# Patient Record
Sex: FEMALE | Race: WHITE | NOT HISPANIC OR LATINO | Employment: OTHER | URBAN - METROPOLITAN AREA
[De-identification: names, ages, dates, MRNs, and addresses within clinical notes are randomized per-mention and may not be internally consistent; named-entity substitution may affect disease eponyms.]

---

## 2017-02-08 RX ORDER — METFORMIN HYDROCHLORIDE 500 MG/1
TABLET ORAL
Qty: 60 TABLET | Refills: 5 | Status: SHIPPED | OUTPATIENT
Start: 2017-02-08 | End: 2017-09-05 | Stop reason: SDUPTHER

## 2017-02-08 RX ORDER — NABUMETONE 500 MG/1
TABLET, FILM COATED ORAL
Qty: 30 TABLET | Refills: 1 | Status: SHIPPED | OUTPATIENT
Start: 2017-02-08 | End: 2017-04-07 | Stop reason: SDUPTHER

## 2017-03-07 RX ORDER — SULFASALAZINE 500 MG/1
TABLET ORAL
Qty: 60 TABLET | Refills: 3 | Status: SHIPPED | OUTPATIENT
Start: 2017-03-07 | End: 2017-07-17 | Stop reason: SDUPTHER

## 2017-03-07 RX ORDER — ATORVASTATIN CALCIUM 40 MG/1
TABLET, FILM COATED ORAL
Qty: 30 TABLET | Refills: 3 | Status: SHIPPED | OUTPATIENT
Start: 2017-03-07 | End: 2017-06-14 | Stop reason: ALTCHOICE

## 2017-04-10 RX ORDER — NABUMETONE 500 MG/1
TABLET, FILM COATED ORAL
Qty: 30 TABLET | Refills: 0 | Status: SHIPPED | OUTPATIENT
Start: 2017-04-10 | End: 2017-06-07 | Stop reason: SDUPTHER

## 2017-05-30 ENCOUNTER — OFFICE VISIT (OUTPATIENT)
Dept: INTERNAL MEDICINE | Facility: CLINIC | Age: 77
End: 2017-05-30
Payer: MEDICARE

## 2017-05-30 VITALS
RESPIRATION RATE: 18 BRPM | BODY MASS INDEX: 31.57 KG/M2 | TEMPERATURE: 99 F | HEIGHT: 58 IN | DIASTOLIC BLOOD PRESSURE: 70 MMHG | OXYGEN SATURATION: 98 % | WEIGHT: 150.38 LBS | SYSTOLIC BLOOD PRESSURE: 112 MMHG | HEART RATE: 80 BPM

## 2017-05-30 DIAGNOSIS — T14.8XXA BITE BY ANIMAL: Primary | ICD-10-CM

## 2017-05-30 PROCEDURE — 1159F MED LIST DOCD IN RCRD: CPT | Performed by: INTERNAL MEDICINE

## 2017-05-30 PROCEDURE — 99213 OFFICE O/P EST LOW 20 MIN: CPT | Mod: S$PBB | Performed by: INTERNAL MEDICINE

## 2017-05-30 PROCEDURE — 1126F AMNT PAIN NOTED NONE PRSNT: CPT | Performed by: INTERNAL MEDICINE

## 2017-05-30 PROCEDURE — 99213 OFFICE O/P EST LOW 20 MIN: CPT | Mod: PBBFAC | Performed by: INTERNAL MEDICINE

## 2017-05-30 PROCEDURE — 99999 PR PBB SHADOW E&M-EST. PATIENT-LVL III: CPT | Mod: PBBFAC,,, | Performed by: INTERNAL MEDICINE

## 2017-05-30 RX ORDER — AMOXICILLIN AND CLAVULANATE POTASSIUM 875; 125 MG/1; MG/1
1 TABLET, FILM COATED ORAL 2 TIMES DAILY
Qty: 14 TABLET | Refills: 0 | Status: SHIPPED | OUTPATIENT
Start: 2017-05-30 | End: 2017-05-30 | Stop reason: SDUPTHER

## 2017-05-30 RX ORDER — AMOXICILLIN AND CLAVULANATE POTASSIUM 875; 125 MG/1; MG/1
1 TABLET, FILM COATED ORAL 2 TIMES DAILY
Qty: 14 TABLET | Refills: 0 | Status: SHIPPED | OUTPATIENT
Start: 2017-05-30 | End: 2017-06-14

## 2017-05-30 NOTE — PROGRESS NOTES
Subjective:       Patient ID: Gretel Ashton is a 76 y.o. female.    Chief Complaint: Animal Bite (mouse bite; right index finger.)      HPI:  Patient presents to clinic with bit on right index finger from mouse. She was trying to remove a trap and she didn't know the trap and two mice on it and the 2nd mouse bite her finger when she reached for the back of the trap. No fevers. No redness to finger yet. Mild pain on tip of finger.     Past Medical History:   Diagnosis Date    Arthritis     Depression     Diabetes mellitus type II     Hyperlipidemia     Hypertension     Pacemaker        Family History   Problem Relation Age of Onset    Cancer Mother     Stroke Father     Cancer Sister     Stroke Maternal Grandmother        Social History     Social History    Marital status:      Spouse name: N/A    Number of children: N/A    Years of education: N/A     Occupational History    Not on file.     Social History Main Topics    Smoking status: Former Smoker     Packs/day: 2.00     Years: 43.00     Types: Cigarettes     Quit date: 1/1/2000    Smokeless tobacco: Never Used    Alcohol use No    Drug use: No    Sexual activity: Not on file     Other Topics Concern    Not on file     Social History Narrative    No narrative on file       Review of Systems   Constitutional: Negative for activity change, fatigue, fever and unexpected weight change.   HENT: Negative for congestion, ear pain, hearing loss, rhinorrhea, sore throat and tinnitus.    Eyes: Negative for pain, redness and visual disturbance.   Respiratory: Negative for cough, shortness of breath and wheezing.    Cardiovascular: Negative for chest pain, palpitations and leg swelling.   Gastrointestinal: Negative for abdominal pain, blood in stool, constipation, diarrhea, nausea and vomiting.   Genitourinary: Negative for dysuria, frequency, pelvic pain and urgency.   Musculoskeletal: Negative for back pain, joint swelling and neck pain.    Skin: Negative for color change, rash and wound.   Neurological: Negative for dizziness, tremors, weakness, light-headedness and headaches.         Objective:      Physical Exam   Constitutional: She is oriented to person, place, and time. She appears well-developed and well-nourished. No distress.   HENT:   Head: Normocephalic and atraumatic.   Right Ear: External ear normal.   Left Ear: External ear normal.   Eyes: Conjunctivae and EOM are normal. Pupils are equal, round, and reactive to light.   Neck: Neck supple. No tracheal deviation present.   Cardiovascular: Normal rate and regular rhythm.    Pulmonary/Chest: Effort normal. No respiratory distress.   Abdominal: Soft. Bowel sounds are normal. She exhibits no distension.   Neurological: She is alert and oriented to person, place, and time. No cranial nerve deficit.   Skin: Skin is warm and dry.   Small bite laura tip of right index finger   Psychiatric: She has a normal mood and affect. Her behavior is normal.   Vitals reviewed.      Assessment:       1. Bite by animal        Plan:       Gretel was seen today for animal bite.    Diagnoses and all orders for this visit:    Bite by animal  -     Discontinue: amoxicillin-clavulanate 875-125mg (AUGMENTIN) 875-125 mg per tablet; Take 1 tablet by mouth 2 (two) times daily.  -     amoxicillin-clavulanate 875-125mg (AUGMENTIN) 875-125 mg per tablet; Take 1 tablet by mouth 2 (two) times daily.    looks good. Will print antibx in case gets red, hot, swollen, or fevers in next 24-48 hours since injury  Just occurred a few hours ago. But may not need antibx.    Call if worsening

## 2017-05-30 NOTE — PATIENT INSTRUCTIONS
Animal Bite (General)  An animal bite can cause a wound deep enough to break the skin. In such cases, the wound is cleaned and then closed. Sometimes, the wound is not closed completely. This is so that fluid can drain if the wound becomes infected. In addition to wound care, a tetanus shot may be given, if needed.    Home care  · Care for the wound as directed. If a dressing was applied to the wound, be sure to change it as directed.  · Wash your hands well with soap and warm water before and after caring for the wound. This helps lower the risk of infection.  · If the wound bleeds, place a clean, soft cloth on the wound. Then firmly apply pressure until the bleeding stops. This may take up to 5 minutes. Do not release the pressure and look at the wound during this time.  · Most skin wounds heal within 10 days. But an infection can occur even with proper treatment. So be sure to watch the wound for signs of infection (see below). Check the wound as often as directed by your health care provider.  · Antibiotics may be prescribed. These help prevent or treat infection. If youre given antibiotics, take them as directed. Also be sure to complete the medications.  Rabies Prevention  Rabies is a virus that can be carried in certain animals. These can include domestic animals such as dogs and cats. Wild animals such as skunks, raccoons, foxes, and bats can also carry rabies. Pets fully vaccinated against rabies (2 shots) are at very low risk of infection. But because human rabies is almost always fatal, any biting pet should be confined for 10 days as an extra precaution. In general, if there is a risk for rabies, the following steps may need to be taken:  · If someones pet dog or cat has bitten you, it should be kept in a secure area for the next 10 days to watch for signs of illness. (If the pet owner wont allow this, contact your local animal control center.) If the dog or cat becomes ill or dies during that time,  contact your local animal control center at once so the animal may be tested for rabies. If the pet stays healthy for the next 10 days, there is no danger of rabies in the animal or you.  · If a stray pet bit you, contact your local animal control center. They can give information on capture, quarantine, and animal rabies testing.  · If you cant locate the animal that bit you in the next 2 days, and if rabies exists in your region, you may need to receive the rabies vaccine series. Call your health care provider right away. Or, return to the emergency department promptly.  · All animal bites should be reported to the local animal control center. If you were not given a form to fill out, you can report this yourself.  Follow-up care  Follow up with your health care provider, or as directed.  When to seek medical advice  Call your health care provider right away if any of these occur:  · Signs of infection:  ¨ Spreading redness or warmth from the wound  ¨ Increased pain or swelling  ¨ Fever of 100.4ºF (38ºC) or higher, or as directed by your health care provider  ¨ Colored fluid or pus draining from the wound  · Signs of rabies infection:  ¨ Headache  ¨ Confusion  ¨ Strange behavior  ¨ Seizure  · Decreased ability to move any body part near the bite area  · Bleeding that cannot be stopped after 5 minutes of firm pressure  Date Last Reviewed: 3/23/2015  © 4009-1378 [employer]. [address] [place] [address], [place] [address]. All rights reserved. This information is not intended as a substitute for professional medical care. Always follow your healthcare professional's instructions.

## 2017-06-07 ENCOUNTER — CLINICAL SUPPORT (OUTPATIENT)
Dept: INTERNAL MEDICINE | Facility: CLINIC | Age: 77
End: 2017-06-07
Payer: MEDICARE

## 2017-06-07 DIAGNOSIS — E78.5 HYPERLIPIDEMIA, UNSPECIFIED HYPERLIPIDEMIA TYPE: ICD-10-CM

## 2017-06-07 DIAGNOSIS — E11.9 CONTROLLED TYPE 2 DIABETES MELLITUS WITHOUT COMPLICATION, WITHOUT LONG-TERM CURRENT USE OF INSULIN: ICD-10-CM

## 2017-06-07 LAB
ALBUMIN SERPL BCP-MCNC: 3.4 G/DL
ALP SERPL-CCNC: 78 U/L
ALT SERPL W/O P-5'-P-CCNC: 14 U/L
ANION GAP SERPL CALC-SCNC: 8 MMOL/L
AST SERPL-CCNC: 21 U/L
BASOPHILS # BLD AUTO: 0.02 K/UL
BASOPHILS NFR BLD: 0.4 %
BILIRUB SERPL-MCNC: 0.3 MG/DL
BUN SERPL-MCNC: 19 MG/DL
CALCIUM SERPL-MCNC: 9.4 MG/DL
CHLORIDE SERPL-SCNC: 103 MMOL/L
CHOLEST/HDLC SERPL: 4.3 {RATIO}
CO2 SERPL-SCNC: 31 MMOL/L
CREAT SERPL-MCNC: 0.7 MG/DL
CREAT UR-MCNC: 103.2 MG/DL
DIFFERENTIAL METHOD: ABNORMAL
EOSINOPHIL # BLD AUTO: 0.1 K/UL
EOSINOPHIL NFR BLD: 2.2 %
ERYTHROCYTE [DISTWIDTH] IN BLOOD BY AUTOMATED COUNT: 16.9 %
EST. GFR  (AFRICAN AMERICAN): >60 ML/MIN/1.73 M^2
EST. GFR  (NON AFRICAN AMERICAN): >60 ML/MIN/1.73 M^2
GLUCOSE SERPL-MCNC: 92 MG/DL
HCT VFR BLD AUTO: 36.2 %
HDL/CHOLESTEROL RATIO: 23.5 %
HDLC SERPL-MCNC: 132 MG/DL
HDLC SERPL-MCNC: 31 MG/DL
HGB BLD-MCNC: 11.2 G/DL
LDLC SERPL CALC-MCNC: 86.4 MG/DL
LYMPHOCYTES # BLD AUTO: 1.5 K/UL
LYMPHOCYTES NFR BLD: 30 %
MCH RBC QN AUTO: 25.7 PG
MCHC RBC AUTO-ENTMCNC: 30.9 %
MCV RBC AUTO: 83 FL
MICROALBUMIN UR DL<=1MG/L-MCNC: 7 UG/ML
MICROALBUMIN/CREATININE RATIO: 6.8 UG/MG
MONOCYTES # BLD AUTO: 0.6 K/UL
MONOCYTES NFR BLD: 11.6 %
NEUTROPHILS # BLD AUTO: 2.9 K/UL
NEUTROPHILS NFR BLD: 55.8 %
NONHDLC SERPL-MCNC: 101 MG/DL
PLATELET # BLD AUTO: 239 K/UL
PMV BLD AUTO: 12.1 FL
POTASSIUM SERPL-SCNC: 4.2 MMOL/L
PROT SERPL-MCNC: 7.4 G/DL
RBC # BLD AUTO: 4.36 M/UL
SODIUM SERPL-SCNC: 142 MMOL/L
TRIGL SERPL-MCNC: 73 MG/DL
TSH SERPL DL<=0.005 MIU/L-ACNC: 2.66 UIU/ML
WBC # BLD AUTO: 5.1 K/UL

## 2017-06-07 PROCEDURE — 36415 COLL VENOUS BLD VENIPUNCTURE: CPT | Mod: PBBFAC

## 2017-06-07 PROCEDURE — 80053 COMPREHEN METABOLIC PANEL: CPT

## 2017-06-07 PROCEDURE — 85025 COMPLETE CBC W/AUTO DIFF WBC: CPT

## 2017-06-07 PROCEDURE — 82570 ASSAY OF URINE CREATININE: CPT

## 2017-06-07 PROCEDURE — 84443 ASSAY THYROID STIM HORMONE: CPT

## 2017-06-07 PROCEDURE — 83036 HEMOGLOBIN GLYCOSYLATED A1C: CPT

## 2017-06-07 PROCEDURE — 80061 LIPID PANEL: CPT

## 2017-06-07 RX ORDER — NABUMETONE 500 MG/1
TABLET, FILM COATED ORAL
Qty: 30 TABLET | Refills: 1 | Status: SHIPPED | OUTPATIENT
Start: 2017-06-07 | End: 2017-07-17 | Stop reason: SDUPTHER

## 2017-06-08 LAB
ESTIMATED AVG GLUCOSE: 123 MG/DL
HBA1C MFR BLD HPLC: 5.9 %

## 2017-06-14 ENCOUNTER — OFFICE VISIT (OUTPATIENT)
Dept: INTERNAL MEDICINE | Facility: CLINIC | Age: 77
End: 2017-06-14
Payer: MEDICARE

## 2017-06-14 VITALS
BODY MASS INDEX: 31.79 KG/M2 | HEART RATE: 83 BPM | WEIGHT: 151.44 LBS | DIASTOLIC BLOOD PRESSURE: 56 MMHG | HEIGHT: 58 IN | SYSTOLIC BLOOD PRESSURE: 90 MMHG | RESPIRATION RATE: 16 BRPM | OXYGEN SATURATION: 91 %

## 2017-06-14 DIAGNOSIS — Z29.9 PREVENTIVE MEASURE: ICD-10-CM

## 2017-06-14 DIAGNOSIS — M19.90 ARTHRITIS: ICD-10-CM

## 2017-06-14 DIAGNOSIS — E11.9 CONTROLLED TYPE 2 DIABETES MELLITUS WITHOUT COMPLICATION, WITHOUT LONG-TERM CURRENT USE OF INSULIN: ICD-10-CM

## 2017-06-14 DIAGNOSIS — J44.9 CHRONIC OBSTRUCTIVE PULMONARY DISEASE, UNSPECIFIED COPD TYPE: ICD-10-CM

## 2017-06-14 DIAGNOSIS — E78.5 HYPERLIPIDEMIA, UNSPECIFIED HYPERLIPIDEMIA TYPE: ICD-10-CM

## 2017-06-14 DIAGNOSIS — K52.9 COLITIS: ICD-10-CM

## 2017-06-14 DIAGNOSIS — I10 ESSENTIAL HYPERTENSION: Primary | ICD-10-CM

## 2017-06-14 PROCEDURE — 99214 OFFICE O/P EST MOD 30 MIN: CPT | Mod: S$PBB | Performed by: INTERNAL MEDICINE

## 2017-06-14 PROCEDURE — 99213 OFFICE O/P EST LOW 20 MIN: CPT | Mod: PBBFAC | Performed by: INTERNAL MEDICINE

## 2017-06-14 PROCEDURE — 99999 PR PBB SHADOW E&M-EST. PATIENT-LVL III: CPT | Mod: PBBFAC,,, | Performed by: INTERNAL MEDICINE

## 2017-06-14 PROCEDURE — 90732 PPSV23 VACC 2 YRS+ SUBQ/IM: CPT | Mod: PBBFAC

## 2017-06-14 PROCEDURE — 1159F MED LIST DOCD IN RCRD: CPT | Performed by: INTERNAL MEDICINE

## 2017-06-14 RX ORDER — DOCUSATE SODIUM 100 MG/1
100 CAPSULE, LIQUID FILLED ORAL DAILY
COMMUNITY
End: 2018-05-16

## 2017-06-14 NOTE — PROGRESS NOTES
Subjective:       Patient ID: Gretel Ashton is a 76 y.o. female.    Chief Complaint: Hypertension (follow up with lab review); Hyperlipidemia; Diabetes; Depression; Arthritis; and Dizziness    Gretel Ashton is a 76 y.o. female who presents for Type II DM, Hypertension, and Hyperlipidemia follow up. Labs were reviewed with patient today.        Hyperlipidemia   This is a chronic problem. The problem is uncontrolled. Recent lipid tests were reviewed and are high. Pertinent negatives include no chest pain or myalgias. Current antihyperlipidemic treatment includes statins. The current treatment provides moderate improvement of lipids.   Diabetes   She presents for her follow-up diabetic visit. She has type 2 diabetes mellitus. Hypoglycemia symptoms include dizziness. Pertinent negatives for hypoglycemia include no confusion, headaches, nervousness/anxiousness or pallor. Associated symptoms include fatigue. Pertinent negatives for diabetes include no chest pain, no polydipsia, no polyphagia and no weakness. There are no hypoglycemic complications. Symptoms are worsening. There are no diabetic complications. Risk factors for coronary artery disease include diabetes mellitus, dyslipidemia, hypertension, obesity, post-menopausal and sedentary lifestyle. Current diabetic treatment includes oral agent (monotherapy). Her weight is stable. She is following a generally healthy diet. Her breakfast blood glucose range is generally 130-140 mg/dl. An ACE inhibitor/angiotensin II receptor blocker is not being taken.   Arthritis   Presents for follow-up visit. Affected location: MCps both hands  Associated symptoms include fatigue. Pertinent negatives include no dysuria, fever or rash.   Dizziness: no hearing loss, no fever, no headaches, no nausea, no vomiting, no weakness, no palpitations and no chest pain.  Medication Refill   Associated symptoms include arthralgias, coughing and fatigue. Pertinent negatives include no chest  pain, chills, congestion, fever, headaches, myalgias, nausea, numbness, rash, sore throat, vomiting or weakness.     Review of Systems   Constitutional: Positive for fatigue. Negative for chills and fever.   HENT: Negative for congestion, hearing loss, sinus pressure and sore throat.    Eyes: Negative for photophobia.   Respiratory: Positive for cough. Negative for choking, chest tightness and wheezing.         On home o2 ; COPD ;sleeps with O2 at night    Cardiovascular: Negative for chest pain and palpitations.   Gastrointestinal: Negative for blood in stool, nausea and vomiting.   Endocrine: Negative for polydipsia and polyphagia.   Genitourinary: Negative for dysuria and hematuria.   Musculoskeletal: Positive for arthralgias and arthritis. Negative for myalgias.   Skin: Negative for pallor and rash.   Neurological: Positive for dizziness. Negative for weakness, numbness and headaches.   Hematological: Does not bruise/bleed easily.   Psychiatric/Behavioral: Negative for confusion, dysphoric mood, hallucinations, sleep disturbance and suicidal ideas. The patient is not nervous/anxious.        Objective:      Physical Exam   Constitutional: She is oriented to person, place, and time. She appears well-developed and well-nourished.   HENT:   Head: Normocephalic and atraumatic.   Right Ear: External ear normal. A middle ear effusion is present.   Left Ear: External ear normal. A middle ear effusion is present.   Nose: Nose normal.   Mouth/Throat: Oropharynx is clear and moist. Mucous membranes are pale.   Eyes: Conjunctivae and EOM are normal. Pupils are equal, round, and reactive to light.   Neck: Normal range of motion. Neck supple. No JVD present. No tracheal deviation present. No thyromegaly present.   Cardiovascular: Normal rate, regular rhythm, normal heart sounds and intact distal pulses.    Pulses:       Dorsalis pedis pulses are 1+ on the right side, and 1+ on the left side.        Posterior tibial pulses are  1+ on the right side, and 1+ on the left side.   Pulmonary/Chest: Effort normal. No respiratory distress. She has no wheezes. She has no rales. She exhibits no tenderness.   Abdominal: Soft. Bowel sounds are normal. She exhibits no distension and no mass. There is no tenderness. There is no rebound and no guarding.   Musculoskeletal: Normal range of motion. She exhibits no edema.   Feet:   Right Foot:   Protective Sensation: 5 sites tested. 5 sites sensed.   Skin Integrity: Negative for ulcer, erythema or dry skin.   Left Foot:   Protective Sensation: 5 sites tested. 5 sites sensed.   Skin Integrity: Negative for ulcer, erythema or dry skin.   Lymphadenopathy:     She has no cervical adenopathy.   Neurological: She is alert and oriented to person, place, and time. She has normal reflexes. No cranial nerve deficit. She exhibits normal muscle tone. Coordination normal.   Skin: Skin is warm and dry.   Psychiatric: She has a normal mood and affect. Her behavior is normal. Judgment and thought content normal.   Nursing note and vitals reviewed.      Assessment:       1. Essential hypertension    2. Controlled type 2 diabetes mellitus without complication, without long-term current use of insulin    3. Hyperlipidemia, unspecified hyperlipidemia type    4. Colitis    5. Chronic obstructive pulmonary disease, unspecified COPD type    6. Arthritis    7. Preventive measure        Plan:   Gretel was seen today for hypertension, hyperlipidemia, diabetes, depression, arthritis and dizziness.    Diagnoses and all orders for this visit:    Essential hypertension  -     CBC auto differential; Future  -     Comprehensive metabolic panel; Future  Resolved.  Controlled type 2 diabetes mellitus without complication, without long-term current use of insulin  -     Hemoglobin A1c; Future  -     Microalbumin/creatinine urine ratio; Future  Patient has controlled Diabetes .  We discussed about diet ;low in calories. Avoid sweats,  sodas.  Also increasing activity;walking 2-3 miles a day.   gave patient  instructions about adherence to plan.  Goal of  A1c  less than 7 % stressed.  Also goal of LDL less than 70 highlighted to patient.  Hyperlipidemia, unspecified hyperlipidemia type  -     Lipid panel; Future  -     TSH; Future  Well controlled.  Continue same medication and dose.  Colitis  Stable;  Continue with sulfasalazine  Chronic obstructive pulmonary disease, unspecified COPD type  Stable.  Continue advair  Arthritis  -     CBC auto differential; Future  Hand arthritis ; helped by relafen     Preventive measure  -     Pneumococcal Polysaccharide Vaccine (23 Valent) (SQ/IM)

## 2017-07-17 RX ORDER — SULFASALAZINE 500 MG/1
TABLET ORAL
Qty: 60 TABLET | Refills: 3 | Status: SHIPPED | OUTPATIENT
Start: 2017-07-17 | End: 2017-12-04 | Stop reason: SDUPTHER

## 2017-07-17 RX ORDER — NABUMETONE 500 MG/1
TABLET, FILM COATED ORAL
Qty: 30 TABLET | Refills: 3 | Status: SHIPPED | OUTPATIENT
Start: 2017-07-17 | End: 2017-12-04 | Stop reason: SDUPTHER

## 2017-07-20 ENCOUNTER — OFFICE VISIT (OUTPATIENT)
Dept: INTERNAL MEDICINE | Facility: CLINIC | Age: 77
End: 2017-07-20
Payer: MEDICARE

## 2017-07-20 VITALS
HEIGHT: 58 IN | WEIGHT: 151.44 LBS | DIASTOLIC BLOOD PRESSURE: 72 MMHG | BODY MASS INDEX: 31.79 KG/M2 | HEART RATE: 81 BPM | RESPIRATION RATE: 14 BRPM | SYSTOLIC BLOOD PRESSURE: 130 MMHG | OXYGEN SATURATION: 92 %

## 2017-07-20 DIAGNOSIS — Z02.6 ENCOUNTER FOR INSURANCE EXAMINATION: Primary | ICD-10-CM

## 2017-07-20 DIAGNOSIS — Z12.31 ENCOUNTER FOR SCREENING MAMMOGRAM FOR MALIGNANT NEOPLASM OF BREAST: ICD-10-CM

## 2017-07-20 PROCEDURE — 99999 PR PBB SHADOW E&M-EST. PATIENT-LVL III: CPT | Mod: PBBFAC,,, | Performed by: INTERNAL MEDICINE

## 2017-07-20 PROCEDURE — 99213 OFFICE O/P EST LOW 20 MIN: CPT | Mod: PBBFAC | Performed by: INTERNAL MEDICINE

## 2017-07-20 PROCEDURE — 99213 OFFICE O/P EST LOW 20 MIN: CPT | Mod: S$PBB | Performed by: INTERNAL MEDICINE

## 2017-07-20 NOTE — PROGRESS NOTES
Subjective:       Patient ID: Gretel Ashton is a 76 y.o. female.    Chief Complaint: Insurance Documents to be completed    Gretel Ashton is a 76 y.o. female  Here for need paperwork filled for her auto insurance brought by her son.  She has been driving since age of 15 yrs.  She reports no issues with driving .  No issues with legs arms; son vouches she is a careful    I see no restrictions.      Review of Systems   Constitutional: Positive for fatigue. Negative for chills and fever.   HENT: Negative for congestion, hearing loss, sinus pressure and sore throat.    Eyes: Negative for photophobia.   Respiratory: Positive for cough. Negative for choking, chest tightness and wheezing.         On home o2 ; COPD ;sleeps with O2 at night    Cardiovascular: Negative for chest pain and palpitations.   Gastrointestinal: Negative for blood in stool, nausea and vomiting.   Endocrine: Negative for polydipsia and polyphagia.   Genitourinary: Negative for dysuria and hematuria.   Musculoskeletal: Positive for arthralgias. Negative for myalgias.   Skin: Negative for pallor and rash.   Neurological: Positive for dizziness. Negative for weakness, numbness and headaches.   Hematological: Does not bruise/bleed easily.   Psychiatric/Behavioral: Negative for confusion, dysphoric mood, hallucinations, sleep disturbance and suicidal ideas. The patient is not nervous/anxious.        Objective:      Physical Exam   Constitutional: She is oriented to person, place, and time. She appears well-developed and well-nourished.   HENT:   Head: Normocephalic and atraumatic.   Nose: Nose normal.   Mouth/Throat: Oropharynx is clear and moist. Mucous membranes are pale.   Eyes: Conjunctivae and EOM are normal. Pupils are equal, round, and reactive to light.   Neck: Normal range of motion. Neck supple. No JVD present. No tracheal deviation present. No thyromegaly present.   Cardiovascular: Normal rate, regular rhythm, normal heart sounds and  intact distal pulses.    Pulmonary/Chest: Effort normal. No respiratory distress. She has no wheezes. She has no rales. She exhibits no tenderness.   Abdominal: Soft. Bowel sounds are normal. She exhibits no distension and no mass. There is no tenderness. There is no rebound and no guarding.   Musculoskeletal: Normal range of motion. She exhibits no edema.   Lymphadenopathy:     She has no cervical adenopathy.   Neurological: She is alert and oriented to person, place, and time. She has normal reflexes. No cranial nerve deficit. She exhibits normal muscle tone. Coordination normal.   Skin: Skin is warm and dry.   Psychiatric: She has a normal mood and affect. Her behavior is normal. Judgment and thought content normal.   Nursing note and vitals reviewed.      Assessment:       1. Encounter for insurance examination    2. Encounter for screening mammogram for malignant neoplasm of breast        Plan:   Gretel was seen today for insurance documents to be completed.    Diagnoses and all orders for this visit:    Encounter for insurance examination  Paperwork completed    Encounter for screening mammogram for malignant neoplasm of breast  -     Mammo Digital Screening Bilat with CAD; Future

## 2017-07-21 ENCOUNTER — HOSPITAL ENCOUNTER (OUTPATIENT)
Dept: RADIOLOGY | Facility: HOSPITAL | Age: 77
Discharge: HOME | End: 2017-07-21
Attending: INTERNAL MEDICINE
Payer: MEDICARE

## 2017-07-21 VITALS — BODY MASS INDEX: 34.15 KG/M2 | WEIGHT: 200 LBS | HEIGHT: 64 IN

## 2017-07-21 DIAGNOSIS — Z12.31 ENCOUNTER FOR SCREENING MAMMOGRAM FOR MALIGNANT NEOPLASM OF BREAST: ICD-10-CM

## 2017-07-21 PROCEDURE — 77067 SCR MAMMO BI INCL CAD: CPT | Mod: 26,,, | Performed by: RADIOLOGY

## 2017-07-21 PROCEDURE — 77067 SCR MAMMO BI INCL CAD: CPT | Mod: TC

## 2017-07-21 PROCEDURE — 77063 BREAST TOMOSYNTHESIS BI: CPT | Mod: 26,,, | Performed by: RADIOLOGY

## 2017-09-05 RX ORDER — METFORMIN HYDROCHLORIDE 500 MG/1
TABLET ORAL
Qty: 60 TABLET | Refills: 5 | Status: SHIPPED | OUTPATIENT
Start: 2017-09-05 | End: 2018-05-01 | Stop reason: SDUPTHER

## 2017-09-05 NOTE — TELEPHONE ENCOUNTER
Requested Prescriptions     Pending Prescriptions Disp Refills    metformin (GLUCOPHAGE) 500 MG tablet 60 tablet 5     Sig: TAKE ONE TABLET BY MOUTH TWICE DAILY AFTER MEALS   LOV: 7/20/17

## 2017-09-06 RX ORDER — METFORMIN HYDROCHLORIDE 500 MG/1
TABLET ORAL
Qty: 60 TABLET | Refills: 5 | Status: SHIPPED | OUTPATIENT
Start: 2017-09-06 | End: 2017-10-02 | Stop reason: SDUPTHER

## 2017-10-02 ENCOUNTER — OFFICE VISIT (OUTPATIENT)
Dept: INTERNAL MEDICINE | Facility: CLINIC | Age: 77
End: 2017-10-02
Payer: MEDICARE

## 2017-10-02 VITALS
DIASTOLIC BLOOD PRESSURE: 68 MMHG | TEMPERATURE: 98 F | WEIGHT: 155.63 LBS | HEART RATE: 83 BPM | HEIGHT: 64 IN | SYSTOLIC BLOOD PRESSURE: 122 MMHG | BODY MASS INDEX: 26.57 KG/M2 | RESPIRATION RATE: 18 BRPM

## 2017-10-02 DIAGNOSIS — J06.9 VIRAL URI WITH COUGH: Primary | ICD-10-CM

## 2017-10-02 PROCEDURE — 99999 PR PBB SHADOW E&M-EST. PATIENT-LVL III: CPT | Mod: PBBFAC,,, | Performed by: INTERNAL MEDICINE

## 2017-10-02 PROCEDURE — 99999 PR STA SHADOW: CPT | Mod: PBBFAC,,, | Performed by: INTERNAL MEDICINE

## 2017-10-02 PROCEDURE — 99999 PR STA SHADOW: CPT | Mod: PBBFAC,,,

## 2017-10-02 PROCEDURE — 96372 THER/PROPH/DIAG INJ SC/IM: CPT | Mod: PBBFAC

## 2017-10-02 PROCEDURE — 99213 OFFICE O/P EST LOW 20 MIN: CPT | Mod: S$PBB | Performed by: INTERNAL MEDICINE

## 2017-10-02 PROCEDURE — 99213 OFFICE O/P EST LOW 20 MIN: CPT | Mod: PBBFAC | Performed by: INTERNAL MEDICINE

## 2017-10-02 RX ORDER — PROMETHAZINE HYDROCHLORIDE AND CODEINE PHOSPHATE 6.25; 1 MG/5ML; MG/5ML
5 SOLUTION ORAL EVERY 6 HOURS PRN
Qty: 120 ML | Refills: 0 | Status: SHIPPED | OUTPATIENT
Start: 2017-10-02 | End: 2017-10-12

## 2017-10-02 RX ORDER — METHYLPREDNISOLONE ACETATE 80 MG/ML
80 INJECTION, SUSPENSION INTRA-ARTICULAR; INTRALESIONAL; INTRAMUSCULAR; SOFT TISSUE
Status: COMPLETED | OUTPATIENT
Start: 2017-10-02 | End: 2017-10-02

## 2017-10-02 RX ORDER — ALBUTEROL SULFATE 90 UG/1
2 AEROSOL, METERED RESPIRATORY (INHALATION) EVERY 6 HOURS PRN
Qty: 1 INHALER | Refills: 5 | Status: SHIPPED | OUTPATIENT
Start: 2017-10-02 | End: 2018-05-16

## 2017-10-02 RX ADMIN — METHYLPREDNISOLONE ACETATE 80 MG: 80 INJECTION, SUSPENSION INTRA-ARTICULAR; INTRALESIONAL; INTRAMUSCULAR; SOFT TISSUE at 01:10

## 2017-10-02 NOTE — PROGRESS NOTES
Subjective:       Patient ID: Gretel Ashton is a 76 y.o. female.    Chief Complaint: Shortness of Breath; Cough; and Nasal Congestion      HPI:  Patient is known to me from acute visit and presnets with cough and congestion. + sore throat and PND. Sx started 3 days ago. Cough is productive. Reports mild SOB. No wheezing. She carries a diagnosis of COPD. She was prescribed Advair in the past but not using currently. No fevers. She has not taken anything OTC yet.     Past Medical History:   Diagnosis Date    Arthritis     Depression     Diabetes mellitus type II     Hyperlipidemia     Hypertension     Pacemaker        Family History   Problem Relation Age of Onset    Cancer Mother     Breast cancer Mother     Stroke Father     Cancer Sister     Breast cancer Sister     Stroke Maternal Grandmother        Social History     Social History    Marital status:      Spouse name: N/A    Number of children: N/A    Years of education: N/A     Occupational History    Not on file.     Social History Main Topics    Smoking status: Former Smoker     Packs/day: 2.00     Years: 43.00     Types: Cigarettes     Quit date: 1/1/2000    Smokeless tobacco: Never Used    Alcohol use No    Drug use: No    Sexual activity: Not on file     Other Topics Concern    Not on file     Social History Narrative    No narrative on file       Review of Systems   Constitutional: Negative for activity change, fatigue, fever and unexpected weight change.   HENT: Positive for congestion, postnasal drip and sore throat. Negative for ear pain, hearing loss and rhinorrhea.    Eyes: Negative for redness and visual disturbance.   Respiratory: Positive for cough and shortness of breath. Negative for wheezing.    Cardiovascular: Negative for chest pain, palpitations and leg swelling.   Gastrointestinal: Negative for abdominal pain, constipation, diarrhea, nausea and vomiting.   Genitourinary: Negative for dysuria, frequency and  urgency.   Musculoskeletal: Negative for back pain, joint swelling and neck pain.   Skin: Negative for color change, rash and wound.   Neurological: Negative for dizziness, tremors, weakness, light-headedness and headaches.         Objective:      Physical Exam   Constitutional: She is oriented to person, place, and time. She appears well-developed and well-nourished. No distress.   HENT:   Head: Normocephalic and atraumatic.   Right Ear: Tympanic membrane and external ear normal.   Left Ear: Tympanic membrane and external ear normal.   Mouth/Throat: Posterior oropharyngeal erythema present. No oropharyngeal exudate or posterior oropharyngeal edema.   Eyes: Conjunctivae and EOM are normal. Pupils are equal, round, and reactive to light. Right eye exhibits no discharge. Left eye exhibits no discharge.   Neck: Neck supple. No tracheal deviation present.   Cardiovascular: Normal rate and regular rhythm.    Murmur heard.  Pulmonary/Chest: Effort normal and breath sounds normal. No respiratory distress. She has no wheezes. She has no rales.   Abdominal: Soft. Bowel sounds are normal. She exhibits no distension. There is no tenderness.   Neurological: She is alert and oriented to person, place, and time. No cranial nerve deficit.   Skin: Skin is warm and dry.   Psychiatric: She has a normal mood and affect. Her behavior is normal.   Vitals reviewed.      Assessment:       1. Viral URI with cough        Plan:       Gretel was seen today for shortness of breath, cough and nasal congestion.    Diagnoses and all orders for this visit:    Viral URI with cough  -     methylPREDNISolone acetate injection 80 mg; Inject 1 mL (80 mg total) into the muscle one time.  -     albuterol 90 mcg/actuation inhaler; Inhale 2 puffs into the lungs every 6 (six) hours as needed for Wheezing.  -     promethazine-codeine 6.25-10 mg/5 ml (PHENERGAN WITH CODEINE) 6.25-10 mg/5 mL syrup; Take 5 mLs by mouth every 6 (six) hours as needed for  Cough.    no wheezing, lungs are clear  Treat as viral URI  Start claritin OTC daily  Call if not getting better or fever > 101 F    RTC PRN and as scheduled with PCP

## 2017-10-02 NOTE — PATIENT INSTRUCTIONS
Viral Upper Respiratory Illness (Adult)  You have a viral upper respiratory illness (URI), which is another term for the common cold. This illness is contagious during the first few days. It is spread through the air by coughing and sneezing. It may also be spread by direct contact (touching the sick person and then touching your own eyes, nose, or mouth). Frequent handwashing will decrease risk of spread. Most viral illnesses go away within 7 to 10 days with rest and simple home remedies. Sometimes the illness may last for several weeks. Antibiotics will not kill a virus, and they are generally not prescribed for this condition.    Home care  · If symptoms are severe, rest at home for the first 2 to 3 days. When you resume activity, don't let yourself get too tired.  · Avoid being exposed to cigarette smoke (yours or others).  · You may use acetaminophen or ibuprofen to control pain and fever, unless another medicine was prescribed. (Note: If you have chronic liver or kidney disease, have ever had a stomach ulcer or gastrointestinal bleeding, or are taking blood-thinning medicines, talk with your healthcare provider before using these medicines.) Aspirin should never be given to anyone under 18 years of age who is ill with a viral infection or fever. It may cause severe liver or brain damage.  · Your appetite may be poor, so a light diet is fine. Avoid dehydration by drinking 6 to 8 glasses of fluids per day (water, soft drinks, juices, tea, or soup). Extra fluids will help loosen secretions in the nose and lungs.  · Over-the-counter cold medicines will not shorten the length of time youre sick, but they may be helpful for the following symptoms: cough, sore throat, and nasal and sinus congestion. (Note: Do not use decongestants if you have high blood pressure.)  Follow-up care  Follow up with your healthcare provider, or as advised.  When to seek medical advice  Call your healthcare provider right away if any  of these occur:  · Cough with lots of colored sputum (mucus)  · Severe headache; face, neck, or ear pain  · Difficulty swallowing due to throat pain  · Fever of 100.4°F (38°C)  Call 911, or get immediate medical care  Call emergency services right away if any of these occur:  · Chest pain, shortness of breath, wheezing, or difficulty breathing  · Coughing up blood  · Inability to swallow due to throat pain  Date Last Reviewed: 9/13/2015  © 7370-3888 [employer]. [address] [place] [address]. All rights reserved. This information is not intended as a substitute for professional medical care. Always follow your healthcare professional's instructions.

## 2017-12-04 RX ORDER — SULFASALAZINE 500 MG/1
TABLET ORAL
Qty: 60 TABLET | Refills: 3 | Status: SHIPPED | OUTPATIENT
Start: 2017-12-04 | End: 2018-04-06 | Stop reason: SDUPTHER

## 2017-12-04 RX ORDER — NABUMETONE 500 MG/1
TABLET, FILM COATED ORAL
Qty: 30 TABLET | Refills: 3 | Status: SHIPPED | OUTPATIENT
Start: 2017-12-04 | End: 2018-04-06 | Stop reason: SDUPTHER

## 2017-12-07 ENCOUNTER — CLINICAL SUPPORT (OUTPATIENT)
Dept: INTERNAL MEDICINE | Facility: CLINIC | Age: 77
End: 2017-12-07
Payer: MEDICARE

## 2017-12-07 DIAGNOSIS — E11.9 CONTROLLED TYPE 2 DIABETES MELLITUS WITHOUT COMPLICATION, WITHOUT LONG-TERM CURRENT USE OF INSULIN: ICD-10-CM

## 2017-12-07 DIAGNOSIS — E78.5 HYPERLIPIDEMIA, UNSPECIFIED HYPERLIPIDEMIA TYPE: ICD-10-CM

## 2017-12-07 DIAGNOSIS — I10 ESSENTIAL HYPERTENSION: ICD-10-CM

## 2017-12-07 DIAGNOSIS — M19.90 ARTHRITIS: ICD-10-CM

## 2017-12-07 LAB
ALBUMIN SERPL BCP-MCNC: 3.3 G/DL
ALP SERPL-CCNC: 78 U/L
ALT SERPL W/O P-5'-P-CCNC: 15 U/L
ANION GAP SERPL CALC-SCNC: 9 MMOL/L
AST SERPL-CCNC: 22 U/L
BASOPHILS # BLD AUTO: 0.02 K/UL
BASOPHILS NFR BLD: 0.5 %
BILIRUB SERPL-MCNC: 0.3 MG/DL
BUN SERPL-MCNC: 15 MG/DL
CALCIUM SERPL-MCNC: 9.2 MG/DL
CHLORIDE SERPL-SCNC: 104 MMOL/L
CHOLEST SERPL-MCNC: 130 MG/DL
CHOLEST/HDLC SERPL: 3.2 {RATIO}
CO2 SERPL-SCNC: 32 MMOL/L
CREAT SERPL-MCNC: 0.7 MG/DL
CREAT UR-MCNC: 78.9 MG/DL
DIFFERENTIAL METHOD: ABNORMAL
EOSINOPHIL # BLD AUTO: 0.1 K/UL
EOSINOPHIL NFR BLD: 3.3 %
ERYTHROCYTE [DISTWIDTH] IN BLOOD BY AUTOMATED COUNT: 16.8 %
EST. GFR  (AFRICAN AMERICAN): >60 ML/MIN/1.73 M^2
EST. GFR  (NON AFRICAN AMERICAN): >60 ML/MIN/1.73 M^2
ESTIMATED AVG GLUCOSE: 105 MG/DL
GLUCOSE SERPL-MCNC: 99 MG/DL
HBA1C MFR BLD HPLC: 5.3 %
HCT VFR BLD AUTO: 36 %
HDLC SERPL-MCNC: 41 MG/DL
HDLC SERPL: 31.5 %
HGB BLD-MCNC: 11.2 G/DL
LDLC SERPL CALC-MCNC: 77.8 MG/DL
LYMPHOCYTES # BLD AUTO: 1.5 K/UL
LYMPHOCYTES NFR BLD: 35.8 %
MCH RBC QN AUTO: 26.4 PG
MCHC RBC AUTO-ENTMCNC: 31.1 G/DL
MCV RBC AUTO: 85 FL
MICROALBUMIN UR DL<=1MG/L-MCNC: 15 UG/ML
MICROALBUMIN/CREATININE RATIO: 19 UG/MG
MONOCYTES # BLD AUTO: 0.6 K/UL
MONOCYTES NFR BLD: 14.3 %
NEUTROPHILS # BLD AUTO: 1.9 K/UL
NEUTROPHILS NFR BLD: 46.1 %
NONHDLC SERPL-MCNC: 89 MG/DL
PLATELET # BLD AUTO: 207 K/UL
PMV BLD AUTO: 11.9 FL
POTASSIUM SERPL-SCNC: 4.1 MMOL/L
PROT SERPL-MCNC: 7 G/DL
RBC # BLD AUTO: 4.25 M/UL
SODIUM SERPL-SCNC: 145 MMOL/L
TRIGL SERPL-MCNC: 56 MG/DL
TSH SERPL DL<=0.005 MIU/L-ACNC: 2.18 UIU/ML
WBC # BLD AUTO: 4.19 K/UL

## 2017-12-07 PROCEDURE — 99999 PR STA SHADOW: CPT | Mod: PBBFAC,,,

## 2017-12-07 PROCEDURE — 80061 LIPID PANEL: CPT

## 2017-12-07 PROCEDURE — 80053 COMPREHEN METABOLIC PANEL: CPT

## 2017-12-07 PROCEDURE — 36415 COLL VENOUS BLD VENIPUNCTURE: CPT | Mod: PBBFAC

## 2017-12-07 PROCEDURE — 83036 HEMOGLOBIN GLYCOSYLATED A1C: CPT

## 2017-12-07 PROCEDURE — 99999 PR PBB SHADOW E&M-EST. PATIENT-LVL I: CPT | Mod: PBBFAC,,,

## 2017-12-07 PROCEDURE — 82570 ASSAY OF URINE CREATININE: CPT

## 2017-12-07 PROCEDURE — 84443 ASSAY THYROID STIM HORMONE: CPT

## 2017-12-07 PROCEDURE — 99211 OFF/OP EST MAY X REQ PHY/QHP: CPT | Mod: PBBFAC

## 2017-12-07 PROCEDURE — 85025 COMPLETE CBC W/AUTO DIFF WBC: CPT

## 2017-12-13 ENCOUNTER — OFFICE VISIT (OUTPATIENT)
Dept: INTERNAL MEDICINE | Facility: CLINIC | Age: 77
End: 2017-12-13
Payer: MEDICARE

## 2017-12-13 VITALS
WEIGHT: 154.31 LBS | DIASTOLIC BLOOD PRESSURE: 72 MMHG | SYSTOLIC BLOOD PRESSURE: 114 MMHG | HEART RATE: 84 BPM | OXYGEN SATURATION: 90 % | HEIGHT: 64 IN | RESPIRATION RATE: 14 BRPM | BODY MASS INDEX: 26.34 KG/M2

## 2017-12-13 DIAGNOSIS — E11.9 CONTROLLED TYPE 2 DIABETES MELLITUS WITHOUT COMPLICATION, WITHOUT LONG-TERM CURRENT USE OF INSULIN: ICD-10-CM

## 2017-12-13 DIAGNOSIS — J44.9 CHRONIC OBSTRUCTIVE PULMONARY DISEASE, UNSPECIFIED COPD TYPE: ICD-10-CM

## 2017-12-13 DIAGNOSIS — K52.9 COLITIS: ICD-10-CM

## 2017-12-13 DIAGNOSIS — M19.90 ARTHRITIS: ICD-10-CM

## 2017-12-13 DIAGNOSIS — E78.5 HYPERLIPIDEMIA, UNSPECIFIED HYPERLIPIDEMIA TYPE: Primary | ICD-10-CM

## 2017-12-13 PROCEDURE — G0008 ADMIN INFLUENZA VIRUS VAC: HCPCS | Mod: PBBFAC

## 2017-12-13 PROCEDURE — 99999 PR STA SHADOW: CPT | Mod: PBBFAC,,, | Performed by: INTERNAL MEDICINE

## 2017-12-13 PROCEDURE — 99214 OFFICE O/P EST MOD 30 MIN: CPT | Mod: S$PBB | Performed by: INTERNAL MEDICINE

## 2017-12-13 PROCEDURE — 99213 OFFICE O/P EST LOW 20 MIN: CPT | Mod: PBBFAC | Performed by: INTERNAL MEDICINE

## 2017-12-13 PROCEDURE — 99999 FLU VACCINE - HIGH DOSE (65+) PRESERVATIVE FREE IM: CPT | Mod: PBBFAC,,,

## 2017-12-13 PROCEDURE — 99999 PR PBB SHADOW E&M-EST. PATIENT-LVL III: CPT | Mod: PBBFAC,,, | Performed by: INTERNAL MEDICINE

## 2017-12-13 NOTE — PROGRESS NOTES
Subjective:       Patient ID: rGetel Ashton is a 77 y.o. female.    Chief Complaint: Hyperlipidemia (follow up with lab review); Hypertension; Depression; Diabetes; and Arthritis    Gretel Ashton is a 77 y.o. female who presents for Type II DM, Hypertension, and Hyperlipidemia follow up. Labs were reviewed with patient today.        Hyperlipidemia   This is a chronic problem. The problem is uncontrolled. Recent lipid tests were reviewed and are high. Pertinent negatives include no chest pain or myalgias. Current antihyperlipidemic treatment includes statins. The current treatment provides moderate improvement of lipids.   Hypertension   This is a chronic problem. The current episode started more than 1 year ago. The problem is controlled. Pertinent negatives include no chest pain, headaches or palpitations. Risk factors for coronary artery disease include sedentary lifestyle, obesity, diabetes mellitus and dyslipidemia. The current treatment provides mild improvement.   Diabetes   She presents for her follow-up diabetic visit. She has type 2 diabetes mellitus. Pertinent negatives for hypoglycemia include no confusion, dizziness, headaches, nervousness/anxiousness or pallor. Pertinent negatives for diabetes include no chest pain, no polydipsia, no polyphagia and no weakness. There are no hypoglycemic complications. Symptoms are worsening. There are no diabetic complications. Risk factors for coronary artery disease include diabetes mellitus, dyslipidemia, hypertension, obesity, post-menopausal and sedentary lifestyle. Current diabetic treatment includes oral agent (monotherapy). Her weight is stable. She is following a generally healthy diet. Her breakfast blood glucose range is generally 130-140 mg/dl. An ACE inhibitor/angiotensin II receptor blocker is not being taken.   Arthritis   Presents for follow-up visit. Affected location: MCps both hands  Pertinent negatives include no dysuria, fever or rash.    Medication Refill   Associated symptoms include arthralgias and coughing. Pertinent negatives include no chest pain, chills, congestion, fever, headaches, myalgias, nausea, numbness, rash, sore throat, vomiting or weakness.     Review of Systems   Constitutional: Negative for chills and fever.   HENT: Negative for congestion, hearing loss, sinus pressure and sore throat.    Eyes: Negative for photophobia.   Respiratory: Positive for cough. Negative for choking, chest tightness and wheezing.         On home o2 ; COPD ;sleeps with O2 at night    Cardiovascular: Negative for chest pain and palpitations.   Gastrointestinal: Negative for blood in stool, nausea and vomiting.   Endocrine: Negative for polydipsia and polyphagia.   Genitourinary: Negative for dysuria and hematuria.   Musculoskeletal: Positive for arthralgias and arthritis. Negative for myalgias.   Skin: Negative for pallor and rash.   Neurological: Negative for dizziness, weakness, numbness and headaches.   Hematological: Does not bruise/bleed easily.   Psychiatric/Behavioral: Negative for confusion, dysphoric mood, hallucinations, sleep disturbance and suicidal ideas. The patient is not nervous/anxious.        Objective:      Physical Exam   Constitutional: She is oriented to person, place, and time. She appears well-developed and well-nourished.   HENT:   Head: Normocephalic and atraumatic.   Nose: Nose normal.   Mouth/Throat: Oropharynx is clear and moist. Mucous membranes are pale.   Eyes: Conjunctivae and EOM are normal. Pupils are equal, round, and reactive to light.   Neck: Normal range of motion. Neck supple. No JVD present. No tracheal deviation present. No thyromegaly present.   Cardiovascular: Normal rate, regular rhythm, normal heart sounds and intact distal pulses.    Pulses:       Dorsalis pedis pulses are 1+ on the right side, and 1+ on the left side.        Posterior tibial pulses are 1+ on the right side, and 1+ on the left side.    Pulmonary/Chest: Effort normal. No respiratory distress. She has no wheezes. She has no rales. She exhibits no tenderness.   Abdominal: Soft. Bowel sounds are normal. She exhibits no distension and no mass. There is no tenderness. There is no rebound and no guarding.   Musculoskeletal: Normal range of motion. She exhibits no edema.   Feet:   Right Foot:   Protective Sensation: 5 sites tested. 5 sites sensed.   Skin Integrity: Negative for ulcer, erythema or dry skin.   Left Foot:   Protective Sensation: 5 sites tested. 5 sites sensed.   Skin Integrity: Negative for ulcer, erythema or dry skin.   Lymphadenopathy:     She has no cervical adenopathy.   Neurological: She is alert and oriented to person, place, and time. She has normal reflexes. No cranial nerve deficit. She exhibits normal muscle tone. Coordination normal.   Skin: Skin is warm and dry.   Psychiatric: She has a normal mood and affect. Her behavior is normal. Judgment and thought content normal.   Nursing note and vitals reviewed.      Assessment:       1. Hyperlipidemia, unspecified hyperlipidemia type    2. Controlled type 2 diabetes mellitus without complication, without long-term current use of insulin    3. Arthritis    4. Colitis    5. Chronic obstructive pulmonary disease, unspecified COPD type        Plan:   Gretel was seen today for hyperlipidemia, hypertension, depression, diabetes and arthritis.    Diagnoses and all orders for this visit:    Hyperlipidemia, unspecified hyperlipidemia type  -     CBC auto differential; Future  -     Comprehensive metabolic panel; Future  -     Lipid panel; Future  Limit the cholesterol in your diet to less than 300 mg per day.   Fats should contribute no more than 20 to 35% of your daily calories.   Less than 7 to 10% of your calories should come from saturated fat.   Avoid saturated fat products e.g., Butter, some oils, meat, and poultry fat contain a lot of saturated fat.   Check food labels for fat and  cholesterol content. Choose the foods with less fat per serving.   Limit the amount of butter and margarine you eat.   Use salad dressings and margarine made with polyunsaturated and monounsaturated fats.   Use egg whites or egg substitutes rather than whole eggs.   Replace whole-milk dairy products with nonfat or low-fat milk, cheese, spreads, and yogurt.   Eat skinless chicken, turkey, fish, and meatless entrees more often than red meat.   Choose lean cuts of meat and trim off all visible fat. Keep portion sizes moderate.   Avoid fatty desserts such as ice cream, cream-filled cakes, and cheesecakes. Choose fresh fruits, nonfat frozen yogurt, Popsicles, etc.   Reduce the amount of fried foods, vending machine food, and fast food you eat.   Eat fruits and vegetables (especially fresh fruits and leafy vegetables), beans, and whole grains daily. The fiber in these foods helps lower cholesterol.   Look for low-fat or nonfat varieties of the foods you like to eat, or look for substitutes.   You may need to exercise 60 minutes a day to prevent weight gain and 90 minutes a day to lose weight.  Controlled type 2 diabetes mellitus without complication, without long-term current use of insulin  -     Hemoglobin A1c; Future  -     Microalbumin/creatinine urine ratio; Future  -     TSH; Future  Diet controlled.  Patient has controlled Diabetes .  We discussed about diet ;low in calories. Avoid sweats, sodas.  Also increasing activity;walking 2-3 miles a day.  gave patient  instructions about adherence to plan.  Goal of  A1c  less than 7 % stressed.  Also goal of LDL less than 70 highlighted to patient.  Arthritis  Prn relafen     Colitis  Stable; continue meds.    Chronic obstructive pulmonary disease, unspecified COPD type  Using advair and home o2 at night

## 2018-02-05 ENCOUNTER — TELEPHONE (OUTPATIENT)
Dept: INTERNAL MEDICINE | Facility: CLINIC | Age: 78
End: 2018-02-05

## 2018-02-05 NOTE — TELEPHONE ENCOUNTER
----- Message from Joce Brooks sent at 2018  4:03 PM CST -----  Contact: MISHA / SON   Gretel Ashton  MRN: [id number]  : 1940  PCP: Jailene Astudillo  Home Phone      [phone]  Work Phone      Not on file.  Mobile          [phone]  Home Phone      Not on file.      MESSAGE: WANTS PT TO BE SEEN TOMORROW. STARTED WITH COLD / COUGH SYMPTOMS AND IS GETTING WORSE. PLEASE CALL     PHONE: [phone]

## 2018-02-06 ENCOUNTER — OFFICE VISIT (OUTPATIENT)
Dept: FAMILY MEDICINE | Facility: CLINIC | Age: 78
End: 2018-02-06
Payer: MEDICARE

## 2018-02-06 VITALS
SYSTOLIC BLOOD PRESSURE: 120 MMHG | DIASTOLIC BLOOD PRESSURE: 76 MMHG | RESPIRATION RATE: 18 BRPM | HEIGHT: 64 IN | TEMPERATURE: 99 F | BODY MASS INDEX: 26.84 KG/M2 | HEART RATE: 100 BPM | WEIGHT: 157.19 LBS

## 2018-02-06 DIAGNOSIS — M19.90 ARTHRITIS: ICD-10-CM

## 2018-02-06 DIAGNOSIS — J01.90 ACUTE SINUSITIS, RECURRENCE NOT SPECIFIED, UNSPECIFIED LOCATION: Primary | ICD-10-CM

## 2018-02-06 PROCEDURE — 99213 OFFICE O/P EST LOW 20 MIN: CPT | Mod: S$PBB | Performed by: FAMILY MEDICINE

## 2018-02-06 PROCEDURE — 96372 THER/PROPH/DIAG INJ SC/IM: CPT | Mod: PBBFAC

## 2018-02-06 PROCEDURE — 99999 PR PBB SHADOW E&M-EST. PATIENT-LVL III: CPT | Mod: PBBFAC,,, | Performed by: FAMILY MEDICINE

## 2018-02-06 PROCEDURE — 99213 OFFICE O/P EST LOW 20 MIN: CPT | Mod: PBBFAC | Performed by: FAMILY MEDICINE

## 2018-02-06 PROCEDURE — 99999 PR STA SHADOW: CPT | Mod: PBBFAC,,,

## 2018-02-06 PROCEDURE — 99999 PR STA SHADOW: CPT | Mod: PBBFAC,,, | Performed by: FAMILY MEDICINE

## 2018-02-06 RX ORDER — METHYLPREDNISOLONE ACETATE 40 MG/ML
20 INJECTION, SUSPENSION INTRA-ARTICULAR; INTRALESIONAL; INTRAMUSCULAR; SOFT TISSUE
Status: COMPLETED | OUTPATIENT
Start: 2018-02-06 | End: 2018-02-06

## 2018-02-06 RX ORDER — CETIRIZINE HYDROCHLORIDE 10 MG/1
10 TABLET ORAL DAILY
Qty: 30 TABLET | Refills: 5 | Status: ON HOLD | OUTPATIENT
Start: 2018-02-06 | End: 2018-05-08

## 2018-02-06 RX ORDER — AZITHROMYCIN 250 MG/1
TABLET, FILM COATED ORAL
Qty: 6 TABLET | Refills: 0 | Status: ON HOLD | OUTPATIENT
Start: 2018-02-06 | End: 2018-05-08

## 2018-02-06 RX ADMIN — METHYLPREDNISOLONE ACETATE 20 MG: 40 INJECTION, SUSPENSION INTRA-ARTICULAR; INTRALESIONAL; INTRAMUSCULAR; SOFT TISSUE at 03:02

## 2018-02-06 NOTE — PROGRESS NOTES
Chief complaint: Sinus congestion    History of present illness: Pt is 77 y.o. female complaints of sinus congestion, facial pressure, sore throat, ear ache.  Patient states glands are swollen and neck.  Patient has a cough.  This started 5 days ago.  Patient denies chest pain, shortness of breath, fever.  Patient has itchy watery eyes, itchy scratchy throat.  The patient complains of decreased hearing.  Cough is nonproductive    Review of systems:  Constitutional-no weight loss, weight gain  HEENT-allergy symptoms such as itchy watery eyes, post nasal drip, itchy palate, come and go.  Respiratory-no wheezing, see history of present illness  Neurological-no weakness or numbness    Past medical history, family history, social history-same as note dated today    Medications-all reviewed and verified in nurses notes.    Physical exam: Vital signs-reviewed and verified in nurses notes  Gen.-alert, oriented, no apparent distress.  Coughing.  Head-positive facial tenderness over the frontal and maxillary sinuses  Eyes: Pupils equal round reactive to light and accommodation, extraocular muscles intact, conjunctiva clear  Ears: Tympanic membranes are clear and mobile, no fluid present.  Nose: Injected mucous membranes, erythematous, mucopurulent discharge  Throat:  Injected red streaky mucosa,  tonsils normal  Neck: Shotty, tender anterior lymphadenopathy  Heart: Regular rate and rhythm, no murmurs, rubs or gallops  Lungs:Lungs were clear to auscultation and percussion, and with normal diaphragmatic excursion. No wheezes or rales were noted.     Assessment/Plan:   Acute sinusitis, recurrence not specified, unspecified location  -     azithromycin (Z-JARED) 250 MG tablet; 2 po day 1, then 1 po q day  Dispense: 6 tablet; Refill: 0  -     cetirizine (ZYRTEC) 10 MG tablet; Take 1 tablet (10 mg total) by mouth once daily.  Dispense: 30 tablet; Refill: 5  -     dextromethorphan-guaifenesin  mg (MUCINEX DM)  mg per 12 hr  tablet; Take 1 tablet by mouth 2 (two) times daily as needed.  Dispense: 20 tablet; Refill: 2  -     methylPREDNISolone acetate injection 20 mg; Inject 0.5 mLs (20 mg total) into the muscle one time.    Arthritis  -     methylPREDNISolone acetate injection 20 mg; Inject 0.5 mLs (20 mg total) into the muscle one time.      Sinusitis, acute  - azithromycin (ZITHROMAX) 500 MG tablet; Take 1 tablet (500 mg total) by mouth once daily.  - cetirizine (ZYRTEC) 10 MG tablet; Take 1 tablet (10 mg total) by mouth once daily.  - diphenhydrAMINE (BENADRYL) 25 mg capsule; Take 1 each (25 mg total) by mouth nightly as needed for Itching.  - methylPREDNISolone acetate injection 60 mg; Inject 1.5 mLs (60 mg total) into the muscle one time.    Simply saline nasal lavage q.2 to 3 hours p.r.n.  Avoid dust, allergens, other sinus irritants.  Handwashing technique discussed to prevent spreading germs.

## 2018-04-09 RX ORDER — SULFASALAZINE 500 MG/1
500 TABLET ORAL 2 TIMES DAILY
Qty: 60 TABLET | Refills: 3 | Status: SHIPPED | OUTPATIENT
Start: 2018-04-09 | End: 2018-10-02 | Stop reason: SDUPTHER

## 2018-04-09 RX ORDER — NABUMETONE 500 MG/1
500 TABLET, FILM COATED ORAL DAILY
Qty: 30 TABLET | Refills: 3 | Status: SHIPPED | OUTPATIENT
Start: 2018-04-09 | End: 2018-10-02 | Stop reason: SDUPTHER

## 2018-05-01 RX ORDER — METFORMIN HYDROCHLORIDE 500 MG/1
TABLET ORAL
Qty: 60 TABLET | Refills: 5 | Status: ON HOLD | OUTPATIENT
Start: 2018-05-01 | End: 2018-05-09

## 2018-05-01 NOTE — TELEPHONE ENCOUNTER
----- Message from Althea Guaman MA sent at 2018 11:24 AM CDT -----  Contact: [facility]  Gretel Ashton  MRN: [id number]  : 1940  PCP: Jailene Astudillo  Home Phone      [phone]  Work Phone      Not on file.  Mobile          [phone]  Home Phone      Not on file.      MESSAGE:   Pt requesting refill or new Rx.   Is this a refill or new RX:  refill  RX name and strength: Metformin  Last office visit: 17  Pharmacy name and location:  [facility]  Comments:      Phone:  [phone]

## 2018-05-08 ENCOUNTER — HOSPITAL ENCOUNTER (OUTPATIENT)
Facility: HOSPITAL | Age: 78
Discharge: HOME | End: 2018-05-09
Attending: SURGERY | Admitting: INTERNAL MEDICINE
Payer: MEDICARE

## 2018-05-08 DIAGNOSIS — J18.9 PNEUMONIA OF RIGHT LOWER LOBE DUE TO INFECTIOUS ORGANISM: ICD-10-CM

## 2018-05-08 DIAGNOSIS — J01.90 ACUTE SINUSITIS, RECURRENCE NOT SPECIFIED, UNSPECIFIED LOCATION: ICD-10-CM

## 2018-05-08 DIAGNOSIS — J20.9 COPD WITH ACUTE BRONCHITIS: Primary | ICD-10-CM

## 2018-05-08 DIAGNOSIS — J44.0 COPD WITH ACUTE BRONCHITIS: Primary | ICD-10-CM

## 2018-05-08 DIAGNOSIS — R06.02 SOB (SHORTNESS OF BREATH): ICD-10-CM

## 2018-05-08 PROBLEM — R79.89 POSITIVE D DIMER: Status: ACTIVE | Noted: 2018-05-08

## 2018-05-08 LAB
ALBUMIN SERPL BCP-MCNC: 3.2 G/DL
ALP SERPL-CCNC: 83 U/L
ALT SERPL W/O P-5'-P-CCNC: 13 U/L
ANION GAP SERPL CALC-SCNC: 6 MMOL/L
APTT BLDCRRT: 24.5 SEC
AST SERPL-CCNC: 19 U/L
BASOPHILS # BLD AUTO: 0.01 K/UL
BASOPHILS NFR BLD: 0.2 %
BILIRUB SERPL-MCNC: 0.3 MG/DL
BILIRUB UR QL STRIP: NEGATIVE
BNP SERPL-MCNC: 69 PG/ML
BUN SERPL-MCNC: 19 MG/DL
CALCIUM SERPL-MCNC: 9.1 MG/DL
CHLORIDE SERPL-SCNC: 105 MMOL/L
CK MB SERPL-MCNC: 2.3 NG/ML
CK MB SERPL-RTO: 2.4 %
CK SERPL-CCNC: 96 U/L
CK SERPL-CCNC: 96 U/L
CLARITY UR: CLEAR
CO2 SERPL-SCNC: 32 MMOL/L
COLOR UR: YELLOW
CREAT SERPL-MCNC: 0.6 MG/DL
D DIMER PPP IA.FEU-MCNC: 1.42 MG/L FEU
DIFFERENTIAL METHOD: ABNORMAL
EOSINOPHIL # BLD AUTO: 0.2 K/UL
EOSINOPHIL NFR BLD: 3.4 %
ERYTHROCYTE [DISTWIDTH] IN BLOOD BY AUTOMATED COUNT: 15.8 %
EST. GFR  (AFRICAN AMERICAN): >60 ML/MIN/1.73 M^2
EST. GFR  (NON AFRICAN AMERICAN): >60 ML/MIN/1.73 M^2
ESTIMATED AVG GLUCOSE: 117 MG/DL
FLUAV AG SPEC QL IA: NEGATIVE
FLUBV AG SPEC QL IA: NEGATIVE
GLUCOSE SERPL-MCNC: 102 MG/DL
GLUCOSE UR QL STRIP: NEGATIVE
HBA1C MFR BLD HPLC: 5.7 %
HCT VFR BLD AUTO: 34.4 %
HGB BLD-MCNC: 10.7 G/DL
HGB UR QL STRIP: NEGATIVE
INR PPP: 1
KETONES UR QL STRIP: NEGATIVE
LACTATE SERPL-SCNC: 0.7 MMOL/L
LEUKOCYTE ESTERASE UR QL STRIP: NEGATIVE
LYMPHOCYTES # BLD AUTO: 1.4 K/UL
LYMPHOCYTES NFR BLD: 30.5 %
MCH RBC QN AUTO: 26.4 PG
MCHC RBC AUTO-ENTMCNC: 31.1 G/DL
MCV RBC AUTO: 85 FL
MONOCYTES # BLD AUTO: 0.6 K/UL
MONOCYTES NFR BLD: 12.7 %
NEUTROPHILS # BLD AUTO: 2.4 K/UL
NEUTROPHILS NFR BLD: 53.2 %
NITRITE UR QL STRIP: NEGATIVE
PH UR STRIP: 7 [PH] (ref 5–8)
PLATELET # BLD AUTO: 199 K/UL
PMV BLD AUTO: 10.9 FL
POCT GLUCOSE: 141 MG/DL (ref 70–110)
POCT GLUCOSE: 153 MG/DL (ref 70–110)
POTASSIUM SERPL-SCNC: 4.2 MMOL/L
PROT SERPL-MCNC: 6.8 G/DL
PROT UR QL STRIP: NEGATIVE
PROTHROMBIN TIME: 10 SEC
RBC # BLD AUTO: 4.05 M/UL
SODIUM SERPL-SCNC: 143 MMOL/L
SP GR UR STRIP: 1.01 (ref 1–1.03)
SPECIMEN SOURCE: NORMAL
TROPONIN I SERPL DL<=0.01 NG/ML-MCNC: <0.006 NG/ML
URN SPEC COLLECT METH UR: NORMAL
UROBILINOGEN UR STRIP-ACNC: NEGATIVE EU/DL
WBC # BLD AUTO: 4.42 K/UL

## 2018-05-08 PROCEDURE — 85610 PROTHROMBIN TIME: CPT

## 2018-05-08 PROCEDURE — 96365 THER/PROPH/DIAG IV INF INIT: CPT

## 2018-05-08 PROCEDURE — 87040 BLOOD CULTURE FOR BACTERIA: CPT

## 2018-05-08 PROCEDURE — 25500020 PHARM REV CODE 255: Performed by: INTERNAL MEDICINE

## 2018-05-08 PROCEDURE — 27000221 HC OXYGEN, UP TO 24 HOURS

## 2018-05-08 PROCEDURE — 25000003 PHARM REV CODE 250: Performed by: SURGERY

## 2018-05-08 PROCEDURE — G0378 HOSPITAL OBSERVATION PER HR: HCPCS

## 2018-05-08 PROCEDURE — 96375 TX/PRO/DX INJ NEW DRUG ADDON: CPT

## 2018-05-08 PROCEDURE — 83605 ASSAY OF LACTIC ACID: CPT

## 2018-05-08 PROCEDURE — 63600175 PHARM REV CODE 636 W HCPCS: Performed by: SURGERY

## 2018-05-08 PROCEDURE — 80053 COMPREHEN METABOLIC PANEL: CPT

## 2018-05-08 PROCEDURE — 82550 ASSAY OF CK (CPK): CPT

## 2018-05-08 PROCEDURE — 94640 AIRWAY INHALATION TREATMENT: CPT

## 2018-05-08 PROCEDURE — 85730 THROMBOPLASTIN TIME PARTIAL: CPT

## 2018-05-08 PROCEDURE — 85025 COMPLETE CBC W/AUTO DIFF WBC: CPT

## 2018-05-08 PROCEDURE — 83036 HEMOGLOBIN GLYCOSYLATED A1C: CPT

## 2018-05-08 PROCEDURE — 27000492 HC SLEEVE, SCD T/L

## 2018-05-08 PROCEDURE — 99220 PR INITIAL OBSERVATION CARE,LEVL III: CPT | Mod: ,,, | Performed by: INTERNAL MEDICINE

## 2018-05-08 PROCEDURE — 94761 N-INVAS EAR/PLS OXIMETRY MLT: CPT

## 2018-05-08 PROCEDURE — 93010 ELECTROCARDIOGRAM REPORT: CPT | Mod: ,,, | Performed by: INTERNAL MEDICINE

## 2018-05-08 PROCEDURE — 84484 ASSAY OF TROPONIN QUANT: CPT

## 2018-05-08 PROCEDURE — 36415 COLL VENOUS BLD VENIPUNCTURE: CPT

## 2018-05-08 PROCEDURE — 25000242 PHARM REV CODE 250 ALT 637 W/ HCPCS: Performed by: SURGERY

## 2018-05-08 PROCEDURE — 85379 FIBRIN DEGRADATION QUANT: CPT

## 2018-05-08 PROCEDURE — 82553 CREATINE MB FRACTION: CPT

## 2018-05-08 PROCEDURE — 81003 URINALYSIS AUTO W/O SCOPE: CPT

## 2018-05-08 PROCEDURE — 83880 ASSAY OF NATRIURETIC PEPTIDE: CPT

## 2018-05-08 PROCEDURE — 82962 GLUCOSE BLOOD TEST: CPT

## 2018-05-08 PROCEDURE — 96376 TX/PRO/DX INJ SAME DRUG ADON: CPT

## 2018-05-08 PROCEDURE — 96366 THER/PROPH/DIAG IV INF ADDON: CPT

## 2018-05-08 PROCEDURE — 87400 INFLUENZA A/B EACH AG IA: CPT | Mod: 59

## 2018-05-08 PROCEDURE — 93005 ELECTROCARDIOGRAM TRACING: CPT

## 2018-05-08 PROCEDURE — 99285 EMERGENCY DEPT VISIT HI MDM: CPT | Mod: 25

## 2018-05-08 RX ORDER — PANTOPRAZOLE SODIUM 40 MG/1
40 TABLET, DELAYED RELEASE ORAL DAILY
Status: DISCONTINUED | OUTPATIENT
Start: 2018-05-08 | End: 2018-05-09 | Stop reason: HOSPADM

## 2018-05-08 RX ORDER — METHYLPREDNISOLONE SOD SUCC 125 MG
80 VIAL (EA) INJECTION EVERY 8 HOURS
Status: DISCONTINUED | OUTPATIENT
Start: 2018-05-08 | End: 2018-05-09

## 2018-05-08 RX ORDER — ACETAMINOPHEN 325 MG/1
650 TABLET ORAL EVERY 8 HOURS PRN
Status: DISCONTINUED | OUTPATIENT
Start: 2018-05-08 | End: 2018-05-09 | Stop reason: HOSPADM

## 2018-05-08 RX ORDER — METFORMIN HYDROCHLORIDE 500 MG/1
500 TABLET ORAL 2 TIMES DAILY WITH MEALS
Status: DISCONTINUED | OUTPATIENT
Start: 2018-05-08 | End: 2018-05-08

## 2018-05-08 RX ORDER — IBUPROFEN 200 MG
16 TABLET ORAL
Status: DISCONTINUED | OUTPATIENT
Start: 2018-05-08 | End: 2018-05-09 | Stop reason: HOSPADM

## 2018-05-08 RX ORDER — IBUPROFEN 200 MG
24 TABLET ORAL
Status: DISCONTINUED | OUTPATIENT
Start: 2018-05-08 | End: 2018-05-09 | Stop reason: HOSPADM

## 2018-05-08 RX ORDER — GLUCAGON 1 MG
1 KIT INJECTION
Status: DISCONTINUED | OUTPATIENT
Start: 2018-05-08 | End: 2018-05-09 | Stop reason: HOSPADM

## 2018-05-08 RX ORDER — INSULIN ASPART 100 [IU]/ML
0-5 INJECTION, SOLUTION INTRAVENOUS; SUBCUTANEOUS
Status: DISCONTINUED | OUTPATIENT
Start: 2018-05-08 | End: 2018-05-09 | Stop reason: HOSPADM

## 2018-05-08 RX ORDER — METHYLPREDNISOLONE SOD SUCC 125 MG
125 VIAL (EA) INJECTION
Status: DISCONTINUED | OUTPATIENT
Start: 2018-05-08 | End: 2018-05-08

## 2018-05-08 RX ORDER — ONDANSETRON 2 MG/ML
4 INJECTION INTRAMUSCULAR; INTRAVENOUS EVERY 8 HOURS PRN
Status: DISCONTINUED | OUTPATIENT
Start: 2018-05-08 | End: 2018-05-09 | Stop reason: HOSPADM

## 2018-05-08 RX ORDER — LEVALBUTEROL 1.25 MG/.5ML
1.25 SOLUTION, CONCENTRATE RESPIRATORY (INHALATION) EVERY 6 HOURS PRN
Status: DISCONTINUED | OUTPATIENT
Start: 2018-05-08 | End: 2018-05-09 | Stop reason: HOSPADM

## 2018-05-08 RX ORDER — IPRATROPIUM BROMIDE AND ALBUTEROL SULFATE 2.5; .5 MG/3ML; MG/3ML
3 SOLUTION RESPIRATORY (INHALATION)
Status: COMPLETED | OUTPATIENT
Start: 2018-05-08 | End: 2018-05-08

## 2018-05-08 RX ORDER — ASPIRIN 81 MG/1
81 TABLET ORAL DAILY
Status: DISCONTINUED | OUTPATIENT
Start: 2018-05-08 | End: 2018-05-09 | Stop reason: HOSPADM

## 2018-05-08 RX ORDER — CEFTRIAXONE 1 G/1
1 INJECTION, POWDER, FOR SOLUTION INTRAMUSCULAR; INTRAVENOUS
Status: DISCONTINUED | OUTPATIENT
Start: 2018-05-08 | End: 2018-05-08

## 2018-05-08 RX ORDER — SULFASALAZINE 500 MG/1
500 TABLET ORAL 2 TIMES DAILY
Status: DISCONTINUED | OUTPATIENT
Start: 2018-05-08 | End: 2018-05-08

## 2018-05-08 RX ADMIN — ASPIRIN 81 MG: 81 TABLET, COATED ORAL at 01:05

## 2018-05-08 RX ADMIN — METHYLPREDNISOLONE SODIUM SUCCINATE 80 MG: 125 INJECTION, POWDER, FOR SOLUTION INTRAMUSCULAR; INTRAVENOUS at 10:05

## 2018-05-08 RX ADMIN — AZITHROMYCIN MONOHYDRATE 500 MG: 500 INJECTION, POWDER, LYOPHILIZED, FOR SOLUTION INTRAVENOUS at 11:05

## 2018-05-08 RX ADMIN — PANTOPRAZOLE SODIUM 40 MG: 40 TABLET, DELAYED RELEASE ORAL at 01:05

## 2018-05-08 RX ADMIN — IOHEXOL 75 ML: 350 INJECTION, SOLUTION INTRAVENOUS at 04:05

## 2018-05-08 RX ADMIN — METHYLPREDNISOLONE SODIUM SUCCINATE 80 MG: 125 INJECTION, POWDER, FOR SOLUTION INTRAMUSCULAR; INTRAVENOUS at 11:05

## 2018-05-08 RX ADMIN — CEFTRIAXONE 1 G: 1 INJECTION, POWDER, FOR SOLUTION INTRAMUSCULAR; INTRAVENOUS at 11:05

## 2018-05-08 RX ADMIN — IPRATROPIUM BROMIDE AND ALBUTEROL SULFATE 3 ML: .5; 3 SOLUTION RESPIRATORY (INHALATION) at 09:05

## 2018-05-08 NOTE — ED NOTES
Pt admitted to room 308. Pt transferred via stretcher by nurse on O2 & tele in no distress. VSS. Bedside report given to AIXA Plascnecia.

## 2018-05-08 NOTE — ED PROVIDER NOTES
Encounter Date: 2018       History     Chief Complaint   Patient presents with    Shortness of Breath     The history is provided by the patient.   Shortness of Breath   This is a chronic problem. The problem occurs intermittently.The current episode started more than 1 week ago. The problem has been gradually worsening. Pertinent negatives include no fever, no headaches, no sore throat, no ear pain, no cough, no wheezing, no chest pain, no vomiting, no abdominal pain, no rash and no leg swelling. Treatments tried: Tried wearing her home oxygen at night and taking her breathing treatments. The treatment provided no relief. Associated medical issues do not include heart failure.     Review of patient's allergies indicates:  No Known Allergies  Past Medical History:   Diagnosis Date    Arthritis     Depression     Diabetes mellitus type II     Hyperlipidemia     Hypertension     Pacemaker      Past Surgical History:   Procedure Laterality Date     SECTION      INNER EAR SURGERY       Family History   Problem Relation Age of Onset    Cancer Mother     Breast cancer Mother     Stroke Father     Cancer Sister     Breast cancer Sister     Stroke Maternal Grandmother      Social History   Substance Use Topics    Smoking status: Former Smoker     Packs/day: 2.00     Years: 43.00     Types: Cigarettes     Quit date: 2000    Smokeless tobacco: Never Used    Alcohol use No     Review of Systems   Constitutional: Negative for chills, fatigue and fever.   HENT: Negative for congestion, dental problem, ear pain, sore throat and trouble swallowing.    Eyes: Negative for pain, discharge, redness and visual disturbance.   Respiratory: Positive for shortness of breath. Negative for cough, chest tightness and wheezing.    Cardiovascular: Negative for chest pain, palpitations and leg swelling.   Gastrointestinal: Negative for abdominal pain, constipation, diarrhea, nausea and vomiting.   Genitourinary:  Negative for difficulty urinating, dysuria, flank pain, frequency, hematuria and urgency.   Musculoskeletal: Positive for arthralgias (chronic arthritis). Negative for back pain and myalgias.   Skin: Negative for color change, pallor and rash.   Neurological: Negative for seizures, weakness and headaches.   Psychiatric/Behavioral: Negative.        Physical Exam     Vitals:    05/08/18 0912 05/08/18 0925 05/08/18 0948 05/08/18 1002   BP: 139/87  (!) 142/77 120/72   Pulse: 85 70 89 90   Resp: (!) 22 (!) 22 19 20   Temp: 96.7 °F (35.9 °C)      TempSrc: Oral      SpO2: (!) 91% (!) 93% (!) 91% 98%   Weight: 71.2 kg (157 lb)          Physical Exam    Nursing note and vitals reviewed.  Constitutional: She appears well-developed. No distress.   HENT:   Head: Normocephalic and atraumatic.   Right Ear: External ear normal.   Left Ear: External ear normal.   Nose: Nose normal.   Mouth/Throat: Oropharynx is clear and moist.   Eyes: Conjunctivae, EOM and lids are normal. Pupils are equal, round, and reactive to light.   Neck: Normal range of motion. Neck supple.   Cardiovascular: Normal rate, regular rhythm, S1 normal, S2 normal and intact distal pulses.   Pulmonary/Chest: Effort normal and breath sounds normal. No respiratory distress. She has no decreased breath sounds. She has no wheezes. She has no rhonchi.   Abdominal: Soft. Bowel sounds are normal. She exhibits no distension. There is no tenderness.   Musculoskeletal: Normal range of motion.   Neurological: She is alert and oriented to person, place, and time. She has normal strength. GCS eye subscore is 4. GCS verbal subscore is 5. GCS motor subscore is 6.   Skin: Skin is warm and dry. Capillary refill takes less than 2 seconds. No rash noted.   Psychiatric: She has a normal mood and affect. Her speech is normal and behavior is normal.         ED Course   Procedures  Labs Reviewed   COMPREHENSIVE METABOLIC PANEL - Abnormal; Notable for the following:        Result Value     CO2 32 (*)     Albumin 3.2 (*)     Anion Gap 6 (*)     All other components within normal limits   CBC W/ AUTO DIFFERENTIAL - Abnormal; Notable for the following:     Hemoglobin 10.7 (*)     Hematocrit 34.4 (*)     MCH 26.4 (*)     MCHC 31.1 (*)     RDW 15.8 (*)     All other components within normal limits   D DIMER, QUANTITATIVE - Abnormal; Notable for the following:     D-Dimer 1.42 (*)     All other components within normal limits   CULTURE, BLOOD   TROPONIN I   CK   CK-MB   B-TYPE NATRIURETIC PEPTIDE   PROTIME-INR   APTT   INFLUENZA A AND B ANTIGEN   LACTIC ACID, PLASMA   URINALYSIS     EKG Readings: (Independently Interpreted)   EKG read with MD at the time it was performed. EKG without concerning findings.                Medications   cefTRIAXone injection 1 g (1 g Intravenous Given 5/8/18 1116)   azithromycin 500 mg in 0.9 % sodium chloride 250 mL IVPB (ready to mix system) (not administered)   methylPREDNISolone sodium succinate injection 80 mg (80 mg Intravenous Given 5/8/18 1117)   albuterol-ipratropium 2.5mg-0.5mg/3mL nebulizer solution 3 mL (3 mLs Nebulization Given 5/8/18 0946)                         Clinical Impression:   The primary encounter diagnosis was COPD with acute bronchitis. Diagnoses of SOB (shortness of breath) and Pneumonia of right lower lobe due to infectious organism were also pertinent to this visit.            Patient has shortness of breath with a 90% room air oxygenation today  Patient has been coughing and wheezing at home, former smoker  Patient has an abnormal chest x-ray, normal lactic acid the emergency room  Patient feels weak and short of breath, observation with IV antibiotics  We'll also do IV steroids and breathing treatments: COPD/pneumonia               Bruce Malone MD  05/08/18 7834

## 2018-05-08 NOTE — SUBJECTIVE & OBJECTIVE
Past Medical History:   Diagnosis Date    Arthritis     Depression     Diabetes mellitus type II     Hyperlipidemia     Hypertension     Pacemaker        Past Surgical History:   Procedure Laterality Date     SECTION      INNER EAR SURGERY         Review of patient's allergies indicates:  No Known Allergies    No current facility-administered medications on file prior to encounter.      Current Outpatient Prescriptions on File Prior to Encounter   Medication Sig    ADVAIR DISKUS 250-50 mcg/dose diskus inhaler INHALE 1 PUFF TWICE A DAYAS DIRECTED    albuterol 90 mcg/actuation inhaler Inhale 2 puffs into the lungs every 6 (six) hours as needed for Wheezing.    aspirin (ECOTRIN) 81 MG EC tablet Take 81 mg by mouth once daily.      docusate sodium (COLACE) 100 MG capsule Take 100 mg by mouth once daily at 6am.    metFORMIN (GLUCOPHAGE) 500 MG tablet TAKE ONE TABLET BY MOUTH TWICE DAILY AFTER MEALS    nabumetone (RELAFEN) 500 MG tablet Take 1 tablet (500 mg total) by mouth once daily.    rosuvastatin (CRESTOR) 20 MG tablet Take 20 mg by mouth every evening.    sulfaSALAzine (AZULFIDINE) 500 mg Tab Take 1 tablet (500 mg total) by mouth 2 (two) times daily.    ZETIA 10 mg tablet Take 10 mg by mouth once daily.    [DISCONTINUED] azithromycin (Z-JARED) 250 MG tablet 2 po day 1, then 1 po q day    [DISCONTINUED] cetirizine (ZYRTEC) 10 MG tablet Take 1 tablet (10 mg total) by mouth once daily.    [DISCONTINUED] fexofenadine (ALLEGRA) 180 MG tablet Take 1 tablet (180 mg total) by mouth once daily.     Family History     Problem Relation (Age of Onset)    Breast cancer Mother, Sister    Cancer Mother, Sister    Stroke Father, Maternal Grandmother        Social History Main Topics    Smoking status: Former Smoker     Packs/day: 2.00     Years: 43.00     Types: Cigarettes     Quit date: 2000    Smokeless tobacco: Never Used    Alcohol use No    Drug use: No    Sexual activity: Not on file      Review of Systems   Constitutional: Positive for fatigue. Negative for appetite change, chills and fever.   HENT: Negative for congestion, ear discharge, ear pain, rhinorrhea, sinus pressure and sore throat.    Eyes: Positive for visual disturbance.   Respiratory: Positive for cough, shortness of breath and wheezing. Negative for choking and chest tightness.         With coughing and increased activity   Cardiovascular: Negative for chest pain and palpitations.   Gastrointestinal: Negative for constipation, diarrhea, nausea and vomiting.   Musculoskeletal: Positive for arthralgias. Negative for joint swelling and myalgias.   Skin: Negative for rash and wound.   Neurological: Negative for dizziness, syncope, weakness, light-headedness and numbness.   Psychiatric/Behavioral: Positive for dysphoric mood and sleep disturbance. Negative for hallucinations and self-injury. The patient is nervous/anxious.      Objective:     Vital Signs (Most Recent):  Temp: 97 °F (36.1 °C) (05/08/18 1244)  Pulse: 89 (05/08/18 1400)  Resp: 17 (05/08/18 1244)  BP: (!) 147/86 (05/08/18 1244)  SpO2: 96 % (05/08/18 1244) Vital Signs (24h Range):  Temp:  [96.7 °F (35.9 °C)-97 °F (36.1 °C)] 97 °F (36.1 °C)  Pulse:  [66-90] 89  Resp:  [15-24] 17  SpO2:  [91 %-98 %] 96 %  BP: (120-176)/() 147/86     Weight: 73.2 kg (161 lb 6 oz)  Body mass index is 30.49 kg/m².    Physical Exam   Constitutional: She is oriented to person, place, and time. She appears well-developed and well-nourished.   HENT:   Head: Normocephalic and atraumatic.   Right Ear: External ear normal.   Left Ear: External ear normal.   Nose: Nose normal.   Mouth/Throat: Oropharynx is clear and moist.   Cardiovascular: Normal rate, regular rhythm and normal heart sounds.    Pulmonary/Chest: Effort normal. She has wheezes. She has rales.   Abdominal: Soft. Bowel sounds are normal.   Musculoskeletal:   Hands with RA changes   Neurological: She is alert and oriented to person,  place, and time.   Skin: Skin is warm and dry.   Psychiatric: She has a normal mood and affect.   Nursing note and vitals reviewed.          Significant Labs:   CBC:   Recent Labs  Lab 05/08/18  0939   WBC 4.42   HGB 10.7*   HCT 34.4*        CMP:   Recent Labs  Lab 05/08/18  0939      K 4.2      CO2 32*      BUN 19   CREATININE 0.6   CALCIUM 9.1   PROT 6.8   ALBUMIN 3.2*   BILITOT 0.3   ALKPHOS 83   AST 19   ALT 13   ANIONGAP 6*   EGFRNONAA >60     Lactic Acid:   Recent Labs  Lab 05/08/18  0939   LACTATE 0.7     Troponin:   Recent Labs  Lab 05/08/18  0939   TROPONINI <0.006     TSH:   Recent Labs  Lab 12/07/17  0813   TSH 2.182     BNP    Recent Labs  Lab 05/08/18  0939   BNP 69     Lab Results   Component Value Date    DDIMER 1.42 (H) 05/08/2018       Significant Imaging: CXR: I have reviewed all pertinent results/findings within the past 24 hours and my personal findings are:  Chronic lung markings are seen bilaterally.  Patchy opacity in the right lung base likely related to a combination of atelectasis of the component of infiltrate and aspiration not excluded.  The heart is enlarged.  Calcified atheromatous disease affects the aorta.  Left-sided pacemaker device is in place.  Age-appropriate degenerative changes affect the skeleton.

## 2018-05-08 NOTE — PLAN OF CARE
Problem: Patient Care Overview  Goal: Plan of Care Review  Outcome: Ongoing (interventions implemented as appropriate)  Pt admitted with COPD with acute bronchitis and RLL pna. On IV zithromax, rochephin, solumedrol and prn respiratory treatments. Sinus rhythm on telemetry. Occasional non productive cough. Breath sounds diminished with coarse crackles to RLL and faint crackles to LLL. Oxygen sats upper 90's on 2L via nasal cannula. Pulmonology consulted. Monitoring blood sugars AC & HS. Call bell within reach. Bed in low, locked position. Reviewed plan of care with pt; states agreement.

## 2018-05-08 NOTE — H&P
[facility] Medicine  History & Physical    Patient Name: Gretel Ashton  MRN: [id number]  Admission Date: 2018  Attending Physician: Jailene Astudillo MD   Primary Care Provider: Jailene Astudillo MD         Patient information was obtained from patient, past medical records and ER records.     Subjective:     Principal Problem:COPD with acute bronchitis    Chief Complaint:   Chief Complaint   Patient presents with    Shortness of Breath     for 2 weeks         HPI: Gretel Ashton is a 77 y.o. female  Past smoker with H/o COPD presented with SOB for 2 weeks ;getting worse  Placed in observation for COPD exacerbation and RLL pneumonia    Shortness of Breath   This is a chronic problem. The problem occurs intermittently.The current episode started more than 1 week ago. The problem has been gradually worsening. Pertinent negatives include no fever, no headaches, no sore throat, no ear pain, no cough, no wheezing, no chest pain, no vomiting, no abdominal pain, no rash and no leg swelling. Treatments tried: Tried wearing her home oxygen at night and taking her breathing treatments. The treatment provided no relief. Associated medical issues do not include heart failure.     Past Medical History:   Diagnosis Date    Arthritis     Depression     Diabetes mellitus type II     Hyperlipidemia     Hypertension     Pacemaker        Past Surgical History:   Procedure Laterality Date     SECTION      INNER EAR SURGERY         Review of patient's allergies indicates:  No Known Allergies    No current facility-administered medications on file prior to encounter.      Current Outpatient Prescriptions on File Prior to Encounter   Medication Sig    ADVAIR DISKUS 250-50 mcg/dose diskus inhaler INHALE 1 PUFF TWICE A DAYAS DIRECTED    albuterol 90 mcg/actuation inhaler Inhale 2 puffs into the lungs every 6 (six) hours as needed for Wheezing.    aspirin (ECOTRIN) 81 MG EC tablet Take 81  mg by mouth once daily.      docusate sodium (COLACE) 100 MG capsule Take 100 mg by mouth once daily at 6am.    metFORMIN (GLUCOPHAGE) 500 MG tablet TAKE ONE TABLET BY MOUTH TWICE DAILY AFTER MEALS    nabumetone (RELAFEN) 500 MG tablet Take 1 tablet (500 mg total) by mouth once daily.    rosuvastatin (CRESTOR) 20 MG tablet Take 20 mg by mouth every evening.    sulfaSALAzine (AZULFIDINE) 500 mg Tab Take 1 tablet (500 mg total) by mouth 2 (two) times daily.    ZETIA 10 mg tablet Take 10 mg by mouth once daily.    [DISCONTINUED] azithromycin (Z-JARED) 250 MG tablet 2 po day 1, then 1 po q day    [DISCONTINUED] cetirizine (ZYRTEC) 10 MG tablet Take 1 tablet (10 mg total) by mouth once daily.    [DISCONTINUED] fexofenadine (ALLEGRA) 180 MG tablet Take 1 tablet (180 mg total) by mouth once daily.     Family History     Problem Relation (Age of Onset)    Breast cancer Mother, Sister    Cancer Mother, Sister    Stroke Father, Maternal Grandmother        Social History Main Topics    Smoking status: Former Smoker     Packs/day: 2.00     Years: 43.00     Types: Cigarettes     Quit date: 1/1/2000    Smokeless tobacco: Never Used    Alcohol use No    Drug use: No    Sexual activity: Not on file     Review of Systems   Constitutional: Positive for fatigue. Negative for appetite change, chills and fever.   HENT: Negative for congestion, ear discharge, ear pain, rhinorrhea, sinus pressure and sore throat.    Eyes: Positive for visual disturbance.   Respiratory: Positive for cough, shortness of breath and wheezing. Negative for choking and chest tightness.         With coughing and increased activity   Cardiovascular: Negative for chest pain and palpitations.   Gastrointestinal: Negative for constipation, diarrhea, nausea and vomiting.   Musculoskeletal: Positive for arthralgias. Negative for joint swelling and myalgias.   Skin: Negative for rash and wound.   Neurological: Negative for dizziness, syncope, weakness,  light-headedness and numbness.   Psychiatric/Behavioral: Positive for dysphoric mood and sleep disturbance. Negative for hallucinations and self-injury. The patient is nervous/anxious.      Objective:     Vital Signs (Most Recent):  Temp: 97 °F (36.1 °C) (05/08/18 1244)  Pulse: 89 (05/08/18 1400)  Resp: 17 (05/08/18 1244)  BP: (!) 147/86 (05/08/18 1244)  SpO2: 96 % (05/08/18 1244) Vital Signs (24h Range):  Temp:  [96.7 °F (35.9 °C)-97 °F (36.1 °C)] 97 °F (36.1 °C)  Pulse:  [66-90] 89  Resp:  [15-24] 17  SpO2:  [91 %-98 %] 96 %  BP: (120-176)/() 147/86     Weight: 73.2 kg (161 lb 6 oz)  Body mass index is 30.49 kg/m².    Physical Exam   Constitutional: She is oriented to person, place, and time. She appears well-developed and well-nourished.   HENT:   Head: Normocephalic and atraumatic.   Right Ear: External ear normal.   Left Ear: External ear normal.   Nose: Nose normal.   Mouth/Throat: Oropharynx is clear and moist.   Cardiovascular: Normal rate, regular rhythm and normal heart sounds.    Pulmonary/Chest: Effort normal. She has wheezes. She has rales.   Abdominal: Soft. Bowel sounds are normal.   Musculoskeletal:   Hands with RA changes   Neurological: She is alert and oriented to person, place, and time.   Skin: Skin is warm and dry.   Psychiatric: She has a normal mood and affect.   Nursing note and vitals reviewed.          Significant Labs:   CBC:   Recent Labs  Lab 05/08/18  0939   WBC 4.42   HGB 10.7*   HCT 34.4*        CMP:   Recent Labs  Lab 05/08/18  0939      K 4.2      CO2 32*      BUN 19   CREATININE 0.6   CALCIUM 9.1   PROT 6.8   ALBUMIN 3.2*   BILITOT 0.3   ALKPHOS 83   AST 19   ALT 13   ANIONGAP 6*   EGFRNONAA >60     Lactic Acid:   Recent Labs  Lab 05/08/18  0939   LACTATE 0.7     Troponin:   Recent Labs  Lab 05/08/18  0939   TROPONINI <0.006     TSH:   Recent Labs  Lab 12/07/17  0813   TSH 2.182     BNP    Recent Labs  Lab 05/08/18  0939   BNP 69     Lab Results    Component Value Date    DDIMER 1.42 (H) 05/08/2018       Significant Imaging: CXR: I have reviewed all pertinent results/findings within the past 24 hours and my personal findings are:  Chronic lung markings are seen bilaterally.  Patchy opacity in the right lung base likely related to a combination of atelectasis of the component of infiltrate and aspiration not excluded.  The heart is enlarged.  Calcified atheromatous disease affects the aorta.  Left-sided pacemaker device is in place.  Age-appropriate degenerative changes affect the skeleton.    Assessment/Plan:     * COPD with acute bronchitis    O2   Nebs  IV sterods  IV zithromax and IV rocephin           Positive D dimer    CTA chest ordered  DC metformin           Pneumonia of right lower lobe due to infectious organism    Blood cultures   IV antibiotics            Type 2 diabetes mellitus, controlled    She is doing CTA   DC metformin   Resume in 48 hrs  accu checks and coverage          Depression      I offered her medications.  She declined          Hyperlipidemia    Resume statin            VTE Risk Mitigation         Ordered     IP VTE HIGH RISK PATIENT  Once      05/08/18 1239     Place sequential compression device  Until discontinued      05/08/18 1239             Jailene Astudillo MD  Department of Hospital Medicine   [facility]

## 2018-05-08 NOTE — ED TRIAGE NOTES
77 y.o. female presents to ER   Chief Complaint   Patient presents with    Shortness of Breath   pt c/o SOB for 1 week. No acute distress noted.

## 2018-05-08 NOTE — HPI
Gretel Ashton is a 77 y.o. female  Past smoker with H/o COPD presented with SOB for 2 weeks ;getting worse  Placed in observation for COPD exacerbation and RLL pneumonia    Shortness of Breath   This is a chronic problem. The problem occurs intermittently.The current episode started more than 1 week ago. The problem has been gradually worsening. Pertinent negatives include no fever, no headaches, no sore throat, no ear pain, no cough, no wheezing, no chest pain, no vomiting, no abdominal pain, no rash and no leg swelling. Treatments tried: Tried wearing her home oxygen at night and taking her breathing treatments. The treatment provided no relief. Associated medical issues do not include heart failure.

## 2018-05-08 NOTE — ED NOTES
Unable to complete med rec due to patient not knowing the medications she takes & not having it with her. I informed the patient to get a family member to bring her medications to the hospital.

## 2018-05-09 VITALS
RESPIRATION RATE: 18 BRPM | HEART RATE: 92 BPM | SYSTOLIC BLOOD PRESSURE: 147 MMHG | TEMPERATURE: 97 F | DIASTOLIC BLOOD PRESSURE: 74 MMHG | WEIGHT: 161.38 LBS | BODY MASS INDEX: 30.47 KG/M2 | OXYGEN SATURATION: 93 % | HEIGHT: 61 IN

## 2018-05-09 LAB
ALBUMIN SERPL BCP-MCNC: 3.2 G/DL
ALP SERPL-CCNC: 88 U/L
ALT SERPL W/O P-5'-P-CCNC: 13 U/L
ANION GAP SERPL CALC-SCNC: 7 MMOL/L
AST SERPL-CCNC: 17 U/L
BASOPHILS # BLD AUTO: 0 K/UL
BASOPHILS NFR BLD: 0 %
BILIRUB SERPL-MCNC: 0.3 MG/DL
BUN SERPL-MCNC: 20 MG/DL
CALCIUM SERPL-MCNC: 9.3 MG/DL
CHLORIDE SERPL-SCNC: 104 MMOL/L
CO2 SERPL-SCNC: 30 MMOL/L
CREAT SERPL-MCNC: 0.7 MG/DL
DIFFERENTIAL METHOD: ABNORMAL
EOSINOPHIL # BLD AUTO: 0 K/UL
EOSINOPHIL NFR BLD: 0 %
ERYTHROCYTE [DISTWIDTH] IN BLOOD BY AUTOMATED COUNT: 15.8 %
EST. GFR  (AFRICAN AMERICAN): >60 ML/MIN/1.73 M^2
EST. GFR  (NON AFRICAN AMERICAN): >60 ML/MIN/1.73 M^2
GLUCOSE SERPL-MCNC: 176 MG/DL
HCT VFR BLD AUTO: 34.5 %
HGB BLD-MCNC: 10.8 G/DL
LYMPHOCYTES # BLD AUTO: 0.7 K/UL
LYMPHOCYTES NFR BLD: 17.4 %
MCH RBC QN AUTO: 26.3 PG
MCHC RBC AUTO-ENTMCNC: 31.3 G/DL
MCV RBC AUTO: 84 FL
MONOCYTES # BLD AUTO: 0.1 K/UL
MONOCYTES NFR BLD: 3.4 %
NEUTROPHILS # BLD AUTO: 3.1 K/UL
NEUTROPHILS NFR BLD: 79.2 %
PLATELET # BLD AUTO: 233 K/UL
PMV BLD AUTO: 11.2 FL
POCT GLUCOSE: 155 MG/DL (ref 70–110)
POCT GLUCOSE: 160 MG/DL (ref 70–110)
POTASSIUM SERPL-SCNC: 4.2 MMOL/L
PROT SERPL-MCNC: 7 G/DL
RBC # BLD AUTO: 4.11 M/UL
SODIUM SERPL-SCNC: 141 MMOL/L
WBC # BLD AUTO: 3.85 K/UL

## 2018-05-09 PROCEDURE — 94760 N-INVAS EAR/PLS OXIMETRY 1: CPT

## 2018-05-09 PROCEDURE — 82962 GLUCOSE BLOOD TEST: CPT

## 2018-05-09 PROCEDURE — 96366 THER/PROPH/DIAG IV INF ADDON: CPT

## 2018-05-09 PROCEDURE — 25000003 PHARM REV CODE 250: Performed by: SURGERY

## 2018-05-09 PROCEDURE — 85025 COMPLETE CBC W/AUTO DIFF WBC: CPT

## 2018-05-09 PROCEDURE — 80053 COMPREHEN METABOLIC PANEL: CPT

## 2018-05-09 PROCEDURE — 36415 COLL VENOUS BLD VENIPUNCTURE: CPT

## 2018-05-09 PROCEDURE — G0378 HOSPITAL OBSERVATION PER HR: HCPCS

## 2018-05-09 PROCEDURE — 27000221 HC OXYGEN, UP TO 24 HOURS

## 2018-05-09 PROCEDURE — 99217 PR OBSERVATION CARE DISCHARGE: CPT | Mod: ,,, | Performed by: NURSE PRACTITIONER

## 2018-05-09 PROCEDURE — 63600175 PHARM REV CODE 636 W HCPCS: Performed by: SURGERY

## 2018-05-09 PROCEDURE — 96376 TX/PRO/DX INJ SAME DRUG ADON: CPT

## 2018-05-09 RX ORDER — METFORMIN HYDROCHLORIDE 500 MG/1
TABLET ORAL
Qty: 60 TABLET | Refills: 0 | Status: SHIPPED | OUTPATIENT
Start: 2018-05-09 | End: 2018-12-07 | Stop reason: SDUPTHER

## 2018-05-09 RX ORDER — AZITHROMYCIN 250 MG/1
TABLET, FILM COATED ORAL
Qty: 2 TABLET | Refills: 0 | Status: SHIPPED | OUTPATIENT
Start: 2018-05-09 | End: 2018-05-09 | Stop reason: HOSPADM

## 2018-05-09 RX ORDER — FLUTICASONE PROPIONATE AND SALMETEROL 50; 250 UG/1; UG/1
POWDER RESPIRATORY (INHALATION)
Qty: 60 EACH | Refills: 5 | Status: SHIPPED | OUTPATIENT
Start: 2018-05-09 | End: 2019-07-08 | Stop reason: SDUPTHER

## 2018-05-09 RX ORDER — AZITHROMYCIN 500 MG/1
500 TABLET, FILM COATED ORAL DAILY
Qty: 1 TABLET | Refills: 0 | Status: SHIPPED | OUTPATIENT
Start: 2018-05-09 | End: 2018-05-10

## 2018-05-09 RX ADMIN — PANTOPRAZOLE SODIUM 40 MG: 40 TABLET, DELAYED RELEASE ORAL at 09:05

## 2018-05-09 RX ADMIN — METHYLPREDNISOLONE SODIUM SUCCINATE 80 MG: 125 INJECTION, POWDER, FOR SOLUTION INTRAMUSCULAR; INTRAVENOUS at 06:05

## 2018-05-09 RX ADMIN — ASPIRIN 81 MG: 81 TABLET, COATED ORAL at 09:05

## 2018-05-09 RX ADMIN — AZITHROMYCIN MONOHYDRATE 500 MG: 500 INJECTION, POWDER, LYOPHILIZED, FOR SOLUTION INTRAVENOUS at 12:05

## 2018-05-09 NOTE — PROGRESS NOTES
Discharge Medication Reconciliation - Pharmacy Consult Note     The discharge medication regimen was reviewed by Tete Lim, Pharm.D. The following medications have been adjusted/changed:     Patient is to INITIATE the following medications   · none    Patient is to DISCONTINUE the following medications   · none    Patient is to HOLD the following medications   · None     The following medications DOSE or FREQUENCY was CHANGED  · See below (azithromycin & metformin)    The following issues/concerns were addressed prior to discharge:   Discharge medication counseling   · I spoke with the patient again regarding her discharge medications, how to take and possible side effects.  She noted that she cannot properly use her MDI but does have a nebulizer machine to use in place if needed. We reviewed proper steroid inhaler use.     Bedside delivery of medications   · yes     Medication affordability   · yes     Tete Lim Pharm.D.  [id number]         Medication List        CHANGE how you take these medications      azithromycin 500 MG tablet  Commonly known as:  ZITHROMAX  take 1  tablet x 1 dose tomorrow 5/10/18  What changed:  · medication strength  · how much to take  · how to take this  · when to take this  · additional instructions     metFORMIN 500 MG tablet  Commonly known as:  GLUCOPHAGE  TAKE ONE TABLET BY MOUTH TWICE DAILY AFTER MEALS** Resume tomorrow 5/10/18  What changed:  additional instructions            CONTINUE taking these medications      ADVAIR DISKUS 250-50 mcg/dose diskus inhaler  Generic drug:  fluticasone-salmeterol 250-50 mcg/dose  INHALE 1 PUFF TWICE A DAYAS DIRECTED     albuterol 90 mcg/actuation inhaler  Inhale 2 puffs into the lungs every 6 (six) hours as needed for Wheezing.     aspirin 81 MG EC tablet  Commonly known as:  ECOTRIN  Notes to patient:  Given today 5/9/18 at 9:30am     docusate sodium 100 MG capsule  Commonly known as:  COLACE     nabumetone 500 MG tablet  Commonly known as:   RELAFEN  Take 1 tablet (500 mg total) by mouth once daily.     rosuvastatin 20 MG tablet  Commonly known as:  CRESTOR     sulfaSALAzine 500 mg Tab  Commonly known as:  AZULFIDINE  Take 1 tablet (500 mg total) by mouth 2 (two) times daily.     ZETIA 10 mg tablet  Generic drug:  ezetimibe               Where to Get Your Medications        These medications were sent to [facility] [address] [address]      Hours:  Mon-Fri, 8a-5:30p Phone:  [phone]   · azithromycin 500 MG tablet       These medications were sent to [facility], [place] - [address]  [address] [place] [address]      Phone:  [phone]   · metFORMIN 500 MG tablet

## 2018-05-09 NOTE — PLAN OF CARE
05/08/18 1130   Medicare Message   Important Message from Medicare regarding Discharge Appeal Rights Given to patient/caregiver;Explained to patient/caregiver;Signed/date by patient/caregiver   Date IMM was signed 05/08/18   Time IMM was signed 1135

## 2018-05-09 NOTE — PROGRESS NOTES
Staff Handoff  Report received from Thais RN and patient assessed per flowsheet. 2L N/C in use. Non-prod cough. Crackles to bases. SR-ST on telemetry. Instructed to call for needs/asst.     Resident Handoff

## 2018-05-09 NOTE — SUBJECTIVE & OBJECTIVE
Past Medical History:   Diagnosis Date    Arthritis     Depression     Diabetes mellitus type II     Hyperlipidemia     Hypertension     Pacemaker        Past Surgical History:   Procedure Laterality Date     SECTION      INNER EAR SURGERY         Review of patient's allergies indicates:  No Known Allergies    No current facility-administered medications on file prior to encounter.      Current Outpatient Prescriptions on File Prior to Encounter   Medication Sig    ADVAIR DISKUS 250-50 mcg/dose diskus inhaler INHALE 1 PUFF TWICE A DAYAS DIRECTED    albuterol 90 mcg/actuation inhaler Inhale 2 puffs into the lungs every 6 (six) hours as needed for Wheezing.    aspirin (ECOTRIN) 81 MG EC tablet Take 81 mg by mouth once daily.      docusate sodium (COLACE) 100 MG capsule Take 100 mg by mouth once daily at 6am.    metFORMIN (GLUCOPHAGE) 500 MG tablet TAKE ONE TABLET BY MOUTH TWICE DAILY AFTER MEALS    nabumetone (RELAFEN) 500 MG tablet Take 1 tablet (500 mg total) by mouth once daily.    rosuvastatin (CRESTOR) 20 MG tablet Take 20 mg by mouth every evening.    sulfaSALAzine (AZULFIDINE) 500 mg Tab Take 1 tablet (500 mg total) by mouth 2 (two) times daily.    ZETIA 10 mg tablet Take 10 mg by mouth once daily.     Family History     Problem Relation (Age of Onset)    Breast cancer Mother, Sister    Cancer Mother, Sister    Stroke Father, Maternal Grandmother        Social History Main Topics    Smoking status: Former Smoker     Packs/day: 2.00     Years: 43.00     Types: Cigarettes     Quit date: 2000    Smokeless tobacco: Never Used    Alcohol use No    Drug use: No    Sexual activity: Not on file     Review of Systems   Constitutional: Positive for fatigue. Negative for appetite change, chills and fever.   HENT: Negative for congestion, ear discharge, ear pain, rhinorrhea, sinus pressure and sore throat.    Eyes: Positive for visual disturbance.   Respiratory: Positive for cough.  Negative for choking, chest tightness, shortness of breath and wheezing.         With coughing and increased activity   Cardiovascular: Negative for chest pain and palpitations.   Gastrointestinal: Negative for constipation, diarrhea, nausea and vomiting.   Musculoskeletal: Positive for arthralgias. Negative for joint swelling and myalgias.   Skin: Negative for rash and wound.   Neurological: Negative for dizziness, syncope, weakness, light-headedness and numbness.   Psychiatric/Behavioral: Positive for dysphoric mood and sleep disturbance. Negative for hallucinations and self-injury. The patient is nervous/anxious.      Objective:     Vital Signs (Most Recent):  Temp: 97.4 °F (36.3 °C) (05/09/18 0357)  Pulse: 98 (05/09/18 0600)  Resp: 18 (05/09/18 0357)  BP: (!) 154/85 (05/09/18 0357)  SpO2: 96 % (05/09/18 0727) Vital Signs (24h Range):  Temp:  [96.1 °F (35.6 °C)-97.4 °F (36.3 °C)] 97.4 °F (36.3 °C)  Pulse:  [] 98  Resp:  [15-24] 18  SpO2:  [91 %-98 %] 96 %  BP: (120-180)/() 154/85     Weight: 73.2 kg (161 lb 6 oz)  Body mass index is 30.49 kg/m².    Physical Exam   Constitutional: She is oriented to person, place, and time. She appears well-developed and well-nourished.   HENT:   Head: Normocephalic and atraumatic.   Right Ear: External ear normal.   Left Ear: External ear normal.   Nose: Nose normal.   Mouth/Throat: Oropharynx is clear and moist.   Cardiovascular: Normal rate, regular rhythm and normal heart sounds.    Pulmonary/Chest: Effort normal. She has no wheezes. She has no rales.   Markedly reduced tidal volume but no wheezing    Abdominal: Soft. Bowel sounds are normal.   Musculoskeletal:   Hands with RA changes   Neurological: She is alert and oriented to person, place, and time.   Skin: Skin is warm and dry.   Psychiatric: She has a normal mood and affect.   Nursing note and vitals reviewed.          Significant Labs:   CBC:     Recent Labs  Lab 05/08/18  0939 05/09/18  0425   WBC 4.42  3.85*   HGB 10.7* 10.8*   HCT 34.4* 34.5*    233     CMP:     Recent Labs  Lab 05/08/18  0939 05/09/18  0425    141   K 4.2 4.2    104   CO2 32* 30*    176*   BUN 19 20   CREATININE 0.6 0.7   CALCIUM 9.1 9.3   PROT 6.8 7.0   ALBUMIN 3.2* 3.2*   BILITOT 0.3 0.3   ALKPHOS 83 88   AST 19 17   ALT 13 13   ANIONGAP 6* 7*   EGFRNONAA >60 >60     Lactic Acid:     Recent Labs  Lab 05/08/18  0939   LACTATE 0.7     Troponin:     Recent Labs  Lab 05/08/18  0939   TROPONINI <0.006     TSH:     Recent Labs  Lab 12/07/17  0813   TSH 2.182     BNP    Recent Labs  Lab 05/08/18  0939   BNP 69     Lab Results   Component Value Date    DDIMER 1.42 (H) 05/08/2018       Significant Imaging: CXR: I have reviewed all pertinent results/findings within the past 24 hours and my personal findings are:  Chronic lung markings are seen bilaterally.  Patchy opacity in the right lung base likely related to a combination of atelectasis of the component of infiltrate and aspiration not excluded.  The heart is enlarged.  Calcified atheromatous disease affects the aorta.  Left-sided pacemaker device is in place.  Age-appropriate degenerative changes affect the skeleton.   3/8/18 CTA chest No aortic dissection or pulmonary embolus is seen.    Severe centrilobular emphysematous disease.  There are 2 noncalcified nodules seen within the lingula, the largest measuring 5.5 mm.  For multiple solid nodules all <6 mm, Fleischner Society 2017 guidelines recommend no routine follow up for a low risk patient, or follow up with non-contrast chest CT at 12 months after discovery in a high risk patient.    Mosaic perfusion pattern of the lungs can be seen the setting of small airways or small vessels disease.    Cardiomegaly.  Calcified coronary artery disease is seen.  Calcified atheromatous disease affects the aorta.

## 2018-05-09 NOTE — HOSPITAL COURSE
5/9/18 NAD overnight. Slept well. O2 sat 96% on 2LNC.    /80, 154/85; BP well controlled as op; / 62 at recent office visit; no on Anti-hypertensive meds..   CT demonstrating emphysema ; not reporting RLL pneumonia as per CXR.   Pt sitting up in chair. Looks good. Speaking full sentences. Reports she is feeling a lot better than yesterday. Currently not wearing nasal cannula.   Uses home oxygen

## 2018-05-09 NOTE — PROGRESS NOTES
I spoke with the patient and her friend, with consent, about her current medications, what they are for and common side effects.  We discussed her current antibiotics and getting assitance to prevent falls. She inquired about her steroid medications and seemed to understand her treatment plan.

## 2018-05-09 NOTE — CONSULTS
[facility]  Pulmonology  Consult Note    Patient Name: Gretel Ashton  MRN: [id number]  Admission Date: 2018  Hospital Length of Stay: 0 days  Code Status: Full Code  Attending Physician: Jailene Astudillo MD  Primary Care Provider: Jailene Astudillo MD   Principal Problem: COPD with acute bronchitis    Consults  Subjective:     HPI:  Gretel Ashton is a 77 y.o. year old female that's presents with a chief complaint of SOB and Wheezing for 10 to 12  Days.Patient has a 40pack year history of smoking 2 to 3 packs for 20 years qiuit about 20 years ago. Worsening sob without a significant history of wheezing BNP is normal .admitted for exacebation of COPD with increased marking in RLL on CXRConsulted to evaluate Respiratory status.    Past Medical History:   Diagnosis Date    Arthritis     Depression     Diabetes mellitus type II     Hyperlipidemia     Hypertension     Pacemaker         Past Surgical History:   Procedure Laterality Date     SECTION      INNER EAR SURGERY         Prior to Admission medications    Medication Sig Start Date End Date Taking? Authorizing Provider   ADVAIR DISKUS 250-50 mcg/dose diskus inhaler INHALE 1 PUFF TWICE A DAYAS DIRECTED 16  Yes Jailene Astudillo MD   albuterol 90 mcg/actuation inhaler Inhale 2 puffs into the lungs every 6 (six) hours as needed for Wheezing. 10/2/17  Yes Linda Lira MD   aspirin (ECOTRIN) 81 MG EC tablet Take 81 mg by mouth once daily.     Yes Historical Provider, MD   docusate sodium (COLACE) 100 MG capsule Take 100 mg by mouth once daily at 6am.   Yes Historical Provider, MD   metFORMIN (GLUCOPHAGE) 500 MG tablet TAKE ONE TABLET BY MOUTH TWICE DAILY AFTER MEALS 18  Yes Jailene Astudillo MD   nabumetone (RELAFEN) 500 MG tablet Take 1 tablet (500 mg total) by mouth once daily. 18  Yes Jailene Astudillo MD   rosuvastatin (CRESTOR) 20 MG tablet Take 20 mg by mouth every evening. 16  Yes Historical Provider,  MD   sulfaSALAzine (AZULFIDINE) 500 mg Tab Take 1 tablet (500 mg total) by mouth 2 (two) times daily. 4/9/18  Yes Jailene Astudillo MD   ZETIA 10 mg tablet Take 10 mg by mouth once daily. 12/6/16  Yes Historical Provider, MD       Social History     Social History    Marital status:      Spouse name: N/A    Number of children: N/A    Years of education: N/A     Occupational History    Not on file.     Social History Main Topics    Smoking status: Former Smoker     Packs/day: 2.00     Years: 43.00     Types: Cigarettes     Quit date: 1/1/2000    Smokeless tobacco: Never Used    Alcohol use No    Drug use: No    Sexual activity: Not on file     Other Topics Concern    Not on file     Social History Narrative    No narrative on file       Family History   Problem Relation Age of Onset    Cancer Mother     Breast cancer Mother     Stroke Father     Cancer Sister     Breast cancer Sister     Stroke Maternal Grandmother        Review of patient's allergies indicates:  No Known Allergies Allergies have been reviewed.     ROS: Review of Systems   Constitutional: Negative for chills, fever and weight loss.   HENT: Negative for sore throat.    Eyes: Negative for double vision and photophobia.   Respiratory: Positive for cough, sputum production (clear) and shortness of breath (progressive over 10 to 12 days ).    Cardiovascular: Positive for leg swelling (mild) and PND (occasional). Negative for palpitations.   Gastrointestinal: Negative for abdominal pain and diarrhea.   Genitourinary: Negative for dysuria and frequency.   Musculoskeletal: Positive for myalgias (generalized). Negative for back pain and neck pain.   Skin: Negative.    Neurological: Negative for dizziness, weakness and headaches.   Endo/Heme/Allergies: Does not bruise/bleed easily.   Psychiatric/Behavioral: Negative for memory loss. The patient has insomnia (intermittant ).        PE:   Vitals:    05/09/18 0600 05/09/18 0727 05/09/18  0800 05/09/18 0937   BP:    (!) 147/77   BP Location:    Right arm   Patient Position:    Sitting   Pulse: 98  78 99   Resp:       Temp:    96.6 °F (35.9 °C)   TempSrc:       SpO2:  96%  (!) 92%   Weight:       Height:        Physical Exam    Alert and orientated X 3   HEENT: Head: Normocephalic no trauma                Ears : Normal Pinna No Drainage no Battles sign                Eyes: Vision Unchanged, No conjunctivitis,No drainage                Neck: Supple, No JVD,No Abnormal Carotid Pulsations                Throat: No Erythema, No pus,No Swelling,Mallampati score= 2    Chest: course BS bilaterally but no wheezing or rhonchi  Cardiac: RRR S1+ S2 with a -S3: +M = 2/6, No R/H/G  Abdomen: Bowel Sounds are Normal.Soft Abdomen. No organomegaly of Liver,Spleen,or Kidneys   CNS: Non focal and intact. Cranial nerves 2, 346,8,9,10 and 12 are normal.Norrmal gait.Normal posture.  Extremities: No Clubbing,No Cyanosis with oxygen,Positive mild edema of lower extremities Bilateral 1/4 +  Skin: No Rash, No Ulcerative sores,and No cellulitis of the IV site.    Lab Results   Component Value Date    WBC 3.85 (L) 05/09/2018    HGB 10.8 (L) 05/09/2018    HCT 34.5 (L) 05/09/2018     05/09/2018    CHOL 130 12/07/2017    TRIG 56 12/07/2017    HDL 41 12/07/2017    ALT 13 05/09/2018    AST 17 05/09/2018     05/09/2018    K 4.2 05/09/2018     05/09/2018    CREATININE 0.7 05/09/2018    BUN 20 05/09/2018    CO2 30 (H) 05/09/2018    TSH 2.182 12/07/2017    INR 1.0 05/08/2018    HGBA1C 5.7 (H) 05/08/2018               Assessment/Plan:     * COPD with acute bronchitis    Improving with o2 pt has o2 at home ? If she is using by history needs it at night         Pneumonia of right lower lobe due to infectious organism    Increased marking on cxr in rll will repeat xray as outpt or in AM         Type 2 diabetes mellitus, controlled    Stable         will see patient as an out patient      Thank you for your consult. I will  sign off. Please contact us if you have any additional questions.     Hood Solano MD  Pulmonology  [facility]

## 2018-05-09 NOTE — DISCHARGE SUMMARY
[facility] Hospital Medicine  Discharge Summary      Patient Name: Gretel Ashton  MRN: [id number]  Admission Date: 5/8/2018  Hospital Length of Stay: 0 days  Discharge Date and Time:  05/09/2018 11:16 AM  Attending Physician: Jailene Hester MD   Discharging Provider: Barbara Amin NP  Primary Care Provider: Jailene Hester MD      HPI:   Gretel Ashton is a 77 y.o. female  Past smoker with H/o COPD presented with SOB for 2 weeks ;getting worse  Placed in observation for COPD exacerbation and RLL pneumonia    Shortness of Breath   This is a chronic problem. The problem occurs intermittently.The current episode started more than 1 week ago. The problem has been gradually worsening. Pertinent negatives include no fever, no headaches, no sore throat, no ear pain, no cough, no wheezing, no chest pain, no vomiting, no abdominal pain, no rash and no leg swelling. Treatments tried: Tried wearing her home oxygen at night and taking her breathing treatments. The treatment provided no relief. Associated medical issues do not include heart failure.     * No surgery found *      Hospital Course:   5/9/18 NAD overnight. Slept well. O2 sat 96% on 2LNC.    /80, 154/85; BP well controlled as op; / 62 at recent office visit; no on Anti-hypertensive meds..   CT demonstrating emphysema ; not reporting RLL pneumonia as per CXR.   Pt sitting up in chair. Looks good. Speaking full sentences. Reports she is feeling a lot better than yesterday. Currently not wearing nasal cannula.   Uses home oxygen     Consults:   Consults         Status Ordering Provider     Inpatient consult to Pulmonology  Once     Provider:  Hood Solano MD    Acknowledged JAILENE HESTER          No new Assessment & Plan notes have been filed under this hospital service since the last note was generated.  Service: Hospital Medicine    Final Active Diagnoses:    Diagnosis Date Noted POA    PRINCIPAL PROBLEM:  COPD with  acute bronchitis [J44.0, J20.9] 05/08/2018 Yes    Pneumonia of right lower lobe due to infectious organism [J18.1] 05/08/2018 Yes    Positive D dimer [R79.89] 05/08/2018 Yes    Type 2 diabetes mellitus, controlled [E11.9] 12/04/2013 Yes    Hyperlipidemia [E78.5]  Yes    Depression [F32.9]  Yes      Problems Resolved During this Admission:    Diagnosis Date Noted Date Resolved POA       Discharged Condition: good    Disposition: Home or Self Care    Follow Up:  Follow-up Information     Jailene Astudillo MD. Schedule an appointment as soon as possible for a visit in 1 week.    Specialty:  Internal Medicine  Why:  outpatient services  Contact information:  [address] [id number] [phone]                 Patient Instructions:   No discharge procedures on file.    Significant Diagnostic Studies:   CBC:      Recent Labs  Lab 05/08/18  0939 05/09/18  0425   WBC 4.42 3.85*   HGB 10.7* 10.8*   HCT 34.4* 34.5*    233      CMP:      Recent Labs  Lab 05/08/18  0939 05/09/18  0425    141   K 4.2 4.2    104   CO2 32* 30*    176*   BUN 19 20   CREATININE 0.6 0.7   CALCIUM 9.1 9.3   PROT 6.8 7.0   ALBUMIN 3.2* 3.2*   BILITOT 0.3 0.3   ALKPHOS 83 88   AST 19 17   ALT 13 13   ANIONGAP 6* 7*   EGFRNONAA >60 >60      Lactic Acid:      Recent Labs  Lab 05/08/18  0939   LACTATE 0.7      Troponin:      Recent Labs  Lab 05/08/18  0939   TROPONINI <0.006      TSH:      Recent Labs  Lab 12/07/17  0813   TSH 2.182      BNP     Recent Labs  Lab 05/08/18  0939   BNP 69            Lab Results   Component Value Date     DDIMER 1.42 (H) 05/08/2018         Significant Imaging: CXR: I have reviewed all pertinent results/findings within the past 24 hours and my personal findings are:  Chronic lung markings are seen bilaterally.  Patchy opacity in the right lung base likely related to a combination of atelectasis of the component of infiltrate and aspiration not excluded.  The heart is enlarged.  Calcified  atheromatous disease affects the aorta.  Left-sided pacemaker device is in place.  Age-appropriate degenerative changes affect the skeleton.   3/8/18 CTA chest No aortic dissection or pulmonary embolus is seen.    Severe centrilobular emphysematous disease.  There are 2 noncalcified nodules seen within the lingula, the largest measuring 5.5 mm.  For multiple solid nodules all <6 mm, Fleischner Society 2017 guidelines recommend no routine follow up for a low risk patient, or follow up with non-contrast chest CT at 12 months after discovery in a high risk patient.    Mosaic perfusion pattern of the lungs can be seen the setting of small airways or small vessels disease.    Cardiomegaly.  Calcified coronary artery disease is seen.  Calcified atheromatous disease affects the aorta.       Pending Diagnostic Studies:     None         Medications:  Reconciled Home Medications:      Medication List      CHANGE how you take these medications    azithromycin 500 MG tablet  Commonly known as:  ZITHROMAX  Take 1 tablet (500 mg total) by mouth once daily. Take 500mg 1 tablet x 1 dose tomorrow 5/101/8  What changed:  · medication strength  · how much to take  · how to take this  · when to take this  · additional instructions     metFORMIN 500 MG tablet  Commonly known as:  GLUCOPHAGE  TAKE ONE TABLET BY MOUTH TWICE DAILY AFTER MEALS** Resume tomorrow 5/10/18  What changed:  additional instructions        CONTINUE taking these medications    ADVAIR DISKUS 250-50 mcg/dose diskus inhaler  Generic drug:  fluticasone-salmeterol 250-50 mcg/dose  INHALE 1 PUFF TWICE A DAYAS DIRECTED     albuterol 90 mcg/actuation inhaler  Inhale 2 puffs into the lungs every 6 (six) hours as needed for Wheezing.     aspirin 81 MG EC tablet  Commonly known as:  ECOTRIN  Take 81 mg by mouth once daily.     docusate sodium 100 MG capsule  Commonly known as:  COLACE  Take 100 mg by mouth once daily at 6am.     nabumetone 500 MG tablet  Commonly known as:   RELAFEN  Take 1 tablet (500 mg total) by mouth once daily.     rosuvastatin 20 MG tablet  Commonly known as:  CRESTOR  Take 20 mg by mouth every evening.     sulfaSALAzine 500 mg Tab  Commonly known as:  AZULFIDINE  Take 1 tablet (500 mg total) by mouth 2 (two) times daily.     ZETIA 10 mg tablet  Generic drug:  ezetimibe  Take 10 mg by mouth once daily.            Indwelling Lines/Drains at time of discharge:   Lines/Drains/Airways          No matching active lines, drains, or airways          Time spent on the discharge of patient: 20 minutes  Patient was seen and examined on the date of discharge and determined to be suitable for discharge.         Barbara Amin, NP  Department of Hospital Medicine  [facility]

## 2018-05-09 NOTE — PLAN OF CARE
05/09/18 1105   Discharge Assessment   Assessment Type Discharge Planning Reassessment     Patient will discharge home today. She has no concerns for discharge.

## 2018-05-09 NOTE — PLAN OF CARE
05/09/18 1047   Discharge Reassessment   Assessment Type Discharge Planning Reassessment     Pt requested homemaker services. Sw contacted [place] [facility] homemaker (Alyssa) and was advised to have the pt to call the office. [phone]. Sw provided pt with contact information. The pt has no other SW needs noted at this time. SW will continue to follow and offer support as needed.    Jorden Robles LMSW

## 2018-05-09 NOTE — PLAN OF CARE
05/09/18 0959   MAGUIRE Message   Medicare Outpatient and Observation Notification regarding financial responsibility Given to patient/caregiver;Explained to patient/caregiver;Signed/date by patient/caregiver   Date MAGUIRE was signed 05/02/18   Time MAGUIRE was signed 0959

## 2018-05-09 NOTE — PROGRESS NOTES
[facility] Hospital Medicine  Progress Note    Patient Name: Gretel Ashton  MRN: [id number]  Patient Class: OP- Observation   Admission Date: 2018  Length of Stay: 0 days  Attending Physician: Jailene Astudillo MD  Primary Care Provider: Jailene Astudillo MD        Subjective:     Principal Problem:COPD with acute bronchitis    HPI:  Gretel Ashton is a 77 y.o. female  Past smoker with H/o COPD presented with SOB for 2 weeks ;getting worse  Placed in observation for COPD exacerbation and RLL pneumonia    Shortness of Breath   This is a chronic problem. The problem occurs intermittently.The current episode started more than 1 week ago. The problem has been gradually worsening. Pertinent negatives include no fever, no headaches, no sore throat, no ear pain, no cough, no wheezing, no chest pain, no vomiting, no abdominal pain, no rash and no leg swelling. Treatments tried: Tried wearing her home oxygen at night and taking her breathing treatments. The treatment provided no relief. Associated medical issues do not include heart failure.     Hospital Course:  18 NAD overnight. Slept well. O2 sat 96% on 2LNC.    /80, 154/85; BP well controlled as op; / 62 at recent office visit; no on Anti-hypertensive meds..   CT demonstrating emphysema ; not reporting RLL pneumonia as per CXR.   Pt sitting up in chair. Looks good. Speaking full sentences. Reports she is feeling a lot better than yesterday. Currently not wearing nasal cannula.   Uses home oxygen    Past Medical History:   Diagnosis Date    Arthritis     Depression     Diabetes mellitus type II     Hyperlipidemia     Hypertension     Pacemaker        Past Surgical History:   Procedure Laterality Date     SECTION      INNER EAR SURGERY         Review of patient's allergies indicates:  No Known Allergies    No current facility-administered medications on file prior to encounter.      Current Outpatient Prescriptions on  File Prior to Encounter   Medication Sig    ADVAIR DISKUS 250-50 mcg/dose diskus inhaler INHALE 1 PUFF TWICE A DAYAS DIRECTED    albuterol 90 mcg/actuation inhaler Inhale 2 puffs into the lungs every 6 (six) hours as needed for Wheezing.    aspirin (ECOTRIN) 81 MG EC tablet Take 81 mg by mouth once daily.      docusate sodium (COLACE) 100 MG capsule Take 100 mg by mouth once daily at 6am.    metFORMIN (GLUCOPHAGE) 500 MG tablet TAKE ONE TABLET BY MOUTH TWICE DAILY AFTER MEALS    nabumetone (RELAFEN) 500 MG tablet Take 1 tablet (500 mg total) by mouth once daily.    rosuvastatin (CRESTOR) 20 MG tablet Take 20 mg by mouth every evening.    sulfaSALAzine (AZULFIDINE) 500 mg Tab Take 1 tablet (500 mg total) by mouth 2 (two) times daily.    ZETIA 10 mg tablet Take 10 mg by mouth once daily.     Family History     Problem Relation (Age of Onset)    Breast cancer Mother, Sister    Cancer Mother, Sister    Stroke Father, Maternal Grandmother        Social History Main Topics    Smoking status: Former Smoker     Packs/day: 2.00     Years: 43.00     Types: Cigarettes     Quit date: 1/1/2000    Smokeless tobacco: Never Used    Alcohol use No    Drug use: No    Sexual activity: Not on file     Review of Systems   Constitutional: Positive for fatigue. Negative for appetite change, chills and fever.   HENT: Negative for congestion, ear discharge, ear pain, rhinorrhea, sinus pressure and sore throat.    Eyes: Positive for visual disturbance.   Respiratory: Positive for cough. Negative for choking, chest tightness, shortness of breath and wheezing.         With coughing and increased activity   Cardiovascular: Negative for chest pain and palpitations.   Gastrointestinal: Negative for constipation, diarrhea, nausea and vomiting.   Musculoskeletal: Positive for arthralgias. Negative for joint swelling and myalgias.   Skin: Negative for rash and wound.   Neurological: Negative for dizziness, syncope, weakness,  light-headedness and numbness.   Psychiatric/Behavioral: Positive for dysphoric mood and sleep disturbance. Negative for hallucinations and self-injury. The patient is nervous/anxious.      Objective:     Vital Signs (Most Recent):  Temp: 97.4 °F (36.3 °C) (05/09/18 0357)  Pulse: 98 (05/09/18 0600)  Resp: 18 (05/09/18 0357)  BP: (!) 154/85 (05/09/18 0357)  SpO2: 96 % (05/09/18 0727) Vital Signs (24h Range):  Temp:  [96.1 °F (35.6 °C)-97.4 °F (36.3 °C)] 97.4 °F (36.3 °C)  Pulse:  [] 98  Resp:  [15-24] 18  SpO2:  [91 %-98 %] 96 %  BP: (120-180)/() 154/85     Weight: 73.2 kg (161 lb 6 oz)  Body mass index is 30.49 kg/m².    Physical Exam   Constitutional: She is oriented to person, place, and time. She appears well-developed and well-nourished.   HENT:   Head: Normocephalic and atraumatic.   Right Ear: External ear normal.   Left Ear: External ear normal.   Nose: Nose normal.   Mouth/Throat: Oropharynx is clear and moist.   Cardiovascular: Normal rate, regular rhythm and normal heart sounds.    Pulmonary/Chest: Effort normal. She has no wheezes. She has no rales.   Markedly reduced tidal volume but no wheezing    Abdominal: Soft. Bowel sounds are normal.   Musculoskeletal:   Hands with RA changes   Neurological: She is alert and oriented to person, place, and time.   Skin: Skin is warm and dry.   Psychiatric: She has a normal mood and affect.   Nursing note and vitals reviewed.          Significant Labs:   CBC:     Recent Labs  Lab 05/08/18  0939 05/09/18 0425   WBC 4.42 3.85*   HGB 10.7* 10.8*   HCT 34.4* 34.5*    233     CMP:     Recent Labs  Lab 05/08/18  0939 05/09/18 0425    141   K 4.2 4.2    104   CO2 32* 30*    176*   BUN 19 20   CREATININE 0.6 0.7   CALCIUM 9.1 9.3   PROT 6.8 7.0   ALBUMIN 3.2* 3.2*   BILITOT 0.3 0.3   ALKPHOS 83 88   AST 19 17   ALT 13 13   ANIONGAP 6* 7*   EGFRNONAA >60 >60     Lactic Acid:     Recent Labs  Lab 05/08/18  0939   LACTATE 0.7      Troponin:     Recent Labs  Lab 05/08/18  0939   TROPONINI <0.006     TSH:     Recent Labs  Lab 12/07/17  0813   TSH 2.182     BNP    Recent Labs  Lab 05/08/18  0939   BNP 69     Lab Results   Component Value Date    DDIMER 1.42 (H) 05/08/2018       Significant Imaging: CXR: I have reviewed all pertinent results/findings within the past 24 hours and my personal findings are:  Chronic lung markings are seen bilaterally.  Patchy opacity in the right lung base likely related to a combination of atelectasis of the component of infiltrate and aspiration not excluded.  The heart is enlarged.  Calcified atheromatous disease affects the aorta.  Left-sided pacemaker device is in place.  Age-appropriate degenerative changes affect the skeleton.   3/8/18 CTA chest No aortic dissection or pulmonary embolus is seen.    Severe centrilobular emphysematous disease.  There are 2 noncalcified nodules seen within the lingula, the largest measuring 5.5 mm.  For multiple solid nodules all <6 mm, Fleischner Society 2017 guidelines recommend no routine follow up for a low risk patient, or follow up with non-contrast chest CT at 12 months after discovery in a high risk patient.    Mosaic perfusion pattern of the lungs can be seen the setting of small airways or small vessels disease.    Cardiomegaly.  Calcified coronary artery disease is seen.  Calcified atheromatous disease affects the aorta.    Assessment/Plan:      * COPD with acute bronchitis    O2   Nebs  IV sterods- eddie decrease dose and change to po taper for home  IV zithromax and IV rocephin - day 2 send home on zmax to complete 3 days course           Positive D dimer    CTA chest  Negative for PE   DC metformin           Pneumonia of right lower lobe due to infectious organism    Blood cultures - NGTD- 1d  IV antibiotics;zithromax and IV rocephin - day 2; will D/C home with  2 more days of zithromax 500mg daily            Type 2 diabetes mellitus, controlled     CTA of chest  5/8   DC metformin   Resume in 48 hrs- 5/10   accu checks and coverage          Depression      I offered her medications.  She declined          Hyperlipidemia    continue statin            VTE Risk Mitigation         Ordered     IP VTE HIGH RISK PATIENT  Once      05/08/18 1239     Place sequential compression device  Until discontinued      05/08/18 1239              Dean Womack MD  Department of Hospital Medicine   [facility]

## 2018-05-09 NOTE — PLAN OF CARE
Problem: Patient Care Overview  Goal: Plan of Care Review  Outcome: Ongoing (interventions implemented as appropriate)  Patient had a good night. Slept well. SR-ST on telemetry. 2L N/C in use. POC reviewed and patient instructed to call for needs/asst.

## 2018-05-09 NOTE — ASSESSMENT & PLAN NOTE
Blood cultures - NGTD- 1d  IV antibiotics;zithromax and IV rocephin - day 2; will D/C home with  2 more days of zithromax 500mg daily

## 2018-05-09 NOTE — ASSESSMENT & PLAN NOTE
O2   Nebs  IV sterods- eddie decrease dose and change to po taper for home  IV zithromax and IV rocephin - day 2 send home on zmax to complete 3 days course

## 2018-05-09 NOTE — TELEPHONE ENCOUNTER
Requested Prescriptions     Pending Prescriptions Disp Refills    ADVAIR DISKUS 250-50 mcg/dose diskus inhaler 60 each 5     Sig: INHALE 1 PUFF TWICE A DAYAS DIRECTED   Refill phoned in to pharmacy d/t patient was there waiting. Rx pended for documentation. Thanks

## 2018-05-09 NOTE — PLAN OF CARE
Problem: Fall Risk (Adult)  Goal: Absence of Falls  Patient will demonstrate the desired outcomes by discharge/transition of care.   Outcome: Ongoing (interventions implemented as appropriate)  Pt free of falls/incidents. Fall precautions maintained.    Problem: Patient Care Overview  Goal: Plan of Care Review  Outcome: Ongoing (interventions implemented as appropriate)  Pt aware of plan of care. No questions or concerns at this time. F/u appt made. Ok to d/c per MD order.

## 2018-05-09 NOTE — PLAN OF CARE
05/09/18 1037   Discharge Assessment   Assessment Type Discharge Planning Assessment   Confirmed/corrected address and phone number on facesheet? Yes   Assessment information obtained from? Patient;Medical Record;Caregiver   Expected Length of Stay (days) 2   Communicated expected length of stay with patient/caregiver yes   Prior to hospitilization cognitive status: Alert/Oriented   Prior to hospitalization functional status: Independent   Current cognitive status: Alert/Oriented   Current Functional Status: Independent   Lives With alone  (the pt lives in a camper behind her son's house. )   Able to Return to Prior Arrangements yes   Is patient able to care for self after discharge? Yes   Who are your caregiver(s) and their phone number(s)? Agustin Ashton [phone]   Patient's perception of discharge disposition home or selfcare   Readmission Within The Last 30 Days no previous admission in last 30 days   Patient currently being followed by outpatient case management? No   Patient currently receives any other outside agency services? No   Equipment Currently Used at Home oxygen   Do you have any problems affording any of your prescribed medications? No   Is the patient taking medications as prescribed? yes   Does the patient have transportation home? Yes   Transportation Available family or friend will provide   Does the patient receive services at the [facility]? No   Discharge Plan A Home   Discharge Plan B Home;Home Health   Patient/Family In Agreement With Plan yes     The pt is a 77 year old female who was admitted with shortness of breath. The pt lives alone. The pt does not use an assistive device to ambulate. The pt does use O2. The pt does not have hh. The pt is able to afford her medication. The pt has no other SW needs noted at this time. SW will continue to follow and offer support as needed.    Jorden Robles LMSW

## 2018-05-10 ENCOUNTER — TELEPHONE (OUTPATIENT)
Dept: INTERNAL MEDICINE | Facility: CLINIC | Age: 78
End: 2018-05-10

## 2018-05-10 NOTE — TELEPHONE ENCOUNTER
Reviewed ER report and dx with patient's son. No mention of MRSA. Patient will follow-up with Dr. Astudillo on 5/16/18. I advised that he accompany her to the appt

## 2018-05-10 NOTE — PLAN OF CARE
05/10/18 1350   Final Note   Assessment Type Final Discharge Note   Discharge Disposition Home   What phone number can be called within the next 1-3 days to see how you are doing after discharge? [phone]   Hospital Follow Up  Appt(s) scheduled? Yes   Discharge plans and expectations educations in teach back method with documentation complete? Yes   Right Care Referral Info   Post Acute Recommendation No Care

## 2018-05-10 NOTE — TELEPHONE ENCOUNTER
----- Message from Joce Brooks sent at 5/10/2018  1:43 PM CDT -----  Contact: MISHA / JACQUI Ashton  MRN: [id number]  : 1940  PCP: Jailene Astudillo  Home Phone      [phone]  Work Phone      Not on file.  Mobile          [phone]  Home Phone      Not on file.      MESSAGE: SAID THAT PT WAS DIAGNOSED WITH EMPHYSEMA AND STAPH. HAS QUESTIONS REGARDING BOTH CONDITIONS. PLEASE CALL     [phone]

## 2018-05-13 LAB — BACTERIA BLD CULT: NORMAL

## 2018-05-16 ENCOUNTER — OFFICE VISIT (OUTPATIENT)
Dept: INTERNAL MEDICINE | Facility: CLINIC | Age: 78
End: 2018-05-16
Payer: MEDICARE

## 2018-05-16 VITALS
RESPIRATION RATE: 16 BRPM | BODY MASS INDEX: 29.97 KG/M2 | HEART RATE: 86 BPM | SYSTOLIC BLOOD PRESSURE: 132 MMHG | OXYGEN SATURATION: 89 % | WEIGHT: 158.75 LBS | DIASTOLIC BLOOD PRESSURE: 80 MMHG | HEIGHT: 61 IN

## 2018-05-16 DIAGNOSIS — J20.9 COPD WITH ACUTE BRONCHITIS: Primary | ICD-10-CM

## 2018-05-16 DIAGNOSIS — J44.0 COPD WITH ACUTE BRONCHITIS: Primary | ICD-10-CM

## 2018-05-16 PROCEDURE — 99213 OFFICE O/P EST LOW 20 MIN: CPT | Mod: PBBFAC | Performed by: INTERNAL MEDICINE

## 2018-05-16 PROCEDURE — 99999 PR PBB SHADOW E&M-EST. PATIENT-LVL III: CPT | Mod: PBBFAC,,, | Performed by: INTERNAL MEDICINE

## 2018-05-16 PROCEDURE — 99999 PR STA SHADOW: CPT | Mod: PBBFAC,,, | Performed by: INTERNAL MEDICINE

## 2018-05-16 PROCEDURE — 99213 OFFICE O/P EST LOW 20 MIN: CPT | Mod: S$PBB | Performed by: INTERNAL MEDICINE

## 2018-05-16 NOTE — PROGRESS NOTES
Subjective:       Patient ID: Gretel Ashton is a 77 y.o. female.    Chief Complaint: COPD (hospital follow up)    Gretel Asthon is a 77 y.o. female  Here for follow up for COPD admit at hospital.  She finished her zithromax.  She reports doing better ;  Reports no fevers no chills.  No wheeizing       COPD   Pertinent negatives include no abdominal pain, chest pain, fatigue, fever, headaches, joint swelling, nausea, neck pain, rash, vomiting or weakness.     Review of Systems   Constitutional: Negative for activity change, fatigue, fever and unexpected weight change.   HENT: Negative for ear pain, hearing loss and rhinorrhea.    Eyes: Negative for redness and visual disturbance.   Respiratory: Negative for wheezing.    Cardiovascular: Negative for chest pain, palpitations and leg swelling.   Gastrointestinal: Negative for abdominal pain, constipation, diarrhea, nausea and vomiting.   Genitourinary: Negative for dysuria, frequency and urgency.   Musculoskeletal: Negative for back pain, joint swelling and neck pain.   Skin: Negative for color change, rash and wound.   Neurological: Negative for dizziness, tremors, weakness, light-headedness and headaches.       Objective:      Physical Exam   Constitutional: She is oriented to person, place, and time. She appears well-developed and well-nourished.   HENT:   Head: Normocephalic and atraumatic.   Right Ear: External ear normal.   Left Ear: External ear normal.   Mouth/Throat: Oropharynx is clear and moist.   Eyes: Conjunctivae and EOM are normal. Pupils are equal, round, and reactive to light.   Neck: Normal range of motion. Neck supple. No JVD present. No tracheal deviation present. No thyromegaly present.   Cardiovascular: Normal rate, regular rhythm, normal heart sounds and intact distal pulses.    Pulmonary/Chest: Effort normal and breath sounds normal. No respiratory distress. She has no wheezes. She has no rales. She exhibits no tenderness.   Abdominal: Soft.  Bowel sounds are normal. She exhibits no distension and no mass. There is no tenderness. There is no rebound and no guarding.   Musculoskeletal: Normal range of motion. She exhibits no edema.   Lymphadenopathy:     She has no cervical adenopathy.   Neurological: She is alert and oriented to person, place, and time. She has normal reflexes. She displays normal reflexes. No cranial nerve deficit. She exhibits normal muscle tone. Coordination normal.   CN: Optic discs are flat with normal vasculature, PERRL, Extraoccular movements and visual fields are full. Normal facial sensation and strength, Hearing symmetric, Tongue and Palate are midline and strong. Shoulder Shrug symmetric and strong.   Skin: Skin is warm and dry.   Psychiatric: She has a normal mood and affect.   Nursing note and vitals reviewed.      Assessment:       1. COPD with acute bronchitis        Plan:   Gretel was seen today for copd.    Diagnoses and all orders for this visit:    COPD with acute bronchitis    improved.  Finished her zithromax  Doing great   Continue inhalers

## 2018-05-30 ENCOUNTER — OFFICE VISIT (OUTPATIENT)
Dept: INTERNAL MEDICINE | Facility: CLINIC | Age: 78
End: 2018-05-30
Payer: MEDICARE

## 2018-05-30 ENCOUNTER — HOSPITAL ENCOUNTER (OUTPATIENT)
Dept: RADIOLOGY | Facility: HOSPITAL | Age: 78
Discharge: HOME | End: 2018-05-30
Attending: NURSE PRACTITIONER
Payer: MEDICARE

## 2018-05-30 VITALS
OXYGEN SATURATION: 95 % | TEMPERATURE: 98 F | WEIGHT: 160.69 LBS | RESPIRATION RATE: 16 BRPM | HEART RATE: 91 BPM | SYSTOLIC BLOOD PRESSURE: 138 MMHG | HEIGHT: 61 IN | BODY MASS INDEX: 30.34 KG/M2 | DIASTOLIC BLOOD PRESSURE: 80 MMHG

## 2018-05-30 DIAGNOSIS — J20.9 COPD WITH ACUTE BRONCHITIS: ICD-10-CM

## 2018-05-30 DIAGNOSIS — J20.9 COPD WITH ACUTE BRONCHITIS: Primary | ICD-10-CM

## 2018-05-30 DIAGNOSIS — J44.0 COPD WITH ACUTE BRONCHITIS: Primary | ICD-10-CM

## 2018-05-30 DIAGNOSIS — E11.9 CONTROLLED TYPE 2 DIABETES MELLITUS WITHOUT COMPLICATION, WITHOUT LONG-TERM CURRENT USE OF INSULIN: ICD-10-CM

## 2018-05-30 DIAGNOSIS — J44.0 COPD WITH ACUTE BRONCHITIS: ICD-10-CM

## 2018-05-30 PROCEDURE — 99999 PR STA SHADOW: CPT | Mod: PBBFAC,,, | Performed by: NURSE PRACTITIONER

## 2018-05-30 PROCEDURE — 99214 OFFICE O/P EST MOD 30 MIN: CPT | Mod: PBBFAC,25 | Performed by: NURSE PRACTITIONER

## 2018-05-30 PROCEDURE — 99214 OFFICE O/P EST MOD 30 MIN: CPT | Mod: S$PBB | Performed by: NURSE PRACTITIONER

## 2018-05-30 PROCEDURE — 71046 X-RAY EXAM CHEST 2 VIEWS: CPT | Mod: TC

## 2018-05-30 PROCEDURE — 96372 THER/PROPH/DIAG INJ SC/IM: CPT | Mod: PBBFAC

## 2018-05-30 PROCEDURE — 71046 X-RAY EXAM CHEST 2 VIEWS: CPT | Mod: 26,,, | Performed by: RADIOLOGY

## 2018-05-30 PROCEDURE — 99999 PR PBB SHADOW E&M-EST. PATIENT-LVL IV: CPT | Mod: PBBFAC,,, | Performed by: NURSE PRACTITIONER

## 2018-05-30 RX ORDER — METHYLPREDNISOLONE ACETATE 40 MG/ML
40 INJECTION, SUSPENSION INTRA-ARTICULAR; INTRALESIONAL; INTRAMUSCULAR; SOFT TISSUE
Status: COMPLETED | OUTPATIENT
Start: 2018-05-30 | End: 2018-05-30

## 2018-05-30 RX ORDER — CEFTRIAXONE 1 G/1
1 INJECTION, POWDER, FOR SOLUTION INTRAMUSCULAR; INTRAVENOUS
Status: COMPLETED | OUTPATIENT
Start: 2018-05-30 | End: 2018-05-30

## 2018-05-30 RX ORDER — EPINEPHRINE 0.22MG
100 AEROSOL WITH ADAPTER (ML) INHALATION DAILY
COMMUNITY

## 2018-05-30 RX ORDER — LEVOFLOXACIN 500 MG/1
500 TABLET, FILM COATED ORAL DAILY
Qty: 7 TABLET | Refills: 0 | Status: SHIPPED | OUTPATIENT
Start: 2018-05-30 | End: 2018-06-13 | Stop reason: ALTCHOICE

## 2018-05-30 RX ORDER — PREDNISONE 20 MG/1
20 TABLET ORAL 2 TIMES DAILY
Qty: 10 TABLET | Refills: 0 | Status: SHIPPED | OUTPATIENT
Start: 2018-05-30 | End: 2018-06-04

## 2018-05-30 RX ADMIN — CEFTRIAXONE SODIUM 1 G: 1 INJECTION, POWDER, FOR SOLUTION INTRAMUSCULAR; INTRAVENOUS at 11:05

## 2018-05-30 RX ADMIN — METHYLPREDNISOLONE ACETATE 40 MG: 40 INJECTION, SUSPENSION INTRA-ARTICULAR; INTRALESIONAL; INTRAMUSCULAR; SOFT TISSUE at 11:05

## 2018-05-30 NOTE — PROGRESS NOTES
Subjective:           Patient ID: Gretel Ashton is a 77 y.o. female.    Chief Complaint: Cough; Nasal Congestion; and Sore Throat    76 YO WFnew to me, known to Dr. Astudillo;  here with c/o recurrent cough and congestion  S/p recent tx for acute bronchitis earlier this month requiring hospitalization; tx with zithromax; uses chronic inhaler. [facility] followed up with DR. Astudillo 5/16/18. Was feeling much better on follow up . Now She reports she has been spitting up a lot of mucous. She has been very tired but unable to sleep. + congestion; Nose hurts from blowing nose so much. Symptoms x 6 days.     5/8/18 CXR-  Patchy opacity in the right lung base likely related to a combination of atelectasis of the component of infiltrate and aspiration not excluded  5/8/18 CT of chest Severe centrilobular emphysematous disease.  There are 2 noncalcified nodules seen within the lingula, the largest measuring 5.5 mm.  For multiple solid nodules all <6 mm, Fleischner Society 2017 guidelines recommend no routine follow up for a low risk patient, or follow up with non-contrast chest CT at 12 months after discovery in a high risk patient.    Mosaic perfusion pattern of the lungs can be seen the setting of small airways or small vessels disease.    Cardiomegaly.  Calcified coronary artery disease is seen.  Calcified atheromatous disease affects the aorta.    Lab Results       Component                Value               Date                       HGBA1C                   5.7 (H)             05/08/2018              Pt was supposed to follow up with Dr. Solano but she states she did not have follow up       Cough   Associated symptoms include a sore throat. Pertinent negatives include no chest pain, ear pain, eye redness, fever, headaches, rash, rhinorrhea or wheezing.   Sore Throat    Associated symptoms include congestion and coughing. Pertinent negatives include no abdominal pain, diarrhea, ear pain, headaches,  neck pain or vomiting.     Review of Systems   Constitutional: Negative for activity change, fatigue, fever and unexpected weight change.   HENT: Positive for congestion and sore throat. Negative for ear pain, hearing loss and rhinorrhea.    Eyes: Negative for redness and visual disturbance.   Respiratory: Positive for cough. Negative for wheezing.    Cardiovascular: Negative for chest pain, palpitations and leg swelling.   Gastrointestinal: Negative for abdominal pain, constipation, diarrhea, nausea and vomiting.   Genitourinary: Negative for dysuria, frequency and urgency.   Musculoskeletal: Negative for back pain, joint swelling and neck pain.   Skin: Negative for color change, rash and wound.   Neurological: Negative for dizziness, tremors, weakness, light-headedness and headaches.       Objective:      Physical Exam   Constitutional: She is oriented to person, place, and time. She appears well-developed and well-nourished.   HENT:   Head: Normocephalic and atraumatic.   Right Ear: External ear normal.   Left Ear: External ear normal.   Mouth/Throat: Oropharynx is clear and moist.   + Nasal congestion, runny nose,    Eyes: Conjunctivae and EOM are normal. Pupils are equal, round, and reactive to light.   Neck: Normal range of motion. Neck supple. No JVD present. No tracheal deviation present. No thyromegaly present.   Cardiovascular: Normal rate, regular rhythm, normal heart sounds and intact distal pulses.    Pulmonary/Chest: Effort normal. No respiratory distress. She has no wheezes. She has rales (RLL/RUL). She exhibits no tenderness.   Abdominal: Soft. Bowel sounds are normal. She exhibits no distension and no mass. There is no tenderness. There is no rebound and no guarding.   Musculoskeletal: Normal range of motion. She exhibits no edema.   Lymphadenopathy:     She has no cervical adenopathy.   Neurological: She is alert and oriented to person, place, and time. She has normal reflexes. She displays normal  reflexes. No cranial nerve deficit. She exhibits normal muscle tone. Coordination normal.   CN: Optic discs are flat with normal vasculature, PERRL, Extraoccular movements and visual fields are full. Normal facial sensation and strength, Hearing symmetric, Tongue and Palate are midline and strong. Shoulder Shrug symmetric and strong.   Skin: Skin is warm and dry.   Psychiatric: She has a normal mood and affect.   Nursing note and vitals reviewed.      Assessment:       1. COPD with acute bronchitis    2. Controlled type 2 diabetes mellitus without complication, without long-term current use of insulin        Plan:   Gretel was seen today for cough, nasal congestion and sore throat.    Diagnoses and all orders for this visit:    COPD with acute bronchitis  Comments:  recurrent- rule out pneumonia  Orders:  -     levoFLOXacin (LEVAQUIN) 500 MG tablet; Take 1 tablet (500 mg total) by mouth once daily.  -     methylPREDNISolone acetate injection 40 mg; Inject 1 mL (40 mg total) into the muscle one time.  -     predniSONE (DELTASONE) 20 MG tablet; Take 1 tablet (20 mg total) by mouth 2 (two) times daily. Start tomorrow  -     cefTRIAXone injection 1 g; Inject 1 g into the muscle one time.  -     Ambulatory Referral to Pulmonology  -     X-Ray Chest PA And Lateral; Future    Controlled type 2 diabetes mellitus without complication, without long-term current use of insulin  Comments:  a1C 5.7 in December    Discussed ^ Neb tx to tid  cxr today; f/u with >   F/U early next week.,

## 2018-06-01 ENCOUNTER — TELEPHONE (OUTPATIENT)
Dept: INTERNAL MEDICINE | Facility: CLINIC | Age: 78
End: 2018-06-01

## 2018-06-01 NOTE — TELEPHONE ENCOUNTER
Patient referred to pulmonology per [facility]. Appt scheduled with Dr. Solano on 6/11/18 @ 9:00 am. Patient notified

## 2018-06-05 ENCOUNTER — OFFICE VISIT (OUTPATIENT)
Dept: INTERNAL MEDICINE | Facility: CLINIC | Age: 78
End: 2018-06-05
Payer: MEDICARE

## 2018-06-05 VITALS
DIASTOLIC BLOOD PRESSURE: 78 MMHG | RESPIRATION RATE: 18 BRPM | BODY MASS INDEX: 29.89 KG/M2 | OXYGEN SATURATION: 92 % | SYSTOLIC BLOOD PRESSURE: 134 MMHG | TEMPERATURE: 99 F | HEART RATE: 90 BPM | HEIGHT: 61 IN | WEIGHT: 158.31 LBS

## 2018-06-05 DIAGNOSIS — E11.9 CONTROLLED TYPE 2 DIABETES MELLITUS WITHOUT COMPLICATION, WITHOUT LONG-TERM CURRENT USE OF INSULIN: ICD-10-CM

## 2018-06-05 DIAGNOSIS — J30.9 ALLERGIC RHINITIS, UNSPECIFIED SEASONALITY, UNSPECIFIED TRIGGER: ICD-10-CM

## 2018-06-05 DIAGNOSIS — J20.9 COPD WITH ACUTE BRONCHITIS: Primary | ICD-10-CM

## 2018-06-05 DIAGNOSIS — J44.0 COPD WITH ACUTE BRONCHITIS: Primary | ICD-10-CM

## 2018-06-05 PROCEDURE — 99213 OFFICE O/P EST LOW 20 MIN: CPT | Mod: PBBFAC | Performed by: NURSE PRACTITIONER

## 2018-06-05 PROCEDURE — 99999 PR PBB SHADOW E&M-EST. PATIENT-LVL III: CPT | Mod: PBBFAC,,, | Performed by: NURSE PRACTITIONER

## 2018-06-05 PROCEDURE — 99213 OFFICE O/P EST LOW 20 MIN: CPT | Mod: S$PBB | Performed by: NURSE PRACTITIONER

## 2018-06-05 PROCEDURE — 99999 PR STA SHADOW: CPT | Mod: PBBFAC,,, | Performed by: NURSE PRACTITIONER

## 2018-06-05 RX ORDER — CETIRIZINE HYDROCHLORIDE 10 MG/1
10 TABLET ORAL NIGHTLY
Qty: 30 TABLET | Refills: 0 | COMMUNITY
Start: 2018-06-05 | End: 2018-06-13

## 2018-06-05 RX ORDER — FLUTICASONE PROPIONATE 50 MCG
1 SPRAY, SUSPENSION (ML) NASAL DAILY
Qty: 16 G | Refills: 0 | Status: SHIPPED | OUTPATIENT
Start: 2018-06-05 | End: 2018-12-12

## 2018-06-05 NOTE — PROGRESS NOTES
Subjective:           Patient ID: Gretel Ashton is a 77 y.o. female.    Chief Complaint: Follow-up    78 YO WF here for  1week follow up; recurrent cough and congestion. She was recently tx in hospital; CT of chest revealed There is a mild amount of right basilar opacity similar to previous exam dated 05/08/2018 likely related atelectasis and/or scar cannot exclude a small infiltrate.  There is no pneumothorax.  The cardiac silhouette appears prominent to mildly enlarged.  There is a left-sided cardiac defibrillator.  At her follow up appnt with Dr. west  5/16 she was feeling better. She then returned 5/30 with c/o worsening cough, chest congestion and head congestion. She was given oral prednidsone and levofloxacin.   CXR deomonstrated There is a mild amount of right basilar opacity similar to previous exam dated 05/08/2018 likely related atelectasis and/or scar cannot exclude a small infiltrate.  There is no pneumothorax.  The cardiac silhouette appears prominent to mildly enlarged.  There is a left-sided cardiac defibrillator.  Today she repeorts not feeling any better; still having a lot of nasal mucous but it is clear. No fever.   She has follow up with Dr. Solano on June 11th;   Still using nebulizer with albuterol tid.   She defers flonase nasal spray; has difficulty with this.       Review of Systems   Constitutional: Negative for chills, fatigue and fever.   HENT: Positive for congestion and rhinorrhea. Negative for ear pain, postnasal drip, sinus pressure, sneezing and sore throat.    Eyes: Negative for discharge.   Respiratory: Positive for cough. Negative for chest tightness and shortness of breath.    Cardiovascular: Negative.    Gastrointestinal: Negative for abdominal pain, constipation, diarrhea, nausea and vomiting.   Genitourinary: Negative for difficulty urinating, flank pain and hematuria.   Musculoskeletal: Negative.  Negative for arthralgias and joint swelling.   Skin: Negative.     Neurological: Negative for dizziness and headaches.   Psychiatric/Behavioral: Negative for behavioral problems and confusion.       Objective:      Physical Exam   Constitutional: She is oriented to person, place, and time. She appears well-developed and well-nourished.   HENT:   Head: Normocephalic and atraumatic.   Right Ear: External ear normal.   Left Ear: External ear normal.   Mouth/Throat: Oropharynx is clear and moist.   + Nasal congestion, runny nose,    Eyes: Conjunctivae and EOM are normal. Pupils are equal, round, and reactive to light.   Neck: Normal range of motion. Neck supple. No JVD present. No tracheal deviation present. No thyromegaly present.   Cardiovascular: Normal rate, regular rhythm, normal heart sounds and intact distal pulses.    Pulmonary/Chest: Effort normal. No respiratory distress. She has no wheezes. She has rales (dry fine; basilar ). She exhibits no tenderness.   Abdominal: Soft. Bowel sounds are normal. She exhibits no distension and no mass. There is no tenderness. There is no rebound and no guarding.   Musculoskeletal: Normal range of motion. She exhibits no edema (no edema ).   Lymphadenopathy:     She has no cervical adenopathy.   Neurological: She is alert and oriented to person, place, and time. She has normal reflexes. She displays normal reflexes. No cranial nerve deficit. She exhibits normal muscle tone. Coordination normal.   CN: Optic discs are flat with normal vasculature, PERRL, Extraoccular movements and visual fields are full. Normal facial sensation and strength, Hearing symmetric, Tongue and Palate are midline and strong. Shoulder Shrug symmetric and strong.   Skin: Skin is warm and dry.   Psychiatric: She has a normal mood and affect.   Nursing note and vitals reviewed.      Assessment:       1. COPD with acute bronchitis    2. Allergic rhinitis, unspecified seasonality, unspecified trigger    3. Controlled type 2 diabetes mellitus without complication, without  long-term current use of insulin        Plan:   Gretel was seen today for follow-up.    Diagnoses and all orders for this visit:    COPD with acute bronchitis    Allergic rhinitis, unspecified seasonality, unspecified trigger  -     fluticasone (FLONASE) 50 mcg/actuation nasal spray; 1 spray (50 mcg total) by Each Nare route once daily.  -     cetirizine (ZYRTEC) 10 MG tablet; Take 1 tablet (10 mg total) by mouth every evening. As needed for post nasal drip allergies    Controlled type 2 diabetes mellitus without complication, without long-term current use of insulin    DM well controlled   Problem List Items Addressed This Visit        Pulmonary    COPD with acute bronchitis - Primary   Follow up with Dr. Solano June 11th    Other Visit Diagnoses     Allergic rhinitis, unspecified seasonality, unspecified trigger        Relevant Medications    fluticasone (FLONASE) 50 mcg/actuation nasal spray    cetirizine (ZYRTEC) 10 MG tablet

## 2018-06-07 ENCOUNTER — CLINICAL SUPPORT (OUTPATIENT)
Dept: INTERNAL MEDICINE | Facility: CLINIC | Age: 78
End: 2018-06-07
Payer: MEDICARE

## 2018-06-07 DIAGNOSIS — E11.9 CONTROLLED TYPE 2 DIABETES MELLITUS WITHOUT COMPLICATION, WITHOUT LONG-TERM CURRENT USE OF INSULIN: ICD-10-CM

## 2018-06-07 DIAGNOSIS — E78.5 HYPERLIPIDEMIA, UNSPECIFIED HYPERLIPIDEMIA TYPE: ICD-10-CM

## 2018-06-07 LAB
ALBUMIN SERPL BCP-MCNC: 3.1 G/DL
ALP SERPL-CCNC: 70 U/L
ALT SERPL W/O P-5'-P-CCNC: 9 U/L
ANION GAP SERPL CALC-SCNC: 4 MMOL/L
AST SERPL-CCNC: 18 U/L
BASOPHILS # BLD AUTO: 0.02 K/UL
BASOPHILS NFR BLD: 0.3 %
BILIRUB SERPL-MCNC: 0.3 MG/DL
BUN SERPL-MCNC: 15 MG/DL
CALCIUM SERPL-MCNC: 9.1 MG/DL
CHLORIDE SERPL-SCNC: 103 MMOL/L
CHOLEST SERPL-MCNC: 133 MG/DL
CHOLEST/HDLC SERPL: 2.8 {RATIO}
CO2 SERPL-SCNC: 36 MMOL/L
CREAT SERPL-MCNC: 0.7 MG/DL
DIFFERENTIAL METHOD: ABNORMAL
EOSINOPHIL # BLD AUTO: 0.3 K/UL
EOSINOPHIL NFR BLD: 4 %
ERYTHROCYTE [DISTWIDTH] IN BLOOD BY AUTOMATED COUNT: 16.3 %
EST. GFR  (AFRICAN AMERICAN): >60 ML/MIN/1.73 M^2
EST. GFR  (NON AFRICAN AMERICAN): >60 ML/MIN/1.73 M^2
ESTIMATED AVG GLUCOSE: 120 MG/DL
GLUCOSE SERPL-MCNC: 82 MG/DL
HBA1C MFR BLD HPLC: 5.8 %
HCT VFR BLD AUTO: 36.2 %
HDLC SERPL-MCNC: 48 MG/DL
HDLC SERPL: 36.1 %
HGB BLD-MCNC: 11 G/DL
LDLC SERPL CALC-MCNC: 74.8 MG/DL
LYMPHOCYTES # BLD AUTO: 1.7 K/UL
LYMPHOCYTES NFR BLD: 27.5 %
MCH RBC QN AUTO: 25.8 PG
MCHC RBC AUTO-ENTMCNC: 30.4 G/DL
MCV RBC AUTO: 85 FL
MONOCYTES # BLD AUTO: 0.6 K/UL
MONOCYTES NFR BLD: 9.7 %
NEUTROPHILS # BLD AUTO: 3.7 K/UL
NEUTROPHILS NFR BLD: 58.5 %
NONHDLC SERPL-MCNC: 85 MG/DL
PLATELET # BLD AUTO: 245 K/UL
PMV BLD AUTO: 11.8 FL
POTASSIUM SERPL-SCNC: 3.9 MMOL/L
PROT SERPL-MCNC: 5.9 G/DL
RBC # BLD AUTO: 4.27 M/UL
SODIUM SERPL-SCNC: 143 MMOL/L
TRIGL SERPL-MCNC: 51 MG/DL
TSH SERPL DL<=0.005 MIU/L-ACNC: 2.65 UIU/ML
WBC # BLD AUTO: 6.3 K/UL

## 2018-06-07 PROCEDURE — 36415 COLL VENOUS BLD VENIPUNCTURE: CPT | Mod: PBBFAC

## 2018-06-07 PROCEDURE — 85025 COMPLETE CBC W/AUTO DIFF WBC: CPT

## 2018-06-07 PROCEDURE — 80061 LIPID PANEL: CPT

## 2018-06-07 PROCEDURE — 80053 COMPREHEN METABOLIC PANEL: CPT

## 2018-06-07 PROCEDURE — 84443 ASSAY THYROID STIM HORMONE: CPT

## 2018-06-07 PROCEDURE — 99999 PR STA SHADOW: CPT | Mod: PBBFAC,,,

## 2018-06-07 PROCEDURE — 83036 HEMOGLOBIN GLYCOSYLATED A1C: CPT

## 2018-06-07 NOTE — PROGRESS NOTES
Venipuncture Collected.     Number of Sticks: 1  Site #1: Left Antecubital  Site #2: N/A  Site #3: N/A    Complications? None  Additional Comments:

## 2018-06-13 ENCOUNTER — OFFICE VISIT (OUTPATIENT)
Dept: INTERNAL MEDICINE | Facility: CLINIC | Age: 78
End: 2018-06-13
Payer: MEDICARE

## 2018-06-13 VITALS
SYSTOLIC BLOOD PRESSURE: 132 MMHG | OXYGEN SATURATION: 94 % | HEIGHT: 61 IN | BODY MASS INDEX: 29.89 KG/M2 | HEART RATE: 86 BPM | DIASTOLIC BLOOD PRESSURE: 70 MMHG | WEIGHT: 158.31 LBS | RESPIRATION RATE: 16 BRPM

## 2018-06-13 DIAGNOSIS — E78.5 HYPERLIPIDEMIA, UNSPECIFIED HYPERLIPIDEMIA TYPE: Primary | ICD-10-CM

## 2018-06-13 DIAGNOSIS — E11.9 CONTROLLED TYPE 2 DIABETES MELLITUS WITHOUT COMPLICATION, WITHOUT LONG-TERM CURRENT USE OF INSULIN: ICD-10-CM

## 2018-06-13 DIAGNOSIS — K52.9 COLITIS: ICD-10-CM

## 2018-06-13 DIAGNOSIS — J43.9 PULMONARY EMPHYSEMA, UNSPECIFIED EMPHYSEMA TYPE: ICD-10-CM

## 2018-06-13 PROCEDURE — 99999 PR PBB SHADOW E&M-EST. PATIENT-LVL III: CPT | Mod: PBBFAC,,, | Performed by: INTERNAL MEDICINE

## 2018-06-13 PROCEDURE — 99213 OFFICE O/P EST LOW 20 MIN: CPT | Mod: PBBFAC | Performed by: INTERNAL MEDICINE

## 2018-06-13 PROCEDURE — 99214 OFFICE O/P EST MOD 30 MIN: CPT | Mod: S$PBB | Performed by: INTERNAL MEDICINE

## 2018-06-13 PROCEDURE — 99999 PR STA SHADOW: CPT | Mod: PBBFAC,,, | Performed by: INTERNAL MEDICINE

## 2018-06-13 RX ORDER — NAPROXEN SODIUM 220 MG
220 TABLET ORAL 2 TIMES DAILY WITH MEALS
COMMUNITY
End: 2018-12-12

## 2018-06-13 NOTE — PROGRESS NOTES
Subjective:       Patient ID: Gretel Ashton is a 77 y.o. female.    Chief Complaint: Hyperlipidemia (follow up with lab review); Hypertension; Arthritis; Diabetes; and Depression    Gretel Ashton is a 77 y.o. female who presents for Type II DM, Hypertension, and Hyperlipidemia follow up. Labs were reviewed with patient today.        Hyperlipidemia   This is a chronic problem. The problem is uncontrolled. Recent lipid tests were reviewed and are high. Pertinent negatives include no chest pain or myalgias. Current antihyperlipidemic treatment includes statins. The current treatment provides moderate improvement of lipids.   Arthritis   Presents for follow-up visit. Affected location: MCps both hands  Pertinent negatives include no dysuria, fever or rash.   Diabetes   She presents for her follow-up diabetic visit. She has type 2 diabetes mellitus. Pertinent negatives for hypoglycemia include no confusion, dizziness, headaches, nervousness/anxiousness or pallor. Pertinent negatives for diabetes include no chest pain, no polydipsia, no polyphagia and no weakness. There are no hypoglycemic complications. Symptoms are worsening. There are no diabetic complications. Risk factors for coronary artery disease include diabetes mellitus, dyslipidemia, hypertension, obesity, post-menopausal and sedentary lifestyle. Current diabetic treatment includes oral agent (monotherapy). Her weight is stable. She is following a generally healthy diet. Her breakfast blood glucose range is generally 130-140 mg/dl. An ACE inhibitor/angiotensin II receptor blocker is not being taken.   Depression Patient is not experiencing: choking sensation, confusion, nervousness/anxiety, palpitations and suicidal ideas.    Medication Refill   Associated symptoms include arthralgias and coughing. Pertinent negatives include no chest pain, chills, congestion, fever, headaches, myalgias, nausea, numbness, rash, sore throat, vomiting or weakness.     Review of  Systems   Constitutional: Negative for chills and fever.   HENT: Negative for congestion, hearing loss, sinus pressure and sore throat.    Eyes: Negative for photophobia.   Respiratory: Positive for cough. Negative for choking, chest tightness and wheezing.         On home o2 ; COPD ;sleeps with O2 at night    Cardiovascular: Negative for chest pain and palpitations.   Gastrointestinal: Negative for blood in stool, nausea and vomiting.   Endocrine: Negative for polydipsia and polyphagia.   Genitourinary: Negative for dysuria and hematuria.   Musculoskeletal: Positive for arthralgias and arthritis. Negative for myalgias.   Skin: Negative for pallor and rash.   Neurological: Negative for dizziness, weakness, numbness and headaches.   Hematological: Does not bruise/bleed easily.   Psychiatric/Behavioral: Positive for depression. Negative for confusion, dysphoric mood, hallucinations, sleep disturbance and suicidal ideas. The patient is not nervous/anxious.        Objective:      Physical Exam   Constitutional: She is oriented to person, place, and time. She appears well-developed and well-nourished.   HENT:   Head: Normocephalic and atraumatic.   Nose: Nose normal.   Mouth/Throat: Oropharynx is clear and moist. Mucous membranes are pale.   Eyes: Conjunctivae and EOM are normal. Pupils are equal, round, and reactive to light.   Neck: Normal range of motion. Neck supple. No JVD present. No tracheal deviation present. No thyromegaly present.   Cardiovascular: Normal rate, regular rhythm, normal heart sounds and intact distal pulses.    Pulmonary/Chest: Effort normal. No respiratory distress. She has no wheezes. She has no rales. She exhibits no tenderness.   Abdominal: Soft. Bowel sounds are normal. She exhibits no distension and no mass. There is no tenderness. There is no rebound and no guarding.   Musculoskeletal: Normal range of motion. She exhibits no edema.   Lymphadenopathy:     She has no cervical adenopathy.    Neurological: She is alert and oriented to person, place, and time. She has normal reflexes. No cranial nerve deficit. She exhibits normal muscle tone. Coordination normal.   Skin: Skin is warm and dry.   Psychiatric: She has a normal mood and affect. Her behavior is normal. Judgment and thought content normal.   Nursing note and vitals reviewed.      Assessment:       1. Hyperlipidemia, unspecified hyperlipidemia type    2. Controlled type 2 diabetes mellitus without complication, without long-term current use of insulin    3. Colitis    4. Pulmonary emphysema, unspecified emphysema type        Plan:   Gretel was seen today for hyperlipidemia, hypertension, arthritis, diabetes and depression.    Diagnoses and all orders for this visit:    Hyperlipidemia, unspecified hyperlipidemia type  -     Lipid panel; Future  -     TSH; Future  Crestor helps.  Limit the cholesterol in your diet to less than 300 mg per day.   Fats should contribute no more than 20 to 35% of your daily calories.   Less than 7 to 10% of your calories should come from saturated fat.   Avoid saturated fat products e.g., Butter, some oils, meat, and poultry fat contain a lot of saturated fat.   Check food labels for fat and cholesterol content. Choose the foods with less fat per serving.   Limit the amount of butter and margarine you eat.   Use salad dressings and margarine made with polyunsaturated and monounsaturated fats.   Use egg whites or egg substitutes rather than whole eggs.   Replace whole-milk dairy products with nonfat or low-fat milk, cheese, spreads, and yogurt.   Eat skinless chicken, turkey, fish, and meatless entrees more often than red meat.   Choose lean cuts of meat and trim off all visible fat. Keep portion sizes moderate.   Avoid fatty desserts such as ice cream, cream-filled cakes, and cheesecakes. Choose fresh fruits, nonfat frozen yogurt, Popsicles, etc.   Reduce the amount of fried foods, vending machine food, and fast  food you eat.   Eat fruits and vegetables (especially fresh fruits and leafy vegetables), beans, and whole grains daily. The fiber in these foods helps lower cholesterol.   Look for low-fat or nonfat varieties of the foods you like to eat, or look for substitutes.   You may need to exercise 60 minutes a day to prevent weight gain and 90 minutes a day to lose weight.  Controlled type 2 diabetes mellitus without complication, without long-term current use of insulin  -     Hemoglobin A1c; Future  -     Microalbumin/creatinine urine ratio; Future  Patient has controlled Diabetes .  We discussed about diet ;low in calories. Avoid sweats, sodas.  Also increasing activity;walking 2-3 miles a day.  I also adjusted medications and gave patient  instructions about adherence to plan.  Goal of  A1c  less than 7 % stressed.  Also goal of LDL less than 70 highlighted to patient.  Metformin helps    I suggested to her about yearly eye exams.    Colitis  -     CBC auto differential; Future  -     Comprehensive metabolic panel; Future  Continue sulfasalazine    Pulmonary emphysema, unspecified emphysema type  Continue O2  Continue  advair  BID.

## 2018-07-02 ENCOUNTER — HOSPITAL ENCOUNTER (OUTPATIENT)
Dept: RADIOLOGY | Facility: HOSPITAL | Age: 78
Discharge: HOME | End: 2018-07-02
Attending: INTERNAL MEDICINE
Payer: MEDICARE

## 2018-07-02 DIAGNOSIS — J44.1 COPD EXACERBATION: Primary | ICD-10-CM

## 2018-07-02 DIAGNOSIS — J44.1 COPD EXACERBATION: ICD-10-CM

## 2018-07-02 PROCEDURE — 71046 X-RAY EXAM CHEST 2 VIEWS: CPT | Mod: TC

## 2018-07-02 PROCEDURE — 71046 X-RAY EXAM CHEST 2 VIEWS: CPT | Mod: 26,,, | Performed by: RADIOLOGY

## 2018-07-03 ENCOUNTER — HOSPITAL ENCOUNTER (OUTPATIENT)
Dept: PULMONOLOGY | Facility: HOSPITAL | Age: 78
Discharge: HOME | End: 2018-07-03
Attending: INTERNAL MEDICINE
Payer: MEDICARE

## 2018-07-03 DIAGNOSIS — J44.1 COPD EXACERBATION: ICD-10-CM

## 2018-07-03 PROCEDURE — 94060 EVALUATION OF WHEEZING: CPT

## 2018-07-03 PROCEDURE — 94727 GAS DIL/WSHOT DETER LNG VOL: CPT

## 2018-07-03 PROCEDURE — 94729 DIFFUSING CAPACITY: CPT

## 2018-07-03 PROCEDURE — 99900031 HC PATIENT EDUCATION (STAT)

## 2018-07-10 ENCOUNTER — TELEPHONE (OUTPATIENT)
Dept: INTERNAL MEDICINE | Facility: CLINIC | Age: 78
End: 2018-07-10

## 2018-07-10 DIAGNOSIS — Z12.31 BREAST CANCER SCREENING BY MAMMOGRAM: Primary | ICD-10-CM

## 2018-07-10 NOTE — TELEPHONE ENCOUNTER
Called and spoke with pt. Informed pt that MMG has been ordered and scheduled for 7/30/18. Pt verbalized understanding. Instructed to call clinic with any needs or concerns.

## 2018-07-10 NOTE — TELEPHONE ENCOUNTER
----- Message from Tamika Spencer MA sent at 7/10/2018  9:28 AM CDT -----  Contact: Joshua Ashton  MRN: [id number]  : 1940  PCP: Jailene Astudillo  Home Phone      [phone]  Work Phone      Not on file.  Mobile          [phone]  Home Phone      Not on file.      MESSAGE: Patient would like to schedule a mammogram. Please place orders and contact patient to schedule an appointment.     Phone number: [phone]

## 2018-07-18 ENCOUNTER — HOSPITAL ENCOUNTER (EMERGENCY)
Facility: HOSPITAL | Age: 78
Discharge: HOME | End: 2018-07-18
Attending: SURGERY
Payer: MEDICARE

## 2018-07-18 VITALS
WEIGHT: 165.38 LBS | OXYGEN SATURATION: 94 % | HEART RATE: 87 BPM | RESPIRATION RATE: 17 BRPM | DIASTOLIC BLOOD PRESSURE: 79 MMHG | SYSTOLIC BLOOD PRESSURE: 137 MMHG | TEMPERATURE: 98 F | BODY MASS INDEX: 31.24 KG/M2

## 2018-07-18 DIAGNOSIS — R07.9 CHEST PAIN: ICD-10-CM

## 2018-07-18 LAB
ALBUMIN SERPL BCP-MCNC: 3.3 G/DL
ALP SERPL-CCNC: 78 U/L
ALT SERPL W/O P-5'-P-CCNC: 15 U/L
ANION GAP SERPL CALC-SCNC: 8 MMOL/L
AST SERPL-CCNC: 21 U/L
BASOPHILS # BLD AUTO: 0.01 K/UL
BASOPHILS NFR BLD: 0.2 %
BILIRUB SERPL-MCNC: 0.4 MG/DL
BNP SERPL-MCNC: 80 PG/ML
BUN SERPL-MCNC: 14 MG/DL
CALCIUM SERPL-MCNC: 9.1 MG/DL
CHLORIDE SERPL-SCNC: 105 MMOL/L
CK MB SERPL-MCNC: 2.1 NG/ML
CK MB SERPL-RTO: 3.4 %
CK SERPL-CCNC: 62 U/L
CO2 SERPL-SCNC: 30 MMOL/L
CREAT SERPL-MCNC: 0.6 MG/DL
D DIMER PPP IA.FEU-MCNC: 1.38 MG/L FEU
DIFFERENTIAL METHOD: ABNORMAL
EOSINOPHIL # BLD AUTO: 0.1 K/UL
EOSINOPHIL NFR BLD: 2.2 %
ERYTHROCYTE [DISTWIDTH] IN BLOOD BY AUTOMATED COUNT: 15.5 %
EST. GFR  (AFRICAN AMERICAN): >60 ML/MIN/1.73 M^2
EST. GFR  (NON AFRICAN AMERICAN): >60 ML/MIN/1.73 M^2
GLUCOSE SERPL-MCNC: 94 MG/DL
HCT VFR BLD AUTO: 34.2 %
HGB BLD-MCNC: 10.7 G/DL
LYMPHOCYTES # BLD AUTO: 1.3 K/UL
LYMPHOCYTES NFR BLD: 26.2 %
MCH RBC QN AUTO: 25.8 PG
MCHC RBC AUTO-ENTMCNC: 31.3 G/DL
MCV RBC AUTO: 82 FL
MONOCYTES # BLD AUTO: 0.4 K/UL
MONOCYTES NFR BLD: 8.9 %
NEUTROPHILS # BLD AUTO: 3.1 K/UL
NEUTROPHILS NFR BLD: 62.5 %
PLATELET # BLD AUTO: 195 K/UL
PMV BLD AUTO: 10.9 FL
POCT GLUCOSE: 96 MG/DL (ref 70–110)
POTASSIUM SERPL-SCNC: 3.7 MMOL/L
PROT SERPL-MCNC: 6.7 G/DL
RBC # BLD AUTO: 4.15 M/UL
SODIUM SERPL-SCNC: 143 MMOL/L
TROPONIN I SERPL DL<=0.01 NG/ML-MCNC: <0.006 NG/ML
WBC # BLD AUTO: 4.97 K/UL

## 2018-07-18 PROCEDURE — 93005 ELECTROCARDIOGRAM TRACING: CPT

## 2018-07-18 PROCEDURE — 93010 ELECTROCARDIOGRAM REPORT: CPT | Mod: ,,, | Performed by: INTERNAL MEDICINE

## 2018-07-18 PROCEDURE — 99284 EMERGENCY DEPT VISIT MOD MDM: CPT | Mod: 25

## 2018-07-18 PROCEDURE — 84484 ASSAY OF TROPONIN QUANT: CPT

## 2018-07-18 PROCEDURE — 36415 COLL VENOUS BLD VENIPUNCTURE: CPT

## 2018-07-18 PROCEDURE — 25500020 PHARM REV CODE 255: Performed by: SURGERY

## 2018-07-18 PROCEDURE — 82962 GLUCOSE BLOOD TEST: CPT

## 2018-07-18 PROCEDURE — 82553 CREATINE MB FRACTION: CPT

## 2018-07-18 PROCEDURE — 85379 FIBRIN DEGRADATION QUANT: CPT

## 2018-07-18 PROCEDURE — 83880 ASSAY OF NATRIURETIC PEPTIDE: CPT

## 2018-07-18 PROCEDURE — 80053 COMPREHEN METABOLIC PANEL: CPT

## 2018-07-18 PROCEDURE — 85025 COMPLETE CBC W/AUTO DIFF WBC: CPT

## 2018-07-18 PROCEDURE — 82550 ASSAY OF CK (CPK): CPT

## 2018-07-18 RX ADMIN — IOHEXOL 75 ML: 350 INJECTION, SOLUTION INTRAVENOUS at 12:07

## 2018-07-18 NOTE — ED PROVIDER NOTES
Encounter Date: 2018       History     Chief Complaint   Patient presents with    Chest Pain     Patient is a 78yo W female who had onset of anterior chest pain 3 days ago.  She was in Dr. Combs's office this morning, and is referred over for complete Cardiac protocol work-up.  Denies N & V, denies diaphoresis.          Review of patient's allergies indicates:  No Known Allergies  Past Medical History:   Diagnosis Date    Arthritis     Depression     Diabetes mellitus type II     Hyperlipidemia     Hypertension     Pacemaker      Past Surgical History:   Procedure Laterality Date    CARDIAC PACEMAKER PLACEMENT       SECTION      INNER EAR SURGERY       Family History   Problem Relation Age of Onset    Cancer Mother     Breast cancer Mother     Stroke Father     Cancer Sister     Breast cancer Sister     Stroke Maternal Grandmother      Social History   Substance Use Topics    Smoking status: Former Smoker     Packs/day: 2.00     Years: 43.00     Types: Cigarettes     Quit date: 2000    Smokeless tobacco: Never Used    Alcohol use No     Review of Systems   Constitutional: Negative.    HENT: Negative.    Respiratory: Negative for cough and shortness of breath.    Cardiovascular: Positive for chest pain. Negative for palpitations.   Gastrointestinal: Negative.    Genitourinary: Negative.    Neurological: Negative.    Psychiatric/Behavioral: Negative.        Physical Exam     Initial Vitals   BP Pulse Resp Temp SpO2   18 1118 18 1118 18 1118 18 1118 18 1121   (!) 202/99 91 20 97.8 °F (36.6 °C) 95 %      MAP       --                Physical Exam    Constitutional: She appears well-developed and well-nourished.   HENT:   Head: Normocephalic and atraumatic.   Eyes: EOM are normal. Pupils are equal, round, and reactive to light.   Neck: Normal range of motion. Neck supple.   Cardiovascular: Normal rate and regular rhythm.   Pulmonary/Chest: No respiratory  distress. She has no wheezes. She has no rhonchi. She has no rales.   Musculoskeletal: Normal range of motion.   Neurological: She is alert and oriented to person, place, and time.   Skin: Skin is warm and dry.   Psychiatric: She has a normal mood and affect. Thought content normal.         ED Course   Procedures  Labs Reviewed   COMPREHENSIVE METABOLIC PANEL   CBC W/ AUTO DIFFERENTIAL   TROPONIN I   CK-MB   B-TYPE NATRIURETIC PEPTIDE   D DIMER, QUANTITATIVE          Imaging Results    None        Cardiac and Pulmonary work-up essentially NEGATIVE.  Discussed with Cardiology, she will be able to Follow-up in 1-2 weeks.                       Clinical Impression:   The encounter diagnosis was Chest pain.      Disposition:   Disposition: Discharged  Condition: Stable                        Tom Smith Jr., MD  07/18/18 1400

## 2018-07-30 ENCOUNTER — HOSPITAL ENCOUNTER (OUTPATIENT)
Dept: RADIOLOGY | Facility: HOSPITAL | Age: 78
Discharge: HOME | End: 2018-07-30
Attending: INTERNAL MEDICINE
Payer: MEDICARE

## 2018-07-30 VITALS — WEIGHT: 165 LBS | BODY MASS INDEX: 31.15 KG/M2 | HEIGHT: 61 IN

## 2018-07-30 DIAGNOSIS — Z12.31 BREAST CANCER SCREENING BY MAMMOGRAM: ICD-10-CM

## 2018-07-30 PROCEDURE — 77067 SCR MAMMO BI INCL CAD: CPT | Mod: TC

## 2018-07-30 PROCEDURE — 77067 SCR MAMMO BI INCL CAD: CPT | Mod: 26,,, | Performed by: RADIOLOGY

## 2018-07-30 PROCEDURE — 77063 BREAST TOMOSYNTHESIS BI: CPT | Mod: 26,,, | Performed by: RADIOLOGY

## 2018-08-13 ENCOUNTER — TELEPHONE (OUTPATIENT)
Dept: INTERNAL MEDICINE | Facility: CLINIC | Age: 78
End: 2018-08-13

## 2018-08-13 NOTE — TELEPHONE ENCOUNTER
----- Message from Ilda Long sent at 2018  2:26 PM CDT -----  Contact: [facility]  Gretel Ashton  MRN: [id number]  : 1940  PCP: Jailene Astudillo  Home Phone      [phone]  Work Phone      Not on file.  Mobile          [phone]  Home Phone      Not on file.    MESSAGE: Calling to see if someone from our office can call the patient to discuss which medications she is supposed to be on.  She was in pharmacy earlier and they stated that patient was very confused.   Please call patient to advise.    Phone:  [phone]

## 2018-08-14 NOTE — TELEPHONE ENCOUNTER
Spoke to patient at length about medications. I reconciled all of her medications over the phone and she verbally repeated all of the instructions correctly.

## 2018-10-02 RX ORDER — SULFASALAZINE 500 MG/1
TABLET ORAL
Qty: 60 TABLET | Refills: 3 | Status: SHIPPED | OUTPATIENT
Start: 2018-10-02 | End: 2019-02-02 | Stop reason: SDUPTHER

## 2018-10-02 RX ORDER — NABUMETONE 500 MG/1
TABLET, FILM COATED ORAL
Qty: 30 TABLET | Refills: 3 | Status: SHIPPED | OUTPATIENT
Start: 2018-10-02 | End: 2019-02-02 | Stop reason: SDUPTHER

## 2018-12-07 ENCOUNTER — LAB VISIT (OUTPATIENT)
Dept: LAB | Facility: HOSPITAL | Age: 78
End: 2018-12-07
Attending: INTERNAL MEDICINE
Payer: MEDICARE

## 2018-12-07 DIAGNOSIS — K52.9 COLITIS: ICD-10-CM

## 2018-12-07 DIAGNOSIS — E11.9 CONTROLLED TYPE 2 DIABETES MELLITUS WITHOUT COMPLICATION, WITHOUT LONG-TERM CURRENT USE OF INSULIN: ICD-10-CM

## 2018-12-07 DIAGNOSIS — E78.5 HYPERLIPIDEMIA, UNSPECIFIED HYPERLIPIDEMIA TYPE: ICD-10-CM

## 2018-12-07 LAB
ALBUMIN SERPL BCP-MCNC: 3.3 G/DL
ALBUMIN/CREAT UR: 31.9 UG/MG
ALP SERPL-CCNC: 90 U/L
ALT SERPL W/O P-5'-P-CCNC: 9 U/L
ANION GAP SERPL CALC-SCNC: 8 MMOL/L
AST SERPL-CCNC: 19 U/L
BASOPHILS # BLD AUTO: 0.02 K/UL
BASOPHILS NFR BLD: 0.4 %
BILIRUB SERPL-MCNC: 0.3 MG/DL
BUN SERPL-MCNC: 17 MG/DL
CALCIUM SERPL-MCNC: 9.3 MG/DL
CHLORIDE SERPL-SCNC: 101 MMOL/L
CHOLEST SERPL-MCNC: 149 MG/DL
CHOLEST/HDLC SERPL: 3.3 {RATIO}
CO2 SERPL-SCNC: 32 MMOL/L
CREAT SERPL-MCNC: 0.7 MG/DL
CREAT UR-MCNC: 65.8 MG/DL
DIFFERENTIAL METHOD: ABNORMAL
EOSINOPHIL # BLD AUTO: 0.1 K/UL
EOSINOPHIL NFR BLD: 2 %
ERYTHROCYTE [DISTWIDTH] IN BLOOD BY AUTOMATED COUNT: 16.9 %
EST. GFR  (AFRICAN AMERICAN): >60 ML/MIN/1.73 M^2
EST. GFR  (NON AFRICAN AMERICAN): >60 ML/MIN/1.73 M^2
ESTIMATED AVG GLUCOSE: 120 MG/DL
GLUCOSE SERPL-MCNC: 104 MG/DL
HBA1C MFR BLD HPLC: 5.8 %
HCT VFR BLD AUTO: 33.3 %
HDLC SERPL-MCNC: 45 MG/DL
HDLC SERPL: 30.2 %
HGB BLD-MCNC: 10.2 G/DL
LDLC SERPL CALC-MCNC: 90 MG/DL
LYMPHOCYTES # BLD AUTO: 1.8 K/UL
LYMPHOCYTES NFR BLD: 39.6 %
MCH RBC QN AUTO: 23 PG
MCHC RBC AUTO-ENTMCNC: 30.6 G/DL
MCV RBC AUTO: 75 FL
MICROALBUMIN UR DL<=1MG/L-MCNC: 21 UG/ML
MONOCYTES # BLD AUTO: 0.5 K/UL
MONOCYTES NFR BLD: 11.9 %
NEUTROPHILS # BLD AUTO: 2.1 K/UL
NEUTROPHILS NFR BLD: 46.1 %
NONHDLC SERPL-MCNC: 104 MG/DL
PLATELET # BLD AUTO: 235 K/UL
PMV BLD AUTO: 11.1 FL
POTASSIUM SERPL-SCNC: 4.3 MMOL/L
PROT SERPL-MCNC: 7.4 G/DL
RBC # BLD AUTO: 4.43 M/UL
SODIUM SERPL-SCNC: 141 MMOL/L
TRIGL SERPL-MCNC: 70 MG/DL
TSH SERPL DL<=0.005 MIU/L-ACNC: 2.64 UIU/ML
WBC # BLD AUTO: 4.47 K/UL

## 2018-12-07 PROCEDURE — 84443 ASSAY THYROID STIM HORMONE: CPT

## 2018-12-07 PROCEDURE — 80053 COMPREHEN METABOLIC PANEL: CPT

## 2018-12-07 PROCEDURE — 36415 COLL VENOUS BLD VENIPUNCTURE: CPT

## 2018-12-07 PROCEDURE — 85025 COMPLETE CBC W/AUTO DIFF WBC: CPT

## 2018-12-07 PROCEDURE — 80061 LIPID PANEL: CPT

## 2018-12-07 PROCEDURE — 83036 HEMOGLOBIN GLYCOSYLATED A1C: CPT

## 2018-12-07 PROCEDURE — 82043 UR ALBUMIN QUANTITATIVE: CPT

## 2018-12-07 NOTE — TELEPHONE ENCOUNTER
----- Message from Ilda Long sent at 2018  9:45 AM CST -----  Contact: Agustin/Jose Ashton  MRN: [id number]  : 1940  PCP: Jailene Astudillo  Home Phone      [phone]  Work Phone      Not on file.  Mobile          [phone]  Home Phone      Not on file.    MESSAGE:     Needs RX  metFORMIN (GLUCOPHAGE) 500 MG tablet    [facility]    Phone: [phone]

## 2018-12-10 RX ORDER — METFORMIN HYDROCHLORIDE 500 MG/1
TABLET ORAL
Qty: 60 TABLET | Refills: 0 | Status: SHIPPED | OUTPATIENT
Start: 2018-12-10 | End: 2019-01-09 | Stop reason: SDUPTHER

## 2018-12-12 ENCOUNTER — OFFICE VISIT (OUTPATIENT)
Dept: INTERNAL MEDICINE | Facility: CLINIC | Age: 78
End: 2018-12-12
Payer: MEDICARE

## 2018-12-12 VITALS
SYSTOLIC BLOOD PRESSURE: 118 MMHG | OXYGEN SATURATION: 92 % | BODY MASS INDEX: 29.55 KG/M2 | WEIGHT: 156.5 LBS | HEIGHT: 61 IN | RESPIRATION RATE: 14 BRPM | HEART RATE: 94 BPM | DIASTOLIC BLOOD PRESSURE: 62 MMHG

## 2018-12-12 DIAGNOSIS — K52.9 COLITIS: ICD-10-CM

## 2018-12-12 DIAGNOSIS — E78.5 HYPERLIPIDEMIA, UNSPECIFIED HYPERLIPIDEMIA TYPE: Primary | ICD-10-CM

## 2018-12-12 DIAGNOSIS — E11.9 CONTROLLED TYPE 2 DIABETES MELLITUS WITHOUT COMPLICATION, WITHOUT LONG-TERM CURRENT USE OF INSULIN: ICD-10-CM

## 2018-12-12 DIAGNOSIS — M19.90 ARTHRITIS: ICD-10-CM

## 2018-12-12 PROCEDURE — 99999 PR PBB SHADOW E&M-EST. PATIENT-LVL III: CPT | Mod: PBBFAC,,, | Performed by: INTERNAL MEDICINE

## 2018-12-12 PROCEDURE — 99213 OFFICE O/P EST LOW 20 MIN: CPT | Mod: PBBFAC,25 | Performed by: INTERNAL MEDICINE

## 2018-12-12 PROCEDURE — 99999 PR STA SHADOW: CPT | Mod: PBBFAC,,, | Performed by: INTERNAL MEDICINE

## 2018-12-12 PROCEDURE — 99999 PR STA SHADOW: CPT | Mod: PBBFAC,,,

## 2018-12-12 PROCEDURE — 96372 THER/PROPH/DIAG INJ SC/IM: CPT | Mod: PBBFAC

## 2018-12-12 PROCEDURE — 99214 OFFICE O/P EST MOD 30 MIN: CPT | Mod: S$PBB | Performed by: INTERNAL MEDICINE

## 2018-12-12 RX ORDER — METHYLPREDNISOLONE ACETATE 40 MG/ML
40 INJECTION, SUSPENSION INTRA-ARTICULAR; INTRALESIONAL; INTRAMUSCULAR; SOFT TISSUE
Status: COMPLETED | OUTPATIENT
Start: 2018-12-12 | End: 2018-12-12

## 2018-12-12 RX ORDER — METHYLPREDNISOLONE 4 MG/1
TABLET ORAL
Qty: 1 PACKAGE | Refills: 0 | Status: SHIPPED | OUTPATIENT
Start: 2018-12-12 | End: 2019-02-22

## 2018-12-12 RX ADMIN — METHYLPREDNISOLONE ACETATE 40 MG: 40 INJECTION, SUSPENSION INTRA-ARTICULAR; INTRALESIONAL; INTRAMUSCULAR; SOFT TISSUE at 10:12

## 2018-12-12 NOTE — PROGRESS NOTES
Subjective:       Patient ID: Gretel Ashton is a 78 y.o. female.    Chief Complaint: Hyperlipidemia (FOLLOW UP WITH LAB REVIEW); Hypertension; Diabetes; Depression; and Arthritis    Gretel Ashton is a 78 y.o. female who presents for Type II DM, Hypertension, and Hyperlipidemia follow up. Labs were reviewed with patient today.        Hyperlipidemia   This is a chronic problem. The problem is uncontrolled. Recent lipid tests were reviewed and are high. Associated symptoms include myalgias. Pertinent negatives include no chest pain. Current antihyperlipidemic treatment includes statins. The current treatment provides moderate improvement of lipids.   Arthritis   Presents for follow-up visit. She complains of joint swelling. Affected location: MCps both hands  Pertinent negatives include no dysuria, fever or rash.   Diabetes   She presents for her follow-up diabetic visit. She has type 2 diabetes mellitus. Pertinent negatives for hypoglycemia include no confusion, dizziness, headaches, nervousness/anxiousness or pallor. Pertinent negatives for diabetes include no chest pain, no polydipsia, no polyphagia and no weakness. There are no hypoglycemic complications. Symptoms are worsening. There are no diabetic complications. Risk factors for coronary artery disease include diabetes mellitus, dyslipidemia, hypertension, obesity, post-menopausal and sedentary lifestyle. Current diabetic treatment includes oral agent (monotherapy). Her weight is stable. She is following a generally healthy diet. Her breakfast blood glucose range is generally 130-140 mg/dl. An ACE inhibitor/angiotensin II receptor blocker is not being taken.   Depression Patient is not experiencing: choking sensation, confusion, nervousness/anxiety, palpitations and suicidal ideas.    Medication Refill   Associated symptoms include arthralgias, coughing, joint swelling and myalgias. Pertinent negatives include no chest pain, chills, congestion, fever,  headaches, nausea, numbness, rash, sore throat, vomiting or weakness.     Review of Systems   Constitutional: Negative for chills and fever.   HENT: Negative for congestion, hearing loss, sinus pressure and sore throat.    Eyes: Negative for photophobia.   Respiratory: Positive for cough. Negative for choking, chest tightness and wheezing.         On home o2 ; COPD ;sleeps with O2 at night    Cardiovascular: Negative for chest pain and palpitations.   Gastrointestinal: Negative for blood in stool, nausea and vomiting.   Endocrine: Negative for polydipsia and polyphagia.   Genitourinary: Negative for dysuria and hematuria.   Musculoskeletal: Positive for arthralgias, arthritis, joint swelling, myalgias and neck stiffness.   Skin: Negative for pallor and rash.   Neurological: Negative for dizziness, weakness, numbness and headaches.   Hematological: Does not bruise/bleed easily.   Psychiatric/Behavioral: Positive for depression. Negative for confusion, dysphoric mood, hallucinations, sleep disturbance and suicidal ideas. The patient is not nervous/anxious.        Objective:      Physical Exam   Constitutional: She is oriented to person, place, and time. She appears well-developed and well-nourished.   HENT:   Head: Normocephalic and atraumatic.   Nose: Nose normal.   Mouth/Throat: Oropharynx is clear and moist. Mucous membranes are pale.   Eyes: Conjunctivae and EOM are normal. Pupils are equal, round, and reactive to light.   Neck: Normal range of motion. Neck supple. No JVD present. No tracheal deviation present. No thyromegaly present.   Cardiovascular: Normal rate, regular rhythm, normal heart sounds and intact distal pulses.   Pulmonary/Chest: Effort normal. No respiratory distress. She has no wheezes. She has no rales. She exhibits no tenderness.   Abdominal: Soft. Bowel sounds are normal. She exhibits no distension and no mass. There is no tenderness. There is no rebound and no guarding.   Musculoskeletal:  Normal range of motion. She exhibits no edema.   Lymphadenopathy:     She has no cervical adenopathy.   Neurological: She is alert and oriented to person, place, and time. She has normal reflexes. No cranial nerve deficit. She exhibits normal muscle tone. Coordination normal.   Skin: Skin is warm and dry.   Psychiatric: She has a normal mood and affect. Her behavior is normal. Judgment and thought content normal.   Nursing note and vitals reviewed.      Assessment:       1. Hyperlipidemia, unspecified hyperlipidemia type    2. Controlled type 2 diabetes mellitus without complication, without long-term current use of insulin    3. Arthritis    4. Colitis        Plan:   Gretel was seen today for hyperlipidemia, hypertension, diabetes, depression and arthritis.    Diagnoses and all orders for this visit:    Hyperlipidemia, unspecified hyperlipidemia type  -     CBC auto differential; Future  -     Comprehensive metabolic panel; Future  -     Lipid panel; Future  -     TSH; Future  Well controlled.  Continue same medication and dose.  Controlled type 2 diabetes mellitus without complication, without long-term current use of insulin  -     Lipid panel; Future  -     Hemoglobin A1c; Future  -     Microalbumin/creatinine urine ratio; Future  Patient has controlled Diabetes .  We discussed about diet ;low in calories. Avoid sweats, sodas.  Also increasing activity;walking 2-3 miles a day.  I also adjusted medications and gave patient  instructions about adherence to plan.  Goal of  A1c  less than 7 % stressed.  Also goal of LDL less than 70 highlighted to patient.  Arthritis  -     methylPREDNISolone acetate injection 40 mg  -     methylPREDNISolone (MEDROL DOSEPACK) 4 mg tablet; use as directed  Continue with NSAIDS    Colitis  Stable

## 2019-01-09 NOTE — TELEPHONE ENCOUNTER
Requested Prescriptions     Pending Prescriptions Disp Refills    metFORMIN (GLUCOPHAGE) 500 MG tablet 60 tablet 5     Sig: TAKE ONE TABLET BY MOUTH TWICE DAILY AFTER MEALS   LOV; 12/12/18

## 2019-01-10 RX ORDER — METFORMIN HYDROCHLORIDE 500 MG/1
TABLET ORAL
Qty: 60 TABLET | Refills: 5 | Status: SHIPPED | OUTPATIENT
Start: 2019-01-10 | End: 2019-08-06 | Stop reason: SDUPTHER

## 2019-02-04 RX ORDER — SULFASALAZINE 500 MG/1
TABLET ORAL
Qty: 60 TABLET | Refills: 3 | Status: SHIPPED | OUTPATIENT
Start: 2019-02-04 | End: 2021-04-13

## 2019-02-04 RX ORDER — NABUMETONE 500 MG/1
TABLET, FILM COATED ORAL
Qty: 30 TABLET | Refills: 3 | Status: SHIPPED | OUTPATIENT
Start: 2019-02-04 | End: 2019-06-12

## 2019-02-18 ENCOUNTER — TELEPHONE (OUTPATIENT)
Dept: INTERNAL MEDICINE | Facility: CLINIC | Age: 79
End: 2019-02-18

## 2019-02-18 NOTE — TELEPHONE ENCOUNTER
----- Message from Ilda Mercedes sent at 2019  2:48 PM CST -----  Contact: Agustin/Son  Gretel Ashton  MRN: [id number]  : 1940  PCP: Jailene Astudillo  Home Phone      [phone]  Work Phone      Not on file.  Mobile          [phone]  Home Phone      Not on file.    MESSAGE:     Patient was seen by Dr. Gerber recently and he told him that he would be sending records to Dr. Astudillo.  He would like to speak to nurse about this and to get advice on what Dr. Astudillo suggests.  Please call to advise.    Phone: [phone]

## 2019-02-18 NOTE — TELEPHONE ENCOUNTER
Have not received any records from Dr. Gerber's office. Attempted to contact Agustin, but no answer. LM advising him that records have not been received.

## 2019-02-19 ENCOUNTER — TELEPHONE (OUTPATIENT)
Dept: INTERNAL MEDICINE | Facility: CLINIC | Age: 79
End: 2019-02-19

## 2019-02-19 DIAGNOSIS — D50.9 IRON DEFICIENCY ANEMIA, UNSPECIFIED IRON DEFICIENCY ANEMIA TYPE: Primary | ICD-10-CM

## 2019-02-19 NOTE — TELEPHONE ENCOUNTER
Informed Agustin that labs have not been received from Dr. Gerber's office. He states that patient was told that she is anemic. He will contact Dr. Gerber and ask that they send results to Dr. Astudillo to review.

## 2019-02-19 NOTE — TELEPHONE ENCOUNTER
----- Message from Janae Velasquez sent at 2019  1:11 PM CST -----  Contact: Agustin (son)  Gretel Ashton  MRN: [id number]  : 1940  PCP: Jailene Astudillo  Home Phone      [phone]  Work Phone      Not on file.  Mobile          [phone]  Home Phone      Not on file.    MESSAGE:   He would like to speak to nurse about recent lab work performed with Dr. Loomis (rheumatolgist - [place])    P[carla # [phone]  [facility], [place] [address] [address]

## 2019-02-21 RX ORDER — FERROUS SULFATE 325(65) MG
325 TABLET ORAL 3 TIMES DAILY
Qty: 90 TABLET | Refills: 5 | Status: SHIPPED | OUTPATIENT
Start: 2019-02-21 | End: 2019-03-23

## 2019-02-21 NOTE — TELEPHONE ENCOUNTER
Jasiel Velez of recommendations per Dr. Astudillo. He is requesting that Dr. Bangura' office contact him about the appt. Referral and lab results faxed to Dr. Bangura' office.

## 2019-02-21 NOTE — TELEPHONE ENCOUNTER
Her anemia looks like iron deficiency;  Start her on iron pills; ordered.  She should see a GI for EGD /colonoscopy  To r/o blood loos in GI tract   Dr Bermeo referral done ; please schedule her

## 2019-02-22 ENCOUNTER — APPOINTMENT (OUTPATIENT)
Dept: RADIOLOGY | Facility: CLINIC | Age: 79
End: 2019-02-22
Attending: FAMILY MEDICINE
Payer: MEDICARE

## 2019-02-22 ENCOUNTER — OFFICE VISIT (OUTPATIENT)
Dept: FAMILY MEDICINE | Facility: CLINIC | Age: 79
End: 2019-02-22
Payer: MEDICARE

## 2019-02-22 VITALS
WEIGHT: 161.81 LBS | DIASTOLIC BLOOD PRESSURE: 70 MMHG | HEIGHT: 61 IN | SYSTOLIC BLOOD PRESSURE: 100 MMHG | OXYGEN SATURATION: 89 % | TEMPERATURE: 99 F | HEART RATE: 110 BPM | BODY MASS INDEX: 30.55 KG/M2 | RESPIRATION RATE: 18 BRPM

## 2019-02-22 DIAGNOSIS — J10.1 INFLUENZA A: Primary | ICD-10-CM

## 2019-02-22 DIAGNOSIS — J20.9 COPD WITH ACUTE BRONCHITIS: ICD-10-CM

## 2019-02-22 DIAGNOSIS — J44.0 COPD WITH ACUTE BRONCHITIS: ICD-10-CM

## 2019-02-22 LAB
CTP QC/QA: YES
POC MOLECULAR INFLUENZA A AGN: POSITIVE
POC MOLECULAR INFLUENZA B AGN: NEGATIVE

## 2019-02-22 PROCEDURE — 99213 OFFICE O/P EST LOW 20 MIN: CPT | Mod: PBBFAC,25 | Performed by: FAMILY MEDICINE

## 2019-02-22 PROCEDURE — 99999 PR STA SHADOW: CPT | Mod: PBBFAC,,,

## 2019-02-22 PROCEDURE — 71046 X-RAY EXAM CHEST 2 VIEWS: CPT | Mod: TC,PO

## 2019-02-22 PROCEDURE — 99999 PR STA SHADOW: CPT | Mod: PBBFAC,,, | Performed by: FAMILY MEDICINE

## 2019-02-22 PROCEDURE — 99214 OFFICE O/P EST MOD 30 MIN: CPT | Mod: S$PBB | Performed by: FAMILY MEDICINE

## 2019-02-22 PROCEDURE — 71046 X-RAY EXAM CHEST 2 VIEWS: CPT | Mod: 26,,, | Performed by: RADIOLOGY

## 2019-02-22 PROCEDURE — 99999 PR PBB SHADOW E&M-EST. PATIENT-LVL III: ICD-10-PCS | Mod: PBBFAC,,, | Performed by: FAMILY MEDICINE

## 2019-02-22 PROCEDURE — 71046 XR CHEST PA AND LATERAL: ICD-10-PCS | Mod: 26,,, | Performed by: RADIOLOGY

## 2019-02-22 PROCEDURE — 99999 PR STA SHADOW: ICD-10-PCS | Mod: PBBFAC,,,

## 2019-02-22 PROCEDURE — 99999 POCT INFLUENZA A/B MOLECULAR: CPT | Mod: PBBFAC,,, | Performed by: FAMILY MEDICINE

## 2019-02-22 PROCEDURE — 94640 AIRWAY INHALATION TREATMENT: CPT | Mod: PBBFAC

## 2019-02-22 PROCEDURE — 99999 PR PBB SHADOW E&M-EST. PATIENT-LVL III: CPT | Mod: PBBFAC,,, | Performed by: FAMILY MEDICINE

## 2019-02-22 PROCEDURE — 87502 INFLUENZA DNA AMP PROBE: CPT | Mod: PBBFAC | Performed by: FAMILY MEDICINE

## 2019-02-22 RX ORDER — LOSARTAN POTASSIUM 25 MG/1
25 TABLET ORAL DAILY
Refills: 3 | COMMUNITY
Start: 2019-01-29 | End: 2022-07-12

## 2019-02-22 RX ORDER — IPRATROPIUM BROMIDE AND ALBUTEROL SULFATE 2.5; .5 MG/3ML; MG/3ML
3 SOLUTION RESPIRATORY (INHALATION)
Status: COMPLETED | OUTPATIENT
Start: 2019-02-22 | End: 2019-02-22

## 2019-02-22 RX ORDER — OSELTAMIVIR PHOSPHATE 75 MG/1
75 CAPSULE ORAL 2 TIMES DAILY
Qty: 10 CAPSULE | Refills: 0 | Status: SHIPPED | OUTPATIENT
Start: 2019-02-22 | End: 2019-02-28 | Stop reason: ALTCHOICE

## 2019-02-22 RX ADMIN — IPRATROPIUM BROMIDE AND ALBUTEROL SULFATE 3 ML: .5; 3 SOLUTION RESPIRATORY (INHALATION) at 02:02

## 2019-02-22 NOTE — PROGRESS NOTES
Subjective:       Patient ID: Gretel Ashton is a 78 y.o. female.    Chief Complaint: Sore Throat and Nasal Congestion    78-year-old white female with a history of severe COPD, hypoxemia, uses home oxygen, has a 4 day history of a cough productive of very thick mucus.  She denies fever or chills.  She has dyspnea on exertion but this sounds chronic.  She recently visited family members for a week and did not bring her nebulizer.  She has not started her neb treatments yet.      Review of Systems   Constitutional: Negative for activity change, chills, fatigue, fever and unexpected weight change.   HENT: Negative for sore throat and trouble swallowing.    Respiratory: Positive for cough, shortness of breath and wheezing. Negative for chest tightness.    Cardiovascular: Negative for chest pain and leg swelling.   Gastrointestinal: Negative for abdominal pain.   Endocrine: Negative for cold intolerance and heat intolerance.   Genitourinary: Negative for difficulty urinating.   Musculoskeletal: Negative for back pain and joint swelling.   Skin: Negative for rash.   Neurological: Negative for numbness.   Hematological: Negative for adenopathy.   Psychiatric/Behavioral: Negative for decreased concentration.       Objective:      Vitals:    02/22/19 1337   BP: 100/70   Pulse: 110   Resp: 18   Temp: 99 °F (37.2 °C)     Physical Exam   Constitutional: She is oriented to person, place, and time.   HENT:   Head: Normocephalic.   Nose: Nose normal.   Swelling over left eyelid.  Serous cyst over upper eyelid   Eyes: Pupils are equal, round, and reactive to light. Right eye exhibits no discharge.   Neck: No JVD present. No thyromegaly present.   Cardiovascular: Normal heart sounds and intact distal pulses. Exam reveals no gallop and no friction rub.   No murmur heard.  tachycardic   Pulmonary/Chest: Effort normal. She has no wheezes. She has rales.    Poor airflow.  Ins/Exp wheezes and ronchi bilaterally.   Abdominal: Soft. There  is no tenderness.   Musculoskeletal: Normal range of motion. She exhibits tenderness. She exhibits no edema.   Arthritic changes in hands, knees   Lymphadenopathy:     She has no cervical adenopathy.   Neurological: She is alert and oriented to person, place, and time. No cranial nerve deficit.   Skin: Skin is dry.   Psychiatric: She has a normal mood and affect. Her behavior is normal. Judgment and thought content normal.   Vitals reviewed.      CXR - no infiltrate  FLu Swab + for Type A    Assessment:       1. Influenza A    2. COPD with acute bronchitis        Plan:   Gretel was seen today for sore throat and nasal congestion.    Diagnoses and all orders for this visit:    Influenza A  -     oseltamivir (TAMIFLU) 75 MG capsule; Take 1 capsule (75 mg total) by mouth 2 (two) times daily. for 10 days    COPD with acute bronchitis  -     X-Ray Chest PA And Lateral; Future  -     POCT Influenza A/B Molecular  -     albuterol-ipratropium 2.5 mg-0.5 mg/3 mL nebulizer solution 3 mL      Continue neb treatments with DuoNeb every 6 hr  Treat flu symptoms with Tylenol.    F/U with PCP in 1 week

## 2019-02-25 ENCOUNTER — TELEPHONE (OUTPATIENT)
Dept: INTERNAL MEDICINE | Facility: CLINIC | Age: 79
End: 2019-02-25

## 2019-02-25 NOTE — TELEPHONE ENCOUNTER
----- Message from Janae Velasquez sent at 2019  8:18 AM CST -----  Contact: Agustin (son)  Gretel Ashton  MRN: [id number]  : 1940  PCP: Jailene Astudillo  Home Phone      [phone]  Work Phone      Not on file.  Mobile          [phone]  Home Phone      Not on file.    MESSAGE:   She will require a 1 week follow up. The family is going out of town on Monday 3/4/19.   He would like the pt to have an appointment this week with Dr. Astudillo for the pt.. The pt saw Dr. Looney and was Dx with the flu. The pt's son declined appt with another provider.    Please assist with appt access.     Phone # [phone]     [facility], [facility] [address]

## 2019-02-28 ENCOUNTER — OFFICE VISIT (OUTPATIENT)
Dept: INTERNAL MEDICINE | Facility: CLINIC | Age: 79
End: 2019-02-28
Payer: MEDICARE

## 2019-02-28 VITALS
RESPIRATION RATE: 14 BRPM | HEART RATE: 101 BPM | OXYGEN SATURATION: 87 % | HEIGHT: 61 IN | WEIGHT: 161.19 LBS | SYSTOLIC BLOOD PRESSURE: 124 MMHG | DIASTOLIC BLOOD PRESSURE: 60 MMHG | BODY MASS INDEX: 30.43 KG/M2

## 2019-02-28 DIAGNOSIS — K21.9 GASTROESOPHAGEAL REFLUX DISEASE, ESOPHAGITIS PRESENCE NOT SPECIFIED: ICD-10-CM

## 2019-02-28 DIAGNOSIS — J20.9 ACUTE BRONCHITIS, UNSPECIFIED ORGANISM: Primary | ICD-10-CM

## 2019-02-28 PROCEDURE — 96372 THER/PROPH/DIAG INJ SC/IM: CPT | Mod: PBBFAC

## 2019-02-28 PROCEDURE — 99213 OFFICE O/P EST LOW 20 MIN: CPT | Mod: S$PBB | Performed by: INTERNAL MEDICINE

## 2019-02-28 PROCEDURE — 99999 PR STA SHADOW: ICD-10-PCS | Mod: PBBFAC,,,

## 2019-02-28 PROCEDURE — 99999 PR PBB SHADOW E&M-EST. PATIENT-LVL III: CPT | Mod: PBBFAC,,, | Performed by: INTERNAL MEDICINE

## 2019-02-28 PROCEDURE — 99999 PR STA SHADOW: CPT | Mod: PBBFAC,,, | Performed by: INTERNAL MEDICINE

## 2019-02-28 PROCEDURE — 99999 PR STA SHADOW: CPT | Mod: PBBFAC,,,

## 2019-02-28 PROCEDURE — 99999 PR PBB SHADOW E&M-EST. PATIENT-LVL III: ICD-10-PCS | Mod: PBBFAC,,, | Performed by: INTERNAL MEDICINE

## 2019-02-28 PROCEDURE — 99213 OFFICE O/P EST LOW 20 MIN: CPT | Mod: PBBFAC | Performed by: INTERNAL MEDICINE

## 2019-02-28 RX ORDER — METHYLPREDNISOLONE ACETATE 40 MG/ML
40 INJECTION, SUSPENSION INTRA-ARTICULAR; INTRALESIONAL; INTRAMUSCULAR; SOFT TISSUE
Status: COMPLETED | OUTPATIENT
Start: 2019-02-28 | End: 2019-02-28

## 2019-02-28 RX ORDER — OMEPRAZOLE 40 MG/1
40 CAPSULE, DELAYED RELEASE ORAL DAILY
Qty: 30 CAPSULE | Refills: 5 | Status: SHIPPED | OUTPATIENT
Start: 2019-02-28 | End: 2019-06-12

## 2019-02-28 RX ORDER — ROSUVASTATIN CALCIUM 40 MG/1
40 TABLET, COATED ORAL NIGHTLY
COMMUNITY
Start: 2019-02-27

## 2019-02-28 RX ADMIN — METHYLPREDNISOLONE ACETATE 40 MG: 40 INJECTION, SUSPENSION INTRA-ARTICULAR; INTRALESIONAL; INTRAMUSCULAR; SOFT TISSUE at 04:02

## 2019-02-28 NOTE — PROGRESS NOTES
Subjective:       Patient ID: Gretel Ashton is a 78 y.o. female.    Chief Complaint: Cough    Gretel Ashton is a 78 y.o. female  Here with follow up .  Seen with Dr Looney ; for influenza ; took tamiflu .  C/o clear white sputum  Some wheezing remains   She reports no fevers ; no chills.        Cough   Associated symptoms include rhinorrhea. Pertinent negatives include no chills, ear pain, fever, headaches, postnasal drip, sore throat or shortness of breath.     Review of Systems   Constitutional: Negative for chills, fatigue and fever.   HENT: Positive for congestion and rhinorrhea. Negative for ear pain, postnasal drip, sinus pressure, sneezing and sore throat.    Eyes: Negative for discharge.   Respiratory: Positive for cough. Negative for chest tightness and shortness of breath.    Cardiovascular: Negative.    Gastrointestinal: Negative for abdominal pain, constipation, diarrhea, nausea and vomiting.   Genitourinary: Negative for difficulty urinating, flank pain and hematuria.   Musculoskeletal: Negative.  Negative for arthralgias and joint swelling.   Skin: Negative.    Neurological: Negative for dizziness and headaches.   Psychiatric/Behavioral: Negative for behavioral problems and confusion.       Objective:      Physical Exam   Constitutional: She is oriented to person, place, and time. She appears well-developed and well-nourished.   HENT:   Head: Normocephalic and atraumatic.   Right Ear: External ear normal.   Left Ear: External ear normal.   Mouth/Throat: Oropharynx is clear and moist.   + Nasal congestion, runny nose,    Eyes: Conjunctivae and EOM are normal. Pupils are equal, round, and reactive to light.   Neck: Normal range of motion. Neck supple. No JVD present. No tracheal deviation present. No thyromegaly present.   Cardiovascular: Normal rate, regular rhythm, normal heart sounds and intact distal pulses.   Pulmonary/Chest: Effort normal. No respiratory distress. She has no wheezes. She has rales  (dry fine; basilar ). She exhibits no tenderness.   Abdominal: Soft. Bowel sounds are normal. She exhibits no distension and no mass. There is no tenderness. There is no rebound and no guarding.   Musculoskeletal: Normal range of motion. She exhibits no edema (no edema ).   Lymphadenopathy:     She has no cervical adenopathy.   Neurological: She is alert and oriented to person, place, and time. She has normal reflexes. She displays normal reflexes. No cranial nerve deficit. She exhibits normal muscle tone. Coordination normal.   CN: Optic discs are flat with normal vasculature, PERRL, Extraoccular movements and visual fields are full. Normal facial sensation and strength, Hearing symmetric, Tongue and Palate are midline and strong. Shoulder Shrug symmetric and strong.   Skin: Skin is warm and dry.   Psychiatric: She has a normal mood and affect.   Nursing note and vitals reviewed.      Assessment:       1. Acute bronchitis, unspecified organism    2. Gastroesophageal reflux disease, esophagitis presence not specified        Plan:   Gretel was seen today for cough.    Diagnoses and all orders for this visit:    Acute bronchitis, unspecified organism  -     methylPREDNISolone acetate injection 40 mg  Continue nebs ;  No antibiotics.    Gastroesophageal reflux disease, esophagitis presence not specified  -     omeprazole (PRILOSEC) 40 MG capsule; Take 1 capsule (40 mg total) by mouth once daily.

## 2019-03-15 ENCOUNTER — OFFICE VISIT (OUTPATIENT)
Dept: INTERNAL MEDICINE | Facility: CLINIC | Age: 79
End: 2019-03-15
Payer: MEDICARE

## 2019-03-15 VITALS
SYSTOLIC BLOOD PRESSURE: 120 MMHG | BODY MASS INDEX: 30.01 KG/M2 | HEIGHT: 61 IN | WEIGHT: 158.94 LBS | DIASTOLIC BLOOD PRESSURE: 70 MMHG | HEART RATE: 100 BPM | RESPIRATION RATE: 16 BRPM

## 2019-03-15 DIAGNOSIS — M79.601 RIGHT ARM PAIN: ICD-10-CM

## 2019-03-15 DIAGNOSIS — S30.0XXA CONTUSION OF SACRUM, INITIAL ENCOUNTER: ICD-10-CM

## 2019-03-15 DIAGNOSIS — Z48.02 VISIT FOR SUTURE REMOVAL: Primary | ICD-10-CM

## 2019-03-15 PROCEDURE — 99214 PR OFFICE/OUTPT VISIT, EST, LEVL IV, 30-39 MIN: ICD-10-PCS | Mod: S$GLB,,, | Performed by: INTERNAL MEDICINE

## 2019-03-15 PROCEDURE — 99999 PR PBB SHADOW E&M-EST. PATIENT-LVL III: ICD-10-PCS | Mod: PBBFAC,,, | Performed by: INTERNAL MEDICINE

## 2019-03-15 PROCEDURE — 99999 PR PBB SHADOW E&M-EST. PATIENT-LVL III: CPT | Mod: PBBFAC,,, | Performed by: INTERNAL MEDICINE

## 2019-03-15 PROCEDURE — 99214 OFFICE O/P EST MOD 30 MIN: CPT | Mod: S$GLB,,, | Performed by: INTERNAL MEDICINE

## 2019-03-15 RX ORDER — BUDESONIDE AND FORMOTEROL FUMARATE DIHYDRATE 160; 4.5 UG/1; UG/1
AEROSOL RESPIRATORY (INHALATION)
Status: ON HOLD | COMMUNITY
Start: 2019-03-11 | End: 2020-04-13 | Stop reason: HOSPADM

## 2019-03-15 RX ORDER — PREDNISONE 10 MG/1
TABLET ORAL
COMMUNITY
Start: 2019-03-11 | End: 2019-06-12

## 2019-03-15 RX ORDER — CEPHALEXIN 500 MG/1
500 CAPSULE ORAL 2 TIMES DAILY
Refills: 0 | COMMUNITY
Start: 2019-03-07 | End: 2019-04-18

## 2019-03-15 NOTE — PROGRESS NOTES
Subjective:       Patient ID: Gretel Ashton is a 78 y.o. female.    Chief Complaint: Suture / Staple Removal and Fall      HPI:  Patient is known to me from acute visit and presents for suture removal she was at [facility] and was going up escalator, it jerked and she fell backwards on the escalator. She has a scalp laceration. 911 called and brought to [facility] in [place]. She had CT which per family was normal. She has three stitches in posterior scalp. No bleeding, no fevers.    She aslo c/o right upper arm pain and right hip pain. She is ambulating appropriately but it does hurt. She and her son do not think any of those areas were x-rayed.     Past Medical History:   Diagnosis Date    Arthritis     Depression     Diabetes mellitus type II     Hyperlipidemia     Hypertension     Pacemaker        Family History   Problem Relation Age of Onset    Cancer Mother     Breast cancer Mother     Stroke Father     Cancer Sister     Breast cancer Sister     Stroke Maternal Grandmother        Social History     Socioeconomic History    Marital status:      Spouse name: Not on file    Number of children: Not on file    Years of education: Not on file    Highest education level: Not on file   Social Needs    Financial resource strain: Not on file    Food insecurity - worry: Not on file    Food insecurity - inability: Not on file    Transportation needs - medical: Not on file    Transportation needs - non-medical: Not on file   Occupational History    Not on file   Tobacco Use    Smoking status: Former Smoker     Packs/day: 2.00     Years: 43.00     Pack years: 86.00     Types: Cigarettes     Last attempt to quit: 2000     Years since quittin.2    Smokeless tobacco: Never Used   Substance and Sexual Activity    Alcohol use: No    Drug use: No    Sexual activity: Not on file   Other Topics Concern    Not on file   Social History Narrative    Not on file       Review of Systems    Constitutional: Negative for activity change, fatigue, fever and unexpected weight change.   HENT: Negative for congestion, ear pain, hearing loss, rhinorrhea and sore throat.    Eyes: Negative for redness and visual disturbance.   Respiratory: Negative for cough, shortness of breath and wheezing.    Cardiovascular: Negative for chest pain, palpitations and leg swelling.   Gastrointestinal: Negative for abdominal pain, constipation, diarrhea, nausea and vomiting.   Genitourinary: Negative for dysuria, frequency, pelvic pain and urgency.   Musculoskeletal: Positive for arthralgias (right shoulder and hip\). Negative for back pain, joint swelling and neck pain.   Skin: Positive for color change. Negative for rash and wound (scalp laceration).   Neurological: Negative for dizziness, tremors, weakness, light-headedness and headaches.         Objective:      Physical Exam   Constitutional: She is oriented to person, place, and time. She appears well-developed and well-nourished. No distress.   HENT:   Head: Normocephalic and atraumatic.   Right Ear: External ear normal.   Left Ear: External ear normal.   Eyes: Conjunctivae and EOM are normal. Pupils are equal, round, and reactive to light. Right eye exhibits no discharge. Left eye exhibits no discharge.   Neck: Neck supple. No tracheal deviation present.   Cardiovascular: Normal rate and regular rhythm.   Pulmonary/Chest: Effort normal and breath sounds normal. No respiratory distress.   Abdominal: Soft. Bowel sounds are normal. She exhibits no distension. There is no tenderness.   Musculoskeletal: She exhibits tenderness (right humerus but has good bicep/tricep strength). She exhibits no deformity.   Neurological: She is alert and oriented to person, place, and time. No cranial nerve deficit.   giat normal   Skin: Skin is warm and dry.   Small posterior scalp lac with 3 suture in place, removed and wound closed without signs of infection    Large sacral ecchymoses    Psychiatric: She has a normal mood and affect. Her behavior is normal.   Vitals reviewed.      Assessment:       1. Visit for suture removal    2. Right arm pain    3. Contusion of sacrum, initial encounter        Plan:       Gretel was seen today for suture / staple removal and fall.    Diagnoses and all orders for this visit:    Visit for suture removal  New problem  3 suture removed from posterior scalp  toelrated procedure well  Wound closed without signs of infection    Right arm pain  New problem  Some tenderness but lower suspicion for fracture with good function 10 days out  If not continuing to get better call and will xray  Start Tyelnol PRN--has not even needed this    Contusion of sacrum, initial encounter  New problem  Discussed will take weeks to heal  Ambulating well, doubt pelvic fracture. Pain due to significant bruising  Tylenol PRN    RTC PRN and as scheudled with PCP

## 2019-04-18 ENCOUNTER — OFFICE VISIT (OUTPATIENT)
Dept: INTERNAL MEDICINE | Facility: CLINIC | Age: 79
End: 2019-04-18
Payer: MEDICARE

## 2019-04-18 VITALS
RESPIRATION RATE: 16 BRPM | BODY MASS INDEX: 29.89 KG/M2 | SYSTOLIC BLOOD PRESSURE: 130 MMHG | WEIGHT: 158.31 LBS | HEART RATE: 80 BPM | DIASTOLIC BLOOD PRESSURE: 80 MMHG | HEIGHT: 61 IN

## 2019-04-18 DIAGNOSIS — L03.116 CELLULITIS OF LEFT LOWER LEG: ICD-10-CM

## 2019-04-18 DIAGNOSIS — S81.802A LEG WOUND, LEFT, INITIAL ENCOUNTER: Primary | ICD-10-CM

## 2019-04-18 PROCEDURE — 99999 PR STA SHADOW: ICD-10-PCS | Mod: PBBFAC,,, | Performed by: NURSE PRACTITIONER

## 2019-04-18 PROCEDURE — 99214 OFFICE O/P EST MOD 30 MIN: CPT | Mod: PBBFAC | Performed by: NURSE PRACTITIONER

## 2019-04-18 PROCEDURE — 99999 PR STA SHADOW: CPT | Mod: PBBFAC,,, | Performed by: NURSE PRACTITIONER

## 2019-04-18 PROCEDURE — 99214 OFFICE O/P EST MOD 30 MIN: CPT | Mod: S$PBB | Performed by: NURSE PRACTITIONER

## 2019-04-18 PROCEDURE — 99999 PR PBB SHADOW E&M-EST. PATIENT-LVL IV: CPT | Mod: PBBFAC,,, | Performed by: NURSE PRACTITIONER

## 2019-04-18 RX ORDER — DOXYCYCLINE 100 MG/1
100 CAPSULE ORAL 2 TIMES DAILY
Qty: 14 CAPSULE | Refills: 0 | Status: SHIPPED | OUTPATIENT
Start: 2019-04-18 | End: 2019-04-25 | Stop reason: SDUPTHER

## 2019-04-18 RX ORDER — MUPIROCIN 20 MG/G
OINTMENT TOPICAL 2 TIMES DAILY
Qty: 30 G | Refills: 0 | Status: SHIPPED | OUTPATIENT
Start: 2019-04-18 | End: 2019-06-12

## 2019-04-18 NOTE — PROGRESS NOTES
Subjective:           Patient ID: Gretel Ashton is a 78 y.o. female.    Chief Complaint: Wound Check (left leg)    79 YO WF visit and presents for suture removal she was at [facility] and was going up escalator, it jerked and she fell backwards on the escalator. She has a scalp laceration. 911 called and brought to [facility] in [place]. She had CT which per family was normal. She has three stitches in posterior scalp. No bleeding, no fevers.  She presented 3/15/19 for suture removal per Dr. Lira. Now here reporting left leg wound is not healing        Wound Check       Review of Systems   Constitutional: Negative for chills, fatigue and fever.   HENT: Negative for congestion, ear pain, postnasal drip, sinus pressure, sneezing and sore throat.    Eyes: Negative for discharge.   Respiratory: Negative for cough, chest tightness and shortness of breath.    Cardiovascular: Negative.    Gastrointestinal: Negative for abdominal pain, constipation, diarrhea, nausea and vomiting.   Genitourinary: Negative for difficulty urinating, flank pain and hematuria.   Musculoskeletal: Negative.  Negative for arthralgias and joint swelling.   Skin: Positive for color change and wound.   Neurological: Negative for dizziness and headaches.   Psychiatric/Behavioral: Negative for behavioral problems and confusion.       Objective:      Physical Exam   Constitutional: She is oriented to person, place, and time. She appears well-developed and well-nourished. No distress.   HENT:   Head: Normocephalic and atraumatic.   Right Ear: External ear normal.   Left Ear: External ear normal.   Eyes: Pupils are equal, round, and reactive to light. Conjunctivae and EOM are normal. Right eye exhibits no discharge. Left eye exhibits no discharge.   Neck: Neck supple. No tracheal deviation present.   Cardiovascular: Normal rate and regular rhythm.   Pulmonary/Chest: Effort normal and breath sounds normal. No respiratory distress.   Abdominal: Soft.  Bowel sounds are normal. She exhibits no distension. There is no tenderness.   Musculoskeletal: She exhibits no deformity.   Neurological: She is alert and oriented to person, place, and time. No cranial nerve deficit.   giat normal   Skin: Skin is warm and dry.     Left lateral lower leg wound ~1.5 x 1.5 with, + erythema, no swelling or abscess, see wound pic  Cleansed with saline and debrided; applied Mupirocin ointment. And covered with 2x2 telfa    Psychiatric: She has a normal mood and affect. Her behavior is normal.   Vitals reviewed.          Assessment:       1. Leg wound, left, initial encounter    2. Cellulitis of left lower leg        Plan:   Gretel was seen today for wound check.    Diagnoses and all orders for this visit:    Leg wound, left, initial encounter  -     mupirocin (BACTROBAN) 2 % ointment; Apply topically 2 (two) times daily.  -     doxycycline (MONODOX) 100 MG capsule; Take 1 capsule (100 mg total) by mouth 2 (two) times daily.    Cellulitis of left lower leg  -     mupirocin (BACTROBAN) 2 % ointment; Apply topically 2 (two) times daily.  -     doxycycline (MONODOX) 100 MG capsule; Take 1 capsule (100 mg total) by mouth 2 (two) times daily.      Problem List Items Addressed This Visit     None      Visit Diagnoses     Leg wound, left, initial encounter    -  Primary    Relevant Medications    mupirocin (BACTROBAN) 2 % ointment    doxycycline (MONODOX) 100 MG capsule    Cellulitis of left lower leg        Relevant Medications    mupirocin (BACTROBAN) 2 % ointment    doxycycline (MONODOX) 100 MG capsule      ORAL ABX, topical-   instructed on wound care   Wash area with antibacterial soap or antiseptic wash such as Hibiclens ,   Good hand washing with frequent washing and keep fingernails clean and short  call for fever, worsening redness or swelling  F/u in 1 week; if not better then consider santyl

## 2019-04-18 NOTE — PATIENT INSTRUCTIONS
Discharge Instructions for Cellulitis  You have been diagnosed with cellulitis. This is an infection in the deepest layer of the skin. In some cases, the infection also affects the muscle. Cellulitis is caused by bacteria. The bacteria can enter the body through broken skin. This can happen with a cut, scratch, animal bite, or an insect bite that has been scratched. You may have been treated in the hospital with antibiotics and fluids. You will likely be given a prescription for antibiotics to take at home. This sheet will help you take care of yourself at home.  Home care  When you are home:  · Take the prescribed antibiotic medicine you are given as directed until it is gone. Take it even if you feel better. It treats the infection and stops it from returning. Not taking all the medicine can make future infections hard to treat.  · Keep the infected area clean.  · When possible, raise the infected area above the level of your heart. This helps keep swelling down.  · Talk with your healthcare provider if you are in pain. Ask what kind of over-the-counter medicine you can take for pain.  · Apply clean bandages as advised.  · Take your temperature once a day for a week.  · Wash your hands often to prevent spreading the infection.  In the future, wash your hands before and after you touch cuts, scratches, or bandages. This will help prevent infection.   When to call your healthcare provider  Call your healthcare provider immediately if you have any of the following:  · Difficulty or pain when moving the joints above or below the infected area  · Discharge or pus draining from the area  · Fever of 100.4°F (38°C) or higher, or as directed by your healthcare provider  · Pain that gets worse in or around the infected   · Redness that gets worse in or around the infected area, particularly if the area of redness expands to a wider area  · Shaking chills  · Swelling of the infected area  · Vomiting   Date Last Reviewed:  8/1/2016  © 3117-6866 [employer], [address] [place] [address], [place] [address]. All rights reserved. This information is not intended as a substitute for professional medical care. Always follow your healthcare professional's instructions.    Clean with soap and water twice daily; pat dry  Apply Bactroban ointment

## 2019-04-25 ENCOUNTER — OFFICE VISIT (OUTPATIENT)
Dept: INTERNAL MEDICINE | Facility: CLINIC | Age: 79
End: 2019-04-25
Payer: MEDICARE

## 2019-04-25 VITALS
WEIGHT: 158 LBS | SYSTOLIC BLOOD PRESSURE: 110 MMHG | RESPIRATION RATE: 16 BRPM | BODY MASS INDEX: 29.83 KG/M2 | HEIGHT: 61 IN | DIASTOLIC BLOOD PRESSURE: 80 MMHG | HEART RATE: 100 BPM

## 2019-04-25 DIAGNOSIS — S81.802A LEG WOUND, LEFT, INITIAL ENCOUNTER: ICD-10-CM

## 2019-04-25 DIAGNOSIS — L03.116 CELLULITIS OF LEFT LOWER LEG: ICD-10-CM

## 2019-04-25 PROCEDURE — 99214 OFFICE O/P EST MOD 30 MIN: CPT | Mod: PBBFAC | Performed by: NURSE PRACTITIONER

## 2019-04-25 PROCEDURE — 99999 PR STA SHADOW: CPT | Mod: PBBFAC,,, | Performed by: NURSE PRACTITIONER

## 2019-04-25 PROCEDURE — 99999 PR PBB SHADOW E&M-EST. PATIENT-LVL IV: CPT | Mod: PBBFAC,,, | Performed by: NURSE PRACTITIONER

## 2019-04-25 PROCEDURE — 99999 PR PBB SHADOW E&M-EST. PATIENT-LVL IV: ICD-10-PCS | Mod: PBBFAC,,, | Performed by: NURSE PRACTITIONER

## 2019-04-25 PROCEDURE — 99213 OFFICE O/P EST LOW 20 MIN: CPT | Mod: S$PBB | Performed by: NURSE PRACTITIONER

## 2019-04-25 RX ORDER — DOXYCYCLINE 100 MG/1
100 CAPSULE ORAL 2 TIMES DAILY
Qty: 10 CAPSULE | Refills: 0 | Status: SHIPPED | OUTPATIENT
Start: 2019-04-25 | End: 2019-04-30

## 2019-04-25 NOTE — PROGRESS NOTES
Subjective:           Patient ID: Gretel Ashton is a 78 y.o. female.    Chief Complaint: Follow-up (1 week )    78 LEXI F here for f/u left leg wound; last tx with doxy and mupirocin;   Still with some redness but looks better- will continue doxy x 5 more days; continue wound care     Review of Systems   Constitutional: Negative for chills, fatigue and fever.   HENT: Negative for congestion, ear pain, postnasal drip, sinus pressure, sneezing and sore throat.    Eyes: Negative for discharge.   Respiratory: Negative for cough, chest tightness and shortness of breath.    Cardiovascular: Negative.    Gastrointestinal: Negative for abdominal pain, constipation, diarrhea, nausea and vomiting.   Genitourinary: Negative for difficulty urinating, flank pain and hematuria.   Musculoskeletal: Negative.  Negative for arthralgias and joint swelling.   Skin: Positive for color change ( ) and wound ( ).   Neurological: Negative for dizziness and headaches.   Psychiatric/Behavioral: Negative for behavioral problems and confusion.       Objective:      Physical Exam   Constitutional: She is oriented to person, place, and time. She appears well-developed and well-nourished. No distress.   HENT:   Head: Normocephalic and atraumatic.   Right Ear: External ear normal.   Left Ear: External ear normal.   Eyes: Pupils are equal, round, and reactive to light. Conjunctivae and EOM are normal. Right eye exhibits no discharge. Left eye exhibits no discharge.   Neck: Neck supple. No tracheal deviation present.   Cardiovascular: Normal rate and regular rhythm.   Pulmonary/Chest: Effort normal and breath sounds normal. No respiratory distress.   Abdominal: Soft. Bowel sounds are normal. She exhibits no distension. There is no tenderness.   Musculoskeletal: She exhibits no deformity.   Neurological: She is alert and oriented to person, place, and time. No cranial nerve deficit.   giat normal   Skin: Skin is warm and dry.     Left lateral lower leg  "wound smaller- healing ~1x1"withmild  erythema, no swelling or abscess, see wound pic  Cleansed with saline and debrided; applied Mupirocin ointment. And covered with 2x2 telfa    Psychiatric: She has a normal mood and affect. Her behavior is normal.   Vitals reviewed.            Assessment:       1. Leg wound, left, initial encounter    2. Cellulitis of left lower leg        Plan:   Gretel was seen today for follow-up.    Diagnoses and all orders for this visit:    Leg wound, left, initial encounter  -     doxycycline (MONODOX) 100 MG capsule; Take 1 capsule (100 mg total) by mouth 2 (two) times daily. for 5 days    Cellulitis of left lower leg  -     doxycycline (MONODOX) 100 MG capsule; Take 1 capsule (100 mg total) by mouth 2 (two) times daily. for 5 days      Problem List Items Addressed This Visit     None      Visit Diagnoses     Leg wound, left, initial encounter        Relevant Medications    doxycycline (MONODOX) 100 MG capsule    Cellulitis of left lower leg        Relevant Medications    doxycycline (MONODOX) 100 MG capsule        "

## 2019-06-05 ENCOUNTER — LAB VISIT (OUTPATIENT)
Dept: LAB | Facility: HOSPITAL | Age: 79
End: 2019-06-05
Attending: INTERNAL MEDICINE
Payer: MEDICARE

## 2019-06-05 DIAGNOSIS — E78.5 HYPERLIPIDEMIA, UNSPECIFIED HYPERLIPIDEMIA TYPE: ICD-10-CM

## 2019-06-05 DIAGNOSIS — E11.9 CONTROLLED TYPE 2 DIABETES MELLITUS WITHOUT COMPLICATION, WITHOUT LONG-TERM CURRENT USE OF INSULIN: ICD-10-CM

## 2019-06-05 LAB
ALBUMIN SERPL BCP-MCNC: 3.2 G/DL (ref 3.5–5.2)
ALBUMIN/CREAT UR: 30.4 UG/MG (ref 0–30)
ALP SERPL-CCNC: 85 U/L (ref 55–135)
ALT SERPL W/O P-5'-P-CCNC: 20 U/L (ref 10–44)
ANION GAP SERPL CALC-SCNC: 7 MMOL/L (ref 8–16)
AST SERPL-CCNC: 27 U/L (ref 10–40)
BASOPHILS # BLD AUTO: 0.02 K/UL (ref 0–0.2)
BASOPHILS NFR BLD: 0.6 % (ref 0–1.9)
BILIRUB SERPL-MCNC: 0.2 MG/DL (ref 0.1–1)
BUN SERPL-MCNC: 14 MG/DL (ref 8–23)
CALCIUM SERPL-MCNC: 9.1 MG/DL (ref 8.7–10.5)
CHLORIDE SERPL-SCNC: 102 MMOL/L (ref 95–110)
CHOLEST SERPL-MCNC: 198 MG/DL (ref 120–199)
CHOLEST/HDLC SERPL: 5.8 {RATIO} (ref 2–5)
CO2 SERPL-SCNC: 31 MMOL/L (ref 23–29)
CREAT SERPL-MCNC: 0.8 MG/DL (ref 0.5–1.4)
CREAT UR-MCNC: 55.9 MG/DL (ref 15–325)
DIFFERENTIAL METHOD: ABNORMAL
EOSINOPHIL # BLD AUTO: 0.1 K/UL (ref 0–0.5)
EOSINOPHIL NFR BLD: 1.7 % (ref 0–8)
ERYTHROCYTE [DISTWIDTH] IN BLOOD BY AUTOMATED COUNT: 18.7 % (ref 11.5–14.5)
EST. GFR  (AFRICAN AMERICAN): >60 ML/MIN/1.73 M^2
EST. GFR  (NON AFRICAN AMERICAN): >60 ML/MIN/1.73 M^2
ESTIMATED AVG GLUCOSE: 117 MG/DL (ref 68–131)
GLUCOSE SERPL-MCNC: 89 MG/DL (ref 70–110)
HBA1C MFR BLD HPLC: 5.7 % (ref 4–5.6)
HCT VFR BLD AUTO: 33.9 % (ref 37–48.5)
HDLC SERPL-MCNC: 34 MG/DL (ref 40–75)
HDLC SERPL: 17.2 % (ref 20–50)
HGB BLD-MCNC: 10.5 G/DL (ref 12–16)
LDLC SERPL CALC-MCNC: 136 MG/DL (ref 63–159)
LYMPHOCYTES # BLD AUTO: 1.4 K/UL (ref 1–4.8)
LYMPHOCYTES NFR BLD: 40.6 % (ref 18–48)
MCH RBC QN AUTO: 26.2 PG (ref 27–31)
MCHC RBC AUTO-ENTMCNC: 31 G/DL (ref 32–36)
MCV RBC AUTO: 85 FL (ref 82–98)
MICROALBUMIN UR DL<=1MG/L-MCNC: 17 UG/ML
MONOCYTES # BLD AUTO: 0.5 K/UL (ref 0.3–1)
MONOCYTES NFR BLD: 14.8 % (ref 4–15)
NEUTROPHILS # BLD AUTO: 1.5 K/UL (ref 1.8–7.7)
NEUTROPHILS NFR BLD: 42.3 % (ref 38–73)
NONHDLC SERPL-MCNC: 164 MG/DL
PLATELET # BLD AUTO: 179 K/UL (ref 150–350)
PMV BLD AUTO: 10.4 FL (ref 9.2–12.9)
POTASSIUM SERPL-SCNC: 4.3 MMOL/L (ref 3.5–5.1)
PROT SERPL-MCNC: 6.6 G/DL (ref 6–8.4)
RBC # BLD AUTO: 4.01 M/UL (ref 4–5.4)
SODIUM SERPL-SCNC: 140 MMOL/L (ref 136–145)
TRIGL SERPL-MCNC: 140 MG/DL (ref 30–150)
TSH SERPL DL<=0.005 MIU/L-ACNC: 2.68 UIU/ML (ref 0.4–4)
WBC # BLD AUTO: 3.52 K/UL (ref 3.9–12.7)

## 2019-06-05 PROCEDURE — 80053 COMPREHEN METABOLIC PANEL: CPT

## 2019-06-05 PROCEDURE — 36415 COLL VENOUS BLD VENIPUNCTURE: CPT

## 2019-06-05 PROCEDURE — 83036 HEMOGLOBIN GLYCOSYLATED A1C: CPT

## 2019-06-05 PROCEDURE — 80061 LIPID PANEL: CPT

## 2019-06-05 PROCEDURE — 82043 UR ALBUMIN QUANTITATIVE: CPT

## 2019-06-05 PROCEDURE — 85025 COMPLETE CBC W/AUTO DIFF WBC: CPT

## 2019-06-05 PROCEDURE — 84443 ASSAY THYROID STIM HORMONE: CPT

## 2019-06-12 ENCOUNTER — TELEPHONE (OUTPATIENT)
Dept: INTERNAL MEDICINE | Facility: CLINIC | Age: 79
End: 2019-06-12

## 2019-06-12 ENCOUNTER — OFFICE VISIT (OUTPATIENT)
Dept: INTERNAL MEDICINE | Facility: CLINIC | Age: 79
End: 2019-06-12
Payer: MEDICARE

## 2019-06-12 VITALS
HEIGHT: 61 IN | SYSTOLIC BLOOD PRESSURE: 120 MMHG | WEIGHT: 156.06 LBS | HEART RATE: 90 BPM | BODY MASS INDEX: 29.47 KG/M2 | OXYGEN SATURATION: 92 % | DIASTOLIC BLOOD PRESSURE: 78 MMHG | RESPIRATION RATE: 16 BRPM

## 2019-06-12 DIAGNOSIS — E11.9 CONTROLLED TYPE 2 DIABETES MELLITUS WITHOUT COMPLICATION, WITHOUT LONG-TERM CURRENT USE OF INSULIN: ICD-10-CM

## 2019-06-12 DIAGNOSIS — E78.5 HYPERLIPIDEMIA, UNSPECIFIED HYPERLIPIDEMIA TYPE: Primary | ICD-10-CM

## 2019-06-12 DIAGNOSIS — J43.9 PULMONARY EMPHYSEMA, UNSPECIFIED EMPHYSEMA TYPE: ICD-10-CM

## 2019-06-12 DIAGNOSIS — I70.0 AORTIC ATHEROSCLEROSIS: ICD-10-CM

## 2019-06-12 DIAGNOSIS — L72.3 SEBACEOUS CYST: ICD-10-CM

## 2019-06-12 PROBLEM — J18.9 PNEUMONIA OF RIGHT LOWER LOBE DUE TO INFECTIOUS ORGANISM: Status: RESOLVED | Noted: 2018-05-08 | Resolved: 2019-06-12

## 2019-06-12 PROCEDURE — 99999 PR PBB SHADOW E&M-EST. PATIENT-LVL IV: ICD-10-PCS | Mod: PBBFAC,,, | Performed by: INTERNAL MEDICINE

## 2019-06-12 PROCEDURE — 99214 OFFICE O/P EST MOD 30 MIN: CPT | Mod: PBBFAC | Performed by: INTERNAL MEDICINE

## 2019-06-12 PROCEDURE — 99999 PR PBB SHADOW E&M-EST. PATIENT-LVL IV: CPT | Mod: PBBFAC,,, | Performed by: INTERNAL MEDICINE

## 2019-06-12 PROCEDURE — 99999 PR STA SHADOW: CPT | Mod: PBBFAC,,, | Performed by: INTERNAL MEDICINE

## 2019-06-12 PROCEDURE — 99214 OFFICE O/P EST MOD 30 MIN: CPT | Mod: S$PBB | Performed by: INTERNAL MEDICINE

## 2019-06-12 NOTE — TELEPHONE ENCOUNTER
----- Message from Ilda Long sent at 2019  1:13 PM CDT -----  Contact: Mari/Daughter in Law  Gretel Ashton  MRN: [id number]  : 1940  PCP: Jailene Astudillo  Home Phone      [phone]  Work Phone      Not on file.  Mobile          [phone]  Home Phone      Not on file.    MESSAGE:   Would like to speak to nurse to review medications that she has at home.  She compared it to the list that was given to her at her appointment and there are some medications that she is not taking. Please call to advise.    Phone: [phone]

## 2019-06-14 ENCOUNTER — OUTPATIENT CASE MANAGEMENT (OUTPATIENT)
Dept: ADMINISTRATIVE | Facility: OTHER | Age: 79
End: 2019-06-14

## 2019-06-14 NOTE — LETTER
June 14, 2019    Gretel Ashton  [address] [address]             [facility] [address] [place] [address] Dear Mrs. Gretel Ashton:    Welcome to [facility]s Complex Care Management Program.  It was a pleasure talking with you today.    My name is Jillian Rene RN and I look forward to being your Care Manager.  My goal is to help you function at the healthiest and highest level possible.    You can contact me directly at [phone],   Office Hours Mon-Fri, 8 am-4:30pm  [facility] Main Office- TEL:  [phone]     Office Hours Mon-Fri, 8 am - 4:30 pm   [facility] On [facility], 24/7 Nurse Help Line (non emergent)- TEL:  [phone]    As an [facility] patient, some of the services we may be able to provide include:      Development of an individualized care plan with a Registered Nurse    Connection with a    Connection with available resources and services     Coordinate communication among your care team members    Provide coaching and education    Help you understand your doctors treatment plan   Help you obtain information about your insurance coverage.     All services provided by [facility]s Complex Care Managers and other care team members are coordinated with and communicated to your primary care team.    As part of your enrollment, you will be receiving education materials and more information about these services in your [facility] account, by phone or through the mail.  If you do not wish to participate or receive information, please contact our office at [phone].      Sincerely,      Jillian Rene RN  [facility]   Out-patient RN Complex Care Manager

## 2019-06-14 NOTE — PROGRESS NOTES
"6/14/19  Summary:  Patient referred to OPCM for high risk by Dr. Astudillo s/p PCP visit 6/12.  Spoke with patient telephonically. Explained OPCM program. Patient in agreement to have OPCM assist & manage health issues. Pertinent h/o -- HLD, DM2 (controlled- A1C= 5.7 (117) on 6/5/19, COPD, Arthritis, Mild Cognitive Impairment.   Completed initial screen/assessment/Med Rec/PHQ9=0 with both pt and her daughter-in-law, Mari who arrives to check on pt. No med discrepancies found. Pt denies depression. Pt is AAOx3,  x 30 yrs, volunteers having min education, prefers to speak [employer] [place] but speaks English fluently. Pt lives in "Camper" behind a regular house of her primary caregiver/son, Agustin Ashton and spouse, Mari. Behind pt's camper lives a grandson in another trailer. Pt lives alone but is visited by immed family daily. Pt is independent with ADLs- limited by arthritis "shoulders to fingertips". Agustin assist with transportation to med appts/labs/pharm and grocery. Meals are provided pt however pt enjoys preparing some food on her own and likes to eat [employer].Pt reports amb without any AD, h/o fall when escalator abruptly stopped. Pt expresses concerns for aftercare for removal of back sebaceaous cyst- would be unable to see site. Tentative plans for cyst removal-Mari says maybe 6/25.  Pt also, asks about homemaker services.Mari indicates pt's needs are being managed. Mari appears attentive to pt's care, coming over when the phone line was busy for so long (with this RN CM) and Mari wanted to check what was going on.  Pt passes phone to Mari. Mari indicates no need for OPCM LCSW as she is familiar with [facility] from her own mother. Mari states, contact with sonAgustin is preferred as is handling pt's care. Mari assisting with organization of AM and PM meds- placed in pill boxes--white for daytime and green for night time weekly. Med system said to be working safely with pt .Mari shares " how pathways in pt's home could stand to be cleared. Pt with limited vision /blind spot left eye.Pt has glucometer in home, never opened or used.  Pt scheduled with [facility] (not listed in [facility]) for maintenance care of diabetes feet. Mari denies need for OPCM LCSW.   Mari agrees to have education material mailed to pt with follow up with son, Agustin in 2 wks.     Interventions:  Explained OPCM services to pt and Mari.   Stressed pt safety with mobility 2* arthritis & decreased vision slow amb, cleared pathways, good lighting, no throw rugs (pt has carpeting).   Encouraged annual eye exam in addition to scheduled POD visit in Diabetes Mgmnt.   Encouraged pt check her BGs levels if not once daily, to check them once weekly to keep track of DM.   In basket message to Dr. Astudillo regarding today's OPCM enrollment-- question need for HH services.   Mailed Welcome Letter with contacts for RN CM, OOC, education materials, [facility] contact and services provided.     Plan:  Resources-- f/u with Dr. Astudillo regarding need for HH services, jf/u on receipt of mailed [facility] resources.   Safety-- f/u on receipt of fall prevention, COPD flare/prevention, Arthritis, Sebaceous Cyst Def/Care, Heart- Healthy foods, Low Cholesterol Diet, USDA MYPLATE understanding  DM- f/u on receipt of mailings-- establishing routine in regular BG checks- whether once daily or once weekly.       [facility] OPCM Self-Management Care Plan was developed with the patients/caregivers input and was based on identified barriers from todays assessment.  Goals were written today with the patient/caregiver and the patient has agreed to work towards these goals to improve his/her overall well-being. Patient verbalized understanding of the care plan, goals, and all of today's instructions. Encouraged patient/caregiver to communicate with his/her physician and health care team about health conditions and the treatment plan.   Provided my contact information today and encouraged patient/caregiver to call me with any questions as needed.

## 2019-06-25 ENCOUNTER — OFFICE VISIT (OUTPATIENT)
Dept: SURGERY | Facility: CLINIC | Age: 79
End: 2019-06-25
Payer: MEDICARE

## 2019-06-25 VITALS
RESPIRATION RATE: 16 BRPM | SYSTOLIC BLOOD PRESSURE: 120 MMHG | DIASTOLIC BLOOD PRESSURE: 58 MMHG | BODY MASS INDEX: 29.59 KG/M2 | WEIGHT: 156.75 LBS | HEART RATE: 98 BPM | HEIGHT: 61 IN

## 2019-06-25 DIAGNOSIS — L72.3 SEBACEOUS CYST: Primary | ICD-10-CM

## 2019-06-25 PROCEDURE — 99999 PR STA SHADOW: CPT | Mod: PBBFAC,,, | Performed by: SURGERY

## 2019-06-25 PROCEDURE — 99999 PR PBB SHADOW E&M-EST. PATIENT-LVL III: CPT | Mod: PBBFAC,,, | Performed by: SURGERY

## 2019-06-25 PROCEDURE — 99203 OFFICE O/P NEW LOW 30 MIN: CPT | Mod: S$PBB | Performed by: SURGERY

## 2019-06-25 PROCEDURE — 99213 OFFICE O/P EST LOW 20 MIN: CPT | Mod: PBBFAC | Performed by: SURGERY

## 2019-06-25 PROCEDURE — 99999 PR PBB SHADOW E&M-EST. PATIENT-LVL III: ICD-10-PCS | Mod: PBBFAC,,, | Performed by: SURGERY

## 2019-06-25 RX ORDER — LIDOCAINE HYDROCHLORIDE 10 MG/ML
1 INJECTION, SOLUTION EPIDURAL; INFILTRATION; INTRACAUDAL; PERINEURAL ONCE
Status: DISCONTINUED | OUTPATIENT
Start: 2019-06-25 | End: 2020-04-12

## 2019-06-25 NOTE — PROGRESS NOTES
Subjective:       Patient ID: Gretel Ashton is a 78 y.o. female.    Chief Complaint: Consult (cyst )    Review of patient's allergies indicates:  No Known Allergies  78-YEAR-OLD FEMALE WITH LEFT FLANK SEBACEOUS CYST THAT SHE STATES SHE 1ST NOTICED WHEN SHE WAS BATHING A FEW WEEKS AGO.  SHE IS HERE IN SURGERY CLINIC TO DISCUSS POSSIBLE EXCISION.  NO PAIN OR DRAINAGE.  NO REDNESS.  NEVER NEEDED TO BE OPENED AND LANCED FOR INFECTION.  SHE AND HER SON HAVE AGREED TO REMOVE IT. THIS CAN BE DONE UNDER LOCAL IN THE OPERATING ROOM.    Past Medical History:   Diagnosis Date    Arthritis     Depression     Diabetes mellitus type II     Hyperlipidemia     Hypertension     Pacemaker      Past Surgical History:   Procedure Laterality Date    CARDIAC PACEMAKER PLACEMENT       SECTION      INNER EAR SURGERY       Family History   Problem Relation Age of Onset    Cancer Mother     Breast cancer Mother     Stroke Father     Cancer Sister     Breast cancer Sister     Stroke Maternal Grandmother      Social History     Socioeconomic History    Marital status:      Spouse name: Not on file    Number of children: Not on file    Years of education: Not on file    Highest education level: Not on file   Occupational History    Not on file   Social Needs    Financial resource strain: Not on file    Food insecurity:     Worry: Not on file     Inability: Not on file    Transportation needs:     Medical: Not on file     Non-medical: Not on file   Tobacco Use    Smoking status: Former Smoker     Packs/day: 2.00     Years: 43.00     Pack years: 86.00     Types: Cigarettes     Last attempt to quit: 2000     Years since quittin.4    Smokeless tobacco: Never Used   Substance and Sexual Activity    Alcohol use: No    Drug use: No    Sexual activity: Not on file   Lifestyle    Physical activity:     Days per week: Not on file     Minutes per session: Not on file    Stress: To some extent    Relationships    Social connections:     Talks on phone: Not on file     Gets together: Not on file     Attends Bahai service: Not on file     Active member of club or organization: Not on file     Attends meetings of clubs or organizations: Not on file     Relationship status: Not on file   Other Topics Concern    Not on file   Social History Narrative    Not on file     Vitals:    06/25/19 0900   BP: (!) 120/58   Pulse: 98   Resp: 16       Review of Systems   All other systems reviewed and are negative.      Objective:      Physical Exam   Constitutional: She is oriented to person, place, and time. She appears well-developed and well-nourished.   HENT:   Head: Normocephalic.   Eyes: Pupils are equal, round, and reactive to light.   Neck: Normal range of motion.   Pulmonary/Chest: Effort normal.   Abdominal: Soft.   Musculoskeletal: Normal range of motion.   Neurological: She is alert and oriented to person, place, and time.   Skin: Skin is warm and dry.   2 X 3 CM SEBACEOUS CYST WITH SMALL BLACK HEAD OVER THE LEFT FLANK.   Psychiatric: She has a normal mood and affect.       Assessment:       1. Sebaceous cyst        Plan:         Gretel was seen today for consult.    Diagnoses and all orders for this visit:    Sebaceous cyst      I WILL SCHEDULE ELECTIVE SEBACEOUS CYST REMOVAL IN THE OPERATING ROOM UNDER LOCAL.    No follow-ups on file.          Jaylen Gonzalez Jr, MD

## 2019-06-25 NOTE — LETTER
June 25, 2019      Jailene Astudillo MD  [address]  [facility] [address]           [facility] Surgery  [address] [facility] [address]  Phone: [phone]          Patient: Gretel Ashton   MR Number: [id number]   YOB: 1940   Date of Visit: 6/25/2019       Dear Dr. Jailene Astudillo:    Thank you for referring Gretel Ashton to me for evaluation. Attached you will find relevant portions of my assessment and plan of care.    If you have questions, please do not hesitate to call me. I look forward to following Gretel Ashton along with you.    Sincerely,    Jaylen Gonzalez Jr., MD    Enclosure  CC:  No Recipients    If you would like to receive this communication electronically, please contact [email or web address] or [phone] to request more information on [employer] Link access.    For providers and/or their staff who would like to refer a patient to [facility], please contact us through our one-stop-shop provider referral line, [facility] , at [phone].    If you feel you have received this communication in error or would no longer like to receive these types of communications, please e-mail [email or web address]

## 2019-07-01 ENCOUNTER — OUTPATIENT CASE MANAGEMENT (OUTPATIENT)
Dept: ADMINISTRATIVE | Facility: OTHER | Age: 79
End: 2019-07-01

## 2019-07-01 ENCOUNTER — TELEPHONE (OUTPATIENT)
Dept: INTERNAL MEDICINE | Facility: CLINIC | Age: 79
End: 2019-07-01

## 2019-07-01 RX ORDER — AZITHROMYCIN 250 MG/1
TABLET, FILM COATED ORAL
Qty: 6 TABLET | Refills: 0 | Status: SHIPPED | OUTPATIENT
Start: 2019-07-01 | End: 2019-07-23

## 2019-07-01 NOTE — TELEPHONE ENCOUNTER
"----- Message from Jillian Rene RN sent at 7/1/2019  2:41 PM CDT -----  Contact: AIXA Foss Dr./Staff:    Mrs. Ashton reports having a productive cough HS. She says she is producing "big chunks of clear mucous" & struggles to get it up--making her very afraid.  She is not sleeping well as a result. Audible stuffy nose.   Message left for son to further check on pt status.   Pt encouraged to drink approx 48 oz/day to help loosen secretions.     Please advise.  Thank you,  JOSH Foss, RN, CCM [facility]  [email or web address]  TEL:  [phone]  "

## 2019-07-01 NOTE — TELEPHONE ENCOUNTER
Notified patient's son, Agustin that Dr. Astudillo sent abx to [facility] for c/o cough

## 2019-07-01 NOTE — PROGRESS NOTES
"19  Summary:  F/u call to pt. Pt states being aware of upcoming sebaceous cyst removal . Pt says this RN CM would be better to call her son, Agustin. At the same time, her son's mother-in-law just  making son/family busy with burial matters.  Pt with c/o coughing especially at night, not sleeping well,  bringing up "thick chunks of mucous" clear in appearance. C/o difficulty sleeping from cough, struggling to clear the mucous out and being fearful that she can't breathe from being choked from the mucous. C/o mouth being so dry. Pt using O2 hs. Pt states her appetite is okay. Pt says her chief problem is not remembering. Pt states she drinks a lot of water daily- sips on water jug throughout day. Pt would like Dr. Astudillo notified of her c/o from today.   Attempted call to sonAgustin; message left to call back.     Interventions:  Instructed pt to drink plenty of water to help loosen the secretions. Encouraged pt to strive for 6 (8 oz) per day.   In basket message sent to Dr. Astudillo regarding pt's c/o mucous production.     Plan:  Attempt to reach son 19  F/u on PCP response to pt's mucous concerns.   Resources-- f/u with Dr. Astudillo regarding need for HH services, f/u on receipt of mailed [facility] resources.   Safety-- f/u on receipt of fall prevention, COPD flare/prevention, Arthritis, Sebaceous Cyst Def/Care, Heart- Healthy foods, Low Cholesterol Diet, USDA MYPLATE understanding  DM- f/u on receipt of mailings-- establishing routine in regular BG checks- whether once daily or once weekly.     Todays OPCM Self-Management Care Plan was developed with the patients/caregivers input and was based on identified barriers from todays assessment.  Goals were written today with the patient/caregiver and the patient has agreed to work towards these goals to improve his/her overall well-being. Patient verbalized understanding of the care plan, goals, and all of today's instructions. Encouraged " patient/caregiver to communicate with his/her physician and health care team about health conditions and the treatment plan.  Provided my contact information today and encouraged patient/caregiver to call me with any questions as needed.

## 2019-07-03 ENCOUNTER — OUTPATIENT CASE MANAGEMENT (OUTPATIENT)
Dept: ADMINISTRATIVE | Facility: OTHER | Age: 79
End: 2019-07-03

## 2019-07-03 NOTE — PROGRESS NOTES
7/3/19  Summary:  Called and verbal message left for pt's son, Agustin Ashton to confirm his awareness of new abx prescribed for pt's reported symptoms. Agustin confirms being notified and pt has started with the abx treatment.     Interventions:  Verified med adherence.     Plan:  Resources-- f/u with Dr. Astudillo regarding need for  services, f/u on receipt of mailed [facility] resources.   Safety-- f/u on receipt of fall prevention, COPD flare/prevention, Arthritis, Sebaceous Cyst Def/Care, Heart- Healthy foods, Low Cholesterol Diet, USDA MYPLATE understanding  DM- f/u on receipt of mailings-- establishing routine in regular BG checks- whether once daily or once weekly.       Todays OPCM Self-Management Care Plan was developed with the patients/caregivers input and was based on identified barriers from todays assessment.  Goals were written today with the patient/caregiver and the patient has agreed to work towards these goals to improve his/her overall well-being. Patient verbalized understanding of the care plan, goals, and all of today's instructions. Encouraged patient/caregiver to communicate with his/her physician and health care team about health conditions and the treatment plan.  Provided my contact information today and encouraged patient/caregiver to call me with any questions as needed.

## 2019-07-08 RX ORDER — FLUTICASONE PROPIONATE AND SALMETEROL 50; 250 UG/1; UG/1
POWDER RESPIRATORY (INHALATION)
Qty: 60 EACH | Refills: 5 | Status: SHIPPED | OUTPATIENT
Start: 2019-07-08 | End: 2020-01-13

## 2019-07-08 NOTE — TELEPHONE ENCOUNTER
Requested Prescriptions     Pending Prescriptions Disp Refills    ADVAIR DISKUS 250-50 mcg/dose diskus inhaler 60 each 5     Sig: INHALE 1 PUFF TWICE A DAYAS DIRECTED   [facility]

## 2019-07-12 ENCOUNTER — OUTPATIENT CASE MANAGEMENT (OUTPATIENT)
Dept: ADMINISTRATIVE | Facility: OTHER | Age: 79
End: 2019-07-12

## 2019-07-12 NOTE — PROGRESS NOTES
7/12/19  Summary:  Follow up call to update care plan. Pt claims to have continued thick mucous production at times, and c/o dry throat, can't breath, coughs. C/o legs weak.  Pt reports using O2 at night for years and doesn't know the O2 provider. Pt thinks she has the name in her little book.   Pt asks to have RN CM speak with her son, Agustin. Pt says she really can't say what is going on with having meds, etc in face of inclement weather. Pt is aware of sebaceous cyst surgical removal 7/30. Pt states not able to drink 6 (8oz)/day of water.   Attempted to contact son regarding pt's c/o dry throat and to determine provider of pt's O2.     Interventions:  Focused on pt drinking plenty of water to help loosen c/o thick mucous and to even try to include 1-2 (8 oz/day) to her regular routine-- goal of 4 (8oz/day), use a straw to sip on water throughout day, add lemon/lime to flavor water.   [facility] not the O2 Provider and given [facility] [phone] listed per Medicare, last billing 4/19/2010; contacted provider- office closed, message left with call back scheduled.     Plan:  F/u on status of current O2, humidifier attachment?, supply delivery schedule?  Resources-- f/u with Dr. Astudillo regarding need for HH services, f/u on receipt of mailed [facility] resources.   Safety-- f/u on receipt of fall prevention, COPD flare/prevention, Arthritis, Sebaceous Cyst Def/Care, Heart- Healthy foods, Low Cholesterol Diet, USDA MYPLATE understanding  DM- f/u on receipt of mailings-- establishing routine in regular BG checks- whether once daily or once weekly.     Todays OPCM Self-Management Care Plan was developed with the patients/caregivers input and was based on identified barriers from todays assessment.  Goals were written today with the patient/caregiver and the patient has agreed to work towards these goals to improve his/her overall well-being. Patient verbalized  understanding of the care plan, goals, and all of today's instructions. Encouraged patient/caregiver to communicate with his/her physician and health care team about health conditions and the treatment plan.  Provided my contact information today and encouraged patient/caregiver to call me with any questions as needed.

## 2019-07-12 NOTE — PROGRESS NOTES
[] Called and reviewed disaster plan with patient/caregiver.  Provided emergency phone number for patient's [place].   [x] Attempted to reach patient/caregiver to review disaster plan.  Left a voice message with the phone number for patient's [place] Office of Emergency Preparedness.     Reviewed with Patient/Caregiver:  [] Patient to have a supply of water, non-perishable food items, flashlights and batteries  [x] Patient to bring all medications if evacuates:  at least a five day supply preferably in the medication bottles, in case displaced for longer than 5 days  [] Patient to bring any DME needed (walkers, wheelchairs, shower chairs, nebulizer machine, etc.)   [] If Diabetic, patient to bring glucometer and monitoring supplies.   [] If Hypertension, patient to bring blood pressure cuff  [] If CHF, patient to bring scale  [] If tube feeds, patient to bring tube feedings and feeding supplies.  [] If on oxygen,  patient to bring all oxygen supplies (Cannula, portable tanks, concentrator).  Patient will contact oxygen supply company to find out the nearest location of a supply company near evacuated location. ([employer]: [phone], [facility]: [phone], [facility]:[phone], [employer]: [phone]), [facility] -[phone].  [] If on hemodialysis, patient should already have a plan in place with dialysis center. If patient does not know where to evacuate to in order to be close to a dialysis center, patient/caregiver to reach out to regular dialysis center for further direction. ([facility]  service line: [phone], [facility]  service line [phone])  [] If receiving treatment at an infusion center, patient/caregiver to contact the infusion center to find out which infusion center they have a contract with where patient plans to evacuate.      Office of Emergency Preparedness Phone number:                LINDA Gaitan  [phone]    [] [facility] [place]:  [phone]  [] [place]: [phone]  [] [place]: [phone]  [] [place]: [phone]  [] [place]: [phone]  [] [place]: [phone]  [] [facility]: [phone]  [] [facility]: [phone]  [] [facility] [place]: [phone]  [] [place]: [phone]  [] [facility]: [phone]  [] [place]: [phone]  [] [facility]: [phone]    [] [facility]:  [phone]   [] [facility]:  [phone]   [] [facility]:  [phone]   [] [facility]:  [phone]   [] [facility]:  [phone]   [] [facility]: [phone]   [] [place]:  [phone]   [] [facility]:  [phone]   [] [facility]:  [phone]   [] [facility]:  [phone]   [] [facility]:  [phone]   [] [place]:  [phone]   [] [facility]:  [phone]   [] [facility]:  [phone]   [] [facility]:  [phone]   [] [facility] [phone]   [] [facility]:  [phone]   [] [facility]: [phone]   [] [facility]: [phone]

## 2019-07-16 ENCOUNTER — OUTPATIENT CASE MANAGEMENT (OUTPATIENT)
Dept: ADMINISTRATIVE | Facility: OTHER | Age: 79
End: 2019-07-16

## 2019-07-16 NOTE — PROGRESS NOTES
7/16/19  Summary:  No call back from [facility].   Contacted [facility] TEL: [phone] - not the O2 provider.  Referred to [facility] TEL:  [phone] also not provider of O2.  [facility] closed out > 1 1/2 years ago. ( Not O2 providers--[facility], [facility]).  Verbal message left for son, Agustin Ashton to learn more documented information of O2 provider on home unit or paperwork.     Interventions:  Care Coordination for home O2 provider. At this point, provider of pt's home O2 is unknown.     Plan:  F/u on status of current O2, humidifier attachment?, supply delivery schedule?--continue to research.   Resources-- f/u with Dr. Astudillo regarding need for  services, f/u on receipt of mailed [facility] resources.   Safety-- f/u on receipt of fall prevention, COPD flare/prevention, Arthritis, Sebaceous Cyst Def/Care, Heart- Healthy foods, Low Cholesterol Diet, USDA MYPLATE understanding  DM- f/u on receipt of mailings-- establishing routine in regular BG checks- whether once daily or once weekly.

## 2019-07-19 ENCOUNTER — TELEPHONE (OUTPATIENT)
Dept: INTERNAL MEDICINE | Facility: CLINIC | Age: 79
End: 2019-07-19

## 2019-07-19 ENCOUNTER — OUTPATIENT CASE MANAGEMENT (OUTPATIENT)
Dept: ADMINISTRATIVE | Facility: OTHER | Age: 79
End: 2019-07-19

## 2019-07-19 NOTE — TELEPHONE ENCOUNTER
----- Message from Jillian Rene RN sent at 7/19/2019  9:36 AM CDT -----  Contact: AIXA Foss Dr./Staff:    I am trying to figure out who is the O2 Provider for Mrs. Ashton.   Information provided by pt/family, [facility] is not a current provider & other calls have failed to determine a provider.     I am trying to reach Agustin torres for more details if possible-- ie when was the last time the home O2 was serviced or O2 tubing supplies received.     I thought you may wish to know.     Thank you,  JOSH Foss, RN, CCM [facility]  [email or web address]  TEL:  [phone]

## 2019-07-19 NOTE — TELEPHONE ENCOUNTER
Andrés Walsh,  I have searched through Ms. Majano's chart and I do not see any correspondence from a DME company that might tell me who her oxygen supplier is. Her son Agustin should be able to obtain that information from her home equipment. Sorry I couldn't be of more help.

## 2019-07-23 ENCOUNTER — OFFICE VISIT (OUTPATIENT)
Dept: SURGERY | Facility: CLINIC | Age: 79
End: 2019-07-23
Payer: MEDICARE

## 2019-07-23 ENCOUNTER — HOSPITAL ENCOUNTER (OUTPATIENT)
Dept: PREADMISSION TESTING | Facility: HOSPITAL | Age: 79
Discharge: HOME | End: 2019-07-23
Attending: SURGERY
Payer: MEDICARE

## 2019-07-23 VITALS
SYSTOLIC BLOOD PRESSURE: 92 MMHG | HEART RATE: 80 BPM | BODY MASS INDEX: 32.17 KG/M2 | DIASTOLIC BLOOD PRESSURE: 48 MMHG | RESPIRATION RATE: 18 BRPM | WEIGHT: 153.25 LBS | HEIGHT: 58 IN

## 2019-07-23 DIAGNOSIS — L72.3 SEBACEOUS CYST: Primary | ICD-10-CM

## 2019-07-23 DIAGNOSIS — Z12.31 SCREENING MAMMOGRAM, ENCOUNTER FOR: ICD-10-CM

## 2019-07-23 PROCEDURE — 99999 PR STA SHADOW: CPT | Mod: PBBFAC,,, | Performed by: SURGERY

## 2019-07-23 PROCEDURE — 99213 OFFICE O/P EST LOW 20 MIN: CPT | Mod: S$PBB | Performed by: SURGERY

## 2019-07-23 PROCEDURE — 99215 OFFICE O/P EST HI 40 MIN: CPT | Mod: PBBFAC | Performed by: SURGERY

## 2019-07-23 PROCEDURE — 99999 PR PBB SHADOW E&M-EST. PATIENT-LVL V: ICD-10-PCS | Mod: PBBFAC,,, | Performed by: SURGERY

## 2019-07-23 PROCEDURE — 99999 PR PBB SHADOW E&M-EST. PATIENT-LVL V: CPT | Mod: PBBFAC,,, | Performed by: SURGERY

## 2019-07-23 RX ORDER — ASPIRIN 81 MG/1
81 TABLET ORAL DAILY
Status: ON HOLD | COMMUNITY
End: 2022-04-15 | Stop reason: HOSPADM

## 2019-07-23 RX ORDER — OMEPRAZOLE 40 MG/1
40 CAPSULE, DELAYED RELEASE ORAL DAILY
Refills: 5 | COMMUNITY
Start: 2019-07-12 | End: 2020-07-22 | Stop reason: SDUPTHER

## 2019-07-23 NOTE — H&P (VIEW-ONLY)
Subjective:       Patient ID: Gretel Ashton is a 78 y.o. female.    Chief Complaint: Mass (Back)    Review of patient's allergies indicates:  No Known Allergies  Patient with left lower back sebaceous cyst.  I saw her about a month ago.  She is now ready to schedule surgery. This will be done in the operating room under local anesthesia.  The cyst has a small little blackhead and it is approximately 3 cm in the left lower back.    Past Medical History:   Diagnosis Date    Arthritis     Depression     Diabetes mellitus type II     Hyperlipidemia     Hypertension     Pacemaker      Past Surgical History:   Procedure Laterality Date    CARDIAC PACEMAKER PLACEMENT       SECTION      INNER EAR SURGERY       Family History   Problem Relation Age of Onset    Cancer Mother     Breast cancer Mother     Stroke Father     Cancer Sister     Breast cancer Sister     Stroke Maternal Grandmother      Social History     Socioeconomic History    Marital status:      Spouse name: Not on file    Number of children: Not on file    Years of education: Not on file    Highest education level: Not on file   Occupational History    Not on file   Social Needs    Financial resource strain: Not on file    Food insecurity:     Worry: Not on file     Inability: Not on file    Transportation needs:     Medical: Not on file     Non-medical: Not on file   Tobacco Use    Smoking status: Former Smoker     Packs/day: 2.00     Years: 43.00     Pack years: 86.00     Types: Cigarettes     Last attempt to quit: 2000     Years since quittin.5    Smokeless tobacco: Never Used   Substance and Sexual Activity    Alcohol use: No    Drug use: No    Sexual activity: Not on file   Lifestyle    Physical activity:     Days per week: Not on file     Minutes per session: Not on file    Stress: To some extent   Relationships    Social connections:     Talks on phone: Not on file     Gets together: Not on file      Attends Scientologist service: Not on file     Active member of club or organization: Not on file     Attends meetings of clubs or organizations: Not on file     Relationship status: Not on file   Other Topics Concern    Not on file   Social History Narrative    Not on file     Vitals:    07/23/19 0945   BP: (!) 92/48   Pulse: 80   Resp: 18       Review of Systems   All other systems reviewed and are negative.      Objective:      Physical Exam   Constitutional: She is oriented to person, place, and time. She appears well-developed and well-nourished.   HENT:   Head: Normocephalic.   Eyes: Pupils are equal, round, and reactive to light.   Neck: Normal range of motion.   Pulmonary/Chest: Effort normal.   Abdominal: Soft.   Musculoskeletal: Normal range of motion.   Neurological: She is alert and oriented to person, place, and time.   Skin: Skin is warm and dry.   3 cm sebaceous cyst left lower back with a small punctate blackhead.  No evidence of redness or drainage.   Psychiatric: She has a normal mood and affect.       Assessment:       1. Screening mammogram, encounter for    2. Sebaceous cyst        Plan:         Gretel was seen today for mass.    Diagnoses and all orders for this visit:    Screening mammogram, encounter for  -     Mammo Digital Screening Bilateral With CAD; Future    Sebaceous cyst     Patient will be scheduled for excision of this left lower back sebaceous cyst under local anesthesia.    No follow-ups on file.          Jaylen Gonzalez Jr, MD

## 2019-07-23 NOTE — DISCHARGE INSTRUCTIONS
Hold Asprin on Monday and Tuesday   hibiclens shower night before and morning of  Nothing to eat or drink after  Midnight on Monday night        Outpatient procedure instructions    Prep Review  Nothing to eat or drink after midnight unless your doctor tells you differently.    Bring your medication in the original containers.   Take medications as instructed by your doctor.    Wear something comfortable that is easy for you to take off and put on.   Do not wear any makeup, jewelry, or body piercings. Leave valuables at home or let your family member keep them for you. Do not bring them to the Surgery area.     Date/Day of Procedure: Thursday 7/30/19  Arrival time: 10am    Arrival time: Somone will call you between 1 p.m. and 5 p.m. the workday before the procedure to give you an arrival time.   Report to the Emergency Room if asked to arrive at the hospital before 7:00 a.m.   Report to Patient Registration if asked to arrive after 7:00 a.m.   It is not necessary to report earlier than the time you are told.   Ignore any automated/computer generated calls telling to what time to report to the hospital.   Plan to be at the hospital for about 4 hours, however, it could be longer.       Diabetics  If you are diabetic do not take your diabetes medication the morning of the procedure unless otherwise instructed by you doctor.

## 2019-07-30 ENCOUNTER — HOSPITAL ENCOUNTER (OUTPATIENT)
Facility: HOSPITAL | Age: 79
Discharge: HOME | End: 2019-07-30
Attending: SURGERY | Admitting: SURGERY
Payer: MEDICARE

## 2019-07-30 VITALS
HEART RATE: 83 BPM | RESPIRATION RATE: 17 BRPM | TEMPERATURE: 98 F | DIASTOLIC BLOOD PRESSURE: 54 MMHG | SYSTOLIC BLOOD PRESSURE: 98 MMHG | OXYGEN SATURATION: 94 %

## 2019-07-30 DIAGNOSIS — Z12.31 SCREENING MAMMOGRAM, ENCOUNTER FOR: ICD-10-CM

## 2019-07-30 DIAGNOSIS — L72.3 SEBACEOUS CYST: Primary | ICD-10-CM

## 2019-07-30 PROCEDURE — 12032 PR LAYR CLOS WND TRUNK,ARM,LEG 2.6-7.5 CM: ICD-10-PCS | Mod: ,,, | Performed by: SURGERY

## 2019-07-30 PROCEDURE — 88304 TISSUE EXAM BY PATHOLOGIST: CPT | Mod: 26,,, | Performed by: PATHOLOGY

## 2019-07-30 PROCEDURE — 11403 PR EXC SKIN BENIG 2.1-3 CM TRUNK,ARM,LEG: ICD-10-PCS | Mod: 51,,, | Performed by: SURGERY

## 2019-07-30 PROCEDURE — 36000707: Performed by: SURGERY

## 2019-07-30 PROCEDURE — 88304 TISSUE SPECIMEN TO PATHOLOGY - SURGERY: ICD-10-PCS | Mod: 26,,, | Performed by: PATHOLOGY

## 2019-07-30 PROCEDURE — 12032 INTMD RPR S/A/T/EXT 2.6-7.5: CPT | Mod: ,,, | Performed by: SURGERY

## 2019-07-30 PROCEDURE — 88304 TISSUE EXAM BY PATHOLOGIST: CPT | Performed by: PATHOLOGY

## 2019-07-30 PROCEDURE — 36000706: Performed by: SURGERY

## 2019-07-30 PROCEDURE — 11403 EXC TR-EXT B9+MARG 2.1-3CM: CPT | Mod: 51,,, | Performed by: SURGERY

## 2019-07-30 PROCEDURE — 25000003 PHARM REV CODE 250: Performed by: SURGERY

## 2019-07-30 RX ORDER — HYDROCODONE BITARTRATE AND ACETAMINOPHEN 5; 325 MG/1; MG/1
1 TABLET ORAL EVERY 6 HOURS PRN
Qty: 20 TABLET | Refills: 0 | Status: ON HOLD | OUTPATIENT
Start: 2019-07-30 | End: 2020-04-13 | Stop reason: HOSPADM

## 2019-07-30 RX ORDER — BUPIVACAINE HYDROCHLORIDE AND EPINEPHRINE 5; 5 MG/ML; UG/ML
INJECTION, SOLUTION EPIDURAL; INTRACAUDAL; PERINEURAL
Status: DISCONTINUED | OUTPATIENT
Start: 2019-07-30 | End: 2019-07-30 | Stop reason: HOSPADM

## 2019-07-30 RX ORDER — LIDOCAINE HYDROCHLORIDE AND EPINEPHRINE 10; 10 MG/ML; UG/ML
INJECTION, SOLUTION INFILTRATION; PERINEURAL
Status: DISCONTINUED | OUTPATIENT
Start: 2019-07-30 | End: 2019-07-30 | Stop reason: HOSPADM

## 2019-07-30 RX ORDER — SODIUM CHLORIDE 9 MG/ML
INJECTION, SOLUTION INTRAVENOUS CONTINUOUS
Status: CANCELLED | OUTPATIENT
Start: 2019-07-30

## 2019-07-30 NOTE — BRIEF OP NOTE
[facility]  Brief Operative Note     SUMMARY     Surgery Date: 7/30/2019     Surgeon(s) and Role:     * Jaylen Gonzalez Jr., MD - Primary    Assisting Surgeon: None    Pre-op Diagnosis:  Sebaceous cyst [L72.3]    Post-op Diagnosis:  Post-Op Diagnosis Codes:     * Sebaceous cyst [L72.3]    Procedure(s) (LRB):  EXCISION, MASS, BACK (Left)    Anesthesia: Local    Description of the findings of the procedure:  Excision of left back sebaceous cyst    Findings/Key Components:  Left back sebaceous cyst    Estimated Blood Loss:  5 mL         Specimens:   Specimen (12h ago, onward)    Start     Ordered    07/30/19 1044  Specimen to Pathology - Surgery  Once     Comments:  Sebaceous cyst left backSameExc sebaceouis cyst left backDr [facility]   Sebaceous cyst     Start Status     07/30/19 1044 Collected (07/30/19 1046) Order ID: [id number]       07/30/19 1046          Discharge Note    SUMMARY     Admit Date: 7/30/2019    Discharge Date and Time:  07/30/2019 11:11 AM    Hospital Course (synopsis of major diagnoses, care, treatment, and services provided during the course of the hospital stay):  Status post excision of left back sebaceous cyst, discharged home, stable condition, regular diet, activity as tolerated, follow up in 1 week.      Final Diagnosis: Post-Op Diagnosis Codes:     * Sebaceous cyst [L72.3]    Disposition: Home or Self Care    Follow Up/Patient Instructions:     Medications:  Reconciled Home Medications:      Medication List      ASK your doctor about these medications    ADVAIR DISKUS 250-50 mcg/dose diskus inhaler  Generic drug:  fluticasone-salmeterol 250-50 mcg/dose  INHALE 1 PUFF TWICE A DAYAS DIRECTED     aspirin 81 MG EC tablet  Commonly known as:  ECOTRIN  Take 81 mg by mouth once daily.     CO Q-10 100 mg capsule  Generic drug:  coenzyme Q10  Take 100 mg by mouth once daily.     losartan 25 MG tablet  Commonly known as:  COZAAR  Take 25 mg by mouth once daily.     metFORMIN 500  MG tablet  Commonly known as:  GLUCOPHAGE  TAKE ONE TABLET BY MOUTH TWICE DAILY AFTER MEALS     omeprazole 40 MG capsule  Commonly known as:  PRILOSEC  Take 40 mg by mouth once daily.     rosuvastatin 40 MG Tab  Commonly known as:  CRESTOR  Take 40 mg by mouth once daily.     sulfaSALAzine 500 mg Tab  Commonly known as:  AZULFIDINE  TAKE ONE TABLET BY MOUTH TWICE DAILY     SYMBICORT 160-4.5 mcg/actuation Hfaa  Generic drug:  budesonide-formoterol 160-4.5 mcg     ZETIA 10 mg tablet  Generic drug:  ezetimibe  Take 10 mg by mouth once daily.          No discharge procedures on file.

## 2019-07-30 NOTE — INTERVAL H&P NOTE
The patient has been examined and the H&P has been reviewed:        I concur with the findings and no changes have occurred since H&P was written.        Patient cleared for Anesthesia: Local        Anesthesia/Surgery risks, benefits and alternative options discussed and understood by patient/family.      Active Hospital Problems    Diagnosis  POA    Sebaceous cyst [L72.3]  Yes      Resolved Hospital Problems   No resolved problems to display.

## 2019-07-30 NOTE — OP NOTE
Operative Note       Surgery Date: 7/30/2019     Surgeon(s) and Role:     * Jaylen Gonzalez Jr., MD - Primary    Pre-op Diagnosis:  Sebaceous cyst [L72.3]    Post-op Diagnosis: Post-Op Diagnosis Codes:     * Sebaceous cyst [L72.3]    Procedure(s) (LRB):  EXCISION, MASS, BACK (Left)    Anesthesia: Local    Procedure in Detail/Findings:  Patient was taken to surgery. Time-out was performed. She was placed left side up. Her left back was prepped and draped standard surgical fashion.  The sebaceous cyst was approximately 3 x 3 cm.  I drew an elliptical skin laura.  1% lidocaine with epinephrine was injected.  Elliptical skin incision was made with a 15 blade.  The the entire sebaceous cyst was completely ellipsed and excised sharply with the 15 blade.  I did not rupture the cyst at all.  It was completely intact when I removed it.  Any oozing was stopped with cautery.  The deep tissue was closed with 2 0 Vicryl interrupted.  Skin was closed with skin staples.  Xeroform gauze as well as 4 x 4 and tape was then applied.    Estimated Blood Loss:  5 mL           Specimens (From admission, onward)    Start     Ordered    07/30/19 1044  Specimen to Pathology - Surgery  Once     Start Status     07/30/19 1044 Collected (07/30/19 1046) Order ID: [id number]       07/30/19 1046        Implants: * No implants in log *           Disposition: PACU - hemodynamically stable.           Condition: Good    Attestation:  I performed the procedure.           Discharge Note    Admit Date: 7/30/2019    Attending Physician: No att. providers found     Discharge Physician: No att. providers found    Final Diagnosis: Post-Op Diagnosis Codes:     * Sebaceous cyst [L72.3]    Disposition: Home or Self Care    Patient Instructions:   Discharge Medication List as of 7/30/2019 11:04 AM      CONTINUE these medications which have NOT CHANGED    Details   coenzyme Q10 (CO Q-10) 100 mg capsule Take 100 mg by mouth once daily., Historical Med       losartan (COZAAR) 25 MG tablet Take 25 mg by mouth once daily., Starting Tue 1/29/2019, Historical Med      metFORMIN (GLUCOPHAGE) 500 MG tablet TAKE ONE TABLET BY MOUTH TWICE DAILY AFTER MEALS, Normal      omeprazole (PRILOSEC) 40 MG capsule Take 40 mg by mouth once daily., Starting Fri 7/12/2019, Historical Med      rosuvastatin (CRESTOR) 40 MG Tab Take 40 mg by mouth once daily. , Starting Wed 2/27/2019, Historical Med      sulfaSALAzine (AZULFIDINE) 500 mg Tab TAKE ONE TABLET BY MOUTH TWICE DAILY, Normal      SYMBICORT 160-4.5 mcg/actuation HFAA Starting Mon 3/11/2019, Historical Med      ZETIA 10 mg tablet Take 10 mg by mouth once daily., Starting 12/6/2016, Until Discontinued, Historical Med      ADVAIR DISKUS 250-50 mcg/dose diskus inhaler INHALE 1 PUFF TWICE A DAYAS DIRECTED, Normal      aspirin (ECOTRIN) 81 MG EC tablet Take 81 mg by mouth once daily., Historical Med             Discharge Procedure Orders (must include Diet, Follow-up, Activity)   Discharge Procedure Orders (must include Diet, Follow-up, Activity)   Diet general        Discharge Date: 7/30/2019 11:11 AM

## 2019-08-06 ENCOUNTER — OFFICE VISIT (OUTPATIENT)
Dept: SURGERY | Facility: CLINIC | Age: 79
End: 2019-08-06
Payer: MEDICARE

## 2019-08-06 VITALS
WEIGHT: 152.75 LBS | HEART RATE: 78 BPM | DIASTOLIC BLOOD PRESSURE: 60 MMHG | SYSTOLIC BLOOD PRESSURE: 100 MMHG | BODY MASS INDEX: 32.07 KG/M2 | RESPIRATION RATE: 16 BRPM | HEIGHT: 58 IN

## 2019-08-06 DIAGNOSIS — L72.3 SEBACEOUS CYST: Primary | ICD-10-CM

## 2019-08-06 PROCEDURE — 99999 PR STA SHADOW: CPT | Mod: PBBFAC,,, | Performed by: SURGERY

## 2019-08-06 PROCEDURE — 99999 PR PBB SHADOW E&M-EST. PATIENT-LVL III: CPT | Mod: PBBFAC,,, | Performed by: SURGERY

## 2019-08-06 PROCEDURE — 99213 OFFICE O/P EST LOW 20 MIN: CPT | Mod: PBBFAC | Performed by: SURGERY

## 2019-08-06 PROCEDURE — 99999 PR STA SHADOW: ICD-10-PCS | Mod: PBBFAC,,, | Performed by: SURGERY

## 2019-08-06 RX ORDER — METFORMIN HYDROCHLORIDE 500 MG/1
TABLET ORAL
Qty: 60 TABLET | Refills: 5 | Status: SHIPPED | OUTPATIENT
Start: 2019-08-06 | End: 2020-02-10

## 2019-08-06 NOTE — PROGRESS NOTES
Excision of left flank sebaceous cyst last week.  Pathology still pending.  Wound is healing without any redness or drainage.  Patient reports no complaints or pain.  Staples were removed and Steri-Strips are placed. Patient to follow-up as needed.

## 2019-08-07 ENCOUNTER — OUTPATIENT CASE MANAGEMENT (OUTPATIENT)
Dept: ADMINISTRATIVE | Facility: OTHER | Age: 79
End: 2019-08-07

## 2019-08-07 NOTE — PROGRESS NOTES
"8/7/19  Summary:  Voice message left for pt's son, Agustin Ashton to gain further information on pt's O2 provider. See the following so far:  Contacted [facility]: [phone] - not the O2 provider.  Referred to [facility]:  [phone] also not provider of O2.  [facility] closed out > 1 1/2 years ago. ( Not O2 providers--[facility], [facility]).  F/u call to pt to update care plan. Pt reports feeling good s/p sebaceus cyst removal 7/30 and post visit yest with no complications. When asked what pt would do if she needed help with her O2 machine, pt states, "I would call the [facility] [employer] office".  Pt able to provide the following ph number options to call-- [phone], [phone] (and again [phone]).    ADDENDUM:  Call back from Meliton from "[employer]. -- to confirm home visit made today by Resp Therapist to service pt's home concentrator found functioning well, filter changed, provided new O2 tubing and verify humidifier component in place. Pt is set up to receive her monthly O2 tubing supplies and to have regular O2 machine checks. Son called this RN CM back prior and stated the O2 company present.     Interventions:  [facility] Provider verified:  Called/spoke with [employer] - [phone]. Pt got O2 in 2007, no call for service in "long time". Pts rent for 3 years then "capped off" with Medicare, meaning the pt must pay for any O2 machine services/supplies. Referred to the [facility] office number [phone].-- called/spoke with Brianna Coelho. Meliton states pt got current concentrator 2010; a  will be in area and will check on pt's machine-bring a new one out just in case, contact Dr Astudillo for any additional orders. Provided pt's current ph # and that of her son, as the best contact for pt. Meliton assures that such visit is under Medicare/Medicaid, no charge to pt to service and provide new O2 tubing supplies.   Notified " pt to expect a phone call and visit from O2 company today-- pt states her understanding.     Plan:  F/u on status of current O2, humidifier attachment?, supply delivery schedule?--continue to research. --Continue to research O2 Provider 8/7  Resources-- Done 8/7. No need for  services, f/u on receipt of mailed [facility] resources. Pt refers to her son who manages her mail.   Safety-- f/u on receipt of fall prevention, COPD flare/prevention, Arthritis, Sebaceous Cyst Def/Care, Heart- Healthy foods, Low Cholesterol Diet, USDA MYPLATE understanding  DM- f/u on receipt of mailings-- establishing routine in regular BG checks- whether once daily or once weekly.      Todays OPCM Self-Management Care Plan was developed with the patients/caregivers input and was based on identified barriers from todays assessment.  Goals were written today with the patient/caregiver and the patient has agreed to work towards these goals to improve his/her overall well-being. Patient verbalized understanding of the care plan, goals, and all of today's instructions. Encouraged patient/caregiver to communicate with his/her physician and health care team about health conditions and the treatment plan.  Provided my contact information today and encouraged patient/caregiver to call me with any questions as needed.

## 2019-08-08 ENCOUNTER — HOSPITAL ENCOUNTER (OUTPATIENT)
Dept: RADIOLOGY | Facility: HOSPITAL | Age: 79
Discharge: HOME | End: 2019-08-08
Attending: INTERNAL MEDICINE
Payer: MEDICARE

## 2019-08-08 VITALS — BODY MASS INDEX: 31.91 KG/M2 | WEIGHT: 152 LBS | HEIGHT: 58 IN

## 2019-08-08 DIAGNOSIS — Z12.31 SCREENING MAMMOGRAM, ENCOUNTER FOR: ICD-10-CM

## 2019-08-08 PROCEDURE — 77063 MAMMO DIGITAL SCREENING BILAT WITH TOMOSYNTHESIS_CAD: ICD-10-PCS | Mod: 26,,, | Performed by: RADIOLOGY

## 2019-08-08 PROCEDURE — 77063 BREAST TOMOSYNTHESIS BI: CPT | Mod: 26,,, | Performed by: RADIOLOGY

## 2019-08-08 PROCEDURE — 77067 SCR MAMMO BI INCL CAD: CPT | Mod: 26,,, | Performed by: RADIOLOGY

## 2019-08-08 PROCEDURE — 77067 SCR MAMMO BI INCL CAD: CPT | Mod: TC

## 2019-08-08 PROCEDURE — 77067 MAMMO DIGITAL SCREENING BILAT WITH TOMOSYNTHESIS_CAD: ICD-10-PCS | Mod: 26,,, | Performed by: RADIOLOGY

## 2019-08-16 ENCOUNTER — OUTPATIENT CASE MANAGEMENT (OUTPATIENT)
Dept: ADMINISTRATIVE | Facility: OTHER | Age: 79
End: 2019-08-16

## 2019-08-16 NOTE — PROGRESS NOTES
8/16/19  Summary:  F/u call to update pt care plan. Pt answers call and reports doing okay. Pt denies having a cough currently. Pt denies any issues with her home O2 concentrator/supplies. [facility] made home visit 8/7/19. Pt refers this RN CM to her son for any specific information. Pt reports eating 3 meals and snacks. Pt denies any complaints or concerns.   F/u call to pt's son, Agustin Ashton--voice message left stating no further OPCM calls to be made to Ms. Ashton, provided contact # for this RN CM for future reference and [facility], [phone].     Interventions:  Discussed case closure. Pt may call RN CM should future needs arise.  Updated [employer] Comment stating O2 Co Provider.     Plan:  Case closed.     Todays OPCM Self-Management Care Plan was developed with the patients/caregivers input and was based on identified barriers from todays assessment.  Goals were written today with the patient/caregiver and the patient has agreed to work towards these goals to improve his/her overall well-being. Patient verbalized understanding of the care plan, goals, and all of today's instructions. Encouraged patient/caregiver to communicate with his/her physician and health care team about health conditions and the treatment plan.  Provided my contact information today and encouraged patient/caregiver to call me with any questions as needed.

## 2019-12-04 ENCOUNTER — LAB VISIT (OUTPATIENT)
Dept: LAB | Facility: HOSPITAL | Age: 79
End: 2019-12-04
Attending: INTERNAL MEDICINE
Payer: MEDICARE

## 2019-12-04 DIAGNOSIS — E11.9 CONTROLLED TYPE 2 DIABETES MELLITUS WITHOUT COMPLICATION, WITHOUT LONG-TERM CURRENT USE OF INSULIN: ICD-10-CM

## 2019-12-04 DIAGNOSIS — I70.0 AORTIC ATHEROSCLEROSIS: ICD-10-CM

## 2019-12-04 DIAGNOSIS — E78.5 HYPERLIPIDEMIA, UNSPECIFIED HYPERLIPIDEMIA TYPE: ICD-10-CM

## 2019-12-04 LAB
ALBUMIN SERPL BCP-MCNC: 3.6 G/DL (ref 3.5–5.2)
ALBUMIN/CREAT UR: 36.7 UG/MG (ref 0–30)
ALP SERPL-CCNC: 83 U/L (ref 55–135)
ALT SERPL W/O P-5'-P-CCNC: 12 U/L (ref 10–44)
ANION GAP SERPL CALC-SCNC: 8 MMOL/L (ref 8–16)
AST SERPL-CCNC: 22 U/L (ref 10–40)
BASOPHILS # BLD AUTO: 0.03 K/UL (ref 0–0.2)
BASOPHILS NFR BLD: 0.9 % (ref 0–1.9)
BILIRUB SERPL-MCNC: 0.4 MG/DL (ref 0.1–1)
BUN SERPL-MCNC: 14 MG/DL (ref 8–23)
CALCIUM SERPL-MCNC: 9.4 MG/DL (ref 8.7–10.5)
CHLORIDE SERPL-SCNC: 103 MMOL/L (ref 95–110)
CHOLEST SERPL-MCNC: 134 MG/DL (ref 120–199)
CHOLEST/HDLC SERPL: 3.4 {RATIO} (ref 2–5)
CO2 SERPL-SCNC: 31 MMOL/L (ref 23–29)
CREAT SERPL-MCNC: 0.7 MG/DL (ref 0.5–1.4)
CREAT UR-MCNC: 70.8 MG/DL (ref 15–325)
DIFFERENTIAL METHOD: ABNORMAL
EOSINOPHIL # BLD AUTO: 0 K/UL (ref 0–0.5)
EOSINOPHIL NFR BLD: 1.2 % (ref 0–8)
ERYTHROCYTE [DISTWIDTH] IN BLOOD BY AUTOMATED COUNT: 15.8 % (ref 11.5–14.5)
EST. GFR  (AFRICAN AMERICAN): >60 ML/MIN/1.73 M^2
EST. GFR  (NON AFRICAN AMERICAN): >60 ML/MIN/1.73 M^2
ESTIMATED AVG GLUCOSE: 108 MG/DL (ref 68–131)
GLUCOSE SERPL-MCNC: 98 MG/DL (ref 70–110)
HBA1C MFR BLD HPLC: 5.4 % (ref 4–5.6)
HCT VFR BLD AUTO: 35.9 % (ref 37–48.5)
HDLC SERPL-MCNC: 39 MG/DL (ref 40–75)
HDLC SERPL: 29.1 % (ref 20–50)
HGB BLD-MCNC: 11 G/DL (ref 12–16)
IMM GRANULOCYTES # BLD AUTO: 0.01 K/UL (ref 0–0.04)
IMM GRANULOCYTES NFR BLD AUTO: 0.3 % (ref 0–0.5)
LDLC SERPL CALC-MCNC: 77.6 MG/DL (ref 63–159)
LYMPHOCYTES # BLD AUTO: 1.4 K/UL (ref 1–4.8)
LYMPHOCYTES NFR BLD: 41.4 % (ref 18–48)
MCH RBC QN AUTO: 26.4 PG (ref 27–31)
MCHC RBC AUTO-ENTMCNC: 30.6 G/DL (ref 32–36)
MCV RBC AUTO: 86 FL (ref 82–98)
MICROALBUMIN UR DL<=1MG/L-MCNC: 26 UG/ML
MONOCYTES # BLD AUTO: 0.5 K/UL (ref 0.3–1)
MONOCYTES NFR BLD: 14.5 % (ref 4–15)
NEUTROPHILS # BLD AUTO: 1.4 K/UL (ref 1.8–7.7)
NEUTROPHILS NFR BLD: 41.7 % (ref 38–73)
NONHDLC SERPL-MCNC: 95 MG/DL
NRBC BLD-RTO: 0 /100 WBC
PLATELET # BLD AUTO: 185 K/UL (ref 150–350)
PMV BLD AUTO: 11.7 FL (ref 9.2–12.9)
POTASSIUM SERPL-SCNC: 3.8 MMOL/L (ref 3.5–5.1)
PROT SERPL-MCNC: 7 G/DL (ref 6–8.4)
RBC # BLD AUTO: 4.16 M/UL (ref 4–5.4)
SODIUM SERPL-SCNC: 142 MMOL/L (ref 136–145)
TRIGL SERPL-MCNC: 87 MG/DL (ref 30–150)
TSH SERPL DL<=0.005 MIU/L-ACNC: 1.8 UIU/ML (ref 0.4–4)
WBC # BLD AUTO: 3.38 K/UL (ref 3.9–12.7)

## 2019-12-04 PROCEDURE — 84443 ASSAY THYROID STIM HORMONE: CPT

## 2019-12-04 PROCEDURE — 80061 LIPID PANEL: CPT

## 2019-12-04 PROCEDURE — 36415 COLL VENOUS BLD VENIPUNCTURE: CPT

## 2019-12-04 PROCEDURE — 80053 COMPREHEN METABOLIC PANEL: CPT

## 2019-12-04 PROCEDURE — 85025 COMPLETE CBC W/AUTO DIFF WBC: CPT

## 2019-12-04 PROCEDURE — 83036 HEMOGLOBIN GLYCOSYLATED A1C: CPT

## 2019-12-04 PROCEDURE — 82043 UR ALBUMIN QUANTITATIVE: CPT

## 2019-12-18 ENCOUNTER — OFFICE VISIT (OUTPATIENT)
Dept: INTERNAL MEDICINE | Facility: CLINIC | Age: 79
End: 2019-12-18
Payer: MEDICARE

## 2019-12-18 VITALS
BODY MASS INDEX: 32.44 KG/M2 | RESPIRATION RATE: 16 BRPM | SYSTOLIC BLOOD PRESSURE: 120 MMHG | OXYGEN SATURATION: 90 % | HEIGHT: 58 IN | WEIGHT: 154.56 LBS | DIASTOLIC BLOOD PRESSURE: 80 MMHG | HEART RATE: 99 BPM

## 2019-12-18 DIAGNOSIS — E11.40 TYPE 2 DIABETES MELLITUS WITH DIABETIC NEUROPATHY, WITHOUT LONG-TERM CURRENT USE OF INSULIN: ICD-10-CM

## 2019-12-18 DIAGNOSIS — M06.042 RHEUMATOID ARTHRITIS INVOLVING BOTH HANDS WITH NEGATIVE RHEUMATOID FACTOR: ICD-10-CM

## 2019-12-18 DIAGNOSIS — E11.9 CONTROLLED TYPE 2 DIABETES MELLITUS WITHOUT COMPLICATION, WITHOUT LONG-TERM CURRENT USE OF INSULIN: ICD-10-CM

## 2019-12-18 DIAGNOSIS — J43.9 PULMONARY EMPHYSEMA, UNSPECIFIED EMPHYSEMA TYPE: ICD-10-CM

## 2019-12-18 DIAGNOSIS — E78.5 HYPERLIPIDEMIA, UNSPECIFIED HYPERLIPIDEMIA TYPE: Primary | ICD-10-CM

## 2019-12-18 DIAGNOSIS — M06.041 RHEUMATOID ARTHRITIS INVOLVING BOTH HANDS WITH NEGATIVE RHEUMATOID FACTOR: ICD-10-CM

## 2019-12-18 PROCEDURE — 99213 OFFICE O/P EST LOW 20 MIN: CPT | Mod: PBBFAC,25 | Performed by: INTERNAL MEDICINE

## 2019-12-18 PROCEDURE — 99999 PR STA SHADOW: CPT | Mod: PBBFAC,,, | Performed by: INTERNAL MEDICINE

## 2019-12-18 PROCEDURE — 90662 IIV NO PRSV INCREASED AG IM: CPT | Mod: PBBFAC

## 2019-12-18 PROCEDURE — 99999 PR PBB SHADOW E&M-EST. PATIENT-LVL III: ICD-10-PCS | Mod: PBBFAC,,, | Performed by: INTERNAL MEDICINE

## 2019-12-18 PROCEDURE — 99999 FLU VACCINE - HIGH DOSE (65+) PRESERVATIVE FREE IM: CPT | Mod: PBBFAC,,,

## 2019-12-18 PROCEDURE — 99999 FLU VACCINE - HIGH DOSE (65+) PRESERVATIVE FREE IM: ICD-10-PCS | Mod: PBBFAC,,,

## 2019-12-18 PROCEDURE — 99214 OFFICE O/P EST MOD 30 MIN: CPT | Mod: S$PBB | Performed by: INTERNAL MEDICINE

## 2019-12-18 PROCEDURE — 99999 PR PBB SHADOW E&M-EST. PATIENT-LVL III: CPT | Mod: PBBFAC,,, | Performed by: INTERNAL MEDICINE

## 2019-12-18 RX ORDER — LEFLUNOMIDE 10 MG/1
10 TABLET ORAL EVERY OTHER DAY
Refills: 3 | COMMUNITY
Start: 2019-11-15

## 2019-12-18 NOTE — PROGRESS NOTES
Subjective:       Patient ID: Gretel Ashton is a 79 y.o. female.    Chief Complaint: Follow-up; Hyperlipidemia; COPD; and Diabetes    Gretel Ashton is a  79  y.o. female who presents for Type II DM, Hypertension, and Hyperlipidemia follow up. Labs were reviewed with patient today.    Seeing rheumatology Dr Gerber for RA        Hyperlipidemia   This is a chronic problem. The problem is uncontrolled. Recent lipid tests were reviewed and are high. Associated symptoms include myalgias. Pertinent negatives include no chest pain. Current antihyperlipidemic treatment includes statins. The current treatment provides moderate improvement of lipids.   Arthritis   Presents for follow-up visit. She complains of joint swelling. Affected location: MCps both hands  Pertinent negatives include no dysuria, fever or rash.   Diabetes   She presents for her follow-up diabetic visit. She has type 2 diabetes mellitus. Pertinent negatives for hypoglycemia include no confusion, dizziness, headaches, nervousness/anxiousness or pallor. Pertinent negatives for diabetes include no chest pain, no polydipsia, no polyphagia and no weakness. There are no hypoglycemic complications. Symptoms are worsening. There are no diabetic complications. Risk factors for coronary artery disease include diabetes mellitus, dyslipidemia, hypertension, obesity, post-menopausal and sedentary lifestyle. Current diabetic treatment includes oral agent (monotherapy). Her weight is stable. She is following a generally healthy diet. Her breakfast blood glucose range is generally 130-140 mg/dl. An ACE inhibitor/angiotensin II receptor blocker is not being taken.   Depression Patient is not experiencing: choking sensation, confusion, nervousness/anxiety, palpitations and suicidal ideas.    Medication Refill   Associated symptoms include arthralgias, coughing, joint swelling and myalgias. Pertinent negatives include no chest pain, chills, congestion, fever, headaches,  nausea, numbness, rash, sore throat, vomiting or weakness.     Review of Systems   Constitutional: Negative for chills and fever.   HENT: Negative for congestion, hearing loss, sinus pressure and sore throat.    Eyes: Negative for photophobia.   Respiratory: Positive for cough. Negative for choking, chest tightness and wheezing.         On home o2 ; COPD ;sleeps with O2 at night    Cardiovascular: Negative for chest pain and palpitations.   Gastrointestinal: Negative for blood in stool, nausea and vomiting.   Endocrine: Negative for polydipsia and polyphagia.   Genitourinary: Negative for dysuria and hematuria.   Musculoskeletal: Positive for arthralgias, arthritis, joint swelling, myalgias and neck stiffness.   Skin: Negative for pallor and rash.        Left lower back with sebaceous cyst    Neurological: Negative for dizziness, weakness, numbness and headaches.   Hematological: Does not bruise/bleed easily.   Psychiatric/Behavioral: Positive for depression. Negative for confusion, dysphoric mood, hallucinations, sleep disturbance and suicidal ideas. The patient is not nervous/anxious.        Objective:      Physical Exam   Constitutional: She is oriented to person, place, and time. She appears well-developed and well-nourished.   HENT:   Head: Normocephalic and atraumatic.   Nose: Nose normal.   Mouth/Throat: Oropharynx is clear and moist. Mucous membranes are pale.   Eyes: Pupils are equal, round, and reactive to light. Conjunctivae and EOM are normal.   Neck: Normal range of motion. Neck supple. No JVD present. No tracheal deviation present. No thyromegaly present.   Cardiovascular: Normal rate, regular rhythm, normal heart sounds and intact distal pulses.   Pulses:       Dorsalis pedis pulses are 1+ on the right side, and 1+ on the left side.        Posterior tibial pulses are 1+ on the right side, and 1+ on the left side.   Pulmonary/Chest: Effort normal. No respiratory distress. She has no wheezes. She has no  rales. She exhibits no tenderness.   Abdominal: Soft. Bowel sounds are normal. She exhibits no distension and no mass. There is no tenderness. There is no rebound and no guarding.   Musculoskeletal: Normal range of motion. She exhibits no edema.   RA changes in both hands.   Feet:   Right Foot:   Protective Sensation: 5 sites tested. 5 sites sensed.   Skin Integrity: Negative for ulcer, erythema or dry skin.   Left Foot:   Protective Sensation: 5 sites tested. 5 sites sensed.   Skin Integrity: Negative for ulcer, erythema or dry skin.   Lymphadenopathy:     She has no cervical adenopathy.   Neurological: She is alert and oriented to person, place, and time. She has normal reflexes. No cranial nerve deficit. She exhibits normal muscle tone. Coordination normal.   Skin: Skin is warm and dry.        Sebaceous cyst : 5 cm in size .   Psychiatric: She has a normal mood and affect. Her behavior is normal. Judgment and thought content normal.   Nursing note and vitals reviewed.      Assessment:       1. Hyperlipidemia, unspecified hyperlipidemia type    2. Pulmonary emphysema, unspecified emphysema type    3. Controlled type 2 diabetes mellitus without complication, without long-term current use of insulin    4. Type 2 diabetes mellitus with diabetic neuropathy, without long-term current use of insulin    5. Rheumatoid arthritis involving both hands with negative rheumatoid factor        Plan:   Gretel was seen today for follow-up, hyperlipidemia, copd and diabetes.    Diagnoses and all orders for this visit:    Hyperlipidemia, unspecified hyperlipidemia type  -     Comprehensive metabolic panel; Future  -     Lipid panel; Future  -     TSH; Future  Continue crestor    Pulmonary emphysema, unspecified emphysema type  Stable.    Controlled type 2 diabetes mellitus without complication, without long-term current use of insulin  -     Hemoglobin A1c; Future  -     CBC auto differential; Future  -     Comprehensive metabolic  panel; Future  -     Microalbumin/creatinine urine ratio; Future  Patient has uncontrolled Diabetes .  We discussed about diet ;low in calories. Avoid sweats, sodas.  Also increasing activity;walking 2-3 miles a day.  Continue metformin   Goal of  A1c  less than 7 % stressed.  Also goal of LDL less than 70 highlighted to patient.  Type 2 diabetes mellitus with diabetic neuropathy, without long-term current use of insulin  -     Hemoglobin A1c; Future  -     Microalbumin/creatinine urine ratio; Future    Rheumatoid arthritis involving both hands with negative rheumatoid factor  -     CBC auto differential; Future  -     Comprehensive metabolic panel; Future  -     Lipid panel; Future  -     TSH; Future  Seeing Dr Gerber      Problem List Items Addressed This Visit     Hyperlipidemia - Primary    COPD (chronic obstructive pulmonary disease)    Type 2 diabetes mellitus with diabetic neuropathy, without long-term current use of insulin    Rheumatoid arthritis involving both hands with negative rheumatoid factor

## 2020-01-13 RX ORDER — FLUTICASONE PROPIONATE AND SALMETEROL 50; 250 UG/1; UG/1
POWDER RESPIRATORY (INHALATION)
Qty: 60 EACH | Refills: 11 | Status: SHIPPED | OUTPATIENT
Start: 2020-01-13 | End: 2021-06-23

## 2020-02-10 RX ORDER — METFORMIN HYDROCHLORIDE 500 MG/1
TABLET ORAL
Qty: 60 TABLET | Refills: 5 | Status: SHIPPED | OUTPATIENT
Start: 2020-02-10 | End: 2020-07-23 | Stop reason: SDUPTHER

## 2020-03-19 RX ORDER — ALBUTEROL SULFATE 0.83 MG/ML
SOLUTION RESPIRATORY (INHALATION)
Qty: 180 EACH | Refills: 11 | Status: SHIPPED | OUTPATIENT
Start: 2020-03-19 | End: 2021-08-18 | Stop reason: SDUPTHER

## 2020-04-09 ENCOUNTER — HOSPITAL ENCOUNTER (EMERGENCY)
Facility: HOSPITAL | Age: 80
Discharge: HOME | End: 2020-04-09
Attending: SURGERY
Payer: MEDICARE

## 2020-04-09 VITALS
OXYGEN SATURATION: 99 % | HEART RATE: 84 BPM | TEMPERATURE: 99 F | RESPIRATION RATE: 18 BRPM | SYSTOLIC BLOOD PRESSURE: 112 MMHG | DIASTOLIC BLOOD PRESSURE: 62 MMHG

## 2020-04-09 DIAGNOSIS — Z53.29 LEFT AGAINST MEDICAL ADVICE: Primary | ICD-10-CM

## 2020-04-09 DIAGNOSIS — R06.02 SOB (SHORTNESS OF BREATH): ICD-10-CM

## 2020-04-09 DIAGNOSIS — J44.1 COPD EXACERBATION: ICD-10-CM

## 2020-04-09 LAB
ALBUMIN SERPL BCP-MCNC: 3.3 G/DL (ref 3.5–5.2)
ALLENS TEST: ABNORMAL
ALP SERPL-CCNC: 98 U/L (ref 55–135)
ALT SERPL W/O P-5'-P-CCNC: 22 U/L (ref 10–44)
ANION GAP SERPL CALC-SCNC: 8 MMOL/L (ref 8–16)
APTT BLDCRRT: 24.5 SEC (ref 21–32)
AST SERPL-CCNC: 25 U/L (ref 10–40)
BASOPHILS # BLD AUTO: 0.02 K/UL (ref 0–0.2)
BASOPHILS NFR BLD: 0.7 % (ref 0–1.9)
BILIRUB SERPL-MCNC: 0.2 MG/DL (ref 0.1–1)
BNP SERPL-MCNC: 57 PG/ML (ref 0–99)
BUN SERPL-MCNC: 22 MG/DL (ref 8–23)
CALCIUM SERPL-MCNC: 8.6 MG/DL (ref 8.7–10.5)
CHLORIDE SERPL-SCNC: 100 MMOL/L (ref 95–110)
CK MB SERPL-MCNC: 3.2 NG/ML (ref 0.1–6.5)
CK MB SERPL-RTO: 3.8 % (ref 0–5)
CK SERPL-CCNC: 84 U/L (ref 20–180)
CK SERPL-CCNC: 84 U/L (ref 20–180)
CO2 SERPL-SCNC: 34 MMOL/L (ref 23–29)
CREAT SERPL-MCNC: 0.7 MG/DL (ref 0.5–1.4)
D DIMER PPP IA.FEU-MCNC: 1.4 MG/L FEU
DELSYS: ABNORMAL
DIFFERENTIAL METHOD: ABNORMAL
EOSINOPHIL # BLD AUTO: 0.1 K/UL (ref 0–0.5)
EOSINOPHIL NFR BLD: 2.3 % (ref 0–8)
ERYTHROCYTE [DISTWIDTH] IN BLOOD BY AUTOMATED COUNT: 16 % (ref 11.5–14.5)
EST. GFR  (AFRICAN AMERICAN): >60 ML/MIN/1.73 M^2
EST. GFR  (NON AFRICAN AMERICAN): >60 ML/MIN/1.73 M^2
GLUCOSE SERPL-MCNC: 98 MG/DL (ref 70–110)
GROUP A STREP, MOLECULAR: NEGATIVE
HCO3 UR-SCNC: 40.2 MMOL/L (ref 22–26)
HCT VFR BLD AUTO: 30.8 % (ref 37–48.5)
HGB BLD-MCNC: 9.3 G/DL (ref 12–16)
IMM GRANULOCYTES # BLD AUTO: 0 K/UL (ref 0–0.04)
IMM GRANULOCYTES NFR BLD AUTO: 0 % (ref 0–0.5)
INFLUENZA A, MOLECULAR: NEGATIVE
INFLUENZA B, MOLECULAR: NEGATIVE
INR PPP: 0.9 (ref 0.8–1.2)
LYMPHOCYTES # BLD AUTO: 1.4 K/UL (ref 1–4.8)
LYMPHOCYTES NFR BLD: 44.9 % (ref 18–48)
MCH RBC QN AUTO: 25.2 PG (ref 27–31)
MCHC RBC AUTO-ENTMCNC: 30.2 G/DL (ref 32–36)
MCV RBC AUTO: 84 FL (ref 82–98)
MONOCYTES # BLD AUTO: 0.5 K/UL (ref 0.3–1)
MONOCYTES NFR BLD: 16.1 % (ref 4–15)
NEUTROPHILS # BLD AUTO: 1.1 K/UL (ref 1.8–7.7)
NEUTROPHILS NFR BLD: 36 % (ref 38–73)
NRBC BLD-RTO: 0 /100 WBC
PCO2 BLDA: 68 MMHG (ref 35–45)
PH SMN: 7.38 [PH] (ref 7.35–7.45)
PLATELET # BLD AUTO: 125 K/UL (ref 150–350)
PMV BLD AUTO: 12.8 FL (ref 9.2–12.9)
PO2 BLDA: 131 MMHG (ref 75–100)
POC BE: 12.9 MMOL/L (ref -2–2)
POC COHB: 0 % (ref 0–3)
POC METHB: 0.5 % (ref 0–1.5)
POC O2HB ARTERIAL: 95.9 % (ref 94–100)
POC SATURATED O2: 96.4 % (ref 90–100)
POC TCO2: 42.3 MMOL/L
POC THB: 9.7 G/DL (ref 12–18)
POTASSIUM SERPL-SCNC: 3.7 MMOL/L (ref 3.5–5.1)
PROT SERPL-MCNC: 6.8 G/DL (ref 6–8.4)
PROTHROMBIN TIME: 10.1 SEC (ref 9–12.5)
RBC # BLD AUTO: 3.69 M/UL (ref 4–5.4)
SARS-COV-2 RDRP RESP QL NAA+PROBE: NEGATIVE
SITE: ABNORMAL
SODIUM SERPL-SCNC: 142 MMOL/L (ref 136–145)
SPECIMEN SOURCE: NORMAL
TROPONIN I SERPL DL<=0.01 NG/ML-MCNC: 0.03 NG/ML (ref 0–0.03)
WBC # BLD AUTO: 3.05 K/UL (ref 3.9–12.7)

## 2020-04-09 PROCEDURE — U0002 COVID-19 LAB TEST NON-CDC: HCPCS

## 2020-04-09 PROCEDURE — 99284 EMERGENCY DEPT VISIT MOD MDM: CPT | Mod: 25

## 2020-04-09 PROCEDURE — 27000221 HC OXYGEN, UP TO 24 HOURS

## 2020-04-09 PROCEDURE — 63600175 PHARM REV CODE 636 W HCPCS: Performed by: SURGERY

## 2020-04-09 PROCEDURE — 93010 EKG 12-LEAD: ICD-10-PCS | Mod: ,,, | Performed by: INTERNAL MEDICINE

## 2020-04-09 PROCEDURE — 94640 AIRWAY INHALATION TREATMENT: CPT

## 2020-04-09 PROCEDURE — 85025 COMPLETE CBC W/AUTO DIFF WBC: CPT

## 2020-04-09 PROCEDURE — 85610 PROTHROMBIN TIME: CPT

## 2020-04-09 PROCEDURE — 82550 ASSAY OF CK (CPK): CPT

## 2020-04-09 PROCEDURE — 93010 ELECTROCARDIOGRAM REPORT: CPT | Mod: ,,, | Performed by: INTERNAL MEDICINE

## 2020-04-09 PROCEDURE — 96374 THER/PROPH/DIAG INJ IV PUSH: CPT

## 2020-04-09 PROCEDURE — 85730 THROMBOPLASTIN TIME PARTIAL: CPT

## 2020-04-09 PROCEDURE — 87651 STREP A DNA AMP PROBE: CPT

## 2020-04-09 PROCEDURE — 25000242 PHARM REV CODE 250 ALT 637 W/ HCPCS: Performed by: SURGERY

## 2020-04-09 PROCEDURE — 84484 ASSAY OF TROPONIN QUANT: CPT

## 2020-04-09 PROCEDURE — 82803 BLOOD GASES ANY COMBINATION: CPT | Performed by: SURGERY

## 2020-04-09 PROCEDURE — 83880 ASSAY OF NATRIURETIC PEPTIDE: CPT

## 2020-04-09 PROCEDURE — 82553 CREATINE MB FRACTION: CPT

## 2020-04-09 PROCEDURE — 85379 FIBRIN DEGRADATION QUANT: CPT

## 2020-04-09 PROCEDURE — 36415 COLL VENOUS BLD VENIPUNCTURE: CPT

## 2020-04-09 PROCEDURE — 87502 INFLUENZA DNA AMP PROBE: CPT

## 2020-04-09 PROCEDURE — 93005 ELECTROCARDIOGRAM TRACING: CPT

## 2020-04-09 PROCEDURE — 80053 COMPREHEN METABOLIC PANEL: CPT

## 2020-04-09 RX ORDER — LEVOFLOXACIN 500 MG/1
500 TABLET, FILM COATED ORAL DAILY
Qty: 7 TABLET | Refills: 0 | Status: ON HOLD | OUTPATIENT
Start: 2020-04-09 | End: 2020-04-13 | Stop reason: HOSPADM

## 2020-04-09 RX ORDER — BENZONATATE 100 MG/1
200 CAPSULE ORAL 3 TIMES DAILY PRN
Qty: 20 CAPSULE | Refills: 0 | Status: SHIPPED | OUTPATIENT
Start: 2020-04-09 | End: 2020-04-19

## 2020-04-09 RX ORDER — ALBUTEROL SULFATE 2.5 MG/.5ML
10 SOLUTION RESPIRATORY (INHALATION)
Status: COMPLETED | OUTPATIENT
Start: 2020-04-09 | End: 2020-04-09

## 2020-04-09 RX ORDER — METHYLPREDNISOLONE 4 MG/1
TABLET ORAL
Qty: 1 PACKAGE | Refills: 0 | Status: SHIPPED | OUTPATIENT
Start: 2020-04-09 | End: 2020-04-09 | Stop reason: CLARIF

## 2020-04-09 RX ORDER — METHYLPREDNISOLONE SOD SUCC 125 MG
125 VIAL (EA) INJECTION
Status: COMPLETED | OUTPATIENT
Start: 2020-04-09 | End: 2020-04-09

## 2020-04-09 RX ADMIN — ALBUTEROL SULFATE 10 MG: 2.5 SOLUTION RESPIRATORY (INHALATION) at 10:04

## 2020-04-09 RX ADMIN — METHYLPREDNISOLONE SODIUM SUCCINATE 125 MG: 125 INJECTION, POWDER, FOR SOLUTION INTRAMUSCULAR; INTRAVENOUS at 09:04

## 2020-04-09 NOTE — ED TRIAGE NOTES
79 y.o. female presents to ER ED 03 /ED 03A   Chief Complaint   Patient presents with    Shortness of Breath   .   C/o SOB progressively getting worse over the last few days

## 2020-04-09 NOTE — ED PROVIDER NOTES
[facility]                                                 Chief Complaint  79 y.o. female with Shortness of Breath    History of Present Illness  Gretel Ashton presents to the emergency room with shortness of breath today  Patient with shortness of breath today, cough and cold symptoms today PTA  Patient is on home oxygen for probable COPD history, former smoking history  No fever, no obvious signs of distress, 100% on her 2 liters home oxygen now  No signs of fever, patient with no signs of    The history is provided by the patient   device was not used during this ER visit  Medical history: Arthritis, depression, diabetes, HLD, HTN, pacemaker  Surgeries: , cyst removal, pacemaker, inner ear surgery  No Known Allergies     I have reviewed all of this patient's past medical, surgical, family, and social   histories as well as active allergies and medications documented in the  electronic medical record    Review of Systems and Physical Exam      Review of Systems  -- Constitution - no fever, denies fatigue, no weakness, no chills  -- Eyes - no tearing or redness, no visual disturbance  -- Ear, Nose - sneezing, nasal congestion and clear discharge   -- Mouth,Throat - sore throat, no toothache, normal voice, normal swallowing  -- Respiratory - cough and congestion, shortness of breath, no KERNS  -- Cardiovascular - denies chest pain, no palpitations, denies claudication  -- Gastrointestinal - denies abdominal pain, nausea, vomiting, or diarrhea  -- Genitourinary - no dysuria, no hematuria, no flank pain, no bladder pain  -- Musculoskeletal - denies back pain, negative for trauma or injury  -- Neurological - no headache, denies weakness or seizure; no LOC  -- Skin - denies pallor, rash, or changes in skin. no hives or welts noted     Vital Signs  Her blood pressure is 110/63 and her pulse is 90.   Her respiration is 18 and oxygen saturation is 99%.     Physical Exam  --  Nursing note and vitals reviewed  -- Constitutional: Appears well-developed and well-nourished  -- Head: Atraumatic. Normocephalic. No obvious abnormality  -- Eyes: Pupils are equal and reactive to light. Normal conjunctiva and lids  -- Nose: nasal mucosa erythema and edema; clear nasal discharge noted   -- Throat: post-nasal drip with mild posterior oropharnyx erythema  -- Ears: External ears and TM normal bilaterally. Normal hearing and no drainage  -- Neck: Normal range of motion. Neck supple. No masses, trachea midline  -- Cardiac: Normal rate, regular rhythm and normal heart sounds  -- Pulmonary: faint rhonchi at the bilateral bases with no active wheezing   -- Abdominal: Soft, no tenderness. Normal bowel sounds. Normal liver edge  -- Musculoskeletal: Normal range of motion, no effusions. Joints stable   -- Neurological: No focal deficits. Showed good interaction with staff  -- Vascular: Posterior tibial, dorsalis pedis and radial pulses 2+ bilaterally       Emergency Room Course      Lab Results     K 3.7      CO2 34 (H)   BUN 22   CREATININE 0.7   GLU 98   ALKPHOS 98   AST 25   ALT 22   BILITOT 0.2   ALBUMIN 3.3 (L)   PROT 6.8   WBC 3.05 (L)   HGB 9.3 (L)   HCT 30.8 (L)    (L)   CPK 84   CPK 84   CPKMB 3.2   TROPONINI 0.034 (H)   INR 0.9   BNP 57   DDIMER 1.40 (H)   TSH 1.802     EKG  -- The EKG findings today were without concerning findings from baseline     Radiology  -- Chest x-ray showed no infiltrate and showed no acute pathology     Additional Work up  -- the patient tested negative for influenza   -- The strep screen was negative  -- rapid Coronavirus test to the emergency room was negative     Medications Given  methylPREDNISolone sodium succinate injection 125 mg (125 mg Intravenous Given 4/9/20 7958)   albuterol sulfate nebulizer solution 10 mg (10 mg Nebulization Given 4/9/20 9802)     ED Physician Management  -- Diagnosis management comments: 79 y.o. female with shortness of  breath today  -- likely COPD exacerbation, ABG shows some CO2 retention in the ER today  -- because of the COVID outbreak, the patient does not wish to stay in the hospital  -- patient was extensively counseled she may have to stay in the hospital for COPD  -- she has COVID negative however the patient elects to sign out AMA this a.m.  -- no signs of distress, I did call the patient and cough syrup and antibiotics  -- I have carefully instructed this patient to come back to matter what if worsens  -- patient voiced understanding, signed out AMA will follow up with her primary care    Diagnosis  -- COPD exacerbation.   -- SOB (shortness of breath)   -- Left against medical advice     Disposition and Plan  -- Disposition: home  -- Condition: stable  -- Patient is of sound mind and capable of making rational decisions  -- Patient is fully aware of the diagnosis and the consequences of leaving AMA  -- Pt was told inpatient treatment needed but wants to leave against advice  -- Patient signed the AMA papers; all questions answered. Voiced understanding    This note is dictated on M*Modal word recognition program.  There are word recognition mistakes that are occasionally missed on review.         Bruce Malone MD  04/09/20 1129

## 2020-04-11 ENCOUNTER — HOSPITAL ENCOUNTER (INPATIENT)
Facility: HOSPITAL | Age: 80
LOS: 1 days | Discharge: HOME | DRG: 193 | End: 2020-04-13
Attending: SURGERY | Admitting: FAMILY MEDICINE
Payer: MEDICARE

## 2020-04-11 DIAGNOSIS — J96.21 ACUTE ON CHRONIC RESPIRATORY FAILURE WITH HYPOXEMIA: ICD-10-CM

## 2020-04-11 DIAGNOSIS — I25.10 CARDIOVASCULAR DISEASE: ICD-10-CM

## 2020-04-11 DIAGNOSIS — J96.90 RESPIRATORY FAILURE, UNSPECIFIED CHRONICITY, UNSPECIFIED WHETHER WITH HYPOXIA OR HYPERCAPNIA: ICD-10-CM

## 2020-04-11 DIAGNOSIS — J43.9 PULMONARY EMPHYSEMA, UNSPECIFIED EMPHYSEMA TYPE: ICD-10-CM

## 2020-04-11 DIAGNOSIS — R06.02 SOB (SHORTNESS OF BREATH): ICD-10-CM

## 2020-04-11 DIAGNOSIS — J18.9 PNEUMONIA OF RIGHT LOWER LOBE DUE TO INFECTIOUS ORGANISM: Primary | ICD-10-CM

## 2020-04-11 LAB
ALBUMIN SERPL BCP-MCNC: 3.5 G/DL (ref 3.5–5.2)
ALLENS TEST: ABNORMAL
ALP SERPL-CCNC: 87 U/L (ref 55–135)
ALT SERPL W/O P-5'-P-CCNC: 28 U/L (ref 10–44)
ANION GAP SERPL CALC-SCNC: 10 MMOL/L (ref 8–16)
AST SERPL-CCNC: 39 U/L (ref 10–40)
BACTERIA #/AREA URNS HPF: ABNORMAL /HPF
BASOPHILS # BLD AUTO: 0.02 K/UL (ref 0–0.2)
BASOPHILS NFR BLD: 0.5 % (ref 0–1.9)
BILIRUB SERPL-MCNC: 0.4 MG/DL (ref 0.1–1)
BILIRUB UR QL STRIP: NEGATIVE
BNP SERPL-MCNC: 46 PG/ML (ref 0–99)
BUN SERPL-MCNC: 24 MG/DL (ref 8–23)
CALCIUM SERPL-MCNC: 9 MG/DL (ref 8.7–10.5)
CHLORIDE SERPL-SCNC: 97 MMOL/L (ref 95–110)
CK MB SERPL-MCNC: 7.1 NG/ML (ref 0.1–6.5)
CK MB SERPL-RTO: 2.5 % (ref 0–5)
CK SERPL-CCNC: 279 U/L (ref 20–180)
CK SERPL-CCNC: 279 U/L (ref 20–180)
CLARITY UR: CLEAR
CO2 SERPL-SCNC: 32 MMOL/L (ref 23–29)
COLOR UR: YELLOW
CREAT SERPL-MCNC: 0.9 MG/DL (ref 0.5–1.4)
DELSYS: ABNORMAL
DIFFERENTIAL METHOD: ABNORMAL
EOSINOPHIL # BLD AUTO: 0 K/UL (ref 0–0.5)
EOSINOPHIL NFR BLD: 0.5 % (ref 0–8)
ERYTHROCYTE [DISTWIDTH] IN BLOOD BY AUTOMATED COUNT: 17 % (ref 11.5–14.5)
EST. GFR  (AFRICAN AMERICAN): >60 ML/MIN/1.73 M^2
EST. GFR  (NON AFRICAN AMERICAN): >60 ML/MIN/1.73 M^2
ESTIMATED AVG GLUCOSE: 108 MG/DL (ref 68–131)
GLUCOSE SERPL-MCNC: 188 MG/DL (ref 70–110)
GLUCOSE UR QL STRIP: NEGATIVE
HBA1C MFR BLD HPLC: 5.4 % (ref 4–5.6)
HCO3 UR-SCNC: 35.7 MMOL/L (ref 22–26)
HCT VFR BLD AUTO: 31.5 % (ref 37–48.5)
HGB BLD-MCNC: 9.6 G/DL (ref 12–16)
HGB UR QL STRIP: ABNORMAL
HYALINE CASTS #/AREA URNS LPF: 0 /LPF
IMM GRANULOCYTES # BLD AUTO: 0.01 K/UL (ref 0–0.04)
IMM GRANULOCYTES NFR BLD AUTO: 0.3 % (ref 0–0.5)
INFLUENZA A, MOLECULAR: NEGATIVE
INFLUENZA B, MOLECULAR: NEGATIVE
KETONES UR QL STRIP: NEGATIVE
LACTATE SERPL-SCNC: 2.1 MMOL/L (ref 0.5–2.2)
LEUKOCYTE ESTERASE UR QL STRIP: ABNORMAL
LYMPHOCYTES # BLD AUTO: 0.7 K/UL (ref 1–4.8)
LYMPHOCYTES NFR BLD: 17.8 % (ref 18–48)
MAGNESIUM SERPL-MCNC: 1.9 MG/DL (ref 1.6–2.6)
MCH RBC QN AUTO: 24.8 PG (ref 27–31)
MCHC RBC AUTO-ENTMCNC: 30.5 G/DL (ref 32–36)
MCV RBC AUTO: 81 FL (ref 82–98)
MICROSCOPIC COMMENT: ABNORMAL
MONOCYTES # BLD AUTO: 0.4 K/UL (ref 0.3–1)
MONOCYTES NFR BLD: 9.6 % (ref 4–15)
NEUTROPHILS # BLD AUTO: 2.7 K/UL (ref 1.8–7.7)
NEUTROPHILS NFR BLD: 71.3 % (ref 38–73)
NITRITE UR QL STRIP: NEGATIVE
NRBC BLD-RTO: 0 /100 WBC
PCO2 BLDA: 48 MMHG (ref 35–45)
PH SMN: 7.48 [PH] (ref 7.35–7.45)
PH UR STRIP: 6 [PH] (ref 5–8)
PHOSPHATE SERPL-MCNC: 2.9 MG/DL (ref 2.7–4.5)
PLATELET # BLD AUTO: 144 K/UL (ref 150–350)
PMV BLD AUTO: 11.4 FL (ref 9.2–12.9)
PO2 BLDA: 53 MMHG (ref 75–100)
POC BE: 10.9 MMOL/L (ref -2–2)
POC COHB: 0.4 % (ref 0–3)
POC METHB: 0.4 % (ref 0–1.5)
POC O2HB ARTERIAL: 88.9 % (ref 94–100)
POC SATURATED O2: 89.6 % (ref 90–100)
POC TCO2: 37.2 MMOL/L
POC THB: 9.5 G/DL (ref 12–18)
POCT GLUCOSE: 146 MG/DL (ref 70–110)
POTASSIUM SERPL-SCNC: 3.5 MMOL/L (ref 3.5–5.1)
PROT SERPL-MCNC: 7 G/DL (ref 6–8.4)
PROT UR QL STRIP: ABNORMAL
RBC # BLD AUTO: 3.87 M/UL (ref 4–5.4)
RBC #/AREA URNS HPF: 4 /HPF (ref 0–4)
SARS-COV-2 RDRP RESP QL NAA+PROBE: NEGATIVE
SITE: ABNORMAL
SODIUM SERPL-SCNC: 139 MMOL/L (ref 136–145)
SP GR UR STRIP: 1.02 (ref 1–1.03)
SPECIMEN SOURCE: NORMAL
SQUAMOUS #/AREA URNS HPF: 7 /HPF
TROPONIN I SERPL DL<=0.01 NG/ML-MCNC: 0.05 NG/ML (ref 0–0.03)
URN SPEC COLLECT METH UR: ABNORMAL
UROBILINOGEN UR STRIP-ACNC: NEGATIVE EU/DL
WBC # BLD AUTO: 3.76 K/UL (ref 3.9–12.7)
WBC #/AREA URNS HPF: 60 /HPF (ref 0–5)
YEAST URNS QL MICRO: ABNORMAL

## 2020-04-11 PROCEDURE — 96372 THER/PROPH/DIAG INJ SC/IM: CPT | Mod: 59

## 2020-04-11 PROCEDURE — 81000 URINALYSIS NONAUTO W/SCOPE: CPT

## 2020-04-11 PROCEDURE — 83735 ASSAY OF MAGNESIUM: CPT

## 2020-04-11 PROCEDURE — 36600 WITHDRAWAL OF ARTERIAL BLOOD: CPT

## 2020-04-11 PROCEDURE — C9399 UNCLASSIFIED DRUGS OR BIOLOG: HCPCS | Performed by: SURGERY

## 2020-04-11 PROCEDURE — G0378 HOSPITAL OBSERVATION PER HR: HCPCS

## 2020-04-11 PROCEDURE — 94761 N-INVAS EAR/PLS OXIMETRY MLT: CPT

## 2020-04-11 PROCEDURE — 25000242 PHARM REV CODE 250 ALT 637 W/ HCPCS: Performed by: NURSE PRACTITIONER

## 2020-04-11 PROCEDURE — 25000003 PHARM REV CODE 250: Performed by: SURGERY

## 2020-04-11 PROCEDURE — 87086 URINE CULTURE/COLONY COUNT: CPT

## 2020-04-11 PROCEDURE — 27000221 HC OXYGEN, UP TO 24 HOURS

## 2020-04-11 PROCEDURE — 83036 HEMOGLOBIN GLYCOSYLATED A1C: CPT

## 2020-04-11 PROCEDURE — 94640 AIRWAY INHALATION TREATMENT: CPT

## 2020-04-11 PROCEDURE — 87502 INFLUENZA DNA AMP PROBE: CPT

## 2020-04-11 PROCEDURE — 83880 ASSAY OF NATRIURETIC PEPTIDE: CPT

## 2020-04-11 PROCEDURE — 80053 COMPREHEN METABOLIC PANEL: CPT

## 2020-04-11 PROCEDURE — 84484 ASSAY OF TROPONIN QUANT: CPT

## 2020-04-11 PROCEDURE — 63600175 PHARM REV CODE 636 W HCPCS: Performed by: SURGERY

## 2020-04-11 PROCEDURE — 83605 ASSAY OF LACTIC ACID: CPT

## 2020-04-11 PROCEDURE — 93010 ELECTROCARDIOGRAM REPORT: CPT | Mod: ,,, | Performed by: INTERNAL MEDICINE

## 2020-04-11 PROCEDURE — 99285 EMERGENCY DEPT VISIT HI MDM: CPT | Mod: 25,CS

## 2020-04-11 PROCEDURE — 82803 BLOOD GASES ANY COMBINATION: CPT | Performed by: NURSE PRACTITIONER

## 2020-04-11 PROCEDURE — 82550 ASSAY OF CK (CPK): CPT

## 2020-04-11 PROCEDURE — 93005 ELECTROCARDIOGRAM TRACING: CPT

## 2020-04-11 PROCEDURE — 96365 THER/PROPH/DIAG IV INF INIT: CPT

## 2020-04-11 PROCEDURE — 25000242 PHARM REV CODE 250 ALT 637 W/ HCPCS: Performed by: SURGERY

## 2020-04-11 PROCEDURE — 82553 CREATINE MB FRACTION: CPT

## 2020-04-11 PROCEDURE — 85025 COMPLETE CBC W/AUTO DIFF WBC: CPT

## 2020-04-11 PROCEDURE — U0002 COVID-19 LAB TEST NON-CDC: HCPCS

## 2020-04-11 PROCEDURE — 96375 TX/PRO/DX INJ NEW DRUG ADDON: CPT

## 2020-04-11 PROCEDURE — 84100 ASSAY OF PHOSPHORUS: CPT

## 2020-04-11 PROCEDURE — 87040 BLOOD CULTURE FOR BACTERIA: CPT | Mod: 59

## 2020-04-11 PROCEDURE — 93010 EKG 12-LEAD: ICD-10-PCS | Mod: ,,, | Performed by: INTERNAL MEDICINE

## 2020-04-11 RX ORDER — IPRATROPIUM BROMIDE AND ALBUTEROL SULFATE 2.5; .5 MG/3ML; MG/3ML
3 SOLUTION RESPIRATORY (INHALATION)
Status: COMPLETED | OUTPATIENT
Start: 2020-04-11 | End: 2020-04-11

## 2020-04-11 RX ORDER — METHYLPREDNISOLONE SOD SUCC 125 MG
125 VIAL (EA) INJECTION EVERY 8 HOURS
Status: DISCONTINUED | OUTPATIENT
Start: 2020-04-11 | End: 2020-04-12

## 2020-04-11 RX ORDER — ONDANSETRON 2 MG/ML
4 INJECTION INTRAMUSCULAR; INTRAVENOUS EVERY 8 HOURS PRN
Status: DISCONTINUED | OUTPATIENT
Start: 2020-04-11 | End: 2020-04-13 | Stop reason: HOSPADM

## 2020-04-11 RX ORDER — INSULIN ASPART 100 [IU]/ML
6 INJECTION, SOLUTION INTRAVENOUS; SUBCUTANEOUS
Status: DISCONTINUED | OUTPATIENT
Start: 2020-04-12 | End: 2020-04-12

## 2020-04-11 RX ORDER — GLUCAGON 1 MG
1 KIT INJECTION
Status: DISCONTINUED | OUTPATIENT
Start: 2020-04-11 | End: 2020-04-12

## 2020-04-11 RX ORDER — ACETAMINOPHEN 325 MG/1
650 TABLET ORAL EVERY 8 HOURS PRN
Status: DISCONTINUED | OUTPATIENT
Start: 2020-04-11 | End: 2020-04-13 | Stop reason: HOSPADM

## 2020-04-11 RX ORDER — IPRATROPIUM BROMIDE AND ALBUTEROL SULFATE 2.5; .5 MG/3ML; MG/3ML
3 SOLUTION RESPIRATORY (INHALATION) EVERY 4 HOURS
Status: DISCONTINUED | OUTPATIENT
Start: 2020-04-11 | End: 2020-04-12

## 2020-04-11 RX ORDER — PANTOPRAZOLE SODIUM 40 MG/1
40 TABLET, DELAYED RELEASE ORAL DAILY
Status: DISCONTINUED | OUTPATIENT
Start: 2020-04-12 | End: 2020-04-13 | Stop reason: HOSPADM

## 2020-04-11 RX ORDER — SODIUM CHLORIDE 0.9 % (FLUSH) 0.9 %
10 SYRINGE (ML) INJECTION
Status: DISCONTINUED | OUTPATIENT
Start: 2020-04-11 | End: 2020-04-13 | Stop reason: HOSPADM

## 2020-04-11 RX ORDER — INSULIN ASPART 100 [IU]/ML
0-5 INJECTION, SOLUTION INTRAVENOUS; SUBCUTANEOUS
Status: DISCONTINUED | OUTPATIENT
Start: 2020-04-11 | End: 2020-04-12

## 2020-04-11 RX ORDER — METHYLPREDNISOLONE SOD SUCC 125 MG
125 VIAL (EA) INJECTION EVERY 8 HOURS
Status: DISCONTINUED | OUTPATIENT
Start: 2020-04-11 | End: 2020-04-11

## 2020-04-11 RX ORDER — IBUPROFEN 200 MG
24 TABLET ORAL
Status: DISCONTINUED | OUTPATIENT
Start: 2020-04-11 | End: 2020-04-12

## 2020-04-11 RX ORDER — IBUPROFEN 200 MG
16 TABLET ORAL
Status: DISCONTINUED | OUTPATIENT
Start: 2020-04-11 | End: 2020-04-12

## 2020-04-11 RX ADMIN — IPRATROPIUM BROMIDE AND ALBUTEROL SULFATE 3 ML: .5; 3 SOLUTION RESPIRATORY (INHALATION) at 11:04

## 2020-04-11 RX ADMIN — AZITHROMYCIN MONOHYDRATE 500 MG: 500 INJECTION, POWDER, LYOPHILIZED, FOR SOLUTION INTRAVENOUS at 09:04

## 2020-04-11 RX ADMIN — CEFTRIAXONE 1 G: 1 INJECTION, SOLUTION INTRAVENOUS at 08:04

## 2020-04-11 RX ADMIN — METHYLPREDNISOLONE SODIUM SUCCINATE 125 MG: 125 INJECTION, POWDER, FOR SOLUTION INTRAMUSCULAR; INTRAVENOUS at 06:04

## 2020-04-11 RX ADMIN — INSULIN DETEMIR 20 UNITS: 100 INJECTION, SOLUTION SUBCUTANEOUS at 08:04

## 2020-04-11 RX ADMIN — IPRATROPIUM BROMIDE AND ALBUTEROL SULFATE 3 ML: .5; 3 SOLUTION RESPIRATORY (INHALATION) at 06:04

## 2020-04-11 RX ADMIN — ACETAMINOPHEN 650 MG: 325 TABLET ORAL at 08:04

## 2020-04-11 NOTE — ED TRIAGE NOTES
STATED SHE HAS HAD CHRONIC  DIFFICULTY BREATHING. WAS SEEN AND TREATED A FEW DAYS AGO. COVID-19 NEGATIVE. PLACED IN ROOM 2A SUPPLEMENTAL OXYGEN 2LPM NC.

## 2020-04-11 NOTE — ED PROVIDER NOTES
Encounter Date: 2020       History     Chief Complaint   Patient presents with    Shortness of Breath     Gretel Ashton is a 79 y.o. Female with PMH of   Arthritis, depression, DM type 2, hyperlipidemia, hypertension, COPD who presents the ED with reports of increasing shortness of breath.  Patient was seen in the ED approximately 2 days ago with sob/copd exacerbation. She was give IV solu, albuterol neb. in the ED along with extensive workup. She left AMA after refusing admission due to COVID concerns.   She has been compliant with her medication, but reports that her symptoms are worsening.       The history is provided by the patient.     Review of patient's allergies indicates:  No Known Allergies  Past Medical History:   Diagnosis Date    Arthritis     Depression     Diabetes mellitus type II     Hyperlipidemia     Hypertension     Pacemaker      Past Surgical History:   Procedure Laterality Date    CARDIAC PACEMAKER PLACEMENT       SECTION      CYST REMOVAL Left 2019    Procedure: EXCISION, SEBACEOUS CYST;  Surgeon: Jaylen Gonzalez Jr., MD;  Location: [facility];  Service: General;  Laterality: Left;    INNER EAR SURGERY       Family History   Problem Relation Age of Onset    Cancer Mother     Breast cancer Mother     Stroke Father     Cancer Sister     Breast cancer Sister     Stroke Maternal Grandmother      Social History     Tobacco Use    Smoking status: Former Smoker     Packs/day: 2.00     Years: 43.00     Pack years: 86.00     Types: Cigarettes     Last attempt to quit: 2000     Years since quittin.2    Smokeless tobacco: Never Used   Substance Use Topics    Alcohol use: No    Drug use: No     Review of Systems   Constitutional: Negative for activity change, chills and fever.   HENT: Negative for congestion, ear discharge, ear pain, nosebleeds, postnasal drip, rhinorrhea, sinus pressure, sinus pain and sore throat.    Eyes: Negative.    Respiratory:  Positive for cough and shortness of breath. Negative for chest tightness.    Cardiovascular: Negative.  Negative for chest pain and palpitations.   Gastrointestinal: Negative.  Negative for abdominal distention, abdominal pain and nausea.   Endocrine: Negative.    Genitourinary: Negative.  Negative for dysuria, frequency and urgency.   Musculoskeletal: Negative.  Negative for back pain.   Skin: Negative.  Negative for rash.   Allergic/Immunologic: Negative.    Neurological: Negative.  Negative for dizziness, weakness, light-headedness and numbness.   Hematological: Negative.  Does not bruise/bleed easily.   Psychiatric/Behavioral: Negative.        Physical Exam     Initial Vitals [04/11/20 1642]   BP Pulse Resp Temp SpO2   114/76 110 (!) 22 99.6 °F (37.6 °C) (!) 90 %      MAP       --         Physical Exam    Nursing note and vitals reviewed.  Constitutional: She appears well-developed and well-nourished.   HENT:   Head: Normocephalic and atraumatic.   Right Ear: Tympanic membrane, external ear and ear canal normal. Tympanic membrane is not erythematous. No middle ear effusion.   Left Ear: Tympanic membrane, external ear and ear canal normal. Tympanic membrane is not erythematous.  No middle ear effusion.   Nose: Nose normal.   Mouth/Throat: Uvula is midline, oropharynx is clear and moist and mucous membranes are normal. Mucous membranes are not pale and not dry.   Eyes: Conjunctivae and EOM are normal. Pupils are equal, round, and reactive to light.   Neck: Normal range of motion. Neck supple.   Cardiovascular: Normal rate, regular rhythm, normal heart sounds and intact distal pulses.   Pulmonary/Chest: Effort normal. She has decreased breath sounds. She has no wheezes. She has no rhonchi. She has no rales.   Abdominal: Soft. Bowel sounds are normal. There is no tenderness.   Musculoskeletal: Normal range of motion.   Neurological: She is alert and oriented to person, place, and time. She has normal strength. She  displays normal reflexes. No cranial nerve deficit or sensory deficit.   Skin: Skin is warm and dry. Capillary refill takes less than 2 seconds. No rash noted.   Psychiatric: She has a normal mood and affect. Her behavior is normal. Judgment and thought content normal.         ED Course   Procedures  Labs Reviewed   CBC W/ AUTO DIFFERENTIAL - Abnormal; Notable for the following components:       Result Value    WBC 3.76 (*)     RBC 3.87 (*)     Hemoglobin 9.6 (*)     Hematocrit 31.5 (*)     Mean Corpuscular Volume 81 (*)     Mean Corpuscular Hemoglobin 24.8 (*)     Mean Corpuscular Hemoglobin Conc 30.5 (*)     RDW 17.0 (*)     Platelets 144 (*)     Lymph # 0.7 (*)     Lymph% 17.8 (*)     All other components within normal limits   CK - Abnormal; Notable for the following components:     (*)     All other components within normal limits   CK-MB - Abnormal; Notable for the following components:     (*)     CPK MB 7.1 (*)     All other components within normal limits   COMPREHENSIVE METABOLIC PANEL - Abnormal; Notable for the following components:    CO2 32 (*)     Glucose 188 (*)     BUN, Bld 24 (*)     All other components within normal limits   TROPONIN I - Abnormal; Notable for the following components:    Troponin I 0.053 (*)     All other components within normal limits   URINALYSIS, REFLEX TO URINE CULTURE - Abnormal; Notable for the following components:    Protein, UA 1+ (*)     Occult Blood UA Trace (*)     Leukocytes, UA 3+ (*)     All other components within normal limits    Narrative:     Preferred Collection Type->Urine, Clean Catch   URINALYSIS MICROSCOPIC - Abnormal; Notable for the following components:    WBC, UA 60 (*)     Bacteria Many (*)     Yeast, UA Rare (*)     All other components within normal limits    Narrative:     Preferred Collection Type->Urine, Clean Catch   INFLUENZA A & B BY MOLECULAR   CULTURE, BLOOD   CULTURE, BLOOD   CULTURE, URINE   B-TYPE NATRIURETIC PEPTIDE    MAGNESIUM   PHOSPHORUS   SARS-COV-2 RNA AMPLIFICATION, QUAL    Narrative:     What symptom criteria does the patient meet?->Difficulty  breathing   LACTIC ACID, PLASMA          Imaging Results           X-Ray Chest AP Portable (Final result)  Result time 04/11/20 17:29:59    Final result by Elmer Cardenas MD (04/11/20 17:29:59)                 Impression:      1. More apparent right lower lobe atelectasis or early infiltrate is present.  The chest is otherwise unchanged.  This report was flagged in Epic as abnormal.      Electronically signed by: Elmer Cardenas MD  Date:    04/11/2020  Time:    17:29             Narrative:    EXAMINATION:  XR CHEST AP PORTABLE    CLINICAL HISTORY:  Shortness of breath    TECHNIQUE:  Single frontal portable view of the chest was performed.    COMPARISON:  Chest x-ray dated 04/09/2020    FINDINGS:  There is right basilar atelectasis or early infiltrate.  The chest is otherwise unchanged compared to the prior study.  Heart size is at the upper limits of normal or mildly enlarged but unchanged.  The aorta is tortuous.  There is no pleural effusion or pneumothorax.  The bones are osteopenic.  There is a dual lead pacemaker unchanged in position.                                                                 Clinical Impression:       ICD-10-CM ICD-9-CM   1. Pneumonia of right lower lobe due to infectious organism J18.1 486   2. SOB (shortness of breath) R06.02 786.05   3. Respiratory failure, unspecified chronicity, unspecified whether with hypoxia or hypercapnia J96.90 518.81             ED Disposition Condition    Observation                           Bruce Malone MD  04/11/20 1818

## 2020-04-12 PROBLEM — J96.01 ACUTE HYPOXEMIC RESPIRATORY FAILURE: Status: ACTIVE | Noted: 2020-04-12

## 2020-04-12 PROBLEM — J96.21 ACUTE ON CHRONIC RESPIRATORY FAILURE WITH HYPOXEMIA: Status: ACTIVE | Noted: 2020-04-12

## 2020-04-12 LAB
ALBUMIN SERPL BCP-MCNC: 3.1 G/DL (ref 3.5–5.2)
ALP SERPL-CCNC: 75 U/L (ref 55–135)
ALT SERPL W/O P-5'-P-CCNC: 23 U/L (ref 10–44)
ANION GAP SERPL CALC-SCNC: 10 MMOL/L (ref 8–16)
ANISOCYTOSIS BLD QL SMEAR: ABNORMAL
AST SERPL-CCNC: 29 U/L (ref 10–40)
BASOPHILS # BLD AUTO: ABNORMAL K/UL (ref 0–0.2)
BASOPHILS NFR BLD: 0 % (ref 0–1.9)
BILIRUB SERPL-MCNC: 0.2 MG/DL (ref 0.1–1)
BUN SERPL-MCNC: 18 MG/DL (ref 8–23)
CALCIUM SERPL-MCNC: 8.7 MG/DL (ref 8.7–10.5)
CHLORIDE SERPL-SCNC: 100 MMOL/L (ref 95–110)
CO2 SERPL-SCNC: 32 MMOL/L (ref 23–29)
CREAT SERPL-MCNC: 0.7 MG/DL (ref 0.5–1.4)
DACRYOCYTES BLD QL SMEAR: ABNORMAL
DIFFERENTIAL METHOD: ABNORMAL
EOSINOPHIL # BLD AUTO: ABNORMAL K/UL (ref 0–0.5)
EOSINOPHIL NFR BLD: 0 % (ref 0–8)
ERYTHROCYTE [DISTWIDTH] IN BLOOD BY AUTOMATED COUNT: 16.9 % (ref 11.5–14.5)
EST. GFR  (AFRICAN AMERICAN): >60 ML/MIN/1.73 M^2
EST. GFR  (NON AFRICAN AMERICAN): >60 ML/MIN/1.73 M^2
GLUCOSE SERPL-MCNC: 100 MG/DL (ref 70–110)
HCT VFR BLD AUTO: 28.2 % (ref 37–48.5)
HGB BLD-MCNC: 8.6 G/DL (ref 12–16)
HYPOCHROMIA BLD QL SMEAR: ABNORMAL
IMM GRANULOCYTES # BLD AUTO: ABNORMAL K/UL (ref 0–0.04)
IMM GRANULOCYTES NFR BLD AUTO: ABNORMAL % (ref 0–0.5)
LYMPHOCYTES # BLD AUTO: ABNORMAL K/UL (ref 1–4.8)
LYMPHOCYTES NFR BLD: 23 % (ref 18–48)
MCH RBC QN AUTO: 24.4 PG (ref 27–31)
MCHC RBC AUTO-ENTMCNC: 30.5 G/DL (ref 32–36)
MCV RBC AUTO: 80 FL (ref 82–98)
MONOCYTES # BLD AUTO: ABNORMAL K/UL (ref 0.3–1)
MONOCYTES NFR BLD: 18 % (ref 4–15)
NEUTROPHILS NFR BLD: 59 % (ref 38–73)
NRBC BLD-RTO: 0 /100 WBC
PLATELET # BLD AUTO: 139 K/UL (ref 150–350)
PLATELET BLD QL SMEAR: ABNORMAL
PMV BLD AUTO: ABNORMAL FL (ref 9.2–12.9)
POCT GLUCOSE: 105 MG/DL (ref 70–110)
POCT GLUCOSE: 118 MG/DL (ref 70–110)
POCT GLUCOSE: 148 MG/DL (ref 70–110)
POCT GLUCOSE: 150 MG/DL (ref 70–110)
POCT GLUCOSE: 170 MG/DL (ref 70–110)
POTASSIUM SERPL-SCNC: 3.6 MMOL/L (ref 3.5–5.1)
PROT SERPL-MCNC: 6.3 G/DL (ref 6–8.4)
RBC # BLD AUTO: 3.52 M/UL (ref 4–5.4)
SODIUM SERPL-SCNC: 142 MMOL/L (ref 136–145)
TROPONIN I SERPL DL<=0.01 NG/ML-MCNC: 0.05 NG/ML (ref 0–0.03)
TROPONIN I SERPL DL<=0.01 NG/ML-MCNC: 0.06 NG/ML (ref 0–0.03)
WBC # BLD AUTO: 2.95 K/UL (ref 3.9–12.7)

## 2020-04-12 PROCEDURE — 25000242 PHARM REV CODE 250 ALT 637 W/ HCPCS: Performed by: FAMILY MEDICINE

## 2020-04-12 PROCEDURE — 84484 ASSAY OF TROPONIN QUANT: CPT

## 2020-04-12 PROCEDURE — 63600175 PHARM REV CODE 636 W HCPCS

## 2020-04-12 PROCEDURE — 27000646 HC AEROBIKA DEVICE

## 2020-04-12 PROCEDURE — 94640 AIRWAY INHALATION TREATMENT: CPT

## 2020-04-12 PROCEDURE — 85027 COMPLETE CBC AUTOMATED: CPT

## 2020-04-12 PROCEDURE — 11000001 HC ACUTE MED/SURG PRIVATE ROOM

## 2020-04-12 PROCEDURE — C9399 UNCLASSIFIED DRUGS OR BIOLOG: HCPCS | Performed by: FAMILY MEDICINE

## 2020-04-12 PROCEDURE — 25000242 PHARM REV CODE 250 ALT 637 W/ HCPCS: Performed by: SURGERY

## 2020-04-12 PROCEDURE — 63600175 PHARM REV CODE 636 W HCPCS: Performed by: FAMILY MEDICINE

## 2020-04-12 PROCEDURE — 63600175 PHARM REV CODE 636 W HCPCS: Performed by: SURGERY

## 2020-04-12 PROCEDURE — 99222 1ST HOSP IP/OBS MODERATE 55: CPT | Mod: AI,,, | Performed by: FAMILY MEDICINE

## 2020-04-12 PROCEDURE — 94664 DEMO&/EVAL PT USE INHALER: CPT

## 2020-04-12 PROCEDURE — 25000003 PHARM REV CODE 250: Performed by: SURGERY

## 2020-04-12 PROCEDURE — 84484 ASSAY OF TROPONIN QUANT: CPT | Mod: 91

## 2020-04-12 PROCEDURE — 85007 BL SMEAR W/DIFF WBC COUNT: CPT

## 2020-04-12 PROCEDURE — 25000003 PHARM REV CODE 250: Performed by: FAMILY MEDICINE

## 2020-04-12 PROCEDURE — 27000221 HC OXYGEN, UP TO 24 HOURS

## 2020-04-12 PROCEDURE — 80053 COMPREHEN METABOLIC PANEL: CPT

## 2020-04-12 PROCEDURE — 94761 N-INVAS EAR/PLS OXIMETRY MLT: CPT

## 2020-04-12 PROCEDURE — 36415 COLL VENOUS BLD VENIPUNCTURE: CPT

## 2020-04-12 PROCEDURE — 99900035 HC TECH TIME PER 15 MIN (STAT)

## 2020-04-12 PROCEDURE — 25000003 PHARM REV CODE 250: Performed by: INTERNAL MEDICINE

## 2020-04-12 PROCEDURE — 99222 PR INITIAL HOSPITAL CARE,LEVL II: ICD-10-PCS | Mod: AI,,, | Performed by: FAMILY MEDICINE

## 2020-04-12 PROCEDURE — 96376 TX/PRO/DX INJ SAME DRUG ADON: CPT

## 2020-04-12 RX ORDER — IPRATROPIUM BROMIDE AND ALBUTEROL SULFATE 2.5; .5 MG/3ML; MG/3ML
3 SOLUTION RESPIRATORY (INHALATION) EVERY 6 HOURS
Status: DISCONTINUED | OUTPATIENT
Start: 2020-04-12 | End: 2020-04-13 | Stop reason: HOSPADM

## 2020-04-12 RX ORDER — LOSARTAN POTASSIUM 25 MG/1
25 TABLET ORAL DAILY
Status: DISCONTINUED | OUTPATIENT
Start: 2020-04-13 | End: 2020-04-13 | Stop reason: HOSPADM

## 2020-04-12 RX ORDER — GLUCAGON 1 MG
1 KIT INJECTION
Status: DISCONTINUED | OUTPATIENT
Start: 2020-04-12 | End: 2020-04-13 | Stop reason: HOSPADM

## 2020-04-12 RX ORDER — ASPIRIN 81 MG/1
81 TABLET ORAL DAILY
Status: DISCONTINUED | OUTPATIENT
Start: 2020-04-13 | End: 2020-04-13 | Stop reason: HOSPADM

## 2020-04-12 RX ORDER — IBUPROFEN 200 MG
24 TABLET ORAL
Status: DISCONTINUED | OUTPATIENT
Start: 2020-04-12 | End: 2020-04-13 | Stop reason: HOSPADM

## 2020-04-12 RX ORDER — IBUPROFEN 200 MG
16 TABLET ORAL
Status: DISCONTINUED | OUTPATIENT
Start: 2020-04-12 | End: 2020-04-13 | Stop reason: HOSPADM

## 2020-04-12 RX ORDER — ROSUVASTATIN CALCIUM 10 MG/1
40 TABLET, COATED ORAL DAILY
Status: DISCONTINUED | OUTPATIENT
Start: 2020-04-13 | End: 2020-04-13 | Stop reason: HOSPADM

## 2020-04-12 RX ORDER — GUAIFENESIN 600 MG/1
600 TABLET, EXTENDED RELEASE ORAL 2 TIMES DAILY
Status: DISCONTINUED | OUTPATIENT
Start: 2020-04-12 | End: 2020-04-13 | Stop reason: HOSPADM

## 2020-04-12 RX ORDER — ENOXAPARIN SODIUM 100 MG/ML
40 INJECTION SUBCUTANEOUS EVERY 24 HOURS
Status: DISCONTINUED | OUTPATIENT
Start: 2020-04-12 | End: 2020-04-13 | Stop reason: HOSPADM

## 2020-04-12 RX ORDER — INSULIN ASPART 100 [IU]/ML
0-5 INJECTION, SOLUTION INTRAVENOUS; SUBCUTANEOUS
Status: DISCONTINUED | OUTPATIENT
Start: 2020-04-12 | End: 2020-04-13 | Stop reason: HOSPADM

## 2020-04-12 RX ORDER — EZETIMIBE 10 MG/1
10 TABLET ORAL DAILY
Status: DISCONTINUED | OUTPATIENT
Start: 2020-04-13 | End: 2020-04-13 | Stop reason: HOSPADM

## 2020-04-12 RX ORDER — MONTELUKAST SODIUM 10 MG/1
10 TABLET ORAL DAILY
Status: DISCONTINUED | OUTPATIENT
Start: 2020-04-12 | End: 2020-04-13 | Stop reason: HOSPADM

## 2020-04-12 RX ADMIN — AZITHROMYCIN MONOHYDRATE 500 MG: 500 INJECTION, POWDER, LYOPHILIZED, FOR SOLUTION INTRAVENOUS at 07:04

## 2020-04-12 RX ADMIN — IPRATROPIUM BROMIDE AND ALBUTEROL SULFATE 3 ML: .5; 3 SOLUTION RESPIRATORY (INHALATION) at 03:04

## 2020-04-12 RX ADMIN — METHYLPREDNISOLONE SODIUM SUCCINATE 40 MG: 40 INJECTION, POWDER, FOR SOLUTION INTRAMUSCULAR; INTRAVENOUS at 08:04

## 2020-04-12 RX ADMIN — METHYLPREDNISOLONE SODIUM SUCCINATE 125 MG: 125 INJECTION, POWDER, FOR SOLUTION INTRAMUSCULAR; INTRAVENOUS at 05:04

## 2020-04-12 RX ADMIN — IPRATROPIUM BROMIDE AND ALBUTEROL SULFATE 3 ML: .5; 3 SOLUTION RESPIRATORY (INHALATION) at 01:04

## 2020-04-12 RX ADMIN — MONTELUKAST 10 MG: 10 TABLET, FILM COATED ORAL at 12:04

## 2020-04-12 RX ADMIN — INSULIN ASPART 6 UNITS: 100 INJECTION, SOLUTION INTRAVENOUS; SUBCUTANEOUS at 12:04

## 2020-04-12 RX ADMIN — ENOXAPARIN SODIUM 40 MG: 100 INJECTION SUBCUTANEOUS at 06:04

## 2020-04-12 RX ADMIN — IPRATROPIUM BROMIDE AND ALBUTEROL SULFATE 3 ML: .5; 3 SOLUTION RESPIRATORY (INHALATION) at 07:04

## 2020-04-12 RX ADMIN — GUAIFENESIN 600 MG: 600 TABLET, EXTENDED RELEASE ORAL at 08:04

## 2020-04-12 RX ADMIN — CEFTRIAXONE 1 G: 1 INJECTION, SOLUTION INTRAVENOUS at 07:04

## 2020-04-12 RX ADMIN — GUAIFENESIN AND DEXTROMETHORPHAN HYDROBROMIDE 1 TABLET: 600; 30 TABLET, EXTENDED RELEASE ORAL at 10:04

## 2020-04-12 RX ADMIN — INSULIN DETEMIR 10 UNITS: 100 INJECTION, SOLUTION SUBCUTANEOUS at 08:04

## 2020-04-12 RX ADMIN — PANTOPRAZOLE SODIUM 40 MG: 40 TABLET, DELAYED RELEASE ORAL at 10:04

## 2020-04-12 NOTE — ASSESSMENT & PLAN NOTE
Failed levaquin (she got 2 doses, and cxr worse, hypoxia worse).  Start on rocephin and azithromycin.  covid neg x 2     Cont with neb treatments.

## 2020-04-12 NOTE — SUBJECTIVE & OBJECTIVE
Past Medical History:   Diagnosis Date    Arthritis     Depression     Diabetes mellitus type II     Hyperlipidemia     Hypertension     Pacemaker        Past Surgical History:   Procedure Laterality Date    CARDIAC PACEMAKER PLACEMENT       SECTION      CYST REMOVAL Left 2019    Procedure: EXCISION, SEBACEOUS CYST;  Surgeon: Jaylen Gonzalez Jr., MD;  Location: [facility];  Service: General;  Laterality: Left;    INNER EAR SURGERY         Review of patient's allergies indicates:  No Known Allergies    No current facility-administered medications on file prior to encounter.      Current Outpatient Medications on File Prior to Encounter   Medication Sig    ADVAIR DISKUS 250-50 mcg/dose diskus inhaler INHALE ONE PUFF INTO THE LUNGS TWICE DAILY AS DIRECTED    aspirin (ECOTRIN) 81 MG EC tablet Take 81 mg by mouth once daily.    benzonatate (TESSALON) 100 MG capsule Take 2 capsules (200 mg total) by mouth 3 (three) times daily as needed for Cough.    coenzyme Q10 (CO Q-10) 100 mg capsule Take 100 mg by mouth once daily.    leflunomide (ARAVA) 10 MG Tab Take 10 mg by mouth once daily.    levoFLOXacin (LEVAQUIN) 500 MG tablet Take 1 tablet (500 mg total) by mouth once daily. for 7 days    losartan (COZAAR) 25 MG tablet Take 25 mg by mouth once daily.    metFORMIN (GLUCOPHAGE) 500 MG tablet TAKE ONE TABLET BY MOUTH TWICE DAILY AFTER MEALS    omeprazole (PRILOSEC) 40 MG capsule Take 40 mg by mouth once daily.    sulfaSALAzine (AZULFIDINE) 500 mg Tab TAKE ONE TABLET BY MOUTH TWICE DAILY    ZETIA 10 mg tablet Take 10 mg by mouth once daily.    albuterol (PROVENTIL) 2.5 mg /3 mL (0.083 %) nebulizer solution USE 1 VIAL IN NEBULIZER EVERY 4 TO 6 HOURS - as directed    HYDROcodone-acetaminophen (NORCO) 5-325 mg per tablet Take 1 tablet by mouth every 6 (six) hours as needed for Pain.    rosuvastatin (CRESTOR) 40 MG Tab Take 40 mg by mouth once daily.     SYMBICORT 160-4.5 mcg/actuation HFAA       Family History     Problem Relation (Age of Onset)    Breast cancer Mother, Sister    Cancer Mother, Sister    Stroke Father, Maternal Grandmother        Tobacco Use    Smoking status: Former Smoker     Packs/day: 2.00     Years: 43.00     Pack years: 86.00     Types: Cigarettes     Last attempt to quit: 2000     Years since quittin.2    Smokeless tobacco: Never Used   Substance and Sexual Activity    Alcohol use: No    Drug use: No    Sexual activity: Not on file     Review of Systems   Constitutional: Negative for chills and fever.   HENT: Negative for congestion, ear pain, postnasal drip, rhinorrhea, sore throat and trouble swallowing.    Eyes: Negative for redness and itching.   Respiratory: Positive for apnea, cough, shortness of breath and wheezing.    Cardiovascular: Negative for chest pain and palpitations.   Gastrointestinal: Negative for abdominal pain, diarrhea, nausea and vomiting.   Genitourinary: Negative for dysuria and frequency.   Skin: Negative for rash.   Neurological: Negative for weakness and headaches.     Objective:     Vital Signs (Most Recent):  Temp: 98.2 °F (36.8 °C) (20 1632)  Pulse: 95 (20 1632)  Resp: 16 (20 1632)  BP: 114/63 (20 1632)  SpO2: 96 % (20 1632) Vital Signs (24h Range):  Temp:  [97.3 °F (36.3 °C)-98.2 °F (36.8 °C)] 98.2 °F (36.8 °C)  Pulse:  [] 95  Resp:  [16-31] 16  SpO2:  [93 %-98 %] 96 %  BP: (110-139)/(56-83) 114/63     Weight: 70.3 kg (155 lb)  Body mass index is 32.4 kg/m².    Physical Exam   Constitutional: She is oriented to person, place, and time. She appears well-developed. No distress.   HENT:   Head: Normocephalic and atraumatic.   3L NC in place   Eyes: Pupils are equal, round, and reactive to light. Conjunctivae are normal.   Neck: Normal range of motion. Neck supple. No thyromegaly present.   Cardiovascular: Normal rate, regular rhythm, normal heart sounds and intact distal pulses.   Pulmonary/Chest:  Effort normal. No respiratory distress. She has wheezes (very minimal end exp wheeze at upper lobes).   Abdominal: Soft. Bowel sounds are normal. There is no tenderness.   Musculoskeletal: Normal range of motion. She exhibits no edema.   Lymphadenopathy:     She has no cervical adenopathy.   Neurological: She is alert and oriented to person, place, and time.   Skin: Skin is warm and dry. No rash noted.   Psychiatric: She has a normal mood and affect. Her behavior is normal.   Nursing note and vitals reviewed.        CRANIAL NERVES     CN III, IV, VI   Pupils are equal, round, and reactive to light.       Significant Labs:   ABGs:   Recent Labs   Lab 04/11/20  1700   PH 7.48*   PCO2 48*   HCO3 35.70*   POCSATURATED 89.6*   BE 10.90*   TOTALHB 9.5*   COHB 0.4   METHB 0.4     Blood Culture:   Recent Labs   Lab 04/11/20  1708   LABBLOO No Growth to date  No Growth to date     CBC:   Recent Labs   Lab 04/11/20  1708 04/12/20  0616   WBC 3.76* 2.95*   HGB 9.6* 8.6*   HCT 31.5* 28.2*   * 139*     CMP:   Recent Labs   Lab 04/11/20  1708 04/12/20  0616    142   K 3.5 3.6   CL 97 100   CO2 32* 32*   * 100   BUN 24* 18   CREATININE 0.9 0.7   CALCIUM 9.0 8.7   PROT 7.0 6.3   ALBUMIN 3.5 3.1*   BILITOT 0.4 0.2   ALKPHOS 87 75   AST 39 29   ALT 28 23   ANIONGAP 10 10   EGFRNONAA >60 >60     POCT Glucose:   Recent Labs   Lab 04/12/20  0622 04/12/20  1142 04/12/20  1631   POCTGLUCOSE 105 148* 118*     Troponin:   Recent Labs   Lab 04/12/20  0005 04/12/20  0616 04/12/20  1200   TROPONINI 0.052* 0.064* 0.059*     TSH:   Recent Labs   Lab 12/04/19  0850   TSH 1.802     Urine Culture: No results for input(s): LABURIN in the last 48 hours.  Urine Studies:   Recent Labs   Lab 04/11/20  1734   COLORU Yellow   APPEARANCEUA Clear   PHUR 6.0   SPECGRAV 1.025   PROTEINUA 1+*   GLUCUA Negative   KETONESU Negative   BILIRUBINUA Negative   OCCULTUA Trace*   NITRITE Negative   UROBILINOGEN Negative   LEUKOCYTESUR 3+*   RBCUA  4   WBCUA 60*   BACTERIA Many*   SQUAMEPITHEL 7   HYALINECASTS 0     UCx pending.    D dimer - 1.4 (chronically elevated)    Significant Imaging: I have reviewed all pertinent imaging results/findings within the past 24 hours.     CXR:  Hazy opacity along the periphery of the left lung likely related to superimposition of soft tissues rather than true parenchymal abnormality although not entirely excluded.  This is superimposed upon chronic lung changes.  The heart is enlarged.  The aorta is tortuous.  There is prominence of the central pulmonary vasculature as seen on prior examinations.  Age-appropriate degenerative    4/11 cxr:  1. More apparent right lower lobe atelectasis or early infiltrate is present.  The chest is otherwise unchanged.  This report was flagged in Epic as abnormal

## 2020-04-12 NOTE — PLAN OF CARE
Patient admitted with shortness of breath and bronchitis.  Continuing IV antibiotics.  Continued on IV steroids but dose decreased.  Lovenox for VTE.  Nebs and Accapella every 6 hours.  Heart monitor NSR.  Glucose checks with meals.  Patient oxygen saturation 97% on 2 liters, wears at home 2 liters as well.  Pending occult blood lab, awaiting stool collection.  Plan of care discussed with patient.

## 2020-04-12 NOTE — PLAN OF CARE
Problem: Fall Injury Risk  Goal: Absence of Fall and Fall-Related Injury  Outcome: Ongoing, Progressing     Problem: Adult Inpatient Plan of Care  Goal: Plan of Care Review  Outcome: Ongoing, Progressing  Goal: Patient-Specific Goal (Individualization)  Outcome: Ongoing, Progressing  Goal: Absence of Hospital-Acquired Illness or Injury  Outcome: Ongoing, Progressing  Goal: Optimal Comfort and Wellbeing  Outcome: Ongoing, Progressing  Goal: Readiness for Transition of Care  Outcome: Ongoing, Progressing  Goal: Rounds/Family Conference  Outcome: Ongoing, Progressing     Problem: Diabetes Comorbidity  Goal: Blood Glucose Level Within Desired Range  Outcome: Ongoing, Progressing     Problem: Skin Injury Risk Increased  Goal: Skin Health and Integrity  Outcome: Ongoing, Progressing     Problem: Adjustment to Illness COPD (Chronic Obstructive Pulmonary Disease)  Goal: Optimal Chronic Illness Coping  Outcome: Ongoing, Progressing     Problem: Functional Ability Impaired COPD (Chronic Obstructive Pulmonary Disease)  Goal: Optimal Level of Functional [place]  Outcome: Ongoing, Progressing     Problem: Oral Intake Inadequate COPD (Chronic Obstructive Pulmonary Disease)  Goal: Improved Nutrition Intake  Outcome: Ongoing, Progressing     Problem: Infection COPD (Chronic Obstructive Pulmonary Disease)  Goal: Absence of Infection Signs/Symptoms  Outcome: Ongoing, Progressing     Problem: Respiratory Compromise COPD (Chronic Obstructive Pulmonary Disease)  Goal: Effective Oxygenation and Ventilation  Outcome: Ongoing, Progressing     Problem: Gas Exchange Impaired  Goal: Optimal Gas Exchange  Outcome: Ongoing, Progressing     Patient admitted with COPD exacerbation. Tested negative for COVID twice. On 2L NC. Ambulated to the restroom independently with standby assistance. Received 20 units of levemir and PRN tylenol for leg spasms. Also received IV azithromycin and rocephin. Free from falls and injury. Afebrile throughout  the night.

## 2020-04-12 NOTE — ASSESSMENT & PLAN NOTE
Patinet on home o2.  Not as hypercapneic as previous.  MONITOR FOR HYPERCAPNEA  Treat for pna and copd exacerbation

## 2020-04-12 NOTE — CONSULTS
[facility]  Pulmonology  Consult Note    Patient Name: Gretel Ashton  MRN: [id number]  Admission Date: 2020  Hospital Length of Stay: 0 days  Code Status: Full Code  Attending Physician: Althea Krause MD  Primary Care Provider: Jailene Astudillo MD   Principal Problem: <principal problem not specified>    Consults  Subjective:     HPI:  Gretel Ashton is a 79 y.o. year old female that's presents with a chief complaint of SOB and Wheezing for 5 to 7 days.Unable to walk further than 75 feet before getting SOB . Consulted to evaluate Respiratory status.    Past Medical History:   Diagnosis Date    Arthritis     Depression     Diabetes mellitus type II     Hyperlipidemia     Hypertension     Pacemaker         Past Surgical History:   Procedure Laterality Date    CARDIAC PACEMAKER PLACEMENT       SECTION      CYST REMOVAL Left 2019    Procedure: EXCISION, SEBACEOUS CYST;  Surgeon: Jaylen Gonzalez Jr., MD;  Location: [facility];  Service: General;  Laterality: Left;    INNER EAR SURGERY         Prior to Admission medications    Medication Sig Start Date End Date Taking? Authorizing Provider   ADVAIR DISKUS 250-50 mcg/dose diskus inhaler INHALE ONE PUFF INTO THE LUNGS TWICE DAILY AS DIRECTED 20  Yes Jailene Astudillo MD   aspirin (ECOTRIN) 81 MG EC tablet Take 81 mg by mouth once daily.   Yes Historical Provider, MD   benzonatate (TESSALON) 100 MG capsule Take 2 capsules (200 mg total) by mouth 3 (three) times daily as needed for Cough. 20 Yes Bruce Malone MD   coenzyme Q10 (CO Q-10) 100 mg capsule Take 100 mg by mouth once daily.   Yes Historical Provider, MD   leflunomide (ARAVA) 10 MG Tab Take 10 mg by mouth once daily. 11/15/19  Yes Historical Provider, MD   levoFLOXacin (LEVAQUIN) 500 MG tablet Take 1 tablet (500 mg total) by mouth once daily. for 7 days 20 Yes Bruce Malone MD   losartan (COZAAR) 25 MG tablet Take 25 mg by mouth  once daily. 19  Yes Historical Provider, MD   metFORMIN (GLUCOPHAGE) 500 MG tablet TAKE ONE TABLET BY MOUTH TWICE DAILY AFTER MEALS 2/10/20  Yes Jailene Astudillo MD   omeprazole (PRILOSEC) 40 MG capsule Take 40 mg by mouth once daily. 19  Yes Historical Provider, MD   sulfaSALAzine (AZULFIDINE) 500 mg Tab TAKE ONE TABLET BY MOUTH TWICE DAILY 19  Yes Jailene Astudillo MD   ZETIA 10 mg tablet Take 10 mg by mouth once daily. 16  Yes Historical Provider, MD   albuterol (PROVENTIL) 2.5 mg /3 mL (0.083 %) nebulizer solution USE 1 VIAL IN NEBULIZER EVERY 4 TO 6 HOURS - as directed 3/19/20   Jailene Astudillo MD   HYDROcodone-acetaminophen (NORCO) 5-325 mg per tablet Take 1 tablet by mouth every 6 (six) hours as needed for Pain. 19   Jaylen Gonzalez Jr., MD   rosuvastatin (CRESTOR) 40 MG Tab Take 40 mg by mouth once daily.  19   Historical Provider, MD   SYMBICORT 160-4.5 mcg/actuation HFAA  3/11/19   Historical Provider, MD       Social History     Socioeconomic History    Marital status:      Spouse name: Not on file    Number of children: Not on file    Years of education: Not on file    Highest education level: Not on file   Occupational History    Not on file   Social Needs    Financial resource strain: Not on file    Food insecurity:     Worry: Not on file     Inability: Not on file    Transportation needs:     Medical: Not on file     Non-medical: Not on file   Tobacco Use    Smoking status: Former Smoker     Packs/day: 2.00     Years: 43.00     Pack years: 86.00     Types: Cigarettes     Last attempt to quit: 2000     Years since quittin.2    Smokeless tobacco: Never Used   Substance and Sexual Activity    Alcohol use: No    Drug use: No    Sexual activity: Not on file   Lifestyle    Physical activity:     Days per week: Not on file     Minutes per session: Not on file    Stress: To some extent   Relationships    Social connections:     Talks on  phone: Not on file     Gets together: Not on file     Attends Yazidi service: Not on file     Active member of club or organization: Not on file     Attends meetings of clubs or organizations: Not on file     Relationship status: Not on file   Other Topics Concern    Not on file   Social History Narrative    Not on file       Family History   Problem Relation Age of Onset    Cancer Mother     Breast cancer Mother     Stroke Father     Cancer Sister     Breast cancer Sister     Stroke Maternal Grandmother        Review of patient's allergies indicates:  No Known Allergies Allergies have been reviewed.     ROS: ROS    PE:   Vitals:    04/12/20 0700 04/12/20 0734 04/12/20 0800 04/12/20 1000   BP:  138/67     BP Location:  Left arm     Patient Position:  Lying     Pulse: 79 83 83 110   Resp: 18 18     Temp:  97.6 °F (36.4 °C)     TempSrc:  Tympanic     SpO2: 97% 97%     Weight:        Physical Exam    Alert and orientated X 3   HEENT: Head: Normocephalic no trauma                Ears : Normal Pinna No Drainage no Battles sign                Eyes: Vision Unchanged, No conjunctivitis,No drainage                Neck: Supple, No JVD,No Abnormal Carotid Pulsations                Throat: No Erythema, No pus,No Swelling,Mallampati score= 2    Chest: course BS with dry crackles and scattered Wheezing   Cardiac: RRR S1+ S2 with a -S3: +M = 2/6, No R/H/G  Abdomen: Bowel Sounds are Normal.Soft Abdomen. No organomegaly of Liver,Spleen,or Kidneys   CNS: Non focal and intact. Cranial nerves 2, 346,8,9,10 and 12 are normal.Norrmal gait.Normal posture.  Extremities: No Clubbing,No Cyanosis with oxygen,Positive mild edema of lower extremities Bilateral  Skin: No Rash, No Ulcerative sores,and No cellulitis of the IV site.    Lab Results   Component Value Date    WBC 2.95 (L) 04/12/2020    HGB 8.6 (L) 04/12/2020    HCT 28.2 (L) 04/12/2020     (L) 04/12/2020    CHOL 134 12/04/2019    TRIG 87 12/04/2019    HDL 39 (L)  12/04/2019    ALT 23 04/12/2020    AST 29 04/12/2020     04/12/2020    K 3.6 04/12/2020     04/12/2020    CREATININE 0.7 04/12/2020    BUN 18 04/12/2020    CO2 32 (H) 04/12/2020    TSH 1.802 12/04/2019    INR 0.9 04/09/2020    HGBA1C 5.4 04/11/2020               Assessment/Plan:     Acute hypoxemic respiratory failure  po2 53 will follow pt known to me has RA and some fibrosis affecting oxygenation as well as COPD    Pneumonia of right lower lobe due to infectious organism  Will follow cxr consider ct chest if persistent fever     COPD (chronic obstructive pulmonary disease)  Probably a factor of her worsening SOB and Dyspnea on Exertion KERNS    o2 not wearing will give nebs and pulmonary toilet       Thank you for your consult. I will follow-up with patient. Please contact us if you have any additional questions.     Hood Solano MD  Pulmonology  [facility]

## 2020-04-12 NOTE — H&P
[facility] Medicine  History & Physical    Patient Name: Gretel Ashton  MRN: [id number]  Admission Date: 2020  Attending Physician: Althea Krause MD   Primary Care Provider: Jailene Astudillo MD         Patient information was obtained from patient and ER records.     Subjective:     Principal Problem:Acute on chronic respiratory failure with hypoxemia    Chief Complaint:   Chief Complaint   Patient presents with    Shortness of Breath        HPI: 79 year old female with chronic respiratory failure on 2-3 L at home secondary to copd started with coughing a few days ago. She notes that she came to the ER and was sent home on abx. She says she started this and also started the steroids that she was prescribed, but she stopped breathing last night so she came back to the ER. She notes that she was coughing, stopped breathing, , went to heaven, and was able to catch her breath again. At that point, she says she coughed up a large piece of thick mucous. She denies any fevers. She has been using her inhalers as she is prescribed and denies any sick contacts.    Past Medical History:   Diagnosis Date    Arthritis     Depression     Diabetes mellitus type II     Hyperlipidemia     Hypertension     Pacemaker        Past Surgical History:   Procedure Laterality Date    CARDIAC PACEMAKER PLACEMENT       SECTION      CYST REMOVAL Left 2019    Procedure: EXCISION, SEBACEOUS CYST;  Surgeon: Jaylen Gonzalez Jr., MD;  Location: [facility] OR;  Service: General;  Laterality: Left;    INNER EAR SURGERY         Review of patient's allergies indicates:  No Known Allergies    No current facility-administered medications on file prior to encounter.      Current Outpatient Medications on File Prior to Encounter   Medication Sig    ADVAIR DISKUS 250-50 mcg/dose diskus inhaler INHALE ONE PUFF INTO THE LUNGS TWICE DAILY AS DIRECTED    aspirin (ECOTRIN) 81 MG EC tablet Take 81 mg  by mouth once daily.    benzonatate (TESSALON) 100 MG capsule Take 2 capsules (200 mg total) by mouth 3 (three) times daily as needed for Cough.    coenzyme Q10 (CO Q-10) 100 mg capsule Take 100 mg by mouth once daily.    leflunomide (ARAVA) 10 MG Tab Take 10 mg by mouth once daily.    levoFLOXacin (LEVAQUIN) 500 MG tablet Take 1 tablet (500 mg total) by mouth once daily. for 7 days    losartan (COZAAR) 25 MG tablet Take 25 mg by mouth once daily.    metFORMIN (GLUCOPHAGE) 500 MG tablet TAKE ONE TABLET BY MOUTH TWICE DAILY AFTER MEALS    omeprazole (PRILOSEC) 40 MG capsule Take 40 mg by mouth once daily.    sulfaSALAzine (AZULFIDINE) 500 mg Tab TAKE ONE TABLET BY MOUTH TWICE DAILY    ZETIA 10 mg tablet Take 10 mg by mouth once daily.    albuterol (PROVENTIL) 2.5 mg /3 mL (0.083 %) nebulizer solution USE 1 VIAL IN NEBULIZER EVERY 4 TO 6 HOURS - as directed    HYDROcodone-acetaminophen (NORCO) 5-325 mg per tablet Take 1 tablet by mouth every 6 (six) hours as needed for Pain.    rosuvastatin (CRESTOR) 40 MG Tab Take 40 mg by mouth once daily.     SYMBICORT 160-4.5 mcg/actuation HFAA      Family History     Problem Relation (Age of Onset)    Breast cancer Mother, Sister    Cancer Mother, Sister    Stroke Father, Maternal Grandmother        Tobacco Use    Smoking status: Former Smoker     Packs/day: 2.00     Years: 43.00     Pack years: 86.00     Types: Cigarettes     Last attempt to quit: 2000     Years since quittin.2    Smokeless tobacco: Never Used   Substance and Sexual Activity    Alcohol use: No    Drug use: No    Sexual activity: Not on file     Review of Systems   Constitutional: Negative for chills and fever.   HENT: Negative for congestion, ear pain, postnasal drip, rhinorrhea, sore throat and trouble swallowing.    Eyes: Negative for redness and itching.   Respiratory: Positive for apnea, cough, shortness of breath and wheezing.    Cardiovascular: Negative for chest pain and  palpitations.   Gastrointestinal: Negative for abdominal pain, diarrhea, nausea and vomiting.   Genitourinary: Negative for dysuria and frequency.   Skin: Negative for rash.   Neurological: Negative for weakness and headaches.     Objective:     Vital Signs (Most Recent):  Temp: 98.2 °F (36.8 °C) (04/12/20 1632)  Pulse: 95 (04/12/20 1632)  Resp: 16 (04/12/20 1632)  BP: 114/63 (04/12/20 1632)  SpO2: 96 % (04/12/20 1632) Vital Signs (24h Range):  Temp:  [97.3 °F (36.3 °C)-98.2 °F (36.8 °C)] 98.2 °F (36.8 °C)  Pulse:  [] 95  Resp:  [16-31] 16  SpO2:  [93 %-98 %] 96 %  BP: (110-139)/(56-83) 114/63     Weight: 70.3 kg (155 lb)  Body mass index is 32.4 kg/m².    Physical Exam   Constitutional: She is oriented to person, place, and time. She appears well-developed. No distress.   HENT:   Head: Normocephalic and atraumatic.   3L NC in place   Eyes: Pupils are equal, round, and reactive to light. Conjunctivae are normal.   Neck: Normal range of motion. Neck supple. No thyromegaly present.   Cardiovascular: Normal rate, regular rhythm, normal heart sounds and intact distal pulses.   Pulmonary/Chest: Effort normal. No respiratory distress. She has wheezes (very minimal end exp wheeze at upper lobes).   Abdominal: Soft. Bowel sounds are normal. There is no tenderness.   Musculoskeletal: Normal range of motion. She exhibits no edema.   Lymphadenopathy:     She has no cervical adenopathy.   Neurological: She is alert and oriented to person, place, and time.   Skin: Skin is warm and dry. No rash noted.   Psychiatric: She has a normal mood and affect. Her behavior is normal.   Nursing note and vitals reviewed.        CRANIAL NERVES     CN III, IV, VI   Pupils are equal, round, and reactive to light.       Significant Labs:   ABGs:   Recent Labs   Lab 04/11/20  1700   PH 7.48*   PCO2 48*   HCO3 35.70*   POCSATURATED 89.6*   BE 10.90*   TOTALHB 9.5*   COHB 0.4   METHB 0.4     Blood Culture:   Recent Labs   Lab 04/11/20  6582    LABBLOO No Growth to date  No Growth to date     CBC:   Recent Labs   Lab 04/11/20  1708 04/12/20  0616   WBC 3.76* 2.95*   HGB 9.6* 8.6*   HCT 31.5* 28.2*   * 139*     CMP:   Recent Labs   Lab 04/11/20  1708 04/12/20  0616    142   K 3.5 3.6   CL 97 100   CO2 32* 32*   * 100   BUN 24* 18   CREATININE 0.9 0.7   CALCIUM 9.0 8.7   PROT 7.0 6.3   ALBUMIN 3.5 3.1*   BILITOT 0.4 0.2   ALKPHOS 87 75   AST 39 29   ALT 28 23   ANIONGAP 10 10   EGFRNONAA >60 >60     POCT Glucose:   Recent Labs   Lab 04/12/20  0622 04/12/20  1142 04/12/20  1631   POCTGLUCOSE 105 148* 118*     Troponin:   Recent Labs   Lab 04/12/20  0005 04/12/20  0616 04/12/20  1200   TROPONINI 0.052* 0.064* 0.059*     TSH:   Recent Labs   Lab 12/04/19  0850   TSH 1.802     Urine Culture: No results for input(s): LABURIN in the last 48 hours.  Urine Studies:   Recent Labs   Lab 04/11/20  1734   COLORU Yellow   APPEARANCEUA Clear   PHUR 6.0   SPECGRAV 1.025   PROTEINUA 1+*   GLUCUA Negative   KETONESU Negative   BILIRUBINUA Negative   OCCULTUA Trace*   NITRITE Negative   UROBILINOGEN Negative   LEUKOCYTESUR 3+*   RBCUA 4   WBCUA 60*   BACTERIA Many*   SQUAMEPITHEL 7   HYALINECASTS 0     UCx pending.    D dimer - 1.4 (chronically elevated)    Significant Imaging: I have reviewed all pertinent imaging results/findings within the past 24 hours.     CXR:  Hazy opacity along the periphery of the left lung likely related to superimposition of soft tissues rather than true parenchymal abnormality although not entirely excluded.  This is superimposed upon chronic lung changes.  The heart is enlarged.  The aorta is tortuous.  There is prominence of the central pulmonary vasculature as seen on prior examinations.  Age-appropriate degenerative    4/11 cxr:  1. More apparent right lower lobe atelectasis or early infiltrate is present.  The chest is otherwise unchanged.  This report was flagged in Epic as abnormal    Assessment/Plan:     * Acute on  chronic respiratory failure with hypoxemia  Patinet on home o2.  Not as hypercapneic as previous.  MONITOR FOR HYPERCAPNEA  Treat for pna and copd exacerbation      Pneumonia of right lower lobe due to infectious organism  Failed levaquin (she got 2 doses, and cxr worse, hypoxia worse).  Start on rocephin and azithromycin.  covid neg x 2     Cont with neb treatments.        Rheumatoid arthritis involving both hands with negative rheumatoid factor  leflunamide and sulfasalazine on hold.      Type 2 diabetes mellitus with diabetic neuropathy, without long-term current use of insulin  Diabetic diet.  Metformin on hold.  Will order sliding scale insulin.      COPD (chronic obstructive pulmonary disease)  Solumedrol, abx, beta agonists.        VTE Risk Mitigation (From admission, onward)         Ordered     IP VTE HIGH RISK PATIENT  Once      04/11/20 1853     Place sequential compression device  Until discontinued      04/11/20 1853                   Althea Krause MD  Department of Hospital Medicine   [facility]

## 2020-04-12 NOTE — HPI
79 year old female with chronic respiratory failure on 2-3 L at home secondary to copd started with coughing a few days ago. She notes that she came to the ER and was sent home on abx. She says she started this and also started the steroids that she was prescribed, but she stopped breathing last night so she came back to the ER. She notes that she was coughing, stopped breathing, , went to heaven, and was able to catch her breath again. At that point, she says she coughed up a large piece of thick mucous. She denies any fevers. She has been using her inhalers as she is prescribed and denies any sick contacts.

## 2020-04-13 VITALS
BODY MASS INDEX: 32.54 KG/M2 | DIASTOLIC BLOOD PRESSURE: 70 MMHG | WEIGHT: 155 LBS | TEMPERATURE: 96 F | RESPIRATION RATE: 18 BRPM | OXYGEN SATURATION: 98 % | HEART RATE: 92 BPM | SYSTOLIC BLOOD PRESSURE: 145 MMHG | HEIGHT: 58 IN

## 2020-04-13 PROBLEM — N30.90 CYSTITIS: Status: ACTIVE | Noted: 2020-04-13

## 2020-04-13 PROBLEM — I10 HTN (HYPERTENSION): Status: ACTIVE | Noted: 2020-04-13

## 2020-04-13 LAB
ALBUMIN SERPL BCP-MCNC: 3.3 G/DL (ref 3.5–5.2)
ALP SERPL-CCNC: 75 U/L (ref 55–135)
ALT SERPL W/O P-5'-P-CCNC: 24 U/L (ref 10–44)
ANION GAP SERPL CALC-SCNC: 7 MMOL/L (ref 8–16)
AST SERPL-CCNC: 31 U/L (ref 10–40)
BACTERIA UR CULT: NORMAL
BACTERIA UR CULT: NORMAL
BASOPHILS # BLD AUTO: 0.01 K/UL (ref 0–0.2)
BASOPHILS NFR BLD: 0.2 % (ref 0–1.9)
BILIRUB SERPL-MCNC: 0.3 MG/DL (ref 0.1–1)
BUN SERPL-MCNC: 21 MG/DL (ref 8–23)
CALCIUM SERPL-MCNC: 8.7 MG/DL (ref 8.7–10.5)
CHLORIDE SERPL-SCNC: 102 MMOL/L (ref 95–110)
CO2 SERPL-SCNC: 33 MMOL/L (ref 23–29)
CREAT SERPL-MCNC: 0.8 MG/DL (ref 0.5–1.4)
DIFFERENTIAL METHOD: ABNORMAL
EOSINOPHIL # BLD AUTO: 0.1 K/UL (ref 0–0.5)
EOSINOPHIL NFR BLD: 2.4 % (ref 0–8)
ERYTHROCYTE [DISTWIDTH] IN BLOOD BY AUTOMATED COUNT: 17.1 % (ref 11.5–14.5)
EST. GFR  (AFRICAN AMERICAN): >60 ML/MIN/1.73 M^2
EST. GFR  (NON AFRICAN AMERICAN): >60 ML/MIN/1.73 M^2
FERRITIN SERPL-MCNC: 22 NG/ML (ref 20–300)
FOLATE SERPL-MCNC: 5.1 NG/ML (ref 4–24)
GLUCOSE SERPL-MCNC: 92 MG/DL (ref 70–110)
HCT VFR BLD AUTO: 30.7 % (ref 37–48.5)
HGB BLD-MCNC: 9.2 G/DL (ref 12–16)
IMM GRANULOCYTES # BLD AUTO: 0.04 K/UL (ref 0–0.04)
IMM GRANULOCYTES NFR BLD AUTO: 0.7 % (ref 0–0.5)
IRON SERPL-MCNC: 28 UG/DL (ref 30–160)
LYMPHOCYTES # BLD AUTO: 0.9 K/UL (ref 1–4.8)
LYMPHOCYTES NFR BLD: 15.9 % (ref 18–48)
MCH RBC QN AUTO: 24.3 PG (ref 27–31)
MCHC RBC AUTO-ENTMCNC: 30 G/DL (ref 32–36)
MCV RBC AUTO: 81 FL (ref 82–98)
MONOCYTES # BLD AUTO: 0.4 K/UL (ref 0.3–1)
MONOCYTES NFR BLD: 8.1 % (ref 4–15)
NEUTROPHILS # BLD AUTO: 3.9 K/UL (ref 1.8–7.7)
NEUTROPHILS NFR BLD: 72.7 % (ref 38–73)
NRBC BLD-RTO: 0 /100 WBC
PLATELET # BLD AUTO: 156 K/UL (ref 150–350)
PMV BLD AUTO: ABNORMAL FL (ref 9.2–12.9)
POCT GLUCOSE: 123 MG/DL (ref 70–110)
POCT GLUCOSE: 87 MG/DL (ref 70–110)
POCT GLUCOSE: 88 MG/DL (ref 70–110)
POCT GLUCOSE: 99 MG/DL (ref 70–110)
POTASSIUM SERPL-SCNC: 3.9 MMOL/L (ref 3.5–5.1)
PROT SERPL-MCNC: 6.5 G/DL (ref 6–8.4)
RBC # BLD AUTO: 3.79 M/UL (ref 4–5.4)
SATURATED IRON: 7 % (ref 20–50)
SODIUM SERPL-SCNC: 142 MMOL/L (ref 136–145)
TOTAL IRON BINDING CAPACITY: 420 UG/DL (ref 250–450)
TRANSFERRIN SERPL-MCNC: 284 MG/DL (ref 200–375)
TROPONIN I SERPL DL<=0.01 NG/ML-MCNC: 0.05 NG/ML (ref 0–0.03)
VIT B12 SERPL-MCNC: 379 PG/ML (ref 210–950)
WBC # BLD AUTO: 5.4 K/UL (ref 3.9–12.7)

## 2020-04-13 PROCEDURE — 83540 ASSAY OF IRON: CPT

## 2020-04-13 PROCEDURE — 25000003 PHARM REV CODE 250: Performed by: FAMILY MEDICINE

## 2020-04-13 PROCEDURE — 63600175 PHARM REV CODE 636 W HCPCS: Performed by: FAMILY MEDICINE

## 2020-04-13 PROCEDURE — 82607 VITAMIN B-12: CPT

## 2020-04-13 PROCEDURE — 94664 DEMO&/EVAL PT USE INHALER: CPT

## 2020-04-13 PROCEDURE — 27000221 HC OXYGEN, UP TO 24 HOURS

## 2020-04-13 PROCEDURE — 94760 N-INVAS EAR/PLS OXIMETRY 1: CPT

## 2020-04-13 PROCEDURE — 94640 AIRWAY INHALATION TREATMENT: CPT

## 2020-04-13 PROCEDURE — 80053 COMPREHEN METABOLIC PANEL: CPT

## 2020-04-13 PROCEDURE — 85025 COMPLETE CBC W/AUTO DIFF WBC: CPT

## 2020-04-13 PROCEDURE — 84484 ASSAY OF TROPONIN QUANT: CPT | Mod: 91

## 2020-04-13 PROCEDURE — 82728 ASSAY OF FERRITIN: CPT

## 2020-04-13 PROCEDURE — 25000003 PHARM REV CODE 250: Performed by: NURSE PRACTITIONER

## 2020-04-13 PROCEDURE — 25000242 PHARM REV CODE 250 ALT 637 W/ HCPCS: Performed by: FAMILY MEDICINE

## 2020-04-13 PROCEDURE — 94761 N-INVAS EAR/PLS OXIMETRY MLT: CPT

## 2020-04-13 PROCEDURE — 82746 ASSAY OF FOLIC ACID SERUM: CPT

## 2020-04-13 PROCEDURE — 36415 COLL VENOUS BLD VENIPUNCTURE: CPT

## 2020-04-13 PROCEDURE — 99900035 HC TECH TIME PER 15 MIN (STAT)

## 2020-04-13 PROCEDURE — 99238 HOSP IP/OBS DSCHRG MGMT 30/<: CPT | Mod: ,,, | Performed by: FAMILY MEDICINE

## 2020-04-13 PROCEDURE — 99238 PR HOSPITAL DISCHARGE DAY,<30 MIN: ICD-10-PCS | Mod: ,,, | Performed by: FAMILY MEDICINE

## 2020-04-13 RX ORDER — MONTELUKAST SODIUM 10 MG/1
10 TABLET ORAL DAILY
Qty: 30 TABLET | Refills: 0 | Status: SHIPPED | OUTPATIENT
Start: 2020-04-14 | End: 2020-05-14

## 2020-04-13 RX ORDER — METHYLPREDNISOLONE 4 MG/1
TABLET ORAL
Qty: 1 PACKAGE | Refills: 0 | Status: SHIPPED | OUTPATIENT
Start: 2020-04-13 | End: 2020-05-04

## 2020-04-13 RX ORDER — CEPHALEXIN 500 MG/1
500 CAPSULE ORAL 2 TIMES DAILY
Qty: 20 CAPSULE | Refills: 0 | Status: SHIPPED | OUTPATIENT
Start: 2020-04-13 | End: 2020-04-23

## 2020-04-13 RX ORDER — GUAIFENESIN 600 MG/1
600 TABLET, EXTENDED RELEASE ORAL 2 TIMES DAILY
Qty: 30 TABLET | Refills: 0 | Status: SHIPPED | OUTPATIENT
Start: 2020-04-13 | End: 2020-06-23

## 2020-04-13 RX ORDER — AZITHROMYCIN 500 MG/1
500 TABLET, FILM COATED ORAL DAILY
Qty: 3 TABLET | Refills: 0 | Status: SHIPPED | OUTPATIENT
Start: 2020-04-13 | End: 2020-06-23

## 2020-04-13 RX ORDER — FLUCONAZOLE 150 MG/1
150 TABLET ORAL ONCE
Status: COMPLETED | OUTPATIENT
Start: 2020-04-13 | End: 2020-04-13

## 2020-04-13 RX ADMIN — LOSARTAN POTASSIUM 25 MG: 25 TABLET ORAL at 08:04

## 2020-04-13 RX ADMIN — EZETIMIBE 10 MG: 10 TABLET ORAL at 08:04

## 2020-04-13 RX ADMIN — IPRATROPIUM BROMIDE AND ALBUTEROL SULFATE 3 ML: .5; 3 SOLUTION RESPIRATORY (INHALATION) at 12:04

## 2020-04-13 RX ADMIN — MONTELUKAST 10 MG: 10 TABLET, FILM COATED ORAL at 08:04

## 2020-04-13 RX ADMIN — METHYLPREDNISOLONE SODIUM SUCCINATE 40 MG: 40 INJECTION, POWDER, FOR SOLUTION INTRAMUSCULAR; INTRAVENOUS at 09:04

## 2020-04-13 RX ADMIN — ASPIRIN 81 MG: 81 TABLET, COATED ORAL at 08:04

## 2020-04-13 RX ADMIN — ROSUVASTATIN CALCIUM 40 MG: 10 TABLET, FILM COATED ORAL at 08:04

## 2020-04-13 RX ADMIN — PANTOPRAZOLE SODIUM 40 MG: 40 TABLET, DELAYED RELEASE ORAL at 08:04

## 2020-04-13 RX ADMIN — GUAIFENESIN 600 MG: 600 TABLET, EXTENDED RELEASE ORAL at 08:04

## 2020-04-13 RX ADMIN — FLUCONAZOLE 150 MG: 150 TABLET ORAL at 12:04

## 2020-04-13 RX ADMIN — IPRATROPIUM BROMIDE AND ALBUTEROL SULFATE 3 ML: .5; 3 SOLUTION RESPIRATORY (INHALATION) at 07:04

## 2020-04-13 NOTE — PLAN OF CARE
Patient admitted with COPD exacerbation. Tested negative for COVID twice. On 2L NC. Ambulate the restroom independently with standby assistance. Received 10 units of levemir, rocephine, azithromycin, Solumedrol 40mg and guaifenesin. Free from falls and injury. Afebrile throughout the night.

## 2020-04-13 NOTE — PLAN OF CARE
04/13/20 1550   Final Note   Assessment Type Final Discharge Note   Anticipated Discharge Disposition Home   What phone number can be called within the next 1-3 days to see how you are doing after discharge? [phone]   Hospital Follow Up  Appt(s) scheduled? Yes   Discharge plans and expectations educations in teach back method with documentation complete? Yes       Patient being discharged home with oxygen portability through [facility].     Iza Ibarra LMSW

## 2020-04-13 NOTE — DISCHARGE INSTRUCTIONS
If for any reason you start to have problems prior to your follow up doctor visit call the clinic ahead of time. If emergency go to the emergency room or call 911.

## 2020-04-13 NOTE — ASSESSMENT & PLAN NOTE
Diabetic diet BS   Metformin on hold.  Will order sliding scale insulin for use as needed and levemir 10 units nightly    Will d/c on home meds. NEEDS to be followed up.

## 2020-04-13 NOTE — PLAN OF CARE
04/13/20 1250   Medicare Message   Important Message from Medicare regarding Discharge Appeal Rights Given to patient/caregiver;Signed/date by patient/caregiver;Explained to patient/caregiver   Date IMM was signed 04/13/20   Time IMM was signed 1242       Completed with patient's son over the phone due to COVID-19 precautions.     Iza Ibarra LMSW

## 2020-04-13 NOTE — PROGRESS NOTES
[facility] Medicine  Progress Note    Patient Name: Gretel Ashton  MRN: [id number]  Patient Class: IP- Inpatient   Admission Date: 2020  Length of Stay: 1 days  Attending Physician: Althea Krause MD  Primary Care Provider: Jailene Astudillo MD        Subjective:     Principal Problem:Acute on chronic respiratory failure with hypoxemia        HPI:  79 year old female with chronic respiratory failure on 2-3 L at home secondary to copd started with coughing a few days ago. She notes that she came to the ER and was sent home on abx. She says she started this and also started the steroids that she was prescribed, but she stopped breathing last night so she came back to the ER. She notes that she was coughing, stopped breathing, , went to heaven, and was able to catch her breath again. At that point, she says she coughed up a large piece of thick mucous. She denies any fevers. She has been using her inhalers as she is prescribed and denies any sick contacts.    Overview/Hospital Course:  Pt was placed inpatient for failed outpt treatment of COPD exacerbation with levaquin with CXR now showing pneumonia now on rocephin and azithromycin. She was placed on solumedrol 40mg IV BID as week as duonebs every 6 hours. She is on home O2 and here using 2L as well NC pOX 94-99%. Dr Solano consulted as he knows her outpt. Monitoring progress. Consider CT if fever persist. Her t max here 99.6 on admission and none since.       Review of Systems   Constitutional: Negative for chills and fever.   HENT: Negative for congestion, ear pain, postnasal drip, rhinorrhea, sore throat and trouble swallowing.    Eyes: Negative for redness and itching.   Respiratory: Positive for cough and wheezing. Negative for apnea and shortness of breath.    Cardiovascular: Negative for chest pain and palpitations.   Gastrointestinal: Negative for abdominal pain, diarrhea, nausea and vomiting.   Genitourinary: Negative for  dysuria and frequency.   Skin: Negative for rash.   Neurological: Negative for weakness and headaches.   Hematological: Bruises/bleeds easily.     Objective:     Vital Signs (Most Recent):  Temp: 96.8 °F (36 °C) (04/13/20 0722)  Pulse: 80 (04/13/20 0722)  Resp: 19 (04/13/20 0722)  BP: 123/60 (04/13/20 0722)  SpO2: (!) 94 % (04/13/20 0722) Vital Signs (24h Range):  Temp:  [96.5 °F (35.8 °C)-98.3 °F (36.8 °C)] 96.8 °F (36 °C)  Pulse:  [] 80  Resp:  [16-20] 19  SpO2:  [93 %-99 %] 94 %  BP: (114-150)/(60-84) 123/60     Weight: 70.3 kg (155 lb)  Body mass index is 32.4 kg/m².    Physical Exam   Constitutional: She is oriented to person, place, and time. She appears well-developed. No distress.   HENT:   Head: Normocephalic and atraumatic.   3L NC in place   Eyes: Pupils are equal, round, and reactive to light. Conjunctivae are normal.   Neck: Normal range of motion. Neck supple. No thyromegaly present.   Cardiovascular: Normal rate, regular rhythm, normal heart sounds and intact distal pulses.   Pulmonary/Chest: Effort normal. No respiratory distress. She has wheezes (very minimal end exp wheeze throughout).   Abdominal: Soft. Bowel sounds are normal. There is no tenderness.   Musculoskeletal: Normal range of motion. She exhibits no edema.   Lymphadenopathy:     She has no cervical adenopathy.   Neurological: She is alert and oriented to person, place, and time.   Skin: Skin is warm and dry. No rash noted.   Scattered ecchymoses   Psychiatric: She has a normal mood and affect. Her behavior is normal.   Nursing note and vitals reviewed.        CRANIAL NERVES     CN III, IV, VI   Pupils are equal, round, and reactive to light.       Significant Labs:   ABGs:   Recent Labs   Lab 04/11/20  1700   PH 7.48*   PCO2 48*   HCO3 35.70*   POCSATURATED 89.6*   BE 10.90*   TOTALHB 9.5*   COHB 0.4   METHB 0.4     Blood Culture:   Recent Labs   Lab 04/11/20  1708   LABBLOO No Growth to date  No Growth to date  No Growth to date   No Growth to date     CBC:   Recent Labs   Lab 20  1708 20  0616   WBC 3.76* 2.95*   HGB 9.6* 8.6*   HCT 31.5* 28.2*   * 139*     CMP:   Recent Labs   Lab 20  1708 20  0616    142   K 3.5 3.6   CL 97 100   CO2 32* 32*   * 100   BUN 24* 18   CREATININE 0.9 0.7   CALCIUM 9.0 8.7   PROT 7.0 6.3   ALBUMIN 3.5 3.1*   BILITOT 0.4 0.2   ALKPHOS 87 75   AST 39 29   ALT 28 23   ANIONGAP 10 10   EGFRNONAA >60 >60     POCT Glucose:   Recent Labs   Lab 20  2027 20  0610 20  0627   POCTGLUCOSE 170* 88 87     Troponin:   Recent Labs   Lab 20  1803 20  2352 20  0556   TROPONINI 0.055* 0.054* 0.049*     BNP  Recent Labs   Lab 20  1708   BNP 46       TSH:   Recent Labs   Lab 19  0850   TSH 1.802     Urine Culture:   Recent Labs   Lab 20  1734   LABURIN Multiple organisms isolated. None in predominance.  Repeat if  clinically necessary.     Urine Studies:   Recent Labs   Lab 20  1734   COLORU Yellow   APPEARANCEUA Clear   PHUR 6.0   SPECGRAV 1.025   PROTEINUA 1+*   GLUCUA Negative   KETONESU Negative   BILIRUBINUA Negative   OCCULTUA Trace*   NITRITE Negative   UROBILINOGEN Negative   LEUKOCYTESUR 3+*   RBCUA 4   WBCUA 60*   BACTERIA Many*   SQUAMEPITHEL 7   HYALINECASTS 0     UCx pending.    D dimer - 1.4 (chronically elevated)3    Strept, flu and covid all negative    Significant Imagin/11 CXR:  There is right basilar atelectasis or early infiltrate.  The chest is otherwise unchanged compared to the prior study.  Heart size is at the upper limits of normal or mildly enlarged but unchanged.  The aorta is tortuous.  There is no pleural effusion or pneumothorax.  The bones are osteopenic.  There is a dual lead pacemaker unchanged in position.     EKG Normal sinus rhythm  Normal ECG  When compared with ECG of 2020 09:37,  No significant change was found  Confirmed by Moshe CACERES MD ([id number]) on 2020 11:58:51  AM      Assessment/Plan:      * Acute on chronic respiratory failure with hypoxemia  Patinet on home o2.  Not as hypercapneic as previous.  MONITOR FOR HYPERCAPNEA  Treat for pna and copd exacerbation  D/c today and resume home o2    Cystitis  Has received rocephin.  Culture with multiple organisms.  Keflex on dc.      HTN (hypertension)  Cont losartan   //80      Pneumonia of right lower lobe due to infectious organism  Failed levaquin (she got 2 doses, and cxr worse, hypoxia worse).  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    Resume per pcp at f/u appt.    Type 2 diabetes mellitus with diabetic neuropathy, without long-term current use of insulin  Diabetic diet BS   Metformin on hold.  Will order sliding scale insulin for use as needed and levemir 10 units nightly    Will d/c on home meds. NEEDS to be followed up.      COPD (chronic obstructive pulmonary disease)  Solumedrol 40mg IV BID  Rocephin and azithromycin started 4/11  Duonebs every 6hr  Dr Solano consulted  No more fever since admission  requiring no more than usual dose of home O2 at 2L NC POX 94-99%  Resume medrol dose pack at d/c    Hyperlipidemia  Cont asa crestor and zetia        VTE Risk Mitigation (From admission, onward)         Ordered     enoxaparin injection 40 mg  Daily      04/12/20 1740     IP VTE HIGH RISK PATIENT  Once      04/11/20 1853     Place sequential compression device  Until discontinued      04/11/20 1853                      Althea Krause MD  Department of Hospital Medicine   [facility]

## 2020-04-13 NOTE — PROGRESS NOTES
[facility]  Pulmonology  Progress Note    Patient Name: Gretel Ashton  MRN: [id number]  Admission Date: 4/11/2020  Hospital Length of Stay: 1 days  Code Status: Full Code  Attending Provider: Althea Krause MD  Primary Care Provider: Jailene Astudillo MD   Principal Problem: Acute on chronic respiratory failure with hypoxemia    Subjective:     Interval History: Home today looks better     Objective:     Vital Signs (Most Recent):  Temp: 97.2 °F (36.2 °C) (04/13/20 1129)  Pulse: 83 (04/13/20 1249)  Resp: 19 (04/13/20 1249)  BP: (!) 124/58 (04/13/20 1129)  SpO2: (!) 94 % (04/13/20 1249) Vital Signs (24h Range):  Temp:  [96.5 °F (35.8 °C)-98.3 °F (36.8 °C)] 97.2 °F (36.2 °C)  Pulse:  [] 83  Resp:  [16-20] 19  SpO2:  [94 %-99 %] 94 %  BP: (114-150)/(58-84) 124/58     Weight: 70.3 kg (155 lb)  Body mass index is 32.4 kg/m².    No intake or output data in the 24 hours ending 04/13/20 1308    Physical Exam   Constitutional: She is oriented to person, place, and time. She appears well-developed and well-nourished. She is cooperative.  Non-toxic appearance. She does not appear ill. No distress.   HENT:   Head: Normocephalic and atraumatic.   Right Ear: Hearing, tympanic membrane, external ear and ear canal normal.   Left Ear: Hearing, tympanic membrane, external ear and ear canal normal.   Nose: Nose normal. No mucosal edema, rhinorrhea or nasal deformity. No epistaxis. Right sinus exhibits no maxillary sinus tenderness and no frontal sinus tenderness. Left sinus exhibits no maxillary sinus tenderness and no frontal sinus tenderness.   Mouth/Throat: Uvula is midline, oropharynx is clear and moist and mucous membranes are normal. No trismus in the jaw. Normal dentition. No uvula swelling. No posterior oropharyngeal erythema.   Eyes: Conjunctivae and lids are normal. No scleral icterus.   Sclera clear bilat   Neck: Trachea normal, full passive range of motion without pain and phonation normal. Neck  supple.   Cardiovascular: Normal rate, regular rhythm, S1 normal, S2 normal, normal heart sounds, intact distal pulses and normal pulses.   No murmur heard.  Pulmonary/Chest: She is in respiratory distress (mild to moderate). She has decreased breath sounds in the right lower field and the left lower field. She has no wheezes. She has no rhonchi. She has no rales.   Abdominal: Soft. Normal appearance and bowel sounds are normal. She exhibits no distension. There is no tenderness.   Musculoskeletal: Normal range of motion. She exhibits no edema or deformity.   Neurological: She is alert and oriented to person, place, and time. She exhibits normal muscle tone. Coordination normal.   Skin: Skin is warm, dry and intact. She is not diaphoretic. No pallor.   Psychiatric: She has a normal mood and affect. Her speech is normal and behavior is normal. Judgment and thought content normal. Cognition and memory are normal.   Nursing note and vitals reviewed.      Vents:       Lines/Drains/Airways     Peripheral Intravenous Line                 Peripheral IV - Single Lumen 04/11/20 1705 20 G;1 in Left Forearm 1 day                Significant Labs:    CBC/Anemia Profile:  Recent Labs   Lab 04/11/20 1708 04/12/20  0616 04/13/20  0556   WBC 3.76* 2.95* 5.40   HGB 9.6* 8.6* 9.2*   HCT 31.5* 28.2* 30.7*   * 139* 156   MCV 81* 80* 81*   RDW 17.0* 16.9* 17.1*   IRON  --   --  28*   FERRITIN  --   --  22   FOLATE  --   --  5.1   [id number]  --   --  379        Chemistries:  Recent Labs   Lab 04/11/20 1708 04/12/20  0616 04/13/20  0556    142 142   K 3.5 3.6 3.9   CL 97 100 102   CO2 32* 32* 33*   BUN 24* 18 21   CREATININE 0.9 0.7 0.8   CALCIUM 9.0 8.7 8.7   ALBUMIN 3.5 3.1* 3.3*   PROT 7.0 6.3 6.5   BILITOT 0.4 0.2 0.3   ALKPHOS 87 75 75   ALT 28 23 24   AST 39 29 31   MG 1.9  --   --    PHOS 2.9  --   --        All pertinent labs within the past 24 hours have been reviewed.    Significant Imaging:  I have reviewed all  pertinent imaging results/findings within the past 24 hours.    Assessment/Plan:     Pneumonia of right lower lobe due to infectious organism  Will follow cxr consider ct chest if persistent fever     Rheumatoid arthritis involving both hands with negative rheumatoid factor  Affecting lungs    Type 2 diabetes mellitus with diabetic neuropathy, without long-term current use of insulin  stable           Hood Solano MD  Pulmonology  [facility]

## 2020-04-13 NOTE — SUBJECTIVE & OBJECTIVE
Interval History: Home today looks better     Objective:     Vital Signs (Most Recent):  Temp: 97.2 °F (36.2 °C) (04/13/20 1129)  Pulse: 83 (04/13/20 1249)  Resp: 19 (04/13/20 1249)  BP: (!) 124/58 (04/13/20 1129)  SpO2: (!) 94 % (04/13/20 1249) Vital Signs (24h Range):  Temp:  [96.5 °F (35.8 °C)-98.3 °F (36.8 °C)] 97.2 °F (36.2 °C)  Pulse:  [] 83  Resp:  [16-20] 19  SpO2:  [94 %-99 %] 94 %  BP: (114-150)/(58-84) 124/58     Weight: 70.3 kg (155 lb)  Body mass index is 32.4 kg/m².    No intake or output data in the 24 hours ending 04/13/20 1308    Physical Exam   Constitutional: She is oriented to person, place, and time. She appears well-developed and well-nourished. She is cooperative.  Non-toxic appearance. She does not appear ill. No distress.   HENT:   Head: Normocephalic and atraumatic.   Right Ear: Hearing, tympanic membrane, external ear and ear canal normal.   Left Ear: Hearing, tympanic membrane, external ear and ear canal normal.   Nose: Nose normal. No mucosal edema, rhinorrhea or nasal deformity. No epistaxis. Right sinus exhibits no maxillary sinus tenderness and no frontal sinus tenderness. Left sinus exhibits no maxillary sinus tenderness and no frontal sinus tenderness.   Mouth/Throat: Uvula is midline, oropharynx is clear and moist and mucous membranes are normal. No trismus in the jaw. Normal dentition. No uvula swelling. No posterior oropharyngeal erythema.   Eyes: Conjunctivae and lids are normal. No scleral icterus.   Sclera clear bilat   Neck: Trachea normal, full passive range of motion without pain and phonation normal. Neck supple.   Cardiovascular: Normal rate, regular rhythm, S1 normal, S2 normal, normal heart sounds, intact distal pulses and normal pulses.   No murmur heard.  Pulmonary/Chest: She is in respiratory distress (mild to moderate). She has decreased breath sounds in the right lower field and the left lower field. She has no wheezes. She has no rhonchi. She has no rales.    Abdominal: Soft. Normal appearance and bowel sounds are normal. She exhibits no distension. There is no tenderness.   Musculoskeletal: Normal range of motion. She exhibits no edema or deformity.   Neurological: She is alert and oriented to person, place, and time. She exhibits normal muscle tone. Coordination normal.   Skin: Skin is warm, dry and intact. She is not diaphoretic. No pallor.   Psychiatric: She has a normal mood and affect. Her speech is normal and behavior is normal. Judgment and thought content normal. Cognition and memory are normal.   Nursing note and vitals reviewed.      Vents:       Lines/Drains/Airways     Peripheral Intravenous Line                 Peripheral IV - Single Lumen 04/11/20 1705 20 G;1 in Left Forearm 1 day                Significant Labs:    CBC/Anemia Profile:  Recent Labs   Lab 04/11/20 1708 04/12/20  0616 04/13/20  0556   WBC 3.76* 2.95* 5.40   HGB 9.6* 8.6* 9.2*   HCT 31.5* 28.2* 30.7*   * 139* 156   MCV 81* 80* 81*   RDW 17.0* 16.9* 17.1*   IRON  --   --  28*   FERRITIN  --   --  22   FOLATE  --   --  5.1   [id number]  --   --  379        Chemistries:  Recent Labs   Lab 04/11/20 1708 04/12/20  0616 04/13/20  0556    142 142   K 3.5 3.6 3.9   CL 97 100 102   CO2 32* 32* 33*   BUN 24* 18 21   CREATININE 0.9 0.7 0.8   CALCIUM 9.0 8.7 8.7   ALBUMIN 3.5 3.1* 3.3*   PROT 7.0 6.3 6.5   BILITOT 0.4 0.2 0.3   ALKPHOS 87 75 75   ALT 28 23 24   AST 39 29 31   MG 1.9  --   --    PHOS 2.9  --   --        All pertinent labs within the past 24 hours have been reviewed.    Significant Imaging:  I have reviewed all pertinent imaging results/findings within the past 24 hours.

## 2020-04-13 NOTE — SUBJECTIVE & OBJECTIVE
Review of Systems   Constitutional: Negative for chills and fever.   HENT: Negative for congestion, ear pain, postnasal drip, rhinorrhea, sore throat and trouble swallowing.    Eyes: Negative for redness and itching.   Respiratory: Positive for cough and wheezing. Negative for apnea and shortness of breath.    Cardiovascular: Negative for chest pain and palpitations.   Gastrointestinal: Negative for abdominal pain, diarrhea, nausea and vomiting.   Genitourinary: Negative for dysuria and frequency.   Skin: Negative for rash.   Neurological: Negative for weakness and headaches.   Hematological: Bruises/bleeds easily.     Objective:     Vital Signs (Most Recent):  Temp: 96.8 °F (36 °C) (04/13/20 0722)  Pulse: 80 (04/13/20 0722)  Resp: 19 (04/13/20 0722)  BP: 123/60 (04/13/20 0722)  SpO2: (!) 94 % (04/13/20 0722) Vital Signs (24h Range):  Temp:  [96.5 °F (35.8 °C)-98.3 °F (36.8 °C)] 96.8 °F (36 °C)  Pulse:  [] 80  Resp:  [16-20] 19  SpO2:  [93 %-99 %] 94 %  BP: (114-150)/(60-84) 123/60     Weight: 70.3 kg (155 lb)  Body mass index is 32.4 kg/m².    Physical Exam   Constitutional: She is oriented to person, place, and time. She appears well-developed. No distress.   HENT:   Head: Normocephalic and atraumatic.   3L NC in place   Eyes: Pupils are equal, round, and reactive to light. Conjunctivae are normal.   Neck: Normal range of motion. Neck supple. No thyromegaly present.   Cardiovascular: Normal rate, regular rhythm, normal heart sounds and intact distal pulses.   Pulmonary/Chest: Effort normal. No respiratory distress. She has wheezes (very minimal end exp wheeze throughout).   Abdominal: Soft. Bowel sounds are normal. There is no tenderness.   Musculoskeletal: Normal range of motion. She exhibits no edema.   Lymphadenopathy:     She has no cervical adenopathy.   Neurological: She is alert and oriented to person, place, and time.   Skin: Skin is warm and dry. No rash noted.   Scattered ecchymoses    Psychiatric: She has a normal mood and affect. Her behavior is normal.   Nursing note and vitals reviewed.        CRANIAL NERVES     CN III, IV, VI   Pupils are equal, round, and reactive to light.       Significant Labs:   ABGs:   Recent Labs   Lab 20  1700   PH 7.48*   PCO2 48*   HCO3 35.70*   POCSATURATED 89.6*   BE 10.90*   TOTALHB 9.5*   COHB 0.4   METHB 0.4     Blood Culture:   Recent Labs   Lab 20  1708   LABBLOO No Growth to date  No Growth to date  No Growth to date  No Growth to date     CBC:   Recent Labs   Lab 20  1708 20  0616   WBC 3.76* 2.95*   HGB 9.6* 8.6*   HCT 31.5* 28.2*   * 139*     CMP:   Recent Labs   Lab 20  17020  0616    142   K 3.5 3.6   CL 97 100   CO2 32* 32*   * 100   BUN 24* 18   CREATININE 0.9 0.7   CALCIUM 9.0 8.7   PROT 7.0 6.3   ALBUMIN 3.5 3.1*   BILITOT 0.4 0.2   ALKPHOS 87 75   AST 39 29   ALT 28 23   ANIONGAP 10 10   EGFRNONAA >60 >60     POCT Glucose:   Recent Labs   Lab 20  2027 20  0610 20  0627   POCTGLUCOSE 170* 88 87     Troponin:   Recent Labs   Lab 20  1803 20  2352 20  0556   TROPONINI 0.055* 0.054* 0.049*     BNP  Recent Labs   Lab 20  1708   BNP 46       TSH:   Recent Labs   Lab 19  0850   TSH 1.802     Urine Culture:   Recent Labs   Lab 20  1734   LABURIN Multiple organisms isolated. None in predominance.  Repeat if  clinically necessary.     Urine Studies:   Recent Labs   Lab 20  1734   COLORU Yellow   APPEARANCEUA Clear   PHUR 6.0   SPECGRAV 1.025   PROTEINUA 1+*   GLUCUA Negative   KETONESU Negative   BILIRUBINUA Negative   OCCULTUA Trace*   NITRITE Negative   UROBILINOGEN Negative   LEUKOCYTESUR 3+*   RBCUA 4   WBCUA 60*   BACTERIA Many*   SQUAMEPITHEL 7   HYALINECASTS 0     UCx pending.    D dimer - 1.4 (chronically elevated)3    Strept, flu and covid all negative    Significant Imagin/11 CXR:  There is right basilar atelectasis or  early infiltrate.  The chest is otherwise unchanged compared to the prior study.  Heart size is at the upper limits of normal or mildly enlarged but unchanged.  The aorta is tortuous.  There is no pleural effusion or pneumothorax.  The bones are osteopenic.  There is a dual lead pacemaker unchanged in position.    4/11 EKG Normal sinus rhythm  Normal ECG  When compared with ECG of 09-APR-2020 09:37,  No significant change was found  Confirmed by Moshe CACERES MD ([id number]) on 4/12/2020 11:58:51 AM

## 2020-04-13 NOTE — HOSPITAL COURSE
Pt was placed inpatient for failed outpt treatment of COPD exacerbation with levaquin with CXR now showing pneumonia now on rocephin and azithromycin. She was placed on solumedrol 40mg IV BID as week as duonebs every 6 hours. She is on home O2 and here using 2L as well NC pOX 94-99%. Dr Solano consulted as he knows her outpt. Monitoring progress. Consider CT if fever persist. Her t max here 99.6 on admission and none since.

## 2020-04-13 NOTE — ASSESSMENT & PLAN NOTE
Failed levaquin (she got 2 doses, and cxr worse, hypoxia worse).  Solumedrol 40mg IV BID  Rocephin and azithromycin started 4/11 today day 3  Duonebs every 6hr  Dr Solano consulted  No more fever since admission  requiring no more than usual dose of home O2 at 2L NC POX 94-99%  Mucinex added as well    D/c today on azithromycin and resume dose pack medrol

## 2020-04-13 NOTE — PLAN OF CARE
04/13/20 1449   Post-Acute Status   Post-Acute Authorization HME   HME Status Pending Delivery Hospital   Discharge Delays None known at this time       Patient has a concentrator for home use now needing portability with her oxygen. SW contacted [employer] [facility] [employer] to inform of patient's need for portability as they are the company who services her concentrator. State that a portable tank could be delivered to the hospital by 5pm. Clinicals faxed to [facility] [facility]. Informed patient's son and nurse of oxygen delivery at 5pm. Nurse will contact patient's son when ready for discharge.     zIa Ibarra LMSW

## 2020-04-13 NOTE — ASSESSMENT & PLAN NOTE
Solumedrol 40mg IV BID  Rocephin and azithromycin started 4/11  Duonebs every 6hr  Dr Solano consulted  No more fever since admission  requiring no more than usual dose of home O2 at 2L NC POX 94-99%  Resume medrol dose pack at d/c

## 2020-04-13 NOTE — PLAN OF CARE
Patient up ad erika. SOB on exertion. Patient with O2 sats 87% on RA; reported to MD. Walk test revealed patient qualified for home oxygen. Patient currently has oxygen at home but wears on & off at her conveinence. She does not have a portable tank. HEATHER Couch called her son to get info about home oxygen company. Portable oxygen tank to be delivered after 5 PM. Blood glucose monitoring. Telemetry. IV antibiotics. Patient free of falls and incidence thi shift.

## 2020-04-13 NOTE — PLAN OF CARE
04/13/20 1249   Discharge Assessment   Assessment Type Discharge Planning Assessment   Confirmed/corrected address and phone number on facesheet? Yes   Assessment information obtained from? Caregiver   Prior to hospitilization cognitive status: Alert/Oriented   Prior to hospitalization functional status: Assistive Equipment   Current cognitive status: Alert/Oriented   Current Functional Status: Assistive Equipment   Facility Arrived From: Home   Lives With alone   Able to Return to Prior Arrangements yes   Is patient able to care for self after discharge? Yes   Who are your caregiver(s) and their phone number(s)? Agustin Ashton (Jose) [phone]   Patient's perception of discharge disposition home or selfcare   Readmission Within the Last 30 Days no previous admission in last 30 days   Patient currently being followed by outpatient case management? No   Patient currently receives any other outside agency services? No   Equipment Currently Used at Home oxygen;nebulizer   Do you have any problems affording any of your prescribed medications? No   Does the patient have transportation home? Yes   Transportation Anticipated family or friend will provide   Discharge Plan A Home with family   DME Needed Upon Discharge  none   Patient/Family in Agreement with Plan yes       No post-acute care needs identified at this time. SW to continue to monitor needs throughout hospital stay.     Iza Ibarra LMSW

## 2020-04-13 NOTE — RESPIRATORY THERAPY
Home Oxygen Evaluation    Date Performed: 4/13/2020    1) Patient's O2 Sat on room air, while at rest: 85%        If O2 sats on room air at rest are 88% or below, patient qualifies. No additional testing needed. Document N/A in steps 2 and 3. If 89% or above, complete steps 2.      2) Patient's O2 Sat on room air while exercising: N/A        If O2 sats on room air while exercising remain 89% or above patient does not qualify, no further testing needed Document N/A in step 3. If O2 sats on room air while exercising are 88% or below, continue to step 3.      3) Patient's O2 Sat while exercising on O2: N/A at N/A LPM         (Must show improvement from #2 for patients to qualify)    If O2 sats improve on oxygen, patient qualifies for portable oxygen. If not, the patient does not qualify.

## 2020-04-13 NOTE — DISCHARGE SUMMARY
[facility] Hospital Medicine  Discharge Summary      Patient Name: Gretel Ashton  MRN: [id number]  Admission Date: 2020  Hospital Length of Stay: 1 days  Discharge Date and Time:  2020 1:29 PM  Attending Physician: Althea Sanford MD   Discharging Provider: Pari Narvaez NP  Primary Care Provider: Jailene Astudillo MD      HPI:   79 year old female with chronic respiratory failure on 2-3 L at home secondary to copd started with coughing a few days ago. She notes that she came to the ER and was sent home on abx. She says she started this and also started the steroids that she was prescribed, but she stopped breathing last night so she came back to the ER. She notes that she was coughing, stopped breathing, , went to heaven, and was able to catch her breath again. At that point, she says she coughed up a large piece of thick mucous. She denies any fevers. She has been using her inhalers as she is prescribed and denies any sick contacts.    * No surgery found *      Hospital Course:   Pt was placed inpatient for failed outpt treatment of COPD exacerbation with levaquin with CXR now showing pneumonia now on rocephin and azithromycin. She was placed on solumedrol 40mg IV BID as week as duonebs every 6 hours. She is on home O2 and here using 2L as well NC pOX 94-99%. Dr Solano consulted as he knows her outpt. Monitoring progress. Consider CT if fever persist. Her t max here 99.6 on admission and none since.      Consults:   Consults (From admission, onward)        Status Ordering Provider     Inpatient consult to Pulmonology  Once     Provider:  Hood Solano MD    Acknowledged ALTHEA SANFORD          * Acute on chronic respiratory failure with hypoxemia  Patinet on home o2.  Not as hypercapneic as previous.  MONITOR FOR HYPERCAPNEA  Treat for pna and copd exacerbation  D/c today and resume home o2    Cystitis  Has received rocephin.  Culture with multiple organisms.  Keflex on  dc.      HTN (hypertension)  Cont losartan   //80      Pneumonia of right lower lobe due to infectious organism  Failed levaquin (she got 2 doses, and cxr worse, hypoxia worse).  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    Resume per pcp at f/u appt.    Type 2 diabetes mellitus with diabetic neuropathy, without long-term current use of insulin  Diabetic diet BS   Metformin on hold.  Will order sliding scale insulin for use as needed and levemir 10 units nightly    Will d/c on home meds. NEEDS to be followed up.      COPD (chronic obstructive pulmonary disease)  Solumedrol 40mg IV BID  Rocephin and azithromycin started 4/11  Duonebs every 6hr  Dr Solano consulted  No more fever since admission  requiring no more than usual dose of home O2 at 2L NC POX 94-99%  Resume medrol dose pack at d/c    Hyperlipidemia  Cont asa crestor and zetia        Final Active Diagnoses:    Diagnosis Date Noted POA    PRINCIPAL PROBLEM:  Acute on chronic respiratory failure with hypoxemia [J96.21] 04/12/2020 Yes    HTN (hypertension) [I10] 04/13/2020 Yes    Cystitis [N30.90] 04/13/2020 Yes    Pneumonia of right lower lobe due to infectious organism [J18.1] 04/11/2020 Yes    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    Specialty:  Internal Medicine  Contact information:  [id number] [place] [id number] [phone]                 Patient Instructions:   No discharge procedures on file.    Significant Diagnostic Studies:     ABGs:       Recent Labs   Lab 04/11/20  1700   PH 7.48*   PCO2 48*   HCO3 35.70*   POCSATURATED 89.6*   BE 10.90*   TOTALHB 9.5*   COHB 0.4   METHB 0.4      Blood Culture:       Recent Labs   Lab 04/11/20  1708   LABBLOO No Growth to date  No Growth to date  No Growth to date  No Growth to date      CBC:        Recent Labs   Lab 04/11/20  1708 04/12/20  0616   WBC 3.76* 2.95*   HGB 9.6* 8.6*   HCT 31.5* 28.2*   * 139*      CMP:        Recent Labs   Lab 04/11/20  1708 04/12/20  0616    142   K 3.5 3.6   CL 97 100   CO2 32* 32*   * 100   BUN 24* 18   CREATININE 0.9 0.7   CALCIUM 9.0 8.7   PROT 7.0 6.3   ALBUMIN 3.5 3.1*   BILITOT 0.4 0.2   ALKPHOS 87 75   AST 39 29   ALT 28 23   ANIONGAP 10 10   EGFRNONAA >60 >60      POCT Glucose:   Recent Labs   Lab 04/12/20  2027 04/13/20  0610 04/13/20  0627   POCTGLUCOSE 170* 88 87      Troponin:         Recent Labs   Lab 04/12/20  1803 04/12/20  2352 04/13/20  0556   TROPONINI 0.055* 0.054* 0.049*      BNP      Recent Labs   Lab 04/11/20  1708   BNP 46         TSH:       Recent Labs   Lab 12/04/19  0850   TSH 1.802      Urine Culture:       Recent Labs   Lab 04/11/20  1734   LABURIN Multiple organisms isolated. None in predominance.  Repeat if  clinically necessary.      Urine Studies:       Recent Labs   Lab 04/11/20  1734   COLORU Yellow   APPEARANCEUA Clear   PHUR 6.0   SPECGRAV 1.025   PROTEINUA 1+*   GLUCUA Negative   KETONESU Negative   BILIRUBINUA Negative   OCCULTUA Trace*   NITRITE Negative   UROBILINOGEN Negative   LEUKOCYTESUR 3+*   RBCUA 4   WBCUA 60*   BACTERIA Many*   SQUAMEPITHEL 7    HYALINECASTS 0      UCx pending.     D dimer - 1.4 (chronically elevated)3     Strept, flu and covid all negative     Significant Imagin/11 CXR:  There is right basilar atelectasis or early infiltrate.  The chest is otherwise unchanged compared to the prior study.  Heart size is at the upper limits of normal or mildly enlarged but unchanged.  The aorta is tortuous.  There is no pleural effusion or pneumothorax.  The bones are osteopenic.  There is a dual lead pacemaker unchanged in position.      EKG Normal sinus rhythm  Normal ECG  When compared with ECG of 2020 09:37,  No significant change was found  Confirmed by Moshe CACERES MD ([id number]) on 2020 11:58:51 AM      Pending Diagnostic Studies:     None         Medications:  Reconciled Home Medications:      Medication List      START taking these medications    azithromycin 500 MG tablet  Commonly known as:  ZITHROMAX  Take 1 tablet (500 mg total) by mouth once daily.     cephALEXin 500 MG capsule  Commonly known as:  KEFLEX  Take 1 capsule (500 mg total) by mouth 2 (two) times daily. for 10 days     guaiFENesin 600 mg 12 hr tablet  Commonly known as:  MUCINEX  Take 1 tablet (600 mg total) by mouth 2 (two) times daily.     methylPREDNISolone 4 mg tablet  Commonly known as:  MEDROL DOSEPACK  use as directed     montelukast 10 mg tablet  Commonly known as:  SINGULAIR  Take 1 tablet (10 mg total) by mouth once daily.  Start taking on:  2020        CONTINUE taking these medications    ADVAIR DISKUS 250-50 mcg/dose diskus inhaler  Generic drug:  fluticasone-salmeterol 250-50 mcg/dose  INHALE ONE PUFF INTO THE LUNGS TWICE DAILY AS DIRECTED     albuterol 2.5 mg /3 mL (0.083 %) nebulizer solution  Commonly known as:  PROVENTIL  USE 1 VIAL IN NEBULIZER EVERY 4 TO 6 HOURS - as directed     aspirin 81 MG EC tablet  Commonly known as:  ECOTRIN  Take 81 mg by mouth once daily.     benzonatate 100 MG capsule  Commonly known as:  TESSALON  Take 2  capsules (200 mg total) by mouth 3 (three) times daily as needed for Cough.     CO Q-10 100 mg capsule  Generic drug:  coenzyme Q10  Take 100 mg by mouth once daily.     leflunomide 10 MG Tab  Commonly known as:  ARAVA  Take 10 mg by mouth once daily.     losartan 25 MG tablet  Commonly known as:  COZAAR  Take 25 mg by mouth once daily.     metFORMIN 500 MG tablet  Commonly known as:  GLUCOPHAGE  TAKE ONE TABLET BY MOUTH TWICE DAILY AFTER MEALS     omeprazole 40 MG capsule  Commonly known as:  PRILOSEC  Take 40 mg by mouth once daily.     rosuvastatin 40 MG Tab  Commonly known as:  CRESTOR  Take 40 mg by mouth once daily.     sulfaSALAzine 500 mg Tab  Commonly known as:  AZULFIDINE  TAKE ONE TABLET BY MOUTH TWICE DAILY     ZETIA 10 mg tablet  Generic drug:  ezetimibe  Take 10 mg by mouth once daily.        STOP taking these medications    HYDROcodone-acetaminophen 5-325 mg per tablet  Commonly known as:  NORCO     levoFLOXacin 500 MG tablet  Commonly known as:  LEVAQUIN     SYMBICORT 160-4.5 mcg/actuation Hfaa  Generic drug:  budesonide-formoterol 160-4.5 mcg            Indwelling Lines/Drains at time of discharge:   Lines/Drains/Airways     None                 Time spent on the discharge of patient: 20 minutes  Patient was seen and examined on the date of discharge and determined to be suitable for discharge.         Pari Narvaez NP  Department of Hospital Medicine  [facility]

## 2020-04-13 NOTE — ASSESSMENT & PLAN NOTE
Patinet on home o2.  Not as hypercapneic as previous.  MONITOR FOR HYPERCAPNEA  Treat for pna and copd exacerbation  D/c today and resume home o2

## 2020-04-15 ENCOUNTER — HOSPITAL ENCOUNTER (EMERGENCY)
Facility: HOSPITAL | Age: 80
Discharge: HOME | End: 2020-04-15
Attending: SURGERY
Payer: MEDICARE

## 2020-04-15 ENCOUNTER — TELEPHONE (OUTPATIENT)
Dept: INTERNAL MEDICINE | Facility: CLINIC | Age: 80
End: 2020-04-15

## 2020-04-15 VITALS
HEART RATE: 90 BPM | SYSTOLIC BLOOD PRESSURE: 120 MMHG | RESPIRATION RATE: 16 BRPM | DIASTOLIC BLOOD PRESSURE: 60 MMHG | TEMPERATURE: 97 F | OXYGEN SATURATION: 98 %

## 2020-04-15 DIAGNOSIS — M25.562 CHRONIC PAIN OF LEFT KNEE: Primary | ICD-10-CM

## 2020-04-15 DIAGNOSIS — G89.29 CHRONIC PAIN OF LEFT KNEE: Primary | ICD-10-CM

## 2020-04-15 LAB
BACTERIA BLD CULT: NORMAL
BACTERIA BLD CULT: NORMAL

## 2020-04-15 PROCEDURE — 99283 EMERGENCY DEPT VISIT LOW MDM: CPT | Mod: 25

## 2020-04-15 PROCEDURE — 25000003 PHARM REV CODE 250: Performed by: SURGERY

## 2020-04-15 RX ORDER — HYDROCODONE BITARTRATE AND ACETAMINOPHEN 5; 325 MG/1; MG/1
1 TABLET ORAL
Status: COMPLETED | OUTPATIENT
Start: 2020-04-15 | End: 2020-04-15

## 2020-04-15 RX ORDER — TRAMADOL HYDROCHLORIDE 50 MG/1
50 TABLET ORAL EVERY 12 HOURS PRN
Qty: 8 TABLET | Refills: 0 | Status: SHIPPED | OUTPATIENT
Start: 2020-04-15 | End: 2021-08-10

## 2020-04-15 RX ADMIN — HYDROCODONE BITARTRATE AND ACETAMINOPHEN 1 TABLET: 5; 325 TABLET ORAL at 11:04

## 2020-04-15 NOTE — ED TRIAGE NOTES
"Patient presents to the ER with left leg pain.  Patient denies trauma.  Patient reports "I didn't sleep last night because of the pain."    "

## 2020-04-15 NOTE — ED PROVIDER NOTES
Encounter Date: 4/15/2020       History     Chief Complaint   Patient presents with    Leg Pain     left     Patient is 79-year-old white female with history of arthritis, complains of arthritic pain in the left knee over night.  Says she was unable to sleep because of the pain. She was released from the hospital 2 days ago after treatment for COPD exacerbation, appears to be doing well from that standpoint.        Review of patient's allergies indicates:  No Known Allergies  Past Medical History:   Diagnosis Date    Arthritis     Depression     Diabetes mellitus type II     Hyperlipidemia     Hypertension     Pacemaker      Past Surgical History:   Procedure Laterality Date    CARDIAC PACEMAKER PLACEMENT       SECTION      CYST REMOVAL Left 2019    Procedure: EXCISION, SEBACEOUS CYST;  Surgeon: Jaylen Gonzalez Jr., MD;  Location: [facility] OR;  Service: General;  Laterality: Left;    INNER EAR SURGERY       Family History   Problem Relation Age of Onset    Cancer Mother     Breast cancer Mother     Stroke Father     Cancer Sister     Breast cancer Sister     Stroke Maternal Grandmother      Social History     Tobacco Use    Smoking status: Former Smoker     Packs/day: 2.00     Years: 43.00     Pack years: 86.00     Types: Cigarettes     Last attempt to quit: 2000     Years since quittin.3    Smokeless tobacco: Never Used   Substance Use Topics    Alcohol use: No    Drug use: No     Review of Systems   Respiratory: Positive for shortness of breath.    Musculoskeletal: Positive for arthralgias.   All other systems reviewed and are negative.      Physical Exam     Initial Vitals [04/15/20 1144]   BP Pulse Resp Temp SpO2   125/64 98 16 97.1 °F (36.2 °C) 98 %      MAP       --         Physical Exam    Nursing note and vitals reviewed.  Constitutional: She appears well-nourished.   HENT:   Head: Normocephalic and atraumatic.   Eyes: EOM are normal. Pupils are equal, round, and  reactive to light.   Neck: Normal range of motion. Neck supple.   Cardiovascular: Normal rate.   Pulmonary/Chest: No respiratory distress.   Musculoskeletal:   No obvious swelling or erythema involving the left knee, which is her primary site of pain. Flexion extension of the knee is performed with minimal pain, no crepitus appreciated   Psychiatric: She has a normal mood and affect.         ED Course   Procedures  Labs Reviewed - No data to display       Imaging Results    None                                          Clinical Impression:       ICD-10-CM ICD-9-CM   1. Chronic pain of left knee M25.562 719.46    G89.29 338.29         Disposition:   Disposition: Discharged  Condition: Stable                        Tom Smith Jr., MD  04/15/20 1151

## 2020-04-15 NOTE — TELEPHONE ENCOUNTER
----- Message from Janae Velasquez sent at 4/15/2020 10:33 AM CDT -----  Contact: Agustin (son)  Gretel Ashton  MRN: [id number]  : 1940  PCP: Jailene Astudillo  Home Phone      [phone]  Work Phone      Not on file.  Mobile          [phone]  Home Phone      Not on file.    MESSAGE:   Patient is having trouble walking due to pain left leg x 1 day.     Phone # [phone]    Pharmacy - [facility], [place] [address] [address]

## 2020-04-15 NOTE — TELEPHONE ENCOUNTER
I called Agustin and offered audio visit for today, but he states that he is already on his way to ER with her.

## 2020-04-27 ENCOUNTER — OFFICE VISIT (OUTPATIENT)
Dept: INTERNAL MEDICINE | Facility: CLINIC | Age: 80
End: 2020-04-27
Payer: MEDICARE

## 2020-04-27 VITALS
WEIGHT: 152.13 LBS | HEART RATE: 82 BPM | BODY MASS INDEX: 31.93 KG/M2 | RESPIRATION RATE: 16 BRPM | DIASTOLIC BLOOD PRESSURE: 66 MMHG | TEMPERATURE: 97 F | HEIGHT: 58 IN | SYSTOLIC BLOOD PRESSURE: 122 MMHG | OXYGEN SATURATION: 93 %

## 2020-04-27 DIAGNOSIS — J18.9 PNEUMONIA OF RIGHT LOWER LOBE DUE TO INFECTIOUS ORGANISM: ICD-10-CM

## 2020-04-27 DIAGNOSIS — J43.9 PULMONARY EMPHYSEMA, UNSPECIFIED EMPHYSEMA TYPE: ICD-10-CM

## 2020-04-27 DIAGNOSIS — J96.21 ACUTE ON CHRONIC RESPIRATORY FAILURE WITH HYPOXEMIA: ICD-10-CM

## 2020-04-27 DIAGNOSIS — Z09 HOSPITAL DISCHARGE FOLLOW-UP: Primary | ICD-10-CM

## 2020-04-27 PROCEDURE — 99999 PR PBB SHADOW E&M-EST. PATIENT-LVL III: ICD-10-PCS | Mod: PBBFAC,,, | Performed by: INTERNAL MEDICINE

## 2020-04-27 PROCEDURE — 99214 OFFICE O/P EST MOD 30 MIN: CPT | Mod: S$PBB | Performed by: INTERNAL MEDICINE

## 2020-04-27 PROCEDURE — 99213 OFFICE O/P EST LOW 20 MIN: CPT | Mod: PBBFAC | Performed by: INTERNAL MEDICINE

## 2020-04-27 PROCEDURE — 99999 PR PBB SHADOW E&M-EST. PATIENT-LVL III: CPT | Mod: PBBFAC,,, | Performed by: INTERNAL MEDICINE

## 2020-04-27 PROCEDURE — 99999 PR STA SHADOW: CPT | Mod: PBBFAC,,, | Performed by: INTERNAL MEDICINE

## 2020-04-27 NOTE — PROGRESS NOTES
Subjective:       Patient ID: Gretel Ashton is a 79 y.o. female.    Chief Complaint: Hospital Follow Up (pneumonia; UTI )    Gretel Ashton is a 79 y.o. female  Seen here with multiple visits to ER ; also in observation for COPD ;respiratory failure and possible   RLL pneumonia /atelectaesis ; brought by son         She is reporting she is better   No fevers .  covid negative         Review of Systems   Constitutional: Negative for chills and fever.   HENT: Negative for congestion, hearing loss, sinus pressure and sore throat.    Eyes: Negative for photophobia.   Respiratory: Positive for cough. Negative for choking, chest tightness and wheezing.         On home o2 ; COPD ;sleeps with O2 at night    Cardiovascular: Negative for chest pain and palpitations.   Gastrointestinal: Negative for blood in stool, nausea and vomiting.   Endocrine: Negative for polydipsia and polyphagia.   Genitourinary: Negative for dysuria and hematuria.   Musculoskeletal: Positive for arthralgias, joint swelling, myalgias and neck stiffness.   Skin: Negative for pallor and rash.        Left lower back with sebaceous cyst    Neurological: Negative for dizziness, weakness, numbness and headaches.   Hematological: Does not bruise/bleed easily.   Psychiatric/Behavioral: Negative for confusion, dysphoric mood, hallucinations, sleep disturbance and suicidal ideas. The patient is not nervous/anxious.        Objective:      Physical Exam   Constitutional: She is oriented to person, place, and time. She appears well-developed and well-nourished.   HENT:   Head: Normocephalic and atraumatic.   Nose: Nose normal.   Mouth/Throat: Oropharynx is clear and moist. Mucous membranes are pale.   Eyes: Pupils are equal, round, and reactive to light. Conjunctivae and EOM are normal.   Neck: Normal range of motion. Neck supple. No JVD present. No tracheal deviation present. No thyromegaly present.   Cardiovascular: Normal rate, regular rhythm, normal heart  sounds and intact distal pulses.   Pulmonary/Chest: Effort normal. No respiratory distress. She has no wheezes. She has no rales. She exhibits no tenderness.   Abdominal: Soft. Bowel sounds are normal. She exhibits no distension and no mass. There is no tenderness. There is no rebound and no guarding.   Musculoskeletal: Normal range of motion. She exhibits no edema.   RA changes in both hands.   Lymphadenopathy:     She has no cervical adenopathy.   Neurological: She is alert and oriented to person, place, and time. She has normal reflexes. No cranial nerve deficit. She exhibits normal muscle tone. Coordination normal.   Skin: Skin is warm and dry.   Psychiatric: She has a normal mood and affect. Her behavior is normal. Judgment and thought content normal.   Nursing note and vitals reviewed.      Assessment:       1. Hospital discharge follow-up    2. Pneumonia of right lower lobe due to infectious organism    3. Acute on chronic respiratory failure with hypoxemia    4. Pulmonary emphysema, unspecified emphysema type        Plan:   Gretel was seen today for hospital follow up.    Diagnoses and all orders for this visit:    Hospital discharge follow-up    Pneumonia of right lower lobe due to infectious organism    Acute on chronic respiratory failure with hypoxemia    Pulmonary emphysema, unspecified emphysema type      She is doing well .  Continue with present meds.  Continue nebs.    Problem List Items Addressed This Visit     COPD (chronic obstructive pulmonary disease)    Acute on chronic respiratory failure with hypoxemia - Primary

## 2020-06-18 ENCOUNTER — LAB VISIT (OUTPATIENT)
Dept: LAB | Facility: HOSPITAL | Age: 80
End: 2020-06-18
Attending: INTERNAL MEDICINE
Payer: MEDICARE

## 2020-06-18 DIAGNOSIS — M06.041 RHEUMATOID ARTHRITIS INVOLVING BOTH HANDS WITH NEGATIVE RHEUMATOID FACTOR: ICD-10-CM

## 2020-06-18 DIAGNOSIS — E78.5 HYPERLIPIDEMIA, UNSPECIFIED HYPERLIPIDEMIA TYPE: ICD-10-CM

## 2020-06-18 DIAGNOSIS — M06.042 RHEUMATOID ARTHRITIS INVOLVING BOTH HANDS WITH NEGATIVE RHEUMATOID FACTOR: ICD-10-CM

## 2020-06-18 DIAGNOSIS — E11.9 CONTROLLED TYPE 2 DIABETES MELLITUS WITHOUT COMPLICATION, WITHOUT LONG-TERM CURRENT USE OF INSULIN: ICD-10-CM

## 2020-06-18 DIAGNOSIS — E11.40 TYPE 2 DIABETES MELLITUS WITH DIABETIC NEUROPATHY, WITHOUT LONG-TERM CURRENT USE OF INSULIN: ICD-10-CM

## 2020-06-18 LAB
ALBUMIN SERPL BCP-MCNC: 3.1 G/DL (ref 3.5–5.2)
ALP SERPL-CCNC: 83 U/L (ref 55–135)
ALT SERPL W/O P-5'-P-CCNC: 16 U/L (ref 10–44)
ANION GAP SERPL CALC-SCNC: 7 MMOL/L (ref 8–16)
AST SERPL-CCNC: 21 U/L (ref 10–40)
BASOPHILS # BLD AUTO: 0.03 K/UL (ref 0–0.2)
BASOPHILS NFR BLD: 1 % (ref 0–1.9)
BILIRUB SERPL-MCNC: 0.2 MG/DL (ref 0.1–1)
BUN SERPL-MCNC: 15 MG/DL (ref 8–23)
CALCIUM SERPL-MCNC: 9.2 MG/DL (ref 8.7–10.5)
CHLORIDE SERPL-SCNC: 98 MMOL/L (ref 95–110)
CHOLEST SERPL-MCNC: 221 MG/DL (ref 120–199)
CHOLEST/HDLC SERPL: 6.9 {RATIO} (ref 2–5)
CO2 SERPL-SCNC: 38 MMOL/L (ref 23–29)
CREAT SERPL-MCNC: 0.7 MG/DL (ref 0.5–1.4)
DIFFERENTIAL METHOD: ABNORMAL
EOSINOPHIL # BLD AUTO: 0.1 K/UL (ref 0–0.5)
EOSINOPHIL NFR BLD: 3 % (ref 0–8)
ERYTHROCYTE [DISTWIDTH] IN BLOOD BY AUTOMATED COUNT: 16.6 % (ref 11.5–14.5)
EST. GFR  (AFRICAN AMERICAN): >60 ML/MIN/1.73 M^2
EST. GFR  (NON AFRICAN AMERICAN): >60 ML/MIN/1.73 M^2
ESTIMATED AVG GLUCOSE: 105 MG/DL (ref 68–131)
GLUCOSE SERPL-MCNC: 96 MG/DL (ref 70–110)
HBA1C MFR BLD HPLC: 5.3 % (ref 4–5.6)
HCT VFR BLD AUTO: 29.4 % (ref 37–48.5)
HDLC SERPL-MCNC: 32 MG/DL (ref 40–75)
HDLC SERPL: 14.5 % (ref 20–50)
HGB BLD-MCNC: 8.5 G/DL (ref 12–16)
IMM GRANULOCYTES # BLD AUTO: 0.01 K/UL (ref 0–0.04)
IMM GRANULOCYTES NFR BLD AUTO: 0.3 % (ref 0–0.5)
LDLC SERPL CALC-MCNC: 150.8 MG/DL (ref 63–159)
LYMPHOCYTES # BLD AUTO: 1.1 K/UL (ref 1–4.8)
LYMPHOCYTES NFR BLD: 35.3 % (ref 18–48)
MCH RBC QN AUTO: 23.7 PG (ref 27–31)
MCHC RBC AUTO-ENTMCNC: 28.9 G/DL (ref 32–36)
MCV RBC AUTO: 82 FL (ref 82–98)
MONOCYTES # BLD AUTO: 0.5 K/UL (ref 0.3–1)
MONOCYTES NFR BLD: 15.8 % (ref 4–15)
NEUTROPHILS # BLD AUTO: 1.4 K/UL (ref 1.8–7.7)
NEUTROPHILS NFR BLD: 44.6 % (ref 38–73)
NONHDLC SERPL-MCNC: 189 MG/DL
NRBC BLD-RTO: 0 /100 WBC
PLATELET # BLD AUTO: 167 K/UL (ref 150–350)
PMV BLD AUTO: 10.9 FL (ref 9.2–12.9)
POTASSIUM SERPL-SCNC: 4.3 MMOL/L (ref 3.5–5.1)
PROT SERPL-MCNC: 6.6 G/DL (ref 6–8.4)
RBC # BLD AUTO: 3.58 M/UL (ref 4–5.4)
SODIUM SERPL-SCNC: 143 MMOL/L (ref 136–145)
TRIGL SERPL-MCNC: 191 MG/DL (ref 30–150)
TSH SERPL DL<=0.005 MIU/L-ACNC: 2.08 UIU/ML (ref 0.4–4)
WBC # BLD AUTO: 3.03 K/UL (ref 3.9–12.7)

## 2020-06-18 PROCEDURE — 80053 COMPREHEN METABOLIC PANEL: CPT

## 2020-06-18 PROCEDURE — 84443 ASSAY THYROID STIM HORMONE: CPT

## 2020-06-18 PROCEDURE — 36415 COLL VENOUS BLD VENIPUNCTURE: CPT

## 2020-06-18 PROCEDURE — 80061 LIPID PANEL: CPT

## 2020-06-18 PROCEDURE — 85025 COMPLETE CBC W/AUTO DIFF WBC: CPT

## 2020-06-18 PROCEDURE — 83036 HEMOGLOBIN GLYCOSYLATED A1C: CPT

## 2020-06-23 ENCOUNTER — LAB VISIT (OUTPATIENT)
Dept: LAB | Facility: HOSPITAL | Age: 80
End: 2020-06-23
Attending: INTERNAL MEDICINE
Payer: MEDICARE

## 2020-06-23 ENCOUNTER — OFFICE VISIT (OUTPATIENT)
Dept: INTERNAL MEDICINE | Facility: CLINIC | Age: 80
End: 2020-06-23
Payer: MEDICARE

## 2020-06-23 VITALS
OXYGEN SATURATION: 90 % | DIASTOLIC BLOOD PRESSURE: 72 MMHG | TEMPERATURE: 98 F | WEIGHT: 159.81 LBS | HEART RATE: 90 BPM | SYSTOLIC BLOOD PRESSURE: 132 MMHG | BODY MASS INDEX: 33.55 KG/M2 | RESPIRATION RATE: 16 BRPM | HEIGHT: 58 IN

## 2020-06-23 DIAGNOSIS — E11.40 TYPE 2 DIABETES MELLITUS WITH DIABETIC NEUROPATHY, WITHOUT LONG-TERM CURRENT USE OF INSULIN: ICD-10-CM

## 2020-06-23 DIAGNOSIS — I10 HYPERTENSION, UNSPECIFIED TYPE: ICD-10-CM

## 2020-06-23 DIAGNOSIS — D64.9 ANEMIA, UNSPECIFIED TYPE: ICD-10-CM

## 2020-06-23 DIAGNOSIS — E78.5 HYPERLIPIDEMIA, UNSPECIFIED HYPERLIPIDEMIA TYPE: Primary | ICD-10-CM

## 2020-06-23 LAB — RETICS/RBC NFR AUTO: 2.7 % (ref 0.5–2.5)

## 2020-06-23 PROCEDURE — 99999 PR PBB SHADOW E&M-EST. PATIENT-LVL IV: ICD-10-PCS | Mod: PBBFAC,,, | Performed by: INTERNAL MEDICINE

## 2020-06-23 PROCEDURE — 84165 PROTEIN E-PHORESIS SERUM: CPT

## 2020-06-23 PROCEDURE — 85045 AUTOMATED RETICULOCYTE COUNT: CPT

## 2020-06-23 PROCEDURE — 83921 ORGANIC ACID SINGLE QUANT: CPT

## 2020-06-23 PROCEDURE — 99214 OFFICE O/P EST MOD 30 MIN: CPT | Mod: S$PBB | Performed by: INTERNAL MEDICINE

## 2020-06-23 PROCEDURE — 84165 PATHOLOGIST INTERPRETATION SPE: ICD-10-PCS | Mod: 26,,, | Performed by: PATHOLOGY

## 2020-06-23 PROCEDURE — 84165 PROTEIN E-PHORESIS SERUM: CPT | Mod: 26,,, | Performed by: PATHOLOGY

## 2020-06-23 PROCEDURE — 82728 ASSAY OF FERRITIN: CPT

## 2020-06-23 PROCEDURE — 99999 PR STA SHADOW: CPT | Mod: PBBFAC,,, | Performed by: INTERNAL MEDICINE

## 2020-06-23 PROCEDURE — 83540 ASSAY OF IRON: CPT

## 2020-06-23 PROCEDURE — 99999 PR PBB SHADOW E&M-EST. PATIENT-LVL IV: CPT | Mod: PBBFAC,,, | Performed by: INTERNAL MEDICINE

## 2020-06-23 PROCEDURE — 99214 OFFICE O/P EST MOD 30 MIN: CPT | Mod: PBBFAC | Performed by: INTERNAL MEDICINE

## 2020-06-23 PROCEDURE — 36415 COLL VENOUS BLD VENIPUNCTURE: CPT

## 2020-06-23 RX ORDER — FERROUS SULFATE 325(65) MG
325 TABLET ORAL DAILY
Qty: 90 TABLET | Refills: 5 | Status: SHIPPED | OUTPATIENT
Start: 2020-06-23 | End: 2020-07-23

## 2020-06-23 NOTE — PROGRESS NOTES
Subjective:       Patient ID: Gretel Ashton is a 79 y.o. female.    Chief Complaint: Follow-up (hyperlipidemia; DM-2 ), Leg Pain, and restless legs    Gretel Ashton is a  79  y.o. female who presents for Type II DM, Hypertension, and Hyperlipidemia follow up. Labs were reviewed with patient today.    Seeing rheumatology Dr Gerber for RA.        Follow-up  Associated symptoms include arthralgias, coughing, joint swelling and myalgias. Pertinent negatives include no chest pain, chills, congestion, fever, headaches, nausea, numbness, rash, sore throat, vomiting or weakness.   Leg Pain   Pertinent negatives include no numbness.   Hyperlipidemia  This is a chronic problem. The problem is uncontrolled. Recent lipid tests were reviewed and are high. Associated symptoms include leg pain and myalgias. Pertinent negatives include no chest pain. Current antihyperlipidemic treatment includes statins. The current treatment provides moderate improvement of lipids.   Arthritis  Presents for follow-up visit. She complains of joint swelling. Affected location: MCps both hands  Pertinent negatives include no dysuria, fever or rash.   Diabetes  She presents for her follow-up diabetic visit. She has type 2 diabetes mellitus. Pertinent negatives for hypoglycemia include no confusion, dizziness, headaches, nervousness/anxiousness or pallor. Pertinent negatives for diabetes include no chest pain, no polydipsia, no polyphagia and no weakness. There are no hypoglycemic complications. Symptoms are worsening. There are no diabetic complications. Risk factors for coronary artery disease include diabetes mellitus, dyslipidemia, hypertension, obesity, post-menopausal and sedentary lifestyle. Current diabetic treatment includes oral agent (monotherapy). Her weight is stable. She is following a generally healthy diet. Her breakfast blood glucose range is generally 130-140 mg/dl. An ACE inhibitor/angiotensin II receptor blocker is not being  taken.   DepressionPatient is not experiencing: choking sensation, confusion, nervousness/anxiety, palpitations and suicidal ideas.    Medication Refill  Associated symptoms include arthralgias, coughing, joint swelling and myalgias. Pertinent negatives include no chest pain, chills, congestion, fever, headaches, nausea, numbness, rash, sore throat, vomiting or weakness.     Review of Systems   Constitutional: Negative for chills and fever.   HENT: Negative for congestion, hearing loss, sinus pressure and sore throat.    Eyes: Negative for photophobia.   Respiratory: Positive for cough. Negative for choking, chest tightness and wheezing.         On home o2 ; COPD ;sleeps with O2 at night    Cardiovascular: Negative for chest pain and palpitations.   Gastrointestinal: Negative for blood in stool, nausea and vomiting.   Endocrine: Negative for polydipsia and polyphagia.   Genitourinary: Negative for dysuria and hematuria.   Musculoskeletal: Positive for arthralgias, arthritis, joint swelling, myalgias and neck stiffness.   Skin: Negative for pallor and rash.        Left lower back with sebaceous cyst    Neurological: Negative for dizziness, weakness, numbness and headaches.   Hematological: Does not bruise/bleed easily.   Psychiatric/Behavioral: Positive for depression. Negative for confusion, dysphoric mood, hallucinations, sleep disturbance and suicidal ideas. The patient is not nervous/anxious.        Objective:      Physical Exam  Vitals signs and nursing note reviewed.   Constitutional:       Appearance: She is well-developed.   HENT:      Head: Normocephalic and atraumatic.      Nose: Nose normal.      Mouth/Throat:      Mouth: Mucous membranes are pale.   Eyes:      Conjunctiva/sclera: Conjunctivae normal.      Pupils: Pupils are equal, round, and reactive to light.   Neck:      Musculoskeletal: Normal range of motion and neck supple.      Thyroid: No thyromegaly.      Vascular: No JVD.      Trachea: No  tracheal deviation.   Cardiovascular:      Rate and Rhythm: Normal rate and regular rhythm.      Pulses:           Dorsalis pedis pulses are 1+ on the right side and 1+ on the left side.        Posterior tibial pulses are 1+ on the right side and 1+ on the left side.      Heart sounds: Normal heart sounds.   Pulmonary:      Effort: Pulmonary effort is normal. No respiratory distress.      Breath sounds: No wheezing or rales.   Chest:      Chest wall: No tenderness.   Abdominal:      General: Bowel sounds are normal. There is no distension.      Palpations: Abdomen is soft. There is no mass.      Tenderness: There is no abdominal tenderness. There is no guarding or rebound.   Musculoskeletal: Normal range of motion.      Comments: RA changes in both hands.   Feet:      Right foot:      Protective Sensation: 5 sites tested. 5 sites sensed.      Skin integrity: No ulcer, erythema or dry skin.      Left foot:      Protective Sensation: 5 sites tested. 5 sites sensed.      Skin integrity: No ulcer, erythema or dry skin.   Lymphadenopathy:      Cervical: No cervical adenopathy.   Skin:     General: Skin is warm and dry.             Comments: Sebaceous cyst : 5 cm in size .   Neurological:      Mental Status: She is alert and oriented to person, place, and time.      Cranial Nerves: No cranial nerve deficit.      Motor: No abnormal muscle tone.      Coordination: Coordination normal.      Deep Tendon Reflexes: Reflexes are normal and symmetric.   Psychiatric:         Behavior: Behavior normal.         Thought Content: Thought content normal.         Judgment: Judgment normal.         Assessment:       1. Hyperlipidemia, unspecified hyperlipidemia type    2. Hypertension, unspecified type    3. Type 2 diabetes mellitus with diabetic neuropathy, without long-term current use of insulin    4. Anemia, unspecified type        Plan:   Gretel was seen today for follow-up, leg pain and restless legs.    Diagnoses and all orders  for this visit:    Hyperlipidemia, unspecified hyperlipidemia type  Limit the cholesterol in your diet to less than 300 mg per day.   Fats should contribute no more than 20 to 35% of your daily calories.   Less than 7 to 10% of your calories should come from saturated fat.   Avoid saturated fat products e.g., Butter, some oils, meat, and poultry fat contain a lot of saturated fat.   Check food labels for fat and cholesterol content. Choose the foods with less fat per serving.   Limit the amount of butter and margarine you eat.   Use salad dressings and margarine made with polyunsaturated and monounsaturated fats.   Use egg whites or egg substitutes rather than whole eggs.   Replace whole-milk dairy products with nonfat or low-fat milk, cheese, spreads, and yogurt.   Eat skinless chicken, turkey, fish, and meatless entrees more often than red meat.   Choose lean cuts of meat and trim off all visible fat. Keep portion sizes moderate.   Avoid fatty desserts such as ice cream, cream-filled cakes, and cheesecakes. Choose fresh fruits, nonfat frozen yogurt, Popsicles, etc.   Reduce the amount of fried foods, vending machine food, and fast food you eat.   Eat fruits and vegetables (especially fresh fruits and leafy vegetables), beans, and whole grains daily. The fiber in these foods helps lower cholesterol.   Look for low-fat or nonfat varieties of the foods you like to eat, or look for substitutes.   You may need to exercise 60 minutes a day to prevent weight gain and 90 minutes a day to lose weight.  Hypertension, unspecified type    Well controlled.  Continue same medication and dose.  1. Keep weight close to ideal body weight.   2.   Avoid high salt foods (olives, pickles, smoked meats, salted potato chips, etc.).   Do not add salt to your food at the table.   Use only small amounts of salt when cooking.   3. Begin an exercise program. Discuss with your doctor what type of exercise program would be best for you. It  doesn't have to be difficult. Even brisk walking for 20 minutes three times a week is a good form of exercise.   4. Avoid medicines which contain heart stimulants. This includes many cold and sinus decongestant pills and sprays as well as diet pills. Check the warnings about hypertension on the label. Stimulants such as amphetamine or cocaine could be lethal for someone with hypertension. Never take these.    Type 2 diabetes mellitus with diabetic neuropathy, without long-term current use of insulin  Patient has controlled Diabetes .  We discussed about diet ;low in calories. Avoid sweats, sodas.  Also increasing activity;walking 2-3 miles a day.    Goal of  A1c  less than 7 % stressed.  Also goal of LDL less than 70 highlighted to patient.  Anemia, unspecified type  -     Ferritin; Future  -     Iron; Future  -     Occult blood x 1, stool; Future  -     Occult blood x 1, stool; Future  -     Protein electrophoresis, serum; Future  -     Reticulocytes; Future  -     Methylmalonic Acid, Serum; Future  -     ferrous sulfate (FEOSOL) 325 mg (65 mg iron) Tab tablet; Take 1 tablet (325 mg total) by mouth once daily.      Problem List Items Addressed This Visit     Hyperlipidemia - Primary    Type 2 diabetes mellitus with diabetic neuropathy, without long-term current use of insulin    HTN (hypertension)      Other Visit Diagnoses     Anemia, unspecified type        Relevant Medications    ferrous sulfate (FEOSOL) 325 mg (65 mg iron) Tab tablet    Other Relevant Orders    Ferritin    Iron    Occult blood x 1, stool    Occult blood x 1, stool    Protein electrophoresis, serum    Reticulocytes    Methylmalonic Acid, Serum

## 2020-06-24 LAB
ALBUMIN SERPL ELPH-MCNC: 3.76 G/DL (ref 3.35–5.55)
ALPHA1 GLOB SERPL ELPH-MCNC: 0.38 G/DL (ref 0.17–0.41)
ALPHA2 GLOB SERPL ELPH-MCNC: 0.73 G/DL (ref 0.43–0.99)
B-GLOBULIN SERPL ELPH-MCNC: 0.85 G/DL (ref 0.5–1.1)
FERRITIN SERPL-MCNC: 16 NG/ML (ref 20–300)
GAMMA GLOB SERPL ELPH-MCNC: 0.88 G/DL (ref 0.67–1.58)
IRON SERPL-MCNC: 27 UG/DL (ref 30–160)
PATHOLOGIST INTERPRETATION SPE: NORMAL
PROT SERPL-MCNC: 6.6 G/DL (ref 6–8.4)

## 2020-06-27 LAB — METHYLMALONATE SERPL-SCNC: 0.52 UMOL/L

## 2020-07-08 ENCOUNTER — TELEPHONE (OUTPATIENT)
Dept: INTERNAL MEDICINE | Facility: CLINIC | Age: 80
End: 2020-07-08

## 2020-07-08 DIAGNOSIS — E53.8 B12 DEFICIENCY: ICD-10-CM

## 2020-07-08 RX ORDER — CYANOCOBALAMIN 1000 UG/ML
1000 INJECTION, SOLUTION INTRAMUSCULAR; SUBCUTANEOUS
Qty: 10 ML | Refills: 3 | Status: SHIPPED | OUTPATIENT
Start: 2020-07-08 | End: 2021-06-23 | Stop reason: SDUPTHER

## 2020-07-08 RX ORDER — SYRINGE W-NEEDLE,DISPOSAB,3 ML 25GX5/8"
SYRINGE, EMPTY DISPOSABLE MISCELLANEOUS
Qty: 12 SYRINGE | Refills: 2 | Status: SHIPPED | OUTPATIENT
Start: 2020-07-08

## 2020-07-08 NOTE — TELEPHONE ENCOUNTER
NP labs suggestive of B 12 defficincy .  She will need B12 shots   One shot every 4 weeks   meds ordered

## 2020-07-09 NOTE — TELEPHONE ENCOUNTER
Spoke to patient's son Agustin, and he does not think the patient will be able to self administer the B12, nor are there any caregivers confident with administering and injection. The patient has an appt with Dr. Astudillo on 7/22/20. We can administer the initial B12 shot on that date and discuss options for how she will receive subsequent injections.

## 2020-07-15 ENCOUNTER — TELEPHONE (OUTPATIENT)
Dept: INTERNAL MEDICINE | Facility: CLINIC | Age: 80
End: 2020-07-15

## 2020-07-15 NOTE — TELEPHONE ENCOUNTER
----- Message from Ilda Long sent at 7/15/2020  2:12 PM CDT -----  Contact: Agustin/Son  Gretel Ashton  MRN: [id number]  : 1940  PCP: Jailene Astudillo  Home Phone      [phone]  Work Phone      Not on file.  Mobile          [phone]  Home Phone      Not on file.    MESSAGE:     Would like to discuss his mother's B-12 injections.  He would like to clarify if she needs to get the B-12 from the pharmacy or if this will be provided at the visit.  Please call to advise.    Phone: [phone]

## 2020-07-17 DIAGNOSIS — Z71.89 COMPLEX CARE COORDINATION: ICD-10-CM

## 2020-07-22 ENCOUNTER — OFFICE VISIT (OUTPATIENT)
Dept: INTERNAL MEDICINE | Facility: CLINIC | Age: 80
End: 2020-07-22
Payer: MEDICARE

## 2020-07-22 ENCOUNTER — LAB VISIT (OUTPATIENT)
Dept: LAB | Facility: HOSPITAL | Age: 80
End: 2020-07-22
Attending: INTERNAL MEDICINE
Payer: MEDICARE

## 2020-07-22 VITALS
HEIGHT: 58 IN | BODY MASS INDEX: 32.26 KG/M2 | DIASTOLIC BLOOD PRESSURE: 60 MMHG | RESPIRATION RATE: 16 BRPM | SYSTOLIC BLOOD PRESSURE: 100 MMHG | OXYGEN SATURATION: 88 % | WEIGHT: 153.69 LBS | HEART RATE: 100 BPM

## 2020-07-22 DIAGNOSIS — D64.9 ANEMIA, UNSPECIFIED TYPE: ICD-10-CM

## 2020-07-22 DIAGNOSIS — Z79.899 ENCOUNTER FOR LONG-TERM (CURRENT) USE OF OTHER MEDICATIONS: ICD-10-CM

## 2020-07-22 DIAGNOSIS — E53.8 B12 DEFICIENCY: Primary | ICD-10-CM

## 2020-07-22 DIAGNOSIS — R06.09 DYSPNEA ON EXERTION: ICD-10-CM

## 2020-07-22 DIAGNOSIS — I10 HTN (HYPERTENSION): Primary | ICD-10-CM

## 2020-07-22 DIAGNOSIS — E78.5 HYPERLIPIDEMIA, UNSPECIFIED HYPERLIPIDEMIA TYPE: ICD-10-CM

## 2020-07-22 DIAGNOSIS — E11.40 TYPE 2 DIABETES MELLITUS WITH DIABETIC NEUROPATHY, WITHOUT LONG-TERM CURRENT USE OF INSULIN: ICD-10-CM

## 2020-07-22 DIAGNOSIS — J43.9 PULMONARY EMPHYSEMA, UNSPECIFIED EMPHYSEMA TYPE: ICD-10-CM

## 2020-07-22 DIAGNOSIS — E78.5 DYSLIPIDEMIA: ICD-10-CM

## 2020-07-22 DIAGNOSIS — I70.0 AORTIC ATHEROSCLEROSIS: ICD-10-CM

## 2020-07-22 DIAGNOSIS — I10 HYPERTENSION, UNSPECIFIED TYPE: ICD-10-CM

## 2020-07-22 LAB
ALBUMIN SERPL BCP-MCNC: 3.3 G/DL (ref 3.5–5.2)
ALP SERPL-CCNC: 97 U/L (ref 55–135)
ALT SERPL W/O P-5'-P-CCNC: 40 U/L (ref 10–44)
ANION GAP SERPL CALC-SCNC: 7 MMOL/L (ref 8–16)
AST SERPL-CCNC: 46 U/L (ref 10–40)
BASOPHILS # BLD AUTO: 0.02 K/UL (ref 0–0.2)
BASOPHILS NFR BLD: 0.6 % (ref 0–1.9)
BILIRUB DIRECT SERPL-MCNC: 0.2 MG/DL (ref 0.1–0.3)
BILIRUB SERPL-MCNC: 0.2 MG/DL (ref 0.1–1)
BNP SERPL-MCNC: 17 PG/ML (ref 0–99)
BUN SERPL-MCNC: 17 MG/DL (ref 8–23)
CALCIUM SERPL-MCNC: 8.8 MG/DL (ref 8.7–10.5)
CHLORIDE SERPL-SCNC: 96 MMOL/L (ref 95–110)
CHOLEST SERPL-MCNC: 145 MG/DL (ref 120–199)
CHOLEST/HDLC SERPL: 4.4 {RATIO} (ref 2–5)
CO2 SERPL-SCNC: 37 MMOL/L (ref 23–29)
CREAT SERPL-MCNC: 0.8 MG/DL (ref 0.5–1.4)
D DIMER PPP IA.FEU-MCNC: 0.88 MG/L FEU
DIFFERENTIAL METHOD: ABNORMAL
EOSINOPHIL # BLD AUTO: 0 K/UL (ref 0–0.5)
EOSINOPHIL NFR BLD: 1.2 % (ref 0–8)
ERYTHROCYTE [DISTWIDTH] IN BLOOD BY AUTOMATED COUNT: 18.5 % (ref 11.5–14.5)
ERYTHROCYTE [DISTWIDTH] IN BLOOD BY AUTOMATED COUNT: 18.5 % (ref 11.5–14.5)
EST. GFR  (AFRICAN AMERICAN): >60 ML/MIN/1.73 M^2
EST. GFR  (NON AFRICAN AMERICAN): >60 ML/MIN/1.73 M^2
GLUCOSE SERPL-MCNC: 101 MG/DL (ref 70–110)
HCT VFR BLD AUTO: 32.8 % (ref 37–48.5)
HCT VFR BLD AUTO: 32.8 % (ref 37–48.5)
HDLC SERPL-MCNC: 33 MG/DL (ref 40–75)
HDLC SERPL: 22.8 % (ref 20–50)
HGB BLD-MCNC: 9.5 G/DL (ref 12–16)
HGB BLD-MCNC: 9.5 G/DL (ref 12–16)
IMM GRANULOCYTES # BLD AUTO: 0.01 K/UL (ref 0–0.04)
IMM GRANULOCYTES NFR BLD AUTO: 0.3 % (ref 0–0.5)
LDLC SERPL CALC-MCNC: 96.8 MG/DL (ref 63–159)
LYMPHOCYTES # BLD AUTO: 1 K/UL (ref 1–4.8)
LYMPHOCYTES NFR BLD: 29 % (ref 18–48)
MCH RBC QN AUTO: 23.8 PG (ref 27–31)
MCH RBC QN AUTO: 23.8 PG (ref 27–31)
MCHC RBC AUTO-ENTMCNC: 29 G/DL (ref 32–36)
MCHC RBC AUTO-ENTMCNC: 29 G/DL (ref 32–36)
MCV RBC AUTO: 82 FL (ref 82–98)
MCV RBC AUTO: 82 FL (ref 82–98)
MONOCYTES # BLD AUTO: 0.5 K/UL (ref 0.3–1)
MONOCYTES NFR BLD: 15.9 % (ref 4–15)
NEUTROPHILS # BLD AUTO: 1.8 K/UL (ref 1.8–7.7)
NEUTROPHILS NFR BLD: 53 % (ref 38–73)
NONHDLC SERPL-MCNC: 112 MG/DL
NRBC BLD-RTO: 0 /100 WBC
PLATELET # BLD AUTO: 113 K/UL (ref 150–350)
PLATELET # BLD AUTO: 113 K/UL (ref 150–350)
PMV BLD AUTO: ABNORMAL FL (ref 9.2–12.9)
PMV BLD AUTO: ABNORMAL FL (ref 9.2–12.9)
POTASSIUM SERPL-SCNC: 3.5 MMOL/L (ref 3.5–5.1)
PROT SERPL-MCNC: 7 G/DL (ref 6–8.4)
RBC # BLD AUTO: 4 M/UL (ref 4–5.4)
RBC # BLD AUTO: 4 M/UL (ref 4–5.4)
SODIUM SERPL-SCNC: 140 MMOL/L (ref 136–145)
T3FREE SERPL-MCNC: 2.7 PG/ML (ref 2.3–4.2)
T4 FREE SERPL-MCNC: 1.12 NG/DL (ref 0.71–1.51)
TRIGL SERPL-MCNC: 76 MG/DL (ref 30–150)
TSH SERPL DL<=0.005 MIU/L-ACNC: 1.38 UIU/ML (ref 0.4–4)
WBC # BLD AUTO: 3.34 K/UL (ref 3.9–12.7)
WBC # BLD AUTO: 3.34 K/UL (ref 3.9–12.7)

## 2020-07-22 PROCEDURE — 84481 FREE ASSAY (FT-3): CPT

## 2020-07-22 PROCEDURE — 96372 THER/PROPH/DIAG INJ SC/IM: CPT | Mod: PBBFAC

## 2020-07-22 PROCEDURE — 80076 HEPATIC FUNCTION PANEL: CPT

## 2020-07-22 PROCEDURE — 99999 PR STA SHADOW: CPT | Mod: PBBFAC,,,

## 2020-07-22 PROCEDURE — 80048 BASIC METABOLIC PNL TOTAL CA: CPT

## 2020-07-22 PROCEDURE — 85025 COMPLETE CBC W/AUTO DIFF WBC: CPT

## 2020-07-22 PROCEDURE — 84439 ASSAY OF FREE THYROXINE: CPT

## 2020-07-22 PROCEDURE — 85379 FIBRIN DEGRADATION QUANT: CPT

## 2020-07-22 PROCEDURE — 99214 OFFICE O/P EST MOD 30 MIN: CPT | Mod: PBBFAC | Performed by: INTERNAL MEDICINE

## 2020-07-22 PROCEDURE — 99213 OFFICE O/P EST LOW 20 MIN: CPT | Mod: S$PBB | Performed by: INTERNAL MEDICINE

## 2020-07-22 PROCEDURE — 99999 PR STA SHADOW: CPT | Mod: PBBFAC,,, | Performed by: INTERNAL MEDICINE

## 2020-07-22 PROCEDURE — 83880 ASSAY OF NATRIURETIC PEPTIDE: CPT

## 2020-07-22 PROCEDURE — 84443 ASSAY THYROID STIM HORMONE: CPT

## 2020-07-22 PROCEDURE — 99999 PR STA SHADOW: ICD-10-PCS | Mod: PBBFAC,,,

## 2020-07-22 PROCEDURE — 99999 PR PBB SHADOW E&M-EST. PATIENT-LVL IV: ICD-10-PCS | Mod: PBBFAC,,, | Performed by: INTERNAL MEDICINE

## 2020-07-22 PROCEDURE — 36415 COLL VENOUS BLD VENIPUNCTURE: CPT

## 2020-07-22 PROCEDURE — 99999 PR PBB SHADOW E&M-EST. PATIENT-LVL IV: CPT | Mod: PBBFAC,,, | Performed by: INTERNAL MEDICINE

## 2020-07-22 PROCEDURE — 80061 LIPID PANEL: CPT

## 2020-07-22 RX ORDER — HYDROCHLOROTHIAZIDE 12.5 MG/1
12.5 TABLET ORAL DAILY PRN
COMMUNITY
Start: 2020-07-16 | End: 2022-04-15

## 2020-07-22 RX ORDER — CYANOCOBALAMIN 1000 UG/ML
100 INJECTION, SOLUTION INTRAMUSCULAR; SUBCUTANEOUS
Status: DISCONTINUED | OUTPATIENT
Start: 2020-07-23 | End: 2021-06-24

## 2020-07-22 RX ORDER — OMEPRAZOLE 40 MG/1
40 CAPSULE, DELAYED RELEASE ORAL DAILY
Qty: 30 CAPSULE | Refills: 5 | Status: SHIPPED | OUTPATIENT
Start: 2020-07-22 | End: 2021-08-18 | Stop reason: SDUPTHER

## 2020-07-22 RX ADMIN — CYANOCOBALAMIN 100 MCG: 1000 INJECTION, SOLUTION INTRAMUSCULAR at 02:07

## 2020-07-22 NOTE — PROGRESS NOTES
Subjective:       Patient ID: Gretel Ashton is a 79 y.o. female.    Chief Complaint: Follow-up, B12 Injection, and Shortness of Breath    Gretel Ashton is a  79  y.o. female who presents for Type II DM, Hypertension, and Hyperlipidemia follow up. Labs were reviewed with patient today.    Seeing rheumatology Dr Gerber for RA.      Labs reviewed :   Anemia better , DR pritchard started her on diuretics .          Follow-up  Associated symptoms include arthralgias, coughing, joint swelling and myalgias. Pertinent negatives include no chest pain, chills, congestion, fever, headaches, nausea, numbness, rash, sore throat, vomiting or weakness.   Hyperlipidemia  This is a chronic problem. The problem is uncontrolled. Recent lipid tests were reviewed and are high. Associated symptoms include myalgias. Pertinent negatives include no chest pain. Current antihyperlipidemic treatment includes statins. The current treatment provides moderate improvement of lipids.   Diabetes  She presents for her follow-up diabetic visit. She has type 2 diabetes mellitus. Pertinent negatives for hypoglycemia include no confusion, dizziness, headaches, nervousness/anxiousness or pallor. Pertinent negatives for diabetes include no chest pain, no polydipsia, no polyphagia and no weakness. There are no hypoglycemic complications. Symptoms are worsening. There are no diabetic complications. Risk factors for coronary artery disease include diabetes mellitus, dyslipidemia, hypertension, obesity, post-menopausal and sedentary lifestyle. Current diabetic treatment includes oral agent (monotherapy). Her weight is stable. She is following a generally healthy diet. Her breakfast blood glucose range is generally 130-140 mg/dl. An ACE inhibitor/angiotensin II receptor blocker is not being taken.   DepressionPatient is not experiencing: choking sensation, confusion, nervousness/anxiety, palpitations and suicidal ideas.    Medication Refill  Associated symptoms  include arthralgias, coughing, joint swelling and myalgias. Pertinent negatives include no chest pain, chills, congestion, fever, headaches, nausea, numbness, rash, sore throat, vomiting or weakness.     Review of Systems   Constitutional: Negative for chills and fever.   HENT: Negative for congestion, hearing loss, sinus pressure and sore throat.    Eyes: Negative for photophobia.   Respiratory: Positive for cough. Negative for choking, chest tightness and wheezing.         On home o2 ; COPD ;sleeps with O2 at night    Cardiovascular: Negative for chest pain and palpitations.   Gastrointestinal: Negative for blood in stool, nausea and vomiting.   Endocrine: Negative for polydipsia and polyphagia.   Genitourinary: Negative for dysuria and hematuria.   Musculoskeletal: Positive for arthralgias, joint swelling, myalgias and neck stiffness.   Skin: Negative for pallor and rash.        Left lower back with sebaceous cyst    Neurological: Negative for dizziness, weakness, numbness and headaches.   Hematological: Does not bruise/bleed easily.   Psychiatric/Behavioral: Positive for depression. Negative for confusion, dysphoric mood, hallucinations, sleep disturbance and suicidal ideas. The patient is not nervous/anxious.        Objective:      Physical Exam  Vitals signs and nursing note reviewed.   Constitutional:       Appearance: She is well-developed.   HENT:      Head: Normocephalic and atraumatic.      Nose: Nose normal.      Mouth/Throat:      Mouth: Mucous membranes are pale.   Eyes:      Conjunctiva/sclera: Conjunctivae normal.      Pupils: Pupils are equal, round, and reactive to light.   Neck:      Musculoskeletal: Normal range of motion and neck supple.      Thyroid: No thyromegaly.      Vascular: No JVD.      Trachea: No tracheal deviation.   Cardiovascular:      Rate and Rhythm: Normal rate and regular rhythm.      Pulses:           Dorsalis pedis pulses are 1+ on the right side and 1+ on the left side.         Posterior tibial pulses are 1+ on the right side and 1+ on the left side.      Heart sounds: Normal heart sounds.   Pulmonary:      Effort: Pulmonary effort is normal. No respiratory distress.      Breath sounds: No wheezing or rales.   Chest:      Chest wall: No tenderness.   Abdominal:      General: Bowel sounds are normal. There is no distension.      Palpations: Abdomen is soft. There is no mass.      Tenderness: There is no abdominal tenderness. There is no guarding or rebound.   Musculoskeletal: Normal range of motion.      Comments: RA changes in both hands.   Feet:      Right foot:      Protective Sensation: 5 sites tested. 5 sites sensed.      Skin integrity: No ulcer, erythema or dry skin.      Left foot:      Protective Sensation: 5 sites tested. 5 sites sensed.      Skin integrity: No ulcer, erythema or dry skin.   Lymphadenopathy:      Cervical: No cervical adenopathy.   Skin:     General: Skin is warm and dry.             Comments: Sebaceous cyst : 5 cm in size .   Neurological:      Mental Status: She is alert and oriented to person, place, and time.      Cranial Nerves: No cranial nerve deficit.      Motor: No abnormal muscle tone.      Coordination: Coordination normal.      Deep Tendon Reflexes: Reflexes are normal and symmetric.   Psychiatric:         Behavior: Behavior normal.         Thought Content: Thought content normal.         Judgment: Judgment normal.         Assessment:       1. B12 deficiency    2. Type 2 diabetes mellitus with diabetic neuropathy, without long-term current use of insulin    3. Hypertension, unspecified type    4. Hyperlipidemia, unspecified hyperlipidemia type    5. Pulmonary emphysema, unspecified emphysema type    6. Aortic atherosclerosis        Plan:   Gretel was seen today for follow-up, b12 injection and shortness of breath.    Diagnoses and all orders for this visit:    B12 deficiency  -     cyanocobalamin injection 100 mcg  -     CBC auto differential;  Future    Type 2 diabetes mellitus with diabetic neuropathy, without long-term current use of insulin  -     Hemoglobin A1C; Future  The current medical regimen is effective;  continue present plan and medications.    Lab Results   Component Value Date    HGBA1C 5.3 06/18/2020       Hypertension, unspecified type  -     CBC auto differential; Future  -     Comprehensive metabolic panel; Future    Well controlled.  Continue same medication and dose.  1. Keep weight close to ideal body weight.   2.   Avoid high salt foods (olives, pickles, smoked meats, salted potato chips, etc.).   Do not add salt to your food at the table.   Use only small amounts of salt when cooking.   3. Begin an exercise program. Discuss with your doctor what type of exercise program would be best for you. It doesn't have to be difficult. Even brisk walking for 20 minutes three times a week is a good form of exercise.   4. Avoid medicines which contain heart stimulants. This includes many cold and sinus decongestant pills and sprays as well as diet pills. Check the warnings about hypertension on the label. Stimulants such as amphetamine or cocaine could be lethal for someone with hypertension. Never take these.    Hyperlipidemia, unspecified hyperlipidemia type  -     Lipid Panel; Future  -     TSH; Future  Well controlled.  Continue same medication and dose.  Pulmonary emphysema, unspecified emphysema type  Continue home O2     Aortic atherosclerosis  -     Lipid Panel; Future  Lab Results   Component Value Date    LDLCALC 150.8 06/18/2020         Problem List Items Addressed This Visit     B12 deficiency - Primary    Relevant Medications    cyanocobalamin injection 100 mcg (Start on 7/23/2020  9:00 AM)

## 2020-07-23 RX ORDER — METFORMIN HYDROCHLORIDE 500 MG/1
500 TABLET ORAL 2 TIMES DAILY WITH MEALS
Qty: 180 TABLET | Refills: 1 | Status: SHIPPED | OUTPATIENT
Start: 2020-07-23 | End: 2020-12-28

## 2020-07-23 NOTE — TELEPHONE ENCOUNTER
Requested Prescriptions     Pending Prescriptions Disp Refills    metFORMIN (GLUCOPHAGE) 500 MG tablet 180 tablet 1     Sig: Take 1 tablet (500 mg total) by mouth 2 (two) times daily with meals.   [facility] requesting refill. LOV: 7/22/20

## 2020-08-12 ENCOUNTER — OUTPATIENT CASE MANAGEMENT (OUTPATIENT)
Dept: ADMINISTRATIVE | Facility: OTHER | Age: 80
End: 2020-08-12

## 2020-08-12 NOTE — LETTER
August 12, 2020    Gretel Ashton  [address] [address]             [facility] [address] [place] [address] Dear Mrs. Gretel Ashton and son, Mr. Agustin Ashton:    Welcome to [facility]s Complex Care Management Program.  It was a pleasure talking with you today.  My name is Jillian Rene RN and I look forward to being your Care Manager.  My goal is to help you function at the healthiest and highest level possible.      As an [facility] patient, some of the services we may be able to provide include:      Development of an individualized care plan with a Registered Nurse    Connection with a    Connection with available resources and services     Coordinate communication among your care team members    Provide coaching and education    Help you understand your doctors treatment plan   Help you obtain information about your insurance coverage.     All services provided by [facility]s Complex Care Managers and other care team members are coordinated with and communicated to your primary care team.    As part of your enrollment, you will be receiving education materials and more information about these services in your [facility] account, by phone or through the mail.  If you do not wish to participate or receive information, please contact our office at [phone].      Sincerely,      Jillian Rene RN  [facility]   Out-patient RN Complex Care Manager   TEL:  [phone]                                                                                                                                                                            -2-      You can contact me, Jillian Rene RN directly at [phone]       Office Hours Mon-Fri, 8 am-4:30pm    [facility] Main Office- TEL:  [phone]     Office Hours Mon-Fri, 8 am - 4:30 pm     [facility], 24/7 Nurse Help Line (non emergent)- TEL:  [phone]         I

## 2020-08-12 NOTE — PROGRESS NOTES
Outpatient Care Management  Initial Patient Assessment    Patient: Gretel Ashton  MRN: [id number]  Date of Service: 08/12/2020  Completed by: Jillian Rene RN  Referral Date: 06/12/2019  Program: Case Management (High Risk)    Reason for Visit   Patient presents with    OPCM Enrollment Call     8/12/2020    Initial Assessment     8/12/2020    Plan Of Care     8/12/2020       Brief Summary:   Reason for Referral:  Referral received from patient seeking help with her O2 and supplies.   8/12- Call received from patient this week 8/10 stating she needed help. Mrs. Ashton is a former OPCM patient. Spoke with patient, and dgt-in-law, Mari to clarify pt's short statement on call that she needed help.It was learned 8/10 that pt was in need of replacement O2 tubing. The tubing they have is broken. Mrs. Ashton can manage with what she has overnight since call transpired after provider hours.   8/11- Facilitated their need for replacement supplies to provider as documented in OPCM -RN notes one year ago----- [facility] [employer], [phone]. O2 supplies to be sent out to pt. Last deliver to pt was March 2019. Also, stated patient's request for a more portable O2 unit that can go over shoulders. Pt currently has large tasks that must be rolled around. Provided Dr. Astudillo, PCP contact information.   Notified patient/family via  that supplies to be sent. Provided contact number for any additional needs.  8/12- Call back from patient's son, Mr. Agustin Ashton. O2 supplies received but needs help in pursuing portable O2 unit. He was referred from above provider to [facility] where pt/family's request was turned down.   To further assist patient/family-- son agrees to enroll Mrs. Ashton into OPCM to better assist with her O2 needs. Pt is on continuous O2, Son unaware of the liter flow.   ------------------------------------------------------------------------------------------------------  High Risk Score:  90  %/Acuity Level: High  Hosp/ED:   Last ED admission 4/15/2020 for chronic left knee pain, ED admission 4/9 SOB-left AMA, Hosp admission 4/11/2020 for PNA RLL due to infectious disease.  Contact: Called and spoke with Mrs. Ashton's son, Agustin Ashton.   Explained purpose of Outpatient Care Management and Agustin is in agreement to receive follow-up calls to assist in their health care management.   Initial RN Assessment: Completed.  Med Rec:Completed without discrepancies found.   PHQ2: 0  Pertinent Dxs: COPD/emphysema, O2 dependent, DM- A1C=5.3 (105) on 6/18/2020, [place]', HLD, Depression, Pacemaker, Colitis.  Current symptoms: Mrs. Ashton is reported to get SOB with minimal exertion.   Cognition: AAOx3. Impaired vision and hearing render a delayed response from pt when speaking with her. Pt's primary language is [employer] Welsh but does speak English.   Living Arrangements: Lives alone in trail owned by son and is located near to son's home on same property.  Support: SonAgustin and his wife, Mari are main caregivers to pt.   Activities: Limited ambulation, KERNS. Has walker available when outside of home. Mrs. Ashton is said to be able to climb the 3 steps to trailer entrance using handrail.   Nutrition/Hydration: Pt is not following a low na, low sugar diet. Son/dgt-in-law assist with meals however pt likes to eat store bought frozen dinners that she put in microwave. (Albumin level 3.1 6/18/202).    Patient/caregiver concerns identified:  Chief concerns --accessing O2 nasal cannula tubing replacement supplies and getting a portable, purse size O2 unit when away from home. Currently has large tanks that are heavy and awkward for pt to maneuver.   Agustin c/o patient being on too many medications which kerns not agree with.   Referrals made today: None.   OPCM-RN's Findings:  Patient/caregivers to benefit from education on O2 management/safety and infection prevention in the presence of COPD. Son does not know what  O2 flow pt should be on. He says he never touches that.     Interventions:  Instructed Agustin in infection prevention practice in changing out O2 nasal cannula/tubing every 2 wks as stated by the provider of pt's home O2. Last shipment of O2 tubing replacements was approx 5 months ago (March).   Provided contacts for OPCM-RN and [facility] and included in welcome letter.    Agustin agrees to f/u call next week.     Mailed Welcome Letter, POOJA:  Chronic Lung Disease:  Preventing Lung Infections/Staying Healthy with Good Nutrition/Maximizing Your Energy.           Assessment Documentation     OPCM Initial Assessment    Involvement of Care  Do I have permission to speak with other family members about your care?: Yes (Comment: Son- Olvin Ashton)  Assessment completed by: Family (Comment: Son, Agustin Ashton)  Patient Reported Insurance  Verified current insurance plan: Medicare, Medicaid  Current Health Status  Patient Health Rating  Compared to other people your age, how would you rate your health?: Poor (Comment: Agustin states Mrs. Ashton has trouble with SOB, limited ambulation)  Patient Reported Labs & Vitals  Any patient reported labs and/or vitals?: No  Social Determinants  Advanced Care Planning  Do you have any of the following?: Medical power of  (Comment: Son reports going to  to establish POA- )  If yes, do we have a copy?: No  If no, would the patient like Advance Directive resources?: N/A  Advanced Care Planning resources provided?: No  Is Advanced Care Planning an area of need?: Yes (Comment: Requested that son submit a copy of the MPOA to his PCP at time of next medical appt. )  Support  Caregiver presence?: Yes  Name of primary caregiver: AGUSTIN ASHTON  Primary caregiver phone number: [phone]  Primary caregiver relationship: son/daughter (Comment: Son)  Present activity level: need help from person or special equipment to leave house (Comment: Walks in house without  walker.  Uses O2 contninuously. Gets SOB easily when outdoors in heat.  )  Who takes you to your medical appointments?: son/daughter (Comment: Son Agustin mostly. )  Is the caregiver supporting a need?: Yes  Does the current primary caregiver meet the patient's needs?: Yes  Housing  Living arrangements: alone  Number of people in home: 1  Type of residence: mobile home (Comment: Lives on property of son)  Own or rent?:  (Comment: Son owns the mobile home /property that pt lives on. Pt does not pay rent. )  Permanent residence?: yes  Does the patient's residence have any of the following?: more than 1 story, more than 2 stairs to enter the residence, handrails on the stairs (Comment: Pt takes her showers at son's  house with the help of her daughter-in-law, Mari. Son's house in on same property  just a short distance from pt's mobile home. 3 steps up to mobile home with handrails.  )  Is housing an area of need?: no  Access to [employer]  Does the patient have access to any of the following devices or technologies?: none (Comment: Son does not use a computer nor does patient. )  Clinical Assessment  Medication Adherence  How does the patient obtain their medications?: family picks up (Comment: [facility])  How many days a week do you miss medications?: never  Do you use a pill box or medication chart to help you manage your medications?: pill box  Do you sometimes have difficulty refilling your medications?: no  Medication reconciliation completed?: Yes  Is medication adherence an area of need?: Yes (Comment: Son c/o pt having too many rxs. He questions the need for all prescribed meds. )  *Active medication list was reviewed and reconciled with patient and/or caregiver:   Nutritional Status  Diet: Regular (Comment: Eats whatever she wants, frozen dinners she puts in micorwave.)  Change in appetite?: no (Comment: Microwave -Frozen )  Recent changes in weight?: no  Dentition: wears dentures  Is  nutrition an area of need?: yes  Labs  Do you have regular lab work to monitor your medications?: yes  Type of lab work: A1c, other (see comment) (Comment: CMP, B12)  Where do you get your lab work done?: [facility]  Is lab adherence an area of need?: no  PHQ Depression Screen  Does patient's PHQ Depression Screening indicate a barrier to meeting self-care needs?: No  Cognitive/Behavioral Health  Alert and oriented?: yes  Difficulty thinking?: no (Comment: Forgets some things)  Requires prompting?: no  Requires assistance for routine expression?: no  Is Cognitive/Behavioral health an area of need?: no  Culture/Gnosticism  Does patient have cultural or Yazidi beliefs that may impact ability to access healthcare?: no  Communication  Language preference: English, Other (see comment) (Comment: Isai Slovak 1st language to English)   needed?: no  Hearing problems?: yes (Comment: Son questions whether there may be wax build up.  Pt does not own hearing aides. )  Decreased vision?: yes  Legally blind?: no  Vision assistance: glasses  Is Communication an area of need?: yes  Health Literacy  Preferred learning method: face to face  How often do you need to have someone help you read instructions, pamphlets, or other written material from your doctor or pharmacy?: always  Is there a Health Literacy need?: no  Activities of Daily Living  Ambulation: independent  Dressing: independent  Bathing: assistance required  Transfers: independent  Toileting: independent  Feeding: independent  Cleaning home/chores: assistance required  Telephone use: independent  Shopping/attending doctors' appointments: dependent  Paying bills: dependent  Taking meds: assistance required  Climbing stairs: independent  Fall Risk  Patient mobility status: Ambulatory  Number of falls in the past 12 months: 0  Fall risk?: No  Equipment/Current Services  Equipment/supplies used in home: walker, other (see comment), oxygen/respiratory  treatment  Current services: n/a  Is Equipment/Supplies/Services an area of need?: no  Community & Government Programs  Support: none  Government suppot assistance: N/A  [facility] Office of Aging and Adult Services: N/A  Community Resource Assessment  Based on the assessment of needs: No community resources needed at this time  Completion of Initial Assessment  Is the Initial Assessment Complete at this time?: Yes          Problem List and History     Problems Addressed This Visit    COPD: Identified as current problem  Depression: Not identified by patient as current problem  Diabetes: Not identified by patient as current problem  Anemia: Not identified by patient as current problem  Hyperlipidemia: Not identified by patient as current problem         Reviewed medical and social history with patient and/or caregiver. A complex care plan was discussed and completed today, with input from patient and/or caregiver.    Patient Instructions     Instructions were provided via the [employer] patient resources and are available for the patient to view on the patient portal, if active.    Next steps:   F/u on receipt of mailings.   Check on O2 liter flow rate.   Check with O2 Providers on getting portable O2 unit for pt.   Facilitate needed O2 equipment by working with PCP  Provide education on O2 therapy management, safety, infection prevention, healthy foods/protein.     F/u on 8/13/2020,    Todays OPCM Self-Management Care Plan was developed with the patients/caregivers input and was based on identified barriers from todays assessment.  Goals were written today with the patient/caregiver and the patient has agreed to work towards these goals to improve his/her overall well-being. Patient verbalized understanding of the care plan, goals, and all of today's instructions. Encouraged patient/caregiver to communicate with his/her physician and health care team about health conditions and the treatment plan.  Provided my contact  information today and encouraged patient/caregiver to call me with any questions as needed.

## 2020-08-13 ENCOUNTER — TELEPHONE (OUTPATIENT)
Dept: INTERNAL MEDICINE | Facility: CLINIC | Age: 80
End: 2020-08-13

## 2020-08-13 ENCOUNTER — OUTPATIENT CASE MANAGEMENT (OUTPATIENT)
Dept: ADMINISTRATIVE | Facility: OTHER | Age: 80
End: 2020-08-13

## 2020-08-13 NOTE — TELEPHONE ENCOUNTER
Orders, clinic notes, insurance and demographic information faxed to [facility] @ the number provided.

## 2020-08-13 NOTE — PROGRESS NOTES
Outpatient Care Management  Plan of Care Follow Up Visit    Patient: Gretel Ashton  MRN: [id number]  Date of Service: 08/13/2020  Completed by: Jillian Rene RN  Referral Date: 06/12/2019  Program: Case Management (High Risk)    Reason for Visit   Patient presents with    Update Plan Of Care     8/13/2020 Care coordination with O2 providers       Brief Summary:   Care coordination with #1 [facility] and with #2 [facility].   Verified per provider #1 that they have been providing pt O2 since 2007, pt is on O2 2 L/M (contiuous), do not provide small portable O2 units. Referred to provider #2. [facility] following is needed to start the process towards patient getting the portable O2 requested:  1) New O2 sats testing  2) Visit to PCP with pt assessment  3) Script for O2.   After pt has been billed for O2 from [facility] x 3 months can a portable unit be processed. Meanwhile, pt will receive the large tanks.  Message sent to Dr. Astudillo, PCP with above process list in order for pt to get a portable unit.      Patient Summary     Involvement of Care:  Do I have permission to speak with other family members about your care?       Patient Reported Labs & Vitals:  1.  Any Patient Reported Labs & Vitals?     2.  Patient Reported Blood Pressure:     3.  Patient Reported Pulse:     4.  Patient Reported Weight (Kg):     5.  Patient Reported Blood Glucose (mg/dl):       Medical and social history was reviewed with patient and/or caregiver.     Clinical Assessment     Reviewed and provided basic information on available community resources for mental health, transportation, wellness resources, and palliative care programs with patient and/or caregiver.     Complex Care Plan     Care plan was discussed and completed today with input from patient and/or caregiver.    Patient Instructions     Instructions were provided via the [employer] patient resources and are available for the patient to view on the patient  portal.    Next Steps:  F/u on receipt of mailings.   Check on O2 liter flow rate. Done 8/13   Check with O2 Providers on getting portable O2 unit for pt. In progress 8/13  Facilitate needed O2 equipment by working with PCP  Provide education on O2 therapy management, safety, infection prevention, healthy foods/protein.     No follow-ups on file.    Todays OPCM Self-Management Care Plan was developed with the patients/caregivers input and was based on identified barriers from todays assessment.  Goals were written today with the patient/caregiver and the patient has agreed to work towards these goals to improve his/her overall well-being. Patient verbalized understanding of the care plan, goals, and all of today's instructions. Encouraged patient/caregiver to communicate with his/her physician and health care team about health conditions and the treatment plan.  Provided my contact information today and encouraged patient/caregiver to call me with any questions as needed.

## 2020-08-13 NOTE — TELEPHONE ENCOUNTER
----- Message from Jillian Rene RN sent at 8/13/2020  1:24 PM CDT -----  Regarding: Patient is requesting portable O2 unit -- steps needed are listed  Dr. Astudillo/Staff:    I received a few calls from Mrs. Ashton and her son, Agustin having questions first about replacing O2 nasal canula tubing that was in disrepair and secondly to get a portable O2 unit that she can carry easily over the shoulder in place of the large tanks she has now.   Current O2 provider- [facility] does not provide portable O2. Referred to [facility].     [facility] reports the following is needed to start the process towards patient getting the portable O2 requested:  1) New O2 sats testing  2) Visit to PCP with MD patient assessment  3) Script for O2.   After pt has been billed for O2 from [facility] x 3 months then [facility] can seek a portable unit for patient. Meanwhile, pt will receive the large tanks for portable use along with the concentrator unit.     If in agreement please Fax 1-3 steps to [facility] FAX:  [phone]        (TEL:  [phone])    Thank you,  JOSH Foss, RN, CCM [facility]  [email or web address]  TEL:  [phone]

## 2020-08-19 ENCOUNTER — CLINICAL SUPPORT (OUTPATIENT)
Dept: INTERNAL MEDICINE | Facility: CLINIC | Age: 80
End: 2020-08-19
Payer: MEDICARE

## 2020-08-19 PROCEDURE — 99999 PR STA SHADOW: CPT | Mod: PBBFAC,,,

## 2020-08-19 PROCEDURE — 99999 PR STA SHADOW: ICD-10-PCS | Mod: PBBFAC,,,

## 2020-08-19 PROCEDURE — 96372 THER/PROPH/DIAG INJ SC/IM: CPT | Mod: PBBFAC

## 2020-08-19 RX ADMIN — CYANOCOBALAMIN 100 MCG: 1000 INJECTION, SOLUTION INTRAMUSCULAR at 09:08

## 2020-08-20 ENCOUNTER — OUTPATIENT CASE MANAGEMENT (OUTPATIENT)
Dept: ADMINISTRATIVE | Facility: OTHER | Age: 80
End: 2020-08-20

## 2020-08-20 ENCOUNTER — TELEPHONE (OUTPATIENT)
Dept: INTERNAL MEDICINE | Facility: CLINIC | Age: 80
End: 2020-08-20

## 2020-08-20 DIAGNOSIS — J18.9 PNEUMONIA OF RIGHT LOWER LOBE DUE TO INFECTIOUS ORGANISM: Primary | ICD-10-CM

## 2020-08-20 DIAGNOSIS — J43.9 PULMONARY EMPHYSEMA, UNSPECIFIED EMPHYSEMA TYPE: ICD-10-CM

## 2020-08-20 NOTE — TELEPHONE ENCOUNTER
----- Message from Jillian Rene RN sent at 8/20/2020  9:25 AM CDT -----  Regarding: O2 orders to [facility] not received-- needs to go to [facility] Office  Dr. Astudillo/Staff:    Thank you for your very prompt response to faxing the O2 orders and clinical notes to [facility] 8/13.   I just learned that the fax needed to go to the [facility] office and was not forwarded to correct office.     Can you please forward the same information to [facility]?- FAX:  [phone]  (TEL:  [phone]).     I apologize for this inconvenience and delay.   Thank you,    JOSH Foss, RN, CCM [facility]  [email or web address]  TEL:  [phone]

## 2020-08-20 NOTE — TELEPHONE ENCOUNTER
AIXA Rojas, LPN   Caller: Unspecified (Today,  9:35 AM)             Betty:   Just got call back from [facility]. The O2 orders are dated from 4/13/2020 and can not be used. Can Dr. Astudillo or other fax currently dated O2 orders. That is the only thing that is needed to secure order before the 30 days from the Office Visit 7/22/2020 lapses and a new visit is needed.     Thank you.   Jillian

## 2020-08-20 NOTE — PROGRESS NOTES
Outpatient Care Management  Plan of Care Follow Up Visit    Patient: Gretel Ashton  MRN: [id number]  Date of Service: 08/20/2020  Completed by: Jillian Rene RN  Referral Date: 06/12/2019  Program: Case Management (High Risk)    Reason for Visit   Patient presents with    OPCM Chart Review     8/20/2020    OPCM RN First Follow-Up Attempt     8/20/2020       Brief Summary:   F/u on change to new O2 provider for the eventual purpose of obtaining portable small O2 unit.     Dr. Astudillo faxed needed information to establish O2 through [facility] and fax provided by this OPCM-RN. Today in f/uu--[facility] denies receipt of orders. Orders need to go to [facility] Office--- FAX:  [phone]. Checked with [place] office and no orders were forwarded.   Message sent to Dr. Astudillo with request to forward O2 orders/clinicals to [facility].   VM left for Mrs. Ashton's son, Agustin with update on above and that in order to be eligible for a small, purse sized portable O2 unit--pt must be billed by [facility] x 3 mos.     9:51 am Response from Dr. Astudillo office- orders refaxed to [facility].     8/21/2020- Verified by Marylou at [facility] that O2 and back up tanks were delivered yest.    Patient Summary     Involvement of Care:  Do I have permission to speak with other family members about your care?       Patient Reported Labs & Vitals:  1.  Any Patient Reported Labs & Vitals?     2.  Patient Reported Blood Pressure:     3.  Patient Reported Pulse:     4.  Patient Reported Weight (Kg):     5.  Patient Reported Blood Glucose (mg/dl):       Medical and social history was reviewed with patient and/or caregiver.     Clinical Assessment     Reviewed and provided basic information on available community resources for mental health, transportation, wellness resources, and palliative care programs with patient and/or caregiver.     Complex Care Plan     Care plan was discussed and completed today with input from patient and/or  caregiver.    Patient Instructions     Instructions were provided via the [employer] patient resources and are available for the patient to view on the patient portal.    Next Steps:  F/u on [facility] receiving O2 orders.     Follow up in about 1 day (around 8/21/2020) for OPCM RN Follow Up to update care plan.    Todays OPCM Self-Management Care Plan was developed with the patients/caregivers input and was based on identified barriers from todays assessment.  Goals were written today with the patient/caregiver and the patient has agreed to work towards these goals to improve his/her overall well-being. Patient verbalized understanding of the care plan, goals, and all of today's instructions. Encouraged patient/caregiver to communicate with his/her physician and health care team about health conditions and the treatment plan.  Provided my contact information today and encouraged patient/caregiver to call me with any questions as needed.

## 2020-08-25 ENCOUNTER — OUTPATIENT CASE MANAGEMENT (OUTPATIENT)
Dept: ADMINISTRATIVE | Facility: OTHER | Age: 80
End: 2020-08-25

## 2020-08-25 NOTE — LETTER
August 25, 2020    Gretel Ashton  [address] [address]             [facility] [address] [place] [address] Dear Mrs. Gretel Ashton and son, Mr. Agustin Ashton:        I work with [facility] Department. I have been unsuccessful at reaching you to follow-up to see how you have been doing. If you require any future assistance or if any new concerns or problems arise, please do not hesitate to call.     The Outpatient Case Management Department can be reached at [phone] from 8:00 am to 4:30 pm on Monday thru Friday. [facility] also has a program where a nurse is available 24/7 to answer questions or provide medical advice. Their phone number is [phone].     Sincerely,        Jillian Rene RN   Outpatient Care Management  TEL:  [phone]

## 2020-08-25 NOTE — PROGRESS NOTES
Outpatient Care Management  Plan of Care Follow Up Visit    Patient: Gretel Ashton  MRN: [id number]  Date of Service: 08/25/2020  Completed by: Jillian Rene RN  Referral Date: 06/12/2019  Program: Case Management (High Risk)    Reason for Visit   Patient presents with    Update Plan Of Care     8/25/2020  (Letter entered but hold)    Other     8/25/2020  Disaster Planning       Brief Summary:   [x] Called and reviewed disaster plan with patient/caregiver.  Provided emergency phone number for patient's [place].   [] Attempted to reach patient/caregiver to review disaster plan.  Left a voice message with the phone number for patient's [place] Office of Emergency Preparedness.     Reviewed with Patient/Caregiver:  [x] Patient to have a supply of water, non-perishable food items, flashlights and batteries  [x] Patient to bring all medications if evacuates:  at least a five day supply preferably in the medication bottles, in case displaced for longer than 5 days  [] Patient to bring any DME needed (walkers, wheelchairs, shower chairs, nebulizer machine, etc.)   [] If Diabetic, patient to bring glucometer and monitoring supplies.   [] If Hypertension, patient to bring blood pressure cuff  [] If CHF, patient to bring scale  [] If tube feeds, patient to bring tube feedings and feeding supplies.  [x] If on oxygen,  patient to bring all oxygen supplies (Cannula, portable tanks, concentrator).  Patient will contact oxygen supply company to find out the nearest location of a supply company near evacuated location. ([employer]: [phone], [facility]: [phone], [facility]:[phone], [employer]: [phone]), [facility] -[phone].  [] If on hemodialysis, patient should already have a plan in place with dialysis center. If patient does not know where to evacuate to in order to be close to a dialysis center, patient/caregiver to reach out to regular dialysis center for further direction. ([facility]  service line: [phone], [facility] service line [phone])  [] If receiving treatment at an infusion center, patient/caregiver to contact the infusion center to find out which infusion center they have a contract with where patient plans to evacuate.      Office of Emergency Preparedness Phone number:  [] Elissa Gaitan:  [phone]     Mr. Ashton states he doesn't need the phone number.   -----------------------------------------------------------------------------------------------------------------------------------------  Return call received from Mrs. Gretel Ashton's son, Agustin in response to VM left by OPCM-RN. Agustin confirms receipt of new O2 equipment form [facility]. He reports being very satisfied as is Mrs. Ashton with the O2 DME. She received a over the shoulder portable unit right away. Agustin confirms receipt of mailings.   Provided education today on O2 safety -- staying away from open flames/burners/heaters/cig smokers as O2 is highly flammable.   Provided education on infection prevention with O2 supplies -- wash hands when handling, replace cannula/tubing every 2 wks, [facility], store O2 cannula in clean place/bag when not in use.   Provided education on disease process of COPD--impaired lung functioning from damaged alveoli, impairing CO2 & O2 exchange.   Agustin states his understanding. He will contact OPCM-RN if he is unable to locate [facility]'s contact number. He is currently driving.   Agustin agrees to f/u call in one week.       Patient Summary     Involvement of Care:  Do I have permission to speak with other family members about your care?       Patient Reported Labs & Vitals:  1.  Any Patient Reported Labs & Vitals?     2.  Patient Reported Blood Pressure:     3.  Patient Reported Pulse:     4.  Patient Reported Weight (Kg):     5.  Patient Reported Blood Glucose (mg/dl):       Medical and social history was reviewed with  patient and/or caregiver.     Clinical Assessment     Reviewed and provided basic information on available community resources for mental health, transportation, wellness resources, and palliative care programs with patient and/or caregiver.     Complex Care Plan     Care plan was discussed and completed today with input from patient and/or caregiver.    Patient Instructions     Instructions were provided via the [employer] patient resources and are available for the patient to view on the patient portal.    Next Steps:   Continue to provide education on COPD management, conservation of energy, s/s infection to report, infection prevention, O2 safety.    Follow up in about 8 days (around 9/2/2020) for OPCM RN Follow Up to update care plan.    Todays OPCM Self-Management Care Plan was developed with the patients/caregivers input and was based on identified barriers from todays assessment.  Goals were written today with the patient/caregiver and the patient has agreed to work towards these goals to improve his/her overall well-being. Patient verbalized understanding of the care plan, goals, and all of today's instructions. Encouraged patient/caregiver to communicate with his/her physician and health care team about health conditions and the treatment plan.  Provided my contact information today and encouraged patient/caregiver to call me with any questions as needed.

## 2020-09-16 ENCOUNTER — TELEPHONE (OUTPATIENT)
Dept: INTERNAL MEDICINE | Facility: CLINIC | Age: 80
End: 2020-09-16

## 2020-09-16 ENCOUNTER — CLINICAL SUPPORT (OUTPATIENT)
Dept: INTERNAL MEDICINE | Facility: CLINIC | Age: 80
End: 2020-09-16
Payer: MEDICARE

## 2020-09-16 PROCEDURE — 96372 THER/PROPH/DIAG INJ SC/IM: CPT | Mod: PBBFAC

## 2020-09-16 PROCEDURE — 99999 PR STA SHADOW: ICD-10-PCS | Mod: PBBFAC,,,

## 2020-09-16 PROCEDURE — 99999 PR STA SHADOW: CPT | Mod: PBBFAC,,,

## 2020-09-16 RX ADMIN — CYANOCOBALAMIN 100 MCG: 1000 INJECTION, SOLUTION INTRAMUSCULAR at 08:09

## 2020-09-16 NOTE — TELEPHONE ENCOUNTER
----- Message from Janae Velasquez sent at 2020  9:11 AM CDT -----  Regarding: Request to speak to a nurse  Contact: Nia with [facility] As[facility]  MRN: [id number]  : 1940  PCP: Jailene Astudillo  Home Phone      [phone]  Work Phone      Not on file.  Mobile          [phone]  Home Phone      Not on file.      MESSAGE:    Request to speak to a nurse regarding oxygen CMN.     Phone # [phone]    Pharmacy - [facility], [place], [place] - [facility] [address]

## 2020-09-16 NOTE — TELEPHONE ENCOUNTER
Nia requesting clarification on CMN order dated 8/24/20.  Length of need and diagnosis code need to be added. Order reprinted and corrected. Faxed to [facility]

## 2020-10-05 ENCOUNTER — OUTPATIENT CASE MANAGEMENT (OUTPATIENT)
Dept: ADMINISTRATIVE | Facility: OTHER | Age: 80
End: 2020-10-05

## 2020-10-06 NOTE — PROGRESS NOTES
Outpatient Care Management  Plan of Care Follow Up Visit    Patient: Gretel Ashton  MRN: [id number]  Date of Service: 10/05/2020  Completed by: Jillian Rene RN  Referral Date: 06/12/2019  Program: Case Management (High Risk)    Reason for Visit   Patient presents with    OPCM RN First Follow-Up Attempt     10/5/2020    Update Plan Of Care     10/6/2020    Case Closure     10/6/2020       Brief Summary:   F/u call reaching Mrs. Ashton directly after failed attempts to reach sonAgustin. Mrs. Ashton is found to be in good spirits. She verbalizes knowledge of her lab appt tomorrow and PCP appt 10/14. Her son will provide transportation. Mrs. Ashton reports having chronic SOB but no exacerbation. She is excited to be the maid of honor in a wedding this month and is pleased to have her portable O2 Discussed infection control measures- washing hands or using hand ,changing out her O2 tubing supplies,getting Flu vaccine. Mrs. Ashton agrees to report any s/s infection to lungs to PCP and  plans to get Flu vaccine at time of PCP appt.   No further needs identified at this time.   Discussed case closure & Mrs. Ashton agrees. Explained how she can call RN CM should future needs arise.     Patient Summary     Involvement of Care:  Do I have permission to speak with other family members about your care?       Patient Reported Labs & Vitals:  1.  Any Patient Reported Labs & Vitals?     2.  Patient Reported Blood Pressure:     3.  Patient Reported Pulse:     4.  Patient Reported Weight (Kg):     5.  Patient Reported Blood Glucose (mg/dl):       Medical and social history was reviewed with patient and/or caregiver.     Clinical Assessment     Reviewed and provided basic information on available community resources for mental health, transportation, wellness resources, and palliative care programs with patient and/or caregiver.     Complex Care Plan     Care plan was discussed and completed today with input from  patient and/or caregiver.    Patient Instructions     Instructions were provided via the [employer] patient resources and are available for the patient to view on the patient portal.    Next Steps:   Goals met. Case closed.    Todays OPCM Self-Management Care Plan was developed with the patients/caregivers input and was based on identified barriers from todays assessment.  Goals were written today with the patient/caregiver and the patient has agreed to work towards these goals to improve his/her overall well-being. Patient verbalized understanding of the care plan, goals, and all of today's instructions. Encouraged patient/caregiver to communicate with his/her physician and health care team about health conditions and the treatment plan.  Provided my contact information today and encouraged patient/caregiver to call me with any questions as needed.

## 2020-10-08 ENCOUNTER — LAB VISIT (OUTPATIENT)
Dept: LAB | Facility: HOSPITAL | Age: 80
End: 2020-10-08
Attending: INTERNAL MEDICINE
Payer: MEDICARE

## 2020-10-08 DIAGNOSIS — I70.0 AORTIC ATHEROSCLEROSIS: ICD-10-CM

## 2020-10-08 DIAGNOSIS — E11.40 TYPE 2 DIABETES MELLITUS WITH DIABETIC NEUROPATHY, WITHOUT LONG-TERM CURRENT USE OF INSULIN: ICD-10-CM

## 2020-10-08 DIAGNOSIS — E53.8 B12 DEFICIENCY: ICD-10-CM

## 2020-10-08 DIAGNOSIS — E78.5 HYPERLIPIDEMIA, UNSPECIFIED HYPERLIPIDEMIA TYPE: ICD-10-CM

## 2020-10-08 DIAGNOSIS — I10 HYPERTENSION, UNSPECIFIED TYPE: ICD-10-CM

## 2020-10-08 LAB
ALBUMIN SERPL BCP-MCNC: 3.4 G/DL (ref 3.5–5.2)
ALP SERPL-CCNC: 83 U/L (ref 55–135)
ALT SERPL W/O P-5'-P-CCNC: 16 U/L (ref 10–44)
ANION GAP SERPL CALC-SCNC: 10 MMOL/L (ref 8–16)
AST SERPL-CCNC: 25 U/L (ref 10–40)
BASOPHILS # BLD AUTO: 0.03 K/UL (ref 0–0.2)
BASOPHILS NFR BLD: 0.8 % (ref 0–1.9)
BILIRUB SERPL-MCNC: 0.5 MG/DL (ref 0.1–1)
BUN SERPL-MCNC: 14 MG/DL (ref 8–23)
CALCIUM SERPL-MCNC: 9.5 MG/DL (ref 8.7–10.5)
CHLORIDE SERPL-SCNC: 98 MMOL/L (ref 95–110)
CHOLEST SERPL-MCNC: 132 MG/DL (ref 120–199)
CHOLEST/HDLC SERPL: 4.6 {RATIO} (ref 2–5)
CO2 SERPL-SCNC: 34 MMOL/L (ref 23–29)
CREAT SERPL-MCNC: 0.8 MG/DL (ref 0.5–1.4)
DIFFERENTIAL METHOD: ABNORMAL
EOSINOPHIL # BLD AUTO: 0.1 K/UL (ref 0–0.5)
EOSINOPHIL NFR BLD: 2.3 % (ref 0–8)
ERYTHROCYTE [DISTWIDTH] IN BLOOD BY AUTOMATED COUNT: 17 % (ref 11.5–14.5)
EST. GFR  (AFRICAN AMERICAN): >60 ML/MIN/1.73 M^2
EST. GFR  (NON AFRICAN AMERICAN): >60 ML/MIN/1.73 M^2
ESTIMATED AVG GLUCOSE: 103 MG/DL (ref 68–131)
GLUCOSE SERPL-MCNC: 111 MG/DL (ref 70–110)
HBA1C MFR BLD HPLC: 5.2 % (ref 4–5.6)
HCT VFR BLD AUTO: 32.3 % (ref 37–48.5)
HDLC SERPL-MCNC: 29 MG/DL (ref 40–75)
HDLC SERPL: 22 % (ref 20–50)
HGB BLD-MCNC: 9.8 G/DL (ref 12–16)
IMM GRANULOCYTES # BLD AUTO: 0.01 K/UL (ref 0–0.04)
IMM GRANULOCYTES NFR BLD AUTO: 0.3 % (ref 0–0.5)
LDLC SERPL CALC-MCNC: 80.4 MG/DL (ref 63–159)
LYMPHOCYTES # BLD AUTO: 1.3 K/UL (ref 1–4.8)
LYMPHOCYTES NFR BLD: 32.2 % (ref 18–48)
MCH RBC QN AUTO: 25.6 PG (ref 27–31)
MCHC RBC AUTO-ENTMCNC: 30.3 G/DL (ref 32–36)
MCV RBC AUTO: 84 FL (ref 82–98)
MONOCYTES # BLD AUTO: 0.7 K/UL (ref 0.3–1)
MONOCYTES NFR BLD: 18 % (ref 4–15)
NEUTROPHILS # BLD AUTO: 1.8 K/UL (ref 1.8–7.7)
NEUTROPHILS NFR BLD: 46.4 % (ref 38–73)
NONHDLC SERPL-MCNC: 103 MG/DL
NRBC BLD-RTO: 0 /100 WBC
PLATELET # BLD AUTO: 198 K/UL (ref 150–350)
PMV BLD AUTO: 11.1 FL (ref 9.2–12.9)
POTASSIUM SERPL-SCNC: 3.9 MMOL/L (ref 3.5–5.1)
PROT SERPL-MCNC: 6.9 G/DL (ref 6–8.4)
RBC # BLD AUTO: 3.83 M/UL (ref 4–5.4)
SODIUM SERPL-SCNC: 142 MMOL/L (ref 136–145)
TRIGL SERPL-MCNC: 113 MG/DL (ref 30–150)
TSH SERPL DL<=0.005 MIU/L-ACNC: 1.83 UIU/ML (ref 0.4–4)
WBC # BLD AUTO: 3.88 K/UL (ref 3.9–12.7)

## 2020-10-08 PROCEDURE — 85025 COMPLETE CBC W/AUTO DIFF WBC: CPT

## 2020-10-08 PROCEDURE — 80053 COMPREHEN METABOLIC PANEL: CPT

## 2020-10-08 PROCEDURE — 36415 COLL VENOUS BLD VENIPUNCTURE: CPT

## 2020-10-08 PROCEDURE — 84443 ASSAY THYROID STIM HORMONE: CPT

## 2020-10-08 PROCEDURE — 80061 LIPID PANEL: CPT

## 2020-10-08 PROCEDURE — 83036 HEMOGLOBIN GLYCOSYLATED A1C: CPT

## 2020-10-14 ENCOUNTER — OFFICE VISIT (OUTPATIENT)
Dept: INTERNAL MEDICINE | Facility: CLINIC | Age: 80
End: 2020-10-14
Payer: MEDICARE

## 2020-10-14 VITALS
DIASTOLIC BLOOD PRESSURE: 64 MMHG | BODY MASS INDEX: 31.88 KG/M2 | OXYGEN SATURATION: 93 % | WEIGHT: 151.88 LBS | HEART RATE: 95 BPM | SYSTOLIC BLOOD PRESSURE: 100 MMHG | RESPIRATION RATE: 16 BRPM | HEIGHT: 58 IN

## 2020-10-14 DIAGNOSIS — E53.8 B12 DEFICIENCY: ICD-10-CM

## 2020-10-14 DIAGNOSIS — J43.9 PULMONARY EMPHYSEMA, UNSPECIFIED EMPHYSEMA TYPE: ICD-10-CM

## 2020-10-14 DIAGNOSIS — E78.5 HYPERLIPIDEMIA, UNSPECIFIED HYPERLIPIDEMIA TYPE: ICD-10-CM

## 2020-10-14 DIAGNOSIS — M06.041 RHEUMATOID ARTHRITIS INVOLVING BOTH HANDS WITH NEGATIVE RHEUMATOID FACTOR: ICD-10-CM

## 2020-10-14 DIAGNOSIS — M06.042 RHEUMATOID ARTHRITIS INVOLVING BOTH HANDS WITH NEGATIVE RHEUMATOID FACTOR: ICD-10-CM

## 2020-10-14 DIAGNOSIS — E11.40 TYPE 2 DIABETES MELLITUS WITH DIABETIC NEUROPATHY, WITHOUT LONG-TERM CURRENT USE OF INSULIN: ICD-10-CM

## 2020-10-14 DIAGNOSIS — I10 HYPERTENSION, UNSPECIFIED TYPE: Primary | ICD-10-CM

## 2020-10-14 PROCEDURE — 90694 VACC AIIV4 NO PRSRV 0.5ML IM: CPT | Mod: PBBFAC

## 2020-10-14 PROCEDURE — 99999 PR STA SHADOW: CPT | Mod: PBBFAC,,, | Performed by: INTERNAL MEDICINE

## 2020-10-14 PROCEDURE — 99999 PR STA SHADOW: ICD-10-PCS | Mod: PBBFAC,,,

## 2020-10-14 PROCEDURE — 96372 THER/PROPH/DIAG INJ SC/IM: CPT | Mod: PBBFAC

## 2020-10-14 PROCEDURE — G0008 ADMIN INFLUENZA VIRUS VAC: HCPCS | Mod: PBBFAC,59

## 2020-10-14 PROCEDURE — 99214 OFFICE O/P EST MOD 30 MIN: CPT | Mod: PBBFAC | Performed by: INTERNAL MEDICINE

## 2020-10-14 PROCEDURE — 99999 PR STA SHADOW: CPT | Mod: PBBFAC,,,

## 2020-10-14 PROCEDURE — 99999 FLU VACCINE - QUADRIVALENT - ADJUVANTED: CPT | Mod: PBBFAC,,,

## 2020-10-14 PROCEDURE — 99999 PR STA SHADOW: ICD-10-PCS | Mod: PBBFAC,,, | Performed by: INTERNAL MEDICINE

## 2020-10-14 PROCEDURE — 99214 OFFICE O/P EST MOD 30 MIN: CPT | Mod: S$PBB | Performed by: INTERNAL MEDICINE

## 2020-10-14 PROCEDURE — 99999 PR PBB SHADOW E&M-EST. PATIENT-LVL IV: CPT | Mod: PBBFAC,,, | Performed by: INTERNAL MEDICINE

## 2020-10-14 RX ORDER — FERROUS SULFATE 325(65) MG
1 TABLET ORAL DAILY
COMMUNITY
Start: 2020-09-23 | End: 2023-01-26

## 2020-10-14 RX ORDER — POTASSIUM CHLORIDE 1500 MG/1
20 TABLET, EXTENDED RELEASE ORAL DAILY
Status: ON HOLD | COMMUNITY
Start: 2020-08-21 | End: 2022-04-15 | Stop reason: HOSPADM

## 2020-10-14 RX ORDER — CYANOCOBALAMIN 1000 UG/ML
100 INJECTION, SOLUTION INTRAMUSCULAR; SUBCUTANEOUS
Status: DISCONTINUED | OUTPATIENT
Start: 2020-10-14 | End: 2021-06-24

## 2020-10-14 RX ADMIN — CYANOCOBALAMIN 100 MCG: 1000 INJECTION, SOLUTION INTRAMUSCULAR at 01:10

## 2020-10-14 NOTE — PROGRESS NOTES
Subjective:       Patient ID: Gretel Ashton is a 79 y.o. female.    Chief Complaint: Follow-up, B12 Injection, Hyperlipidemia, Diabetes, Hypertension, and COPD    Gretel Ashton is a  79  y.o. female who presents for Type II DM, Hypertension, and Hyperlipidemia follow up. Labs were reviewed with patient today.    Seeing rheumatology Dr Gerber for RA.      Labs reviewed :     Anemia better , DR pritchard started her on diuretics .          Follow-up  Associated symptoms include arthralgias, coughing, joint swelling and myalgias. Pertinent negatives include no chest pain, chills, congestion, fever, headaches, nausea, numbness, rash, sore throat, vomiting or weakness.   Hyperlipidemia  This is a chronic problem. The problem is uncontrolled. Recent lipid tests were reviewed and are high. Associated symptoms include myalgias. Pertinent negatives include no chest pain. Current antihyperlipidemic treatment includes statins. The current treatment provides moderate improvement of lipids.   Diabetes  She presents for her follow-up diabetic visit. She has type 2 diabetes mellitus. Pertinent negatives for hypoglycemia include no confusion, dizziness, headaches, nervousness/anxiousness or pallor. Pertinent negatives for diabetes include no chest pain, no polydipsia, no polyphagia and no weakness. There are no hypoglycemic complications. Symptoms are worsening. There are no diabetic complications. Risk factors for coronary artery disease include diabetes mellitus, dyslipidemia, hypertension, obesity, post-menopausal and sedentary lifestyle. Current diabetic treatment includes oral agent (monotherapy). Her weight is stable. She is following a generally healthy diet. Her breakfast blood glucose range is generally 130-140 mg/dl. An ACE inhibitor/angiotensin II receptor blocker is not being taken.   DepressionPatient is not experiencing: choking sensation, confusion, nervousness/anxiety, palpitations and suicidal ideas.    Medication  Refill  Associated symptoms include arthralgias, coughing, joint swelling and myalgias. Pertinent negatives include no chest pain, chills, congestion, fever, headaches, nausea, numbness, rash, sore throat, vomiting or weakness.     Review of Systems   Constitutional: Negative for chills and fever.   HENT: Negative for congestion, hearing loss, sinus pressure and sore throat.    Eyes: Negative for photophobia.   Respiratory: Positive for cough. Negative for choking, chest tightness and wheezing.         On home o2 ; COPD ;sleeps with O2 at night    Cardiovascular: Negative for chest pain and palpitations.   Gastrointestinal: Negative for blood in stool, nausea and vomiting.   Endocrine: Negative for polydipsia and polyphagia.   Genitourinary: Negative for dysuria and hematuria.   Musculoskeletal: Positive for arthralgias, joint swelling, myalgias and neck stiffness.   Skin: Negative for pallor and rash.        Left lower back with sebaceous cyst    Neurological: Negative for dizziness, weakness, numbness and headaches.   Hematological: Does not bruise/bleed easily.   Psychiatric/Behavioral: Positive for depression. Negative for confusion, dysphoric mood, hallucinations, sleep disturbance and suicidal ideas. The patient is not nervous/anxious.        Objective:      Physical Exam  Vitals signs and nursing note reviewed.   Constitutional:       Appearance: She is well-developed.   HENT:      Head: Normocephalic and atraumatic.      Nose: Nose normal.      Mouth/Throat:      Mouth: Mucous membranes are pale.   Eyes:      Conjunctiva/sclera: Conjunctivae normal.      Pupils: Pupils are equal, round, and reactive to light.   Neck:      Musculoskeletal: Normal range of motion and neck supple.      Thyroid: No thyromegaly.      Vascular: No JVD.      Trachea: No tracheal deviation.   Cardiovascular:      Rate and Rhythm: Normal rate and regular rhythm.      Pulses:           Dorsalis pedis pulses are 1+ on the right side  and 1+ on the left side.        Posterior tibial pulses are 1+ on the right side and 1+ on the left side.      Heart sounds: Normal heart sounds.   Pulmonary:      Effort: Pulmonary effort is normal. No respiratory distress.      Breath sounds: No wheezing or rales.   Chest:      Chest wall: No tenderness.   Abdominal:      General: Bowel sounds are normal. There is no distension.      Palpations: Abdomen is soft. There is no mass.      Tenderness: There is no abdominal tenderness. There is no guarding or rebound.   Musculoskeletal: Normal range of motion.      Comments: RA changes in both hands.   Feet:      Right foot:      Protective Sensation: 5 sites tested. 5 sites sensed.      Skin integrity: No ulcer, erythema or dry skin.      Left foot:      Protective Sensation: 5 sites tested. 5 sites sensed.      Skin integrity: No ulcer, erythema or dry skin.   Lymphadenopathy:      Cervical: No cervical adenopathy.   Skin:     General: Skin is warm and dry.             Comments: Sebaceous cyst : 5 cm in size .   Neurological:      Mental Status: She is alert and oriented to person, place, and time.      Cranial Nerves: No cranial nerve deficit.      Motor: No abnormal muscle tone.      Coordination: Coordination normal.      Deep Tendon Reflexes: Reflexes are normal and symmetric.   Psychiatric:         Behavior: Behavior normal.         Thought Content: Thought content normal.         Judgment: Judgment normal.         Assessment:       1. Hypertension, unspecified type    2. Hyperlipidemia, unspecified hyperlipidemia type    3. Type 2 diabetes mellitus with diabetic neuropathy, without long-term current use of insulin    4. Pulmonary emphysema, unspecified emphysema type    5. 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 B12 deficiency        Plan:   Gretel was seen today for follow-up, b12 injection, hyperlipidemia, diabetes, hypertension and copd.    Diagnoses and all orders for  this visit:    Hypertension, unspecified type  -     CBC auto differential; Future  -     Comprehensive Metabolic Panel; Future    Well controlled.  Continue same medication and dose.  1. Keep weight close to ideal body weight.   2.   Avoid high salt foods (olives, pickles, smoked meats, salted potato chips, etc.).   Do not add salt to your food at the table.   Use only small amounts of salt when cooking.   3. Begin an exercise program. Discuss with your doctor what type of exercise program would be best for you. It doesn't have to be difficult. Even brisk walking for 20 minutes three times a week is a good form of exercise.   4. Avoid medicines which contain heart stimulants. This includes many cold and sinus decongestant pills and sprays as well as diet pills. Check the warnings about hypertension on the label. Stimulants such as amphetamine or cocaine could be lethal for someone with hypertension. Never take these.    Hyperlipidemia, unspecified hyperlipidemia type  -     Lipid Panel; Future  -     TSH; Future  Well controlled.  Continue same medication and dose.      Type 2 diabetes mellitus with diabetic neuropathy, without long-term current use of insulin  -     Microalbumin/Creatinine Ratio, Urine; Future  -     Hemoglobin A1C; Future  Patient has controlled Diabetes .  We discussed about diet ;low in calories. Avoid sweats, sodas.  Also increasing activity;walking 2-3 miles a day.    Goal of  A1c  less than 7 % stressed.  Also goal of LDL less than 70 highlighted to patient.  Pulmonary emphysema, unspecified emphysema type  continue advair     Rheumatoid arthritis involving both hands with negative rheumatoid factor  -     CBC auto differential; Future  Continue ARAVA.      B12 deficiency  -     cyanocobalamin injection 100 mcg      Problem List Items Addressed This Visit     Hyperlipidemia    COPD (chronic obstructive pulmonary disease)    Type 2 diabetes mellitus with diabetic neuropathy, without long-term  current use of insulin    Rheumatoid arthritis involving both hands with negative rheumatoid factor    HTN (hypertension) - Primary

## 2020-10-15 ENCOUNTER — TELEPHONE (OUTPATIENT)
Dept: INTERNAL MEDICINE | Facility: CLINIC | Age: 80
End: 2020-10-15

## 2020-10-15 NOTE — TELEPHONE ENCOUNTER
----- Message from Marguerite Mckeon sent at 10/15/2020  4:07 PM CDT -----  Gretel Ashton  MRN: [id number]  : 1940  PCP: Jailene Astudillo  Home Phone      [phone]  Work Phone      Not on file.  Mobile          [phone]  Home Phone      Not on file.      MESSAGE:   Patient had an appointment yesterday and it does not say when she is due to come get another B12 injection. Would like a nurse to call her and schedule this.    [phone]     5

## 2020-11-11 ENCOUNTER — CLINICAL SUPPORT (OUTPATIENT)
Dept: INTERNAL MEDICINE | Facility: CLINIC | Age: 80
End: 2020-11-11
Payer: MEDICARE

## 2020-11-11 PROCEDURE — 99999 PR STA SHADOW: CPT | Mod: PBBFAC,,,

## 2020-11-11 PROCEDURE — 99999 PR STA SHADOW: ICD-10-PCS | Mod: PBBFAC,,,

## 2020-11-11 PROCEDURE — 96372 THER/PROPH/DIAG INJ SC/IM: CPT | Mod: PBBFAC

## 2020-11-11 RX ADMIN — CYANOCOBALAMIN 100 MCG: 1000 INJECTION, SOLUTION INTRAMUSCULAR at 08:11

## 2020-12-16 ENCOUNTER — CLINICAL SUPPORT (OUTPATIENT)
Dept: INTERNAL MEDICINE | Facility: CLINIC | Age: 80
End: 2020-12-16
Payer: MEDICARE

## 2020-12-16 PROCEDURE — 99999 PR STA SHADOW: CPT | Mod: PBBFAC,,,

## 2020-12-16 PROCEDURE — 96372 THER/PROPH/DIAG INJ SC/IM: CPT | Mod: PBBFAC

## 2020-12-16 PROCEDURE — 99999 PR STA SHADOW: ICD-10-PCS | Mod: PBBFAC,,,

## 2020-12-16 RX ADMIN — CYANOCOBALAMIN 100 MCG: 1000 INJECTION, SOLUTION INTRAMUSCULAR at 09:12

## 2021-01-04 ENCOUNTER — TELEPHONE (OUTPATIENT)
Dept: INTERNAL MEDICINE | Facility: CLINIC | Age: 81
End: 2021-01-04

## 2021-01-11 ENCOUNTER — TELEPHONE (OUTPATIENT)
Dept: INTERNAL MEDICINE | Facility: CLINIC | Age: 81
End: 2021-01-11

## 2021-01-13 ENCOUNTER — TELEPHONE (OUTPATIENT)
Dept: INTERNAL MEDICINE | Facility: CLINIC | Age: 81
End: 2021-01-13

## 2021-01-14 ENCOUNTER — CLINICAL SUPPORT (OUTPATIENT)
Dept: INTERNAL MEDICINE | Facility: CLINIC | Age: 81
End: 2021-01-14
Payer: MEDICARE

## 2021-01-14 PROCEDURE — 96372 THER/PROPH/DIAG INJ SC/IM: CPT | Mod: PBBFAC

## 2021-01-14 PROCEDURE — 99999 PR STA SHADOW: CPT | Mod: PBBFAC,,,

## 2021-01-14 PROCEDURE — 99999 PR STA SHADOW: ICD-10-PCS | Mod: PBBFAC,,,

## 2021-01-14 RX ADMIN — CYANOCOBALAMIN 100 MCG: 1000 INJECTION, SOLUTION INTRAMUSCULAR at 08:01

## 2021-01-20 ENCOUNTER — IMMUNIZATION (OUTPATIENT)
Dept: FAMILY MEDICINE | Facility: CLINIC | Age: 81
End: 2021-01-20
Payer: MEDICARE

## 2021-01-20 DIAGNOSIS — Z23 NEED FOR VACCINATION: Primary | ICD-10-CM

## 2021-01-20 PROCEDURE — 91300 COVID-19, MRNA, LNP-S, PF, 30 MCG/0.3 ML DOSE VACCINE: CPT | Mod: PBBFAC | Performed by: FAMILY MEDICINE

## 2021-02-10 ENCOUNTER — IMMUNIZATION (OUTPATIENT)
Dept: FAMILY MEDICINE | Facility: CLINIC | Age: 81
End: 2021-02-10
Payer: MEDICARE

## 2021-02-10 DIAGNOSIS — Z23 NEED FOR VACCINATION: Primary | ICD-10-CM

## 2021-02-10 PROCEDURE — 91300 COVID-19, MRNA, LNP-S, PF, 30 MCG/0.3 ML DOSE VACCINE: CPT | Mod: PBBFAC | Performed by: FAMILY MEDICINE

## 2021-02-10 PROCEDURE — 0002A COVID-19, MRNA, LNP-S, PF, 30 MCG/0.3 ML DOSE VACCINE: CPT | Mod: PBBFAC | Performed by: FAMILY MEDICINE

## 2021-03-15 ENCOUNTER — CLINICAL SUPPORT (OUTPATIENT)
Dept: INTERNAL MEDICINE | Facility: CLINIC | Age: 81
End: 2021-03-15
Payer: MEDICARE

## 2021-03-15 PROCEDURE — 99999 PR STA SHADOW: ICD-10-PCS | Mod: PBBFAC,,,

## 2021-03-15 PROCEDURE — 99999 PR STA SHADOW: CPT | Mod: PBBFAC,,,

## 2021-03-15 PROCEDURE — 96372 THER/PROPH/DIAG INJ SC/IM: CPT | Mod: PBBFAC

## 2021-03-15 RX ADMIN — CYANOCOBALAMIN 100 MCG: 1000 INJECTION, SOLUTION INTRAMUSCULAR at 08:03

## 2021-04-08 ENCOUNTER — LAB VISIT (OUTPATIENT)
Dept: LAB | Facility: HOSPITAL | Age: 81
End: 2021-04-08
Attending: INTERNAL MEDICINE
Payer: MEDICARE

## 2021-04-08 DIAGNOSIS — E78.5 HYPERLIPIDEMIA, UNSPECIFIED HYPERLIPIDEMIA TYPE: ICD-10-CM

## 2021-04-08 DIAGNOSIS — E11.40 TYPE 2 DIABETES MELLITUS WITH DIABETIC NEUROPATHY, WITHOUT LONG-TERM CURRENT USE OF INSULIN: ICD-10-CM

## 2021-04-08 DIAGNOSIS — M06.042 RHEUMATOID ARTHRITIS INVOLVING BOTH HANDS WITH NEGATIVE RHEUMATOID FACTOR: ICD-10-CM

## 2021-04-08 DIAGNOSIS — I10 HYPERTENSION, UNSPECIFIED TYPE: ICD-10-CM

## 2021-04-08 DIAGNOSIS — M06.041 RHEUMATOID ARTHRITIS INVOLVING BOTH HANDS WITH NEGATIVE RHEUMATOID FACTOR: ICD-10-CM

## 2021-04-08 LAB
ALBUMIN SERPL BCP-MCNC: 3.6 G/DL (ref 3.5–5.2)
ALP SERPL-CCNC: 71 U/L (ref 55–135)
ALT SERPL W/O P-5'-P-CCNC: 17 U/L (ref 10–44)
ANION GAP SERPL CALC-SCNC: 7 MMOL/L (ref 8–16)
AST SERPL-CCNC: 30 U/L (ref 10–40)
BASOPHILS # BLD AUTO: 0.02 K/UL (ref 0–0.2)
BASOPHILS NFR BLD: 0.7 % (ref 0–1.9)
BILIRUB SERPL-MCNC: 0.3 MG/DL (ref 0.1–1)
BUN SERPL-MCNC: 17 MG/DL (ref 8–23)
CALCIUM SERPL-MCNC: 9.5 MG/DL (ref 8.7–10.5)
CHLORIDE SERPL-SCNC: 97 MMOL/L (ref 95–110)
CHOLEST SERPL-MCNC: 117 MG/DL (ref 120–199)
CHOLEST/HDLC SERPL: 4.2 {RATIO} (ref 2–5)
CO2 SERPL-SCNC: 37 MMOL/L (ref 23–29)
CREAT SERPL-MCNC: 0.9 MG/DL (ref 0.5–1.4)
DIFFERENTIAL METHOD: ABNORMAL
EOSINOPHIL # BLD AUTO: 0.1 K/UL (ref 0–0.5)
EOSINOPHIL NFR BLD: 2.9 % (ref 0–8)
ERYTHROCYTE [DISTWIDTH] IN BLOOD BY AUTOMATED COUNT: 15.9 % (ref 11.5–14.5)
EST. GFR  (AFRICAN AMERICAN): >60 ML/MIN/1.73 M^2
EST. GFR  (NON AFRICAN AMERICAN): >60 ML/MIN/1.73 M^2
GLUCOSE SERPL-MCNC: 132 MG/DL (ref 70–110)
HCT VFR BLD AUTO: 37.9 % (ref 37–48.5)
HDLC SERPL-MCNC: 28 MG/DL (ref 40–75)
HDLC SERPL: 23.9 % (ref 20–50)
HGB BLD-MCNC: 11.8 G/DL (ref 12–16)
IMM GRANULOCYTES # BLD AUTO: 0 K/UL (ref 0–0.04)
IMM GRANULOCYTES NFR BLD AUTO: 0 % (ref 0–0.5)
LDLC SERPL CALC-MCNC: 62.6 MG/DL (ref 63–159)
LYMPHOCYTES # BLD AUTO: 1.5 K/UL (ref 1–4.8)
LYMPHOCYTES NFR BLD: 50 % (ref 18–48)
MCH RBC QN AUTO: 26.6 PG (ref 27–31)
MCHC RBC AUTO-ENTMCNC: 31.1 G/DL (ref 32–36)
MCV RBC AUTO: 85 FL (ref 82–98)
MONOCYTES # BLD AUTO: 0.4 K/UL (ref 0.3–1)
MONOCYTES NFR BLD: 13.7 % (ref 4–15)
NEUTROPHILS # BLD AUTO: 1 K/UL (ref 1.8–7.7)
NEUTROPHILS NFR BLD: 32.7 % (ref 38–73)
NONHDLC SERPL-MCNC: 89 MG/DL
NRBC BLD-RTO: 0 /100 WBC
PLATELET # BLD AUTO: 184 K/UL (ref 150–450)
PMV BLD AUTO: 11 FL (ref 9.2–12.9)
POTASSIUM SERPL-SCNC: 4.2 MMOL/L (ref 3.5–5.1)
PROT SERPL-MCNC: 7.5 G/DL (ref 6–8.4)
RBC # BLD AUTO: 4.44 M/UL (ref 4–5.4)
SODIUM SERPL-SCNC: 141 MMOL/L (ref 136–145)
TRIGL SERPL-MCNC: 132 MG/DL (ref 30–150)
TSH SERPL DL<=0.005 MIU/L-ACNC: 1.93 UIU/ML (ref 0.4–4)
WBC # BLD AUTO: 3.06 K/UL (ref 3.9–12.7)

## 2021-04-08 PROCEDURE — 84443 ASSAY THYROID STIM HORMONE: CPT | Performed by: INTERNAL MEDICINE

## 2021-04-08 PROCEDURE — 85025 COMPLETE CBC W/AUTO DIFF WBC: CPT | Performed by: INTERNAL MEDICINE

## 2021-04-08 PROCEDURE — 80053 COMPREHEN METABOLIC PANEL: CPT | Performed by: INTERNAL MEDICINE

## 2021-04-08 PROCEDURE — 36415 COLL VENOUS BLD VENIPUNCTURE: CPT | Performed by: INTERNAL MEDICINE

## 2021-04-08 PROCEDURE — 83036 HEMOGLOBIN GLYCOSYLATED A1C: CPT | Performed by: INTERNAL MEDICINE

## 2021-04-08 PROCEDURE — 80061 LIPID PANEL: CPT | Performed by: INTERNAL MEDICINE

## 2021-04-09 LAB
ESTIMATED AVG GLUCOSE: 126 MG/DL (ref 68–131)
HBA1C MFR BLD: 6 % (ref 4–5.6)

## 2021-04-13 ENCOUNTER — OFFICE VISIT (OUTPATIENT)
Dept: INTERNAL MEDICINE | Facility: CLINIC | Age: 81
End: 2021-04-13
Payer: MEDICARE

## 2021-04-13 VITALS
RESPIRATION RATE: 18 BRPM | BODY MASS INDEX: 32.35 KG/M2 | HEART RATE: 88 BPM | WEIGHT: 154.13 LBS | SYSTOLIC BLOOD PRESSURE: 110 MMHG | DIASTOLIC BLOOD PRESSURE: 62 MMHG | HEIGHT: 58 IN

## 2021-04-13 DIAGNOSIS — J43.9 PULMONARY EMPHYSEMA, UNSPECIFIED EMPHYSEMA TYPE: ICD-10-CM

## 2021-04-13 DIAGNOSIS — E78.5 HYPERLIPIDEMIA, UNSPECIFIED HYPERLIPIDEMIA TYPE: ICD-10-CM

## 2021-04-13 DIAGNOSIS — I70.0 AORTIC ATHEROSCLEROSIS: ICD-10-CM

## 2021-04-13 DIAGNOSIS — I10 HYPERTENSION, UNSPECIFIED TYPE: Primary | ICD-10-CM

## 2021-04-13 DIAGNOSIS — E11.40 TYPE 2 DIABETES MELLITUS WITH DIABETIC NEUROPATHY, WITHOUT LONG-TERM CURRENT USE OF INSULIN: ICD-10-CM

## 2021-04-13 PROCEDURE — 99999 PR PBB SHADOW E&M-EST. PATIENT-LVL III: ICD-10-PCS | Mod: PBBFAC,,, | Performed by: INTERNAL MEDICINE

## 2021-04-13 PROCEDURE — 99213 OFFICE O/P EST LOW 20 MIN: CPT | Mod: PBBFAC | Performed by: INTERNAL MEDICINE

## 2021-04-13 PROCEDURE — 99999 PR PBB SHADOW E&M-EST. PATIENT-LVL III: CPT | Mod: PBBFAC,,, | Performed by: INTERNAL MEDICINE

## 2021-04-13 PROCEDURE — 99999 PR STA SHADOW: CPT | Mod: PBBFAC,,, | Performed by: INTERNAL MEDICINE

## 2021-04-13 PROCEDURE — 99214 OFFICE O/P EST MOD 30 MIN: CPT | Mod: S$PBB | Performed by: INTERNAL MEDICINE

## 2021-04-13 RX ORDER — SULFASALAZINE 500 MG/1
500 TABLET, DELAYED RELEASE ORAL 2 TIMES DAILY
COMMUNITY
Start: 2021-01-05 | End: 2022-04-18

## 2021-04-13 RX ORDER — BEMPEDOIC ACID 180 MG/1
1 TABLET, FILM COATED ORAL DAILY
COMMUNITY
Start: 2021-03-16 | End: 2021-08-10

## 2021-06-22 ENCOUNTER — OUTPATIENT CASE MANAGEMENT (OUTPATIENT)
Dept: ADMINISTRATIVE | Facility: OTHER | Age: 81
End: 2021-06-22

## 2021-06-23 ENCOUNTER — TELEPHONE (OUTPATIENT)
Dept: INTERNAL MEDICINE | Facility: CLINIC | Age: 81
End: 2021-06-23

## 2021-06-23 DIAGNOSIS — E53.8 B12 DEFICIENCY: ICD-10-CM

## 2021-06-23 DIAGNOSIS — G31.84 MILD COGNITIVE IMPAIRMENT: Primary | ICD-10-CM

## 2021-06-23 RX ORDER — CYANOCOBALAMIN 1000 UG/ML
1000 INJECTION, SOLUTION INTRAMUSCULAR; SUBCUTANEOUS
Qty: 10 ML | Refills: 3 | Status: SHIPPED | OUTPATIENT
Start: 2021-06-23 | End: 2021-06-24

## 2021-06-24 RX ORDER — CYANOCOBALAMIN (VITAMIN B-12) 1000 MCG
TABLET, SUBLINGUAL SUBLINGUAL
Qty: 90 TABLET | Refills: 1 | Status: SHIPPED | OUTPATIENT
Start: 2021-06-24 | End: 2021-08-18 | Stop reason: SDUPTHER

## 2021-07-12 ENCOUNTER — OUTPATIENT CASE MANAGEMENT (OUTPATIENT)
Dept: ADMINISTRATIVE | Facility: OTHER | Age: 81
End: 2021-07-12

## 2021-07-13 LAB
LEFT EYE DM RETINOPATHY: NEGATIVE
RIGHT EYE DM RETINOPATHY: NEGATIVE

## 2021-07-15 ENCOUNTER — PATIENT OUTREACH (OUTPATIENT)
Dept: ADMINISTRATIVE | Facility: HOSPITAL | Age: 81
End: 2021-07-15

## 2021-07-15 ENCOUNTER — PATIENT OUTREACH (OUTPATIENT)
Dept: INTERNAL MEDICINE | Facility: CLINIC | Age: 81
End: 2021-07-15

## 2021-08-03 ENCOUNTER — TELEPHONE (OUTPATIENT)
Dept: INTERNAL MEDICINE | Facility: CLINIC | Age: 81
End: 2021-08-03

## 2021-08-10 ENCOUNTER — HOSPITAL ENCOUNTER (OUTPATIENT)
Dept: RADIOLOGY | Facility: HOSPITAL | Age: 81
Discharge: HOME | End: 2021-08-10
Attending: PSYCHIATRY & NEUROLOGY
Payer: MEDICARE

## 2021-08-10 ENCOUNTER — OFFICE VISIT (OUTPATIENT)
Dept: NEUROLOGY | Facility: CLINIC | Age: 81
End: 2021-08-10
Payer: MEDICARE

## 2021-08-10 VITALS
WEIGHT: 155.63 LBS | HEART RATE: 86 BPM | BODY MASS INDEX: 32.67 KG/M2 | HEIGHT: 58 IN | SYSTOLIC BLOOD PRESSURE: 112 MMHG | DIASTOLIC BLOOD PRESSURE: 68 MMHG

## 2021-08-10 DIAGNOSIS — R41.82 ALTERED MENTAL STATUS, UNSPECIFIED ALTERED MENTAL STATUS TYPE: ICD-10-CM

## 2021-08-10 DIAGNOSIS — R41.3 MEMORY LOSS: ICD-10-CM

## 2021-08-10 PROCEDURE — 99999 PR PBB SHADOW E&M-EST. PATIENT-LVL IV: CPT | Mod: PBBFAC,,, | Performed by: PSYCHIATRY & NEUROLOGY

## 2021-08-10 PROCEDURE — 70450 CT HEAD/BRAIN W/O DYE: CPT | Mod: TC

## 2021-08-10 PROCEDURE — 99214 OFFICE O/P EST MOD 30 MIN: CPT | Mod: PBBFAC | Performed by: PSYCHIATRY & NEUROLOGY

## 2021-08-10 PROCEDURE — 99999 PR STA SHADOW: CPT | Mod: PBBFAC,,, | Performed by: PSYCHIATRY & NEUROLOGY

## 2021-08-10 PROCEDURE — 99204 OFFICE O/P NEW MOD 45 MIN: CPT | Mod: S$PBB | Performed by: PSYCHIATRY & NEUROLOGY

## 2021-08-10 PROCEDURE — 70450 CT HEAD/BRAIN W/O DYE: CPT | Mod: 26,,, | Performed by: RADIOLOGY

## 2021-08-10 PROCEDURE — 99999 PR STA SHADOW: ICD-10-PCS | Mod: PBBFAC,,, | Performed by: PSYCHIATRY & NEUROLOGY

## 2021-08-10 PROCEDURE — 70450 CT HEAD WITHOUT CONTRAST: ICD-10-PCS | Mod: 26,,, | Performed by: RADIOLOGY

## 2021-08-10 RX ORDER — MEMANTINE HYDROCHLORIDE 5 MG/1
TABLET ORAL
Qty: 60 TABLET | Refills: 11 | Status: SHIPPED | OUTPATIENT
Start: 2021-08-10 | End: 2021-09-07 | Stop reason: SDUPTHER

## 2021-08-18 ENCOUNTER — OUTPATIENT CASE MANAGEMENT (OUTPATIENT)
Dept: ADMINISTRATIVE | Facility: OTHER | Age: 81
End: 2021-08-18

## 2021-08-18 RX ORDER — ALBUTEROL SULFATE 0.83 MG/ML
SOLUTION RESPIRATORY (INHALATION)
Qty: 180 EACH | Refills: 11 | Status: SHIPPED | OUTPATIENT
Start: 2021-08-18 | End: 2021-08-19

## 2021-08-18 RX ORDER — CYANOCOBALAMIN (VITAMIN B-12) 1000 MCG
TABLET, SUBLINGUAL SUBLINGUAL
Qty: 90 TABLET | Refills: 1 | Status: SHIPPED | OUTPATIENT
Start: 2021-08-18 | End: 2024-02-01 | Stop reason: DRUGHIGH

## 2021-08-18 RX ORDER — OMEPRAZOLE 40 MG/1
40 CAPSULE, DELAYED RELEASE ORAL DAILY
Qty: 90 CAPSULE | Refills: 1 | Status: SHIPPED | OUTPATIENT
Start: 2021-08-18 | End: 2022-04-18

## 2021-08-18 RX ORDER — METFORMIN HYDROCHLORIDE 500 MG/1
500 TABLET ORAL 2 TIMES DAILY WITH MEALS
Qty: 180 TABLET | Refills: 1 | Status: SHIPPED | OUTPATIENT
Start: 2021-08-18

## 2021-09-07 ENCOUNTER — OUTPATIENT CASE MANAGEMENT (OUTPATIENT)
Dept: ADMINISTRATIVE | Facility: OTHER | Age: 81
End: 2021-09-07

## 2021-09-07 DIAGNOSIS — R41.3 MEMORY LOSS: ICD-10-CM

## 2021-09-07 RX ORDER — MEMANTINE HYDROCHLORIDE 5 MG/1
TABLET ORAL
Qty: 60 TABLET | Refills: 11 | Status: SHIPPED | OUTPATIENT
Start: 2021-09-07 | End: 2022-07-12

## 2021-10-11 ENCOUNTER — LAB VISIT (OUTPATIENT)
Dept: LAB | Facility: HOSPITAL | Age: 81
End: 2021-10-11
Attending: INTERNAL MEDICINE
Payer: MEDICARE

## 2021-10-11 DIAGNOSIS — E78.5 HYPERLIPIDEMIA, UNSPECIFIED HYPERLIPIDEMIA TYPE: ICD-10-CM

## 2021-10-11 DIAGNOSIS — I70.0 AORTIC ATHEROSCLEROSIS: ICD-10-CM

## 2021-10-11 DIAGNOSIS — E11.40 TYPE 2 DIABETES MELLITUS WITH DIABETIC NEUROPATHY, WITHOUT LONG-TERM CURRENT USE OF INSULIN: ICD-10-CM

## 2021-10-11 DIAGNOSIS — I10 HYPERTENSION, UNSPECIFIED TYPE: ICD-10-CM

## 2021-10-11 LAB
ALBUMIN SERPL BCP-MCNC: 3.2 G/DL (ref 3.5–5.2)
ALBUMIN/CREAT UR: 9.7 UG/MG (ref 0–30)
ALP SERPL-CCNC: 95 U/L (ref 55–135)
ALT SERPL W/O P-5'-P-CCNC: 21 U/L (ref 10–44)
ANION GAP SERPL CALC-SCNC: 7 MMOL/L (ref 8–16)
AST SERPL-CCNC: 22 U/L (ref 10–40)
BASOPHILS # BLD AUTO: 0.02 K/UL (ref 0–0.2)
BASOPHILS NFR BLD: 0.5 % (ref 0–1.9)
BILIRUB SERPL-MCNC: 0.2 MG/DL (ref 0.1–1)
BUN SERPL-MCNC: 23 MG/DL (ref 8–23)
CALCIUM SERPL-MCNC: 9.5 MG/DL (ref 8.7–10.5)
CHLORIDE SERPL-SCNC: 104 MMOL/L (ref 95–110)
CHOLEST SERPL-MCNC: 200 MG/DL (ref 120–199)
CHOLEST/HDLC SERPL: 7.4 {RATIO} (ref 2–5)
CO2 SERPL-SCNC: 28 MMOL/L (ref 23–29)
CREAT SERPL-MCNC: 1 MG/DL (ref 0.5–1.4)
CREAT UR-MCNC: 31 MG/DL (ref 15–325)
DIFFERENTIAL METHOD: ABNORMAL
EOSINOPHIL # BLD AUTO: 0.1 K/UL (ref 0–0.5)
EOSINOPHIL NFR BLD: 2.5 % (ref 0–8)
ERYTHROCYTE [DISTWIDTH] IN BLOOD BY AUTOMATED COUNT: 15.1 % (ref 11.5–14.5)
EST. GFR  (AFRICAN AMERICAN): >60 ML/MIN/1.73 M^2
EST. GFR  (NON AFRICAN AMERICAN): 53 ML/MIN/1.73 M^2
ESTIMATED AVG GLUCOSE: 111 MG/DL (ref 68–131)
GLUCOSE SERPL-MCNC: 100 MG/DL (ref 70–110)
HBA1C MFR BLD: 5.5 % (ref 4–5.6)
HCT VFR BLD AUTO: 38.8 % (ref 37–48.5)
HDLC SERPL-MCNC: 27 MG/DL (ref 40–75)
HDLC SERPL: 13.5 % (ref 20–50)
HGB BLD-MCNC: 12.2 G/DL (ref 12–16)
IMM GRANULOCYTES # BLD AUTO: 0 K/UL (ref 0–0.04)
IMM GRANULOCYTES NFR BLD AUTO: 0 % (ref 0–0.5)
LDLC SERPL CALC-MCNC: 123 MG/DL (ref 63–159)
LYMPHOCYTES # BLD AUTO: 1.7 K/UL (ref 1–4.8)
LYMPHOCYTES NFR BLD: 41.7 % (ref 18–48)
MCH RBC QN AUTO: 27.5 PG (ref 27–31)
MCHC RBC AUTO-ENTMCNC: 31.4 G/DL (ref 32–36)
MCV RBC AUTO: 88 FL (ref 82–98)
MICROALBUMIN UR DL<=1MG/L-MCNC: 3 UG/ML
MONOCYTES # BLD AUTO: 0.6 K/UL (ref 0.3–1)
MONOCYTES NFR BLD: 14.4 % (ref 4–15)
NEUTROPHILS # BLD AUTO: 1.6 K/UL (ref 1.8–7.7)
NEUTROPHILS NFR BLD: 40.9 % (ref 38–73)
NONHDLC SERPL-MCNC: 173 MG/DL
NRBC BLD-RTO: 0 /100 WBC
PLATELET # BLD AUTO: 199 K/UL (ref 150–450)
PMV BLD AUTO: 10.9 FL (ref 9.2–12.9)
POTASSIUM SERPL-SCNC: 4.5 MMOL/L (ref 3.5–5.1)
PROT SERPL-MCNC: 7 G/DL (ref 6–8.4)
RBC # BLD AUTO: 4.43 M/UL (ref 4–5.4)
SODIUM SERPL-SCNC: 139 MMOL/L (ref 136–145)
T4 FREE SERPL-MCNC: 1.03 NG/DL (ref 0.71–1.51)
TRIGL SERPL-MCNC: 250 MG/DL (ref 30–150)
TSH SERPL DL<=0.005 MIU/L-ACNC: 4.24 UIU/ML (ref 0.4–4)
WBC # BLD AUTO: 3.96 K/UL (ref 3.9–12.7)

## 2021-10-11 PROCEDURE — 84443 ASSAY THYROID STIM HORMONE: CPT | Performed by: INTERNAL MEDICINE

## 2021-10-11 PROCEDURE — 83036 HEMOGLOBIN GLYCOSYLATED A1C: CPT | Performed by: INTERNAL MEDICINE

## 2021-10-11 PROCEDURE — 82570 ASSAY OF URINE CREATININE: CPT | Performed by: INTERNAL MEDICINE

## 2021-10-11 PROCEDURE — 84439 ASSAY OF FREE THYROXINE: CPT | Performed by: INTERNAL MEDICINE

## 2021-10-11 PROCEDURE — 36415 COLL VENOUS BLD VENIPUNCTURE: CPT | Performed by: INTERNAL MEDICINE

## 2021-10-11 PROCEDURE — 85025 COMPLETE CBC W/AUTO DIFF WBC: CPT | Performed by: INTERNAL MEDICINE

## 2021-10-11 PROCEDURE — 80053 COMPREHEN METABOLIC PANEL: CPT | Performed by: INTERNAL MEDICINE

## 2021-10-11 PROCEDURE — 80061 LIPID PANEL: CPT | Performed by: INTERNAL MEDICINE

## 2021-10-15 ENCOUNTER — OUTPATIENT CASE MANAGEMENT (OUTPATIENT)
Dept: ADMINISTRATIVE | Facility: OTHER | Age: 81
End: 2021-10-15

## 2021-10-18 ENCOUNTER — OFFICE VISIT (OUTPATIENT)
Dept: INTERNAL MEDICINE | Facility: CLINIC | Age: 81
End: 2021-10-18
Payer: MEDICARE

## 2021-10-18 VITALS
RESPIRATION RATE: 16 BRPM | DIASTOLIC BLOOD PRESSURE: 54 MMHG | SYSTOLIC BLOOD PRESSURE: 98 MMHG | OXYGEN SATURATION: 95 % | WEIGHT: 156.06 LBS | HEIGHT: 57 IN | BODY MASS INDEX: 33.67 KG/M2 | HEART RATE: 86 BPM

## 2021-10-18 DIAGNOSIS — E11.40 TYPE 2 DIABETES MELLITUS WITH DIABETIC NEUROPATHY, WITHOUT LONG-TERM CURRENT USE OF INSULIN: ICD-10-CM

## 2021-10-18 DIAGNOSIS — J43.9 PULMONARY EMPHYSEMA, UNSPECIFIED EMPHYSEMA TYPE: ICD-10-CM

## 2021-10-18 DIAGNOSIS — E78.5 HYPERLIPIDEMIA, UNSPECIFIED HYPERLIPIDEMIA TYPE: ICD-10-CM

## 2021-10-18 DIAGNOSIS — I10 PRIMARY HYPERTENSION: Primary | ICD-10-CM

## 2021-10-18 PROCEDURE — 99999 PR STA SHADOW: ICD-10-PCS | Mod: PBBFAC,,, | Performed by: INTERNAL MEDICINE

## 2021-10-18 PROCEDURE — 99999 PR PBB SHADOW E&M-EST. PATIENT-LVL IV: CPT | Mod: PBBFAC,,, | Performed by: INTERNAL MEDICINE

## 2021-10-18 PROCEDURE — 99999 FLU VACCINE - QUADRIVALENT - ADJUVANTED: CPT | Mod: PBBFAC,,,

## 2021-10-18 PROCEDURE — G0008 ADMIN INFLUENZA VIRUS VAC: HCPCS | Mod: PBBFAC

## 2021-10-18 PROCEDURE — 99214 OFFICE O/P EST MOD 30 MIN: CPT | Mod: S$PBB | Performed by: INTERNAL MEDICINE

## 2021-10-18 PROCEDURE — 90694 VACC AIIV4 NO PRSRV 0.5ML IM: CPT | Mod: PBBFAC

## 2021-10-18 PROCEDURE — 99214 OFFICE O/P EST MOD 30 MIN: CPT | Mod: PBBFAC | Performed by: INTERNAL MEDICINE

## 2021-10-18 PROCEDURE — 99999 FLU VACCINE - QUADRIVALENT - ADJUVANTED: ICD-10-PCS | Mod: PBBFAC,,,

## 2021-10-18 PROCEDURE — 99999 PR STA SHADOW: CPT | Mod: PBBFAC,,, | Performed by: INTERNAL MEDICINE

## 2021-12-28 ENCOUNTER — HOSPITAL ENCOUNTER (EMERGENCY)
Facility: HOSPITAL | Age: 81
Discharge: HOME | End: 2021-12-28
Attending: STUDENT IN AN ORGANIZED HEALTH CARE EDUCATION/TRAINING PROGRAM
Payer: MEDICARE

## 2021-12-28 VITALS
SYSTOLIC BLOOD PRESSURE: 116 MMHG | WEIGHT: 159.06 LBS | BODY MASS INDEX: 34.42 KG/M2 | TEMPERATURE: 97 F | HEART RATE: 102 BPM | DIASTOLIC BLOOD PRESSURE: 72 MMHG | OXYGEN SATURATION: 94 % | RESPIRATION RATE: 22 BRPM

## 2021-12-28 DIAGNOSIS — R06.02 SHORTNESS OF BREATH: ICD-10-CM

## 2021-12-28 DIAGNOSIS — J44.1 COPD EXACERBATION: Primary | ICD-10-CM

## 2021-12-28 LAB
ALBUMIN SERPL BCP-MCNC: 3.5 G/DL (ref 3.5–5.2)
ALP SERPL-CCNC: 97 U/L (ref 55–135)
ALT SERPL W/O P-5'-P-CCNC: 19 U/L (ref 10–44)
ANION GAP SERPL CALC-SCNC: 10 MMOL/L (ref 8–16)
AST SERPL-CCNC: 25 U/L (ref 10–40)
BASOPHILS # BLD AUTO: 0.02 K/UL (ref 0–0.2)
BASOPHILS NFR BLD: 0.4 % (ref 0–1.9)
BILIRUB SERPL-MCNC: 0.3 MG/DL (ref 0.1–1)
BNP SERPL-MCNC: 30 PG/ML (ref 0–99)
BUN SERPL-MCNC: 20 MG/DL (ref 8–23)
CALCIUM SERPL-MCNC: 9.8 MG/DL (ref 8.7–10.5)
CHLORIDE SERPL-SCNC: 98 MMOL/L (ref 95–110)
CO2 SERPL-SCNC: 31 MMOL/L (ref 23–29)
CREAT SERPL-MCNC: 1.1 MG/DL (ref 0.5–1.4)
DIFFERENTIAL METHOD: ABNORMAL
EOSINOPHIL # BLD AUTO: 0 K/UL (ref 0–0.5)
EOSINOPHIL NFR BLD: 0.6 % (ref 0–8)
ERYTHROCYTE [DISTWIDTH] IN BLOOD BY AUTOMATED COUNT: 15.7 % (ref 11.5–14.5)
EST. GFR  (AFRICAN AMERICAN): 54 ML/MIN/1.73 M^2
EST. GFR  (NON AFRICAN AMERICAN): 47 ML/MIN/1.73 M^2
GLUCOSE SERPL-MCNC: 108 MG/DL (ref 70–110)
HCT VFR BLD AUTO: 37.4 % (ref 37–48.5)
HGB BLD-MCNC: 11.8 G/DL (ref 12–16)
IMM GRANULOCYTES # BLD AUTO: 0.01 K/UL (ref 0–0.04)
IMM GRANULOCYTES NFR BLD AUTO: 0.2 % (ref 0–0.5)
INFLUENZA A, MOLECULAR: NEGATIVE
INFLUENZA B, MOLECULAR: NEGATIVE
LYMPHOCYTES # BLD AUTO: 1.8 K/UL (ref 1–4.8)
LYMPHOCYTES NFR BLD: 33.3 % (ref 18–48)
MAGNESIUM SERPL-MCNC: 2 MG/DL (ref 1.6–2.6)
MCH RBC QN AUTO: 27.1 PG (ref 27–31)
MCHC RBC AUTO-ENTMCNC: 31.6 G/DL (ref 32–36)
MCV RBC AUTO: 86 FL (ref 82–98)
MONOCYTES # BLD AUTO: 0.9 K/UL (ref 0.3–1)
MONOCYTES NFR BLD: 15.9 % (ref 4–15)
NEUTROPHILS # BLD AUTO: 2.7 K/UL (ref 1.8–7.7)
NEUTROPHILS NFR BLD: 49.6 % (ref 38–73)
NRBC BLD-RTO: 0 /100 WBC
PLATELET # BLD AUTO: 231 K/UL (ref 150–450)
PMV BLD AUTO: 10.1 FL (ref 9.2–12.9)
POTASSIUM SERPL-SCNC: 4.7 MMOL/L (ref 3.5–5.1)
PROT SERPL-MCNC: 7.9 G/DL (ref 6–8.4)
RBC # BLD AUTO: 4.36 M/UL (ref 4–5.4)
SARS-COV-2 RDRP RESP QL NAA+PROBE: NEGATIVE
SODIUM SERPL-SCNC: 139 MMOL/L (ref 136–145)
SPECIMEN SOURCE: NORMAL
TROPONIN I SERPL DL<=0.01 NG/ML-MCNC: 0.01 NG/ML (ref 0–0.03)
WBC # BLD AUTO: 5.41 K/UL (ref 3.9–12.7)

## 2021-12-28 PROCEDURE — 94640 AIRWAY INHALATION TREATMENT: CPT

## 2021-12-28 PROCEDURE — 36415 COLL VENOUS BLD VENIPUNCTURE: CPT | Performed by: STUDENT IN AN ORGANIZED HEALTH CARE EDUCATION/TRAINING PROGRAM

## 2021-12-28 PROCEDURE — 96375 TX/PRO/DX INJ NEW DRUG ADDON: CPT

## 2021-12-28 PROCEDURE — 85025 COMPLETE CBC W/AUTO DIFF WBC: CPT | Performed by: STUDENT IN AN ORGANIZED HEALTH CARE EDUCATION/TRAINING PROGRAM

## 2021-12-28 PROCEDURE — 27000221 HC OXYGEN, UP TO 24 HOURS

## 2021-12-28 PROCEDURE — 80053 COMPREHEN METABOLIC PANEL: CPT | Performed by: STUDENT IN AN ORGANIZED HEALTH CARE EDUCATION/TRAINING PROGRAM

## 2021-12-28 PROCEDURE — 96365 THER/PROPH/DIAG IV INF INIT: CPT

## 2021-12-28 PROCEDURE — 83880 ASSAY OF NATRIURETIC PEPTIDE: CPT | Performed by: STUDENT IN AN ORGANIZED HEALTH CARE EDUCATION/TRAINING PROGRAM

## 2021-12-28 PROCEDURE — U0002 COVID-19 LAB TEST NON-CDC: HCPCS | Performed by: STUDENT IN AN ORGANIZED HEALTH CARE EDUCATION/TRAINING PROGRAM

## 2021-12-28 PROCEDURE — 63600175 PHARM REV CODE 636 W HCPCS: Performed by: STUDENT IN AN ORGANIZED HEALTH CARE EDUCATION/TRAINING PROGRAM

## 2021-12-28 PROCEDURE — 93010 EKG 12-LEAD: ICD-10-PCS | Mod: ,,, | Performed by: INTERNAL MEDICINE

## 2021-12-28 PROCEDURE — 99285 EMERGENCY DEPT VISIT HI MDM: CPT | Mod: 25

## 2021-12-28 PROCEDURE — 25000242 PHARM REV CODE 250 ALT 637 W/ HCPCS: Performed by: STUDENT IN AN ORGANIZED HEALTH CARE EDUCATION/TRAINING PROGRAM

## 2021-12-28 PROCEDURE — 83735 ASSAY OF MAGNESIUM: CPT | Performed by: STUDENT IN AN ORGANIZED HEALTH CARE EDUCATION/TRAINING PROGRAM

## 2021-12-28 PROCEDURE — 96366 THER/PROPH/DIAG IV INF ADDON: CPT

## 2021-12-28 PROCEDURE — 93005 ELECTROCARDIOGRAM TRACING: CPT

## 2021-12-28 PROCEDURE — 93010 ELECTROCARDIOGRAM REPORT: CPT | Mod: ,,, | Performed by: INTERNAL MEDICINE

## 2021-12-28 PROCEDURE — 87502 INFLUENZA DNA AMP PROBE: CPT | Performed by: STUDENT IN AN ORGANIZED HEALTH CARE EDUCATION/TRAINING PROGRAM

## 2021-12-28 PROCEDURE — 84484 ASSAY OF TROPONIN QUANT: CPT | Performed by: STUDENT IN AN ORGANIZED HEALTH CARE EDUCATION/TRAINING PROGRAM

## 2021-12-28 RX ORDER — PREDNISONE 20 MG/1
40 TABLET ORAL DAILY
Qty: 10 TABLET | Refills: 0 | Status: SHIPPED | OUTPATIENT
Start: 2021-12-28 | End: 2022-01-02

## 2021-12-28 RX ORDER — ALBUTEROL SULFATE 90 UG/1
1-2 AEROSOL, METERED RESPIRATORY (INHALATION) EVERY 6 HOURS PRN
Qty: 1 G | Refills: 0 | Status: SHIPPED | OUTPATIENT
Start: 2021-12-28 | End: 2024-02-01 | Stop reason: ALTCHOICE

## 2021-12-28 RX ORDER — ALBUTEROL SULFATE 2.5 MG/.5ML
2.5 SOLUTION RESPIRATORY (INHALATION)
Status: COMPLETED | OUTPATIENT
Start: 2021-12-28 | End: 2021-12-28

## 2021-12-28 RX ORDER — IPRATROPIUM BROMIDE AND ALBUTEROL SULFATE 2.5; .5 MG/3ML; MG/3ML
3 SOLUTION RESPIRATORY (INHALATION)
Status: COMPLETED | OUTPATIENT
Start: 2021-12-28 | End: 2021-12-28

## 2021-12-28 RX ORDER — METHYLPREDNISOLONE SOD SUCC 125 MG
125 VIAL (EA) INJECTION
Status: COMPLETED | OUTPATIENT
Start: 2021-12-28 | End: 2021-12-28

## 2021-12-28 RX ORDER — MAGNESIUM SULFATE HEPTAHYDRATE 40 MG/ML
2 INJECTION, SOLUTION INTRAVENOUS
Status: COMPLETED | OUTPATIENT
Start: 2021-12-28 | End: 2021-12-28

## 2021-12-28 RX ADMIN — ALBUTEROL SULFATE 2.5 MG: 2.5 SOLUTION RESPIRATORY (INHALATION) at 09:12

## 2021-12-28 RX ADMIN — IPRATROPIUM BROMIDE AND ALBUTEROL SULFATE 3 ML: .5; 3 SOLUTION RESPIRATORY (INHALATION) at 08:12

## 2021-12-28 RX ADMIN — ALBUTEROL SULFATE 2.5 MG: 2.5 SOLUTION RESPIRATORY (INHALATION) at 10:12

## 2021-12-28 RX ADMIN — METHYLPREDNISOLONE SODIUM SUCCINATE 125 MG: 125 INJECTION, POWDER, FOR SOLUTION INTRAMUSCULAR; INTRAVENOUS at 09:12

## 2021-12-28 RX ADMIN — MAGNESIUM SULFATE HEPTAHYDRATE 2 G: 40 INJECTION, SOLUTION INTRAVENOUS at 09:12

## 2021-12-28 NOTE — ED PROVIDER NOTES
Encounter Date: 2021       History     Chief Complaint   Patient presents with    Shortness of Breath     C/o SOB since last night. Patient reports a productive cough.     81-year-old female with hypertension, diabetes, severe COPD, presenting with shortness of breath since last night any productive cough.  Patient denies fever, chest pain.  Denies nausea, vomiting.  Patient uses home oxygen at night, but not during the day.  Patient has no other complaints.  No sick contacts.  Patient was vaccinated against COVID-19, but has not received her booster.        Review of patient's allergies indicates:  No Known Allergies  Past Medical History:   Diagnosis Date    Arthritis     COPD (chronic obstructive pulmonary disease)     Depression     Diabetes mellitus type II     Hyperlipidemia     Hypertension     Pacemaker      Past Surgical History:   Procedure Laterality Date    CARDIAC PACEMAKER PLACEMENT       SECTION      CYST REMOVAL Left 2019    Procedure: EXCISION, SEBACEOUS CYST;  Surgeon: Jaylen Gonzalez Jr., MD;  Location[facility];  Service: General;  Laterality: Left;    INNER EAR SURGERY       Family History   Problem Relation Age of Onset    Cancer Mother     Breast cancer Mother     Stroke Father     Cancer Sister     Breast cancer Sister     Stroke Maternal Grandmother      Social History     Tobacco Use    Smoking status: Former Smoker     Packs/day: 2.00     Years: 43.00     Pack years: 86.00     Types: Cigarettes     Quit date: 2000     Years since quittin.0    Smokeless tobacco: Never Used   Substance Use Topics    Alcohol use: No    Drug use: No     Review of Systems   Constitutional: Negative for chills and fever.   HENT: Positive for congestion. Negative for sore throat.    Respiratory: Positive for cough, shortness of breath and wheezing.    Cardiovascular: Negative for chest pain.   Gastrointestinal: Negative for abdominal pain, diarrhea, nausea and  vomiting.   Genitourinary: Negative for dysuria.   Musculoskeletal: Negative for back pain.   Skin: Negative for rash.   Neurological: Negative for weakness.   Hematological: Does not bruise/bleed easily.       Physical Exam     Initial Vitals [12/28/21 0824]   BP Pulse Resp Temp SpO2   113/63 96 (!) 34 97.1 °F (36.2 °C) (!) 89 %      MAP       --         Physical Exam    Nursing note and vitals reviewed.  Constitutional: She appears well-developed. She is not diaphoretic. No distress.   HENT:   Head: Normocephalic.   Eyes: Pupils are equal, round, and reactive to light.   Neck:   Normal range of motion.  Cardiovascular:   No murmur heard.  Pulmonary/Chest: No respiratory distress.   Poor air movement and significant wheezing bilaterally.  No increased work of breathing or respiratory distress.  Patient speaking full sentences.   Abdominal: Abdomen is soft. She exhibits no distension. There is no abdominal tenderness. There is no rebound.   Musculoskeletal:         General: No edema.      Cervical back: Normal range of motion.     Neurological: She is alert.   Skin: Skin is warm.   Psychiatric: She has a normal mood and affect.         ED Course   Procedures  Labs Reviewed   CBC W/ AUTO DIFFERENTIAL - Abnormal; Notable for the following components:       Result Value    Hemoglobin 11.8 (*)     MCHC 31.6 (*)     RDW 15.7 (*)     Mono % 15.9 (*)     All other components within normal limits   COMPREHENSIVE METABOLIC PANEL - Abnormal; Notable for the following components:    CO2 31 (*)     eGFR if  54 (*)     eGFR if non  47 (*)     All other components within normal limits   INFLUENZA A & B BY MOLECULAR   SARS-COV-2 RNA AMPLIFICATION, QUAL   MAGNESIUM   B-TYPE NATRIURETIC PEPTIDE   TROPONIN I     EKG Readings: (Independently Interpreted)   Initial Reading: No STEMI. Previous EKG: Compared with most recent EKG Rhythm: Normal Sinus Rhythm. Heart Rate: 90. Ectopy: No Ectopy. Conduction:  Normal.     ECG Results          EKG 12-lead (Final result)  Result time 12/29/21 04:54:38    Final result by Interface, Lab In [facility] (12/29/21 04:54:38)                 Narrative:    Test Reason : R06.02    Vent. Rate : 090 BPM     Atrial Rate : 090 BPM     P-R Int : 188 ms          QRS Dur : 078 ms      QT Int : 358 ms       P-R-T Axes : 023 000 019 degrees     QTc Int : 437 ms    Baseline wander  Sinus rhythm with Premature atrial complexes  Low voltage, precordial leads  Borderline Abnormal ECG  When compared with ECG of 11-APR-2020 16:45,  Premature atrial complexes are now Present  Confirmed by Tommy WARD, Dilshad ([id number]) on 12/29/2021 4:54:30 AM    Referred By: EMILY   SELF           Confirmed By:Dilshad Sanders MD                            Imaging Results          X-Ray Chest AP Portable (Final result)  Result time 12/28/21 09:10:48    Final result by Edgard Muller MD (12/28/21 09:10:48)                 Impression:      As above.      Electronically signed by: Edgard Muller MD  Date:    12/28/2021  Time:    09:10             Narrative:    EXAMINATION:  XR CHEST AP PORTABLE    CLINICAL HISTORY:  shortness of breath;.    TECHNIQUE:  Portable chest dated December 28, 2021.    COMPARISON:  April 11, 2020.    FINDINGS:  Pacemaker in place.  Stable cardiomegaly atherosclerotic changes of the aorta.    Chronic lung changes.  No focal infiltrate or effusion.    Degenerative changes of the spine.                                 Medications   methylPREDNISolone sodium succinate injection 125 mg (125 mg Intravenous Given 12/28/21 0906)   albuterol-ipratropium 2.5 mg-0.5 mg/3 mL nebulizer solution 3 mL (3 mLs Nebulization Given 12/28/21 0842)   magnesium sulfate 2g in water 50mL IVPB (premix) (0 g Intravenous Stopped 12/28/21 1107)   albuterol sulfate nebulizer solution 2.5 mg (2.5 mg Nebulization Given 12/28/21 0917)   albuterol sulfate nebulizer solution 2.5 mg (2.5 mg Nebulization Given 12/28/21 1023)     Medical  Decision Making:   Differential Diagnosis:   DDX: Likely COPD exacerbation given history, exam. R/o PNA. Unlikely PE given history, physical, risk factor analysis, +other more likely diagnosis. Low suspicion ACS but will screen given risk factors.  Will screen for COVID and influenza.  DX: CXR. BMP, CBC, Mg. EKG.  COVID.  Influenza.  TX: Analgesia PRN. Albuterol/atrovent nebs PRN. Steroids. Antibiotics if indicated by CXR or will be admitted. Treatment/consult as indicated by studies.  Dispo: Pending studies. If symptoms controlled, studies WNL or stable for outpatient management, discharge to follow up with PMD within 3-5 days with steroid course and albuterol PRN, precautions for return, and recommendations for supportive care. If no improvement in respiratory distress with appropriate observation in ED, consider observation vs. admission for acute asthma exacerbation.               ED Course as of 12/29/21 0602   Tue Dec 28, 2021   0909 Troponin I: 0.007 [NB]   0909 BNP: 30 [NB]   0909 SARS-CoV-2 RNA, Amplification, Qual: Negative [NB]   0912 Patient appears significantly improved.  Satting 97% on 2 L. COVID negative, troponin negative, EKG shows no signs of ischemia.  Will re-dose albuterol and reassess patient in 1 hour for possible discharge. [NB]   1034 Patient satting 94% on room air.  Patient reports she feels significantly better.  Wheezing significantly improved.  IE ratio normalized [NB]      ED Course User Index  [NB] Js Morley MD             Clinical Impression:   Final diagnoses:  [R06.02] Shortness of breath  [J44.1] COPD exacerbation (Primary)          ED Disposition Condition    Discharge Stable        ED Prescriptions     Medication Sig Dispense Start Date End Date Auth. Provider    predniSONE (DELTASONE) 20 MG tablet Take 2 tablets (40 mg total) by mouth once daily. for 5 days 10 tablet 12/28/2021 1/2/2022 Js Morley MD    albuterol (PROVENTIL/VENTOLIN HFA) 90 mcg/actuation  inhaler Inhale 1-2 puffs into the lungs every 6 (six) hours as needed for Wheezing. Rescue 1 g 12/28/2021  Js Morley MD        Follow-up Information     Follow up With Specialties Details Why Contact Info    Jailene Astudillo MD Internal Medicine Schedule an appointment as soon as possible for a visit in 2 days  [address] [address]  [phone]      [facility] - Emergency Dept Emergency Medicine  If symptoms worsen [facility] [facility] [address]  [phone]           Js Morley MD  12/28/21 0831       Js Morley MD  12/28/21 0913       Js Morley MD  12/29/21 0602

## 2021-12-28 NOTE — DISCHARGE INSTRUCTIONS
Please follow up with your primary care physician within 2 days. Ensure that you review all lab work results and imaging results. If you have any questions about your discharge paperwork please call the Emergency Department.     Return to the Emergency Department for any fevers, worsening cough or congestion, shortness of breath, chest pain, nausea, vomiting, diarrhea, if symptoms do not improve, or for any new or worsening symptoms, unless otherwise told.     Thank you for visiting [facility], Department of Emergency Medicine. Please see the entirety of the educational materials provided. Please note that a visit to the emergency department does not substitute ongoing care from a primary medical provider or specialist. Please ensure to follow up as recommended. However, please return to the emergency department immediately if symptoms do not improve as discussed, symptoms worsen, new symptoms develop, difficulty in following up or for any of your concerns or issues. Please note on discharge you are acknowledging understanding and agreement on medical evaluation, management recommendations and follow up recommendations.

## 2022-01-30 ENCOUNTER — LAB VISIT (OUTPATIENT)
Dept: LAB | Facility: HOSPITAL | Age: 82
End: 2022-01-30
Attending: INTERNAL MEDICINE
Payer: MEDICARE

## 2022-01-30 DIAGNOSIS — N28.9 ABNORMAL KIDNEY FUNCTION: Primary | ICD-10-CM

## 2022-01-30 LAB
ALBUMIN SERPL BCP-MCNC: 3.4 G/DL (ref 3.5–5.2)
ALP SERPL-CCNC: 87 U/L (ref 55–135)
ALT SERPL W/O P-5'-P-CCNC: 20 U/L (ref 10–44)
ANION GAP SERPL CALC-SCNC: 10 MMOL/L (ref 8–16)
AST SERPL-CCNC: 35 U/L (ref 10–40)
BACTERIA #/AREA URNS HPF: ABNORMAL /HPF
BILIRUB SERPL-MCNC: 0.3 MG/DL (ref 0.1–1)
BILIRUB UR QL STRIP: NEGATIVE
BUN SERPL-MCNC: 23 MG/DL (ref 8–23)
CALCIUM SERPL-MCNC: 9.7 MG/DL (ref 8.7–10.5)
CHLORIDE SERPL-SCNC: 97 MMOL/L (ref 95–110)
CLARITY UR: CLEAR
CO2 SERPL-SCNC: 32 MMOL/L (ref 23–29)
COLOR UR: YELLOW
CREAT SERPL-MCNC: 1.2 MG/DL (ref 0.5–1.4)
EST. GFR  (AFRICAN AMERICAN): 49 ML/MIN/1.73 M^2
EST. GFR  (NON AFRICAN AMERICAN): 42 ML/MIN/1.73 M^2
GLUCOSE SERPL-MCNC: 152 MG/DL (ref 70–110)
GLUCOSE UR QL STRIP: NEGATIVE
HGB UR QL STRIP: NEGATIVE
HYALINE CASTS #/AREA URNS LPF: 1 /LPF
KETONES UR QL STRIP: NEGATIVE
LEUKOCYTE ESTERASE UR QL STRIP: ABNORMAL
MICROSCOPIC COMMENT: ABNORMAL
NITRITE UR QL STRIP: NEGATIVE
PH UR STRIP: 5 [PH] (ref 5–8)
POTASSIUM SERPL-SCNC: 4.1 MMOL/L (ref 3.5–5.1)
PROT SERPL-MCNC: 6.7 G/DL (ref 6–8.4)
PROT UR QL STRIP: NEGATIVE
RBC #/AREA URNS HPF: 0 /HPF (ref 0–4)
SODIUM SERPL-SCNC: 139 MMOL/L (ref 136–145)
SP GR UR STRIP: 1.02 (ref 1–1.03)
URN SPEC COLLECT METH UR: ABNORMAL
UROBILINOGEN UR STRIP-ACNC: NEGATIVE EU/DL
WBC #/AREA URNS HPF: 11 /HPF (ref 0–5)

## 2022-01-30 PROCEDURE — 87086 URINE CULTURE/COLONY COUNT: CPT | Performed by: INTERNAL MEDICINE

## 2022-01-30 PROCEDURE — 81000 URINALYSIS NONAUTO W/SCOPE: CPT | Performed by: INTERNAL MEDICINE

## 2022-01-30 PROCEDURE — 80053 COMPREHEN METABOLIC PANEL: CPT | Performed by: INTERNAL MEDICINE

## 2022-01-30 PROCEDURE — 36415 COLL VENOUS BLD VENIPUNCTURE: CPT | Performed by: INTERNAL MEDICINE

## 2022-01-31 LAB — BACTERIA UR CULT: NO GROWTH

## 2022-02-09 ENCOUNTER — PATIENT OUTREACH (OUTPATIENT)
Dept: ADMINISTRATIVE | Facility: HOSPITAL | Age: 82
End: 2022-02-09
Payer: MEDICARE

## 2022-02-09 NOTE — PROGRESS NOTES
[facility] chart audits-DEPRESSION SCREEN/FOLLOW UP PLAN.    SCREENING:  EXCLUDED FROM SCREENING

## 2022-02-11 ENCOUNTER — LAB VISIT (OUTPATIENT)
Dept: LAB | Facility: HOSPITAL | Age: 82
End: 2022-02-11
Attending: INTERNAL MEDICINE
Payer: MEDICARE

## 2022-02-11 DIAGNOSIS — N28.9 ABNORMAL KIDNEY FUNCTION: Primary | ICD-10-CM

## 2022-02-11 LAB
ANION GAP SERPL CALC-SCNC: 8 MMOL/L (ref 8–16)
BUN SERPL-MCNC: 31 MG/DL (ref 8–23)
CALCIUM SERPL-MCNC: 9.3 MG/DL (ref 8.7–10.5)
CHLORIDE SERPL-SCNC: 99 MMOL/L (ref 95–110)
CO2 SERPL-SCNC: 30 MMOL/L (ref 23–29)
CREAT SERPL-MCNC: 1.1 MG/DL (ref 0.5–1.4)
EST. GFR  (AFRICAN AMERICAN): 54 ML/MIN/1.73 M^2
EST. GFR  (NON AFRICAN AMERICAN): 47 ML/MIN/1.73 M^2
GLUCOSE SERPL-MCNC: 144 MG/DL (ref 70–110)
POTASSIUM SERPL-SCNC: 5 MMOL/L (ref 3.5–5.1)
SODIUM SERPL-SCNC: 137 MMOL/L (ref 136–145)

## 2022-02-11 PROCEDURE — 80048 BASIC METABOLIC PNL TOTAL CA: CPT | Performed by: INTERNAL MEDICINE

## 2022-02-11 PROCEDURE — 36415 COLL VENOUS BLD VENIPUNCTURE: CPT | Performed by: INTERNAL MEDICINE

## 2022-02-23 DIAGNOSIS — D84.9 IMMUNOSUPPRESSED STATUS: ICD-10-CM

## 2022-04-07 ENCOUNTER — HOSPITAL ENCOUNTER (INPATIENT)
Facility: HOSPITAL | Age: 82
LOS: 7 days | Discharge: HOME HEALTH | DRG: 871 | End: 2022-04-15
Attending: STUDENT IN AN ORGANIZED HEALTH CARE EDUCATION/TRAINING PROGRAM | Admitting: FAMILY MEDICINE
Payer: MEDICARE

## 2022-04-07 DIAGNOSIS — D64.9 SYMPTOMATIC ANEMIA: Primary | ICD-10-CM

## 2022-04-07 DIAGNOSIS — R06.82 TACHYPNEA: ICD-10-CM

## 2022-04-07 DIAGNOSIS — R07.9 CHEST PAIN: ICD-10-CM

## 2022-04-07 DIAGNOSIS — J44.0 COPD WITH ACUTE BRONCHITIS: ICD-10-CM

## 2022-04-07 DIAGNOSIS — J20.9 COPD WITH ACUTE BRONCHITIS: ICD-10-CM

## 2022-04-07 DIAGNOSIS — R53.81 DEBILITY: ICD-10-CM

## 2022-04-07 DIAGNOSIS — I10 BP (HIGH BLOOD PRESSURE): ICD-10-CM

## 2022-04-07 DIAGNOSIS — R06.02 SOB (SHORTNESS OF BREATH): ICD-10-CM

## 2022-04-07 DIAGNOSIS — J96.21 ACUTE ON CHRONIC RESPIRATORY FAILURE WITH HYPOXEMIA: ICD-10-CM

## 2022-04-07 LAB
ALBUMIN SERPL BCP-MCNC: 3.2 G/DL (ref 3.5–5.2)
ALLENS TEST: ABNORMAL
ALP SERPL-CCNC: 97 U/L (ref 55–135)
ALT SERPL W/O P-5'-P-CCNC: 19 U/L (ref 10–44)
ANION GAP SERPL CALC-SCNC: 10 MMOL/L (ref 8–16)
AST SERPL-CCNC: 30 U/L (ref 10–40)
BACTERIA #/AREA URNS HPF: ABNORMAL /HPF
BASOPHILS # BLD AUTO: 0.01 K/UL (ref 0–0.2)
BASOPHILS NFR BLD: 0.1 % (ref 0–1.9)
BILIRUB SERPL-MCNC: 0.5 MG/DL (ref 0.1–1)
BILIRUB UR QL STRIP: NEGATIVE
BNP SERPL-MCNC: 76 PG/ML (ref 0–99)
BUN SERPL-MCNC: 26 MG/DL (ref 8–23)
CALCIUM SERPL-MCNC: 9.6 MG/DL (ref 8.7–10.5)
CHLORIDE SERPL-SCNC: 92 MMOL/L (ref 95–110)
CLARITY UR: CLEAR
CO2 SERPL-SCNC: 32 MMOL/L (ref 23–29)
COLOR UR: YELLOW
CREAT SERPL-MCNC: 1.1 MG/DL (ref 0.5–1.4)
DELSYS: ABNORMAL
DIFFERENTIAL METHOD: ABNORMAL
EOSINOPHIL # BLD AUTO: 0 K/UL (ref 0–0.5)
EOSINOPHIL NFR BLD: 0.4 % (ref 0–8)
ERYTHROCYTE [DISTWIDTH] IN BLOOD BY AUTOMATED COUNT: 15.1 % (ref 11.5–14.5)
EST. GFR  (AFRICAN AMERICAN): 54 ML/MIN/1.73 M^2
EST. GFR  (NON AFRICAN AMERICAN): 47 ML/MIN/1.73 M^2
FIO2: 28 (ref 21–100)
GLUCOSE SERPL-MCNC: 151 MG/DL (ref 70–110)
GLUCOSE UR QL STRIP: NEGATIVE
HCO3 UR-SCNC: 34.5 MMOL/L
HCT VFR BLD AUTO: 31.6 % (ref 37–48.5)
HGB BLD-MCNC: 9.8 G/DL (ref 12–16)
HGB UR QL STRIP: NEGATIVE
IMM GRANULOCYTES # BLD AUTO: 0.01 K/UL (ref 0–0.04)
IMM GRANULOCYTES NFR BLD AUTO: 0.1 % (ref 0–0.5)
KETONES UR QL STRIP: NEGATIVE
LEUKOCYTE ESTERASE UR QL STRIP: ABNORMAL
LYMPHOCYTES # BLD AUTO: 1.1 K/UL (ref 1–4.8)
LYMPHOCYTES NFR BLD: 15.8 % (ref 18–48)
MCH RBC QN AUTO: 25.6 PG (ref 27–31)
MCHC RBC AUTO-ENTMCNC: 31 G/DL (ref 32–36)
MCV RBC AUTO: 83 FL (ref 82–98)
MICROSCOPIC COMMENT: ABNORMAL
MONOCYTES # BLD AUTO: 0.9 K/UL (ref 0.3–1)
MONOCYTES NFR BLD: 13.2 % (ref 4–15)
NEUTROPHILS # BLD AUTO: 4.7 K/UL (ref 1.8–7.7)
NEUTROPHILS NFR BLD: 70.4 % (ref 38–73)
NITRITE UR QL STRIP: NEGATIVE
NRBC BLD-RTO: 0 /100 WBC
PCO2 BLDA: 52 MMHG (ref 35–45)
PH SMN: 7.43 [PH] (ref 7.35–7.45)
PH UR STRIP: 6 [PH] (ref 5–8)
PLATELET # BLD AUTO: 259 K/UL (ref 150–450)
PMV BLD AUTO: 10 FL (ref 9.2–12.9)
PO2 BLDA: 240 MMHG (ref 75–100)
POC BE: 8.8 MMOL/L (ref -2–2)
POC COHB: 2 % (ref 0–3)
POC METHB: 1.8 % (ref 0–1.5)
POC O2HB ARTERIAL: 95.2 % (ref 94–100)
POC SATURATED O2: 99 % (ref 90–100)
POC TCO2: 36.1 MMOL/L
POC THB: 10.6 G/DL (ref 12–18)
POTASSIUM SERPL-SCNC: 4.7 MMOL/L (ref 3.5–5.1)
PROT SERPL-MCNC: 7.5 G/DL (ref 6–8.4)
PROT UR QL STRIP: NEGATIVE
RBC # BLD AUTO: 3.83 M/UL (ref 4–5.4)
RBC #/AREA URNS HPF: 0 /HPF (ref 0–4)
SARS-COV-2 RDRP RESP QL NAA+PROBE: NEGATIVE
SITE: ABNORMAL
SODIUM SERPL-SCNC: 134 MMOL/L (ref 136–145)
SP GR UR STRIP: 1.02 (ref 1–1.03)
SQUAMOUS #/AREA URNS HPF: 2 /HPF
TROPONIN I SERPL DL<=0.01 NG/ML-MCNC: <0.006 NG/ML (ref 0–0.03)
URN SPEC COLLECT METH UR: ABNORMAL
UROBILINOGEN UR STRIP-ACNC: NEGATIVE EU/DL
WBC # BLD AUTO: 6.72 K/UL (ref 3.9–12.7)
WBC #/AREA URNS HPF: 30 /HPF (ref 0–5)
WBC CLUMPS URNS QL MICRO: ABNORMAL

## 2022-04-07 PROCEDURE — 96365 THER/PROPH/DIAG IV INF INIT: CPT

## 2022-04-07 PROCEDURE — 96366 THER/PROPH/DIAG IV INF ADDON: CPT

## 2022-04-07 PROCEDURE — 82803 BLOOD GASES ANY COMBINATION: CPT | Performed by: NURSE PRACTITIONER

## 2022-04-07 PROCEDURE — 25000242 PHARM REV CODE 250 ALT 637 W/ HCPCS: Performed by: NURSE PRACTITIONER

## 2022-04-07 PROCEDURE — 81000 URINALYSIS NONAUTO W/SCOPE: CPT | Performed by: NURSE PRACTITIONER

## 2022-04-07 PROCEDURE — 27000221 HC OXYGEN, UP TO 24 HOURS

## 2022-04-07 PROCEDURE — 99285 EMERGENCY DEPT VISIT HI MDM: CPT | Mod: 25,CS

## 2022-04-07 PROCEDURE — 93005 ELECTROCARDIOGRAM TRACING: CPT

## 2022-04-07 PROCEDURE — 36600 WITHDRAWAL OF ARTERIAL BLOOD: CPT

## 2022-04-07 PROCEDURE — 93010 EKG 12-LEAD: ICD-10-PCS | Mod: ,,, | Performed by: INTERNAL MEDICINE

## 2022-04-07 PROCEDURE — G0378 HOSPITAL OBSERVATION PER HR: HCPCS

## 2022-04-07 PROCEDURE — 84484 ASSAY OF TROPONIN QUANT: CPT | Performed by: NURSE PRACTITIONER

## 2022-04-07 PROCEDURE — 96372 THER/PROPH/DIAG INJ SC/IM: CPT | Performed by: NURSE PRACTITIONER

## 2022-04-07 PROCEDURE — 94761 N-INVAS EAR/PLS OXIMETRY MLT: CPT

## 2022-04-07 PROCEDURE — 63600175 PHARM REV CODE 636 W HCPCS: Performed by: NURSE PRACTITIONER

## 2022-04-07 PROCEDURE — 94640 AIRWAY INHALATION TREATMENT: CPT | Mod: XB

## 2022-04-07 PROCEDURE — 99900035 HC TECH TIME PER 15 MIN (STAT)

## 2022-04-07 PROCEDURE — 85025 COMPLETE CBC W/AUTO DIFF WBC: CPT | Performed by: NURSE PRACTITIONER

## 2022-04-07 PROCEDURE — U0002 COVID-19 LAB TEST NON-CDC: HCPCS | Performed by: NURSE PRACTITIONER

## 2022-04-07 PROCEDURE — 87086 URINE CULTURE/COLONY COUNT: CPT | Performed by: NURSE PRACTITIONER

## 2022-04-07 PROCEDURE — 83880 ASSAY OF NATRIURETIC PEPTIDE: CPT | Performed by: NURSE PRACTITIONER

## 2022-04-07 PROCEDURE — 80053 COMPREHEN METABOLIC PANEL: CPT | Performed by: NURSE PRACTITIONER

## 2022-04-07 PROCEDURE — 93010 ELECTROCARDIOGRAM REPORT: CPT | Mod: ,,, | Performed by: INTERNAL MEDICINE

## 2022-04-07 RX ORDER — TALC
6 POWDER (GRAM) TOPICAL NIGHTLY PRN
Status: DISCONTINUED | OUTPATIENT
Start: 2022-04-07 | End: 2022-04-15 | Stop reason: HOSPADM

## 2022-04-07 RX ORDER — ALBUTEROL SULFATE 2.5 MG/.5ML
1.25 SOLUTION RESPIRATORY (INHALATION)
Status: COMPLETED | OUTPATIENT
Start: 2022-04-07 | End: 2022-04-07

## 2022-04-07 RX ORDER — ACETAMINOPHEN 325 MG/1
650 TABLET ORAL EVERY 8 HOURS PRN
Status: DISCONTINUED | OUTPATIENT
Start: 2022-04-07 | End: 2022-04-15 | Stop reason: HOSPADM

## 2022-04-07 RX ORDER — MAGNESIUM SULFATE HEPTAHYDRATE 40 MG/ML
2 INJECTION, SOLUTION INTRAVENOUS
Status: COMPLETED | OUTPATIENT
Start: 2022-04-07 | End: 2022-04-07

## 2022-04-07 RX ORDER — IPRATROPIUM BROMIDE AND ALBUTEROL SULFATE 2.5; .5 MG/3ML; MG/3ML
3 SOLUTION RESPIRATORY (INHALATION) EVERY 6 HOURS
Status: DISCONTINUED | OUTPATIENT
Start: 2022-04-08 | End: 2022-04-15 | Stop reason: HOSPADM

## 2022-04-07 RX ORDER — METHYLPREDNISOLONE SOD SUCC 125 MG
125 VIAL (EA) INJECTION
Status: COMPLETED | OUTPATIENT
Start: 2022-04-07 | End: 2022-04-07

## 2022-04-07 RX ORDER — SODIUM CHLORIDE 0.9 % (FLUSH) 0.9 %
10 SYRINGE (ML) INJECTION
Status: DISCONTINUED | OUTPATIENT
Start: 2022-04-07 | End: 2022-04-15 | Stop reason: HOSPADM

## 2022-04-07 RX ADMIN — ALBUTEROL SULFATE 1.25 MG: 2.5 SOLUTION RESPIRATORY (INHALATION) at 04:04

## 2022-04-07 RX ADMIN — METHYLPREDNISOLONE SODIUM SUCCINATE 125 MG: 125 INJECTION, POWDER, FOR SOLUTION INTRAMUSCULAR; INTRAVENOUS at 04:04

## 2022-04-07 RX ADMIN — MAGNESIUM SULFATE HEPTAHYDRATE 2 G: 40 INJECTION, SOLUTION INTRAVENOUS at 07:04

## 2022-04-07 RX ADMIN — ALBUTEROL SULFATE 1.25 MG: 2.5 SOLUTION RESPIRATORY (INHALATION) at 06:04

## 2022-04-07 NOTE — ED TRIAGE NOTES
81 y.o. female presents to ER   Chief Complaint   Patient presents with    Nausea     Since 2 am  Pt also reports chest discomfort & SOB   . No acute distress noted.

## 2022-04-07 NOTE — PROVIDER PROGRESS NOTES - EMERGENCY DEPT.
Encounter Date: 4/7/2022    ED Physician Progress Notes        Physician Note:   I have evaluated this EKG and found the following:    Rate: 90  Rhythm:  Sinus rhythm  Signs of ischemia:  None    Conclusion:  Normal sinus rhythm at 90 beats per minute.  No signs ischemia.    Patient has no concerning morphologies on their EKG for any concerning underlying cardiac pathology (including ACS, Brugada, Wellens, Prolonged QT, HOCM, WPW, ARVD)

## 2022-04-07 NOTE — ED NOTES
Pt ambulated to wheelchair and transferred to toilet. Pt reports severe sob and is unable to complete a sentence. SPO2 88% at rest; pt slow to recover. 2L NC applied.

## 2022-04-07 NOTE — ED PROVIDER NOTES
Encounter Date: 2022       History     Chief Complaint   Patient presents with    Nausea     Since 2 am  Pt also reports chest discomfort & SOB     Chief complaint:  Nausea and shortness of breath 81-year-old female with a history of COPD diabetes hypertension pacemaker high cholesterol presents to be evaluated for shortness of breath that began 2:00 a.m. this morning.  She also reports feeling nauseated and having a cough with productive brownish yellow sputum. Brother reports she has been having frequent episodes of shortness of breath but not quite this severe. Denies any home o2 use.  denies any fever. denies any chest pain or palpitations. patient does appear very anxious.  Denies any recent ill contacts        Review of patient's allergies indicates:  No Known Allergies  Past Medical History:   Diagnosis Date    Arthritis     COPD (chronic obstructive pulmonary disease)     Depression     Diabetes mellitus type II     Hyperlipidemia     Hypertension     Pacemaker      Past Surgical History:   Procedure Laterality Date    CARDIAC PACEMAKER PLACEMENT       SECTION      CYST REMOVAL Left 2019    Procedure: EXCISION, SEBACEOUS CYST;  Surgeon: Jaylen Gonzalez Jr., MD;  Location: [facility];  Service: General;  Laterality: Left;    INNER EAR SURGERY       Family History   Problem Relation Age of Onset    Cancer Mother     Breast cancer Mother     Stroke Father     Cancer Sister     Breast cancer Sister     Stroke Maternal Grandmother      Social History     Tobacco Use    Smoking status: Former Smoker     Packs/day: 2.00     Years: 43.00     Pack years: 86.00     Types: Cigarettes     Quit date: 2000     Years since quittin.2    Smokeless tobacco: Never Used   Substance Use Topics    Alcohol use: No    Drug use: No     Review of Systems   Constitutional: Negative for fever.   HENT: Negative.    Eyes: Negative.    Respiratory: Positive for cough, chest tightness and  shortness of breath.    Cardiovascular: Negative for chest pain and palpitations.   Gastrointestinal: Negative for abdominal pain.   Genitourinary: Negative.    Musculoskeletal: Negative.    Skin: Negative.    Neurological: Negative for dizziness and weakness.       Physical Exam     Initial Vitals [04/07/22 1526]   BP Pulse Resp Temp SpO2   (!) 136/59 83 20 96.7 °F (35.9 °C) (!) 93 %      MAP       --         Physical Exam    Nursing note and vitals reviewed.  Constitutional: She appears well-developed and well-nourished.   HENT:   Head: Normocephalic and atraumatic.   Eyes: EOM are normal. Pupils are equal, round, and reactive to light.   Neck: Neck supple.   Normal range of motion.  Cardiovascular: Normal rate, regular rhythm and normal heart sounds. Exam reveals no gallop and no friction rub.    No murmur heard.  Pulmonary/Chest: She has wheezes. She has rhonchi.   Abdominal: Abdomen is soft. She exhibits no distension. There is no abdominal tenderness.   Musculoskeletal:      Cervical back: Normal range of motion and neck supple.     Neurological: She is alert and oriented to person, place, and time. She has normal strength and normal reflexes.   Skin: Skin is warm and dry. Capillary refill takes less than 2 seconds.   Psychiatric: She has a normal mood and affect. Thought content normal.         ED Course   Procedures  Labs Reviewed   COMPREHENSIVE METABOLIC PANEL - Abnormal; Notable for the following components:       Result Value    Sodium 134 (*)     Chloride 92 (*)     CO2 32 (*)     Glucose 151 (*)     BUN 26 (*)     Albumin 3.2 (*)     eGFR if  54 (*)     eGFR if non  47 (*)     All other components within normal limits   CBC W/ AUTO DIFFERENTIAL - Abnormal; Notable for the following components:    RBC 3.83 (*)     Hemoglobin 9.8 (*)     Hematocrit 31.6 (*)     MCH 25.6 (*)     MCHC 31.0 (*)     RDW 15.1 (*)     Lymph % 15.8 (*)     All other components within normal  limits   URINALYSIS, REFLEX TO URINE CULTURE - Abnormal; Notable for the following components:    Leukocytes, UA 2+ (*)     All other components within normal limits    Narrative:     Specimen Source->Urine   URINALYSIS MICROSCOPIC - Abnormal; Notable for the following components:    WBC, UA 30 (*)     Bacteria Moderate (*)     All other components within normal limits    Narrative:     Specimen Source->Urine   CULTURE, URINE   B-TYPE NATRIURETIC PEPTIDE   TROPONIN I   SARS-COV-2 RNA AMPLIFICATION, QUAL     EKG Readings: (Independently Interpreted)   Initial Reading: No STEMI. Previous EKG: Compared with most recent EKG Rhythm: Normal Sinus Rhythm. Heart Rate: 90. Ectopy: Multifocal PVCs. Conduction: Normal. Clinical Impression: with PVCs       Imaging Results          X-Ray Chest 1 View (Final result)  Result time 04/07/22 16:12:29   Procedure changed from X-Ray Chest PA And Lateral     Final result by Dayan Daniels MD (04/07/22 16:12:29)                 Impression:      Somewhat limited examination, unchanged.      Electronically signed by: Dayan Daniels  Date:    04/07/2022  Time:    16:12             Narrative:    EXAMINATION:  XR CHEST 1 VIEW    CLINICAL HISTORY:  sob; Shortness of breath    TECHNIQUE:  Single frontal view of the chest was performed.    COMPARISON:  12/28/2021    FINDINGS:  The patient is rotated to the right.  Lung volumes remain low.  Numerous external artifacts are present.  Heart is enlarged and unchanged with a pacemaker in place.  Chronic interstitial opacities in the lung bases appears similar to the prior study.  There is a left apical calcified granuloma.  No pleural effusion.                                 Medications   magnesium sulfate 2g in water 50mL IVPB (premix) (2 g Intravenous New Bag 4/7/22 0415)   methylPREDNISolone sodium succinate injection 125 mg (125 mg Intramuscular Given 4/7/22 1604)   albuterol sulfate nebulizer solution 1.25 mg (1.25 mg Nebulization  Given 4/7/22 1653)   albuterol sulfate nebulizer solution 1.25 mg (1.25 mg Nebulization Given 4/7/22 1827)     Medical Decision Making:   Differential Diagnosis:   COPD exacerbation, shortness of breath, pneumonia  Clinical Tests:   Lab Tests: Reviewed  The following lab test(s) were unremarkable: CBC and CMP  Radiological Study: Reviewed  ED Management:  79s year old female with shortness of breath that began approximately to a.m. presents to be evaluated.  Patient does have some next door wheezing with limited air movement patient was initially not hypoxic in triage.  Patient had a chest x-ray which showed continue low lung volumes chronic interstitial opacities which is near patient's baseline.  Patient had ABG with noted CO2 retention.  She was treated with magnesium breathing treatments in the ED.  Patient partially improved however became very hypoxic with minimal activity and sleeping.  Patient was noted to desat to 83% when sleeping without oxygen.  Patient will be admitted for hypoxia with observation and serial breathing treatments possible home O2 on DC. Mild UTI noted. Spoke with Dr. Krause who feels she does not require abx at this time and will admit for hospital medicine. Patient poor historian. Unsure of current medications, spoke with brother who states will bring in her med list                      Clinical Impression:   Final diagnoses:  [R06.02] SOB (shortness of breath)          ED Disposition Condition    Observation               Laurence Marino NP  04/07/22 2013       Laurence Marino NP  04/07/22 2027       Laurence Marino NP  04/07/22 2028

## 2022-04-08 PROBLEM — R53.81 DEBILITY: Status: ACTIVE | Noted: 2022-04-08

## 2022-04-08 LAB
BASOPHILS # BLD AUTO: 0 K/UL (ref 0–0.2)
BASOPHILS NFR BLD: 0 % (ref 0–1.9)
DIFFERENTIAL METHOD: ABNORMAL
EOSINOPHIL # BLD AUTO: 0 K/UL (ref 0–0.5)
EOSINOPHIL NFR BLD: 0 % (ref 0–8)
ERYTHROCYTE [DISTWIDTH] IN BLOOD BY AUTOMATED COUNT: 15 % (ref 11.5–14.5)
HCT VFR BLD AUTO: 30.5 % (ref 37–48.5)
HGB BLD-MCNC: 9.7 G/DL (ref 12–16)
IMM GRANULOCYTES # BLD AUTO: 0.02 K/UL (ref 0–0.04)
IMM GRANULOCYTES NFR BLD AUTO: 0.4 % (ref 0–0.5)
LYMPHOCYTES # BLD AUTO: 0.5 K/UL (ref 1–4.8)
LYMPHOCYTES NFR BLD: 9.1 % (ref 18–48)
MCH RBC QN AUTO: 26 PG (ref 27–31)
MCHC RBC AUTO-ENTMCNC: 31.8 G/DL (ref 32–36)
MCV RBC AUTO: 82 FL (ref 82–98)
MONOCYTES # BLD AUTO: 0.3 K/UL (ref 0.3–1)
MONOCYTES NFR BLD: 5.6 % (ref 4–15)
NEUTROPHILS # BLD AUTO: 4.7 K/UL (ref 1.8–7.7)
NEUTROPHILS NFR BLD: 84.9 % (ref 38–73)
NRBC BLD-RTO: 0 /100 WBC
PLATELET # BLD AUTO: 243 K/UL (ref 150–450)
PMV BLD AUTO: 10.6 FL (ref 9.2–12.9)
RBC # BLD AUTO: 3.73 M/UL (ref 4–5.4)
WBC # BLD AUTO: 5.52 K/UL (ref 3.9–12.7)

## 2022-04-08 PROCEDURE — 93010 EKG 12-LEAD: ICD-10-PCS | Mod: ,,, | Performed by: INTERNAL MEDICINE

## 2022-04-08 PROCEDURE — 25000003 PHARM REV CODE 250: Performed by: NURSE PRACTITIONER

## 2022-04-08 PROCEDURE — 99222 1ST HOSP IP/OBS MODERATE 55: CPT | Mod: ,,, | Performed by: INTERNAL MEDICINE

## 2022-04-08 PROCEDURE — 97530 THERAPEUTIC ACTIVITIES: CPT

## 2022-04-08 PROCEDURE — 93005 ELECTROCARDIOGRAM TRACING: CPT

## 2022-04-08 PROCEDURE — 94640 AIRWAY INHALATION TREATMENT: CPT | Mod: XB

## 2022-04-08 PROCEDURE — 27000221 HC OXYGEN, UP TO 24 HOURS

## 2022-04-08 PROCEDURE — 94761 N-INVAS EAR/PLS OXIMETRY MLT: CPT

## 2022-04-08 PROCEDURE — 93010 ELECTROCARDIOGRAM REPORT: CPT | Mod: ,,, | Performed by: INTERNAL MEDICINE

## 2022-04-08 PROCEDURE — 85025 COMPLETE CBC W/AUTO DIFF WBC: CPT | Performed by: NURSE PRACTITIONER

## 2022-04-08 PROCEDURE — 11000001 HC ACUTE MED/SURG PRIVATE ROOM

## 2022-04-08 PROCEDURE — 25000242 PHARM REV CODE 250 ALT 637 W/ HCPCS: Performed by: NURSE PRACTITIONER

## 2022-04-08 PROCEDURE — 97166 OT EVAL MOD COMPLEX 45 MIN: CPT

## 2022-04-08 PROCEDURE — 36415 COLL VENOUS BLD VENIPUNCTURE: CPT | Performed by: NURSE PRACTITIONER

## 2022-04-08 PROCEDURE — 99222 PR INITIAL HOSPITAL CARE,LEVL II: ICD-10-PCS | Mod: ,,, | Performed by: INTERNAL MEDICINE

## 2022-04-08 PROCEDURE — 63600175 PHARM REV CODE 636 W HCPCS: Performed by: NURSE PRACTITIONER

## 2022-04-08 PROCEDURE — 97162 PT EVAL MOD COMPLEX 30 MIN: CPT

## 2022-04-08 RX ORDER — GUAIFENESIN 600 MG/1
600 TABLET, EXTENDED RELEASE ORAL 2 TIMES DAILY
Status: DISCONTINUED | OUTPATIENT
Start: 2022-04-08 | End: 2022-04-15 | Stop reason: HOSPADM

## 2022-04-08 RX ORDER — MEMANTINE HYDROCHLORIDE 5 MG/1
5 TABLET ORAL DAILY
Status: DISCONTINUED | OUTPATIENT
Start: 2022-04-08 | End: 2022-04-15 | Stop reason: HOSPADM

## 2022-04-08 RX ORDER — ASPIRIN 81 MG/1
81 TABLET ORAL DAILY
Status: DISCONTINUED | OUTPATIENT
Start: 2022-04-08 | End: 2022-04-10

## 2022-04-08 RX ORDER — LANOLIN ALCOHOL/MO/W.PET/CERES
1 CREAM (GRAM) TOPICAL DAILY
Status: DISCONTINUED | OUTPATIENT
Start: 2022-04-08 | End: 2022-04-15 | Stop reason: HOSPADM

## 2022-04-08 RX ORDER — LIDOCAINE HYDROCHLORIDE 20 MG/ML
10 SOLUTION OROPHARYNGEAL ONCE
Status: COMPLETED | OUTPATIENT
Start: 2022-04-09 | End: 2022-04-09

## 2022-04-08 RX ORDER — ATORVASTATIN CALCIUM 20 MG/1
20 TABLET, FILM COATED ORAL NIGHTLY
Status: DISCONTINUED | OUTPATIENT
Start: 2022-04-08 | End: 2022-04-15 | Stop reason: HOSPADM

## 2022-04-08 RX ORDER — MAG HYDROX/ALUMINUM HYD/SIMETH 200-200-20
30 SUSPENSION, ORAL (FINAL DOSE FORM) ORAL ONCE
Status: COMPLETED | OUTPATIENT
Start: 2022-04-09 | End: 2022-04-09

## 2022-04-08 RX ORDER — CALCIUM CARBONATE 200(500)MG
1000 TABLET,CHEWABLE ORAL EVERY 8 HOURS PRN
Status: DISCONTINUED | OUTPATIENT
Start: 2022-04-09 | End: 2022-04-15 | Stop reason: HOSPADM

## 2022-04-08 RX ORDER — PANTOPRAZOLE SODIUM 40 MG/1
40 TABLET, DELAYED RELEASE ORAL DAILY
Refills: 1 | Status: DISCONTINUED | OUTPATIENT
Start: 2022-04-08 | End: 2022-04-15 | Stop reason: HOSPADM

## 2022-04-08 RX ORDER — LOSARTAN POTASSIUM 25 MG/1
25 TABLET ORAL DAILY
Status: DISCONTINUED | OUTPATIENT
Start: 2022-04-08 | End: 2022-04-09

## 2022-04-08 RX ORDER — EZETIMIBE 10 MG/1
10 TABLET ORAL DAILY
Status: DISCONTINUED | OUTPATIENT
Start: 2022-04-08 | End: 2022-04-15 | Stop reason: HOSPADM

## 2022-04-08 RX ORDER — UBIDECARENONE 75 MG
1000 CAPSULE ORAL DAILY
Refills: 1 | Status: DISCONTINUED | OUTPATIENT
Start: 2022-04-08 | End: 2022-04-15 | Stop reason: HOSPADM

## 2022-04-08 RX ORDER — SULFASALAZINE 500 MG/1
500 TABLET ORAL DAILY
Status: DISCONTINUED | OUTPATIENT
Start: 2022-04-08 | End: 2022-04-15 | Stop reason: HOSPADM

## 2022-04-08 RX ADMIN — IPRATROPIUM BROMIDE AND ALBUTEROL SULFATE 3 ML: 2.5; .5 SOLUTION RESPIRATORY (INHALATION) at 12:04

## 2022-04-08 RX ADMIN — IPRATROPIUM BROMIDE AND ALBUTEROL SULFATE 3 ML: 2.5; .5 SOLUTION RESPIRATORY (INHALATION) at 07:04

## 2022-04-08 RX ADMIN — PANTOPRAZOLE SODIUM 40 MG: 40 TABLET, DELAYED RELEASE ORAL at 11:04

## 2022-04-08 RX ADMIN — GUAIFENESIN 600 MG: 600 TABLET, EXTENDED RELEASE ORAL at 11:04

## 2022-04-08 RX ADMIN — MEMANTINE HYDROCHLORIDE 5 MG: 5 TABLET ORAL at 11:04

## 2022-04-08 RX ADMIN — CYANOCOBALAMIN TAB 500 MCG 1000 MCG: 500 TAB at 11:04

## 2022-04-08 RX ADMIN — IPRATROPIUM BROMIDE AND ALBUTEROL SULFATE 3 ML: 2.5; .5 SOLUTION RESPIRATORY (INHALATION) at 06:04

## 2022-04-08 RX ADMIN — METHYLPREDNISOLONE SODIUM SUCCINATE 80 MG: 40 INJECTION, POWDER, FOR SOLUTION INTRAMUSCULAR; INTRAVENOUS at 11:04

## 2022-04-08 RX ADMIN — GUAIFENESIN 600 MG: 600 TABLET, EXTENDED RELEASE ORAL at 08:04

## 2022-04-08 RX ADMIN — CEFTRIAXONE 1 G: 1 INJECTION, SOLUTION INTRAVENOUS at 12:04

## 2022-04-08 RX ADMIN — ATORVASTATIN CALCIUM 20 MG: 20 TABLET, FILM COATED ORAL at 08:04

## 2022-04-08 RX ADMIN — LOSARTAN POTASSIUM 25 MG: 25 TABLET, FILM COATED ORAL at 12:04

## 2022-04-08 RX ADMIN — SULFASALAZINE 500 MG: 500 TABLET ORAL at 11:04

## 2022-04-08 RX ADMIN — EZETIMIBE 10 MG: 10 TABLET ORAL at 11:04

## 2022-04-08 RX ADMIN — ASPIRIN 81 MG: 81 TABLET, COATED ORAL at 11:04

## 2022-04-08 RX ADMIN — FERROUS SULFATE TAB 325 MG (65 MG ELEMENTAL FE) 1 EACH: 325 (65 FE) TAB at 11:04

## 2022-04-08 RX ADMIN — Medication 6 MG: at 08:04

## 2022-04-08 RX ADMIN — ACETAMINOPHEN 650 MG: 325 TABLET ORAL at 08:04

## 2022-04-08 NOTE — ASSESSMENT & PLAN NOTE
Supplemental O2 via nasal canula; titrate O2 saturation to >92%. Records reviewed and she was on  Home O2 a few years ago ; family denies home Oxygen at present   Start Solumedrol 80mg daily   Continue beta 2 agonist bronchodilator treatments.   Continue IV antibiotics. Start IV rocephin 1gm for lower resp infection/COPD exac as would treat possible UTI; f/u urine Cx also and if negative can de-escalate and no consolidation on CXR    Continue routine medications as before.

## 2022-04-08 NOTE — PLAN OF CARE
[facility] - Med Surg (3rd Fl)  Initial Discharge Assessment       Primary Care Provider: Jailene Astudillo MD    Admission Diagnosis: SOB (shortness of breath) [R06.02]    Admission Date: 4/7/2022  Expected Discharge Date:     Discharge Barriers Identified: None    Payor: MEDICARE / Plan: MEDICARE PART A & B / Product Type: Government /     Extended Emergency Contact Information  Primary Emergency Contact: Agustin Ashton   [facility]  Home Phone: [phone]  Relation: Son    Discharge Plan A: Home with family, Home Health  Discharge Plan B: Home with family, Home Health      [facility] - [place] - [address]  [address]  [place] [address]  Phone: [phone] Fax: [phone]      Initial Assessment (most recent)     Adult Discharge Assessment - 04/08/22 1455        Discharge Assessment    Assessment Type Discharge Planning Assessment     Confirmed/corrected address, phone number and insurance Yes     Confirmed Demographics Correct on Facesheet     Source of Information family     Communicated MARIKA with patient/caregiver Date not available/Unable to determine     Reason For Admission COPD Exacerbation     Lives With alone     Facility Arrived From: Home     Do you expect to return to your current living situation? Yes     Do you have help at home or someone to help you manage your care at home? Yes     Who are your caregiver(s) and their phone number(s)? Agustin Ashton (Son) [phone]     Walking or Climbing Stairs Difficulty none     Dressing/Bathing Difficulty none     Equipment Currently Used at Home walker, rolling;oxygen;nebulizer     Readmission within 30 days? No     Patient currently being followed by outpatient case management? No     Do you currently have service(s) that help you manage your care at home? No     Do you take prescription medications? Yes     Do you have prescription coverage? Yes     Coverage Medicaid of [place]     Do you have any problems affording any  of your prescribed medications? No     How do you get to doctors appointments? family or friend will provide     Are you on dialysis? No     Do you take coumadin? No     Discharge Plan A Home with family;Home Health     Discharge Plan B Home with family;Home Health     DME Needed Upon Discharge  oxygen     Discharge Plan discussed with: Adult children     Discharge Barriers Identified None                    Discharge assessment completed with patient's daughter-in-law, Mari. Mari states that patient lives between her and her spouse and her son. Patient has nocturnal oxygen but does not have portability, per Mari. SW asked nursing to clarify with patient's son while nursing is simultaneously on the phone with son. SW to remain available for discharge planning.

## 2022-04-08 NOTE — ASSESSMENT & PLAN NOTE
Follows with Dr. Reese for this  Dx probable early dementia per notes in 9/2021   Continue memantine

## 2022-04-08 NOTE — PT/OT/SLP EVAL
"Occupational Therapy   Evaluation    Name: Gretel Ashton  MRN: [id number]  Admitting Diagnosis:  COPD exacerbation  Recent Surgery: * No surgery found *      Recommendations:     Discharge Recommendations: other (see comments) (continue to assess)  Discharge Equipment Recommendations:  other (see comments) (continue to assess)  Barriers to discharge:  Decreased caregiver support    Assessment:     Gretel Ashton is a 81 y.o. female with a medical diagnosis of COPD exacerbation.  She presents with SOB upon OT arrival with NC not on. Noted difficult to get O2 reading, once NC applied and able to get reading 100%. OT attempted LBD dressing, patient quickly becoming SOB, O2 90% within 2 minutes. Patient then reporting nausea and requesting nurse. Limited evaluation.     Performance deficits affecting function: weakness, impaired endurance, impaired cognition, impaired cardiopulmonary response to activity, impaired self care skills, impaired functional mobilty, decreased upper extremity function, gait instability.      Rehab Prognosis: Fair; patient would benefit from acute skilled OT services to address these deficits and reach maximum level of function.       Plan:     Patient to be seen 5 x/week to address the above listed problems via therapeutic activities, self-care/home management, therapeutic exercises  · Plan of Care Expires: 04/15/22  · Plan of Care Reviewed with: patient    Subjective     Chief Complaint: "I wish I had someone that would come visit me in the hospital"  Patient/Family Comments/goals: patient reports her daughter in law assists her with bathing and LBD to don socks    Occupational Profile:  Living Environment: alone in a camper with 2 JOSH on son's property between her son and grandson's homes, bathes at her sons home with walk in shower, uses her own standard toilet   Previous level of function: requires assist for bathing and LBD  Roles and Routines: does not work   Equipment Used at Home:  " none  Assistance upon Discharge: son, daughter in law    Pain/Comfort:  · Pain Rating 1: 0/10    Patients cultural, spiritual, Hindu conflicts given the current situation: no    Objective:     Communicated with: Nursing prior to session.  Patient found sitting edge of bed with peripheral IV, oxygen upon OT entry to room.    General Precautions: Standard, fall   Orthopedic Precautions:N/A   Braces: N/A  Respiratory Status: Nasal cannula, flow 2 L/min    Occupational Performance:    Functional Mobility/Transfers:  · Unable to complete due to patient SOB and nausea     Activities of Daily Living:  · Feeding:  modified independence to drink from cup with straw  · Grooming: modified independence to wipe face with washcloth  · Lower Body Dressing: patient able to doff sock but requiring total A to don sock, SOB and nausea reported after attempt to don sock      Cognitive/Visual Perceptual:  Cognitive/Psychosocial Skills:     -       Oriented to: Person and Situation   -       Follows Commands/attention:Follows multistep  commands  -       Communication: clear/fluent    Physical Exam:  Upper Extremity Range of Motion:     -       Right Upper Extremity: WFL  -       Left Upper Extremity: WFL  Upper Extremity Strength:    -       Right Upper Extremity: fair  -       Left Upper Extremity: fair    AMPAC 6 Click ADL:  AMPAC Total Score: 16    Treatment & Education:  Therapist educated patient on role of OT and POC  Education:    Patient left sitting edge of bed with all lines intact, call button in reach and nursing present    GOALS:   Multidisciplinary Problems     Occupational Therapy Goals        Problem: Occupational Therapy    Goal Priority Disciplines Outcome Interventions   Occupational Therapy Goal     OT, PT/OT Ongoing, Progressing    Description: Goals to be met by: 4/15/22     Patient will increase functional independence with ADLs by performing:    Toileting from toilet with Modified [place] for hygiene  and clothing management.   Supine to sit with Modified [place].  Toilet transfer to toilet with Modified [place].  Upper extremity exercise program x10 reps per handout, with assistance as needed.                     History:     Past Medical History:   Diagnosis Date    Arthritis     COPD (chronic obstructive pulmonary disease)     Depression     Diabetes mellitus type II     Hyperlipidemia     Hypertension     Pacemaker        Past Surgical History:   Procedure Laterality Date    CARDIAC PACEMAKER PLACEMENT       SECTION      CYST REMOVAL Left 2019    Procedure: EXCISION, SEBACEOUS CYST;  Surgeon: Jaylen Gonzalez Jr., MD;  Location: [facility];  Service: General;  Laterality: Left;    INNER EAR SURGERY         Time Tracking:     OT Date of Treatment: 22  OT Start Time: 1059  OT Stop Time: 1120  OT Total Time (min): 21 min    Billable Minutes:Evaluation Moderate Complexity 21 minutes    2022

## 2022-04-08 NOTE — H&P
[facility] ([address] [place] [address] Medicine  History & Physical    Patient Name: Gretel Ashton  MRN: [id number]  Patient Class: OP- Observation  Admission Date: 2022  Attending Physician: Althea Krause MD   Primary Care Provider: Jailene Astudillo MD         Patient information was obtained from patient and ER records.     Subjective:     Principal Problem:COPD exacerbation    Chief Complaint:   Chief Complaint   Patient presents with    Nausea     Since 2 am  Pt also reports chest discomfort & SOB        HPI: Gretel Ashton is a 81 y.o. 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 Reese and chronic respiratory failure on 2-3 L at home secondary to COPD, follows with Dr. CECILY Guerra.   Pt presents to ER yesterday PM with c/o shortness of breath that began 2:00 a.m. yesterday  morning.  She also reports feeling nauseated and having a cough with productive brownish yellow sputum. Brother reports she has been having frequent episodes of shortness of breath but not quite this severe. Denies any home o2 use???    denies any fever. denies any chest pain or palpitations. patient does appear very anxious.  Denies any recent ill contacts    Colitis- ? Sulfasalazine   URINE ABN- Cx in process   Will start IV Rocephin,       Past Medical History:   Diagnosis Date    Arthritis     COPD (chronic obstructive pulmonary disease)     Depression     Diabetes mellitus type II     Hyperlipidemia     Hypertension     Pacemaker        Past Surgical History:   Procedure Laterality Date    CARDIAC PACEMAKER PLACEMENT       SECTION      CYST REMOVAL Left 2019    Procedure: EXCISION, SEBACEOUS CYST;  Surgeon: Jaylen Gonzalez Jr., MD;  [facility];   "Service: General;  Laterality: Left;    INNER EAR SURGERY         Review of patient's allergies indicates:  No Known Allergies    No current facility-administered medications on file prior to encounter.     Current Outpatient Medications on File Prior to Encounter   Medication Sig    albuterol (PROVENTIL) 2.5 mg /3 mL (0.083 %) nebulizer solution USE 1 VAIL IN NEBULIZER EVERY 4-6 HOURS AS DIRECTED. DX CODE: J43.9    albuterol (PROVENTIL/VENTOLIN HFA) 90 mcg/actuation inhaler Inhale 1-2 puffs into the lungs every 6 (six) hours as needed for Wheezing. Rescue    aspirin (ECOTRIN) 81 MG EC tablet Take 81 mg by mouth once daily.    coenzyme Q10 (CO Q-10) 100 mg capsule Take 100 mg by mouth once daily.    cyanocobalamin, vitamin B-12, 500 mcg Subl One sub lingual daily    ferrous sulfate (FEOSOL) 325 mg (65 mg iron) Tab tablet Take 1 tablet by mouth once daily.    fluticasone-umeclidin-vilanter (TRELEGY ELLIPTA) 100-62.5-25 mcg DsDv Inhale 1 puff into the lungs once daily.    hydroCHLOROthiazide (HYDRODIURIL) 12.5 MG Tab Take 12.5 mg by mouth daily as needed.     leflunomide (ARAVA) 10 MG Tab Take 10 mg by mouth once daily.    losartan (COZAAR) 25 MG tablet Take 25 mg by mouth once daily.    memantine (NAMENDA) 5 MG Tab Take one nightly for 3 weeks, then one BID    metFORMIN (GLUCOPHAGE) 500 MG tablet Take 1 tablet (500 mg total) by mouth 2 (two) times daily with meals.    omeprazole (PRILOSEC) 40 MG capsule Take 1 capsule (40 mg total) by mouth once daily.    potassium chloride (K-TAB) 20 mEq Take 20 mEq by mouth once daily.    rosuvastatin (CRESTOR) 40 MG Tab Take 40 mg by mouth once daily.     sulfaSALAzine (AZULFIDINE) 500 MG EC tablet Take 500 mg by mouth 2 (two) times daily.    syringe with needle (SYRINGE 3CC/25GX1") 3 mL 25 gauge x 1" Syrg Once every months    ZETIA 10 mg tablet Take 10 mg by mouth once daily.     Family History       Problem Relation (Age of Onset)    Breast cancer Mother, " Sister    Cancer Mother, Sister    Stroke Father, Maternal Grandmother          Tobacco Use    Smoking status: Former Smoker     Packs/day: 2.00     Years: 43.00     Pack years: 86.00     Types: Cigarettes     Quit date: 2000     Years since quittin.2    Smokeless tobacco: Never Used   Substance and Sexual Activity    Alcohol use: No    Drug use: No    Sexual activity: Not on file     Review of Systems   Unable to perform ROS: Dementia   Objective:     Vital Signs (Most Recent):  Temp: 97.1 °F (36.2 °C) (22 0354)  Pulse: 78 (22 0354)  Resp: 18 (22 0354)  BP: 98/61 (22 035)  SpO2: 96 % (22 035) Vital Signs (24h Range):  Temp:  [96.7 °F (35.9 °C)-98.3 °F (36.8 °C)] 97.1 °F (36.2 °C)  Pulse:  [74-97] 78  Resp:  [16-37] 18  SpO2:  [92 %-100 %] 96 %  BP: ()/(51-80) 98/61     Weight: 78.5 kg (173 lb 1 oz)  Body mass index is 37.45 kg/m².    Physical Exam  Vitals and nursing note reviewed.   Constitutional:       General: She is not in acute distress.     Appearance: She is well-developed. She is obese.   HENT:      Head: Normocephalic and atraumatic.      Right Ear: External ear normal.      Left Ear: External ear normal.      Nose: Nose normal.   Eyes:      General:         Right eye: No discharge.         Left eye: No discharge.      Extraocular Movements: Extraocular movements intact.      Conjunctiva/sclera: Conjunctivae normal.      Pupils: Pupils are equal, round, and reactive to light.   Neck:      Thyroid: No thyromegaly.   Cardiovascular:      Rate and Rhythm: Normal rate and regular rhythm.      Heart sounds: No murmur heard.    No friction rub. No gallop.   Pulmonary:      Effort: Pulmonary effort is normal. No respiratory distress.      Breath sounds: Wheezing (b/l) present.   Abdominal:      General: Bowel sounds are normal. There is no distension.      Palpations: Abdomen is soft.      Tenderness: There is no abdominal tenderness.   Skin:     General: Skin  is warm and dry.   Neurological:      Mental Status: She is alert and oriented to person, place, and time.      Cranial Nerves: No cranial nerve deficit.   Psychiatric:         Behavior: Behavior normal.         Thought Content: Thought content normal.         CRANIAL NERVES     CN III, IV, VI   Pupils are equal, round, and reactive to light.     Significant Labs: All pertinent labs within the past 24 hours have been reviewed.  A1C:   Recent Labs   Lab 10/11/21  0802   HGBA1C 5.5     ABGs:   Recent Labs   Lab 04/07/22  1836   PH 7.430   PCO2 52*   HCO3 34.50   POCSATURATED 99.0   BE 8.80*   TOTALHB 10.6*   COHB 2.0   METHB 1.8*   PO2 240*     Bilirubin:   Recent Labs   Lab 04/07/22  1603   BILITOT 0.5     Blood Culture: No results for input(s): LABBLOO in the last 48 hours.  CBC:   Recent Labs   Lab 04/07/22  1603 04/08/22  0548   WBC 6.72 5.52   HGB 9.8* 9.7*   HCT 31.6* 30.5*    243     CMP:   Recent Labs   Lab 04/07/22  1603   *   K 4.7   CL 92*   CO2 32*   *   BUN 26*   CREATININE 1.1   CALCIUM 9.6   PROT 7.5   ALBUMIN 3.2*   BILITOT 0.5   ALKPHOS 97   AST 30   ALT 19   ANIONGAP 10   EGFRNONAA 47*     Cardiac Markers:   Recent Labs   Lab 04/07/22  1603   BNP 76     Lactic Acid: No results for input(s): LACTATE in the last 48 hours.  Lipase: No results for input(s): LIPASE in the last 48 hours.  Lipid Panel: No results for input(s): CHOL, HDL, LDLCALC, TRIG, CHOLHDL in the last 48 hours.  Magnesium: No results for input(s): MG in the last 48 hours.  POCT Glucose: No results for input(s): POCTGLUCOSE in the last 48 hours.  Troponin:   Recent Labs   Lab 04/07/22  1603   TROPONINI <0.006     TSH:   Recent Labs   Lab 10/11/21  0802   TSH 4.244*     Urine Culture: No results for input(s): LABURIN in the last 48 hours.  Urine Studies:   Recent Labs   Lab 04/07/22  1759   COLORU Yellow   APPEARANCEUA Clear   PHUR 6.0   SPECGRAV 1.020   PROTEINUA Negative   GLUCUA Negative   KETONESU Negative    BILIRUBINUA Negative   OCCULTUA Negative   NITRITE Negative   UROBILINOGEN Negative   LEUKOCYTESUR 2+*   RBCUA 0   WBCUA 30*   BACTERIA Moderate*   SQUAMEPITHEL 2       Significant Imaging: I have reviewed and interpreted all pertinent imaging results/findings within the past 24 hours.    4/7 CXR- The patient is rotated to the right.  Lung volumes remain low.  Numerous external artifacts are present.  Heart is enlarged and unchanged with a pacemaker in place.  Chronic interstitial opacities in the lung bases appears similar to the prior study.  There is a left apical calcified granuloma.  No pleural effusion.       Assessment/Plan:     * COPD exacerbation  Supplemental O2 via nasal canula; titrate O2 saturation to >92%. Records reviewed and she was on  Home O2 a few years ago ; family denies home Oxygen at present   Start Solumedrol 80mg daily   Continue beta 2 agonist bronchodilator treatments.   Continue IV antibiotics. Start IV rocephin 1gm for lower resp infection/COPD exac as would treat possible UTI; f/u urine Cx also and if negative can de-escalate and no consolidation on CXR    Continue routine medications as before.             Debility  PT/OT      Cystitis  Abn UA  Awaiting urine Cx in process   Not reliable historian to know if symptomatic so will treat with rocephin pending cx results      HTN (hypertension)  Home meds;   Losartan 25mg daily , HCTz 12.5mg daily prn- hold hctz for now     Acute on chronic respiratory failure with hypoxemia  Patient with Hypoxic Respiratory failure which is Acute on chronic per chart but family doesn't think she was on O2 at home.  she is on home oxygen at 2-3 LPM per chart review; patient unsure and family doesn't think she had supplemental O2 at home. Supplemental oxygen was provided and noted-  .   Signs/symptoms of respiratory failure include- wheezing. Contributing diagnoses includes - COPD Labs and images were reviewed. Patient Has not had a recent ABG. Will treat  underlying causes and adjust management of respiratory failure as follows-     Treat as COPD exacerbation with O2 and wean as tolerated, steroids, nebs, mucinex  Rocephin added to also cover for possible UTI and de-escalate pending cx as CXR without conslidation and low suspicion for bacterial PNA        Rheumatoid arthritis involving both hands with negative rheumatoid factor  Takes sulfasalazine 500mg daily ; not sure if for RA or hx of colitis       Aortic atherosclerosis  Statin , ASA       Memory loss  Follows with Dr. Reese for this  Dx probable early dementia per notes in 9/2021   Continue memantine       Type 2 diabetes mellitus with diabetic neuropathy, without long-term current use of insulin  Home meds include oral metformin   Lab Results   Component Value Date    HGBA1C 5.5 10/11/2021   will not start insulin per protocol  Monitor for now         Hyperlipidemia  Continue statin- crestor not fomulary; atorvastatin here   zetia       VTE Risk Mitigation (From admission, onward)         Ordered     IP VTE HIGH RISK PATIENT  Once         04/07/22 2238     Place sequential compression device  Until discontinued         04/07/22 2238                   Linda Lira MD  Department of Hospital Medicine   [place] [facility] (3rd Fl)

## 2022-04-08 NOTE — ASSESSMENT & PLAN NOTE
Patient with Hypoxic Respiratory failure which is Acute on chronic per chart but family doesn't think she was on O2 at home.  she is on home oxygen at 2-3 LPM per chart review; patient unsure and family doesn't think she had supplemental O2 at home. Supplemental oxygen was provided and noted-  .   Signs/symptoms of respiratory failure include- wheezing. Contributing diagnoses includes - COPD Labs and images were reviewed. Patient Has not had a recent ABG. Will treat underlying causes and adjust management of respiratory failure as follows-     Treat as COPD exacerbation with O2 and wean as tolerated, steroids, nebs, mucinex  Rocephin added to also cover for possible UTI and de-escalate pending cx as CXR without conslidation and low suspicion for bacterial PNA

## 2022-04-08 NOTE — HPI
Gretel Ashton is a 81 y.o. 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 Reese and chronic respiratory failure on 2-3 L at home secondary to COPD, follows with Dr. CECILY Guerra.   Pt presents to ER yesterday PM with c/o shortness of breath that began 2:00 a.m. yesterday  morning.  She also reports feeling nauseated and having a cough with productive brownish yellow sputum. Brother reports she has been having frequent episodes of shortness of breath but not quite this severe. Denies any home o2 use???    denies any fever. denies any chest pain or palpitations. patient does appear very anxious.  Denies any recent ill contacts    Colitis- ? Sulfasalazine   URINE ABN- Cx in process   Will start IV Rocephin,

## 2022-04-08 NOTE — ASSESSMENT & PLAN NOTE
Abn UA  Awaiting urine Cx in process   Not reliable historian to know if symptomatic so will treat with rocephin pending cx results

## 2022-04-08 NOTE — PLAN OF CARE
Problem: Adult Inpatient Plan of Care  Goal: Plan of Care Review  Outcome: Ongoing, Progressing  Goal: Patient-Specific Goal (Individualized)  Outcome: Ongoing, Progressing  Goal: Absence of Hospital-Acquired Illness or Injury  Outcome: Ongoing, Progressing  Goal: Optimal Comfort and Wellbeing  Outcome: Ongoing, Progressing  Goal: Readiness for Transition of Care  Outcome: Ongoing, Progressing     Problem: Diabetes Comorbidity  Goal: Blood Glucose Level Within Targeted Range  Outcome: Ongoing, Progressing     Problem: Adjustment to Illness COPD (Chronic Obstructive Pulmonary Disease)  Goal: Optimal Chronic Illness Coping  Outcome: Ongoing, Progressing     Problem: Functional Ability Impaired COPD (Chronic Obstructive Pulmonary Disease)  Goal: Optimal Level of Functional [place]  Outcome: Ongoing, Progressing     Problem: Infection COPD (Chronic Obstructive Pulmonary Disease)  Goal: Absence of Infection Signs and Symptoms  Outcome: Ongoing, Progressing     Problem: Oral Intake Inadequate COPD (Chronic Obstructive Pulmonary Disease)  Goal: Improved Nutrition Intake  Outcome: Ongoing, Progressing     Problem: Respiratory Compromise COPD (Chronic Obstructive Pulmonary Disease)  Goal: Effective Oxygenation and Ventilation  Outcome: Ongoing, Progressing     Problem: UTI (Urinary Tract Infection)  Goal: Improved Infection Symptoms  Outcome: Ongoing, Progressing       Pt placed on avasys due to confusion and fall risk. Pt receiving IV Rocephin IV for uti. Urine culture pending. Added breathing TX and solumedrol for bilateral wheezing throughout. Pt oxygen sat maintaining at 94-96% on 2L nasal canula.

## 2022-04-08 NOTE — SUBJECTIVE & OBJECTIVE
Past Medical History:   Diagnosis Date    Arthritis     COPD (chronic obstructive pulmonary disease)     Depression     Diabetes mellitus type II     Hyperlipidemia     Hypertension     Pacemaker        Past Surgical History:   Procedure Laterality Date    CARDIAC PACEMAKER PLACEMENT       SECTION      CYST REMOVAL Left 2019    Procedure: EXCISION, SEBACEOUS CYST;  Surgeon: Jaylen Gonzalez Jr., MD;  [facility];  Service: General;  Laterality: Left;    INNER EAR SURGERY         Review of patient's allergies indicates:  No Known Allergies    No current facility-administered medications on file prior to encounter.     Current Outpatient Medications on File Prior to Encounter   Medication Sig    albuterol (PROVENTIL) 2.5 mg /3 mL (0.083 %) nebulizer solution USE 1 VAIL IN NEBULIZER EVERY 4-6 HOURS AS DIRECTED. DX CODE: J43.9    albuterol (PROVENTIL/VENTOLIN HFA) 90 mcg/actuation inhaler Inhale 1-2 puffs into the lungs every 6 (six) hours as needed for Wheezing. Rescue    aspirin (ECOTRIN) 81 MG EC tablet Take 81 mg by mouth once daily.    coenzyme Q10 (CO Q-10) 100 mg capsule Take 100 mg by mouth once daily.    cyanocobalamin, vitamin B-12, 500 mcg Subl One sub lingual daily    ferrous sulfate (FEOSOL) 325 mg (65 mg iron) Tab tablet Take 1 tablet by mouth once daily.    fluticasone-umeclidin-vilanter (TRELEGY ELLIPTA) 100-62.5-25 mcg DsDv Inhale 1 puff into the lungs once daily.    hydroCHLOROthiazide (HYDRODIURIL) 12.5 MG Tab Take 12.5 mg by mouth daily as needed.     leflunomide (ARAVA) 10 MG Tab Take 10 mg by mouth once daily.    losartan (COZAAR) 25 MG tablet Take 25 mg by mouth once daily.    memantine (NAMENDA) 5 MG Tab Take one nightly for 3 weeks, then one BID    metFORMIN (GLUCOPHAGE) 500 MG tablet Take 1 tablet (500 mg total) by mouth 2 (two) times daily with meals.    omeprazole (PRILOSEC) 40 MG capsule Take 1 capsule (40 mg total) by mouth once daily.    potassium chloride (K-TAB) 20  "mEq Take 20 mEq by mouth once daily.    rosuvastatin (CRESTOR) 40 MG Tab Take 40 mg by mouth once daily.     sulfaSALAzine (AZULFIDINE) 500 MG EC tablet Take 500 mg by mouth 2 (two) times daily.    syringe with needle (SYRINGE 3CC/25GX1") 3 mL 25 gauge x 1" Syrg Once every months    ZETIA 10 mg tablet Take 10 mg by mouth once daily.     Family History       Problem Relation (Age of Onset)    Breast cancer Mother, Sister    Cancer Mother, Sister    Stroke Father, Maternal Grandmother          Tobacco Use    Smoking status: Former Smoker     Packs/day: 2.00     Years: 43.00     Pack years: 86.00     Types: Cigarettes     Quit date: 2000     Years since quittin.2    Smokeless tobacco: Never Used   Substance and Sexual Activity    Alcohol use: No    Drug use: No    Sexual activity: Not on file     Review of Systems   Unable to perform ROS: Dementia   Objective:     Vital Signs (Most Recent):  Temp: 97.1 °F (36.2 °C) (22 0354)  Pulse: 78 (22 0354)  Resp: 18 (22 0354)  BP: 98/61 (22 0354)  SpO2: 96 % (22 0354) Vital Signs (24h Range):  Temp:  [96.7 °F (35.9 °C)-98.3 °F (36.8 °C)] 97.1 °F (36.2 °C)  Pulse:  [74-97] 78  Resp:  [16-37] 18  SpO2:  [92 %-100 %] 96 %  BP: ()/(51-80) 98/61     Weight: 78.5 kg (173 lb 1 oz)  Body mass index is 37.45 kg/m².    Physical Exam  Vitals and nursing note reviewed.   Constitutional:       General: She is not in acute distress.     Appearance: She is well-developed. She is obese.   HENT:      Head: Normocephalic and atraumatic.      Right Ear: External ear normal.      Left Ear: External ear normal.      Nose: Nose normal.   Eyes:      General:         Right eye: No discharge.         Left eye: No discharge.      Extraocular Movements: Extraocular movements intact.      Conjunctiva/sclera: Conjunctivae normal.      Pupils: Pupils are equal, round, and reactive to light.   Neck:      Thyroid: No thyromegaly.   Cardiovascular:      Rate and " Rhythm: Normal rate and regular rhythm.      Heart sounds: No murmur heard.    No friction rub. No gallop.   Pulmonary:      Effort: Pulmonary effort is normal. No respiratory distress.      Breath sounds: Wheezing (b/l) present.   Abdominal:      General: Bowel sounds are normal. There is no distension.      Palpations: Abdomen is soft.      Tenderness: There is no abdominal tenderness.   Skin:     General: Skin is warm and dry.   Neurological:      Mental Status: She is alert and oriented to person, place, and time.      Cranial Nerves: No cranial nerve deficit.   Psychiatric:         Behavior: Behavior normal.         Thought Content: Thought content normal.         CRANIAL NERVES     CN III, IV, VI   Pupils are equal, round, and reactive to light.     Significant Labs: All pertinent labs within the past 24 hours have been reviewed.  A1C:   Recent Labs   Lab 10/11/21  0802   HGBA1C 5.5     ABGs:   Recent Labs   Lab 04/07/22  1836   PH 7.430   PCO2 52*   HCO3 34.50   POCSATURATED 99.0   BE 8.80*   TOTALHB 10.6*   COHB 2.0   METHB 1.8*   PO2 240*     Bilirubin:   Recent Labs   Lab 04/07/22  1603   BILITOT 0.5     Blood Culture: No results for input(s): LABBLOO in the last 48 hours.  CBC:   Recent Labs   Lab 04/07/22  1603 04/08/22  0548   WBC 6.72 5.52   HGB 9.8* 9.7*   HCT 31.6* 30.5*    243     CMP:   Recent Labs   Lab 04/07/22  1603   *   K 4.7   CL 92*   CO2 32*   *   BUN 26*   CREATININE 1.1   CALCIUM 9.6   PROT 7.5   ALBUMIN 3.2*   BILITOT 0.5   ALKPHOS 97   AST 30   ALT 19   ANIONGAP 10   EGFRNONAA 47*     Cardiac Markers:   Recent Labs   Lab 04/07/22  1603   BNP 76     Lactic Acid: No results for input(s): LACTATE in the last 48 hours.  Lipase: No results for input(s): LIPASE in the last 48 hours.  Lipid Panel: No results for input(s): CHOL, HDL, LDLCALC, TRIG, CHOLHDL in the last 48 hours.  Magnesium: No results for input(s): MG in the last 48 hours.  POCT Glucose: No results for  input(s): POCTGLUCOSE in the last 48 hours.  Troponin:   Recent Labs   Lab 04/07/22  1603   TROPONINI <0.006     TSH:   Recent Labs   Lab 10/11/21  0802   TSH 4.244*     Urine Culture: No results for input(s): LABURIN in the last 48 hours.  Urine Studies:   Recent Labs   Lab 04/07/22  1759   COLORU Yellow   APPEARANCEUA Clear   PHUR 6.0   SPECGRAV 1.020   PROTEINUA Negative   GLUCUA Negative   KETONESU Negative   BILIRUBINUA Negative   OCCULTUA Negative   NITRITE Negative   UROBILINOGEN Negative   LEUKOCYTESUR 2+*   RBCUA 0   WBCUA 30*   BACTERIA Moderate*   SQUAMEPITHEL 2       Significant Imaging: I have reviewed and interpreted all pertinent imaging results/findings within the past 24 hours.    4/7 CXR- The patient is rotated to the right.  Lung volumes remain low.  Numerous external artifacts are present.  Heart is enlarged and unchanged with a pacemaker in place.  Chronic interstitial opacities in the lung bases appears similar to the prior study.  There is a left apical calcified granuloma.  No pleural effusion.

## 2022-04-08 NOTE — PROGRESS NOTES
Patient admitted with SOB and reqwuiring O2 r/t COPD exacerbation. Bedside report received from AIXA Caal. Patient on 2L NC, does not wear O2 at home. VS stable. Patient up to bedside commode with assist. SOB increases on exertion. A/O to person only. Patient with a very wet congested productive cough. No acute changes noted. Plan of care reviewed and agreed upon.

## 2022-04-08 NOTE — PT/OT/SLP EVAL
"Physical Therapy Evaluation    Patient Name:  Gretel Ashton   MRN:  [id number]    Recommendations:     Discharge Recommendations:  home with home health, home health PT   Discharge Equipment Recommendations: none   Barriers to discharge: Inaccessible home and Decreased caregiver support    Assessment:     Gretel Ashton is a 81 y.o. female admitted with a medical diagnosis of COPD exacerbation.  She presents with the following impairments/functional limitations:  weakness, impaired cardiopulmonary response to activity, impaired endurance, gait instability, impaired self care skills, impaired functional mobilty, impaired balance. Patient noted with SOB at rest and uses NC O2 at 2 liter. Sat pt on edge of the bed with minimal assistance and limited tolerance with gait functions O2 supplement using  RW with SPO2 at 84%.     Rehab Prognosis: Fair; patient would benefit from acute skilled PT services to address these deficits and reach maximum level of function.    Recent Surgery: * No surgery found *      Plan:     During this hospitalization, patient to be seen daily to address the identified rehab impairments via gait training, therapeutic activities, therapeutic exercises and progress toward the following goals:    · Plan of Care Expires:  04/15/22    Subjective     Chief Complaint: SOB and nausea. Patient also complain congestion with thick phlegm.  Patient/Family Comments/goals: "To breath better and feel better".  Pain/Comfort:  · Pain Rating 1: 0/10    Patients cultural, spiritual, Jehovah's witness conflicts given the current situation: no    Living Environment:  Patient lives alone in a camper with 2-3 steps and handrail to enter. Son and grandson  lives at the front and at the back.  Prior to admission, patients level of function was Independent. Patient sleeps at the sofa  Equipment used at home: none.  DME owned (not currently used): none.  Upon discharge, patient will have assistance from son and " blaise.    Objective:     Communicated with patient  prior to session.  Patient found HOB elevated with peripheral IV, oxygen  upon PT entry to room.    General Precautions: Standard, fall   Orthopedic Precautions:N/A   Braces: N/A  Respiratory Status: Nasal cannula, flow 2 L/min    Exams:  · Cognitive Exam:  Patient is oriented to Person, Place and Time  · Fine Motor Coordination:    · -       Intact  · Gross Motor Coordination:  WFL  · Postural Exam:  Patient presented with the following abnormalities:    · -       Rounded shoulders  · -       Forward head  · Skin Integrity/Edema:      · -       Skin integrity: Visible skin intact  · RLE ROM: WFL  · RLE Strength: WFL  · LLE ROM: WFL  · LLE Strength: WFL    Functional Mobility:  · Bed Mobility:     · Rolling Left:  contact guard assistance  · Rolling Right: contact guard assistance  · Scooting: minimum assistance  · Supine to Sit: minimum assistance  · Sit to Supine: minimum assistance  · Transfers:     · Sit to Stand:  minimum assistance with rolling walker  · Gait: Minimal Assistance x ~20 feet stepping forward/back and side steps with O2 supplement using RW . Noted SOB and dec foot/floor clearance.    Therapeutic Activities and Exercises:   Completed PT eval. Educated patient the role of PT, safety measures on out of bed actiivty and the proper breathing ex for SOB mgt. Initiated gait trng with RW.    AM-PAC 6 CLICK MOBILITY  Total Score:16     Patient left HOB elevated with all lines intact, call button in reach and nursing notified.    GOALS:   Multidisciplinary Problems     Physical Therapy Goals        Problem: Physical Therapy    Goal Priority Disciplines Outcome Goal Variances Interventions   Physical Therapy Goal     PT, PT/OT      Description:   Patient will increase functional independence with mobility by performin. Supine to sit with Modified Independent  2. Sit to supine with Modified Independent  3. Bed to chair transfer with Modified  Independentwith or without rolling walker using Step Transfer TECHNIQUE  4. Gait  x ~150  feet with Supervision or Set-up Assistance with or without rolling walker.  5. Ascend/descend 4 steps with handrail/s with supervision.  6. Lower extremity exercise program x10 reps per handout, with assistance as needed                      History:     Past Medical History:   Diagnosis Date    Arthritis     COPD (chronic obstructive pulmonary disease)     Depression     Diabetes mellitus type II     Hyperlipidemia     Hypertension     Pacemaker        Past Surgical History:   Procedure Laterality Date    CARDIAC PACEMAKER PLACEMENT       SECTION      CYST REMOVAL Left 2019    Procedure: EXCISION, SEBACEOUS CYST;  Surgeon: Jaylen Gonzalez Jr., MD;  Location: [facility];  Service: General;  Laterality: Left;    INNER EAR SURGERY         Time Tracking:     PT Received On: 22  PT Start Time: 1010     PT Stop Time: 1040  PT Total Time (min): 30 min     Billable Minutes: Evaluation 15 mins  and Therapeutic Activity 15 mins      2022

## 2022-04-08 NOTE — ASSESSMENT & PLAN NOTE
Home meds include oral metformin   Lab Results   Component Value Date    HGBA1C 5.5 10/11/2021   will not start insulin per protocol  Monitor for now

## 2022-04-08 NOTE — PLAN OF CARE
Ms Majano is here with COPD. She plans to discharge to her home when stable. Her post acute needs are TBD. CM to be available for all transitions.

## 2022-04-09 PROBLEM — R07.9 CHEST PAIN: Status: ACTIVE | Noted: 2022-04-09

## 2022-04-09 LAB
ALBUMIN SERPL BCP-MCNC: 2.9 G/DL (ref 3.5–5.2)
ALP SERPL-CCNC: 82 U/L (ref 55–135)
ALT SERPL W/O P-5'-P-CCNC: 23 U/L (ref 10–44)
ANION GAP SERPL CALC-SCNC: 14 MMOL/L (ref 8–16)
AST SERPL-CCNC: 28 U/L (ref 10–40)
BACTERIA UR CULT: NO GROWTH
BASOPHILS # BLD AUTO: 0 K/UL (ref 0–0.2)
BASOPHILS NFR BLD: 0 % (ref 0–1.9)
BILIRUB SERPL-MCNC: 0.3 MG/DL (ref 0.1–1)
BUN SERPL-MCNC: 83 MG/DL (ref 8–23)
CALCIUM SERPL-MCNC: 9.1 MG/DL (ref 8.7–10.5)
CHLORIDE SERPL-SCNC: 89 MMOL/L (ref 95–110)
CK MB SERPL-MCNC: 10.8 NG/ML (ref 0.1–6.5)
CK MB SERPL-MCNC: 6.7 NG/ML (ref 0.1–6.5)
CK MB SERPL-MCNC: 7.3 NG/ML (ref 0.1–6.5)
CK MB SERPL-MCNC: 8.5 NG/ML (ref 0.1–6.5)
CK MB SERPL-RTO: 4.8 % (ref 0–5)
CK MB SERPL-RTO: 5.3 % (ref 0–5)
CK MB SERPL-RTO: 6.2 % (ref 0–5)
CK MB SERPL-RTO: 6.4 % (ref 0–5)
CK SERPL-CCNC: 108 U/L (ref 20–180)
CK SERPL-CCNC: 114 U/L (ref 20–180)
CK SERPL-CCNC: 160 U/L (ref 20–180)
CK SERPL-CCNC: 223 U/L (ref 20–180)
CO2 SERPL-SCNC: 29 MMOL/L (ref 23–29)
CREAT SERPL-MCNC: 1.3 MG/DL (ref 0.5–1.4)
DIFFERENTIAL METHOD: ABNORMAL
EOSINOPHIL # BLD AUTO: 0 K/UL (ref 0–0.5)
EOSINOPHIL NFR BLD: 0 % (ref 0–8)
ERYTHROCYTE [DISTWIDTH] IN BLOOD BY AUTOMATED COUNT: 15.3 % (ref 11.5–14.5)
EST. GFR  (AFRICAN AMERICAN): 44 ML/MIN/1.73 M^2
EST. GFR  (NON AFRICAN AMERICAN): 38 ML/MIN/1.73 M^2
GLUCOSE SERPL-MCNC: 200 MG/DL (ref 70–110)
HCT VFR BLD AUTO: 25.6 % (ref 37–48.5)
HGB BLD-MCNC: 8.1 G/DL (ref 12–16)
IMM GRANULOCYTES # BLD AUTO: 0.05 K/UL (ref 0–0.04)
IMM GRANULOCYTES NFR BLD AUTO: 0.6 % (ref 0–0.5)
LACTATE SERPL-SCNC: 2 MMOL/L (ref 0.5–2.2)
LYMPHOCYTES # BLD AUTO: 0.7 K/UL (ref 1–4.8)
LYMPHOCYTES NFR BLD: 8.7 % (ref 18–48)
MAGNESIUM SERPL-MCNC: 3.1 MG/DL (ref 1.6–2.6)
MCH RBC QN AUTO: 26.4 PG (ref 27–31)
MCHC RBC AUTO-ENTMCNC: 31.6 G/DL (ref 32–36)
MCV RBC AUTO: 83 FL (ref 82–98)
MONOCYTES # BLD AUTO: 0.8 K/UL (ref 0.3–1)
MONOCYTES NFR BLD: 9.4 % (ref 4–15)
NEUTROPHILS # BLD AUTO: 6.9 K/UL (ref 1.8–7.7)
NEUTROPHILS NFR BLD: 81.3 % (ref 38–73)
NRBC BLD-RTO: 0 /100 WBC
PHOSPHATE SERPL-MCNC: 4.9 MG/DL (ref 2.7–4.5)
PLATELET # BLD AUTO: 276 K/UL (ref 150–450)
PMV BLD AUTO: 10.2 FL (ref 9.2–12.9)
POTASSIUM SERPL-SCNC: 5 MMOL/L (ref 3.5–5.1)
PROT SERPL-MCNC: 6.7 G/DL (ref 6–8.4)
RBC # BLD AUTO: 3.07 M/UL (ref 4–5.4)
SODIUM SERPL-SCNC: 132 MMOL/L (ref 136–145)
TROPONIN I SERPL DL<=0.01 NG/ML-MCNC: 0.01 NG/ML (ref 0–0.03)
TROPONIN I SERPL DL<=0.01 NG/ML-MCNC: 0.02 NG/ML (ref 0–0.03)
WBC # BLD AUTO: 8.52 K/UL (ref 3.9–12.7)

## 2022-04-09 PROCEDURE — 87070 CULTURE OTHR SPECIMN AEROBIC: CPT | Performed by: INTERNAL MEDICINE

## 2022-04-09 PROCEDURE — 85025 COMPLETE CBC W/AUTO DIFF WBC: CPT | Performed by: FAMILY MEDICINE

## 2022-04-09 PROCEDURE — 82553 CREATINE MB FRACTION: CPT | Mod: 91 | Performed by: INTERNAL MEDICINE

## 2022-04-09 PROCEDURE — 25000003 PHARM REV CODE 250: Performed by: NURSE PRACTITIONER

## 2022-04-09 PROCEDURE — 25000003 PHARM REV CODE 250: Performed by: INTERNAL MEDICINE

## 2022-04-09 PROCEDURE — 11000001 HC ACUTE MED/SURG PRIVATE ROOM

## 2022-04-09 PROCEDURE — 25000242 PHARM REV CODE 250 ALT 637 W/ HCPCS: Performed by: NURSE PRACTITIONER

## 2022-04-09 PROCEDURE — 63600175 PHARM REV CODE 636 W HCPCS: Performed by: INTERNAL MEDICINE

## 2022-04-09 PROCEDURE — 99233 SBSQ HOSP IP/OBS HIGH 50: CPT | Mod: ,,, | Performed by: INTERNAL MEDICINE

## 2022-04-09 PROCEDURE — 84484 ASSAY OF TROPONIN QUANT: CPT | Performed by: FAMILY MEDICINE

## 2022-04-09 PROCEDURE — 83605 ASSAY OF LACTIC ACID: CPT | Performed by: INTERNAL MEDICINE

## 2022-04-09 PROCEDURE — 97530 THERAPEUTIC ACTIVITIES: CPT

## 2022-04-09 PROCEDURE — 83735 ASSAY OF MAGNESIUM: CPT | Performed by: INTERNAL MEDICINE

## 2022-04-09 PROCEDURE — 87040 BLOOD CULTURE FOR BACTERIA: CPT | Mod: 59 | Performed by: INTERNAL MEDICINE

## 2022-04-09 PROCEDURE — 93010 EKG 12-LEAD: ICD-10-PCS | Mod: ,,, | Performed by: INTERNAL MEDICINE

## 2022-04-09 PROCEDURE — 87205 SMEAR GRAM STAIN: CPT | Performed by: INTERNAL MEDICINE

## 2022-04-09 PROCEDURE — 94761 N-INVAS EAR/PLS OXIMETRY MLT: CPT

## 2022-04-09 PROCEDURE — 27000221 HC OXYGEN, UP TO 24 HOURS

## 2022-04-09 PROCEDURE — 84484 ASSAY OF TROPONIN QUANT: CPT | Mod: 91 | Performed by: INTERNAL MEDICINE

## 2022-04-09 PROCEDURE — 25000003 PHARM REV CODE 250: Performed by: FAMILY MEDICINE

## 2022-04-09 PROCEDURE — 94640 AIRWAY INHALATION TREATMENT: CPT

## 2022-04-09 PROCEDURE — 82553 CREATINE MB FRACTION: CPT | Performed by: FAMILY MEDICINE

## 2022-04-09 PROCEDURE — 36415 COLL VENOUS BLD VENIPUNCTURE: CPT | Performed by: INTERNAL MEDICINE

## 2022-04-09 PROCEDURE — 93005 ELECTROCARDIOGRAM TRACING: CPT

## 2022-04-09 PROCEDURE — 63600175 PHARM REV CODE 636 W HCPCS: Performed by: NURSE PRACTITIONER

## 2022-04-09 PROCEDURE — 63600175 PHARM REV CODE 636 W HCPCS: Performed by: FAMILY MEDICINE

## 2022-04-09 PROCEDURE — 99233 PR SUBSEQUENT HOSPITAL CARE,LEVL III: ICD-10-PCS | Mod: ,,, | Performed by: INTERNAL MEDICINE

## 2022-04-09 PROCEDURE — 93010 ELECTROCARDIOGRAM REPORT: CPT | Mod: ,,, | Performed by: INTERNAL MEDICINE

## 2022-04-09 PROCEDURE — 36415 COLL VENOUS BLD VENIPUNCTURE: CPT | Performed by: FAMILY MEDICINE

## 2022-04-09 PROCEDURE — 84100 ASSAY OF PHOSPHORUS: CPT | Performed by: INTERNAL MEDICINE

## 2022-04-09 PROCEDURE — 80053 COMPREHEN METABOLIC PANEL: CPT | Performed by: FAMILY MEDICINE

## 2022-04-09 RX ORDER — ONDANSETRON 2 MG/ML
4 INJECTION INTRAMUSCULAR; INTRAVENOUS ONCE
Status: COMPLETED | OUTPATIENT
Start: 2022-04-09 | End: 2022-04-09

## 2022-04-09 RX ORDER — SODIUM CHLORIDE 9 MG/ML
INJECTION, SOLUTION INTRAVENOUS CONTINUOUS
Status: ACTIVE | OUTPATIENT
Start: 2022-04-09 | End: 2022-04-09

## 2022-04-09 RX ORDER — ONDANSETRON HYDROCHLORIDE 4 MG/5ML
4 SOLUTION ORAL ONCE
Status: DISCONTINUED | OUTPATIENT
Start: 2022-04-09 | End: 2022-04-09

## 2022-04-09 RX ORDER — LIDOCAINE HYDROCHLORIDE 20 MG/ML
10 SOLUTION OROPHARYNGEAL ONCE
Status: COMPLETED | OUTPATIENT
Start: 2022-04-09 | End: 2022-04-09

## 2022-04-09 RX ORDER — SUCRALFATE 1 G/10ML
1 SUSPENSION ORAL EVERY 6 HOURS
Status: DISCONTINUED | OUTPATIENT
Start: 2022-04-09 | End: 2022-04-15 | Stop reason: HOSPADM

## 2022-04-09 RX ORDER — MAG HYDROX/ALUMINUM HYD/SIMETH 200-200-20
30 SUSPENSION, ORAL (FINAL DOSE FORM) ORAL ONCE
Status: COMPLETED | OUTPATIENT
Start: 2022-04-09 | End: 2022-04-09

## 2022-04-09 RX ADMIN — IPRATROPIUM BROMIDE AND ALBUTEROL SULFATE 3 ML: 2.5; .5 SOLUTION RESPIRATORY (INHALATION) at 12:04

## 2022-04-09 RX ADMIN — ASPIRIN 81 MG: 81 TABLET, COATED ORAL at 09:04

## 2022-04-09 RX ADMIN — AZITHROMYCIN MONOHYDRATE 500 MG: 500 INJECTION, POWDER, LYOPHILIZED, FOR SOLUTION INTRAVENOUS at 08:04

## 2022-04-09 RX ADMIN — SUCRALFATE 1 G: 1 SUSPENSION ORAL at 06:04

## 2022-04-09 RX ADMIN — LIDOCAINE HYDROCHLORIDE 10 ML: 20 SOLUTION ORAL; TOPICAL at 12:04

## 2022-04-09 RX ADMIN — LOSARTAN POTASSIUM 25 MG: 25 TABLET, FILM COATED ORAL at 09:04

## 2022-04-09 RX ADMIN — SUCRALFATE 1 G: 1 SUSPENSION ORAL at 01:04

## 2022-04-09 RX ADMIN — ATORVASTATIN CALCIUM 20 MG: 20 TABLET, FILM COATED ORAL at 08:04

## 2022-04-09 RX ADMIN — ALUMINUM HYDROXIDE, MAGNESIUM HYDROXIDE, AND SIMETHICONE 30 ML: 200; 200; 20 SUSPENSION ORAL at 09:04

## 2022-04-09 RX ADMIN — CALCIUM CARBONATE (ANTACID) CHEW TAB 500 MG 1000 MG: 500 CHEW TAB at 08:04

## 2022-04-09 RX ADMIN — SULFASALAZINE 500 MG: 500 TABLET ORAL at 09:04

## 2022-04-09 RX ADMIN — GUAIFENESIN 600 MG: 600 TABLET, EXTENDED RELEASE ORAL at 09:04

## 2022-04-09 RX ADMIN — PANTOPRAZOLE SODIUM 40 MG: 40 TABLET, DELAYED RELEASE ORAL at 09:04

## 2022-04-09 RX ADMIN — ONDANSETRON HYDROCHLORIDE 4 MG: 2 SOLUTION INTRAMUSCULAR; INTRAVENOUS at 04:04

## 2022-04-09 RX ADMIN — CALCIUM CARBONATE (ANTACID) CHEW TAB 500 MG 1000 MG: 500 CHEW TAB at 04:04

## 2022-04-09 RX ADMIN — IPRATROPIUM BROMIDE AND ALBUTEROL SULFATE 3 ML: 2.5; .5 SOLUTION RESPIRATORY (INHALATION) at 07:04

## 2022-04-09 RX ADMIN — ALUMINUM HYDROXIDE, MAGNESIUM HYDROXIDE, AND SIMETHICONE 30 ML: 200; 200; 20 SUSPENSION ORAL at 12:04

## 2022-04-09 RX ADMIN — EZETIMIBE 10 MG: 10 TABLET ORAL at 09:04

## 2022-04-09 RX ADMIN — CEFTRIAXONE 2 G: 2 INJECTION, SOLUTION INTRAVENOUS at 08:04

## 2022-04-09 RX ADMIN — GUAIFENESIN 600 MG: 600 TABLET, EXTENDED RELEASE ORAL at 08:04

## 2022-04-09 RX ADMIN — FERROUS SULFATE TAB 325 MG (65 MG ELEMENTAL FE) 1 EACH: 325 (65 FE) TAB at 09:04

## 2022-04-09 RX ADMIN — SODIUM CHLORIDE 1000 ML: 0.9 INJECTION, SOLUTION INTRAVENOUS at 07:04

## 2022-04-09 RX ADMIN — LIDOCAINE HYDROCHLORIDE 10 ML: 20 SOLUTION ORAL; TOPICAL at 09:04

## 2022-04-09 RX ADMIN — METHYLPREDNISOLONE SODIUM SUCCINATE 80 MG: 40 INJECTION, POWDER, FOR SOLUTION INTRAMUSCULAR; INTRAVENOUS at 09:04

## 2022-04-09 RX ADMIN — SODIUM CHLORIDE: 0.9 INJECTION, SOLUTION INTRAVENOUS at 01:04

## 2022-04-09 RX ADMIN — MEMANTINE HYDROCHLORIDE 5 MG: 5 TABLET ORAL at 09:04

## 2022-04-09 RX ADMIN — CYANOCOBALAMIN TAB 500 MCG 1000 MCG: 500 TAB at 09:04

## 2022-04-09 NOTE — ASSESSMENT & PLAN NOTE
Abn UA  Awaiting urine Cx in process   Not reliable historian to know if symptomatic so will treat with rocephin pending cx results    4/9: cx negative. Stop rocephin

## 2022-04-09 NOTE — HOSPITAL COURSE
Patient had a rough night complaining of intermittent chest pain. + belching. + nausea. Increasing intensity overnight. Initial SBP 90s then 200s, then 90s. Likely anxiety driven. Initial CE negative. EKG negative. 11 beat VT on tele overnight but none since. Replacing electrolytes PRN. She remains on 2L NC. She is unable to tell me if her pain feels like reflux but reports in the left lower chest.     4/10:  Patient developed worsening hypotension throughout the day yesterday. Given up to 3L NS bolus and then was on 100cc/hr NS. SBP improved from 70s to 90s but still low. RR > 30. No fevers (temp around 99) and no leukocytosis. Urine cx negative. Initial CXR without consolidation. However, triggering sepsis alert. She was moved to the ICU last night for closer monitoring in case she would require pressor support but she has not. Lactic acid 2. She continues to oxygenate well on 2L NC. pCO2 is 52--unchanged from admission. She remains intermittently confused since admission; though likely her baseline. No further reports of chest pain since treatment for reflux yesterday. CE not suggestive of ischemia. Resumed Rocephin and azithro as pulmonary source seems most likely given her initial presentation; repeat CXR this AM.     4/11/22  Gretel Ashton is a 81 y.o. female  Seen in ICU ; on precedex and irene synephrine .still on IVF   I saw her in office for a long time she does not recognize me now .  She was admitted for copd exacerbation ; on steroid ; and ceftriaxone and zithromax   She became hypotensive ; needs IVF ; minimal pressors ; her HB dropped to 6 ; required 2 units of PRBCs   Now  9.7; No BM so dont know if GI bleeding .  Will wean her off of pressors and precedex .     4/12:  Off predecex and irene this AM  BP borderline this morning, better on rounds   Still with cough on 2L NC  BM not collected for occult; H/H trended up. No gross bleeding noted  Mental status still with intermittent confusion but less  agitation. She knows where she is and her family members. Can ask to go to toilet for BM, etc.     4/12  Doing much better.  Working with therapy.  Still with cough and O2 at 2 liters NC.    No longer agitated.  Confusion much better.  Eating      4/14 pt was moved from ICU yesterday after blood pressure stabilizing. She is currently being treated for COPD exacerbation. She is on medrol 40mf IV daily and duonebs every 6hr. On 3L NC sats %. Uses 2-3 L at home. Still has cough with sputum production.  Completed 3 days azithromycin and 6 days rocephin. VSS/afebrile [place] now elevated. Will need to resume home antihypertensives.   Anemia stable with + occult stool noted.   PT has pt ambulating 15 ft forward and backward using RW> goal is 100ft and 4 steps         4/15/22  Gretel Ashton is a 81 y.o. female  She wants to go home ; no family by bedisde   Nurses trying to get hold off son   Using 3 L nasal cannula   Walking with PT 30 feet with PT .

## 2022-04-09 NOTE — NURSING
On assessment Pt complaining of increasing midsternal chest pain. BP: 108/59 Map: 81 HR 83.    Notified MD. See new orders.

## 2022-04-09 NOTE — PLAN OF CARE
Problem: Adult Inpatient Plan of Care  Goal: Plan of Care Review  Outcome: Ongoing, Progressing  Goal: Patient-Specific Goal (Individualized)  Outcome: Ongoing, Progressing  Goal: Absence of Hospital-Acquired Illness or Injury  Outcome: Ongoing, Progressing  Goal: Optimal Comfort and Wellbeing  Outcome: Ongoing, Progressing  Goal: Readiness for Transition of Care  Outcome: Ongoing, Progressing     Problem: Diabetes Comorbidity  Goal: Blood Glucose Level Within Targeted Range  Outcome: Ongoing, Progressing     Problem: Adjustment to Illness COPD (Chronic Obstructive Pulmonary Disease)  Goal: Optimal Chronic Illness Coping  Outcome: Ongoing, Progressing     Problem: Functional Ability Impaired COPD (Chronic Obstructive Pulmonary Disease)  Goal: Optimal Level of Functional [place]  Outcome: Ongoing, Progressing     Problem: Infection COPD (Chronic Obstructive Pulmonary Disease)  Goal: Absence of Infection Signs and Symptoms  Outcome: Ongoing, Progressing     Problem: Oral Intake Inadequate COPD (Chronic Obstructive Pulmonary Disease)  Goal: Improved Nutrition Intake  Outcome: Ongoing, Progressing     Problem: Respiratory Compromise COPD (Chronic Obstructive Pulmonary Disease)  Goal: Effective Oxygenation and Ventilation  Outcome: Ongoing, Progressing     Problem: UTI (Urinary Tract Infection)  Goal: Improved Infection Symptoms  Outcome: Ongoing, Progressing     Problem: Skin Injury Risk Increased  Goal: Skin Health and Integrity  Outcome: Ongoing, Progressing    Pt anxious throughout the day. Complaints of mid sternal /epigastric pain/discomfort. Notified MD. New orders given. Seemed to alleviate discomfort. New 22 Gauge IV placed into the R AC for Normal saline continuous infusion due to BP's. BP at 1600 Manual 79/40. MD notified. 1 liter bolus normal saline given. Pt receiving breathing TX q6hrs and currently on 2L nasal canula.  Bilat wheezing throughout.  Pt up to bedside commode with assistance. Avasys on  Please start ramipril 10mg/d in addition to everything you are on  Patient due to fall risk and pt confusion.

## 2022-04-09 NOTE — PROGRESS NOTES
04/09/22 1853   Vital Signs   BP (!) 75/35   BP Location Left arm   BP Method Manual   Patient Position Lying     Dr. Lira aware of current blood pressure. Will bolus with one more liter of normal saline and then recheck blood pressure. Oncoming nurse made aware. Will continue to monitor.

## 2022-04-09 NOTE — SUBJECTIVE & OBJECTIVE
Past Medical History:   Diagnosis Date    Arthritis     COPD (chronic obstructive pulmonary disease)     Depression     Diabetes mellitus type II     Hyperlipidemia     Hypertension     Pacemaker        Past Surgical History:   Procedure Laterality Date    CARDIAC PACEMAKER PLACEMENT       SECTION      CYST REMOVAL Left 2019    Procedure: EXCISION, SEBACEOUS CYST;  Surgeon: Jaylen Gonzalez Jr., MD;  Location: [facility];  Service: General;  Laterality: Left;    INNER EAR SURGERY         Review of patient's allergies indicates:  No Known Allergies    No current facility-administered medications on file prior to encounter.     Current Outpatient Medications on File Prior to Encounter   Medication Sig    albuterol (PROVENTIL) 2.5 mg /3 mL (0.083 %) nebulizer solution USE 1 VAIL IN NEBULIZER EVERY 4-6 HOURS AS DIRECTED. DX CODE: J43.9    albuterol (PROVENTIL/VENTOLIN HFA) 90 mcg/actuation inhaler Inhale 1-2 puffs into the lungs every 6 (six) hours as needed for Wheezing. Rescue    aspirin (ECOTRIN) 81 MG EC tablet Take 81 mg by mouth once daily.    coenzyme Q10 (CO Q-10) 100 mg capsule Take 100 mg by mouth once daily.    cyanocobalamin, vitamin B-12, 500 mcg Subl One sub lingual daily    ferrous sulfate (FEOSOL) 325 mg (65 mg iron) Tab tablet Take 1 tablet by mouth once daily.    fluticasone-umeclidin-vilanter (TRELEGY ELLIPTA) 100-62.5-25 mcg DsDv Inhale 1 puff into the lungs once daily.    hydroCHLOROthiazide (HYDRODIURIL) 12.5 MG Tab Take 12.5 mg by mouth daily as needed.     leflunomide (ARAVA) 10 MG Tab Take 10 mg by mouth once daily.    losartan (COZAAR) 25 MG tablet Take 25 mg by mouth once daily.    memantine (NAMENDA) 5 MG Tab Take one nightly for 3 weeks, then one BID    metFORMIN (GLUCOPHAGE) 500 MG tablet Take 1 tablet (500 mg total) by mouth 2 (two) times daily with meals.    omeprazole (PRILOSEC) 40 MG capsule Take 1 capsule (40 mg total) by mouth once daily.    potassium chloride (K-TAB) 20  "mEq Take 20 mEq by mouth once daily.    rosuvastatin (CRESTOR) 40 MG Tab Take 40 mg by mouth once daily.     sulfaSALAzine (AZULFIDINE) 500 MG EC tablet Take 500 mg by mouth 2 (two) times daily.    syringe with needle (SYRINGE 3CC/25GX1") 3 mL 25 gauge x 1" Syrg Once every months    ZETIA 10 mg tablet Take 10 mg by mouth once daily.     Family History       Problem Relation (Age of Onset)    Breast cancer Mother, Sister    Cancer Mother, Sister    Stroke Father, Maternal Grandmother          Tobacco Use    Smoking status: Former Smoker     Packs/day: 2.00     Years: 43.00     Pack years: 86.00     Types: Cigarettes     Quit date: 2000     Years since quittin.2    Smokeless tobacco: Never Used   Substance and Sexual Activity    Alcohol use: No    Drug use: No    Sexual activity: Not on file     Review of Systems   Unable to perform ROS: Dementia   Cardiovascular:  Positive for chest pain.   Objective:     Vital Signs (Most Recent):  Temp: 97.8 °F (36.6 °C) (22 0744)  Pulse: 79 (22 1000)  Resp: 20 (22 0811)  BP: (!) 109/57 (22 0811)  SpO2: (!) 87 % (22 0811) Vital Signs (24h Range):  Temp:  [97.1 °F (36.2 °C)-98.2 °F (36.8 °C)] 97.8 °F (36.6 °C)  Pulse:  [] 79  Resp:  [16-26] 20  SpO2:  [87 %-100 %] 87 %  BP: ()/() 109/57     Weight: 78.5 kg (173 lb 1 oz)  Body mass index is 37.45 kg/m².    Physical Exam  Vitals and nursing note reviewed.   Constitutional:       General: She is not in acute distress.     Appearance: She is well-developed. She is obese.   HENT:      Head: Normocephalic and atraumatic.      Right Ear: External ear normal.      Left Ear: External ear normal.      Nose: Nose normal.   Eyes:      General:         Right eye: No discharge.         Left eye: No discharge.      Extraocular Movements: Extraocular movements intact.      Conjunctiva/sclera: Conjunctivae normal.      Pupils: Pupils are equal, round, and reactive to light.   Neck:      " Thyroid: No thyromegaly.   Cardiovascular:      Rate and Rhythm: Normal rate and regular rhythm.      Heart sounds: No murmur heard.    No friction rub. No gallop.   Pulmonary:      Effort: Pulmonary effort is normal. No respiratory distress.      Breath sounds: Wheezing (b/l) present.   Abdominal:      General: Bowel sounds are normal. There is no distension.      Palpations: Abdomen is soft.      Tenderness: There is no abdominal tenderness.   Skin:     General: Skin is warm and dry.   Neurological:      Mental Status: She is alert.      Cranial Nerves: No cranial nerve deficit.      Comments: Oriented to person and place   Psychiatric:         Behavior: Behavior normal.         Thought Content: Thought content normal.         CRANIAL NERVES     CN III, IV, VI   Pupils are equal, round, and reactive to light.     Significant Labs: All pertinent labs within the past 24 hours have been reviewed.  A1C:   No results for input(s): HGBA1C in the last 4320 hours.    ABGs:   Recent Labs   Lab 04/07/22  1836   PH 7.430   PCO2 52*   HCO3 34.50   POCSATURATED 99.0   BE 8.80*   TOTALHB 10.6*   COHB 2.0   METHB 1.8*   PO2 240*       Bilirubin:   Recent Labs   Lab 04/07/22  1603 04/09/22  0435   BILITOT 0.5 0.3       Blood Culture: No results for input(s): LABBLOO in the last 48 hours.  CBC:   Recent Labs   Lab 04/07/22  1603 04/08/22  0548 04/09/22  0435   WBC 6.72 5.52 8.52   HGB 9.8* 9.7* 8.1*   HCT 31.6* 30.5* 25.6*    243 276       CMP:   Recent Labs   Lab 04/07/22  1603 04/09/22  0435   * 132*   K 4.7 5.0   CL 92* 89*   CO2 32* 29   * 200*   BUN 26* 83*   CREATININE 1.1 1.3   CALCIUM 9.6 9.1   PROT 7.5 6.7   ALBUMIN 3.2* 2.9*   BILITOT 0.5 0.3   ALKPHOS 97 82   AST 30 28   ALT 19 23   ANIONGAP 10 14   EGFRNONAA 47* 38*       Cardiac Markers:   Recent Labs   Lab 04/07/22  1603   BNP 76       Lactic Acid: No results for input(s): LACTATE in the last 48 hours.  Lipase: No results for input(s): LIPASE in  the last 48 hours.  Lipid Panel: No results for input(s): CHOL, HDL, LDLCALC, TRIG, CHOLHDL in the last 48 hours.  Magnesium:   Recent Labs   Lab 04/09/22  0435   MG 3.1*     POCT Glucose: No results for input(s): POCTGLUCOSE in the last 48 hours.  Troponin:   Recent Labs   Lab 04/07/22  1603 04/09/22  0435   TROPONINI <0.006 0.013       TSH:   No results for input(s): TSH in the last 4320 hours.    Urine Culture:   Recent Labs   Lab 04/07/22  1759   LABURIN No growth     Urine Studies:   Recent Labs   Lab 04/07/22  1759   COLORU Yellow   APPEARANCEUA Clear   PHUR 6.0   SPECGRAV 1.020   PROTEINUA Negative   GLUCUA Negative   KETONESU Negative   BILIRUBINUA Negative   OCCULTUA Negative   NITRITE Negative   UROBILINOGEN Negative   LEUKOCYTESUR 2+*   RBCUA 0   WBCUA 30*   BACTERIA Moderate*   SQUAMEPITHEL 2         Significant Imaging: I have reviewed and interpreted all pertinent imaging results/findings within the past 24 hours.    4/7 CXR- The patient is rotated to the right.  Lung volumes remain low.  Numerous external artifacts are present.  Heart is enlarged and unchanged with a pacemaker in place.  Chronic interstitial opacities in the lung bases appears similar to the prior study.  There is a left apical calcified granuloma.  No pleural effusion.

## 2022-04-09 NOTE — ASSESSMENT & PLAN NOTE
Patient with Hypoxic Respiratory failure which is Acute on chronic per chart but family doesn't think she was on O2 at home.  she is on home oxygen at 2-3 LPM per chart review; patient unsure and family doesn't think she had supplemental O2 at home. Supplemental oxygen was provided and noted-  .   Signs/symptoms of respiratory failure include- wheezing. Contributing diagnoses includes - COPD Labs and images were reviewed. Patient Has not had a recent ABG. Will treat underlying causes and adjust management of respiratory failure as follows-     Treat as COPD exacerbation with O2 and wean as tolerated, steroids, nebs, mucinex  Rocephin added to also cover for possible UTI and de-escalate pending cx as CXR without conslidation and low suspicion for bacterial PNA    4/9: wean steroids to 40mg daily  contn ebs--consider switch to xopenex if tachyarrythmia continues but only 1 occurrence  Stop rocephin, urine cx negative  Wean o2 as tolerated

## 2022-04-09 NOTE — ASSESSMENT & PLAN NOTE
Supplemental O2 via nasal canula; titrate O2 saturation to >92%. Records reviewed and she was on  Home O2 a few years ago ; family denies home Oxygen at present   Start Solumedrol 80mg daily   Continue beta 2 agonist bronchodilator treatments.   Continue IV antibiotics. Start IV rocephin 1gm for lower resp infection/COPD exac as would treat possible UTI; f/u urine Cx also and if negative can de-escalate and no consolidation on CXR    Continue routine medications as before.     4/9: improving  Cont nebs, reduce steroids to 40mg; stop rocephin  Wean O2 as tolerated

## 2022-04-09 NOTE — PROGRESS NOTES
[facility] ([facility])  [facility] Medicine  Progress Note    Patient Name: Gretel Ashton  MRN: [id number]  Patient Class: IP- Inpatient   Admission Date: 4/7/2022  Length of Stay: 1 days  Attending Physician: Atlhea Krause MD  Primary Care Provider: Jailene Astudillo MD        Subjective:     Principal Problem:COPD exacerbation        HPI:  Gretle Ashton is a 81 y.o. 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 Reese and chronic respiratory failure on 2-3 L at home secondary to COPD, follows with Dr. CECILY Guerra.   Pt presents to ER yesterday PM with c/o shortness of breath that began 2:00 a.m. yesterday  morning.  She also reports feeling nauseated and having a cough with productive brownish yellow sputum. Brother reports she has been having frequent episodes of shortness of breath but not quite this severe. Denies any home o2 use???    denies any fever. denies any chest pain or palpitations. patient does appear very anxious.  Denies any recent ill contacts    Colitis- ? Sulfasalazine   URINE ABN- Cx in process   Will start IV Rocephin,       Overview/Hospital Course:  Patient had a rough night complaining of intermittent chest pain. + belching. + nausea. Increasing intensity overnight. Initial SBP 90s then 200s, then 90s. Likely anxiety driven. Initial CE negative. EKG negative. 11 beat VT on tele overnight but none since. Replacing electrolytes PRN. She remains on 2L NC. She is unable to tell me if her pain feels like reflux but reports in the left lower chest.       Past Medical History:   Diagnosis Date    Arthritis     COPD (chronic obstructive pulmonary disease)     Depression     Diabetes mellitus type II     Hyperlipidemia     Hypertension      Pacemaker        Past Surgical History:   Procedure Laterality Date    CARDIAC PACEMAKER PLACEMENT       SECTION      CYST REMOVAL Left 2019    Procedure: EXCISION, SEBACEOUS CYST;  Surgeon: Jaylen Gonzalez Jr., MD;  Location: [facility] OR;  Service: General;  Laterality: Left;    INNER EAR SURGERY         Review of patient's allergies indicates:  No Known Allergies    No current facility-administered medications on file prior to encounter.     Current Outpatient Medications on File Prior to Encounter   Medication Sig    albuterol (PROVENTIL) 2.5 mg /3 mL (0.083 %) nebulizer solution USE 1 VAIL IN NEBULIZER EVERY 4-6 HOURS AS DIRECTED. DX CODE: J43.9    albuterol (PROVENTIL/VENTOLIN HFA) 90 mcg/actuation inhaler Inhale 1-2 puffs into the lungs every 6 (six) hours as needed for Wheezing. Rescue    aspirin (ECOTRIN) 81 MG EC tablet Take 81 mg by mouth once daily.    coenzyme Q10 (CO Q-10) 100 mg capsule Take 100 mg by mouth once daily.    cyanocobalamin, vitamin B-12, 500 mcg Subl One sub lingual daily    ferrous sulfate (FEOSOL) 325 mg (65 mg iron) Tab tablet Take 1 tablet by mouth once daily.    fluticasone-umeclidin-vilanter (TRELEGY ELLIPTA) 100-62.5-25 mcg DsDv Inhale 1 puff into the lungs once daily.    hydroCHLOROthiazide (HYDRODIURIL) 12.5 MG Tab Take 12.5 mg by mouth daily as needed.     leflunomide (ARAVA) 10 MG Tab Take 10 mg by mouth once daily.    losartan (COZAAR) 25 MG tablet Take 25 mg by mouth once daily.    memantine (NAMENDA) 5 MG Tab Take one nightly for 3 weeks, then one BID    metFORMIN (GLUCOPHAGE) 500 MG tablet Take 1 tablet (500 mg total) by mouth 2 (two) times daily with meals.    omeprazole (PRILOSEC) 40 MG capsule Take 1 capsule (40 mg total) by mouth once daily.    potassium chloride (K-TAB) 20 mEq Take 20 mEq by mouth once daily.    rosuvastatin (CRESTOR) 40 MG Tab Take 40 mg by mouth once daily.     sulfaSALAzine (AZULFIDINE) 500 MG EC tablet Take 500 mg  "by mouth 2 (two) times daily.    syringe with needle (SYRINGE 3CC/25GX1") 3 mL 25 gauge x 1" Syrg Once every months    ZETIA 10 mg tablet Take 10 mg by mouth once daily.     Family History       Problem Relation (Age of Onset)    Breast cancer Mother, Sister    Cancer Mother, Sister    Stroke Father, Maternal Grandmother          Tobacco Use    Smoking status: Former Smoker     Packs/day: 2.00     Years: 43.00     Pack years: 86.00     Types: Cigarettes     Quit date: 2000     Years since quittin.2    Smokeless tobacco: Never Used   Substance and Sexual Activity    Alcohol use: No    Drug use: No    Sexual activity: Not on file     Review of Systems   Unable to perform ROS: Dementia   Cardiovascular:  Positive for chest pain.   Objective:     Vital Signs (Most Recent):  Temp: 97.8 °F (36.6 °C) (22 0744)  Pulse: 79 (22 1000)  Resp: 20 (22 0811)  BP: (!) 109/57 (22 0811)  SpO2: (!) 87 % (22 0811) Vital Signs (24h Range):  Temp:  [97.1 °F (36.2 °C)-98.2 °F (36.8 °C)] 97.8 °F (36.6 °C)  Pulse:  [] 79  Resp:  [16-26] 20  SpO2:  [87 %-100 %] 87 %  BP: ()/() 109/57     Weight: 78.5 kg (173 lb 1 oz)  Body mass index is 37.45 kg/m².    Physical Exam  Vitals and nursing note reviewed.   Constitutional:       General: She is not in acute distress.     Appearance: She is well-developed. She is obese.   HENT:      Head: Normocephalic and atraumatic.      Right Ear: External ear normal.      Left Ear: External ear normal.      Nose: Nose normal.   Eyes:      General:         Right eye: No discharge.         Left eye: No discharge.      Extraocular Movements: Extraocular movements intact.      Conjunctiva/sclera: Conjunctivae normal.      Pupils: Pupils are equal, round, and reactive to light.   Neck:      Thyroid: No thyromegaly.   Cardiovascular:      Rate and Rhythm: Normal rate and regular rhythm.      Heart sounds: No murmur heard.    No friction rub. No gallop. "   Pulmonary:      Effort: Pulmonary effort is normal. No respiratory distress.      Breath sounds: Wheezing (b/l) present.   Abdominal:      General: Bowel sounds are normal. There is no distension.      Palpations: Abdomen is soft.      Tenderness: There is no abdominal tenderness.   Skin:     General: Skin is warm and dry.   Neurological:      Mental Status: She is alert.      Cranial Nerves: No cranial nerve deficit.      Comments: Oriented to person and place   Psychiatric:         Behavior: Behavior normal.         Thought Content: Thought content normal.         CRANIAL NERVES     CN III, IV, VI   Pupils are equal, round, and reactive to light.     Significant Labs: All pertinent labs within the past 24 hours have been reviewed.  A1C:   No results for input(s): HGBA1C in the last 4320 hours.    ABGs:   Recent Labs   Lab 04/07/22  1836   PH 7.430   PCO2 52*   HCO3 34.50   POCSATURATED 99.0   BE 8.80*   TOTALHB 10.6*   COHB 2.0   METHB 1.8*   PO2 240*       Bilirubin:   Recent Labs   Lab 04/07/22  1603 04/09/22  0435   BILITOT 0.5 0.3       Blood Culture: No results for input(s): LABBLOO in the last 48 hours.  CBC:   Recent Labs   Lab 04/07/22  1603 04/08/22  0548 04/09/22  0435   WBC 6.72 5.52 8.52   HGB 9.8* 9.7* 8.1*   HCT 31.6* 30.5* 25.6*    243 276       CMP:   Recent Labs   Lab 04/07/22  1603 04/09/22  0435   * 132*   K 4.7 5.0   CL 92* 89*   CO2 32* 29   * 200*   BUN 26* 83*   CREATININE 1.1 1.3   CALCIUM 9.6 9.1   PROT 7.5 6.7   ALBUMIN 3.2* 2.9*   BILITOT 0.5 0.3   ALKPHOS 97 82   AST 30 28   ALT 19 23   ANIONGAP 10 14   EGFRNONAA 47* 38*       Cardiac Markers:   Recent Labs   Lab 04/07/22  1603   BNP 76       Lactic Acid: No results for input(s): LACTATE in the last 48 hours.  Lipase: No results for input(s): LIPASE in the last 48 hours.  Lipid Panel: No results for input(s): CHOL, HDL, LDLCALC, TRIG, CHOLHDL in the last 48 hours.  Magnesium:   Recent Labs   Lab 04/09/22  0435   MG  3.1*     POCT Glucose: No results for input(s): POCTGLUCOSE in the last 48 hours.  Troponin:   Recent Labs   Lab 04/07/22  1603 04/09/22  0435   TROPONINI <0.006 0.013       TSH:   No results for input(s): TSH in the last 4320 hours.    Urine Culture:   Recent Labs   Lab 04/07/22  1759   LABURIN No growth     Urine Studies:   Recent Labs   Lab 04/07/22  1759   COLORU Yellow   APPEARANCEUA Clear   PHUR 6.0   SPECGRAV 1.020   PROTEINUA Negative   GLUCUA Negative   KETONESU Negative   BILIRUBINUA Negative   OCCULTUA Negative   NITRITE Negative   UROBILINOGEN Negative   LEUKOCYTESUR 2+*   RBCUA 0   WBCUA 30*   BACTERIA Moderate*   SQUAMEPITHEL 2         Significant Imaging: I have reviewed and interpreted all pertinent imaging results/findings within the past 24 hours.    4/7 CXR- The patient is rotated to the right.  Lung volumes remain low.  Numerous external artifacts are present.  Heart is enlarged and unchanged with a pacemaker in place.  Chronic interstitial opacities in the lung bases appears similar to the prior study.  There is a left apical calcified granuloma.  No pleural effusion.         Assessment/Plan:      * COPD exacerbation  Supplemental O2 via nasal canula; titrate O2 saturation to >92%. Records reviewed and she was on  Home O2 a few years ago ; family denies home Oxygen at present   Start Solumedrol 80mg daily   Continue beta 2 agonist bronchodilator treatments.   Continue IV antibiotics. Start IV rocephin 1gm for lower resp infection/COPD exac as would treat possible UTI; f/u urine Cx also and if negative can de-escalate and no consolidation on CXR    Continue routine medications as before.     4/9: improving  Cont nebs, reduce steroids to 40mg; stop rocephin  Wean O2 as tolerated            Chest pain  New onset overnight  Patient a poor historian and difficulty to qualify her pain  + belching  Cont PPI, added carafate; seems to respond to GI cocktail earlier this am  Trend CE  EKG reviewed, no  acute ST-T wave changes  Cont tele  Cont statin      Debility  PT/OT      Cystitis  Abn UA  Awaiting urine Cx in process   Not reliable historian to know if symptomatic so will treat with rocephin pending cx results    4/9: cx negative. Stop rocephin      HTN (hypertension)  Home meds;   Losartan 25mg daily , HCTz 12.5mg daily prn- hold hctz for now     4/9: BP variable. SBP 200s overnight but anxious. Then as low as 80s. Hold meds. 1L NS this AM and reassess--BP trending up again with fluids    Acute on chronic respiratory failure with hypoxemia  Patient with Hypoxic Respiratory failure which is Acute on chronic per chart but family doesn't think she was on O2 at home.  she is on home oxygen at 2-3 LPM per chart review; patient unsure and family doesn't think she had supplemental O2 at home. Supplemental oxygen was provided and noted-  .   Signs/symptoms of respiratory failure include- wheezing. Contributing diagnoses includes - COPD Labs and images were reviewed. Patient Has not had a recent ABG. Will treat underlying causes and adjust management of respiratory failure as follows-     Treat as COPD exacerbation with O2 and wean as tolerated, steroids, nebs, mucinex  Rocephin added to also cover for possible UTI and de-escalate pending cx as CXR without conslidation and low suspicion for bacterial PNA    4/9: wean steroids to 40mg daily  contn ebs--consider switch to xopenex if tachyarrythmia continues but only 1 occurrence  Stop rocephin, urine cx negative  Wean o2 as tolerated        Rheumatoid arthritis involving both hands with negative rheumatoid factor  Takes sulfasalazine 500mg daily ; not sure if for RA or hx of colitis       Aortic atherosclerosis  Statin , ASA       Memory loss  Follows with Dr. Reese for this  Dx probable early dementia per notes in 9/2021   Continue memantine       Type 2 diabetes mellitus with diabetic neuropathy, without long-term current use of insulin  Home meds include oral  metformin   Lab Results   Component Value Date    HGBA1C 5.5 10/11/2021   will not start insulin per protocol  Monitor for now         Hyperlipidemia  Continue statin- crestor not fomulary; atorvastatin here   zetia         VTE Risk Mitigation (From admission, onward)         Ordered     IP VTE HIGH RISK PATIENT  Once         04/07/22 2238     Place sequential compression device  Until discontinued         04/07/22 2238                Discharge Planning   MARIKA:      Code Status: Full Code   Is the patient medically ready for discharge?:     Reason for patient still in hospital (select all that apply): Treatment  Discharge Plan A: Home with family, Home Health                  Linda Lira MD  Department of Hospital Medicine   [place] [facility] Surg (3rd Fl)

## 2022-04-09 NOTE — ASSESSMENT & PLAN NOTE
Home meds;   Losartan 25mg daily , HCTz 12.5mg daily prn- hold hctz for now     4/9: BP variable. SBP 200s overnight but anxious. Then as low as 80s. Hold meds. 1L NS this AM and reassess--BP trending up again with fluids

## 2022-04-09 NOTE — ASSESSMENT & PLAN NOTE
New onset overnight  Patient a poor historian and difficulty to qualify her pain  + belching  Cont PPI, added carafate; seems to respond to GI cocktail earlier this am  Trend CE  EKG reviewed, no acute ST-T wave changes  Cont tele  Cont statin

## 2022-04-09 NOTE — PT/OT/SLP PROGRESS
"Physical Therapy Treatment    Patient Name:  Gretel Ashton   MRN:  [id number]    Recommendations:     Discharge Recommendations:  nursing facility, skilled   Discharge Equipment Recommendations: other (see comments) (Will continue to assess)   Barriers to discharge: Inaccessible home and Decreased caregiver support    Assessment:     Gretel Ashton is a 81 y.o. female admitted with a medical diagnosis of COPD exacerbation.  She presents with the following impairments/functional limitations:  weakness, impaired endurance, impaired self care skills, impaired functional mobilty, gait instability, impaired balance, decreased lower extremity function, decreased safety awareness, pain, impaired cardiopulmonary response to activity.  Pt displayed very little toleration of PT treatment due to fatigue and c/o sternal pain.    Rehab Prognosis: Poor; patient would benefit from acute skilled PT services to address these deficits and reach maximum level of function.    Recent Surgery: * No surgery found *      Plan:     During this hospitalization, patient to be seen daily to address the identified rehab impairments via gait training, therapeutic activities, therapeutic exercises and progress toward the following goals:    · Plan of Care Expires:  04/15/22    Subjective     Chief Complaint: fatigue  Patient/Family Comments/goals: "try to eat breakfast"  Pain/Comfort:  · Pain Rating 1: 3/10  · Location - Orientation 1: medial  · Location 1: sternal  · Pain Addressed 1: Reposition  · Pain Rating Post-Intervention 1: 3/10      Objective:     Communicated with pt prior to session.  Patient found supine with peripheral IV, oxygen upon PT entry to room.     General Precautions: Standard, fall   Orthopedic Precautions:N/A   Braces: N/A     Functional Mobility:  · Bed Mobility:     · Rolling Right: stand by assistance  · Scooting: stand by assistance  · Supine to Sit: contact guard assistance      AM-PAC 6 CLICK MOBILITY  Turning over in " bed (including adjusting bedclothes, sheets and blankets)?: 3  Sitting down on and standing up from a chair with arms (e.g., wheelchair, bedside commode, etc.): 3  Moving from lying on back to sitting on the side of the bed?: 3  Moving to and from a bed to a chair (including a wheelchair)?: 3  Need to walk in hospital room?: 3  Climbing 3-5 steps with a railing?: 1  Basic Mobility Total Score: 16       Therapeutic Activities and Exercises:   Supine to sit  Scooting in sitting x 4  Self-care tasks x 5 min    Patient left sitting edge of bed with all lines intact, call button in reach and nursing notified..    GOALS:   Multidisciplinary Problems     Physical Therapy Goals        Problem: Physical Therapy    Goal Priority Disciplines Outcome Goal Variances Interventions   Physical Therapy Goal     PT, PT/OT      Description:   Patient will increase functional independence with mobility by performin. Supine to sit with Modified Independent  2. Sit to supine with Modified Independent  3. Bed to chair transfer with Modified Independentwith or without rolling walker using Step Transfer TECHNIQUE  4. Gait  x ~150  feet with Supervision or Set-up Assistance with or without rolling walker.  5. Ascend/descend 4 steps with handrail/s with supervision.  6. Lower extremity exercise program x10 reps per handout, with assistance as needed                      Time Tracking:     PT Received On: 22  PT Start Time: 0900     PT Stop Time: 910  PT Total Time (min): 10 min     Billable Minutes: Therapeutic Activity 10    Treatment Type: Treatment  PT/PTA: PT           2022

## 2022-04-09 NOTE — NURSING
Patient with complaints of epigastric pain. VS stable with a B/P 83/54 with no dizziness or lightheadedness. Dr. Lira made aware and ordered a one time GI Cocktail and PRN Tums. Patient tolerated medicine. Will continue to monitor.

## 2022-04-10 PROBLEM — R65.10 SIRS (SYSTEMIC INFLAMMATORY RESPONSE SYNDROME): Status: ACTIVE | Noted: 2022-04-10

## 2022-04-10 LAB
ABO + RH BLD: NORMAL
ALBUMIN SERPL BCP-MCNC: 2.5 G/DL (ref 3.5–5.2)
ALLENS TEST: ABNORMAL
ALP SERPL-CCNC: 61 U/L (ref 55–135)
ALT SERPL W/O P-5'-P-CCNC: 20 U/L (ref 10–44)
ANION GAP SERPL CALC-SCNC: 7 MMOL/L (ref 8–16)
AST SERPL-CCNC: 26 U/L (ref 10–40)
BASOPHILS # BLD AUTO: 0 K/UL (ref 0–0.2)
BASOPHILS NFR BLD: 0 % (ref 0–1.9)
BILIRUB SERPL-MCNC: 0.2 MG/DL (ref 0.1–1)
BLD GP AB SCN CELLS X3 SERPL QL: NORMAL
BLD PROD TYP BPU: NORMAL
BLD PROD TYP BPU: NORMAL
BLOOD UNIT EXPIRATION DATE: NORMAL
BLOOD UNIT EXPIRATION DATE: NORMAL
BLOOD UNIT TYPE CODE: 5100
BLOOD UNIT TYPE CODE: 5100
BLOOD UNIT TYPE: NORMAL
BLOOD UNIT TYPE: NORMAL
BUN SERPL-MCNC: 74 MG/DL (ref 8–23)
CALCIUM SERPL-MCNC: 8.3 MG/DL (ref 8.7–10.5)
CHLORIDE SERPL-SCNC: 101 MMOL/L (ref 95–110)
CK MB SERPL-MCNC: 6.9 NG/ML (ref 0.1–6.5)
CK MB SERPL-MCNC: 7.2 NG/ML (ref 0.1–6.5)
CK MB SERPL-MCNC: 7.8 NG/ML (ref 0.1–6.5)
CK MB SERPL-MCNC: 7.8 NG/ML (ref 0.1–6.5)
CK MB SERPL-RTO: 5.1 % (ref 0–5)
CK MB SERPL-RTO: 5.2 % (ref 0–5)
CK MB SERPL-RTO: 5.7 % (ref 0–5)
CK MB SERPL-RTO: 6.2 % (ref 0–5)
CK SERPL-CCNC: 111 U/L (ref 20–180)
CK SERPL-CCNC: 137 U/L (ref 20–180)
CK SERPL-CCNC: 140 U/L (ref 20–180)
CK SERPL-CCNC: 149 U/L (ref 20–180)
CO2 SERPL-SCNC: 26 MMOL/L (ref 23–29)
CODING SYSTEM: NORMAL
CODING SYSTEM: NORMAL
CREAT SERPL-MCNC: 1.2 MG/DL (ref 0.5–1.4)
DELSYS: ABNORMAL
DIFFERENTIAL METHOD: ABNORMAL
DISPENSE STATUS: NORMAL
DISPENSE STATUS: NORMAL
EOSINOPHIL # BLD AUTO: 0 K/UL (ref 0–0.5)
EOSINOPHIL NFR BLD: 0.2 % (ref 0–8)
ERYTHROCYTE [DISTWIDTH] IN BLOOD BY AUTOMATED COUNT: 15.6 % (ref 11.5–14.5)
EST. GFR  (AFRICAN AMERICAN): 49 ML/MIN/1.73 M^2
EST. GFR  (NON AFRICAN AMERICAN): 42 ML/MIN/1.73 M^2
ESTIMATED AVG GLUCOSE: 126 MG/DL (ref 68–131)
FERRITIN SERPL-MCNC: 54 NG/ML (ref 20–300)
FIO2: 28 (ref 21–100)
GLUCOSE SERPL-MCNC: 105 MG/DL (ref 70–110)
HBA1C MFR BLD: 6 % (ref 4–5.6)
HCO3 UR-SCNC: 28.7 MMOL/L
HCT VFR BLD AUTO: 20.4 % (ref 37–48.5)
HGB BLD-MCNC: 6.2 G/DL (ref 12–16)
IMM GRANULOCYTES # BLD AUTO: 0.04 K/UL (ref 0–0.04)
IMM GRANULOCYTES NFR BLD AUTO: 0.6 % (ref 0–0.5)
IRON SERPL-MCNC: 17 UG/DL (ref 30–160)
LYMPHOCYTES # BLD AUTO: 1.3 K/UL (ref 1–4.8)
LYMPHOCYTES NFR BLD: 20 % (ref 18–48)
MCH RBC QN AUTO: 25.8 PG (ref 27–31)
MCHC RBC AUTO-ENTMCNC: 30.4 G/DL (ref 32–36)
MCV RBC AUTO: 85 FL (ref 82–98)
MONOCYTES # BLD AUTO: 0.8 K/UL (ref 0.3–1)
MONOCYTES NFR BLD: 12.9 % (ref 4–15)
NEUTROPHILS # BLD AUTO: 4.2 K/UL (ref 1.8–7.7)
NEUTROPHILS NFR BLD: 66.3 % (ref 38–73)
NRBC BLD-RTO: 0 /100 WBC
PCO2 BLDA: 52 MMHG (ref 35–45)
PH SMN: 7.35 [PH] (ref 7.35–7.45)
PLATELET # BLD AUTO: 211 K/UL (ref 150–450)
PMV BLD AUTO: 10.8 FL (ref 9.2–12.9)
PO2 BLDA: 73 MMHG (ref 75–100)
POC BE: 2.7 MMOL/L (ref -2–2)
POC COHB: 1.5 % (ref 0–3)
POC METHB: 0.7 % (ref 0–1.5)
POC O2HB ARTERIAL: 94.9 % (ref 94–100)
POC SATURATED O2: 97 % (ref 90–100)
POC TCO2: 30.3 MMOL/L
POC THB: 6.8 G/DL (ref 12–18)
POCT GLUCOSE: 106 MG/DL (ref 70–110)
POCT GLUCOSE: 109 MG/DL (ref 70–110)
POCT GLUCOSE: 137 MG/DL (ref 70–110)
POCT GLUCOSE: 211 MG/DL (ref 70–110)
POTASSIUM SERPL-SCNC: 5 MMOL/L (ref 3.5–5.1)
PROT SERPL-MCNC: 5.2 G/DL (ref 6–8.4)
RBC # BLD AUTO: 2.4 M/UL (ref 4–5.4)
RETICS/RBC NFR AUTO: 3.8 % (ref 0.5–2.5)
SATURATED IRON: 4 % (ref 20–50)
SITE: ABNORMAL
SODIUM SERPL-SCNC: 134 MMOL/L (ref 136–145)
TOTAL IRON BINDING CAPACITY: 395 UG/DL (ref 250–450)
TRANS ERYTHROCYTES VOL PATIENT: NORMAL ML
TRANS ERYTHROCYTES VOL PATIENT: NORMAL ML
TRANSFERRIN SERPL-MCNC: 267 MG/DL (ref 200–375)
TROPONIN I SERPL DL<=0.01 NG/ML-MCNC: 0.01 NG/ML (ref 0–0.03)
TROPONIN I SERPL DL<=0.01 NG/ML-MCNC: 0.02 NG/ML (ref 0–0.03)
VIT B12 SERPL-MCNC: 1065 PG/ML (ref 210–950)
WBC # BLD AUTO: 6.35 K/UL (ref 3.9–12.7)

## 2022-04-10 PROCEDURE — 36600 WITHDRAWAL OF ARTERIAL BLOOD: CPT

## 2022-04-10 PROCEDURE — 82607 VITAMIN B-12: CPT | Performed by: INTERNAL MEDICINE

## 2022-04-10 PROCEDURE — 63600175 PHARM REV CODE 636 W HCPCS: Performed by: INTERNAL MEDICINE

## 2022-04-10 PROCEDURE — 86850 RBC ANTIBODY SCREEN: CPT | Performed by: INTERNAL MEDICINE

## 2022-04-10 PROCEDURE — 82728 ASSAY OF FERRITIN: CPT | Performed by: INTERNAL MEDICINE

## 2022-04-10 PROCEDURE — 25000242 PHARM REV CODE 250 ALT 637 W/ HCPCS: Performed by: NURSE PRACTITIONER

## 2022-04-10 PROCEDURE — 99233 PR SUBSEQUENT HOSPITAL CARE,LEVL III: ICD-10-PCS | Mod: ,,, | Performed by: INTERNAL MEDICINE

## 2022-04-10 PROCEDURE — C9399 UNCLASSIFIED DRUGS OR BIOLOG: HCPCS | Performed by: INTERNAL MEDICINE

## 2022-04-10 PROCEDURE — 80053 COMPREHEN METABOLIC PANEL: CPT | Performed by: INTERNAL MEDICINE

## 2022-04-10 PROCEDURE — 36415 COLL VENOUS BLD VENIPUNCTURE: CPT | Performed by: INTERNAL MEDICINE

## 2022-04-10 PROCEDURE — 36430 TRANSFUSION BLD/BLD COMPNT: CPT

## 2022-04-10 PROCEDURE — 84466 ASSAY OF TRANSFERRIN: CPT | Performed by: INTERNAL MEDICINE

## 2022-04-10 PROCEDURE — 99233 SBSQ HOSP IP/OBS HIGH 50: CPT | Mod: ,,, | Performed by: INTERNAL MEDICINE

## 2022-04-10 PROCEDURE — 82553 CREATINE MB FRACTION: CPT | Mod: 91 | Performed by: INTERNAL MEDICINE

## 2022-04-10 PROCEDURE — 20000000 HC ICU ROOM

## 2022-04-10 PROCEDURE — 27000221 HC OXYGEN, UP TO 24 HOURS

## 2022-04-10 PROCEDURE — 83036 HEMOGLOBIN GLYCOSYLATED A1C: CPT | Performed by: INTERNAL MEDICINE

## 2022-04-10 PROCEDURE — 25000003 PHARM REV CODE 250: Performed by: NURSE PRACTITIONER

## 2022-04-10 PROCEDURE — 25000003 PHARM REV CODE 250: Performed by: FAMILY MEDICINE

## 2022-04-10 PROCEDURE — 94761 N-INVAS EAR/PLS OXIMETRY MLT: CPT

## 2022-04-10 PROCEDURE — P9021 RED BLOOD CELLS UNIT: HCPCS | Performed by: INTERNAL MEDICINE

## 2022-04-10 PROCEDURE — 82803 BLOOD GASES ANY COMBINATION: CPT | Performed by: INTERNAL MEDICINE

## 2022-04-10 PROCEDURE — 25000003 PHARM REV CODE 250: Performed by: INTERNAL MEDICINE

## 2022-04-10 PROCEDURE — 85025 COMPLETE CBC W/AUTO DIFF WBC: CPT | Performed by: INTERNAL MEDICINE

## 2022-04-10 PROCEDURE — 94640 AIRWAY INHALATION TREATMENT: CPT

## 2022-04-10 PROCEDURE — 85045 AUTOMATED RETICULOCYTE COUNT: CPT | Performed by: INTERNAL MEDICINE

## 2022-04-10 PROCEDURE — 84484 ASSAY OF TROPONIN QUANT: CPT | Performed by: INTERNAL MEDICINE

## 2022-04-10 PROCEDURE — 86920 COMPATIBILITY TEST SPIN: CPT | Performed by: INTERNAL MEDICINE

## 2022-04-10 PROCEDURE — 99900035 HC TECH TIME PER 15 MIN (STAT)

## 2022-04-10 RX ORDER — DIPHENHYDRAMINE HCL 25 MG
25 CAPSULE ORAL ONCE
Status: COMPLETED | OUTPATIENT
Start: 2022-04-10 | End: 2022-04-10

## 2022-04-10 RX ORDER — HALOPERIDOL 5 MG/ML
5 INJECTION INTRAMUSCULAR ONCE
Status: COMPLETED | OUTPATIENT
Start: 2022-04-10 | End: 2022-04-10

## 2022-04-10 RX ORDER — SODIUM CHLORIDE 9 MG/ML
INJECTION, SOLUTION INTRAVENOUS CONTINUOUS
Status: DISCONTINUED | OUTPATIENT
Start: 2022-04-10 | End: 2022-04-12

## 2022-04-10 RX ORDER — INSULIN ASPART 100 [IU]/ML
0-5 INJECTION, SOLUTION INTRAVENOUS; SUBCUTANEOUS
Status: DISCONTINUED | OUTPATIENT
Start: 2022-04-10 | End: 2022-04-15 | Stop reason: HOSPADM

## 2022-04-10 RX ORDER — IBUPROFEN 200 MG
16 TABLET ORAL
Status: DISCONTINUED | OUTPATIENT
Start: 2022-04-10 | End: 2022-04-15 | Stop reason: HOSPADM

## 2022-04-10 RX ORDER — IBUPROFEN 200 MG
24 TABLET ORAL
Status: DISCONTINUED | OUTPATIENT
Start: 2022-04-10 | End: 2022-04-15 | Stop reason: HOSPADM

## 2022-04-10 RX ORDER — LORAZEPAM 2 MG/ML
0.5 INJECTION INTRAMUSCULAR ONCE
Status: COMPLETED | OUTPATIENT
Start: 2022-04-10 | End: 2022-04-10

## 2022-04-10 RX ORDER — GLUCAGON 1 MG
1 KIT INJECTION
Status: DISCONTINUED | OUTPATIENT
Start: 2022-04-10 | End: 2022-04-15 | Stop reason: HOSPADM

## 2022-04-10 RX ORDER — HYDROCODONE BITARTRATE AND ACETAMINOPHEN 500; 5 MG/1; MG/1
TABLET ORAL
Status: DISCONTINUED | OUTPATIENT
Start: 2022-04-10 | End: 2022-04-15 | Stop reason: HOSPADM

## 2022-04-10 RX ORDER — PHENYLEPHRINE HYDROCHLORIDE 10 MG/ML
INJECTION INTRAVENOUS
Status: DISCONTINUED
Start: 2022-04-10 | End: 2022-04-10

## 2022-04-10 RX ORDER — MUPIROCIN 20 MG/G
OINTMENT TOPICAL 2 TIMES DAILY
Status: COMPLETED | OUTPATIENT
Start: 2022-04-10 | End: 2022-04-14

## 2022-04-10 RX ORDER — DEXMEDETOMIDINE HYDROCHLORIDE 4 UG/ML
0-1.4 INJECTION INTRAVENOUS CONTINUOUS
Status: DISCONTINUED | OUTPATIENT
Start: 2022-04-10 | End: 2022-04-13

## 2022-04-10 RX ADMIN — CEFTRIAXONE 2 G: 2 INJECTION, SOLUTION INTRAVENOUS at 08:04

## 2022-04-10 RX ADMIN — MEMANTINE HYDROCHLORIDE 5 MG: 5 TABLET ORAL at 09:04

## 2022-04-10 RX ADMIN — SUCRALFATE 1 G: 1 SUSPENSION ORAL at 12:04

## 2022-04-10 RX ADMIN — SUCRALFATE 1 G: 1 SUSPENSION ORAL at 11:04

## 2022-04-10 RX ADMIN — IPRATROPIUM BROMIDE AND ALBUTEROL SULFATE 3 ML: 2.5; .5 SOLUTION RESPIRATORY (INHALATION) at 07:04

## 2022-04-10 RX ADMIN — LORAZEPAM 0.5 MG: 2 INJECTION INTRAMUSCULAR; INTRAVENOUS at 04:04

## 2022-04-10 RX ADMIN — PHENYLEPHRINE HYDROCHLORIDE 1.5 MCG/KG/MIN: 10 INJECTION INTRAVENOUS at 02:04

## 2022-04-10 RX ADMIN — SUCRALFATE 1 G: 1 SUSPENSION ORAL at 01:04

## 2022-04-10 RX ADMIN — MUPIROCIN: 20 OINTMENT TOPICAL at 09:04

## 2022-04-10 RX ADMIN — SUCRALFATE 1 G: 1 SUSPENSION ORAL at 06:04

## 2022-04-10 RX ADMIN — IPRATROPIUM BROMIDE AND ALBUTEROL SULFATE 3 ML: 2.5; .5 SOLUTION RESPIRATORY (INHALATION) at 06:04

## 2022-04-10 RX ADMIN — SUCRALFATE 1 G: 1 SUSPENSION ORAL at 05:04

## 2022-04-10 RX ADMIN — INSULIN ASPART 2 UNITS: 100 INJECTION, SOLUTION INTRAVENOUS; SUBCUTANEOUS at 04:04

## 2022-04-10 RX ADMIN — FERROUS SULFATE TAB 325 MG (65 MG ELEMENTAL FE) 1 EACH: 325 (65 FE) TAB at 09:04

## 2022-04-10 RX ADMIN — SODIUM CHLORIDE: 0.9 INJECTION, SOLUTION INTRAVENOUS at 04:04

## 2022-04-10 RX ADMIN — PHENYLEPHRINE HYDROCHLORIDE 1 MCG/KG/MIN: 10 INJECTION INTRAVENOUS at 01:04

## 2022-04-10 RX ADMIN — GUAIFENESIN 600 MG: 600 TABLET, EXTENDED RELEASE ORAL at 09:04

## 2022-04-10 RX ADMIN — CYANOCOBALAMIN TAB 500 MCG 1000 MCG: 500 TAB at 09:04

## 2022-04-10 RX ADMIN — IPRATROPIUM BROMIDE AND ALBUTEROL SULFATE 3 ML: 2.5; .5 SOLUTION RESPIRATORY (INHALATION) at 01:04

## 2022-04-10 RX ADMIN — DEXMEDETOMIDINE HYDROCHLORIDE 0.2 MCG/KG/HR: 4 INJECTION INTRAVENOUS at 05:04

## 2022-04-10 RX ADMIN — HALOPERIDOL LACTATE 5 MG: 5 INJECTION, SOLUTION INTRAMUSCULAR at 05:04

## 2022-04-10 RX ADMIN — METHYLPREDNISOLONE SODIUM SUCCINATE 40 MG: 40 INJECTION, POWDER, FOR SOLUTION INTRAMUSCULAR; INTRAVENOUS at 09:04

## 2022-04-10 RX ADMIN — DIPHENHYDRAMINE HYDROCHLORIDE 25 MG: 25 CAPSULE ORAL at 06:04

## 2022-04-10 RX ADMIN — SODIUM CHLORIDE: 0.9 INJECTION, SOLUTION INTRAVENOUS at 03:04

## 2022-04-10 RX ADMIN — AZITHROMYCIN MONOHYDRATE 500 MG: 500 INJECTION, POWDER, LYOPHILIZED, FOR SOLUTION INTRAVENOUS at 09:04

## 2022-04-10 RX ADMIN — EZETIMIBE 10 MG: 10 TABLET ORAL at 09:04

## 2022-04-10 RX ADMIN — GUAIFENESIN 600 MG: 600 TABLET, EXTENDED RELEASE ORAL at 08:04

## 2022-04-10 RX ADMIN — PANTOPRAZOLE SODIUM 40 MG: 40 TABLET, DELAYED RELEASE ORAL at 09:04

## 2022-04-10 RX ADMIN — SULFASALAZINE 500 MG: 500 TABLET ORAL at 09:04

## 2022-04-10 RX ADMIN — MUPIROCIN: 20 OINTMENT TOPICAL at 08:04

## 2022-04-10 RX ADMIN — ATORVASTATIN CALCIUM 20 MG: 20 TABLET, FILM COATED ORAL at 08:04

## 2022-04-10 NOTE — PT/OT/SLP PROGRESS
Pt will require new order set prior to continuation of PT treatment due pt requiring increased level of care now in ICU.    Jaspreet Castellanos, PT, DPT

## 2022-04-10 NOTE — ASSESSMENT & PLAN NOTE
Patient with Hypoxic Respiratory failure which is Acute on chronic per chart but family doesn't think she was on O2 at home.  she is on home oxygen at 2-3 LPM per chart review; patient unsure and family doesn't think she had supplemental O2 at home. Supplemental oxygen was provided and noted-  .   Signs/symptoms of respiratory failure include- wheezing. Contributing diagnoses includes - COPD Labs and images were reviewed. Patient Has not had a recent ABG. Will treat underlying causes and adjust management of respiratory failure as follows-     Treat as COPD exacerbation with O2 and wean as tolerated, steroids, nebs, mucinex  Rocephin added to also cover for possible UTI and de-escalate pending cx as CXR without conslidation and low suspicion for bacterial PNA    4/10: cont steroids 40mg daily  Cont nebs  Rocephin 4/8; stopped 4/9 as urine cx negative and resumed 4/10 after triggering sepsis alert due to hypotension, urine cx negative. Blood cx pending. Repeat CXR pending  Wean o2 as tolerated. PCO2 stable since admit; not sure she would tolerate BiPaP and mild elevation at best

## 2022-04-10 NOTE — ASSESSMENT & PLAN NOTE
"SBP >90 and RR > 30  No leukcotyosis; no "real fevers"-temp ~99F  No clear source  Urine cx negative  Blood cx NGTD  Sputum cx pending  Repeating CXR  Resumed rocephin and added azithro as pulmonary source seems most likely and de-escalate as cx return  Received 3L NS throughout the day yesterday with improvement in BP from 70-90s but moved to ICU in case of need for pressor support. Has not yet required this  LA=2    "

## 2022-04-10 NOTE — PLAN OF CARE
Patient anxious at times and on Precedex that was infusing at .4 mcg/kg/ hr-currently infusing at .2 mcg/kg/hr. Jatinder 0.5 mcg/kg/min blood pressure currently at 118/67 (MAP 77). Normal infusing at 100 ml/hr. Patient voiding 1500 clear yellow urine per abad catheter. Patient has a productive cough with thick tan colored secretions. Also received 2 units of packed red blood cells each over 4 hours maintaining oxygen saturation at % on 2 liters. Tolerating oral fluids but has been refusing meals.Blood sugar monitoring continues as ordered and coverage provided per sliding scale.Telemetry SR with PACs. Patient remains free from falls/injury.

## 2022-04-10 NOTE — ASSESSMENT & PLAN NOTE
New onset overnight  Patient a poor historian and difficulty to qualify her pain  + belching  Cont PPI, added carafate; seems to respond to GI cocktail earlier this am  Trend CE  EKG reviewed, no acute ST-T wave changes  Cont tele  Cont statin    4/10: resolved today with treatment for reflux  CE normal  Cont statin and tele  Repeat CXR today

## 2022-04-10 NOTE — EICU
82 yo female w/ PMHx Dementia, COPD here for the past 3-4 days being treated for COPD infective exacerbation w/ IV steroids and abx.    Pt was moved upto the ICU this night from the medicine floors for hypotension.   S/p 1L NS bolus.   LA done as part of the low BP workup is negative.   Trop negative w/ slightly elevated CKMB.     ECHO in am to further evaluate this hypotension.     Now BP is 90s/70s w/ MAP 70s.   Agitated and confused.     Suspected delirium.   Requested for Ativan 0.5mg and Haldol 5mg IV x 1 now.   Qtc 430 as per ECG done yesterday.   Abad to be placed for OOB is what seems to worsen her confusion. Not able to use the diaper.   Followed by precedex gtt if needed.     Adding NS @ 100ml/hr hydration.   Based on the last set of labs, she appears dry.   Sating 99% on 2L NC which she keeps removing.     Currently the room camera is dysfunctional, so I am unable to see the pt. D/w the bedside RN re care plan.     Requested an ABG also once she is a little stable.   BIPAP if needed for work of breathing.   Jatinder gtt if she becomes hypotensive via PIV. Would need an infection workup then including escalating abx and cx.     Low threshold for intubation.   Aspiration precautions.     Follow up note:  ABG now is 7.35/52/73 on 2L NC  Calm after the medications and abad placement.   BP is better.   F/up UOP and watch for s/s fluid overload.

## 2022-04-10 NOTE — ASSESSMENT & PLAN NOTE
Abn UA  Awaiting urine Cx in process   Not reliable historian to know if symptomatic so will treat with rocephin pending cx results    4/9: cx negative. Stopped rocephin but resume for possible pulmonary source/SIRS

## 2022-04-10 NOTE — NURSING
0241: Report received from Poli KRUSE.  0256: Patient arrived to unit via wheelchair per RN and PCT. Introduced self to patient, assisted to CCU 03. Oriented x 2, denies any pain. 96% saturation on 2L NC, v/s stable.   0439: 0.5 mg IV Ativan given for agitation at this time.  0523: 5 mg Haloperidol given for agitation, See MAR for medication detail. 16f Mcclellan placed, patient tolerated well.   0554: Prece dex started for agitation.  0655: Shift report endorsed to oncoming RN. Care of patient relinquished.

## 2022-04-10 NOTE — PLAN OF CARE
Problem: Adult Inpatient Plan of Care  Goal: Plan of Care Review  Outcome: Ongoing, Progressing  Goal: Patient-Specific Goal (Individualized)  Outcome: Ongoing, Progressing  Goal: Absence of Hospital-Acquired Illness or Injury  Outcome: Ongoing, Progressing  Goal: Optimal Comfort and Wellbeing  Outcome: Ongoing, Progressing  Goal: Readiness for Transition of Care  Outcome: Ongoing, Progressing     Problem: Diabetes Comorbidity  Goal: Blood Glucose Level Within Targeted Range  Outcome: Ongoing, Progressing     Problem: Adjustment to Illness COPD (Chronic Obstructive Pulmonary Disease)  Goal: Optimal Chronic Illness Coping  Outcome: Ongoing, Progressing     Problem: Functional Ability Impaired COPD (Chronic Obstructive Pulmonary Disease)  Goal: Optimal Level of Functional [place]  Outcome: Ongoing, Progressing     Problem: Infection COPD (Chronic Obstructive Pulmonary Disease)  Goal: Absence of Infection Signs and Symptoms  Outcome: Ongoing, Progressing     Problem: Oral Intake Inadequate COPD (Chronic Obstructive Pulmonary Disease)  Goal: Improved Nutrition Intake  Outcome: Ongoing, Progressing     Problem: Respiratory Compromise COPD (Chronic Obstructive Pulmonary Disease)  Goal: Effective Oxygenation and Ventilation  Outcome: Ongoing, Progressing     Problem: UTI (Urinary Tract Infection)  Goal: Improved Infection Symptoms  Outcome: Ongoing, Progressing     Problem: Skin Injury Risk Increased  Goal: Skin Health and Integrity  Outcome: Ongoing, Progressing     Problem: Infection  Goal: Absence of Infection Signs and Symptoms  Outcome: Ongoing, Progressing

## 2022-04-10 NOTE — NURSING
Patient being transferred to CCU for closer observation r/t low b/p. Report given to AIXA Carlton.

## 2022-04-10 NOTE — NURSING
Patient complaining of itching to hands. Hands slightly swollen and red-no rash noted.Dr. Lira notified and new orders noted.

## 2022-04-10 NOTE — ASSESSMENT & PLAN NOTE
Supplemental O2 via nasal canula; titrate O2 saturation to >92%. Records reviewed and she was on  Home O2 a few years ago ; family denies home Oxygen at present   Start Solumedrol 80mg daily   Continue beta 2 agonist bronchodilator treatments.   Continue IV antibiotics. Start IV rocephin 1gm for lower resp infection/COPD exac as would treat possible UTI; f/u urine Cx also and if negative can de-escalate and no consolidation on CXR    Continue routine medications as before.     4/10: improving  Cont nebs, cont steroids  Resumed rocephin and started azitro as triggering sepsis protocol and pulmonary seems most likely source given her presentation  Repeat CXR today  Wean O2 as tolerated

## 2022-04-10 NOTE — PROGRESS NOTES
[facility] Medicine  Progress Note    Patient Name: Gretel Ashton  MRN: [id number]  Patient Class: IP- Inpatient   Admission Date: 4/7/2022  Length of Stay: 2 days  Attending Physician: Althea Krause MD  Primary Care Provider: Jailene Astudillo MD        Subjective:     Principal Problem:COPD exacerbation        HPI:  Gretel Ashton is a 81 y.o. 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 Reese and chronic respiratory failure on 2-3 L at home secondary to COPD, follows with Dr. CECILY Guerra.   Pt presents to ER yesterday PM with c/o shortness of breath that began 2:00 a.m. yesterday  morning.  She also reports feeling nauseated and having a cough with productive brownish yellow sputum. Brother reports she has been having frequent episodes of shortness of breath but not quite this severe. Denies any home o2 use???    denies any fever. denies any chest pain or palpitations. patient does appear very anxious.  Denies any recent ill contacts    Colitis- ? Sulfasalazine   URINE ABN- Cx in process   Will start IV Rocephin,       Overview/Hospital Course:  Patient had a rough night complaining of intermittent chest pain. + belching. + nausea. Increasing intensity overnight. Initial SBP 90s then 200s, then 90s. Likely anxiety driven. Initial CE negative. EKG negative. 11 beat VT on tele overnight but none since. Replacing electrolytes PRN. She remains on 2L NC. She is unable to tell me if her pain feels like reflux but reports in the left lower chest.     4/10:  Patient developed worsening hypotension throughout the day yesterday. Given up to 3L NS bolus and then was on 100cc/hr NS. SBP improved from 70s to 90s but still low. RR > 30. No fevers  (temp around 99) and no leukocytosis. Urine cx negative. Initial CXR without consolidation. However, triggering sepsis alert. She was moved to the ICU last night for closer monitoring in case she would require pressor support but she has not. Lactic acid 2. She continues to oxygenate well on 2L NC. pCO2 is 52--unchanged from admission. She remains intermittently confused since admission; though likely her baseline. No further reports of chest pain since treatment for reflux yesterday. CE not suggestive of ischemia. Resumed Rocephin and azithro as pulmonary source seems most likely given her initial presentation; repeat CXR this AM.       Past Medical History:   Diagnosis Date    Arthritis     COPD (chronic obstructive pulmonary disease)     Depression     Diabetes mellitus type II     Hyperlipidemia     Hypertension     Pacemaker        Past Surgical History:   Procedure Laterality Date    CARDIAC PACEMAKER PLACEMENT       SECTION      CYST REMOVAL Left 2019    Procedure: EXCISION, SEBACEOUS CYST;  Surgeon: Jaylen Gonzalez Jr., MD;  Location: [facility];  Service: General;  Laterality: Left;    INNER EAR SURGERY         Review of patient's allergies indicates:  No Known Allergies    No current facility-administered medications on file prior to encounter.     Current Outpatient Medications on File Prior to Encounter   Medication Sig    albuterol (PROVENTIL) 2.5 mg /3 mL (0.083 %) nebulizer solution USE 1 VAIL IN NEBULIZER EVERY 4-6 HOURS AS DIRECTED. DX CODE: J43.9    albuterol (PROVENTIL/VENTOLIN HFA) 90 mcg/actuation inhaler Inhale 1-2 puffs into the lungs every 6 (six) hours as needed for Wheezing. Rescue    aspirin (ECOTRIN) 81 MG EC tablet Take 81 mg by mouth once daily.    coenzyme Q10 (CO Q-10) 100 mg capsule Take 100 mg by mouth once daily.    cyanocobalamin, vitamin B-12, 500 mcg Subl One sub lingual daily    ferrous sulfate (FEOSOL) 325 mg (65 mg iron) Tab tablet Take 1 tablet  "by mouth once daily.    fluticasone-umeclidin-vilanter (TRELEGY ELLIPTA) 100-62.5-25 mcg DsDv Inhale 1 puff into the lungs once daily.    hydroCHLOROthiazide (HYDRODIURIL) 12.5 MG Tab Take 12.5 mg by mouth daily as needed.     leflunomide (ARAVA) 10 MG Tab Take 10 mg by mouth once daily.    losartan (COZAAR) 25 MG tablet Take 25 mg by mouth once daily.    memantine (NAMENDA) 5 MG Tab Take one nightly for 3 weeks, then one BID    metFORMIN (GLUCOPHAGE) 500 MG tablet Take 1 tablet (500 mg total) by mouth 2 (two) times daily with meals.    omeprazole (PRILOSEC) 40 MG capsule Take 1 capsule (40 mg total) by mouth once daily.    potassium chloride (K-TAB) 20 mEq Take 20 mEq by mouth once daily.    rosuvastatin (CRESTOR) 40 MG Tab Take 40 mg by mouth once daily.     sulfaSALAzine (AZULFIDINE) 500 MG EC tablet Take 500 mg by mouth 2 (two) times daily.    syringe with needle (SYRINGE 3CC/25GX1") 3 mL 25 gauge x 1" Syrg Once every months    ZETIA 10 mg tablet Take 10 mg by mouth once daily.     Family History       Problem Relation (Age of Onset)    Breast cancer Mother, Sister    Cancer Mother, Sister    Stroke Father, Maternal Grandmother          Tobacco Use    Smoking status: Former Smoker     Packs/day: 2.00     Years: 43.00     Pack years: 86.00     Types: Cigarettes     Quit date: 2000     Years since quittin.2    Smokeless tobacco: Never Used   Substance and Sexual Activity    Alcohol use: No    Drug use: No    Sexual activity: Not on file     Review of Systems   Unable to perform ROS: Dementia   Objective:     Vital Signs (Most Recent):  Temp: 96.1 °F (35.6 °C) (04/10/22 0600)  Pulse: 99 (04/10/22 0615)  Resp: 19 (04/10/22 0615)  BP: (!) 91/55 (04/10/22 0615)  SpO2: 95 % (04/10/22 0615) Vital Signs (24h Range):  Temp:  [96.1 °F (35.6 °C)-99 °F (37.2 °C)] 96.1 °F (35.6 °C)  Pulse:  [] 99  Resp:  [12-32] 19  SpO2:  [87 %-100 %] 95 %  BP: ()/(35-87) 91/55     Weight: 78.5 kg (173 " lb 1 oz)  Body mass index is 37.45 kg/m².    Physical Exam  Vitals and nursing note reviewed.   Constitutional:       General: She is not in acute distress.     Appearance: She is well-developed. She is obese.   HENT:      Head: Normocephalic and atraumatic.      Right Ear: External ear normal.      Left Ear: External ear normal.      Nose: Nose normal.   Eyes:      General:         Right eye: No discharge.         Left eye: No discharge.      Extraocular Movements: Extraocular movements intact.      Conjunctiva/sclera: Conjunctivae normal.      Pupils: Pupils are equal, round, and reactive to light.   Neck:      Thyroid: No thyromegaly.   Cardiovascular:      Rate and Rhythm: Normal rate and regular rhythm.      Heart sounds: No murmur heard.    No friction rub. No gallop.   Pulmonary:      Effort: Pulmonary effort is normal. No respiratory distress.      Breath sounds: Wheezing (b/l) present.   Abdominal:      General: Bowel sounds are normal. There is no distension.      Palpations: Abdomen is soft.      Tenderness: There is no abdominal tenderness.   Skin:     General: Skin is warm and dry.   Neurological:      Mental Status: She is alert.      Cranial Nerves: No cranial nerve deficit.      Comments: Oriented to person and place   Psychiatric:         Behavior: Behavior normal.         Thought Content: Thought content normal.         CRANIAL NERVES     CN III, IV, VI   Pupils are equal, round, and reactive to light.     Significant Labs: All pertinent labs within the past 24 hours have been reviewed.  A1C:   No results for input(s): HGBA1C in the last 4320 hours.    ABGs:   Recent Labs   Lab 04/10/22  0433   PH 7.350   PCO2 52*   HCO3 28.70   POCSATURATED 97.0   BE 2.70*   TOTALHB 6.8*   COHB 1.5   METHB 0.7   PO2 73*       Bilirubin:   Recent Labs   Lab 04/07/22  1603 04/09/22  0435   BILITOT 0.5 0.3       Blood Culture:   Recent Labs   Lab 04/09/22 2005   LABBLOO No Growth to date  No Growth to date     CBC:    Recent Labs   Lab 04/09/22 0435   WBC 8.52   HGB 8.1*   HCT 25.6*          CMP:   Recent Labs   Lab 04/09/22 0435   *   K 5.0   CL 89*   CO2 29   *   BUN 83*   CREATININE 1.3   CALCIUM 9.1   PROT 6.7   ALBUMIN 2.9*   BILITOT 0.3   ALKPHOS 82   AST 28   ALT 23   ANIONGAP 14   EGFRNONAA 38*       Cardiac Markers:   No results for input(s): CKMB, MYOGLOBIN, BNP, TROPISTAT in the last 48 hours.    Lactic Acid:   Recent Labs   Lab 04/09/22 2005   LACTATE 2.0     Lipase: No results for input(s): LIPASE in the last 48 hours.  Lipid Panel: No results for input(s): CHOL, HDL, LDLCALC, TRIG, CHOLHDL in the last 48 hours.  Magnesium:   Recent Labs   Lab 04/09/22 0435   MG 3.1*       POCT Glucose: No results for input(s): POCTGLUCOSE in the last 48 hours.  Troponin:   Recent Labs   Lab 04/09/22  1635 04/09/22  2005 04/10/22  0251   TROPONINI 0.016 0.010 0.013       TSH:   No results for input(s): TSH in the last 4320 hours.    Urine Culture: No results for input(s): LABURIN in the last 48 hours.    Urine Studies:   No results for input(s): COLORU, APPEARANCEUA, PHUR, SPECGRAV, PROTEINUA, GLUCUA, KETONESU, BILIRUBINUA, OCCULTUA, NITRITE, UROBILINOGEN, LEUKOCYTESUR, RBCUA, WBCUA, BACTERIA, SQUAMEPITHEL, HYALINECASTS in the last 48 hours.    Invalid input(s): WRIGHTSUR      Significant Imaging: I have reviewed and interpreted all pertinent imaging results/findings within the past 24 hours.    4/7 CXR- The patient is rotated to the right.  Lung volumes remain low.  Numerous external artifacts are present.  Heart is enlarged and unchanged with a pacemaker in place.  Chronic interstitial opacities in the lung bases appears similar to the prior study.  There is a left apical calcified granuloma.  No pleural effusion.         Assessment/Plan:      * COPD exacerbation  Supplemental O2 via nasal canula; titrate O2 saturation to >92%. Records reviewed and she was on  Home O2 a few years ago ; family denies home  "Oxygen at present   Start Solumedrol 80mg daily   Continue beta 2 agonist bronchodilator treatments.   Continue IV antibiotics. Start IV rocephin 1gm for lower resp infection/COPD exac as would treat possible UTI; f/u urine Cx also and if negative can de-escalate and no consolidation on CXR    Continue routine medications as before.     4/10: improving  Cont nebs, cont steroids  Resumed rocephin and started azitro as triggering sepsis protocol and pulmonary seems most likely source given her presentation  Repeat CXR today  Wean O2 as tolerated            SIRS (systemic inflammatory response syndrome)  SBP >90 and RR > 30  No leukcotyosis; no "real fevers"-temp ~99F  No clear source  Urine cx negative  Blood cx NGTD  Sputum cx pending  Repeating CXR  Resumed rocephin and added azithro as pulmonary source seems most likely and de-escalate as cx return  Received 3L NS throughout the day yesterday with improvement in BP from 70-90s but moved to ICU in case of need for pressor support. Has not yet required this  LA=2      Chest pain  New onset overnight  Patient a poor historian and difficulty to qualify her pain  + belching  Cont PPI, added carafate; seems to respond to GI cocktail earlier this am  Trend CE  EKG reviewed, no acute ST-T wave changes  Cont tele  Cont statin    4/10: resolved today with treatment for reflux  CE normal  Cont statin and tele  Repeat CXR today      Debility  PT/OT      Cystitis  Abn UA  Awaiting urine Cx in process   Not reliable historian to know if symptomatic so will treat with rocephin pending cx results    4/9: cx negative. Stopped rocephin but resume for possible pulmonary source/SIRS      HTN (hypertension)  Home meds;   Losartan 25mg daily , HCTz 12.5mg daily prn- hold hctz for now     4/9: BP variable. SBP 200s overnight but anxious. Then as low as 80s. Hold meds. 1L NS this AM and reassess--BP trending up again with fluids    4/10: patient remained hypotensive throughout the day " yesterday. Given 3L NS with improvement from 70s to 90s.  Moved to ICU in case of need for pressor support but was never needed. Currently stable  SIRS criteria met but unclear source if any. cx pending and repeating CXR this AM.  Cont hold BP meds    Acute on chronic respiratory failure with hypoxemia  Patient with Hypoxic Respiratory failure which is Acute on chronic per chart but family doesn't think she was on O2 at home.  she is on home oxygen at 2-3 LPM per chart review; patient unsure and family doesn't think she had supplemental O2 at home. Supplemental oxygen was provided and noted-  .   Signs/symptoms of respiratory failure include- wheezing. Contributing diagnoses includes - COPD Labs and images were reviewed. Patient Has not had a recent ABG. Will treat underlying causes and adjust management of respiratory failure as follows-     Treat as COPD exacerbation with O2 and wean as tolerated, steroids, nebs, mucinex  Rocephin added to also cover for possible UTI and de-escalate pending cx as CXR without conslidation and low suspicion for bacterial PNA    4/10: cont steroids 40mg daily  Cont nebs  Rocephin 4/8; stopped 4/9 as urine cx negative and resumed 4/10 after triggering sepsis alert due to hypotension, urine cx negative. Blood cx pending. Repeat CXR pending  Wean o2 as tolerated. PCO2 stable since admit; not sure she would tolerate BiPaP and mild elevation at best        Rheumatoid arthritis involving both hands with negative rheumatoid factor  Takes sulfasalazine 500mg daily ; not sure if for RA or hx of colitis       Aortic atherosclerosis  Statin , ASA       Memory loss  Follows with Dr. Reese for this  Dx probable early dementia per notes in 9/2021   Continue memantine       Type 2 diabetes mellitus with diabetic neuropathy, without long-term current use of insulin  Home meds include oral metformin   Lab Results   Component Value Date    HGBA1C 5.5 10/11/2021   will not start insulin per  protocol  Monitor for now         Hyperlipidemia  Continue statin- crestor not fomulary; atorvastatin here   zetia         VTE Risk Mitigation (From admission, onward)         Ordered     IP VTE HIGH RISK PATIENT  Once         04/07/22 2238     Place sequential compression device  Until discontinued         04/07/22 2238                Discharge Planning   MARIKA:      Code Status: Full Code   Is the patient medically ready for discharge?:     Reason for patient still in hospital (select all that apply): Patient unstable and Treatment  Discharge Plan A: Home with family, Home Health          Addendum:  CBC reviewed after rounds. Hg 6.2. continuous drop this admit. Unclear source of bleeding however. No melena, BRBPR. Occult stool ordered. She was c/o chest pain yesterday but resolved with GI Treatment; lower suspicion for dissection, etc as she did stabilize with IVF.  Stop ASA  Transfuse 2 units today  Iron studies ordered  Consider transfer if occult positive for EGD, C-scope  Critical care time spent on the evaluation and treatment of severe organ dysfunction, review of pertinent labs and imaging studies, discussions with consulting providers and discussions with patient/family: 53 minutes.      Linda Lira MD  Department of Hospital Medicine   [facility] - Int[facility]

## 2022-04-10 NOTE — SUBJECTIVE & OBJECTIVE
Past Medical History:   Diagnosis Date    Arthritis     COPD (chronic obstructive pulmonary disease)     Depression     Diabetes mellitus type II     Hyperlipidemia     Hypertension     Pacemaker        Past Surgical History:   Procedure Laterality Date    CARDIAC PACEMAKER PLACEMENT       SECTION      CYST REMOVAL Left 2019    Procedure: EXCISION, SEBACEOUS CYST;  Surgeon: Jaylen Gonzalez Jr., MD;  Location: [facility];  Service: General;  Laterality: Left;    INNER EAR SURGERY         Review of patient's allergies indicates:  No Known Allergies    No current facility-administered medications on file prior to encounter.     Current Outpatient Medications on File Prior to Encounter   Medication Sig    albuterol (PROVENTIL) 2.5 mg /3 mL (0.083 %) nebulizer solution USE 1 VAIL IN NEBULIZER EVERY 4-6 HOURS AS DIRECTED. DX CODE: J43.9    albuterol (PROVENTIL/VENTOLIN HFA) 90 mcg/actuation inhaler Inhale 1-2 puffs into the lungs every 6 (six) hours as needed for Wheezing. Rescue    aspirin (ECOTRIN) 81 MG EC tablet Take 81 mg by mouth once daily.    coenzyme Q10 (CO Q-10) 100 mg capsule Take 100 mg by mouth once daily.    cyanocobalamin, vitamin B-12, 500 mcg Subl One sub lingual daily    ferrous sulfate (FEOSOL) 325 mg (65 mg iron) Tab tablet Take 1 tablet by mouth once daily.    fluticasone-umeclidin-vilanter (TRELEGY ELLIPTA) 100-62.5-25 mcg DsDv Inhale 1 puff into the lungs once daily.    hydroCHLOROthiazide (HYDRODIURIL) 12.5 MG Tab Take 12.5 mg by mouth daily as needed.     leflunomide (ARAVA) 10 MG Tab Take 10 mg by mouth once daily.    losartan (COZAAR) 25 MG tablet Take 25 mg by mouth once daily.    memantine (NAMENDA) 5 MG Tab Take one nightly for 3 weeks, then one BID    metFORMIN (GLUCOPHAGE) 500 MG tablet Take 1 tablet (500 mg total) by mouth 2 (two) times daily with meals.    omeprazole (PRILOSEC) 40 MG capsule Take 1 capsule (40 mg total) by mouth once daily.    potassium chloride (K-TAB) 20  "mEq Take 20 mEq by mouth once daily.    rosuvastatin (CRESTOR) 40 MG Tab Take 40 mg by mouth once daily.     sulfaSALAzine (AZULFIDINE) 500 MG EC tablet Take 500 mg by mouth 2 (two) times daily.    syringe with needle (SYRINGE 3CC/25GX1") 3 mL 25 gauge x 1" Syrg Once every months    ZETIA 10 mg tablet Take 10 mg by mouth once daily.     Family History       Problem Relation (Age of Onset)    Breast cancer Mother, Sister    Cancer Mother, Sister    Stroke Father, Maternal Grandmother          Tobacco Use    Smoking status: Former Smoker     Packs/day: 2.00     Years: 43.00     Pack years: 86.00     Types: Cigarettes     Quit date: 2000     Years since quittin.2    Smokeless tobacco: Never Used   Substance and Sexual Activity    Alcohol use: No    Drug use: No    Sexual activity: Not on file     Review of Systems   Unable to perform ROS: Dementia   Objective:     Vital Signs (Most Recent):  Temp: 96.1 °F (35.6 °C) (04/10/22 0600)  Pulse: 99 (04/10/22 0615)  Resp: 19 (04/10/22 0615)  BP: (!) 91/55 (04/10/22 0615)  SpO2: 95 % (04/10/22 0615) Vital Signs (24h Range):  Temp:  [96.1 °F (35.6 °C)-99 °F (37.2 °C)] 96.1 °F (35.6 °C)  Pulse:  [] 99  Resp:  [12-32] 19  SpO2:  [87 %-100 %] 95 %  BP: ()/(35-87) 91/55     Weight: 78.5 kg (173 lb 1 oz)  Body mass index is 37.45 kg/m².    Physical Exam  Vitals and nursing note reviewed.   Constitutional:       General: She is not in acute distress.     Appearance: She is well-developed. She is obese.   HENT:      Head: Normocephalic and atraumatic.      Right Ear: External ear normal.      Left Ear: External ear normal.      Nose: Nose normal.   Eyes:      General:         Right eye: No discharge.         Left eye: No discharge.      Extraocular Movements: Extraocular movements intact.      Conjunctiva/sclera: Conjunctivae normal.      Pupils: Pupils are equal, round, and reactive to light.   Neck:      Thyroid: No thyromegaly.   Cardiovascular:      Rate and " Rhythm: Normal rate and regular rhythm.      Heart sounds: No murmur heard.    No friction rub. No gallop.   Pulmonary:      Effort: Pulmonary effort is normal. No respiratory distress.      Breath sounds: Wheezing (b/l) present.   Abdominal:      General: Bowel sounds are normal. There is no distension.      Palpations: Abdomen is soft.      Tenderness: There is no abdominal tenderness.   Skin:     General: Skin is warm and dry.   Neurological:      Mental Status: She is alert.      Cranial Nerves: No cranial nerve deficit.      Comments: Oriented to person and place   Psychiatric:         Behavior: Behavior normal.         Thought Content: Thought content normal.         CRANIAL NERVES     CN III, IV, VI   Pupils are equal, round, and reactive to light.     Significant Labs: All pertinent labs within the past 24 hours have been reviewed.  A1C:   No results for input(s): HGBA1C in the last 4320 hours.    ABGs:   Recent Labs   Lab 04/10/22  0433   PH 7.350   PCO2 52*   HCO3 28.70   POCSATURATED 97.0   BE 2.70*   TOTALHB 6.8*   COHB 1.5   METHB 0.7   PO2 73*       Bilirubin:   Recent Labs   Lab 04/07/22  1603 04/09/22  0435   BILITOT 0.5 0.3       Blood Culture:   Recent Labs   Lab 04/09/22 2005   LABBLOO No Growth to date  No Growth to date     CBC:   Recent Labs   Lab 04/09/22  0435   WBC 8.52   HGB 8.1*   HCT 25.6*          CMP:   Recent Labs   Lab 04/09/22  0435   *   K 5.0   CL 89*   CO2 29   *   BUN 83*   CREATININE 1.3   CALCIUM 9.1   PROT 6.7   ALBUMIN 2.9*   BILITOT 0.3   ALKPHOS 82   AST 28   ALT 23   ANIONGAP 14   EGFRNONAA 38*       Cardiac Markers:   No results for input(s): CKMB, MYOGLOBIN, BNP, TROPISTAT in the last 48 hours.    Lactic Acid:   Recent Labs   Lab 04/09/22 2005   LACTATE 2.0     Lipase: No results for input(s): LIPASE in the last 48 hours.  Lipid Panel: No results for input(s): CHOL, HDL, LDLCALC, TRIG, CHOLHDL in the last 48 hours.  Magnesium:   Recent Labs   Lab  04/09/22  0435   MG 3.1*       POCT Glucose: No results for input(s): POCTGLUCOSE in the last 48 hours.  Troponin:   Recent Labs   Lab 04/09/22  1635 04/09/22  2005 04/10/22  0251   TROPONINI 0.016 0.010 0.013       TSH:   No results for input(s): TSH in the last 4320 hours.    Urine Culture: No results for input(s): LABURIN in the last 48 hours.    Urine Studies:   No results for input(s): COLORU, APPEARANCEUA, PHUR, SPECGRAV, PROTEINUA, GLUCUA, KETONESU, BILIRUBINUA, OCCULTUA, NITRITE, UROBILINOGEN, LEUKOCYTESUR, RBCUA, WBCUA, BACTERIA, SQUAMEPITHEL, HYALINECASTS in the last 48 hours.    Invalid input(s): WRIGHTSUR      Significant Imaging: I have reviewed and interpreted all pertinent imaging results/findings within the past 24 hours.    4/7 CXR- The patient is rotated to the right.  Lung volumes remain low.  Numerous external artifacts are present.  Heart is enlarged and unchanged with a pacemaker in place.  Chronic interstitial opacities in the lung bases appears similar to the prior study.  There is a left apical calcified granuloma.  No pleural effusion.

## 2022-04-10 NOTE — ASSESSMENT & PLAN NOTE
Home meds;   Losartan 25mg daily , HCTz 12.5mg daily prn- hold hctz for now     4/9: BP variable. SBP 200s overnight but anxious. Then as low as 80s. Hold meds. 1L NS this AM and reassess--BP trending up again with fluids    4/10: patient remained hypotensive throughout the day yesterday. Given 3L NS with improvement from 70s to 90s.  Moved to ICU in case of need for pressor support but was never needed. Currently stable  SIRS criteria met but unclear source if any. cx pending and repeating CXR this AM.  Cont hold BP meds

## 2022-04-11 PROBLEM — D64.9 SYMPTOMATIC ANEMIA: Status: ACTIVE | Noted: 2022-04-11

## 2022-04-11 LAB
ALBUMIN SERPL BCP-MCNC: 2.7 G/DL (ref 3.5–5.2)
ALP SERPL-CCNC: 73 U/L (ref 55–135)
ALT SERPL W/O P-5'-P-CCNC: 29 U/L (ref 10–44)
ANION GAP SERPL CALC-SCNC: 11 MMOL/L (ref 8–16)
AST SERPL-CCNC: 35 U/L (ref 10–40)
BASOPHILS # BLD AUTO: 0.01 K/UL (ref 0–0.2)
BASOPHILS NFR BLD: 0.1 % (ref 0–1.9)
BILIRUB SERPL-MCNC: 0.4 MG/DL (ref 0.1–1)
BUN SERPL-MCNC: 47 MG/DL (ref 8–23)
CALCIUM SERPL-MCNC: 8.3 MG/DL (ref 8.7–10.5)
CHLORIDE SERPL-SCNC: 104 MMOL/L (ref 95–110)
CK MB SERPL-MCNC: 4.5 NG/ML (ref 0.1–6.5)
CK MB SERPL-MCNC: 4.6 NG/ML (ref 0.1–6.5)
CK MB SERPL-MCNC: 4.8 NG/ML (ref 0.1–6.5)
CK MB SERPL-MCNC: 6.5 NG/ML (ref 0.1–6.5)
CK MB SERPL-RTO: 6.3 % (ref 0–5)
CK MB SERPL-RTO: 6.3 % (ref 0–5)
CK MB SERPL-RTO: 6.5 % (ref 0–5)
CK MB SERPL-RTO: 6.5 % (ref 0–5)
CK SERPL-CCNC: 100 U/L (ref 20–180)
CK SERPL-CCNC: 71 U/L (ref 20–180)
CK SERPL-CCNC: 72 U/L (ref 20–180)
CK SERPL-CCNC: 76 U/L (ref 20–180)
CO2 SERPL-SCNC: 25 MMOL/L (ref 23–29)
CREAT SERPL-MCNC: 1.1 MG/DL (ref 0.5–1.4)
DIFFERENTIAL METHOD: ABNORMAL
EOSINOPHIL # BLD AUTO: 0 K/UL (ref 0–0.5)
EOSINOPHIL NFR BLD: 0.1 % (ref 0–8)
ERYTHROCYTE [DISTWIDTH] IN BLOOD BY AUTOMATED COUNT: 16 % (ref 11.5–14.5)
EST. GFR  (AFRICAN AMERICAN): 54 ML/MIN/1.73 M^2
EST. GFR  (NON AFRICAN AMERICAN): 47 ML/MIN/1.73 M^2
GLUCOSE SERPL-MCNC: 111 MG/DL (ref 70–110)
HCT VFR BLD AUTO: 31.3 % (ref 37–48.5)
HGB BLD-MCNC: 9.7 G/DL (ref 12–16)
IMM GRANULOCYTES # BLD AUTO: 0.04 K/UL (ref 0–0.04)
IMM GRANULOCYTES NFR BLD AUTO: 0.6 % (ref 0–0.5)
LYMPHOCYTES # BLD AUTO: 1.4 K/UL (ref 1–4.8)
LYMPHOCYTES NFR BLD: 19.8 % (ref 18–48)
MCH RBC QN AUTO: 27.1 PG (ref 27–31)
MCHC RBC AUTO-ENTMCNC: 31 G/DL (ref 32–36)
MCV RBC AUTO: 87 FL (ref 82–98)
MONOCYTES # BLD AUTO: 0.8 K/UL (ref 0.3–1)
MONOCYTES NFR BLD: 11.5 % (ref 4–15)
NEUTROPHILS # BLD AUTO: 4.9 K/UL (ref 1.8–7.7)
NEUTROPHILS NFR BLD: 67.9 % (ref 38–73)
NRBC BLD-RTO: 0 /100 WBC
PLATELET # BLD AUTO: 224 K/UL (ref 150–450)
PMV BLD AUTO: 10.7 FL (ref 9.2–12.9)
POCT GLUCOSE: 159 MG/DL (ref 70–110)
POCT GLUCOSE: 168 MG/DL (ref 70–110)
POCT GLUCOSE: 95 MG/DL (ref 70–110)
POTASSIUM SERPL-SCNC: 4.3 MMOL/L (ref 3.5–5.1)
PROT SERPL-MCNC: 5.9 G/DL (ref 6–8.4)
RBC # BLD AUTO: 3.58 M/UL (ref 4–5.4)
SODIUM SERPL-SCNC: 140 MMOL/L (ref 136–145)
TROPONIN I SERPL DL<=0.01 NG/ML-MCNC: 0.01 NG/ML (ref 0–0.03)
TROPONIN I SERPL DL<=0.01 NG/ML-MCNC: 0.02 NG/ML (ref 0–0.03)
WBC # BLD AUTO: 7.21 K/UL (ref 3.9–12.7)

## 2022-04-11 PROCEDURE — 80053 COMPREHEN METABOLIC PANEL: CPT | Performed by: INTERNAL MEDICINE

## 2022-04-11 PROCEDURE — 25000242 PHARM REV CODE 250 ALT 637 W/ HCPCS: Performed by: NURSE PRACTITIONER

## 2022-04-11 PROCEDURE — C9399 UNCLASSIFIED DRUGS OR BIOLOG: HCPCS | Performed by: INTERNAL MEDICINE

## 2022-04-11 PROCEDURE — 84484 ASSAY OF TROPONIN QUANT: CPT | Mod: 91 | Performed by: INTERNAL MEDICINE

## 2022-04-11 PROCEDURE — 36415 COLL VENOUS BLD VENIPUNCTURE: CPT | Performed by: INTERNAL MEDICINE

## 2022-04-11 PROCEDURE — 97164 PT RE-EVAL EST PLAN CARE: CPT

## 2022-04-11 PROCEDURE — 20000000 HC ICU ROOM

## 2022-04-11 PROCEDURE — 25000003 PHARM REV CODE 250: Performed by: INTERNAL MEDICINE

## 2022-04-11 PROCEDURE — 85025 COMPLETE CBC W/AUTO DIFF WBC: CPT | Performed by: INTERNAL MEDICINE

## 2022-04-11 PROCEDURE — 25000003 PHARM REV CODE 250: Performed by: NURSE PRACTITIONER

## 2022-04-11 PROCEDURE — 63600175 PHARM REV CODE 636 W HCPCS: Performed by: INTERNAL MEDICINE

## 2022-04-11 PROCEDURE — 94761 N-INVAS EAR/PLS OXIMETRY MLT: CPT

## 2022-04-11 PROCEDURE — 99233 SBSQ HOSP IP/OBS HIGH 50: CPT | Mod: ,,, | Performed by: INTERNAL MEDICINE

## 2022-04-11 PROCEDURE — 94640 AIRWAY INHALATION TREATMENT: CPT

## 2022-04-11 PROCEDURE — 99233 PR SUBSEQUENT HOSPITAL CARE,LEVL III: ICD-10-PCS | Mod: ,,, | Performed by: INTERNAL MEDICINE

## 2022-04-11 PROCEDURE — 27000221 HC OXYGEN, UP TO 24 HOURS

## 2022-04-11 PROCEDURE — 82553 CREATINE MB FRACTION: CPT | Mod: 91 | Performed by: INTERNAL MEDICINE

## 2022-04-11 RX ADMIN — FERROUS SULFATE TAB 325 MG (65 MG ELEMENTAL FE) 1 EACH: 325 (65 FE) TAB at 08:04

## 2022-04-11 RX ADMIN — EZETIMIBE 10 MG: 10 TABLET ORAL at 08:04

## 2022-04-11 RX ADMIN — SUCRALFATE 1 G: 1 SUSPENSION ORAL at 05:04

## 2022-04-11 RX ADMIN — AZITHROMYCIN MONOHYDRATE 500 MG: 500 INJECTION, POWDER, LYOPHILIZED, FOR SOLUTION INTRAVENOUS at 08:04

## 2022-04-11 RX ADMIN — MUPIROCIN: 20 OINTMENT TOPICAL at 08:04

## 2022-04-11 RX ADMIN — IPRATROPIUM BROMIDE AND ALBUTEROL SULFATE 3 ML: 2.5; .5 SOLUTION RESPIRATORY (INHALATION) at 07:04

## 2022-04-11 RX ADMIN — SULFASALAZINE 500 MG: 500 TABLET ORAL at 08:04

## 2022-04-11 RX ADMIN — MEMANTINE HYDROCHLORIDE 5 MG: 5 TABLET ORAL at 08:04

## 2022-04-11 RX ADMIN — METHYLPREDNISOLONE SODIUM SUCCINATE 40 MG: 40 INJECTION, POWDER, FOR SOLUTION INTRAMUSCULAR; INTRAVENOUS at 08:04

## 2022-04-11 RX ADMIN — PANTOPRAZOLE SODIUM 40 MG: 40 TABLET, DELAYED RELEASE ORAL at 08:04

## 2022-04-11 RX ADMIN — DEXMEDETOMIDINE HYDROCHLORIDE 0.2 MCG/KG/HR: 4 INJECTION INTRAVENOUS at 09:04

## 2022-04-11 RX ADMIN — GUAIFENESIN 600 MG: 600 TABLET, EXTENDED RELEASE ORAL at 08:04

## 2022-04-11 RX ADMIN — CYANOCOBALAMIN TAB 500 MCG 1000 MCG: 500 TAB at 08:04

## 2022-04-11 RX ADMIN — CEFTRIAXONE 2 G: 2 INJECTION, SOLUTION INTRAVENOUS at 09:04

## 2022-04-11 RX ADMIN — IPRATROPIUM BROMIDE AND ALBUTEROL SULFATE 3 ML: 2.5; .5 SOLUTION RESPIRATORY (INHALATION) at 12:04

## 2022-04-11 RX ADMIN — SUCRALFATE 1 G: 1 SUSPENSION ORAL at 11:04

## 2022-04-11 RX ADMIN — ATORVASTATIN CALCIUM 20 MG: 20 TABLET, FILM COATED ORAL at 08:04

## 2022-04-11 RX ADMIN — DEXMEDETOMIDINE HYDROCHLORIDE 0.2 MCG/KG/HR: 4 INJECTION INTRAVENOUS at 03:04

## 2022-04-11 RX ADMIN — SODIUM CHLORIDE: 0.9 INJECTION, SOLUTION INTRAVENOUS at 03:04

## 2022-04-11 NOTE — SUBJECTIVE & OBJECTIVE
Interval History: see above    Review of Systems   Unable to perform ROS: Dementia   Constitutional:  Negative for chills and fever.   HENT:  Negative for congestion, hearing loss, sinus pressure and sore throat.    Eyes:  Negative for photophobia.   Respiratory:  Positive for cough and wheezing. Negative for choking and chest tightness.    Cardiovascular:  Negative for chest pain and palpitations.   Gastrointestinal:  Negative for blood in stool, nausea and vomiting.   Genitourinary:  Negative for dysuria and hematuria.   Musculoskeletal:  Negative for arthralgias and myalgias.   Skin:  Negative for pallor.   Neurological:  Negative for dizziness and numbness.   Hematological:  Does not bruise/bleed easily.   Psychiatric/Behavioral:  Positive for confusion. Negative for agitation, decreased concentration, dysphoric mood, hallucinations, self-injury, sleep disturbance and suicidal ideas. The patient is not nervous/anxious and is not hyperactive.    Objective:     Vital Signs (Most Recent):  Temp: 97.5 °F (36.4 °C) (04/11/22 0704)  Pulse: 64 (04/11/22 0704)  Resp: 14 (04/11/22 0704)  BP: (!) 112/51 (04/11/22 0700)  SpO2: 100 % (04/11/22 0704)   Vital Signs (24h Range):  Temp:  [96.5 °F (35.8 °C)-98 °F (36.7 °C)] 97.5 °F (36.4 °C)  Pulse:  [61-85] 64  Resp:  [10-25] 14  SpO2:  [90 %-100 %] 100 %  BP: ()/() 112/51     Weight: 78.5 kg (173 lb 1 oz)  Body mass index is 37.45 kg/m².    Intake/Output Summary (Last 24 hours) at 4/11/2022 0812  Last data filed at 4/11/2022 0710  Gross per 24 hour   Intake 3142.02 ml   Output 4050 ml   Net -907.98 ml      Physical Exam  Vitals and nursing note reviewed.   Constitutional:       Appearance: She is well-developed.   HENT:      Head: Normocephalic and atraumatic.   Cardiovascular:      Rate and Rhythm: Normal rate and regular rhythm.      Heart sounds: Normal heart sounds.   Pulmonary:      Effort: Pulmonary effort is normal.      Breath sounds: Examination of the  left-middle field reveals wheezing. Examination of the left-lower field reveals wheezing. Wheezing present.   Abdominal:      General: Bowel sounds are normal.      Palpations: Abdomen is soft.      Tenderness: There is no abdominal tenderness.   Skin:     General: Skin is warm and dry.   Neurological:      Mental Status: She is alert.   Psychiatric:      Comments: Confused        Significant Labs: All pertinent labs within the past 24 hours have been reviewed.  ABGs:   Recent Labs   Lab 04/10/22  0433   PH 7.350   PCO2 52*   HCO3 28.70   POCSATURATED 97.0   BE 2.70*   TOTALHB 6.8*   COHB 1.5   METHB 0.7   PO2 73*     CBC:   Recent Labs   Lab 04/10/22  0720 04/11/22  0246   WBC 6.35 7.21   HGB 6.2* 9.7*   HCT 20.4* 31.3*    224     CMP:   Recent Labs   Lab 04/10/22  0720 04/11/22  0246   * 140   K 5.0 4.3    104   CO2 26 25    111*   BUN 74* 47*   CREATININE 1.2 1.1   CALCIUM 8.3* 8.3*   PROT 5.2* 5.9*   ALBUMIN 2.5* 2.7*   BILITOT 0.2 0.4   ALKPHOS 61 73   AST 26 35   ALT 20 29   ANIONGAP 7* 11   EGFRNONAA 42* 47*     Lactic Acid:   Recent Labs   Lab 04/09/22 2005   LACTATE 2.0     Troponin:   Recent Labs   Lab 04/10/22  1420 04/10/22  2044 04/11/22  0246   TROPONINI 0.015 0.015 0.021     Urine Culture: No results for input(s): LABURIN in the last 48 hours.  Urine Studies: No results for input(s): COLORU, APPEARANCEUA, PHUR, SPECGRAV, PROTEINUA, GLUCUA, KETONESU, BILIRUBINUA, OCCULTUA, NITRITE, UROBILINOGEN, LEUKOCYTESUR, RBCUA, WBCUA, BACTERIA, SQUAMEPITHEL, HYALINECASTS in the last 48 hours.    Invalid input(s): WRIGHTSUR    Significant Imaging: I have reviewed all pertinent imaging results/findings within the past 24 hours.  CXR: I have reviewed all pertinent results/findings within the past 24 hours and my personal findings are:  Film again limited by body habitus and external leads.  Heart is enlarged and unchanged.  Aorta is ectatic.  Chronic widening of right paratracheal stripe  secondary to tortuous vessels.  Calcified granuloma noted left apex.  Chronic interstitial opacities in lung bases appears similar to multiple previous chest x-rays.  No superimposed consolidation noted.

## 2022-04-11 NOTE — ASSESSMENT & PLAN NOTE
Supplemental O2 via nasal canula; titrate O2 saturation to >92%. Records reviewed and she was on  Home O2 a few years ago ; family denies home Oxygen at present   Start Solumedrol 80mg daily   Continue beta 2 agonist bronchodilator treatments.   Continue IV antibiotics. Start IV rocephin 1gm for lower resp infection/COPD exac as would treat possible UTI; f/u urine Cx also and if negative can de-escalate and no consolidation on CXR    Continue routine medications as before.     4/11: improving; still coughing   Cont nebs, cont steroids  Resumed rocephin and started azitro as triggering sepsis protocol and pulmonary seems most likely source given her presentation  Reviewed CXR  Film again limited by body habitus and external leads.  Heart is enlarged and unchanged.  Aorta is ectatic.  Chronic widening of right paratracheal stripe secondary to tortuous vessels.  Calcified granuloma noted left apex.  Chronic interstitial opacities in lung bases appears similar to multiple previous chest x-rays.  No superimposed consolidation noted.     Impression:     No acute abnormality.   Wean O2 as tolerated.

## 2022-04-11 NOTE — ASSESSMENT & PLAN NOTE
Home meds;   Losartan 25mg daily , HCTz 12.5mg daily prn- hold hctz for now     4/9: BP variable. SBP 200s overnight but anxious. Then as low as 80s. Hold meds. 1L NS this AM and reassess--BP trending up again with fluids    4/10: patient remained hypotensive throughout the day yesterday. Given 3L NS with improvement from 70s to 90s.  Moved to ICU in case of need for pressor support but was never needed. Currently stable  SIRS criteria met but unclear source if any. cx pending and repeating CXR this AM.  Cont hold BP meds    4/11  Continue IVF   Wean off irene synephrine

## 2022-04-11 NOTE — EICU
Rounding (Video Assessment):  Yes    Comments: Video assessment completed.  Pt lying in bed.  Eyes closed, resp deep even and unlabored.  IVF in progress include precedex at 0.2 mcg/kg/hr and neosynephrine at 0.4 mcg/kg/min.  NAD noted at this time.

## 2022-04-11 NOTE — PLAN OF CARE
Problem: Adult Inpatient Plan of Care  Goal: Plan of Care Review  Outcome: Ongoing, Progressing  Goal: Patient-Specific Goal (Individualized)  Outcome: Ongoing, Progressing  Goal: Absence of Hospital-Acquired Illness or Injury  Outcome: Ongoing, Progressing  Goal: Optimal Comfort and Wellbeing  Outcome: Ongoing, Progressing  Goal: Readiness for Transition of Care  Outcome: Ongoing, Progressing     Problem: Diabetes Comorbidity  Goal: Blood Glucose Level Within Targeted Range  Outcome: Ongoing, Progressing     Problem: Adjustment to Illness COPD (Chronic Obstructive Pulmonary Disease)  Goal: Optimal Chronic Illness Coping  Outcome: Ongoing, Progressing     Problem: Functional Ability Impaired COPD (Chronic Obstructive Pulmonary Disease)  Goal: Optimal Level of Functional Camp  Outcome: Ongoing, Progressing     Problem: Infection COPD (Chronic Obstructive Pulmonary Disease)  Goal: Absence of Infection Signs and Symptoms  Outcome: Ongoing, Progressing     Problem: Oral Intake Inadequate COPD (Chronic Obstructive Pulmonary Disease)  Goal: Improved Nutrition Intake  Outcome: Ongoing, Progressing     Problem: Respiratory Compromise COPD (Chronic Obstructive Pulmonary Disease)  Goal: Effective Oxygenation and Ventilation  Outcome: Ongoing, Progressing     Problem: UTI (Urinary Tract Infection)  Goal: Improved Infection Symptoms  Outcome: Ongoing, Progressing     Problem: Skin Injury Risk Increased  Goal: Skin Health and Integrity  Outcome: Ongoing, Progressing     Problem: Infection  Goal: Absence of Infection Signs and Symptoms  Outcome: Ongoing, Progressing

## 2022-04-11 NOTE — NURSING
1902: Bed side report received from off going RN, care of patient assumed. Introduced self to patient, safety cheek completed. Patient is alert, awake and oriented x 2, denies discomfort at this time. 100 % saturation on 2L NC, infusion, Jatinder at 0.5 mcg/kg/min, prece dex 0.2 mcg/kg/hr, /hr  at this time, please see MAR for medication detail. V/S per patient trend.  0930 : Scheduled medication given as per MAR, patient tolerated well.  0215: Patient bathed, bed linen, bed pad, and gown changed. Patient tolerated well.   0624: Patient's status stayed at the baseline during shift, report will be endorsed to oncoming RN.

## 2022-04-11 NOTE — PLAN OF CARE
Condition essentially unchanged. VS stable.Jatinder remains off. Precedex continued, calm at present. Infrequent productive cough noted. Adequate UOP. Appetite fair. Reinforced plan of care.

## 2022-04-11 NOTE — PROGRESS NOTES
[facility]  Progress Note    Patient Name: Gretel Ashton  MRN: [id number]  Patient Class: IP- Inpatient   Admission Date: 4/7/2022  Length of Stay: 3 days  Attending Physician: Althea Krause MD  Primary Care Provider: Jailene Astudillo MD        Subjective:     Principal Problem:COPD exacerbation        HPI:  Gretel Ashton is a 81 y.o. 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 Reese and chronic respiratory failure on 2-3 L at home secondary to COPD, follows with Dr. CECILY Guerra.   Pt presents to ER yesterday PM with c/o shortness of breath that began 2:00 a.m. yesterday  morning.  She also reports feeling nauseated and having a cough with productive brownish yellow sputum. Brother reports she has been having frequent episodes of shortness of breath but not quite this severe. Denies any home o2 use???    denies any fever. denies any chest pain or palpitations. patient does appear very anxious.  Denies any recent ill contacts    Colitis- ? Sulfasalazine   URINE ABN- Cx in process   Will start IV Rocephin,       Overview/Hospital Course:  Patient had a rough night complaining of intermittent chest pain. + belching. + nausea. Increasing intensity overnight. Initial SBP 90s then 200s, then 90s. Likely anxiety driven. Initial CE negative. EKG negative. 11 beat VT on tele overnight but none since. Replacing electrolytes PRN. She remains on 2L NC. She is unable to tell me if her pain feels like reflux but reports in the left lower chest.     4/10:  Patient developed worsening hypotension throughout the day yesterday. Given up to 3L NS bolus and then was on 100cc/hr NS. SBP improved from 70s to 90s but still low. RR > 30. No fevers  (temp around 99) and no leukocytosis. Urine cx negative. Initial CXR without consolidation. However, triggering sepsis alert. She was moved to the ICU last night for closer monitoring in case she would require pressor support but she has not. Lactic acid 2. She continues to oxygenate well on 2L NC. pCO2 is 52--unchanged from admission. She remains intermittently confused since admission; though likely her baseline. No further reports of chest pain since treatment for reflux yesterday. CE not suggestive of ischemia. Resumed Rocephin and azithro as pulmonary source seems most likely given her initial presentation; repeat CXR this AM.     4/11/22  Gretel Ashton is a 81 y.o. female  Seen in ICU ; on precedex and irene synephrine .still on IVF   I saw her in office for a long time she does not recognize me now .  She was admitted for copd exacerbation ; on steroid ; and ceftriaxone and zithromax   She became hypotensive ; needs IVF ; minimal pressors ; her HB dropped to 6 ; required 2 units of PRBCs   Now  9.7; No BM so dont know if GI bleeding .  Will wean her off of pressors and precedex .             Interval History: see above    Review of Systems   Unable to perform ROS: Dementia   Constitutional:  Negative for chills and fever.   HENT:  Negative for congestion, hearing loss, sinus pressure and sore throat.    Eyes:  Negative for photophobia.   Respiratory:  Positive for cough and wheezing. Negative for choking and chest tightness.    Cardiovascular:  Negative for chest pain and palpitations.   Gastrointestinal:  Negative for blood in stool, nausea and vomiting.   Genitourinary:  Negative for dysuria and hematuria.   Musculoskeletal:  Negative for arthralgias and myalgias.   Skin:  Negative for pallor.   Neurological:  Negative for dizziness and numbness.   Hematological:  Does not bruise/bleed easily.   Psychiatric/Behavioral:  Positive for confusion. Negative for agitation, decreased concentration, dysphoric mood,  hallucinations, self-injury, sleep disturbance and suicidal ideas. The patient is not nervous/anxious and is not hyperactive.    Objective:     Vital Signs (Most Recent):  Temp: 97.5 °F (36.4 °C) (04/11/22 0704)  Pulse: 64 (04/11/22 0704)  Resp: 14 (04/11/22 0704)  BP: (!) 112/51 (04/11/22 0700)  SpO2: 100 % (04/11/22 0704)   Vital Signs (24h Range):  Temp:  [96.5 °F (35.8 °C)-98 °F (36.7 °C)] 97.5 °F (36.4 °C)  Pulse:  [61-85] 64  Resp:  [10-25] 14  SpO2:  [90 %-100 %] 100 %  BP: ()/() 112/51     Weight: 78.5 kg (173 lb 1 oz)  Body mass index is 37.45 kg/m².    Intake/Output Summary (Last 24 hours) at 4/11/2022 0812  Last data filed at 4/11/2022 0710  Gross per 24 hour   Intake 3142.02 ml   Output 4050 ml   Net -907.98 ml      Physical Exam  Vitals and nursing note reviewed.   Constitutional:       Appearance: She is well-developed.   HENT:      Head: Normocephalic and atraumatic.   Cardiovascular:      Rate and Rhythm: Normal rate and regular rhythm.      Heart sounds: Normal heart sounds.   Pulmonary:      Effort: Pulmonary effort is normal.      Breath sounds: Examination of the left-middle field reveals wheezing. Examination of the left-lower field reveals wheezing. Wheezing present.   Abdominal:      General: Bowel sounds are normal.      Palpations: Abdomen is soft.      Tenderness: There is no abdominal tenderness.   Skin:     General: Skin is warm and dry.   Neurological:      Mental Status: She is alert.   Psychiatric:      Comments: Confused        Significant Labs: All pertinent labs within the past 24 hours have been reviewed.  ABGs:   Recent Labs   Lab 04/10/22  0433   PH 7.350   PCO2 52*   HCO3 28.70   POCSATURATED 97.0   BE 2.70*   TOTALHB 6.8*   COHB 1.5   METHB 0.7   PO2 73*     CBC:   Recent Labs   Lab 04/10/22  0720 04/11/22  0246   WBC 6.35 7.21   HGB 6.2* 9.7*   HCT 20.4* 31.3*    224     CMP:   Recent Labs   Lab 04/10/22  0720 04/11/22  0246   * 140   K 5.0 4.3   CL  101 104   CO2 26 25    111*   BUN 74* 47*   CREATININE 1.2 1.1   CALCIUM 8.3* 8.3*   PROT 5.2* 5.9*   ALBUMIN 2.5* 2.7*   BILITOT 0.2 0.4   ALKPHOS 61 73   AST 26 35   ALT 20 29   ANIONGAP 7* 11   EGFRNONAA 42* 47*     Lactic Acid:   Recent Labs   Lab 04/09/22 2005   LACTATE 2.0     Troponin:   Recent Labs   Lab 04/10/22  1420 04/10/22  2044 04/11/22  0246   TROPONINI 0.015 0.015 0.021     Urine Culture: No results for input(s): LABURIN in the last 48 hours.  Urine Studies: No results for input(s): COLORU, APPEARANCEUA, PHUR, SPECGRAV, PROTEINUA, GLUCUA, KETONESU, BILIRUBINUA, OCCULTUA, NITRITE, UROBILINOGEN, LEUKOCYTESUR, RBCUA, WBCUA, BACTERIA, SQUAMEPITHEL, HYALINECASTS in the last 48 hours.    Invalid input(s): WRIGHTSUR    Significant Imaging: I have reviewed all pertinent imaging results/findings within the past 24 hours.  CXR: I have reviewed all pertinent results/findings within the past 24 hours and my personal findings are:  Film again limited by body habitus and external leads.  Heart is enlarged and unchanged.  Aorta is ectatic.  Chronic widening of right paratracheal stripe secondary to tortuous vessels.  Calcified granuloma noted left apex.  Chronic interstitial opacities in lung bases appears similar to multiple previous chest x-rays.  No superimposed consolidation noted.      Assessment/Plan:      * COPD exacerbation  Supplemental O2 via nasal canula; titrate O2 saturation to >92%. Records reviewed and she was on  Home O2 a few years ago ; family denies home Oxygen at present   Start Solumedrol 80mg daily   Continue beta 2 agonist bronchodilator treatments.   Continue IV antibiotics. Start IV rocephin 1gm for lower resp infection/COPD exac as would treat possible UTI; f/u urine Cx also and if negative can de-escalate and no consolidation on CXR    Continue routine medications as before.     4/11: improving; still coughing   Cont nebs, cont steroids  Resumed rocephin and started azitro as  "triggering sepsis protocol and pulmonary seems most likely source given her presentation  Reviewed CXR  Film again limited by body habitus and external leads.  Heart is enlarged and unchanged.  Aorta is ectatic.  Chronic widening of right paratracheal stripe secondary to tortuous vessels.  Calcified granuloma noted left apex.  Chronic interstitial opacities in lung bases appears similar to multiple previous chest x-rays.  No superimposed consolidation noted.     Impression:     No acute abnormality.   Wean O2 as tolerated.          Symptomatic anemia  She has h/o colitis   She may have bled   Right now no active bleeding   Will monitor H/H      SIRS (systemic inflammatory response syndrome)  SBP >90 and RR > 30  No leukcotyosis; no "real fevers"-temp ~99F  No clear source  Urine cx negative  Blood cx NGTD  Sputum cx pending  Repeating CXR  Resumed rocephin and added azithro as pulmonary source seems most likely and de-escalate as cx return  Received 3L NS throughout the day yesterday with improvement in BP from 70-90s but moved to ICU in case of need for pressor support. Has not yet required this  LA=2      Chest pain  New onset overnight  Patient a poor historian and difficulty to qualify her pain  + belching  Cont PPI, added carafate; seems to respond to GI cocktail earlier this am  Trend CE  EKG reviewed, no acute ST-T wave changes  Cont tele  Cont statin    4/10: resolved today with treatment for reflux  CE normal  Cont statin and tele  Repeat CXR today    4/11  Resolved     Debility  PT/OT.      Cystitis  Abn UA  Awaiting urine Cx in process   Not reliable historian to know if symptomatic so will treat with rocephin pending cx results    4/9: cx negative. Stopped rocephin but resume for possible pulmonary source/SIRS.    4/11  Continue rocephin     HTN (hypertension)  Home meds;   Losartan 25mg daily , HCTz 12.5mg daily prn- hold hctz for now     4/9: BP variable. SBP 200s overnight but anxious. Then as low as " 80s. Hold meds. 1L NS this AM and reassess--BP trending up again with fluids    4/10: patient remained hypotensive throughout the day yesterday. Given 3L NS with improvement from 70s to 90s.  Moved to ICU in case of need for pressor support but was never needed. Currently stable  SIRS criteria met but unclear source if any. cx pending and repeating CXR this AM.  Cont hold BP meds    4/11  Continue IVF   Wean off irene synephrine    Acute on chronic respiratory failure with hypoxemia  Patient with Hypoxic Respiratory failure which is Acute on chronic per chart but family doesn't think she was on O2 at home.  she is on home oxygen at 2-3 LPM per chart review; patient unsure and family doesn't think she had supplemental O2 at home. Supplemental oxygen was provided and noted-  .   Signs/symptoms of respiratory failure include- wheezing. Contributing diagnoses includes - COPD Labs and images were reviewed. Patient Has not had a recent ABG. Will treat underlying causes and adjust management of respiratory failure as follows-     Treat as COPD exacerbation with O2 and wean as tolerated, steroids, nebs, mucinex  Rocephin added to also cover for possible UTI and de-escalate pending cx as CXR without conslidation and low suspicion for bacterial PNA    4/10: cont steroids 40mg daily  Cont nebs  Rocephin 4/8; stopped 4/9 as urine cx negative and resumed 4/10 after triggering sepsis alert due to hypotension, urine cx negative. Blood cx pending. Repeat CXR pending  Wean o2 as tolerated. PCO2 stable since admit; not sure she would tolerate BiPaP and mild elevation at best    4/11  CXR ;   Film again limited by body habitus and external leads.  Heart is enlarged and unchanged.  Aorta is ectatic.  Chronic widening of right paratracheal stripe secondary to tortuous vessels.  Calcified granuloma noted left apex.  Chronic interstitial opacities in lung bases appears similar to multiple previous chest x-rays.  No superimposed  consolidation noted.     Impression:     No acute abnormality.       Rheumatoid arthritis involving both hands with negative rheumatoid factor  Takes sulfasalazine 500mg daily ; not sure if for RA or hx of colitis .      Aortic atherosclerosis  Statin , ASA .      Memory loss  Follows with Dr. Reese for this  Dx probable early dementia per notes in 9/2021   Continue memantine .      Type 2 diabetes mellitus with diabetic neuropathy, without long-term current use of insulin  Home meds include oral metformin   Lab Results   Component Value Date    HGBA1C 6.0 (H) 04/10/2022   will not start insulin per protocol  Monitor for now .        Hyperlipidemia  Continue statin- crestor not fomulary; atorvastatin here   zetia .        VTE Risk Mitigation (From admission, onward)         Ordered     IP VTE HIGH RISK PATIENT  Once         04/07/22 2238     Place sequential compression device  Until discontinued         04/07/22 2238                Discharge Planning   MARIKA:      Code Status: Full Code   Is the patient medically ready for discharge?:     Reason for patient still in hospital (select all that apply): Treatment  Discharge Plan A: Home with family, Home Health            Critical care time spent on the evaluation and treatment of severe organ dysfunction, review of pertinent labs and imaging studies, discussions with consulting providers and discussions with patient/family: 35 minutes.      Jailene Astudillo MD  Department of Hospital Medicine   [place] - Intensive Care

## 2022-04-11 NOTE — PT/OT/SLP PROGRESS
Pt will require new order set prior to continuation of OT treatment due pt requiring increased level of care, now in ICU.    Damion Faustin, NICK  04/11/2022

## 2022-04-11 NOTE — ASSESSMENT & PLAN NOTE
Follows with Dr. Reese for this  Dx probable early dementia per notes in 9/2021   Continue memantine .

## 2022-04-11 NOTE — PLAN OF CARE
Pt dx COPD exacerbation. Pt presents awake, alert oriented to person reoriented to time and place. Needs reinforcement. CM in progress alarms set and on. VS stable. O2 @ 2LNC in progress. Follows simple commands. BBS with rhonchi noted. Explained care, reinforcement needed. Monitoring closely.

## 2022-04-11 NOTE — ASSESSMENT & PLAN NOTE
Abn UA  Awaiting urine Cx in process   Not reliable historian to know if symptomatic so will treat with rocephin pending cx results    4/9: cx negative. Stopped rocephin but resume for possible pulmonary source/SIRS.    4/11  Continue rocephin

## 2022-04-11 NOTE — PT/OT/SLP RE-EVAL
"Physical Therapy Re-evaluation    Patient Name:  Gretel Ashton   MRN:  [id number]    Recommendations:     Discharge Recommendations:  nursing facility, skilled   Discharge Equipment Recommendations: other (see comments) (Will continue to assess)   Barriers to discharge: Inaccessible home and Decreased caregiver support    Assessment:     Gretel Ashton is a 81 y.o. female admitted with a medical diagnosis of COPD exacerbation.  She presents with the following impairments/functional limitations:  weakness, impaired endurance, impaired self care skills, impaired functional mobilty, gait instability, impaired balance, decreased safety awareness, impaired joint extensibility. Resumed PT services today with clearance verbal ordered by Dr. Astudillo accdg to nurse in charge( [facility] Hooks). Noted patients blood pressure 114/52 mm hg with no complain. Completed bed mobiliy rolling to sides with SBA and sat up at edge of the bed with contact guard assistance. Performed gait functions at bed side x ~ 5 feet with side steps using RW with contact guard assistance. No sign of respiratory distress and no sign of fatigue.    Rehab Prognosis:  Fair; patient would benefit from acute skilled PT services to address these deficits and reach maximum level of function.      Recent Surgery: * No surgery found *      Plan:     During this hospitalization, patient to be seen daily to address the above listed problems via gait training, therapeutic activities, therapeutic exercises  · Plan of Care Expires:  04/25/22   Plan of Care Reviewed with: patient    Subjective     Communicated with nursing and patient prior to session.  Patient found HOB elevated with peripheral IV, oxygen, blood pressure cuff, pulse ox (continuous), abad catheter upon PT entry to room, agreeable to evaluation.      Chief Complaint: Patient wants to do more sitting activity.   Patient comments/goals: "To get better".  Pain/Comfort:  · Pain Rating 1: 0/10    Patients " cultural, spiritual, Episcopal conflicts given the current situation: no      Objective:     Patient found with: peripheral IV, oxygen, blood pressure cuff, pulse ox (continuous), abad catheter     General Precautions: Standard, fall   Orthopedic Precautions:N/A   Braces: N/A  Respiratory Status: Nasal cannula, flow 2 L/min    Exams:  · Cognitive Exam:  Patient is oriented to Person, Place and Time  · Fine Motor Coordination:    · -       Intact  · Gross Motor Coordination:  WFL  · Postural Exam:  Patient presented with the following abnormalities:    · -       Rounded shoulders  · -       Forward head  · Skin Integrity/Edema:      · -       Skin integrity: Visible skin intact  · RUE ROM: WFL  · RUE Strength: WFL  · LUE ROM: WFL  · LUE Strength: WFL  · RLE ROM: WFL  · RLE Strength: WFL  · LLE ROM: WFL  · LLE Strength: WFL    Functional Mobility:  · Bed Mobility:     · Rolling Left:  stand by assistance  · Rolling Right: stand by assistance  · Scooting: contact guard assistance  · Supine to Sit: contact guard assistance  · Sit to Supine: contact guard assistance  · Transfers:     · Sit to Stand:  contact guard assistance with rolling walker  · Gait: Contact Guard Assistance x ~5 feet (side steps)  with contact guard assistance.    AM-PAC 6 CLICK MOBILITY  Total Score:16       Therapeutic Activities and Exercises:   Completed PT Re-evaluation. Initiated sitting at edge of the bed, standing activity with rw and side steps using RW with contact guard assistance.    Patient left HOB elevated with all lines intact, call button in reach and nursing present.    GOALS:   Multidisciplinary Problems     Physical Therapy Goals        Problem: Physical Therapy    Goal Priority Disciplines Outcome Goal Variances Interventions   Physical Therapy Goal     PT, PT/OT      Description:   Patient will increase functional independence with mobility by performin. Supine to sit with Modified Independent  2. Sit to supine with  Modified Independent  3. Bed to chair transfer with Supervision with or without rolling walker using Step Transfer TECHNIQUE  4. Gait  x ~100 feet with Supervision or Set-up Assistance with or without rolling walker.  5. Ascend/descend 4 steps with handrail/s with contact guard /supervision.  6. Lower extremity exercise program x10 reps per handout, with assistance as needed                      History:     Past Medical History:   Diagnosis Date    Arthritis     COPD (chronic obstructive pulmonary disease)     Depression     Diabetes mellitus type II     Hyperlipidemia     Hypertension     Pacemaker        Past Surgical History:   Procedure Laterality Date    CARDIAC PACEMAKER PLACEMENT       SECTION      CYST REMOVAL Left 2019    Procedure: EXCISION, SEBACEOUS CYST;  Surgeon: Jaylen Gonzalez Jr., MD;  Location: [facility];  Service: General;  Laterality: Left;    INNER EAR SURGERY         Time Tracking:     PT Received On: 22  PT Start Time: 1020     PT Stop Time: 1040  PT Total Time (min): 20 min     Billable Minutes: Re-eval 20 mins      2022

## 2022-04-11 NOTE — ASSESSMENT & PLAN NOTE
Patient with Hypoxic Respiratory failure which is Acute on chronic per chart but family doesn't think she was on O2 at home.  she is on home oxygen at 2-3 LPM per chart review; patient unsure and family doesn't think she had supplemental O2 at home. Supplemental oxygen was provided and noted-  .   Signs/symptoms of respiratory failure include- wheezing. Contributing diagnoses includes - COPD Labs and images were reviewed. Patient Has not had a recent ABG. Will treat underlying causes and adjust management of respiratory failure as follows-     Treat as COPD exacerbation with O2 and wean as tolerated, steroids, nebs, mucinex  Rocephin added to also cover for possible UTI and de-escalate pending cx as CXR without conslidation and low suspicion for bacterial PNA    4/10: cont steroids 40mg daily  Cont nebs  Rocephin 4/8; stopped 4/9 as urine cx negative and resumed 4/10 after triggering sepsis alert due to hypotension, urine cx negative. Blood cx pending. Repeat CXR pending  Wean o2 as tolerated. PCO2 stable since admit; not sure she would tolerate BiPaP and mild elevation at best    4/11  CXR ;   Film again limited by body habitus and external leads.  Heart is enlarged and unchanged.  Aorta is ectatic.  Chronic widening of right paratracheal stripe secondary to tortuous vessels.  Calcified granuloma noted left apex.  Chronic interstitial opacities in lung bases appears similar to multiple previous chest x-rays.  No superimposed consolidation noted.     Impression:     No acute abnormality.

## 2022-04-11 NOTE — ASSESSMENT & PLAN NOTE
New onset overnight  Patient a poor historian and difficulty to qualify her pain  + belching  Cont PPI, added carafate; seems to respond to GI cocktail earlier this am  Trend CE  EKG reviewed, no acute ST-T wave changes  Cont tele  Cont statin    4/10: resolved today with treatment for reflux  CE normal  Cont statin and tele  Repeat CXR today    4/11  Resolved

## 2022-04-11 NOTE — ASSESSMENT & PLAN NOTE
Home meds include oral metformin   Lab Results   Component Value Date    HGBA1C 6.0 (H) 04/10/2022   will not start insulin per protocol  Monitor for now .

## 2022-04-12 LAB
ALBUMIN SERPL BCP-MCNC: 2.4 G/DL (ref 3.5–5.2)
ALP SERPL-CCNC: 62 U/L (ref 55–135)
ALT SERPL W/O P-5'-P-CCNC: 26 U/L (ref 10–44)
ANION GAP SERPL CALC-SCNC: 8 MMOL/L (ref 8–16)
AST SERPL-CCNC: 30 U/L (ref 10–40)
BACTERIA SPEC AEROBE CULT: NORMAL
BACTERIA SPEC AEROBE CULT: NORMAL
BASOPHILS # BLD AUTO: 0 K/UL (ref 0–0.2)
BASOPHILS NFR BLD: 0 % (ref 0–1.9)
BILIRUB SERPL-MCNC: 0.2 MG/DL (ref 0.1–1)
BUN SERPL-MCNC: 28 MG/DL (ref 8–23)
CALCIUM SERPL-MCNC: 8.2 MG/DL (ref 8.7–10.5)
CHLORIDE SERPL-SCNC: 105 MMOL/L (ref 95–110)
CK MB SERPL-MCNC: 4.3 NG/ML (ref 0.1–6.5)
CK MB SERPL-MCNC: 4.4 NG/ML (ref 0.1–6.5)
CK MB SERPL-MCNC: 4.5 NG/ML (ref 0.1–6.5)
CK MB SERPL-MCNC: 5.1 NG/ML (ref 0.1–6.5)
CK MB SERPL-RTO: 5.4 % (ref 0–5)
CK MB SERPL-RTO: 5.7 % (ref 0–5)
CK MB SERPL-RTO: 6.2 % (ref 0–5)
CK MB SERPL-RTO: 6.6 % (ref 0–5)
CK SERPL-CCNC: 73 U/L (ref 20–180)
CK SERPL-CCNC: 76 U/L (ref 20–180)
CK SERPL-CCNC: 77 U/L (ref 20–180)
CK SERPL-CCNC: 81 U/L (ref 20–180)
CO2 SERPL-SCNC: 29 MMOL/L (ref 23–29)
CREAT SERPL-MCNC: 0.9 MG/DL (ref 0.5–1.4)
DIFFERENTIAL METHOD: ABNORMAL
EOSINOPHIL # BLD AUTO: 0.1 K/UL (ref 0–0.5)
EOSINOPHIL NFR BLD: 1.4 % (ref 0–8)
ERYTHROCYTE [DISTWIDTH] IN BLOOD BY AUTOMATED COUNT: 16.5 % (ref 11.5–14.5)
EST. GFR  (AFRICAN AMERICAN): >60 ML/MIN/1.73 M^2
EST. GFR  (NON AFRICAN AMERICAN): 60 ML/MIN/1.73 M^2
GLUCOSE SERPL-MCNC: 96 MG/DL (ref 70–110)
GRAM STN SPEC: NORMAL
HCT VFR BLD AUTO: 31.7 % (ref 37–48.5)
HGB BLD-MCNC: 10 G/DL (ref 12–16)
IMM GRANULOCYTES # BLD AUTO: 0.01 K/UL (ref 0–0.04)
IMM GRANULOCYTES NFR BLD AUTO: 0.2 % (ref 0–0.5)
LYMPHOCYTES # BLD AUTO: 1.2 K/UL (ref 1–4.8)
LYMPHOCYTES NFR BLD: 28.4 % (ref 18–48)
MCH RBC QN AUTO: 27.6 PG (ref 27–31)
MCHC RBC AUTO-ENTMCNC: 31.5 G/DL (ref 32–36)
MCV RBC AUTO: 88 FL (ref 82–98)
MONOCYTES # BLD AUTO: 0.6 K/UL (ref 0.3–1)
MONOCYTES NFR BLD: 13.7 % (ref 4–15)
NEUTROPHILS # BLD AUTO: 2.3 K/UL (ref 1.8–7.7)
NEUTROPHILS NFR BLD: 56.3 % (ref 38–73)
NRBC BLD-RTO: 0 /100 WBC
OB PNL STL: POSITIVE
PLATELET # BLD AUTO: 162 K/UL (ref 150–450)
PMV BLD AUTO: 10.6 FL (ref 9.2–12.9)
POCT GLUCOSE: 116 MG/DL (ref 70–110)
POCT GLUCOSE: 127 MG/DL (ref 70–110)
POCT GLUCOSE: 140 MG/DL (ref 70–110)
POCT GLUCOSE: 141 MG/DL (ref 70–110)
POCT GLUCOSE: 214 MG/DL (ref 70–110)
POTASSIUM SERPL-SCNC: 4.1 MMOL/L (ref 3.5–5.1)
PROT SERPL-MCNC: 5.2 G/DL (ref 6–8.4)
RBC # BLD AUTO: 3.62 M/UL (ref 4–5.4)
SODIUM SERPL-SCNC: 142 MMOL/L (ref 136–145)
TROPONIN I SERPL DL<=0.01 NG/ML-MCNC: 0.01 NG/ML (ref 0–0.03)
TROPONIN I SERPL DL<=0.01 NG/ML-MCNC: <0.006 NG/ML (ref 0–0.03)
WBC # BLD AUTO: 4.15 K/UL (ref 3.9–12.7)

## 2022-04-12 PROCEDURE — 94761 N-INVAS EAR/PLS OXIMETRY MLT: CPT

## 2022-04-12 PROCEDURE — 36415 COLL VENOUS BLD VENIPUNCTURE: CPT | Performed by: INTERNAL MEDICINE

## 2022-04-12 PROCEDURE — 99233 PR SUBSEQUENT HOSPITAL CARE,LEVL III: ICD-10-PCS | Mod: ,,, | Performed by: INTERNAL MEDICINE

## 2022-04-12 PROCEDURE — 25000003 PHARM REV CODE 250: Performed by: NURSE PRACTITIONER

## 2022-04-12 PROCEDURE — 27000221 HC OXYGEN, UP TO 24 HOURS

## 2022-04-12 PROCEDURE — 25000003 PHARM REV CODE 250: Performed by: FAMILY MEDICINE

## 2022-04-12 PROCEDURE — 80053 COMPREHEN METABOLIC PANEL: CPT | Performed by: INTERNAL MEDICINE

## 2022-04-12 PROCEDURE — 63600175 PHARM REV CODE 636 W HCPCS: Performed by: INTERNAL MEDICINE

## 2022-04-12 PROCEDURE — 20000000 HC ICU ROOM

## 2022-04-12 PROCEDURE — 84484 ASSAY OF TROPONIN QUANT: CPT | Performed by: INTERNAL MEDICINE

## 2022-04-12 PROCEDURE — 97530 THERAPEUTIC ACTIVITIES: CPT

## 2022-04-12 PROCEDURE — 99233 SBSQ HOSP IP/OBS HIGH 50: CPT | Mod: ,,, | Performed by: INTERNAL MEDICINE

## 2022-04-12 PROCEDURE — 85025 COMPLETE CBC W/AUTO DIFF WBC: CPT | Performed by: INTERNAL MEDICINE

## 2022-04-12 PROCEDURE — 82272 OCCULT BLD FECES 1-3 TESTS: CPT | Performed by: INTERNAL MEDICINE

## 2022-04-12 PROCEDURE — 94640 AIRWAY INHALATION TREATMENT: CPT

## 2022-04-12 PROCEDURE — 25000242 PHARM REV CODE 250 ALT 637 W/ HCPCS: Performed by: NURSE PRACTITIONER

## 2022-04-12 PROCEDURE — 99900035 HC TECH TIME PER 15 MIN (STAT)

## 2022-04-12 PROCEDURE — 82553 CREATINE MB FRACTION: CPT | Mod: 91 | Performed by: INTERNAL MEDICINE

## 2022-04-12 PROCEDURE — 25000003 PHARM REV CODE 250: Performed by: INTERNAL MEDICINE

## 2022-04-12 RX ORDER — ALPRAZOLAM 0.25 MG/1
0.5 TABLET ORAL ONCE
Status: COMPLETED | OUTPATIENT
Start: 2022-04-12 | End: 2022-04-12

## 2022-04-12 RX ADMIN — SODIUM CHLORIDE: 0.9 INJECTION, SOLUTION INTRAVENOUS at 01:04

## 2022-04-12 RX ADMIN — SUCRALFATE 1 G: 1 SUSPENSION ORAL at 12:04

## 2022-04-12 RX ADMIN — ALPRAZOLAM 0.5 MG: 0.25 TABLET ORAL at 06:04

## 2022-04-12 RX ADMIN — MEMANTINE HYDROCHLORIDE 5 MG: 5 TABLET ORAL at 10:04

## 2022-04-12 RX ADMIN — FERROUS SULFATE TAB 325 MG (65 MG ELEMENTAL FE) 1 EACH: 325 (65 FE) TAB at 10:04

## 2022-04-12 RX ADMIN — ATORVASTATIN CALCIUM 20 MG: 20 TABLET, FILM COATED ORAL at 08:04

## 2022-04-12 RX ADMIN — SUCRALFATE 1 G: 1 SUSPENSION ORAL at 06:04

## 2022-04-12 RX ADMIN — IPRATROPIUM BROMIDE AND ALBUTEROL SULFATE 3 ML: 2.5; .5 SOLUTION RESPIRATORY (INHALATION) at 07:04

## 2022-04-12 RX ADMIN — GUAIFENESIN 600 MG: 600 TABLET, EXTENDED RELEASE ORAL at 10:04

## 2022-04-12 RX ADMIN — MUPIROCIN: 20 OINTMENT TOPICAL at 10:04

## 2022-04-12 RX ADMIN — IPRATROPIUM BROMIDE AND ALBUTEROL SULFATE 3 ML: 2.5; .5 SOLUTION RESPIRATORY (INHALATION) at 12:04

## 2022-04-12 RX ADMIN — INSULIN ASPART 2 UNITS: 100 INJECTION, SOLUTION INTRAVENOUS; SUBCUTANEOUS at 04:04

## 2022-04-12 RX ADMIN — MUPIROCIN: 20 OINTMENT TOPICAL at 08:04

## 2022-04-12 RX ADMIN — METHYLPREDNISOLONE SODIUM SUCCINATE 40 MG: 40 INJECTION, POWDER, FOR SOLUTION INTRAMUSCULAR; INTRAVENOUS at 10:04

## 2022-04-12 RX ADMIN — ACETAMINOPHEN 650 MG: 325 TABLET ORAL at 07:04

## 2022-04-12 RX ADMIN — GUAIFENESIN 600 MG: 600 TABLET, EXTENDED RELEASE ORAL at 08:04

## 2022-04-12 RX ADMIN — EZETIMIBE 10 MG: 10 TABLET ORAL at 10:04

## 2022-04-12 RX ADMIN — CYANOCOBALAMIN TAB 500 MCG 1000 MCG: 500 TAB at 10:04

## 2022-04-12 RX ADMIN — IPRATROPIUM BROMIDE AND ALBUTEROL SULFATE 3 ML: 2.5; .5 SOLUTION RESPIRATORY (INHALATION) at 06:04

## 2022-04-12 RX ADMIN — PANTOPRAZOLE SODIUM 40 MG: 40 TABLET, DELAYED RELEASE ORAL at 10:04

## 2022-04-12 RX ADMIN — CEFTRIAXONE 2 G: 2 INJECTION, SOLUTION INTRAVENOUS at 08:04

## 2022-04-12 RX ADMIN — SODIUM CHLORIDE: 0.9 INJECTION, SOLUTION INTRAVENOUS at 10:04

## 2022-04-12 RX ADMIN — SULFASALAZINE 500 MG: 500 TABLET ORAL at 10:04

## 2022-04-12 NOTE — SUBJECTIVE & OBJECTIVE
Interval History: off pressors and precedex, BP borderline but improving    Review of Systems   Constitutional:  Positive for fatigue. Negative for activity change, fever and unexpected weight change.   HENT:  Negative for congestion, ear pain, hearing loss, rhinorrhea and sore throat.    Eyes:  Negative for pain, redness and visual disturbance.   Respiratory:  Positive for choking. Negative for cough, shortness of breath and wheezing.    Cardiovascular:  Negative for chest pain, palpitations and leg swelling.   Gastrointestinal:  Negative for abdominal pain, constipation, diarrhea, nausea and vomiting.   Genitourinary:  Negative for dysuria, frequency and urgency.   Musculoskeletal:  Negative for back pain, joint swelling and neck pain.   Skin:  Negative for color change, rash and wound.   Neurological:  Positive for weakness. Negative for dizziness, tremors, light-headedness and headaches.   Objective:     Vital Signs (Most Recent):  Temp: 97.7 °F (36.5 °C) (04/12/22 1130)  Pulse: 78 (04/12/22 1113)  Resp: 19 (04/12/22 1113)  BP: (!) 125/53 (04/12/22 1100)  SpO2: 100 % (04/12/22 1113)   Vital Signs (24h Range):  Temp:  [97 °F (36.1 °C)-98.1 °F (36.7 °C)] 97.7 °F (36.5 °C)  Pulse:  [64-95] 78  Resp:  [12-28] 19  SpO2:  [92 %-100 %] 100 %  BP: ()/(36-75) 125/53     Weight: 78.5 kg (173 lb 1 oz)  Body mass index is 37.45 kg/m².    Intake/Output Summary (Last 24 hours) at 4/12/2022 1136  Last data filed at 4/12/2022 1024  Gross per 24 hour   Intake 3945.47 ml   Output 3225 ml   Net 720.47 ml      Physical Exam  Vitals and nursing note reviewed.   Constitutional:       General: She is not in acute distress.     Appearance: She is well-developed. She is obese.   HENT:      Head: Normocephalic and atraumatic.      Right Ear: External ear normal.      Left Ear: External ear normal.      Nose: Nose normal.   Eyes:      General:         Right eye: No discharge.         Left eye: No discharge.      Extraocular  Movements: Extraocular movements intact.      Conjunctiva/sclera: Conjunctivae normal.      Pupils: Pupils are equal, round, and reactive to light.   Neck:      Thyroid: No thyromegaly.   Cardiovascular:      Rate and Rhythm: Normal rate and regular rhythm.      Heart sounds: No murmur heard.  Pulmonary:      Effort: Pulmonary effort is normal. No respiratory distress.      Breath sounds: Wheezing present. No rales.   Abdominal:      General: Bowel sounds are normal. There is no distension.      Palpations: Abdomen is soft.      Tenderness: There is no abdominal tenderness.   Musculoskeletal:      Right lower leg: No edema.      Left lower leg: No edema.   Skin:     General: Skin is warm and dry.   Neurological:      Mental Status: She is alert and oriented to person, place, and time.      Cranial Nerves: No cranial nerve deficit.   Psychiatric:         Behavior: Behavior normal.         Thought Content: Thought content normal.       Significant Labs: All pertinent labs within the past 24 hours have been reviewed.  ABGs: No results for input(s): PH, PCO2, HCO3, POCSATURATED, BE, TOTALHB, COHB, METHB, O2HB, POCFIO2, PO2 in the last 48 hours.  CBC:   Recent Labs   Lab 04/11/22 0246 04/12/22  0403   WBC 7.21 4.15   HGB 9.7* 10.0*   HCT 31.3* 31.7*    162     CMP:   Recent Labs   Lab 04/11/22 0246 04/12/22  0403    142   K 4.3 4.1    105   CO2 25 29   * 96   BUN 47* 28*   CREATININE 1.1 0.9   CALCIUM 8.3* 8.2*   PROT 5.9* 5.2*   ALBUMIN 2.7* 2.4*   BILITOT 0.4 0.2   ALKPHOS 73 62   AST 35 30   ALT 29 26   ANIONGAP 11 8   EGFRNONAA 47* 60     Lactic Acid: No results for input(s): LACTATE in the last 48 hours.  Troponin:   Recent Labs   Lab 04/11/22 2021 04/12/22  0220 04/12/22  0810   TROPONINI 0.011 0.014 0.007     Urine Culture: No results for input(s): LABURIN in the last 48 hours.  Urine Studies: No results for input(s): COLORU, APPEARANCEUA, PHUR, SPECGRAV, PROTEINUA, GLUCUA, KETONESU,  BILIRUBINUA, OCCULTUA, NITRITE, UROBILINOGEN, LEUKOCYTESUR, RBCUA, WBCUA, BACTERIA, SQUAMEPITHEL, HYALINECASTS in the last 48 hours.    Invalid input(s): LISET    Significant Imaging: I have reviewed all pertinent imaging results/findings within the past 24 hours.

## 2022-04-12 NOTE — ASSESSMENT & PLAN NOTE
New onset overnight  Patient a poor historian and difficulty to qualify her pain  + belching  Cont PPI, added carafate; seems to respond to GI cocktail earlier this am  Trend CE  EKG reviewed, no acute ST-T wave changes  Cont tele  Cont statin    4/10: resolved today with treatment for reflux  CE normal  Cont statin and tele  Repeat CXR today    4/12  Resolved, trop negative

## 2022-04-12 NOTE — PLAN OF CARE
[facility] - Intensive Care  Discharge Reassessment    Primary Care Provider: Jailene Astudillo MD    Expected Discharge Date:     Reassessment (most recent)     Discharge Reassessment - 22 1346        Discharge Reassessment    Assessment Type Discharge Planning Reassessment     Did the patient's condition or plan change since previous assessment? Yes     Discharge Plan discussed with: Patient     Communicated MARIKA with patient/caregiver Date not available/Unable to determine     Discharge Plan A Home with family;Home Health     Discharge Plan B Skilled Nursing Facility     DME Needed Upon Discharge  other (see comments)   TBD    Discharge Barriers Identified None     Why the patient remains in the hospital Requires continued medical care                   Discharge reassessment completed with patient. Original discharge assessment completed with patient's daughter-in-law, Mari, as patient presented with confusion. Patient states that she lives in a camper in between her oldest son, Agustin, and his wife, Mari, and her grandson and his wife. States that she lives alone but knows that family will 'take care of her.' She states that she's relatively independent with daily living but does go to take showers at her son's home. During initial discharge assessment with Mari, the discharge plan was for patient to return home. Patient's nurse received a call on Friday from the patient's son requesting nursing home placement. SW attempted to contact patient's son this afternoon but he did not answer. SW then met with patient who stated all of above. MD documenting that patient's mental status is improving. She is now oriented to person and place. During discussion with patient, she does repeat herself frequently. She expressed several times during conversation that she lives alone in between her son and grandson. She stated several times that her   and another man gave her a check to purchase the camper she is  living in now. She stated several times that her middle son is a very hard worker. She states that her discharge plan is to go home to her camper where she resided previous to this admission. There is no indication in the medical record that the patient cannot make her own medical decisions at this time. Until then, SW to proceed with discharge planning with patient's wishes. PT is recommending SNF for patient at discharge. Nursing reports that patient ambulates with assistance. Patient is agreeable to home health at discharge.     SW to remain available.

## 2022-04-12 NOTE — PLAN OF CARE
No falls or near misses noted on shift, Edema as documented, some generalized bruising noted to bilateral upper extremities, no other skin issues or wounds, No pain complaints, personal preferences provided as requested for food/drinks as diet orders allow, plan of care discussed with patient at bedside, minimal evidence of learning noted, saline locked as ordered, plan is to maintain ICU status.

## 2022-04-12 NOTE — ASSESSMENT & PLAN NOTE
Patient with Hypoxic Respiratory failure which is Acute on chronic per chart but family doesn't think she was on O2 at home.  she is on home oxygen at 2-3 LPM per chart review; patient unsure and family doesn't think she had supplemental O2 at home. Supplemental oxygen was provided and noted-  .   Signs/symptoms of respiratory failure include- wheezing. Contributing diagnoses includes - COPD Labs and images were reviewed. Patient Has not had a recent ABG. Will treat underlying causes and adjust management of respiratory failure as follows-     Treat as COPD exacerbation with O2 and wean as tolerated, steroids, nebs, mucinex  Rocephin added to also cover for possible UTI and de-escalate pending cx as CXR without conslidation and low suspicion for bacterial PNA    4/12: cont steroids 40mg daily  Cont nebs  Rocephin 4/8; stopped 4/9 as urine cx negative and resumed 4/10 after triggering sepsis alert due to hypotension, urine cx negative. Blood cx pending. Repeat CXR pending  Wean o2 as tolerated. PCO2 stable since admit; not sure she would tolerate BiPaP and mild elevation at best

## 2022-04-12 NOTE — ASSESSMENT & PLAN NOTE
"SBP >90 and RR > 30  No leukcotyosis; no "real fevers"-temp ~99F  No clear source  Urine cx negative  Blood cx NGTD  Sputum cx pending  Repeating CXR  Resumed rocephin and added azithro as pulmonary source seems most likely and de-escalate as cx return  Received 3L NS throughout the day yesterday with improvement in BP from 70-90s but moved to ICU in case of need for pressor support. Has not yet required this  LA=2    4/12  Stable  Will cont antibx. CXR not showing consolidation though if there is source pulmonary seems most likely  Cont IVF, BP stabilizing. Off pressors now    "

## 2022-04-12 NOTE — NURSING
1905 Lying in bed awake and alert. Oriented to name and place but not time. Reoriented to time. Patient restless constant moving about in bed. Voice no complaints. Precedex infusing at 0.2 mcg/kg/hr. Normal saline infusing at 100 ml/hr. O2 at 2 liters NC in progress. No signs of respiratory distress assessed at this time. Sat 92%.16 fr abad draining clear yellow urine to gravity. Continuous cardiac monitoring in progress. Discussed plan of care with patient and son. Will continue to monitor. Side rails times 2 up and call button in reach.    2100 Patient eating cereal and milk tolerating well. No signs of respiratory distress assessed at this time. Voices no complaints. Side rails times 2 up and call button in reach.    2300 Precedex infusing at 0.2 mcg/kg/hr and Normal Saline infusing at 100 ml/hr. 16 fr abad draining clear yellow urine to gravity.    0100 Sleeping. No signs of respiratory distress assessed at this time. Precedes infusing at 0.2 mcg/kg/hr and Normal Saline infusing at 100 ml/hr. Abad remains patent and intact.     0300 Status unchanged sleeping. No signs of respiratory distress assessed at this time. Precedex remains at 0.2 mcg/kg/hr and Normal Saline remains at 100 ml/hr. Abad remains patent and intact.    0500 Sleeping    0630 Patient remained stable through out the night. She had no episodes of difficulty breathing. O2 at 2 liters NC. Precedex at 0.2 mcg/kg/hr and Normal Saline at 100 ml/hr. Abad draining clear yellow urine to gravity. Side rails times 2 up and call button in reach.

## 2022-04-12 NOTE — EICU
Rounding (Video Assessment):  Yes    Intervention Initiated From:  [facility] / EICU    Comments: Video rounds complete. Patient resting in bed with no alarms, or distress noted.

## 2022-04-12 NOTE — PROGRESS NOTES
[facility] Medicine  Progress Note    Patient Name: Gretel Ashton  MRN: [id number]  Patient Class: IP- Inpatient   Admission Date: 4/7/2022  Length of Stay: 4 days  Attending Physician: Althea Krause MD  Primary Care Provider: Jailene Astudillo MD        Subjective:     Principal Problem:COPD exacerbation        HPI:  Gretel Ashton is a 81 y.o. 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 Reese and chronic respiratory failure on 2-3 L at home secondary to COPD, follows with Dr. CECILY Guerra.   Pt presents to ER yesterday PM with c/o shortness of breath that began 2:00 a.m. yesterday  morning.  She also reports feeling nauseated and having a cough with productive brownish yellow sputum. Brother reports she has been having frequent episodes of shortness of breath but not quite this severe. Denies any home o2 use???    denies any fever. denies any chest pain or palpitations. patient does appear very anxious.  Denies any recent ill contacts    Colitis- ? Sulfasalazine   URINE ABN- Cx in process   Will start IV Rocephin,       Overview/Hospital Course:  Patient had a rough night complaining of intermittent chest pain. + belching. + nausea. Increasing intensity overnight. Initial SBP 90s then 200s, then 90s. Likely anxiety driven. Initial CE negative. EKG negative. 11 beat VT on tele overnight but none since. Replacing electrolytes PRN. She remains on 2L NC. She is unable to tell me if her pain feels like reflux but reports in the left lower chest.     4/10:  Patient developed worsening hypotension throughout the day yesterday. Given up to 3L NS bolus and then was on 100cc/hr NS. SBP improved from 70s to 90s but still low. RR > 30. No fevers  (temp around 99) and no leukocytosis. Urine cx negative. Initial CXR without consolidation. However, triggering sepsis alert. She was moved to the ICU last night for closer monitoring in case she would require pressor support but she has not. Lactic acid 2. She continues to oxygenate well on 2L NC. pCO2 is 52--unchanged from admission. She remains intermittently confused since admission; though likely her baseline. No further reports of chest pain since treatment for reflux yesterday. CE not suggestive of ischemia. Resumed Rocephin and azithro as pulmonary source seems most likely given her initial presentation; repeat CXR this AM.     4/11/22  Gretel Ashton is a 81 y.o. female  Seen in ICU ; on precedex and irene synephrine .still on IVF   I saw her in office for a long time she does not recognize me now .  She was admitted for copd exacerbation ; on steroid ; and ceftriaxone and zithromax   She became hypotensive ; needs IVF ; minimal pressors ; her HB dropped to 6 ; required 2 units of PRBCs   Now  9.7; No BM so dont know if GI bleeding .  Will wean her off of pressors and precedex .     4/12:  Off predecex and irene this AM  BP borderline this morning, better on rounds   Still with cough on 2L NC  BM not collected for occult; H/H trended up. No gross bleeding noted  Mental status still with intermittent confusion but less agitation. She knows where she is and her family members. Can ask to go to toilet for BM, etc.             Interval History: off pressors and precedex, BP borderline but improving    Review of Systems   Constitutional:  Positive for fatigue. Negative for activity change, fever and unexpected weight change.   HENT:  Negative for congestion, ear pain, hearing loss, rhinorrhea and sore throat.    Eyes:  Negative for pain, redness and visual disturbance.   Respiratory:  Positive for choking. Negative for cough, shortness of breath and wheezing.    Cardiovascular:  Negative for chest pain, palpitations  and leg swelling.   Gastrointestinal:  Negative for abdominal pain, constipation, diarrhea, nausea and vomiting.   Genitourinary:  Negative for dysuria, frequency and urgency.   Musculoskeletal:  Negative for back pain, joint swelling and neck pain.   Skin:  Negative for color change, rash and wound.   Neurological:  Positive for weakness. Negative for dizziness, tremors, light-headedness and headaches.   Objective:     Vital Signs (Most Recent):  Temp: 97.7 °F (36.5 °C) (04/12/22 1130)  Pulse: 78 (04/12/22 1113)  Resp: 19 (04/12/22 1113)  BP: (!) 125/53 (04/12/22 1100)  SpO2: 100 % (04/12/22 1113)   Vital Signs (24h Range):  Temp:  [97 °F (36.1 °C)-98.1 °F (36.7 °C)] 97.7 °F (36.5 °C)  Pulse:  [64-95] 78  Resp:  [12-28] 19  SpO2:  [92 %-100 %] 100 %  BP: ()/(36-75) 125/53     Weight: 78.5 kg (173 lb 1 oz)  Body mass index is 37.45 kg/m².    Intake/Output Summary (Last 24 hours) at 4/12/2022 1136  Last data filed at 4/12/2022 1024  Gross per 24 hour   Intake 3945.47 ml   Output 3225 ml   Net 720.47 ml      Physical Exam  Vitals and nursing note reviewed.   Constitutional:       General: She is not in acute distress.     Appearance: She is well-developed. She is obese.   HENT:      Head: Normocephalic and atraumatic.      Right Ear: External ear normal.      Left Ear: External ear normal.      Nose: Nose normal.   Eyes:      General:         Right eye: No discharge.         Left eye: No discharge.      Extraocular Movements: Extraocular movements intact.      Conjunctiva/sclera: Conjunctivae normal.      Pupils: Pupils are equal, round, and reactive to light.   Neck:      Thyroid: No thyromegaly.   Cardiovascular:      Rate and Rhythm: Normal rate and regular rhythm.      Heart sounds: No murmur heard.  Pulmonary:      Effort: Pulmonary effort is normal. No respiratory distress.      Breath sounds: Wheezing present. No rales.   Abdominal:      General: Bowel sounds are normal. There is no distension.       Palpations: Abdomen is soft.      Tenderness: There is no abdominal tenderness.   Musculoskeletal:      Right lower leg: No edema.      Left lower leg: No edema.   Skin:     General: Skin is warm and dry.   Neurological:      Mental Status: She is alert and oriented to person, place, and time.      Cranial Nerves: No cranial nerve deficit.   Psychiatric:         Behavior: Behavior normal.         Thought Content: Thought content normal.       Significant Labs: All pertinent labs within the past 24 hours have been reviewed.  ABGs: No results for input(s): PH, PCO2, HCO3, POCSATURATED, BE, TOTALHB, COHB, METHB, O2HB, POCFIO2, PO2 in the last 48 hours.  CBC:   Recent Labs   Lab 04/11/22 0246 04/12/22  0403   WBC 7.21 4.15   HGB 9.7* 10.0*   HCT 31.3* 31.7*    162     CMP:   Recent Labs   Lab 04/11/22 0246 04/12/22  0403    142   K 4.3 4.1    105   CO2 25 29   * 96   BUN 47* 28*   CREATININE 1.1 0.9   CALCIUM 8.3* 8.2*   PROT 5.9* 5.2*   ALBUMIN 2.7* 2.4*   BILITOT 0.4 0.2   ALKPHOS 73 62   AST 35 30   ALT 29 26   ANIONGAP 11 8   EGFRNONAA 47* 60     Lactic Acid: No results for input(s): LACTATE in the last 48 hours.  Troponin:   Recent Labs   Lab 04/11/22 2021 04/12/22  0220 04/12/22  0810   TROPONINI 0.011 0.014 0.007     Urine Culture: No results for input(s): LABURIN in the last 48 hours.  Urine Studies: No results for input(s): COLORU, APPEARANCEUA, PHUR, SPECGRAV, PROTEINUA, GLUCUA, KETONESU, BILIRUBINUA, OCCULTUA, NITRITE, UROBILINOGEN, LEUKOCYTESUR, RBCUA, WBCUA, BACTERIA, SQUAMEPITHEL, HYALINECASTS in the last 48 hours.    Invalid input(s): WRIGHTSUR    Significant Imaging: I have reviewed all pertinent imaging results/findings within the past 24 hours.      Assessment/Plan:      * COPD exacerbation  Supplemental O2 via nasal canula; titrate O2 saturation to >92%. Records reviewed and she was on  Home O2 a few years ago ; family denies home Oxygen at present   Start Solumedrol 80mg  "daily   Continue beta 2 agonist bronchodilator treatments.   Continue IV antibiotics. Start IV rocephin 1gm for lower resp infection/COPD exac as would treat possible UTI; f/u urine Cx also and if negative can de-escalate and no consolidation on CXR    Continue routine medications as before.     4/12: improving; still coughing   Cont nebs, cont steroids  Resumed rocephin and started azitro as triggering sepsis protocol and pulmonary seems most likely source given her presentation  Reviewed CXR  Wean O2 as tolerated    Symptomatic anemia  She has h/o colitis   She may have bled   Right now no active bleeding   Will monitor H/H    4/12  Stable  Still no stool for occult but no active bleeding  Daily H/H      SIRS (systemic inflammatory response syndrome)  SBP >90 and RR > 30  No leukcotyosis; no "real fevers"-temp ~99F  No clear source  Urine cx negative  Blood cx NGTD  Sputum cx pending  Repeating CXR  Resumed rocephin and added azithro as pulmonary source seems most likely and de-escalate as cx return  Received 3L NS throughout the day yesterday with improvement in BP from 70-90s but moved to ICU in case of need for pressor support. Has not yet required this  LA=2    4/12  Stable  Will cont antibx. CXR not showing consolidation though if there is source pulmonary seems most likely  Cont IVF, BP stabilizing. Off pressors now      Chest pain  New onset overnight  Patient a poor historian and difficulty to qualify her pain  + belching  Cont PPI, added carafate; seems to respond to GI cocktail earlier this am  Trend CE  EKG reviewed, no acute ST-T wave changes  Cont tele  Cont statin    4/10: resolved today with treatment for reflux  CE normal  Cont statin and tele  Repeat CXR today    4/12  Resolved, trop negative    Debility  PT/OT.      Cystitis  Abn UA  Awaiting urine Cx in process   Not reliable historian to know if symptomatic so will treat with rocephin pending cx results    4/9: cx negative. Stopped rocephin but " resume for possible pulmonary source/SIRS.    4/11  Continue rocephin     HTN (hypertension)  Home meds;   Losartan 25mg daily , HCTz 12.5mg daily prn- hold hctz for now     4/9: BP variable. SBP 200s overnight but anxious. Then as low as 80s. Hold meds. 1L NS this AM and reassess--BP trending up again with fluids    4/10: patient remained hypotensive throughout the day yesterday. Given 3L NS with improvement from 70s to 90s.  Moved to ICU in case of need for pressor support but was never needed. Currently stable  SIRS criteria met but unclear source if any. cx pending and repeating CXR this AM.  Cont hold BP meds    4/12  Off irene  Cont IVF, BP borderline but improving. Keep in ICU another 24 hours to ensure BP stability (fluctuating on checks this morning)  Remains off all PO BP meds    Acute on chronic respiratory failure with hypoxemia  Patient with Hypoxic Respiratory failure which is Acute on chronic per chart but family doesn't think she was on O2 at home.  she is on home oxygen at 2-3 LPM per chart review; patient unsure and family doesn't think she had supplemental O2 at home. Supplemental oxygen was provided and noted-  .   Signs/symptoms of respiratory failure include- wheezing. Contributing diagnoses includes - COPD Labs and images were reviewed. Patient Has not had a recent ABG. Will treat underlying causes and adjust management of respiratory failure as follows-     Treat as COPD exacerbation with O2 and wean as tolerated, steroids, nebs, mucinex  Rocephin added to also cover for possible UTI and de-escalate pending cx as CXR without conslidation and low suspicion for bacterial PNA    4/12: cont steroids 40mg daily  Cont nebs  Rocephin 4/8; stopped 4/9 as urine cx negative and resumed 4/10 after triggering sepsis alert due to hypotension, urine cx negative. Blood cx pending. Repeat CXR pending  Wean o2 as tolerated. PCO2 stable since admit; not sure she would tolerate BiPaP and mild elevation at  best      Rheumatoid arthritis involving both hands with negative rheumatoid factor  Takes sulfasalazine 500mg daily ; not sure if for RA or hx of colitis .      Aortic atherosclerosis  Statin , ASA .      Memory loss  Follows with Dr. Reese for this  Dx probable early dementia per notes in 9/2021   Continue memantine .      Type 2 diabetes mellitus with diabetic neuropathy, without long-term current use of insulin  Home meds include oral metformin   Lab Results   Component Value Date    HGBA1C 6.0 (H) 04/10/2022   will not start insulin per protocol  Monitor for now .        Hyperlipidemia  Continue statin- crestor not fomulary; atorvastatin here   zetia .        VTE Risk Mitigation (From admission, onward)         Ordered     IP VTE HIGH RISK PATIENT  Once         04/07/22 2238     Place sequential compression device  Until discontinued         04/07/22 2238                Discharge Planning   MARIKA:      Code Status: Full Code   Is the patient medically ready for discharge?:     Reason for patient still in hospital (select all that apply): Patient unstable and Treatment  Discharge Plan A: Home with family, Home Health            Critical care time spent on the evaluation and treatment of severe organ dysfunction, review of pertinent labs and imaging studies, discussions with consulting providers and discussions with patient/family: 62 minutes.      Linda Lira MD  Department of Hospital Medicine   [facility] - Intensive Care

## 2022-04-12 NOTE — NURSING
Called and updated Dr Lira on vitals, status, condition, restless, itching complaints, trying to get out of bed, new orders noted, also shortness of breath complaints at times. Will continue to monitor and update MD as needed.

## 2022-04-12 NOTE — ASSESSMENT & PLAN NOTE
Home meds;   Losartan 25mg daily , HCTz 12.5mg daily prn- hold hctz for now     4/9: BP variable. SBP 200s overnight but anxious. Then as low as 80s. Hold meds. 1L NS this AM and reassess--BP trending up again with fluids    4/10: patient remained hypotensive throughout the day yesterday. Given 3L NS with improvement from 70s to 90s.  Moved to ICU in case of need for pressor support but was never needed. Currently stable  SIRS criteria met but unclear source if any. cx pending and repeating CXR this AM.  Cont hold BP meds    4/12  Off irene  Cont IVF, BP borderline but improving. Keep in ICU another 24 hours to ensure BP stability (fluctuating on checks this morning)  Remains off all PO BP meds

## 2022-04-12 NOTE — ASSESSMENT & PLAN NOTE
She has h/o colitis   She may have bled   Right now no active bleeding   Will monitor H/H    4/12  Stable  Still no stool for occult but no active bleeding  Daily H/H

## 2022-04-12 NOTE — ASSESSMENT & PLAN NOTE
Supplemental O2 via nasal canula; titrate O2 saturation to >92%. Records reviewed and she was on  Home O2 a few years ago ; family denies home Oxygen at present   Start Solumedrol 80mg daily   Continue beta 2 agonist bronchodilator treatments.   Continue IV antibiotics. Start IV rocephin 1gm for lower resp infection/COPD exac as would treat possible UTI; f/u urine Cx also and if negative can de-escalate and no consolidation on CXR    Continue routine medications as before.     4/12: improving; still coughing   Cont nebs, cont steroids  Resumed rocephin and started azitro as triggering sepsis protocol and pulmonary seems most likely source given her presentation  Reviewed CXR  Wean O2 as tolerated

## 2022-04-12 NOTE — NURSING
Dr Lira at bedside, updated MD on vitals, status, condition, peaked T waves, anxious and restless at times, frequent coughing, Precedex gtt off, SBP 80's at times, awaiting new orders, Son at bedside

## 2022-04-12 NOTE — PT/OT/SLP PROGRESS
"Physical Therapy Treatment    Patient Name:  Gretel Ashton   MRN:  [id number]    Recommendations:     Discharge Recommendations:  nursing facility, skilled   Discharge Equipment Recommendations:  (Will continue to assess)   Barriers to discharge: Inaccessible home and Decreased caregiver support    Assessment:     Gretel Ashton is a 81 y.o. female admitted with a medical diagnosis of COPD exacerbation.  She presents with the following impairments/functional limitations:  weakness, impaired endurance, impaired self care skills, impaired functional mobilty, gait instability, impaired balance, decreased safety awareness. Checked with nursing prior to tx and reported patient's blood pressure was down earlier but now noted 130/52 mm hg and SPO2 at 98%. Patient agreed with PT tx. Noted disoriented with time. Tolerated AAROM to AROM ex on bilateral LE/UE and sitting activity at edge of the bed for breakfast. Left patient to nursing care with /52 mm hg and SPO2 96%.Will continue to monitor vital signs during gradual inc functional activity with the patient.     Rehab Prognosis: Fair; patient would benefit from acute skilled PT services to address these deficits and reach maximum level of function.    Recent Surgery: * No surgery found *      Plan:     During this hospitalization, patient to be seen daily to address the identified rehab impairments via gait training, therapeutic activities, therapeutic exercises and progress toward the following goals:    · Plan of Care Expires:  04/25/22    Subjective     Chief Complaint: No new complain  Patient/Family Comments/goals: "To get better"  Pain/Comfort:  · Pain Rating 1: 0/10      Objective:     Communicated with nursing, patient and son just left  prior to session.  Patient found HOB elevated with peripheral IV, oxygen, blood pressure cuff, pulse ox (continuous), abad catheter upon PT entry to room.     General Precautions: Standard, fall   Orthopedic Precautions:N/A "   Braces: N/A  Respiratory Status: Nasal cannula, flow 2 L/min     Functional Mobility:  · Bed Mobility:     · Rolling Left:  stand by assistance  · Rolling Right: stand by assistance  · Scooting: contact guard assistance  · Supine to Sit: contact guard assistance  · Balance: Sitting Dynamic: Good+      AM-PAC 6 CLICK MOBILITY  Turning over in bed (including adjusting bedclothes, sheets and blankets)?: 3  Sitting down on and standing up from a chair with arms (e.g., wheelchair, bedside commode, etc.): 3  Moving from lying on back to sitting on the side of the bed?: 3  Moving to and from a bed to a chair (including a wheelchair)?: 3  Need to walk in hospital room?: 3  Climbing 3-5 steps with a railing?: 1  Basic Mobility Total Score: 16       Therapeutic Activities and Exercises:   Performed AAROM to AROM ex on bilateral LE and UE x 5 reps on each with rest periods followed by sitting activity at edge of the bed for breakfast meal set up    Patient left sitting at edge of the bed for breakfast with all lines intact, call button in reach and nursing notified..    GOALS:   Multidisciplinary Problems     Physical Therapy Goals        Problem: Physical Therapy    Goal Priority Disciplines Outcome Goal Variances Interventions   Physical Therapy Goal     PT, PT/OT      Description:   Patient will increase functional independence with mobility by performin. Supine to sit with Modified Independent  2. Sit to supine with Modified Independent  3. Bed to chair transfer with Supervision with or without rolling walker using Step Transfer TECHNIQUE  4. Gait  x ~100 feet with Supervision or Set-up Assistance with or without rolling walker.  5. Ascend/descend 4 steps with handrail/s with contact guard /supervision.  6. Lower extremity exercise program x10 reps per handout, with assistance as needed                      Time Tracking:     PT Received On: 22  PT Start Time: 0850     PT Stop Time: 905  PT Total Time  (min): 15 min     Billable Minutes: Therapeutic Activity 15 mins    Treatment Type: Treatment  PT/PTA: PT           04/12/2022

## 2022-04-12 NOTE — PHYSICIAN QUERY
EXAM DESCRIPTION:  L SPINE WHOLE



COMPLETED DATE/TIME:  3/6/2018 1:18 pm



REASON FOR STUDY:  back pain



COMPARISON:  None.



NUMBER OF VIEWS:  Five views including obliques.



TECHNIQUE:  AP, lateral, oblique, and sacral radiographic images acquired of the lumbar spine.



LIMITATIONS:  None.



FINDINGS:  MINERALIZATION: Normal.

SEGMENTATION: Normal.  No transitional anatomy.

ALIGNMENT: Normal.

VERTEBRAE: Maintained height.  No fracture or worrisome bone lesion.

DISCS: Preserved height.  No significant osteophytes or end plate irregularity.

POSTERIOR ELEMENTS: Pedicles and facets are intact.  No pars defect or posterior arch defects.

HARDWARE: None in the spine.

PARASPINAL SOFT TISSUES: Normal.

PELVIS: Intact as visualized. No fractures or worrisome bone lesions. SI joints intact.

OTHER: No other significant finding.



IMPRESSION:  NORMAL 5 VIEW LUMBAR SPINE.



TECHNICAL DOCUMENTATION:  JOB ID:  [id number]

 2011 [employer]- All Rights Reserved



Reading location - IP/workstation name: [email or web address] PT Name: Gretel Ashton  MR #: [id number]     DOCUMENTATION CLARIFICATION     CDS/: Elayne Garner RN CCDS              Contact information:[email or web address]  This form is a permanent document in the medical record.     Query Date: April 12, 2022    By submitting this query, we are merely seeking further clarification of documentation.  Please utilize your independent clinical judgment when addressing the question(s) below.  The Medical Record contains the following   Indicators   Supporting Clinical Findings Location in Medical Record   X Documentation of Respiratory Failure, ARDS Acute on chronic respiratory failure with hypoxemia  Patient with Hypoxic Respiratory failure which is Acute on chronic per chart but family doesn't think she was on O2 at home.  she is on home oxygen at 2-3 LPM per chart review; patient  unsure and family doesn't think she had supplemental O2 at home. Supplemental oxygen was provided and noted-  .  Signs/symptoms of respiratory failure include- wheezing. Contributing diagnoses includes - COPD Labs and images were reviewed. Patient Has not had a recent ABG. Will treat underlying causes and adjust management of respiratory failure as follows- H&P-hospital medicine PN 4/12   x SOB, KERNS, Wheezing, Productive Cough, Use of Accessory Muscles, etc.  Effort: Pulmonary effort is normal. No respiratory distress.     Breath sounds: Wheezing (b/l) present. H&P   x RR     ABGs     O2 sat ABG now is 7.35/52/73 on 2L NC    O2 sat= % on 2L/NC  RR= 15-26 EICU note 4/10    Nursing flow sheets    Hypoxia/Hypercapnia      BiPAP/Intubation/Mechanical Ventilation     x Supplemental O2 2L/NC Nursing flow sheets   x Home O2, Oxygen Dependence she is on home oxygen at 2-3 LPM per chart review; patient  unsure and family doesn't think she had supplemental O2 at home. Supplemental oxygen was provided and noted-  . Hospital medicine PN 4/9    Respiratory distress      x Radiology findings The patient  is rotated to the right.Lung volumes remain low.Numerous external artifacts are present.Heart is enlarged and unchanged with a pacemaker in place.Chronic interstitial opacities in the lung bases appears similar to the prior study. CXR 4/7   x Acute/Chronic Illness 82 yo female w/ PMHx Dementia, COPD here for the past 3-4 days being treated for COPD infective exacerbation w/ IV steroids and abx. EICU 4/10    Treatment      Other         The noted clinical guidelines following a diagnosis are only system guidelines and do not replace the providers clinical judgment.    Provider, please clarify the diagnosis of _Acute on chronic hypoxic respiratory failure_____:    [  x  ] Acute and (on) Chronic Respiratory Failure with Hypoxia (pO2 >10 mmHg below baseline or SpO2 < 91% on usual home O2 or O2 ? 2L/min over baseline home O2 and respiratory symptoms documented) diagnosis is confirmed and additional clinical support/decision-making indicators for the diagnosis include (please specify): ___________________________________________________________________   [    ] Acute and (on) Chronic Respiratory Failure ruled out.    Pt only has Chronic Respiratory Failure with Hypoxia (Continuous home oxygen use) ________________________________________________________________________   [    ] Other clarification (please specify): ___________________   [   ] Clinically Undetermined         Please document in your progress notes daily for the duration of treatment until resolved and include in your discharge summary.     Reference:    ENEIDA Loza MD. (2020, March 13). Acute respiratory distress syndrome: Clinical features, diagnosis, and complications in adults ([id number] DANIEL Jaimes MD & [id number] NATASHA Mendieta MD, Eds.). Retrieved November 13, 2020, from  https://[email or web address]/contents/acute-respiratory-distress-syndrome-clinical-features-diagnosis-and-complications-in-adults?search=ards&source=search_result&selectedTitle=1~150&usage_type=default&display_rank=1  Form No. [id number]

## 2022-04-12 NOTE — NURSING
Called and updated Dr Lira on shortness of breath complaints, Intake and Output (see values), new orders noted, will continue to monitor and update MD as needed, HR 90-low 100's, -140's (see vitals)

## 2022-04-13 LAB
CK MB SERPL-MCNC: 3.5 NG/ML (ref 0.1–6.5)
CK MB SERPL-MCNC: 3.8 NG/ML (ref 0.1–6.5)
CK MB SERPL-RTO: 3.4 % (ref 0–5)
CK MB SERPL-RTO: 6.8 % (ref 0–5)
CK SERPL-CCNC: 103 U/L (ref 20–180)
CK SERPL-CCNC: 56 U/L (ref 20–180)
POCT GLUCOSE: 104 MG/DL (ref 70–110)
POCT GLUCOSE: 128 MG/DL (ref 70–110)
POCT GLUCOSE: 156 MG/DL (ref 70–110)
POCT GLUCOSE: 165 MG/DL (ref 70–110)
TROPONIN I SERPL DL<=0.01 NG/ML-MCNC: 0.02 NG/ML (ref 0–0.03)
TROPONIN I SERPL DL<=0.01 NG/ML-MCNC: 0.02 NG/ML (ref 0–0.03)

## 2022-04-13 PROCEDURE — 25000003 PHARM REV CODE 250: Performed by: NURSE PRACTITIONER

## 2022-04-13 PROCEDURE — 97530 THERAPEUTIC ACTIVITIES: CPT

## 2022-04-13 PROCEDURE — 63600175 PHARM REV CODE 636 W HCPCS: Performed by: INTERNAL MEDICINE

## 2022-04-13 PROCEDURE — 27000221 HC OXYGEN, UP TO 24 HOURS

## 2022-04-13 PROCEDURE — 84484 ASSAY OF TROPONIN QUANT: CPT | Mod: 91 | Performed by: INTERNAL MEDICINE

## 2022-04-13 PROCEDURE — 99233 SBSQ HOSP IP/OBS HIGH 50: CPT | Mod: ,,, | Performed by: FAMILY MEDICINE

## 2022-04-13 PROCEDURE — 36415 COLL VENOUS BLD VENIPUNCTURE: CPT | Performed by: INTERNAL MEDICINE

## 2022-04-13 PROCEDURE — 11000001 HC ACUTE MED/SURG PRIVATE ROOM

## 2022-04-13 PROCEDURE — 25000003 PHARM REV CODE 250: Performed by: INTERNAL MEDICINE

## 2022-04-13 PROCEDURE — 82553 CREATINE MB FRACTION: CPT | Performed by: INTERNAL MEDICINE

## 2022-04-13 PROCEDURE — 94640 AIRWAY INHALATION TREATMENT: CPT

## 2022-04-13 PROCEDURE — 25000242 PHARM REV CODE 250 ALT 637 W/ HCPCS: Performed by: NURSE PRACTITIONER

## 2022-04-13 PROCEDURE — 94761 N-INVAS EAR/PLS OXIMETRY MLT: CPT

## 2022-04-13 PROCEDURE — 25000003 PHARM REV CODE 250: Performed by: FAMILY MEDICINE

## 2022-04-13 PROCEDURE — 99233 PR SUBSEQUENT HOSPITAL CARE,LEVL III: ICD-10-PCS | Mod: ,,, | Performed by: FAMILY MEDICINE

## 2022-04-13 PROCEDURE — 99900035 HC TECH TIME PER 15 MIN (STAT)

## 2022-04-13 RX ADMIN — SULFASALAZINE 500 MG: 500 TABLET ORAL at 09:04

## 2022-04-13 RX ADMIN — EZETIMIBE 10 MG: 10 TABLET ORAL at 09:04

## 2022-04-13 RX ADMIN — SUCRALFATE 1 G: 1 SUSPENSION ORAL at 05:04

## 2022-04-13 RX ADMIN — MUPIROCIN: 20 OINTMENT TOPICAL at 09:04

## 2022-04-13 RX ADMIN — PANTOPRAZOLE SODIUM 40 MG: 40 TABLET, DELAYED RELEASE ORAL at 09:04

## 2022-04-13 RX ADMIN — CEFTRIAXONE 2 G: 2 INJECTION, SOLUTION INTRAVENOUS at 09:04

## 2022-04-13 RX ADMIN — IPRATROPIUM BROMIDE AND ALBUTEROL SULFATE 3 ML: 2.5; .5 SOLUTION RESPIRATORY (INHALATION) at 07:04

## 2022-04-13 RX ADMIN — ATORVASTATIN CALCIUM 20 MG: 20 TABLET, FILM COATED ORAL at 08:04

## 2022-04-13 RX ADMIN — FERROUS SULFATE TAB 325 MG (65 MG ELEMENTAL FE) 1 EACH: 325 (65 FE) TAB at 09:04

## 2022-04-13 RX ADMIN — IPRATROPIUM BROMIDE AND ALBUTEROL SULFATE 3 ML: 2.5; .5 SOLUTION RESPIRATORY (INHALATION) at 12:04

## 2022-04-13 RX ADMIN — MEMANTINE HYDROCHLORIDE 5 MG: 5 TABLET ORAL at 09:04

## 2022-04-13 RX ADMIN — Medication 6 MG: at 09:04

## 2022-04-13 RX ADMIN — GUAIFENESIN 600 MG: 600 TABLET, EXTENDED RELEASE ORAL at 08:04

## 2022-04-13 RX ADMIN — GUAIFENESIN 600 MG: 600 TABLET, EXTENDED RELEASE ORAL at 09:04

## 2022-04-13 RX ADMIN — SUCRALFATE 1 G: 1 SUSPENSION ORAL at 12:04

## 2022-04-13 RX ADMIN — CYANOCOBALAMIN TAB 500 MCG 1000 MCG: 500 TAB at 09:04

## 2022-04-13 RX ADMIN — SUCRALFATE 1 G: 1 SUSPENSION ORAL at 06:04

## 2022-04-13 RX ADMIN — METHYLPREDNISOLONE SODIUM SUCCINATE 40 MG: 40 INJECTION, POWDER, FOR SOLUTION INTRAMUSCULAR; INTRAVENOUS at 09:04

## 2022-04-13 NOTE — PT/OT/SLP PROGRESS
"Physical Therapy Treatment    Patient Name:  Gretel Ashton   MRN:  [id number]    Recommendations:     Discharge Recommendations:  nursing facility, skilled   Discharge Equipment Recommendations: other (see comments) (will continue to assess)   Barriers to discharge: Inaccessible home and Decreased caregiver support    Assessment:     Gretel Ashton is a 81 y.o. female admitted with a medical diagnosis of COPD exacerbation.  She presents with the following impairments/functional limitations:  weakness, impaired endurance, impaired self care skills, impaired functional mobilty, gait instability, impaired balance, decreased safety awareness, impaired cognition. Patient seen up at the bedside chair and agreed with PT tx. Patient tolerated PT session today. Maintained BP at 119/82 mm hg before and after limited gait functions using RW with O2 supplement x ~15 feet. Also monitored SPO2 from 96% to 87% but immediately returned back to 96% -97% upon rest. No sign of respiratory distress.    Rehab Prognosis: Fair; patient would benefit from acute skilled PT services to address these deficits and reach maximum level of function.    Recent Surgery: * No surgery found *      Plan:     During this hospitalization, patient to be seen daily to address the identified rehab impairments via gait training, therapeutic activities, therapeutic exercises and progress toward the following goals:    · Plan of Care Expires:  04/25/22    Subjective     Chief Complaint: Patient wants cereal for breakfast today.  Patient/Family Comments/goals: "To get better".  Pain/Comfort:  · Pain Rating 1: 0/10      Objective:     Communicated with nursing and patient prior to session.  Patient found up in chair with blood pressure cuff, oxygen, pulse ox (continuous), telemetry, peripheral IV upon PT entry to room.     General Precautions: Standard, fall   Orthopedic Precautions:N/A   Braces: N/A  Respiratory Status: Nasal cannula, flow 2 L/min     Functional " Mobility:  · Transfers:     · Sit to Stand:  contact guard assistance with rolling walker  · Bed to Chair: contact guard assistance with  no AD  using  Stand Pivot  · Gait: Contact guard assistance x ~15 feet( stepping forward and back) with RW. Decrease stride lenght and dec foot/floor clearance.  · Balance: Standing Dynamic with RW : Fair      AM-PAC 6 CLICK MOBILITY  Turning over in bed (including adjusting bedclothes, sheets and blankets)?: 3  Sitting down on and standing up from a chair with arms (e.g., wheelchair, bedside commode, etc.): 3  Moving from lying on back to sitting on the side of the bed?: 3  Moving to and from a bed to a chair (including a wheelchair)?: 3  Need to walk in hospital room?: 3  Climbing 3-5 steps with a railing?: 1  Basic Mobility Total Score: 16       Therapeutic Activities and Exercises:   Worked on hands/feet placement and body sequence on sit to stand and gait trng using RW x ~15 feet with contact guard assistance.    Patient left up in chair with all lines intact, call button in reach and nursing notified..    GOALS:   Multidisciplinary Problems     Physical Therapy Goals        Problem: Physical Therapy    Goal Priority Disciplines Outcome Goal Variances Interventions   Physical Therapy Goal     PT, PT/OT      Description:   Patient will increase functional independence with mobility by performin. Supine to sit with Modified Independent  2. Sit to supine with Modified Independent  3. Bed to chair transfer with Supervision with or without rolling walker using Step Transfer TECHNIQUE  4. Gait  x ~100 feet with Supervision or Set-up Assistance with or without rolling walker.  5. Ascend/descend 4 steps with handrail/s with contact guard /supervision.  6. Lower extremity exercise program x10 reps per handout, with assistance as needed                      Time Tracking:     PT Received On: 22  PT Start Time: 905     PT Stop Time: 920  PT Total Time (min): 15 min      Billable Minutes: Therapeutic Activity 15 mins    Treatment Type: Treatment  PT/PTA: PT           04/13/2022

## 2022-04-13 NOTE — EICU
Rounding (Video Assessment):  Yes    Intervention Initiated From:  [facility] / EICU    Comments: Video rounds complete. Patient resting in bed. Appears to be confused. No distress, or alarms noted at this time.

## 2022-04-13 NOTE — SUBJECTIVE & OBJECTIVE
Interval History: off pressors and precedex, BP borderline but improving    Review of Systems   Constitutional:  Negative for activity change, fatigue, fever and unexpected weight change.   HENT:  Negative for congestion, ear pain, hearing loss, rhinorrhea and sore throat.    Eyes:  Negative for pain, redness and visual disturbance.   Respiratory:  Positive for cough, choking and wheezing. Negative for shortness of breath.    Cardiovascular:  Negative for chest pain, palpitations and leg swelling.   Gastrointestinal:  Negative for abdominal pain, constipation, diarrhea, nausea and vomiting.   Genitourinary:  Negative for dysuria, frequency and urgency.   Musculoskeletal:  Negative for back pain, joint swelling and neck pain.   Skin:  Negative for color change, rash and wound.   Neurological:  Positive for weakness. Negative for dizziness, tremors, light-headedness and headaches.   Objective:     Vital Signs (Most Recent):  Temp: 97.3 °F (36.3 °C) (04/13/22 0711)  Pulse: 84 (04/13/22 0736)  Resp: 18 (04/13/22 0736)  BP: (!) 124/59 (04/13/22 0600)  SpO2: 100 % (04/13/22 0736)   Vital Signs (24h Range):  Temp:  [97.1 °F (36.2 °C)-97.8 °F (36.6 °C)] 97.3 °F (36.3 °C)  Pulse:  [] 84  Resp:  [12-24] 18  SpO2:  [97 %-100 %] 100 %  BP: ()/(5-95) 124/59     Weight: 78.5 kg (173 lb 1 oz)  Body mass index is 37.45 kg/m².    Intake/Output Summary (Last 24 hours) at 4/13/2022 0954  Last data filed at 4/12/2022 1527  Gross per 24 hour   Intake 1369.05 ml   Output 300 ml   Net 1069.05 ml        Physical Exam  Vitals and nursing note reviewed.   Constitutional:       General: She is not in acute distress.     Appearance: She is well-developed. She is obese.   HENT:      Head: Normocephalic and atraumatic.      Right Ear: External ear normal.      Left Ear: External ear normal.      Nose: Nose normal.   Eyes:      General:         Right eye: No discharge.         Left eye: No discharge.      Extraocular Movements:  Extraocular movements intact.      Conjunctiva/sclera: Conjunctivae normal.      Pupils: Pupils are equal, round, and reactive to light.   Neck:      Thyroid: No thyromegaly.   Cardiovascular:      Rate and Rhythm: Normal rate and regular rhythm.      Heart sounds: No murmur heard.  Pulmonary:      Effort: Pulmonary effort is normal. No respiratory distress.      Breath sounds: Rhonchi present. No wheezing or rales.      Comments: Ronchi worse over right lung field.  Abdominal:      General: Bowel sounds are normal. There is no distension.      Palpations: Abdomen is soft.      Tenderness: There is no abdominal tenderness.   Musculoskeletal:      Right lower leg: No edema.      Left lower leg: No edema.   Skin:     General: Skin is warm and dry.   Neurological:      Mental Status: She is alert and oriented to person, place, and time.      Cranial Nerves: No cranial nerve deficit.   Psychiatric:         Behavior: Behavior normal.         Thought Content: Thought content normal.       Significant Labs: All pertinent labs within the past 24 hours have been reviewed.  ABGs: No results for input(s): PH, PCO2, HCO3, POCSATURATED, BE, TOTALHB, COHB, METHB, O2HB, POCFIO2, PO2 in the last 48 hours.  CBC:   Recent Labs   Lab 04/12/22  0403   WBC 4.15   HGB 10.0*   HCT 31.7*          CMP:   Recent Labs   Lab 04/12/22  0403      K 4.1      CO2 29   GLU 96   BUN 28*   CREATININE 0.9   CALCIUM 8.2*   PROT 5.2*   ALBUMIN 2.4*   BILITOT 0.2   ALKPHOS 62   AST 30   ALT 26   ANIONGAP 8   EGFRNONAA 60       Lactic Acid: No results for input(s): LACTATE in the last 48 hours.  Troponin:   Recent Labs   Lab 04/12/22 2023 04/13/22  0215 04/13/22  0821   TROPONINI 0.008 0.016 0.016       Urine Culture: No results for input(s): LABURIN in the last 48 hours.  Urine Studies: No results for input(s): COLORU, APPEARANCEUA, PHUR, SPECGRAV, PROTEINUA, GLUCUA, KETONESU, BILIRUBINUA, OCCULTUA, NITRITE, UROBILINOGEN,  LEUKOCYTESUR, RBCUA, WBCUA, BACTERIA, SQUAMEPITHEL, HYALINECASTS in the last 48 hours.    Invalid input(s): LISET    Significant Imaging: I have reviewed all pertinent imaging results/findings within the past 24 hours.

## 2022-04-13 NOTE — ASSESSMENT & PLAN NOTE
"SBP >90 and RR > 30  No leukcotyosis; no "real fevers"-temp ~99F  No clear source  Urine cx negative  Blood cx NGTD  Sputum cx pending  Repeating CXR  Resumed rocephin and added azithro as pulmonary source seems most likely and de-escalate as cx return  Received 3L NS throughout the day yesterday with improvement in BP from 70-90s but moved to ICU in case of need for pressor support. Has not yet required this  LA=2    4/12  Stable  Will cont antibx. CXR not showing consolidation though if there is source pulmonary seems most likely  Cont IVF, BP stabilizing. Off pressors now    4/13  Resolved    "

## 2022-04-13 NOTE — NURSING
1905 Patient trying to get out of bed. Explained safety measures to patient. She is alert to name and place but not time. Reoriented to time. She is restless and anxious. Pulling off equipment . Continuous cardiac monitoring in progress b/p 151/71 and map 83. O2 at 2 liters nc in progress. Sat 97%. Patient denies difficulty breathing. No signs of respiratory distress assessed at this time. Discussed plan of care with patient. Side rails times 2 up and call button in reach.    2100 Sleeping. Stable. B/p 110/52, MAP 71,  ST, and Sat 99%. Will continue to monitor. Side rails times 2 up and call button in reach.    2301 Patient sleeps for short periods then awakens and pulls at medical equipment and tries to get out of bed. No signs of respiratory distress assessed at this time.    0100 Assisted patient to bedside commode. Assisted back to bed. Side rails times 2 up and call button in reach.    0300 Sleeping.     0500 Status unchanged sleeping. Stable. Side rails times 2 up and call button in reach.    0600 Stable. Remains asleep. No signs of respiratory distress assessed at this time.

## 2022-04-13 NOTE — ASSESSMENT & PLAN NOTE
She has h/o colitis   She may have bled   Right now no active bleeding   Will monitor H/H    4/12  Stable  Still no stool for occult but no active bleeding  Daily H/H    4/13  Heme + stool can be worked up later  Continue sucralfate and protonix  Check H&H tomorrow

## 2022-04-13 NOTE — ASSESSMENT & PLAN NOTE
Patient with Hypoxic Respiratory failure which is Acute on chronic per chart but family doesn't think she was on O2 at home.  she is on home oxygen at 2-3 LPM per chart review; patient unsure and family doesn't think she had supplemental O2 at home. Supplemental oxygen was provided and noted-  .   Signs/symptoms of respiratory failure include- wheezing. Contributing diagnoses includes - COPD Labs and images were reviewed. Patient Has not had a recent ABG. Will treat underlying causes and adjust management of respiratory failure as follows-     Treat as COPD exacerbation with O2 and wean as tolerated, steroids, nebs, mucinex  Rocephin added to also cover for possible UTI and de-escalate pending cx as CXR without conslidation and low suspicion for bacterial PNA    4/12: cont steroids 40mg daily  Cont nebs  Rocephin 4/8; stopped 4/9 as urine cx negative and resumed 4/10 after triggering sepsis alert due to hypotension, urine cx negative. Blood cx pending. Repeat CXR pending  Wean o2 as tolerated. PCO2 stable since admit; not sure she would tolerate BiPaP and mild elevation at best    4/13  Transfer to third floor.  No changes with meds.  Continue nebs, ABX's, O2

## 2022-04-13 NOTE — PLAN OF CARE
Patient no longer requiring ICU and is being transferred via wheelchair on Telemetry. Patient's blood sugars were 104 (breakfast)  and 165 (lunch). PT was able to work with patient with exercises to strengthen upper and lower body. Telemetry ST with PACs, elevated T waves. Patient afebrile. She sat in recliner for breakfast and lunch (only ate 25% of each meal.) Strong non productive cough frequently noted. Patient remains free of falls/injury.

## 2022-04-13 NOTE — ASSESSMENT & PLAN NOTE
Supplemental O2 via nasal canula; titrate O2 saturation to >92%. Records reviewed and she was on  Home O2 a few years ago ; family denies home Oxygen at present   Start Solumedrol 80mg daily   Continue beta 2 agonist bronchodilator treatments.   Continue IV antibiotics. Start IV rocephin 1gm for lower resp infection/COPD exac as would treat possible UTI; f/u urine Cx also and if negative can de-escalate and no consolidation on CXR    Continue routine medications as before.     4/12: improving; still coughing   Cont nebs, cont steroids  Resumed rocephin and started azitro as triggering sepsis protocol and pulmonary seems most likely source given her presentation  Reviewed CXR  Wean O2 as tolerated    4/13  continue current care and meds.  No changes  Transfer to 3rd floor  Currently on solumedrol 40 mg daily

## 2022-04-13 NOTE — ASSESSMENT & PLAN NOTE
Home meds;   Losartan 25mg daily , HCTz 12.5mg daily prn- hold hctz for now     4/9: BP variable. SBP 200s overnight but anxious. Then as low as 80s. Hold meds. 1L NS this AM and reassess--BP trending up again with fluids    4/10: patient remained hypotensive throughout the day yesterday. Given 3L NS with improvement from 70s to 90s.  Moved to ICU in case of need for pressor support but was never needed. Currently stable  SIRS criteria met but unclear source if any. cx pending and repeating CXR this AM.  Cont hold BP meds    4/12  Off irene  Cont IVF, BP borderline but improving. Keep in ICU another 24 hours to ensure BP stability (fluctuating on checks this morning)  Remains off all PO BP meds    4/13  No BP meds for now

## 2022-04-13 NOTE — PROGRESS NOTES
[facility] Medicine  Progress Note    Patient Name: Gretel Ashton  MRN: [id number]  Patient Class: IP- Inpatient   Admission Date: 4/7/2022  Length of Stay: 5 days  Attending Physician: Aayush Rinaldi MD  Primary Care Provider: Jailene Astudillo MD        Subjective:     Principal Problem:COPD exacerbation        HPI:  Gretel Ashton is a 81 y.o. 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 Reese and chronic respiratory failure on 2-3 L at home secondary to COPD, follows with Dr. CECILY Guerra.   Pt presents to ER yesterday PM with c/o shortness of breath that began 2:00 a.m. yesterday  morning.  She also reports feeling nauseated and having a cough with productive brownish yellow sputum. Brother reports she has been having frequent episodes of shortness of breath but not quite this severe. Denies any home o2 use???    denies any fever. denies any chest pain or palpitations. patient does appear very anxious.  Denies any recent ill contacts    Colitis- ? Sulfasalazine   URINE ABN- Cx in process   Will start IV Rocephin,       Overview/Hospital Course:  Patient had a rough night complaining of intermittent chest pain. + belching. + nausea. Increasing intensity overnight. Initial SBP 90s then 200s, then 90s. Likely anxiety driven. Initial CE negative. EKG negative. 11 beat VT on tele overnight but none since. Replacing electrolytes PRN. She remains on 2L NC. She is unable to tell me if her pain feels like reflux but reports in the left lower chest.     4/10:  Patient developed worsening hypotension throughout the day yesterday. Given up to 3L NS bolus and then was on 100cc/hr NS. SBP improved from 70s to 90s but still low. RR > 30. No  fevers (temp around 99) and no leukocytosis. Urine cx negative. Initial CXR without consolidation. However, triggering sepsis alert. She was moved to the ICU last night for closer monitoring in case she would require pressor support but she has not. Lactic acid 2. She continues to oxygenate well on 2L NC. pCO2 is 52--unchanged from admission. She remains intermittently confused since admission; though likely her baseline. No further reports of chest pain since treatment for reflux yesterday. CE not suggestive of ischemia. Resumed Rocephin and azithro as pulmonary source seems most likely given her initial presentation; repeat CXR this AM.     4/11/22  Gretel Ashton is a 81 y.o. female  Seen in ICU ; on precedex and irene synephrine .still on IVF   I saw her in office for a long time she does not recognize me now .  She was admitted for copd exacerbation ; on steroid ; and ceftriaxone and zithromax   She became hypotensive ; needs IVF ; minimal pressors ; her HB dropped to 6 ; required 2 units of PRBCs   Now  9.7; No BM so dont know if GI bleeding .  Will wean her off of pressors and precedex .     4/12:  Off predecex and irene this AM  BP borderline this morning, better on rounds   Still with cough on 2L NC  BM not collected for occult; H/H trended up. No gross bleeding noted  Mental status still with intermittent confusion but less agitation. She knows where she is and her family members. Can ask to go to toilet for BM, etc.     4/12  Doing much better.  Working with therapy.  Still with cough and O2 at 2 liters NC.    No longer agitated.  Confusion much better.  Eating.         Interval History: off pressors and precedex, BP borderline but improving    Review of Systems   Constitutional:  Negative for activity change, fatigue, fever and unexpected weight change.   HENT:  Negative for congestion, ear pain, hearing loss, rhinorrhea and sore throat.    Eyes:  Negative for pain, redness and visual disturbance.    Respiratory:  Positive for cough, choking and wheezing. Negative for shortness of breath.    Cardiovascular:  Negative for chest pain, palpitations and leg swelling.   Gastrointestinal:  Negative for abdominal pain, constipation, diarrhea, nausea and vomiting.   Genitourinary:  Negative for dysuria, frequency and urgency.   Musculoskeletal:  Negative for back pain, joint swelling and neck pain.   Skin:  Negative for color change, rash and wound.   Neurological:  Positive for weakness. Negative for dizziness, tremors, light-headedness and headaches.   Objective:     Vital Signs (Most Recent):  Temp: 97.3 °F (36.3 °C) (04/13/22 0711)  Pulse: 84 (04/13/22 0736)  Resp: 18 (04/13/22 0736)  BP: (!) 124/59 (04/13/22 0600)  SpO2: 100 % (04/13/22 0736)   Vital Signs (24h Range):  Temp:  [97.1 °F (36.2 °C)-97.8 °F (36.6 °C)] 97.3 °F (36.3 °C)  Pulse:  [] 84  Resp:  [12-24] 18  SpO2:  [97 %-100 %] 100 %  BP: ()/(5-95) 124/59     Weight: 78.5 kg (173 lb 1 oz)  Body mass index is 37.45 kg/m².    Intake/Output Summary (Last 24 hours) at 4/13/2022 0954  Last data filed at 4/12/2022 1527  Gross per 24 hour   Intake 1369.05 ml   Output 300 ml   Net 1069.05 ml        Physical Exam  Vitals and nursing note reviewed.   Constitutional:       General: She is not in acute distress.     Appearance: She is well-developed. She is obese.   HENT:      Head: Normocephalic and atraumatic.      Right Ear: External ear normal.      Left Ear: External ear normal.      Nose: Nose normal.   Eyes:      General:         Right eye: No discharge.         Left eye: No discharge.      Extraocular Movements: Extraocular movements intact.      Conjunctiva/sclera: Conjunctivae normal.      Pupils: Pupils are equal, round, and reactive to light.   Neck:      Thyroid: No thyromegaly.   Cardiovascular:      Rate and Rhythm: Normal rate and regular rhythm.      Heart sounds: No murmur heard.  Pulmonary:      Effort: Pulmonary effort is normal. No  respiratory distress.      Breath sounds: Rhonchi present. No wheezing or rales.      Comments: Ronchi worse over right lung field.  Abdominal:      General: Bowel sounds are normal. There is no distension.      Palpations: Abdomen is soft.      Tenderness: There is no abdominal tenderness.   Musculoskeletal:      Right lower leg: No edema.      Left lower leg: No edema.   Skin:     General: Skin is warm and dry.   Neurological:      Mental Status: She is alert and oriented to person, place, and time.      Cranial Nerves: No cranial nerve deficit.   Psychiatric:         Behavior: Behavior normal.         Thought Content: Thought content normal.       Significant Labs: All pertinent labs within the past 24 hours have been reviewed.  ABGs: No results for input(s): PH, PCO2, HCO3, POCSATURATED, BE, TOTALHB, COHB, METHB, O2HB, POCFIO2, PO2 in the last 48 hours.  CBC:   Recent Labs   Lab 04/12/22  0403   WBC 4.15   HGB 10.0*   HCT 31.7*          CMP:   Recent Labs   Lab 04/12/22  0403      K 4.1      CO2 29   GLU 96   BUN 28*   CREATININE 0.9   CALCIUM 8.2*   PROT 5.2*   ALBUMIN 2.4*   BILITOT 0.2   ALKPHOS 62   AST 30   ALT 26   ANIONGAP 8   EGFRNONAA 60       Lactic Acid: No results for input(s): LACTATE in the last 48 hours.  Troponin:   Recent Labs   Lab 04/12/22  2023 04/13/22  0215 04/13/22  0821   TROPONINI 0.008 0.016 0.016       Urine Culture: No results for input(s): LABURIN in the last 48 hours.  Urine Studies: No results for input(s): COLORU, APPEARANCEUA, PHUR, SPECGRAV, PROTEINUA, GLUCUA, KETONESU, BILIRUBINUA, OCCULTUA, NITRITE, UROBILINOGEN, LEUKOCYTESUR, RBCUA, WBCUA, BACTERIA, SQUAMEPITHEL, HYALINECASTS in the last 48 hours.    Invalid input(s): LORRAINESUR    Significant Imaging: I have reviewed all pertinent imaging results/findings within the past 24 hours.      Assessment/Plan:      * COPD exacerbation  Supplemental O2 via nasal canula; titrate O2 saturation to >92%. Records  "reviewed and she was on  Home O2 a few years ago ; family denies home Oxygen at present   Start Solumedrol 80mg daily   Continue beta 2 agonist bronchodilator treatments.   Continue IV antibiotics. Start IV rocephin 1gm for lower resp infection/COPD exac as would treat possible UTI; f/u urine Cx also and if negative can de-escalate and no consolidation on CXR    Continue routine medications as before.     4/12: improving; still coughing   Cont nebs, cont steroids  Resumed rocephin and started azitro as triggering sepsis protocol and pulmonary seems most likely source given her presentation  Reviewed CXR  Wean O2 as tolerated    4/13  continue current care and meds.  No changes  Transfer to 3rd floor  Currently on solumedrol 40 mg daily    Symptomatic anemia  She has h/o colitis   She may have bled   Right now no active bleeding   Will monitor H/H    4/12  Stable  Still no stool for occult but no active bleeding  Daily H/H    4/13  Heme + stool can be worked up later  Continue sucralfate and protonix  Check H&H tomorrow      SIRS (systemic inflammatory response syndrome)  SBP >90 and RR > 30  No leukcotyosis; no "real fevers"-temp ~99F  No clear source  Urine cx negative  Blood cx NGTD  Sputum cx pending  Repeating CXR  Resumed rocephin and added azithro as pulmonary source seems most likely and de-escalate as cx return  Received 3L NS throughout the day yesterday with improvement in BP from 70-90s but moved to ICU in case of need for pressor support. Has not yet required this  LA=2    4/12  Stable  Will cont antibx. CXR not showing consolidation though if there is source pulmonary seems most likely  Cont IVF, BP stabilizing. Off pressors now    4/13  Resolved      Chest pain  New onset overnight  Patient a poor historian and difficulty to qualify her pain  + belching  Cont PPI, added carafate; seems to respond to GI cocktail earlier this am  Trend CE  EKG reviewed, no acute ST-T wave changes  Cont tele  Cont " statin    4/10: resolved today with treatment for reflux  CE normal  Cont statin and tele  Repeat CXR today    4/12  Resolved, trop negative    Debility  PT/OT.      Cystitis  Abn UA  Awaiting urine Cx in process   Not reliable historian to know if symptomatic so will treat with rocephin pending cx results    4/9: cx negative. Stopped rocephin but resume for possible pulmonary source/SIRS.    4/11  Continue rocephin     HTN (hypertension)  Home meds;   Losartan 25mg daily , HCTz 12.5mg daily prn- hold hctz for now     4/9: BP variable. SBP 200s overnight but anxious. Then as low as 80s. Hold meds. 1L NS this AM and reassess--BP trending up again with fluids    4/10: patient remained hypotensive throughout the day yesterday. Given 3L NS with improvement from 70s to 90s.  Moved to ICU in case of need for pressor support but was never needed. Currently stable  SIRS criteria met but unclear source if any. cx pending and repeating CXR this AM.  Cont hold BP meds    4/12  Off irene  Cont IVF, BP borderline but improving. Keep in ICU another 24 hours to ensure BP stability (fluctuating on checks this morning)  Remains off all PO BP meds    4/13  No BP meds for now    Acute on chronic respiratory failure with hypoxemia  Patient with Hypoxic Respiratory failure which is Acute on chronic per chart but family doesn't think she was on O2 at home.  she is on home oxygen at 2-3 LPM per chart review; patient unsure and family doesn't think she had supplemental O2 at home. Supplemental oxygen was provided and noted-  .   Signs/symptoms of respiratory failure include- wheezing. Contributing diagnoses includes - COPD Labs and images were reviewed. Patient Has not had a recent ABG. Will treat underlying causes and adjust management of respiratory failure as follows-     Treat as COPD exacerbation with O2 and wean as tolerated, steroids, nebs, mucinex  Rocephin added to also cover for possible UTI and de-escalate pending cx as CXR  without conslidation and low suspicion for bacterial PNA    4/12: cont steroids 40mg daily  Cont nebs  Rocephin 4/8; stopped 4/9 as urine cx negative and resumed 4/10 after triggering sepsis alert due to hypotension, urine cx negative. Blood cx pending. Repeat CXR pending  Wean o2 as tolerated. PCO2 stable since admit; not sure she would tolerate BiPaP and mild elevation at best    4/13  Transfer to third floor.  No changes with meds.  Continue nebs, ABX's, O2    Rheumatoid arthritis involving both hands with negative rheumatoid factor  Takes sulfasalazine 500mg daily ; not sure if for RA or hx of colitis .      Aortic atherosclerosis  Statin , ASA .      Memory loss  Follows with Dr. Reese for this  Dx probable early dementia per notes in 9/2021   Continue memantine .      Type 2 diabetes mellitus with diabetic neuropathy, without long-term current use of insulin  Home meds include oral metformin   Lab Results   Component Value Date    HGBA1C 6.0 (H) 04/10/2022   will not start insulin per protocol  Monitor for now .        Hyperlipidemia  Continue statin- crestor not fomulary; atorvastatin here   zetia .        VTE Risk Mitigation (From admission, onward)         Ordered     IP VTE HIGH RISK PATIENT  Once         04/07/22 2238     Place sequential compression device  Until discontinued         04/07/22 2238                Discharge Planning   MARIKA:      Code Status: Full Code   Is the patient medically ready for discharge?:     Reason for patient still in hospital (select all that apply): Treatment  Discharge Plan A: Home with family, Home Health            Critical care time spent on the evaluation and treatment of severe organ dysfunction, review of pertinent labs and imaging studies, discussions with consulting providers and discussions with patient/family: 30 minutes.      Aayush Rinaldi MD  Department of Hospital Medicine   [place] - Intensive Care

## 2022-04-14 LAB
ALBUMIN SERPL BCP-MCNC: 2.8 G/DL (ref 3.5–5.2)
ALP SERPL-CCNC: 77 U/L (ref 55–135)
ALT SERPL W/O P-5'-P-CCNC: 33 U/L (ref 10–44)
ANION GAP SERPL CALC-SCNC: 12 MMOL/L (ref 8–16)
AST SERPL-CCNC: 34 U/L (ref 10–40)
BACTERIA BLD CULT: NORMAL
BACTERIA BLD CULT: NORMAL
BASOPHILS # BLD AUTO: 0.01 K/UL (ref 0–0.2)
BASOPHILS NFR BLD: 0.2 % (ref 0–1.9)
BILIRUB SERPL-MCNC: 0.2 MG/DL (ref 0.1–1)
BUN SERPL-MCNC: 15 MG/DL (ref 8–23)
CALCIUM SERPL-MCNC: 8.8 MG/DL (ref 8.7–10.5)
CHLORIDE SERPL-SCNC: 99 MMOL/L (ref 95–110)
CO2 SERPL-SCNC: 32 MMOL/L (ref 23–29)
CREAT SERPL-MCNC: 0.9 MG/DL (ref 0.5–1.4)
DIFFERENTIAL METHOD: ABNORMAL
EOSINOPHIL # BLD AUTO: 0.2 K/UL (ref 0–0.5)
EOSINOPHIL NFR BLD: 2.8 % (ref 0–8)
ERYTHROCYTE [DISTWIDTH] IN BLOOD BY AUTOMATED COUNT: 16.3 % (ref 11.5–14.5)
EST. GFR  (AFRICAN AMERICAN): >60 ML/MIN/1.73 M^2
EST. GFR  (NON AFRICAN AMERICAN): 60 ML/MIN/1.73 M^2
GLUCOSE SERPL-MCNC: 87 MG/DL (ref 70–110)
HCT VFR BLD AUTO: 32.4 % (ref 37–48.5)
HGB BLD-MCNC: 10.1 G/DL (ref 12–16)
IMM GRANULOCYTES # BLD AUTO: 0.02 K/UL (ref 0–0.04)
IMM GRANULOCYTES NFR BLD AUTO: 0.4 % (ref 0–0.5)
LYMPHOCYTES # BLD AUTO: 1.5 K/UL (ref 1–4.8)
LYMPHOCYTES NFR BLD: 25.7 % (ref 18–48)
MCH RBC QN AUTO: 27.3 PG (ref 27–31)
MCHC RBC AUTO-ENTMCNC: 31.2 G/DL (ref 32–36)
MCV RBC AUTO: 88 FL (ref 82–98)
MONOCYTES # BLD AUTO: 0.6 K/UL (ref 0.3–1)
MONOCYTES NFR BLD: 10.1 % (ref 4–15)
NEUTROPHILS # BLD AUTO: 3.4 K/UL (ref 1.8–7.7)
NEUTROPHILS NFR BLD: 60.8 % (ref 38–73)
NRBC BLD-RTO: 0 /100 WBC
PLATELET # BLD AUTO: 206 K/UL (ref 150–450)
PMV BLD AUTO: 10.5 FL (ref 9.2–12.9)
POCT GLUCOSE: 102 MG/DL (ref 70–110)
POCT GLUCOSE: 122 MG/DL (ref 70–110)
POCT GLUCOSE: 147 MG/DL (ref 70–110)
POCT GLUCOSE: 192 MG/DL (ref 70–110)
POTASSIUM SERPL-SCNC: 3.7 MMOL/L (ref 3.5–5.1)
PROT SERPL-MCNC: 6.1 G/DL (ref 6–8.4)
RBC # BLD AUTO: 3.7 M/UL (ref 4–5.4)
SODIUM SERPL-SCNC: 143 MMOL/L (ref 136–145)
WBC # BLD AUTO: 5.65 K/UL (ref 3.9–12.7)

## 2022-04-14 PROCEDURE — 27000221 HC OXYGEN, UP TO 24 HOURS

## 2022-04-14 PROCEDURE — 85025 COMPLETE CBC W/AUTO DIFF WBC: CPT | Performed by: FAMILY MEDICINE

## 2022-04-14 PROCEDURE — 97168 OT RE-EVAL EST PLAN CARE: CPT

## 2022-04-14 PROCEDURE — 80053 COMPREHEN METABOLIC PANEL: CPT | Performed by: FAMILY MEDICINE

## 2022-04-14 PROCEDURE — 36415 COLL VENOUS BLD VENIPUNCTURE: CPT | Performed by: FAMILY MEDICINE

## 2022-04-14 PROCEDURE — 25000003 PHARM REV CODE 250: Performed by: NURSE PRACTITIONER

## 2022-04-14 PROCEDURE — 94761 N-INVAS EAR/PLS OXIMETRY MLT: CPT

## 2022-04-14 PROCEDURE — 11000001 HC ACUTE MED/SURG PRIVATE ROOM

## 2022-04-14 PROCEDURE — 99232 PR SUBSEQUENT HOSPITAL CARE,LEVL II: ICD-10-PCS | Mod: ,,, | Performed by: STUDENT IN AN ORGANIZED HEALTH CARE EDUCATION/TRAINING PROGRAM

## 2022-04-14 PROCEDURE — 25000003 PHARM REV CODE 250: Performed by: INTERNAL MEDICINE

## 2022-04-14 PROCEDURE — 63600175 PHARM REV CODE 636 W HCPCS: Performed by: INTERNAL MEDICINE

## 2022-04-14 PROCEDURE — 97530 THERAPEUTIC ACTIVITIES: CPT

## 2022-04-14 PROCEDURE — 94640 AIRWAY INHALATION TREATMENT: CPT

## 2022-04-14 PROCEDURE — 25000242 PHARM REV CODE 250 ALT 637 W/ HCPCS: Performed by: NURSE PRACTITIONER

## 2022-04-14 PROCEDURE — 99232 SBSQ HOSP IP/OBS MODERATE 35: CPT | Mod: ,,, | Performed by: STUDENT IN AN ORGANIZED HEALTH CARE EDUCATION/TRAINING PROGRAM

## 2022-04-14 PROCEDURE — 97116 GAIT TRAINING THERAPY: CPT

## 2022-04-14 RX ORDER — PREDNISONE 20 MG/1
40 TABLET ORAL DAILY
Status: DISCONTINUED | OUTPATIENT
Start: 2022-04-15 | End: 2022-04-15 | Stop reason: HOSPADM

## 2022-04-14 RX ORDER — LOSARTAN POTASSIUM 25 MG/1
25 TABLET ORAL DAILY
Status: DISCONTINUED | OUTPATIENT
Start: 2022-04-14 | End: 2022-04-15 | Stop reason: HOSPADM

## 2022-04-14 RX ADMIN — MUPIROCIN: 20 OINTMENT TOPICAL at 08:04

## 2022-04-14 RX ADMIN — SUCRALFATE 1 G: 1 SUSPENSION ORAL at 05:04

## 2022-04-14 RX ADMIN — FERROUS SULFATE TAB 325 MG (65 MG ELEMENTAL FE) 1 EACH: 325 (65 FE) TAB at 08:04

## 2022-04-14 RX ADMIN — IPRATROPIUM BROMIDE AND ALBUTEROL SULFATE 3 ML: 2.5; .5 SOLUTION RESPIRATORY (INHALATION) at 12:04

## 2022-04-14 RX ADMIN — IPRATROPIUM BROMIDE AND ALBUTEROL SULFATE 3 ML: 2.5; .5 SOLUTION RESPIRATORY (INHALATION) at 06:04

## 2022-04-14 RX ADMIN — ATORVASTATIN CALCIUM 20 MG: 20 TABLET, FILM COATED ORAL at 08:04

## 2022-04-14 RX ADMIN — SUCRALFATE 1 G: 1 SUSPENSION ORAL at 12:04

## 2022-04-14 RX ADMIN — SUCRALFATE 1 G: 1 SUSPENSION ORAL at 11:04

## 2022-04-14 RX ADMIN — ACETAMINOPHEN 650 MG: 325 TABLET ORAL at 08:04

## 2022-04-14 RX ADMIN — MEMANTINE HYDROCHLORIDE 5 MG: 5 TABLET ORAL at 08:04

## 2022-04-14 RX ADMIN — Medication 6 MG: at 08:04

## 2022-04-14 RX ADMIN — GUAIFENESIN 600 MG: 600 TABLET, EXTENDED RELEASE ORAL at 08:04

## 2022-04-14 RX ADMIN — PANTOPRAZOLE SODIUM 40 MG: 40 TABLET, DELAYED RELEASE ORAL at 08:04

## 2022-04-14 RX ADMIN — CYANOCOBALAMIN TAB 500 MCG 1000 MCG: 500 TAB at 08:04

## 2022-04-14 RX ADMIN — LOSARTAN POTASSIUM 25 MG: 25 TABLET, FILM COATED ORAL at 11:04

## 2022-04-14 RX ADMIN — METHYLPREDNISOLONE SODIUM SUCCINATE 40 MG: 40 INJECTION, POWDER, FOR SOLUTION INTRAMUSCULAR; INTRAVENOUS at 08:04

## 2022-04-14 RX ADMIN — EZETIMIBE 10 MG: 10 TABLET ORAL at 08:04

## 2022-04-14 RX ADMIN — SULFASALAZINE 500 MG: 500 TABLET ORAL at 08:04

## 2022-04-14 RX ADMIN — IPRATROPIUM BROMIDE AND ALBUTEROL SULFATE 3 ML: 2.5; .5 SOLUTION RESPIRATORY (INHALATION) at 07:04

## 2022-04-14 NOTE — ASSESSMENT & PLAN NOTE
PT/OT> she is not safe to be d/willie yet. Needs mopre PT. onlt ambulating 15 feet>> goal 100ft with 4 steps

## 2022-04-14 NOTE — ASSESSMENT & PLAN NOTE
Supplemental O2 via nasal canula; titrate O2 saturation to >92%. Records reviewed and she was on  Home O2 a few years ago ; family denies home Oxygen at present   Start Solumedrol 80mg daily   Continue beta 2 agonist bronchodilator treatments.   Continue IV antibiotics. Start IV rocephin 1gm for lower resp infection/COPD exac as would treat possible UTI; f/u urine Cx also and if negative can de-escalate and no consolidation on CXR    Continue routine medications as before.     4/12: improving; still coughing   Cont nebs, cont steroids  Resumed rocephin and started azitro as triggering sepsis protocol and pulmonary seems most likely source given her presentation  Reviewed CXR  Wean O2 as tolerated    4/13  continue current care and meds.  No changes  Transfer to 3rd floor  Currently on solumedrol 40 mg daily    4/14 wean steroids to prednisone 40 po daily

## 2022-04-14 NOTE — ASSESSMENT & PLAN NOTE
Home meds;   Losartan 25mg daily , HCTz 12.5mg daily prn- hold hctz for now     4/9: BP variable. SBP 200s overnight but anxious. Then as low as 80s. Hold meds. 1L NS this AM and reassess--BP trending up again with fluids    4/10: patient remained hypotensive throughout the day yesterday. Given 3L NS with improvement from 70s to 90s.  Moved to ICU in case of need for pressor support but was never needed. Currently stable  SIRS criteria met but unclear source if any. cx pending and repeating CXR this AM.  Cont hold BP meds    4/12  Off irene  Cont IVF, BP borderline but improving. Keep in ICU another 24 hours to ensure BP stability (fluctuating on checks this morning)  Remains off all PO BP meds    4/13  No BP meds for now    4/14 will start resuming bp meds today bp 174/67 losartan 25> hold hctz for now

## 2022-04-14 NOTE — PT/OT/SLP PROGRESS
"Physical Therapy Treatment    Patient Name:  Gretel Ashton   MRN:  [id number]    Recommendations:     Discharge Recommendations:  nursing facility, skilled   Discharge Equipment Recommendations: bedside commode   Barriers to discharge: Inaccessible home and Decreased caregiver support    Assessment:     Gretel Ashton is a 81 y.o. female admitted with a medical diagnosis of COPD exacerbation.  She presents with the following impairments/functional limitations:  weakness, impaired endurance, impaired self care skills, impaired functional mobilty, gait instability, impaired balance, decreased safety awareness, other (comment) (hard of hearing). Patient tolerated PT session today. Increased gait distance at room air using RW x ~30 feet with close Standby Assistance. Monitored BP before: 128/78 mm hg and SPO2 at 98%; After walk BP: 119/78/mm hg and SPO2 at 95% and immediately return back to 98% after 1 minute rest. No sight of distress and fatigue.    Rehab Prognosis: Fair; patient would benefit from acute skilled PT services to address these deficits and reach maximum level of function.    Recent Surgery: * No surgery found *      Plan:     During this hospitalization, patient to be seen daily to address the identified rehab impairments via gait training, therapeutic activities, therapeutic exercises and progress toward the following goals:    · Plan of Care Expires:  04/25/22    Subjective     Chief Complaint: No new complain. Patient reported feeling better today.  Patient/Family Comments/goals: "To do better and to return home".  Pain/Comfort:  · Pain Rating 1: 0/10      Objective:     Communicated with patient  prior to session.  Patient found sitting edge of bed with peripheral IV, oxygen, telemetry upon PT entry to room.     General Precautions: Standard, fall   Orthopedic Precautions:N/A   Braces: N/A  Respiratory Status: Nasal cannula, flow 3 L/min     Functional Mobility:  · Bed Mobility:     · Supine to Sit: " supervision  · Sit to Supine: supervision  · Transfers:     · Sit to Stand:  stand by assistance with rolling walker  · Bed to Chair: stand by assistance with  rolling walker  using  Step Transfer  · Gait: Standby Assistance x ~30 feet with RW.       AM-PAC 6 CLICK MOBILITY  Turning over in bed (including adjusting bedclothes, sheets and blankets)?: 3  Sitting down on and standing up from a chair with arms (e.g., wheelchair, bedside commode, etc.): 3  Moving from lying on back to sitting on the side of the bed?: 3  Moving to and from a bed to a chair (including a wheelchair)?: 3  Need to walk in hospital room?: 3  Climbing 3-5 steps with a railing?: 2 (clinical judgement)  Basic Mobility Total Score: 17       Therapeutic Activities and Exercises:   Worked on safety measures on out of bed activity, standing actiivty, transfer trng with RW and gait trng with RW x ~30 feet with SBA.    Patient left up in chair with all lines intact, call button in reach and nursing notified..    GOALS:   Multidisciplinary Problems     Physical Therapy Goals        Problem: Physical Therapy    Goal Priority Disciplines Outcome Goal Variances Interventions   Physical Therapy Goal     PT, PT/OT Ongoing, Progressing     Description:   Patient will increase functional independence with mobility by performin. Supine to sit with Modified Independent  2. Sit to supine with Modified Independent  3. Bed to chair transfer with Supervision with or without rolling walker using Step Transfer TECHNIQUE  4. Gait  x ~100 feet with Supervision or Set-up Assistance with or without rolling walker.  5. Ascend/descend 4 steps with handrail/s with contact guard /supervision.  6. Lower extremity exercise program x10 reps per handout, with assistance as needed                      Time Tracking:     PT Received On: 22  PT Start Time: 940     PT Stop Time: 1005  PT Total Time (min): 25 min     Billable Minutes: Gait Training 10 mins and  Therapeutic Activity 15 mins    Treatment Type: Treatment  PT/PTA: PT           04/14/2022

## 2022-04-14 NOTE — PLAN OF CARE
Problem: Physical Therapy  Goal: Physical Therapy Goal  Description:   Patient will increase functional independence with mobility by performin. Supine to sit with Modified Independent  2. Sit to supine with Modified Independent  3. Bed to chair transfer with Supervision with or without rolling walker using Step Transfer TECHNIQUE  4. Gait  x ~100 feet with Supervision or Set-up Assistance with or without rolling walker.  5. Ascend/descend 4 steps with handrail/s with contact guard /supervision.  6. Lower extremity exercise program x10 reps per handout, with assistance as needed     Outcome: Ongoing, Progressing

## 2022-04-14 NOTE — ASSESSMENT & PLAN NOTE
She has h/o colitis   She may have bled   Right now no active bleeding   Will monitor H/H    4/12  Stable  Still no stool for occult but no active bleeding  Daily H/H    4/13  Heme + stool can be worked up later  Continue sucralfate and protonix  Check H&H tomorrow    4/14 CBC remains stable   Holding asa

## 2022-04-14 NOTE — PROGRESS NOTES
Nursing Notes    Admitted to room 303 per wheelchair with attendants x2.  Report received from Sangita KRUSE and Margarita RN.  Call pathak in reach.  Oriented to room.       Huddle Comments

## 2022-04-14 NOTE — ASSESSMENT & PLAN NOTE
Abn UA  Awaiting urine Cx in process   Not reliable historian to know if symptomatic so will treat with rocephin pending cx results    4/9: cx negative. Stopped rocephin but resume for possible pulmonary source/SIRS.    4/11  Continue rocephin     4/14 urine culture is negative > d.c abx

## 2022-04-14 NOTE — PROGRESS NOTES
[place] - [facility] Surg ([facility])  [facility] Medicine  Progress Note    Patient Name: Gretel Ashton  MRN: [id number]  Patient Class: IP- Inpatient   Admission Date: 4/7/2022  Length of Stay: 6 days  Attending Physician: Aayush Rinaldi MD  Primary Care Provider: Jialene Astudillo MD        Subjective:     Principal Problem:COPD exacerbation        HPI:  Gretel Ashton is a 81 y.o. 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 Reese and chronic respiratory failure on 2-3 L at home secondary to COPD, follows with Dr. CECILY Guerra.   Pt presents to ER yesterday PM with c/o shortness of breath that began 2:00 a.m. yesterday  morning.  She also reports feeling nauseated and having a cough with productive brownish yellow sputum. Brother reports she has been having frequent episodes of shortness of breath but not quite this severe. Denies any home o2 use???    denies any fever. denies any chest pain or palpitations. patient does appear very anxious.  Denies any recent ill contacts    Colitis- ? Sulfasalazine   URINE ABN- Cx in process   Will start IV Rocephin,       Overview/Hospital Course:  Patient had a rough night complaining of intermittent chest pain. + belching. + nausea. Increasing intensity overnight. Initial SBP 90s then 200s, then 90s. Likely anxiety driven. Initial CE negative. EKG negative. 11 beat VT on tele overnight but none since. Replacing electrolytes PRN. She remains on 2L NC. She is unable to tell me if her pain feels like reflux but reports in the left lower chest.     4/10:  Patient developed worsening hypotension throughout the day yesterday. Given up to 3L NS bolus and then was on 100cc/hr NS. SBP improved from 70s to 90s but still low. RR > 30. No  fevers (temp around 99) and no leukocytosis. Urine cx negative. Initial CXR without consolidation. However, triggering sepsis alert. She was moved to the ICU last night for closer monitoring in case she would require pressor support but she has not. Lactic acid 2. She continues to oxygenate well on 2L NC. pCO2 is 52--unchanged from admission. She remains intermittently confused since admission; though likely her baseline. No further reports of chest pain since treatment for reflux yesterday. CE not suggestive of ischemia. Resumed Rocephin and azithro as pulmonary source seems most likely given her initial presentation; repeat CXR this AM.     4/11/22  Gretel Ashton is a 81 y.o. female  Seen in ICU ; on precedex and irene synephrine .still on IVF   I saw her in office for a long time she does not recognize me now .  She was admitted for copd exacerbation ; on steroid ; and ceftriaxone and zithromax   She became hypotensive ; needs IVF ; minimal pressors ; her HB dropped to 6 ; required 2 units of PRBCs   Now  9.7; No BM so dont know if GI bleeding .  Will wean her off of pressors and precedex .     4/12:  Off predecex and irene this AM  BP borderline this morning, better on rounds   Still with cough on 2L NC  BM not collected for occult; H/H trended up. No gross bleeding noted  Mental status still with intermittent confusion but less agitation. She knows where she is and her family members. Can ask to go to toilet for BM, etc.     4/12  Doing much better.  Working with therapy.  Still with cough and O2 at 2 liters NC.    No longer agitated.  Confusion much better.  Eating    4/14 pt was moved from ICU yesterday after blood pressure stabilizing. She is currently being treated for COPD exacerbation. She is on medrol 40mf IV daily and duonebs every 6hr. On 3L NC sats %. Uses 2-3 L at home. Still has cough with sputum production.  Completed 3 days azithromycin and 6 days rocephin. VSS/afebrile [place] now elevated. Will  need to resume home antihypertensives.   Anemia stable with + occult stool noted.   PT has pt ambulating 15 ft forward and backward using RW> goal is 100ft and 4 steps       Interval History: off pressors and precedex, BP borderline but improving    Review of Systems   Constitutional:  Negative for fatigue and fever.   HENT:  Negative for congestion and sore throat.    Eyes:  Negative for pain and visual disturbance.   Respiratory:  Positive for cough and wheezing. Negative for shortness of breath.    Cardiovascular:  Negative for chest pain and leg swelling.   Gastrointestinal:  Negative for abdominal pain, constipation, diarrhea, nausea and vomiting.   Genitourinary:  Negative for dysuria and hematuria.   Skin:  Negative for rash and wound.   Neurological:  Positive for weakness (generalized). Negative for dizziness, light-headedness and headaches.   Objective:     Vital Signs (Most Recent):  Temp: 97.5 °F (36.4 °C) (04/14/22 0749)  Pulse: 68 (04/14/22 0800)  Resp: 20 (04/14/22 0749)  BP: (!) 174/67 (04/14/22 0749)  SpO2: 100 % (04/14/22 0749)   Vital Signs (24h Range):  Temp:  [96.5 °F (35.8 °C)-98.3 °F (36.8 °C)] 97.5 °F (36.4 °C)  Pulse:  [] 68  Resp:  [14-20] 20  SpO2:  [93 %-100 %] 100 %  BP: (105-174)/(61-82) 174/67     Weight: 80.5 kg (177 lb 7.5 oz)  Body mass index is 38.4 kg/m².    Intake/Output Summary (Last 24 hours) at 4/14/2022 0851  Last data filed at 4/14/2022 0800  Gross per 24 hour   Intake 600 ml   Output 300 ml   Net 300 ml        Physical Exam  Vitals and nursing note reviewed.   Constitutional:       General: She is not in acute distress.     Appearance: She is well-developed. She is obese.   HENT:      Head: Normocephalic and atraumatic.      Nose: Nose normal.   Eyes:      General:         Right eye: No discharge.         Left eye: No discharge.      Extraocular Movements: Extraocular movements intact.      Conjunctiva/sclera: Conjunctivae normal.      Pupils: Pupils are equal, round,  and reactive to light.   Neck:      Thyroid: No thyromegaly.   Cardiovascular:      Rate and Rhythm: Normal rate and regular rhythm.      Heart sounds: No murmur heard.  Pulmonary:      Effort: Pulmonary effort is normal. No respiratory distress.      Breath sounds: Rhonchi present. No wheezing or rales.      Comments: Ronchi worse over right lung field.  Abdominal:      General: Bowel sounds are normal. There is no distension.      Palpations: Abdomen is soft.      Tenderness: There is no abdominal tenderness.   Musculoskeletal:      Cervical back: Neck supple.      Right lower leg: No edema.      Left lower leg: No edema.   Skin:     General: Skin is warm and dry.   Neurological:      General: No focal deficit present.      Mental Status: She is alert and oriented to person, place, and time.   Psychiatric:         Mood and Affect: Mood normal.         Behavior: Behavior normal.       Significant Labs: CBC:   Recent Labs   Lab 04/14/22  0558   WBC 5.65   HGB 10.1*   HCT 32.4*        Occult stool positive   B12 1065   Retic 3.8  Lab Results   Component Value Date    IRON 17 (L) 04/10/2022    TIBC 395 04/10/2022    FERRITIN 54 04/10/2022     Lab Results   Component Value Date    HGBA1C 6.0 (H) 04/10/2022     Lactic 2.0 on admission     CMP:   Recent Labs   Lab 04/14/22  0558      K 3.7   CL 99   CO2 32*   GLU 87   BUN 15   CREATININE 0.9   CALCIUM 8.8   PROT 6.1   ALBUMIN 2.8*   BILITOT 0.2   ALKPHOS 77   AST 34   ALT 33   ANIONGAP 12   EGFRNONAA 60       Troponin:   Recent Labs   Lab 04/12/22 2023 04/13/22  0215 04/13/22  0821   TROPONINI 0.008 0.016 0.016     BNP  Recent Labs   Lab 04/07/22  1603   BNP 76         Blood cultures x 2 NGTD   4/7 urine culture No growth   Resp culture normal crystal  Covid screen negative     Significant Imaging:     CXR 4/7 The patient is rotated to the right.  Lung volumes remain low.  Numerous external artifacts are present.  Heart is enlarged and unchanged with a  pacemaker in place.  Chronic interstitial opacities in the lung bases appears similar to the prior study.  There is a left apical calcified granuloma.  No pleural effusion.    CXR 4/10 Film again limited by body habitus and external leads.  Heart is enlarged and unchanged.  Aorta is ectatic.  Chronic widening of right paratracheal stripe secondary to tortuous vessels.  Calcified granuloma noted left apex.  Chronic interstitial opacities in lung bases appears similar to multiple previous chest x-rays.  No superimposed consolidation noted.    CXR 4/12 Probable developing small volume right basilar pleural fluid and associated atelectasis/consolidation in the right lower lung zone.       EKG 4/9 Normal sinus rhythm   Normal ECG   When compared with ECG of 08-APR-2022 12:01,   PACs no longer present   Confirmed by Moshe CACERES MD ([id number]) on 4/9/2022 9:24:17 AM    EKG 4/8 Sinus rhythm with occasional Premature ventricular complexes   Otherwise normal ECG   When compared with ECG of 07-APR-2022 15:48,   Premature ventricular complexes are now Present   Questionable change in The axis   Confirmed by Rody Maher MD ([id number]) on 4/8/2022 1:40:36 PM     EKG 4/7 Sinus rhythm with Premature supraventricular complexes   Baseline wander   Otherwise normal ECG   When compared with ECG of 28-DEC-2021 08:48,   Premature ventricular complexes are now Present   Confirmed by Dilshad Sanders MD ([id number]) on 4/8/2022 11:29:40 AM       Assessment/Plan:      * COPD exacerbation  Supplemental O2 via nasal canula; titrate O2 saturation to >92%. Records reviewed and she was on  Home O2 a few years ago ; family denies home Oxygen at present   Start Solumedrol 80mg daily   Continue beta 2 agonist bronchodilator treatments.   Continue IV antibiotics. Start IV rocephin 1gm for lower resp infection/COPD exac as would treat possible UTI; f/u urine Cx also and if negative can de-escalate and no consolidation on CXR    Continue routine medications as before.  "    4/12: improving; still coughing   Cont nebs, cont steroids  Resumed rocephin and started azitro as triggering sepsis protocol and pulmonary seems most likely source given her presentation  Reviewed CXR  Wean O2 as tolerated    4/13  continue current care and meds.  No changes  Transfer to 3rd floor  Currently on solumedrol 40 mg daily    4/14 wean steroids to prednisone 40 po daily     Symptomatic anemia  She has h/o colitis   She may have bled   Right now no active bleeding   Will monitor H/H    4/12  Stable  Still no stool for occult but no active bleeding  Daily H/H    4/13  Heme + stool can be worked up later  Continue sucralfate and protonix  Check H&H tomorrow    4/14 CBC remains stable   Holding asa       SIRS (systemic inflammatory response syndrome)  SBP >90 and RR > 30  No leukcotyosis; no "real fevers"-temp ~99F  No clear source  Urine cx negative  Blood cx NGTD  Sputum cx pending  Repeating CXR  Resumed rocephin and added azithro as pulmonary source seems most likely and de-escalate as cx return  Received 3L NS throughout the day yesterday with improvement in BP from 70-90s but moved to ICU in case of need for pressor support. Has not yet required this  LA=2    4/12  Stable  Will cont antibx. CXR not showing consolidation though if there is source pulmonary seems most likely  Cont IVF, BP stabilizing. Off pressors now    4/13  Resolved      Chest pain  New onset overnight  Patient a poor historian and difficulty to qualify her pain  + belching  Cont PPI, added carafate; seems to respond to GI cocktail earlier this am  Trend CE  EKG reviewed, no acute ST-T wave changes  Cont tele  Cont statin    4/10: resolved today with treatment for reflux  CE normal  Cont statin and tele  Repeat CXR today    4/12  Resolved, trop negative    Debility  PT/OT> she is not safe to be d/willie yet. Needs mopre PT. onlt ambulating 15 feet>> goal 100ft with 4 steps       Cystitis  Abn UA  Awaiting urine Cx in process   Not " reliable historian to know if symptomatic so will treat with rocephin pending cx results    4/9: cx negative. Stopped rocephin but resume for possible pulmonary source/SIRS.    4/11  Continue rocephin     4/14 urine culture is negative > d.c abx    HTN (hypertension)  Home meds;   Losartan 25mg daily , HCTz 12.5mg daily prn- hold hctz for now     4/9: BP variable. SBP 200s overnight but anxious. Then as low as 80s. Hold meds. 1L NS this AM and reassess--BP trending up again with fluids    4/10: patient remained hypotensive throughout the day yesterday. Given 3L NS with improvement from 70s to 90s.  Moved to ICU in case of need for pressor support but was never needed. Currently stable  SIRS criteria met but unclear source if any. cx pending and repeating CXR this AM.  Cont hold BP meds    4/12  Off irene  Cont IVF, BP borderline but improving. Keep in ICU another 24 hours to ensure BP stability (fluctuating on checks this morning)  Remains off all PO BP meds    4/13  No BP meds for now    4/14 will start resuming bp meds today bp 174/67 losartan 25> hold hctz for now     Acute on chronic respiratory failure with hypoxemia  Patient with Hypoxic Respiratory failure which is Acute on chronic per chart but family doesn't think she was on O2 at home.  she is on home oxygen at 2-3 LPM per chart review; patient unsure and family doesn't think she had supplemental O2 at home. Supplemental oxygen was provided and noted-  .   Signs/symptoms of respiratory failure include- wheezing. Contributing diagnoses includes - COPD Labs and images were reviewed. Patient Has not had a recent ABG. Will treat underlying causes and adjust management of respiratory failure as follows-     Treat as COPD exacerbation with O2 and wean as tolerated, steroids, nebs, mucinex  Rocephin added to also cover for possible UTI and de-escalate pending cx as CXR without conslidation and low suspicion for bacterial PNA    4/12: cont steroids 40mg daily  Cont  nebs  Rocephin 4/8; stopped 4/9 as urine cx negative and resumed 4/10 after triggering sepsis alert due to hypotension, urine cx negative. Blood cx pending. Repeat CXR pending  Wean o2 as tolerated. PCO2 stable since admit; not sure she would tolerate BiPaP and mild elevation at best    4/13  Transfer to third floor.  No changes with meds.  Continue nebs, ABX's, O2    4/14 now on 3rd floor stable on home dose Oxygen 3L sats %- completed 3 days zithromax and 6 days rocephin. CXR clear no fever. No elevated WBC.     Rheumatoid arthritis involving both hands with negative rheumatoid factor  Takes sulfasalazine 500mg daily ; not sure if for RA    Aortic atherosclerosis  Statin> cont  , ASA> held due to + occult  .      Memory loss  Follows with Dr. Reese for this  Dx probable early dementia per notes in 9/2021   Continue memantine .      Type 2 diabetes mellitus with diabetic neuropathy, without long-term current use of insulin  Home meds include oral metformin   Lab Results   Component Value Date    HGBA1C 6.0 (H) 04/10/2022   will not start insulin per protocol  Monitor for now .        Hyperlipidemia  Continue statin- crestor not fomulary; atorvastatin here   zetia .        VTE Risk Mitigation (From admission, onward)         Ordered     IP VTE HIGH RISK PATIENT  Once         04/07/22 2238     Place sequential compression device  Until discontinued         04/07/22 2238                Discharge Planning   MARIKA:      Code Status: Full Code   Is the patient medically ready for discharge?:     Reason for patient still in hospital (select all that apply): Patient trending condition and PT / OT recommendations  Discharge Plan A: Home with family, Home Health                  Bobo Macdonald MD  Department of Hospital Medicine   [place] - [facility] Surg (3rd Fl)

## 2022-04-14 NOTE — PLAN OF CARE
Patient awake, alert, oriented. 3 L nasal cannula. PT and OT working with patient. Tele normal sinus. Blood glucose monitoring continued. E sitter in room. Bed alarm engaged. Free from fall or injury. Plan of care reviewed with patient. Report given to AIXA Mckeon.

## 2022-04-14 NOTE — PHYSICIAN QUERY
"PT Name: Gretel Ashton  MR #: [id number]     DOCUMENTATION CLARIFICATION     CDS/: Elayne Garner  RN CCDS            Contact information:[email or web address]  This form is a permanent document in the medical record.     Query Date: April 14, 2022    By submitting this query, we are merely seeking further clarification of documentation.  Please utilize your independent clinical judgment when addressing the question(s) below.  The Medical Record contains the following:  Indicators Supporting Clinical Findings Location in Medical Record   x HR         RR          BP        Temp Temp:  96.7 °F -98.3 °F    Pulse:  74-97  Resp:  16-37  SpO2:  92 %-100 %  BP: ()/(51-80)  VS range per H&P   x Lactic Acid          Procalcitonin Lactic acid-2.0 Lab 4/9   x WBC           Bands          CRP  WBC=6.72, 5.52, 8.52, 6.35, 7.21, 5.65 Lab 4/7-4/13    Culture(s)     x AMS, Confusion, LOC, etc. Mental status still with intermittent confusion but less agitation. Hospital medicine PN 4/12-4/13    Organ Dysfunction/Failure     x Bacteremia or Sepsis / Septic SIRS (systemic inflammatory response syndrome)  SBP >90 and RR > 30  No leukcotyosis; no "real fevers"-temp ~99F  No clear source  Urine cx negative  Blood cx NGTD  Sputum cx pending  Repeating CXR  Resumed rocephin and added azithro as pulmonary source seems most likely and de-escalate as cx return  Received 3L NS throughout the day yesterday with improvement in BP from 70-90s but moved to ICU in case of need for pressor support. Has not yet required this  LA=2    Will cont antibx. CXR not showing consolidation though if there is source pulmonary seems most likely Hospital medicine PN 4/13   x Known or Suspected Source of Infection documented Cystitis  Abn UA  Awaiting urine Cx in process  Not reliable historian to know if symptomatic so will treat with rocephin pending cx results     Rocephin 4/8; stopped 4/9 as urine cx negative and resumed 4/10 after triggering " sepsis alert due to hypotension, urine cx negative.  4/9: cx negative. Stopped rocephin but resume for possible pulmonary source/SIRS. Hospital medicine PN 4/13          Hospital medicine PN 4/13    (Failed) Outpatient Treatment     x Medication Rocephin IV 4/8-4/13  Azithromycin IV 4/9-4/11 MAR    Treatment      Other          Please further clarify the SIRS.  Thank you.  [  x ] Sepsis due to pneumonia   [   ] Sepsis due to other (please specify): __________   [   ] SIRS without infectious etiology   [   ] SIRS with infection but without Sepsis, please specify infection:______________________   [   ] Other,___________________________   [  ] Clinically Undetermined       Present on admission (POA) status:   [   ] Yes (Y)            [  ] Clinically Undetermined (W)  [   ] No (N)              [   ] Documentation insufficient to determine if condition is POA (U)     Please document in your progress notes daily for the duration of treatment until resolved and include in your discharge summary.

## 2022-04-14 NOTE — PLAN OF CARE
Plan of Care:  Monitor vital signs, SATs, labs, telemetry, glucose levels.  Telemetry sinus rhythm with PACs and peaked R waves.  O2 SATs mid/upper 90s on 3 liters oxygen nasal cannula.  Intermittent cough with productive sputum.  Glycemic controls in place.  Afebrile.  Tellesitter in place.  Bed alarm on.  Side rails x2.  No falls this shift.

## 2022-04-14 NOTE — PT/OT/SLP RE-EVAL
"Occupational Therapy   Re-evaluation    Name: Gretel Ashton  MRN: [id number]  Admitting Diagnosis:  COPD exacerbation  Recent Surgery: * No surgery found *      Recommendations:     Discharge Recommendations: nursing facility, skilled  Discharge Equipment Recommendations:  bedside commode  Barriers to discharge:  Decreased caregiver support    Assessment:     Gretel Ashton is a 81 y.o. female with a medical diagnosis of COPD exacerbation.  She presents with good participation and no SOB throughout session. Patient was on OT caseload, then transferred to ICU. Patient back on floor now and OT received new orders today and re-evaluated patient. Performance deficits affecting function are weakness, impaired endurance, impaired self care skills, impaired functional mobilty, decreased upper extremity function.      Rehab Prognosis:  Good; patient would benefit from acute skilled OT services to address these deficits and reach maximum level of function.       Plan:     Patient to be seen 5 x/week to address the above listed problems via self-care/home management, therapeutic activities, therapeutic exercises  · Plan of Care Expires: 04/28/22  · Plan of Care Reviewed with: patient    Subjective     Chief Complaint: cold   Patient/Family stated goals: "I want to stay up"  Communicated with: Nursing prior to session.  Pain/Comfort:  · Pain Rating 1: 0/10    Objective:     Communicated with: Nursing prior to session.  Patient found up in chair with: peripheral IV, oxygen, telemetry upon OT entry to room.    General Precautions: Standard, fall   Orthopedic Precautions:N/A   Braces: N/A  Respiratory Status: Nasal cannula, flow 3 L/min    Occupational Performance:    Functional Mobility/Transfers:  · Patient completed Sit <> Stand Transfer with contact guard assistance  with  rolling walker   · Patient completed Bed <> Chair Transfer using Step Transfer technique with contact guard assistance with rolling walker  · Functional " Mobility: with RW to and from bed    Activities of Daily Living:  · Grooming: supervision to comb hair  · Lower Body Dressing: moderate assistance to don/doff socks    Cognitive/Visual Perceptual:  Cognitive/Psychosocial Skills:     -       Oriented to: Person and Place   -       Follows Commands/attention:Follows one-step commands  -       Communication: clear/fluent       Physical Exam:  Upper Extremity Range of Motion:     -       Right Upper Extremity: WFL  -       Left Upper Extremity: WFL  Upper Extremity Strength:    -       Right Upper Extremity: fair  -       Left Upper Extremity: fair    AMPA 6 Click:  AMPA Total Score: 17    Treatment & Education:  TEducation:  herapist completed r-evaluation and educated patient on POC and role of OT    Patient left up in chair with all lines intact, call button in reach and [employer] present    GOALS:   Multidisciplinary Problems     Occupational Therapy Goals        Problem: Occupational Therapy    Goal Priority Disciplines Outcome Interventions   Occupational Therapy Goal     OT, PT/OT Ongoing, Progressing    Description: Goals to be met by: 22     Patient will increase functional independence with ADLs by performing:    Lower Body dressing with Modified [place]   Toileting from toilet with Modified [place] for hygiene and clothing management.   Supine to sit with Modified [place].  Toilet transfer to toilet with Modified [place].  Upper extremity exercise program x10 reps per handout, with assistance as needed.                     History:     Past Medical History:   Diagnosis Date    Arthritis     COPD (chronic obstructive pulmonary disease)     Depression     Diabetes mellitus type II     Hyperlipidemia     Hypertension     Pacemaker        Past Surgical History:   Procedure Laterality Date    CARDIAC PACEMAKER PLACEMENT       SECTION      CYST REMOVAL Left 2019    Procedure: EXCISION, SEBACEOUS CYST;  Surgeon: Jaylen  B Carlos Solis MD;  Location: [facility];  Service: General;  Laterality: Left;    INNER EAR SURGERY         Time Tracking:     OT Date of Treatment: 04/14/22  OT Start Time: 1023  OT Stop Time: 1035  OT Total Time (min): 12 min    Billable Minutes:Re-eval 12 minutes    4/14/2022

## 2022-04-14 NOTE — PROGRESS NOTES
Nursing Notes  Report received from Myah KRUSE.  Patient awake.  Call bell in reach.  Bed alarm on.     Huddle Comments

## 2022-04-14 NOTE — ASSESSMENT & PLAN NOTE
Patient with Hypoxic Respiratory failure which is Acute on chronic per chart but family doesn't think she was on O2 at home.  she is on home oxygen at 2-3 LPM per chart review; patient unsure and family doesn't think she had supplemental O2 at home. Supplemental oxygen was provided and noted-  .   Signs/symptoms of respiratory failure include- wheezing. Contributing diagnoses includes - COPD Labs and images were reviewed. Patient Has not had a recent ABG. Will treat underlying causes and adjust management of respiratory failure as follows-     Treat as COPD exacerbation with O2 and wean as tolerated, steroids, nebs, mucinex  Rocephin added to also cover for possible UTI and de-escalate pending cx as CXR without conslidation and low suspicion for bacterial PNA    4/12: cont steroids 40mg daily  Cont nebs  Rocephin 4/8; stopped 4/9 as urine cx negative and resumed 4/10 after triggering sepsis alert due to hypotension, urine cx negative. Blood cx pending. Repeat CXR pending  Wean o2 as tolerated. PCO2 stable since admit; not sure she would tolerate BiPaP and mild elevation at best    4/13  Transfer to third floor.  No changes with meds.  Continue nebs, ABX's, O2    4/14 now on 3rd floor stable on home dose Oxygen 3L sats %- completed 3 days zithromax and 6 days rocephin. CXR clear no fever. No elevated WBC.

## 2022-04-14 NOTE — PLAN OF CARE
04/14/22 1520   Medicare Message   Important Message from Medicare regarding Discharge Appeal Rights Given to patient/caregiver;Explained to patient/caregiver;Signed/date by patient/caregiver   Date IMM was signed 04/14/22   Time IMM was signed [id number]

## 2022-04-15 VITALS
DIASTOLIC BLOOD PRESSURE: 65 MMHG | HEIGHT: 57 IN | HEART RATE: 100 BPM | WEIGHT: 177.5 LBS | TEMPERATURE: 96 F | OXYGEN SATURATION: 92 % | RESPIRATION RATE: 18 BRPM | BODY MASS INDEX: 38.29 KG/M2 | SYSTOLIC BLOOD PRESSURE: 137 MMHG

## 2022-04-15 LAB
ALBUMIN SERPL BCP-MCNC: 2.6 G/DL (ref 3.5–5.2)
ALP SERPL-CCNC: 65 U/L (ref 55–135)
ALT SERPL W/O P-5'-P-CCNC: 30 U/L (ref 10–44)
ANION GAP SERPL CALC-SCNC: 10 MMOL/L (ref 8–16)
AST SERPL-CCNC: 28 U/L (ref 10–40)
BASOPHILS # BLD AUTO: 0.01 K/UL (ref 0–0.2)
BASOPHILS NFR BLD: 0.2 % (ref 0–1.9)
BILIRUB SERPL-MCNC: 0.2 MG/DL (ref 0.1–1)
BUN SERPL-MCNC: 16 MG/DL (ref 8–23)
CALCIUM SERPL-MCNC: 8.6 MG/DL (ref 8.7–10.5)
CHLORIDE SERPL-SCNC: 98 MMOL/L (ref 95–110)
CO2 SERPL-SCNC: 36 MMOL/L (ref 23–29)
CREAT SERPL-MCNC: 0.9 MG/DL (ref 0.5–1.4)
DIFFERENTIAL METHOD: ABNORMAL
EOSINOPHIL # BLD AUTO: 0.2 K/UL (ref 0–0.5)
EOSINOPHIL NFR BLD: 3 % (ref 0–8)
ERYTHROCYTE [DISTWIDTH] IN BLOOD BY AUTOMATED COUNT: 16.4 % (ref 11.5–14.5)
EST. GFR  (AFRICAN AMERICAN): >60 ML/MIN/1.73 M^2
EST. GFR  (NON AFRICAN AMERICAN): 60 ML/MIN/1.73 M^2
GLUCOSE SERPL-MCNC: 92 MG/DL (ref 70–110)
HCT VFR BLD AUTO: 30.4 % (ref 37–48.5)
HGB BLD-MCNC: 9.2 G/DL (ref 12–16)
IMM GRANULOCYTES # BLD AUTO: 0.02 K/UL (ref 0–0.04)
IMM GRANULOCYTES NFR BLD AUTO: 0.4 % (ref 0–0.5)
LYMPHOCYTES # BLD AUTO: 1.5 K/UL (ref 1–4.8)
LYMPHOCYTES NFR BLD: 28.1 % (ref 18–48)
MCH RBC QN AUTO: 26.8 PG (ref 27–31)
MCHC RBC AUTO-ENTMCNC: 30.3 G/DL (ref 32–36)
MCV RBC AUTO: 89 FL (ref 82–98)
MONOCYTES # BLD AUTO: 0.6 K/UL (ref 0.3–1)
MONOCYTES NFR BLD: 11 % (ref 4–15)
NEUTROPHILS # BLD AUTO: 3.1 K/UL (ref 1.8–7.7)
NEUTROPHILS NFR BLD: 57.3 % (ref 38–73)
NRBC BLD-RTO: 0 /100 WBC
PLATELET # BLD AUTO: 201 K/UL (ref 150–450)
PMV BLD AUTO: 10.5 FL (ref 9.2–12.9)
POCT GLUCOSE: 104 MG/DL (ref 70–110)
POCT GLUCOSE: 158 MG/DL (ref 70–110)
POTASSIUM SERPL-SCNC: 3.7 MMOL/L (ref 3.5–5.1)
PROT SERPL-MCNC: 5.4 G/DL (ref 6–8.4)
RBC # BLD AUTO: 3.43 M/UL (ref 4–5.4)
SODIUM SERPL-SCNC: 144 MMOL/L (ref 136–145)
WBC # BLD AUTO: 5.38 K/UL (ref 3.9–12.7)

## 2022-04-15 PROCEDURE — 25000003 PHARM REV CODE 250: Performed by: FAMILY MEDICINE

## 2022-04-15 PROCEDURE — 27000221 HC OXYGEN, UP TO 24 HOURS

## 2022-04-15 PROCEDURE — 36415 COLL VENOUS BLD VENIPUNCTURE: CPT | Performed by: NURSE PRACTITIONER

## 2022-04-15 PROCEDURE — 94761 N-INVAS EAR/PLS OXIMETRY MLT: CPT

## 2022-04-15 PROCEDURE — 25000242 PHARM REV CODE 250 ALT 637 W/ HCPCS: Performed by: FAMILY MEDICINE

## 2022-04-15 PROCEDURE — 63600175 PHARM REV CODE 636 W HCPCS: Performed by: NURSE PRACTITIONER

## 2022-04-15 PROCEDURE — 99239 PR HOSPITAL DISCHARGE DAY,>30 MIN: ICD-10-PCS | Mod: ,,, | Performed by: INTERNAL MEDICINE

## 2022-04-15 PROCEDURE — 97530 THERAPEUTIC ACTIVITIES: CPT | Mod: CQ

## 2022-04-15 PROCEDURE — 85025 COMPLETE CBC W/AUTO DIFF WBC: CPT | Performed by: NURSE PRACTITIONER

## 2022-04-15 PROCEDURE — 94640 AIRWAY INHALATION TREATMENT: CPT

## 2022-04-15 PROCEDURE — 97110 THERAPEUTIC EXERCISES: CPT | Mod: CQ

## 2022-04-15 PROCEDURE — 80053 COMPREHEN METABOLIC PANEL: CPT | Performed by: NURSE PRACTITIONER

## 2022-04-15 PROCEDURE — 25000003 PHARM REV CODE 250: Performed by: NURSE PRACTITIONER

## 2022-04-15 PROCEDURE — 99239 HOSP IP/OBS DSCHRG MGMT >30: CPT | Mod: ,,, | Performed by: INTERNAL MEDICINE

## 2022-04-15 RX ORDER — METHYLPREDNISOLONE 4 MG/1
TABLET ORAL
Qty: 21 EACH | Refills: 0 | Status: SHIPPED | OUTPATIENT
Start: 2022-04-15 | End: 2022-04-18

## 2022-04-15 RX ADMIN — LOSARTAN POTASSIUM 25 MG: 25 TABLET, FILM COATED ORAL at 08:04

## 2022-04-15 RX ADMIN — CYANOCOBALAMIN TAB 500 MCG 1000 MCG: 500 TAB at 08:04

## 2022-04-15 RX ADMIN — PREDNISONE 40 MG: 20 TABLET ORAL at 08:04

## 2022-04-15 RX ADMIN — IPRATROPIUM BROMIDE AND ALBUTEROL SULFATE 3 ML: 2.5; .5 SOLUTION RESPIRATORY (INHALATION) at 07:04

## 2022-04-15 RX ADMIN — IPRATROPIUM BROMIDE AND ALBUTEROL SULFATE 3 ML: 2.5; .5 SOLUTION RESPIRATORY (INHALATION) at 01:04

## 2022-04-15 RX ADMIN — SULFASALAZINE 500 MG: 500 TABLET ORAL at 08:04

## 2022-04-15 RX ADMIN — SUCRALFATE 1 G: 1 SUSPENSION ORAL at 01:04

## 2022-04-15 RX ADMIN — IPRATROPIUM BROMIDE AND ALBUTEROL SULFATE 3 ML: 2.5; .5 SOLUTION RESPIRATORY (INHALATION) at 12:04

## 2022-04-15 RX ADMIN — MEMANTINE HYDROCHLORIDE 5 MG: 5 TABLET ORAL at 08:04

## 2022-04-15 RX ADMIN — SUCRALFATE 1 G: 1 SUSPENSION ORAL at 12:04

## 2022-04-15 RX ADMIN — GUAIFENESIN 600 MG: 600 TABLET, EXTENDED RELEASE ORAL at 08:04

## 2022-04-15 RX ADMIN — EZETIMIBE 10 MG: 10 TABLET ORAL at 08:04

## 2022-04-15 RX ADMIN — SUCRALFATE 1 G: 1 SUSPENSION ORAL at 06:04

## 2022-04-15 RX ADMIN — PANTOPRAZOLE SODIUM 40 MG: 40 TABLET, DELAYED RELEASE ORAL at 08:04

## 2022-04-15 RX ADMIN — FERROUS SULFATE TAB 325 MG (65 MG ELEMENTAL FE) 1 EACH: 325 (65 FE) TAB at 08:04

## 2022-04-15 NOTE — ASSESSMENT & PLAN NOTE
New onset overnight  Patient a poor historian and difficulty to qualify her pain  + belching  Cont PPI, added carafate; seems to respond to GI cocktail earlier this am  Trend CE  EKG reviewed, no acute ST-T wave changes  Cont tele  Cont statin    4/10: resolved today with treatment for reflux  CE normal  Cont statin and tele  Repeat CXR today    4/12  Resolved, trop negative.

## 2022-04-15 NOTE — ASSESSMENT & PLAN NOTE
Patient with Hypoxic Respiratory failure which is Acute on chronic per chart but family doesn't think she was on O2 at home.  she is on home oxygen at 2-3 LPM per chart review; patient unsure and family doesn't think she had supplemental O2 at home. Supplemental oxygen was provided and noted-  .   Signs/symptoms of respiratory failure include- wheezing. Contributing diagnoses includes - COPD Labs and images were reviewed. Patient Has not had a recent ABG. Will treat underlying causes and adjust management of respiratory failure as follows-     Treat as COPD exacerbation with O2 and wean as tolerated, steroids, nebs, mucinex  Rocephin added to also cover for possible UTI and de-escalate pending cx as CXR without conslidation and low suspicion for bacterial PNA    4/12: cont steroids 40mg daily  Cont nebs  Rocephin 4/8; stopped 4/9 as urine cx negative and resumed 4/10 after triggering sepsis alert due to hypotension, urine cx negative. Blood cx pending. Repeat CXR pending  Wean o2 as tolerated. PCO2 stable since admit; not sure she would tolerate BiPaP and mild elevation at best    4/13  Transfer to third floor.  No changes with meds.  Continue nebs, ABX's, O2    4/14 now on 3rd floor stable on home dose Oxygen 3L sats %- completed 3 days zithromax and 6 days rocephin. CXR clear no fever. No elevated WBC.     4/15  DC home

## 2022-04-15 NOTE — PT/OT/SLP PROGRESS
"Physical Therapy Treatment    Patient Name:  Gretel Ashton   MRN:  [id number]    Recommendations:     Discharge Recommendations:  nursing facility, skilled   Discharge Equipment Recommendations: bedside commode   Barriers to discharge: Inaccessible home and Decreased caregiver support    Assessment:     Gretel Ashton is a 81 y.o. female admitted with a medical diagnosis of COPD exacerbation.  She presents with the following impairments/functional limitations:  weakness, impaired endurance, impaired self care skills, gait instability, impaired balance, decreased safety awareness, impaired functional mobilty (hard or hearing) Patient tolerated treatment fairly well today. Patient performed all activities with nasal cannula donned due to fluctuations in SPO2. Monitored BP before standin/65 mm hg and SPO2 at 95%; During standing balance SPO2 declined to 88%. Educated patient on deep breathing and SPO2 returned to 95% after 1 minute. After standing balance x 3 mins BP: 141/61 mm hg and SPO2 and SPO2 at 92%. Patient completed lower extremity exercises with no complaints. No signs of distress and fatigue.      Rehab Prognosis: Fair; patient would benefit from acute skilled PT services to address these deficits and reach maximum level of function.    Recent Surgery: * No surgery found *      Plan:     During this hospitalization, patient to be seen daily to address the identified rehab impairments via gait training, therapeutic activities, therapeutic exercises and progress toward the following goals:    · Plan of Care Expires:  22    Subjective     Chief Complaint: No new complaints  Patient/Family Comments/goals: "To go home"  Pain/Comfort:  · Pain Rating 1: 0/10      Objective:     Communicated with RN and patient prior to session. Patient found sitting on sofa  with telemetry, peripheral IV, oxygen, and telesitter upon PT entry to room.     General Precautions: Standard, fall   Orthopedic Precautions:N/A "   Braces: N/A  Respiratory Status: Nasal cannula, flow 2 L/min     Functional Mobility:  · Transfers:     · Sit to Stand:  stand by assistance with rolling walker  · Balance: SBA x 3 mins w/ RW       AM-PAC 6 CLICK MOBILITY  Turning over in bed (including adjusting bedclothes, sheets and blankets)?: 3  Sitting down on and standing up from a chair with arms (e.g., wheelchair, bedside commode, etc.): 3  Moving from lying on back to sitting on the side of the bed?: 3  Moving to and from a bed to a chair (including a wheelchair)?: 3  Need to walk in hospital room?: 3  Climbing 3-5 steps with a railing?: 2 (clinical judgement)  Basic Mobility Total Score: 17       Therapeutic Activities and Exercises:   Standing balance x 3 min w/ SBA and RW  Bilateral lower extremity exercises as follows-  Ankle pumps x 10   Seated marches x 10   Hip add x 10   LAQs x 10     Patient left seated on sofa  with all lines intact, call button in reach and telesitter present..    GOALS:   Multidisciplinary Problems     Physical Therapy Goals        Problem: Physical Therapy    Goal Priority Disciplines Outcome Goal Variances Interventions   Physical Therapy Goal     PT, PT/OT Ongoing, Progressing     Description:   Patient will increase functional independence with mobility by performin. Supine to sit with Modified Independent  2. Sit to supine with Modified Independent  3. Bed to chair transfer with Supervision with or without rolling walker using Step Transfer TECHNIQUE  4. Gait  x ~100 feet with Supervision or Set-up Assistance with or without rolling walker.  5. Ascend/descend 4 steps with handrail/s with contact guard /supervision.  6. Lower extremity exercise program x10 reps per handout, with assistance as needed                      Time Tracking:     PT Received On: 04/15/22  PT Start Time: 1145     PT Stop Time: 1214  PT Total Time (min): 29 min     Billable Minutes: Therapeutic Activity 10 and Therapeutic Exercise  13    Treatment Type: Treatment  PT/PTA: PTA     PTA Visit Number: 1     04/15/2022

## 2022-04-15 NOTE — ASSESSMENT & PLAN NOTE
Home meds;   Losartan 25mg daily , HCTz 12.5mg daily prn- hold hctz for now     4/9: BP variable. SBP 200s overnight but anxious. Then as low as 80s. Hold meds. 1L NS this AM and reassess--BP trending up again with fluids    4/10: patient remained hypotensive throughout the day yesterday. Given 3L NS with improvement from 70s to 90s.  Moved to ICU in case of need for pressor support but was never needed. Currently stable  SIRS criteria met but unclear source if any. cx pending and repeating CXR this AM.  Cont hold BP meds    4/12  Off irene  Cont IVF, BP borderline but improving. Keep in ICU another 24 hours to ensure BP stability (fluctuating on checks this morning)  Remains off all PO BP meds    4/13  No BP meds for now    4/14 will start resuming bp meds today bp 174/67 losartan 25> hold hctz for now .    4/15  146/60  Resume home meds

## 2022-04-15 NOTE — PROGRESS NOTES
[place] [facility] ([facility])  [facility]  Progress Note    Patient Name: Gretel Ashton  MRN: [id number]  Patient Class: IP- Inpatient   Admission Date: 4/7/2022  Length of Stay: 7 days  Attending Physician: Aayush Rinaldi MD  Primary Care Provider: Jailene Astudillo MD        Subjective:     Principal Problem:COPD exacerbation        HPI:  Gretel Ashton is a 81 y.o. 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 Reese and chronic respiratory failure on 2-3 L at home secondary to COPD, follows with Dr. CECILY Guerra.   Pt presents to ER yesterday PM with c/o shortness of breath that began 2:00 a.m. yesterday  morning.  She also reports feeling nauseated and having a cough with productive brownish yellow sputum. Brother reports she has been having frequent episodes of shortness of breath but not quite this severe. Denies any home o2 use???    denies any fever. denies any chest pain or palpitations. patient does appear very anxious.  Denies any recent ill contacts    Colitis- ? Sulfasalazine   URINE ABN- Cx in process   Will start IV Rocephin,       Overview/Hospital Course:  Patient had a rough night complaining of intermittent chest pain. + belching. + nausea. Increasing intensity overnight. Initial SBP 90s then 200s, then 90s. Likely anxiety driven. Initial CE negative. EKG negative. 11 beat VT on tele overnight but none since. Replacing electrolytes PRN. She remains on 2L NC. She is unable to tell me if her pain feels like reflux but reports in the left lower chest.     4/10:  Patient developed worsening hypotension throughout the day yesterday. Given up to 3L NS bolus and then was on 100cc/hr NS. SBP improved from 70s to 90s but still low. RR > 30. No  fevers (temp around 99) and no leukocytosis. Urine cx negative. Initial CXR without consolidation. However, triggering sepsis alert. She was moved to the ICU last night for closer monitoring in case she would require pressor support but she has not. Lactic acid 2. She continues to oxygenate well on 2L NC. pCO2 is 52--unchanged from admission. She remains intermittently confused since admission; though likely her baseline. No further reports of chest pain since treatment for reflux yesterday. CE not suggestive of ischemia. Resumed Rocephin and azithro as pulmonary source seems most likely given her initial presentation; repeat CXR this AM.     4/11/22  Gretel Ashton is a 81 y.o. female  Seen in ICU ; on precedex and irene synephrine .still on IVF   I saw her in office for a long time she does not recognize me now .  She was admitted for copd exacerbation ; on steroid ; and ceftriaxone and zithromax   She became hypotensive ; needs IVF ; minimal pressors ; her HB dropped to 6 ; required 2 units of PRBCs   Now  9.7; No BM so dont know if GI bleeding .  Will wean her off of pressors and precedex .     4/12:  Off predecex and irene this AM  BP borderline this morning, better on rounds   Still with cough on 2L NC  BM not collected for occult; H/H trended up. No gross bleeding noted  Mental status still with intermittent confusion but less agitation. She knows where she is and her family members. Can ask to go to toilet for BM, etc.     4/12  Doing much better.  Working with therapy.  Still with cough and O2 at 2 liters NC.    No longer agitated.  Confusion much better.  Eating      4/14 pt was moved from ICU yesterday after blood pressure stabilizing. She is currently being treated for COPD exacerbation. She is on medrol 40mf IV daily and duonebs every 6hr. On 3L NC sats %. Uses 2-3 L at home. Still has cough with sputum production.  Completed 3 days azithromycin and 6 days rocephin. VSS/afebrile [place] now elevated. Will  need to resume home antihypertensives.   Anemia stable with + occult stool noted.   PT has pt ambulating 15 ft forward and backward using RW> goal is 100ft and 4 steps         4/15/22  Gretel Ashton is a 81 y.o. female  She wants to go home ; no family by bedisde   Nurses trying to get hold off son   Using 3 L nasal cannula   Walking with PT 30 feet with PT .      Interval History:     Review of Systems   Constitutional:  Negative for fatigue and fever.   HENT:  Negative for congestion and sore throat.    Eyes:  Negative for pain and visual disturbance.   Respiratory:  Negative for shortness of breath.    Cardiovascular:  Negative for chest pain and leg swelling.   Gastrointestinal:  Negative for abdominal pain, constipation, diarrhea, nausea and vomiting.   Genitourinary:  Negative for dysuria and hematuria.   Skin:  Negative for rash and wound.   Neurological:  Positive for weakness (generalized). Negative for dizziness, light-headedness and headaches.   Objective:     Vital Signs (Most Recent):  Temp: 97.6 °F (36.4 °C) (04/15/22 0727)  Pulse: 71 (04/15/22 0800)  Resp: 18 (04/15/22 0727)  BP: (!) 146/60 (04/15/22 0727)  SpO2: (!) 94 % (04/15/22 0727)   Vital Signs (24h Range):  Temp:  [97.6 °F (36.4 °C)-99.2 °F (37.3 °C)] 97.6 °F (36.4 °C)  Pulse:  [60-99] 71  Resp:  [16-24] 18  SpO2:  [94 %-100 %] 94 %  BP: (120-148)/(56-74) 146/60     Weight: 80.5 kg (177 lb 7.5 oz)  Body mass index is 38.4 kg/m².    Intake/Output Summary (Last 24 hours) at 4/15/2022 0851  Last data filed at 4/15/2022 0512  Gross per 24 hour   Intake 425 ml   Output 1301 ml   Net -876 ml      Physical Exam  Vitals and nursing note reviewed.   Constitutional:       General: She is not in acute distress.     Appearance: She is well-developed. She is obese.   HENT:      Head: Normocephalic and atraumatic.      Nose: Nose normal.   Eyes:      General:         Right eye: No discharge.         Left eye: No discharge.      Extraocular Movements:  Extraocular movements intact.      Conjunctiva/sclera: Conjunctivae normal.      Pupils: Pupils are equal, round, and reactive to light.   Neck:      Thyroid: No thyromegaly.   Cardiovascular:      Rate and Rhythm: Normal rate and regular rhythm.      Heart sounds: No murmur heard.  Pulmonary:      Effort: Pulmonary effort is normal. No respiratory distress.      Breath sounds: No wheezing or rales.   Abdominal:      General: Bowel sounds are normal. There is no distension.      Palpations: Abdomen is soft.      Tenderness: There is no abdominal tenderness.   Musculoskeletal:      Cervical back: Neck supple.      Right lower leg: No edema.      Left lower leg: No edema.   Skin:     General: Skin is warm and dry.   Neurological:      General: No focal deficit present.      Mental Status: She is alert and oriented to person, place, and time.   Psychiatric:         Mood and Affect: Mood normal.         Behavior: Behavior normal.       Significant Labs: All pertinent labs within the past 24 hours have been reviewed.  CBC:   Recent Labs   Lab 04/14/22  0558 04/15/22  0541   WBC 5.65 5.38   HGB 10.1* 9.2*   HCT 32.4* 30.4*    201     CMP:   Recent Labs   Lab 04/14/22  0558 04/15/22  0541    144   K 3.7 3.7   CL 99 98   CO2 32* 36*   GLU 87 92   BUN 15 16   CREATININE 0.9 0.9   CALCIUM 8.8 8.6*   PROT 6.1 5.4*   ALBUMIN 2.8* 2.6*   BILITOT 0.2 0.2   ALKPHOS 77 65   AST 34 28   ALT 33 30   ANIONGAP 12 10   EGFRNONAA 60 60     Cultures negative        Assessment/Plan:      * COPD exacerbation  Supplemental O2 via nasal canula; titrate O2 saturation to >92%. Records reviewed and she was on  Home O2 a few years ago ; family denies home Oxygen at present   Start Solumedrol 80mg daily   Continue beta 2 agonist bronchodilator treatments.   Continue IV antibiotics. Start IV rocephin 1gm for lower resp infection/COPD exac as would treat possible UTI; f/u urine Cx also and if negative can de-escalate and no  "consolidation on CXR    Continue routine medications as before.     4/12: improving; still coughing   Cont nebs, cont steroids  Resumed rocephin and started azitro as triggering sepsis protocol and pulmonary seems most likely source given her presentation  Reviewed CXR  Wean O2 as tolerated    4/13  continue current care and meds.  No changes  Transfer to 3rd floor  Currently on solumedrol 40 mg daily    4/14 wean steroids to prednisone 40 po daily     4/15  Home on medrol dose pack     Symptomatic anemia  She has h/o colitis   She may have bled   Right now no active bleeding   Will monitor H/H    4/12  Stable  Still no stool for occult but no active bleeding  Daily H/H    4/13  Heme + stool can be worked up later  Continue sucralfate and protonix  Check H&H tomorrow    4/14 CBC remains stable   Holding asa     4/15  Hold ASA      SIRS (systemic inflammatory response syndrome)  SBP >90 and RR > 30  No leukcotyosis; no "real fevers"-temp ~99F  No clear source  Urine cx negative  Blood cx NGTD  Sputum cx pending  Repeating CXR  Resumed rocephin and added azithro as pulmonary source seems most likely and de-escalate as cx return  Received 3L NS throughout the day yesterday with improvement in BP from 70-90s but moved to ICU in case of need for pressor support. Has not yet required this  LA=2    4/12  Stable  Will cont antibx. CXR not showing consolidation though if there is source pulmonary seems most likely  Cont IVF, BP stabilizing. Off pressors now    4/13  Resolved.      Chest pain  New onset overnight  Patient a poor historian and difficulty to qualify her pain  + belching  Cont PPI, added carafate; seems to respond to GI cocktail earlier this am  Trend CE  EKG reviewed, no acute ST-T wave changes  Cont tele  Cont statin    4/10: resolved today with treatment for reflux  CE normal  Cont statin and tele  Repeat CXR today    4/12  Resolved, trop negative.    Debility  PT/OT> she is not safe to be d/willie yet. Needs " mopre PT. onlt ambulating 15 feet>> goal 100ft with 4 steps         4/15  DC home   Will need home health and home PT       Cystitis  Abn UA  Awaiting urine Cx in process   Not reliable historian to know if symptomatic so will treat with rocephin pending cx results    4/9: cx negative. Stopped rocephin but resume for possible pulmonary source/SIRS.    4/11  Continue rocephin     4/14 urine culture is negative > d.c abx.    HTN (hypertension)  Home meds;   Losartan 25mg daily , HCTz 12.5mg daily prn- hold hctz for now     4/9: BP variable. SBP 200s overnight but anxious. Then as low as 80s. Hold meds. 1L NS this AM and reassess--BP trending up again with fluids    4/10: patient remained hypotensive throughout the day yesterday. Given 3L NS with improvement from 70s to 90s.  Moved to ICU in case of need for pressor support but was never needed. Currently stable  SIRS criteria met but unclear source if any. cx pending and repeating CXR this AM.  Cont hold BP meds    4/12  Off irene  Cont IVF, BP borderline but improving. Keep in ICU another 24 hours to ensure BP stability (fluctuating on checks this morning)  Remains off all PO BP meds    4/13  No BP meds for now    4/14 will start resuming bp meds today bp 174/67 losartan 25> hold hctz for now .    4/15  146/60  Resume home meds      Acute on chronic respiratory failure with hypoxemia  Patient with Hypoxic Respiratory failure which is Acute on chronic per chart but family doesn't think she was on O2 at home.  she is on home oxygen at 2-3 LPM per chart review; patient unsure and family doesn't think she had supplemental O2 at home. Supplemental oxygen was provided and noted-  .   Signs/symptoms of respiratory failure include- wheezing. Contributing diagnoses includes - COPD Labs and images were reviewed. Patient Has not had a recent ABG. Will treat underlying causes and adjust management of respiratory failure as follows-     Treat as COPD exacerbation with O2 and wean  as tolerated, steroids, nebs, mucinex  Rocephin added to also cover for possible UTI and de-escalate pending cx as CXR without conslidation and low suspicion for bacterial PNA    4/12: cont steroids 40mg daily  Cont nebs  Rocephin 4/8; stopped 4/9 as urine cx negative and resumed 4/10 after triggering sepsis alert due to hypotension, urine cx negative. Blood cx pending. Repeat CXR pending  Wean o2 as tolerated. PCO2 stable since admit; not sure she would tolerate BiPaP and mild elevation at best    4/13  Transfer to third floor.  No changes with meds.  Continue nebs, ABX's, O2    4/14 now on 3rd floor stable on home dose Oxygen 3L sats %- completed 3 days zithromax and 6 days rocephin. CXR clear no fever. No elevated WBC.     4/15  DC home       Rheumatoid arthritis involving both hands with negative rheumatoid factor  Takes sulfasalazine 500mg daily ; not sure if for RA.    Aortic atherosclerosis  Statin> cont  , ASA> held due to + occult  .          Memory loss  Follows with Dr. Reese for this  Dx probable early dementia per notes in 9/2021   Continue memantine .      Type 2 diabetes mellitus with diabetic neuropathy, without long-term current use of insulin  Home meds include oral metformin   Lab Results   Component Value Date    HGBA1C 6.0 (H) 04/10/2022   will not start insulin per protocol  Monitor for now .      4/15  Home meds upon discharge        Hyperlipidemia  Continue statin- crestor not fomulary; atorvastatin here   zetia .            VTE Risk Mitigation (From admission, onward)         Ordered     IP VTE HIGH RISK PATIENT  Once         04/07/22 2238     Place sequential compression device  Until discontinued         04/07/22 2238                Discharge Planning   MARIKA:      Code Status: Full Code   Is the patient medically ready for discharge?:     Reason for patient still in hospital (select all that apply): Pending disposition  Discharge Plan A: Home with family, Home Health                   Jailene Astudillo MD  Department of Hospital Medicine   [place] [facility] (3rd Fl)

## 2022-04-15 NOTE — ASSESSMENT & PLAN NOTE
"SBP >90 and RR > 30  No leukcotyosis; no "real fevers"-temp ~99F  No clear source  Urine cx negative  Blood cx NGTD  Sputum cx pending  Repeating CXR  Resumed rocephin and added azithro as pulmonary source seems most likely and de-escalate as cx return  Received 3L NS throughout the day yesterday with improvement in BP from 70-90s but moved to ICU in case of need for pressor support. Has not yet required this  LA=2    4/12  Stable  Will cont antibx. CXR not showing consolidation though if there is source pulmonary seems most likely  Cont IVF, BP stabilizing. Off pressors now    4/13  Resolved.    "

## 2022-04-15 NOTE — ASSESSMENT & PLAN NOTE
She has h/o colitis   She may have bled   Right now no active bleeding   Will monitor H/H    4/12  Stable  Still no stool for occult but no active bleeding  Daily H/H    4/13  Heme + stool can be worked up later  Continue sucralfate and protonix  Check H&H tomorrow    4/14 CBC remains stable   Holding asa     4/15  Hold ASA

## 2022-04-15 NOTE — ASSESSMENT & PLAN NOTE
Abn UA  Awaiting urine Cx in process   Not reliable historian to know if symptomatic so will treat with rocephin pending cx results    4/9: cx negative. Stopped rocephin but resume for possible pulmonary source/SIRS.    4/11  Continue rocephin     4/14 urine culture is negative > d.c abx.

## 2022-04-15 NOTE — ASSESSMENT & PLAN NOTE
PT/OT> she is not safe to be d/willie yet. Needs mopre PT. onlt ambulating 15 feet>> goal 100ft with 4 steps         4/15  DC home   Will need home health and home PT

## 2022-04-15 NOTE — PLAN OF CARE
Problem: Adult Inpatient Plan of Care  Goal: Plan of Care Review  Outcome: Ongoing, Progressing  Goal: Patient-Specific Goal (Individualized)  Outcome: Ongoing, Progressing  Goal: Absence of Hospital-Acquired Illness or Injury  Outcome: Ongoing, Progressing  Goal: Optimal Comfort and Wellbeing  Outcome: Ongoing, Progressing  Goal: Readiness for Transition of Care  Outcome: Ongoing, Progressing     Problem: Diabetes Comorbidity  Goal: Blood Glucose Level Within Targeted Range  Outcome: Ongoing, Progressing     Problem: Adjustment to Illness COPD (Chronic Obstructive Pulmonary Disease)  Goal: Optimal Chronic Illness Coping  Outcome: Ongoing, Progressing     Problem: Infection COPD (Chronic Obstructive Pulmonary Disease)  Goal: Absence of Infection Signs and Symptoms  Outcome: Ongoing, Progressing     Problem: Functional Ability Impaired COPD (Chronic Obstructive Pulmonary Disease)  Goal: Optimal Level of Functional [place]  Outcome: Ongoing, Progressing     Problem: Oral Intake Inadequate COPD (Chronic Obstructive Pulmonary Disease)  Goal: Improved Nutrition Intake  Outcome: Ongoing, Progressing     Problem: Respiratory Compromise COPD (Chronic Obstructive Pulmonary Disease)  Goal: Effective Oxygenation and Ventilation  Outcome: Ongoing, Progressing     Problem: UTI (Urinary Tract Infection)  Goal: Improved Infection Symptoms  Outcome: Ongoing, Progressing     Problem: Skin Injury Risk Increased  Goal: Skin Health and Integrity  Outcome: Ongoing, Progressing     Problem: Infection  Goal: Absence of Infection Signs and Symptoms  Outcome: Ongoing, Progressing

## 2022-04-15 NOTE — PLAN OF CARE
[place] - Med Surg (3rd Fl)  Discharge Final Note    Primary Care Provider: Jailene Astudillo MD    Expected Discharge Date: 4/15/2022    Final Discharge Note (most recent)     Final Note - 04/15/22 1036        Final Note    Assessment Type Final Discharge Note     Anticipated Discharge Disposition Home-Health Care [facility]     What phone number can be called within the next 1-3 days to see how you are doing after discharge? [phone]        Post-Acute Status    Post-Acute Authorization Home Health     Home Health Status Referrals Sent   choice paper signed. patient choosed [facility].    Patient choice form signed by patient/caregiver List with quality metrics by geographic area provided     Discharge Delays None known at this time                 Important Message from Medicare  Important Message from Medicare regarding Discharge Appeal Rights: Given to patient/caregiver, Explained to patient/caregiver, Signed/date by patient/caregiver     Date IMM was signed: 04/14/22  Time IMM was signed: 1137    Contact Info     Jailene Astudillo MD   Specialty: Internal Medicine   Relationship: PCP - General    [address] [address]   Phone: [phone]       Next Steps: Go on 4/18/2022    Instructions: Hospital Follow-up at 8:15am       Ms Ashton will discharge home today. Choice paper signed for home health services with [facility]. Awaiting acceptance.

## 2022-04-15 NOTE — ASSESSMENT & PLAN NOTE
Supplemental O2 via nasal canula; titrate O2 saturation to >92%. Records reviewed and she was on  Home O2 a few years ago ; family denies home Oxygen at present   Start Solumedrol 80mg daily   Continue beta 2 agonist bronchodilator treatments.   Continue IV antibiotics. Start IV rocephin 1gm for lower resp infection/COPD exac as would treat possible UTI; f/u urine Cx also and if negative can de-escalate and no consolidation on CXR    Continue routine medications as before.     4/12: improving; still coughing   Cont nebs, cont steroids  Resumed rocephin and started azitro as triggering sepsis protocol and pulmonary seems most likely source given her presentation  Reviewed CXR  Wean O2 as tolerated    4/13  continue current care and meds.  No changes  Transfer to 3rd floor  Currently on solumedrol 40 mg daily    4/14 wean steroids to prednisone 40 po daily     4/15  Home on medrol dose pack

## 2022-04-15 NOTE — ASSESSMENT & PLAN NOTE
Home meds include oral metformin   Lab Results   Component Value Date    HGBA1C 6.0 (H) 04/10/2022   will not start insulin per protocol  Monitor for now .      4/15  Home meds upon discharge

## 2022-04-15 NOTE — SUBJECTIVE & OBJECTIVE
Interval History:     Review of Systems   Constitutional:  Negative for fatigue and fever.   HENT:  Negative for congestion and sore throat.    Eyes:  Negative for pain and visual disturbance.   Respiratory:  Negative for shortness of breath.    Cardiovascular:  Negative for chest pain and leg swelling.   Gastrointestinal:  Negative for abdominal pain, constipation, diarrhea, nausea and vomiting.   Genitourinary:  Negative for dysuria and hematuria.   Skin:  Negative for rash and wound.   Neurological:  Positive for weakness (generalized). Negative for dizziness, light-headedness and headaches.   Objective:     Vital Signs (Most Recent):  Temp: 97.6 °F (36.4 °C) (04/15/22 0727)  Pulse: 71 (04/15/22 0800)  Resp: 18 (04/15/22 0727)  BP: (!) 146/60 (04/15/22 0727)  SpO2: (!) 94 % (04/15/22 0727)   Vital Signs (24h Range):  Temp:  [97.6 °F (36.4 °C)-99.2 °F (37.3 °C)] 97.6 °F (36.4 °C)  Pulse:  [60-99] 71  Resp:  [16-24] 18  SpO2:  [94 %-100 %] 94 %  BP: (120-148)/(56-74) 146/60     Weight: 80.5 kg (177 lb 7.5 oz)  Body mass index is 38.4 kg/m².    Intake/Output Summary (Last 24 hours) at 4/15/2022 0855  Last data filed at 4/15/2022 0512  Gross per 24 hour   Intake 425 ml   Output 1301 ml   Net -876 ml      Physical Exam  Vitals and nursing note reviewed.   Constitutional:       General: She is not in acute distress.     Appearance: She is well-developed. She is obese.   HENT:      Head: Normocephalic and atraumatic.      Nose: Nose normal.   Eyes:      General:         Right eye: No discharge.         Left eye: No discharge.      Extraocular Movements: Extraocular movements intact.      Conjunctiva/sclera: Conjunctivae normal.      Pupils: Pupils are equal, round, and reactive to light.   Neck:      Thyroid: No thyromegaly.   Cardiovascular:      Rate and Rhythm: Normal rate and regular rhythm.      Heart sounds: No murmur heard.  Pulmonary:      Effort: Pulmonary effort is normal. No respiratory distress.      Breath  sounds: No wheezing or rales.   Abdominal:      General: Bowel sounds are normal. There is no distension.      Palpations: Abdomen is soft.      Tenderness: There is no abdominal tenderness.   Musculoskeletal:      Cervical back: Neck supple.      Right lower leg: No edema.      Left lower leg: No edema.   Skin:     General: Skin is warm and dry.   Neurological:      General: No focal deficit present.      Mental Status: She is alert and oriented to person, place, and time.   Psychiatric:         Mood and Affect: Mood normal.         Behavior: Behavior normal.       Significant Labs: All pertinent labs within the past 24 hours have been reviewed.  CBC:   Recent Labs   Lab 04/14/22  0558 04/15/22  0541   WBC 5.65 5.38   HGB 10.1* 9.2*   HCT 32.4* 30.4*    201     CMP:   Recent Labs   Lab 04/14/22  0558 04/15/22  0541    144   K 3.7 3.7   CL 99 98   CO2 32* 36*   GLU 87 92   BUN 15 16   CREATININE 0.9 0.9   CALCIUM 8.8 8.6*   PROT 6.1 5.4*   ALBUMIN 2.8* 2.6*   BILITOT 0.2 0.2   ALKPHOS 77 65   AST 34 28   ALT 33 30   ANIONGAP 12 10   EGFRNONAA 60 60     Cultures negative

## 2022-04-15 NOTE — DISCHARGE SUMMARY
[facility] Surg ([facility])  [facility] Medicine  Discharge Summary      Patient Name: Gretel Ashton  MRN: [id number]  Patient Class: IP- Inpatient  Admission Date: 4/7/2022  Hospital Length of Stay: 7 days  Discharge Date and Time:  04/15/2022 9:08 AM  Attending Physician: Aayush Rinaldi MD   Discharging Provider: Jailene Astudillo MD  Primary Care Provider: Jailene Astudillo MD      HPI:   Gretel Ashton is a 81 y.o. 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 Reese and chronic respiratory failure on 2-3 L at home secondary to COPD, follows with Dr. CECILY Guerra.   Pt presents to ER yesterday PM with c/o shortness of breath that began 2:00 a.m. yesterday  morning.  She also reports feeling nauseated and having a cough with productive brownish yellow sputum. Brother reports she has been having frequent episodes of shortness of breath but not quite this severe. Denies any home o2 use???    denies any fever. denies any chest pain or palpitations. patient does appear very anxious.  Denies any recent ill contacts    Colitis- ? Sulfasalazine   URINE ABN- Cx in process   Will start IV Rocephin,       * No surgery found *      Hospital Course:   Patient had a rough night complaining of intermittent chest pain. + belching. + nausea. Increasing intensity overnight. Initial SBP 90s then 200s, then 90s. Likely anxiety driven. Initial CE negative. EKG negative. 11 beat VT on tele overnight but none since. Replacing electrolytes PRN. She remains on 2L NC. She is unable to tell me if her pain feels like reflux but reports in the left lower chest.     4/10:  Patient developed worsening hypotension throughout the day yesterday. Given up to 3L NS bolus and then was on 100cc/hr  NS. SBP improved from 70s to 90s but still low. RR > 30. No fevers (temp around 99) and no leukocytosis. Urine cx negative. Initial CXR without consolidation. However, triggering sepsis alert. She was moved to the ICU last night for closer monitoring in case she would require pressor support but she has not. Lactic acid 2. She continues to oxygenate well on 2L NC. pCO2 is 52--unchanged from admission. She remains intermittently confused since admission; though likely her baseline. No further reports of chest pain since treatment for reflux yesterday. CE not suggestive of ischemia. Resumed Rocephin and azithro as pulmonary source seems most likely given her initial presentation; repeat CXR this AM.     4/11/22  Gretel Ashton is a 81 y.o. female  Seen in ICU ; on precedex and irene synephrine .still on IVF   I saw her in office for a long time she does not recognize me now .  She was admitted for copd exacerbation ; on steroid ; and ceftriaxone and zithromax   She became hypotensive ; needs IVF ; minimal pressors ; her HB dropped to 6 ; required 2 units of PRBCs   Now  9.7; No BM so dont know if GI bleeding .  Will wean her off of pressors and precedex .     4/12:  Off predecex and irene this AM  BP borderline this morning, better on rounds   Still with cough on 2L NC  BM not collected for occult; H/H trended up. No gross bleeding noted  Mental status still with intermittent confusion but less agitation. She knows where she is and her family members. Can ask to go to toilet for BM, etc.     4/12  Doing much better.  Working with therapy.  Still with cough and O2 at 2 liters NC.    No longer agitated.  Confusion much better.  Eating      4/14 pt was moved from ICU yesterday after blood pressure stabilizing. She is currently being treated for COPD exacerbation. She is on medrol 40mf IV daily and duonebs every 6hr. On 3L NC sats %. Uses 2-3 L at home. Still has cough with sputum production.  Completed 3 days  azithromycin and 6 days rocephin. VSS/afebrile [place] now elevated. Will need to resume home antihypertensives.   Anemia stable with + occult stool noted.   PT has pt ambulating 15 ft forward and backward using RW> goal is 100ft and 4 steps         4/15/22  Gretel Ashton is a 81 y.o. female  She wants to go home ; no family by bedisde   Nurses trying to get hold off son   Using 3 L nasal cannula   Walking with PT 30 feet with PT .       Goals of Care Treatment Preferences:  Code Status: Full Code      Consults:     No new Assessment & Plan notes have been filed under this hospital service since the last note was generated.  Service: Hospital Medicine    Final Active Diagnoses:    Diagnosis Date Noted POA    PRINCIPAL PROBLEM:  COPD exacerbation [J44.1] 01/02/2013 Yes    Symptomatic anemia [D64.9] 04/11/2022 No    SIRS (systemic inflammatory response syndrome) [R65.10] 04/10/2022 No    Chest pain [R07.9] 04/09/2022 No    Debility [R53.81] 04/08/2022 Yes    HTN (hypertension) [I10] 04/13/2020 Yes    Cystitis [N30.90] 04/13/2020 Yes    Acute on chronic respiratory failure with hypoxemia [J96.21] 04/12/2020 Yes    Rheumatoid arthritis involving both hands with negative rheumatoid factor [M06.041, M06.042] 12/18/2019 Yes    Aortic atherosclerosis [I70.0] 06/12/2019 Yes    Type 2 diabetes mellitus with diabetic neuropathy, without long-term current use of insulin [E11.40] 12/04/2013 Yes    Memory loss [R41.3] 12/04/2013 Yes    Hyperlipidemia [E78.5]  Yes      Problems Resolved During this Admission:       Discharged Condition: fair    Disposition:     Follow Up:   Follow-up Information     Jailene Astudillo MD. Go on 4/18/2022.    Specialty: Internal Medicine  Why: Hospital Follow-up at 8:15am  Contact information:  [address] [facility]  [place] [id number] [phone]                       Patient Instructions:      Ambulatory referral/consult to Outpatient Case Management   Referral Priority: Routine Referral Type:  Consultation   Referral Reason: Specialty Services Required   Number of Visits Requested: 1     HOME HEALTH ORDERS   Order Comments: Subsequent Home Health Orders    Current Medications:  Current Facility-Administered Medications:  0.9%  NaCl infusion (for blood administration), , Intravenous, Q24H PRN, Aayush Rinaldi MD  acetaminophen tablet 650 mg, 650 mg, Oral, Q8H PRN, Aayush Rinaldi MD, 650 mg at 04/14/22 2008  albuterol-ipratropium 2.5 mg-0.5 mg/3 mL nebulizer solution 3 mL, 3 mL, Nebulization, Q6H, Aayush Rinaldi MD, 3 mL at 04/15/22 0712  atorvastatin tablet 20 mg, 20 mg, Oral, QHS, Aayush Rinaldi MD, 20 mg at 04/14/22 2008  calcium carbonate 200 mg calcium (500 mg) chewable tablet 1,000 mg, 1,000 mg, Oral, Q8H PRN, Aayush Rinaldi MD, 1,000 mg at 04/09/22 2011  cyanocobalamin tablet 1,000 mcg, 1,000 mcg, Oral, Daily, Aayush Rinaldi MD, 1,000 mcg at 04/15/22 0845  dextrose 10% bolus 125 mL, 12.5 g, Intravenous, PRN, Aayush Rinaldi MD  dextrose 10% bolus 250 mL, 25 g, Intravenous, PRN, Aayush Rinaldi MD  ezetimibe tablet 10 mg, 10 mg, Oral, Daily, Aayush Rinaldi MD, 10 mg at 04/15/22 0844  ferrous sulfate tablet 1 each, 1 tablet, Oral, Daily, Aayush Rinaldi MD, 1 each at 04/15/22 0844  glucagon (human recombinant) injection 1 mg, 1 mg, Intramuscular, PRN, Aayush Rinaldi MD  glucose chewable tablet 16 g, 16 g, Oral, PRN, Aayush Rinaldi MD  glucose chewable tablet 24 g, 24 g, Oral, PRN, Aayush Rinaldi MD  guaiFENesin 12 hr tablet 600 mg, 600 mg, Oral, BID, Aayush Rinaldi MD, 600 mg at 04/15/22 0844  insulin aspart U-100 pen 0-5 Units, 0-5 Units, Subcutaneous, QID (AC + HS) PRN, Aayush Rinaldi MD, 2 Units at 04/12/22 1645  losartan tablet 25 mg, 25 mg, Oral, Daily, Pari Narvaez NP, 25 mg at 04/15/22 0845  melatonin tablet 6 mg, 6 mg, Oral, Nightly PRN, Aayush Rinaldi MD, 6 mg at 04/14/22 2008  memantine tablet 5  mg, 5 mg, Oral, Daily, Aayush Rinaldi MD, 5 mg at 04/15/22 0845  pantoprazole EC tablet 40 mg, 40 mg, Oral, Daily, Aayush Rinaldi MD, 40 mg at 04/15/22 0844  predniSONE tablet 40 mg, 40 mg, Oral, Daily, Pari Narvaez NP, 40 mg at 04/15/22 0844  sodium chloride 0.9% bolus 1,000 mL, 1,000 mL, Intravenous, Once, Aayush Rinaldi MD  sodium chloride 0.9% flush 10 mL, 10 mL, Intravenous, PRN, Aayush Rinaldi MD  sucralfate 100 mg/mL suspension 1 g, 1 g, Oral, Q6H, Aayush Rinaldi MD, 1 g at 04/15/22 0638  sulfaSALAzine tablet 500 mg, 500 mg, Oral, Daily, Aayush Rinaldi MD, 500 mg at 04/15/22 0844        Nursing:   Home Oxygen:  No change    Diet:   regular diet    Activities:   activity as tolerated    Labs:  SN to perform labs:  CBC: Weekly; 1 week(s)    Referrals/ Consults  Physical Therapy to evaluate and treat. Evaluate for home safety and equipment needs; Establish/upgrade home exercise program. Perform / instruct on therapeutic exercises, gait training, transfer training, and Range of Motion.    Home Health Aide:  Nursing Twice weekly     Order Specific Question Answer Comments   What Home Health Agency is the patient currently using? Other/External        Significant Diagnostic Studies: Labs:   BMP:   Recent Labs   Lab 04/14/22  0558 04/15/22  0541   GLU 87 92    144   K 3.7 3.7   CL 99 98   CO2 32* 36*   BUN 15 16   CREATININE 0.9 0.9   CALCIUM 8.8 8.6*    and CMP   Recent Labs   Lab 04/14/22  0558 04/15/22  0541    144   K 3.7 3.7   CL 99 98   CO2 32* 36*   GLU 87 92   BUN 15 16   CREATININE 0.9 0.9   CALCIUM 8.8 8.6*   PROT 6.1 5.4*   ALBUMIN 2.8* 2.6*   BILITOT 0.2 0.2   ALKPHOS 77 65   AST 34 28   ALT 33 30   ANIONGAP 12 10   ESTGFRAFRICA >60 >60   EGFRNONAA 60 60       Pending Diagnostic Studies:     None         Medications:  Reconciled Home Medications:      Medication List      START taking these medications    methylPREDNISolone 4 mg tablet  Commonly known  "as: MEDROL DOSEPACK  use as directed        CONTINUE taking these medications    * albuterol 2.5 mg /3 mL (0.083 %) nebulizer solution  Commonly known as: PROVENTIL  USE 1 VAIL IN NEBULIZER EVERY 4-6 HOURS AS DIRECTED. DX CODE: J43.9     * albuterol 90 mcg/actuation inhaler  Commonly known as: PROVENTIL/VENTOLIN HFA  Inhale 1-2 puffs into the lungs every 6 (six) hours as needed for Wheezing. Rescue     CO Q-10 100 mg capsule  Generic drug: coenzyme Q10  Take 100 mg by mouth once daily.     cyanocobalamin (vitamin B-12) 500 mcg Subl  One sub lingual daily     ferrous sulfate 325 mg (65 mg iron) Tab tablet  Commonly known as: FEOSOL  Take 1 tablet by mouth once daily.     leflunomide 10 MG Tab  Commonly known as: ARAVA  Take 10 mg by mouth once daily.     losartan 25 MG tablet  Commonly known as: COZAAR  Take 25 mg by mouth once daily.     memantine 5 MG Tab  Commonly known as: NAMENDA  Take one nightly for 3 weeks, then one BID     metFORMIN 500 MG tablet  Commonly known as: GLUCOPHAGE  Take 1 tablet (500 mg total) by mouth 2 (two) times daily with meals.     omeprazole 40 MG capsule  Commonly known as: PRILOSEC  Take 1 capsule (40 mg total) by mouth once daily.     rosuvastatin 40 MG Tab  Commonly known as: CRESTOR  Take 40 mg by mouth once daily.     sulfaSALAzine 500 MG EC tablet  Commonly known as: AZULFIDINE  Take 500 mg by mouth 2 (two) times daily.     SYRINGE 3CC/25GX1" 3 mL 25 gauge x 1" Syrg  Generic drug: syringe with needle  Once every months     TRELEGY ELLIPTA 100-62.5-25 mcg Dsdv  Generic drug: fluticasone-umeclidin-vilanter  Inhale 1 puff into the lungs once daily.     ZETIA 10 mg tablet  Generic drug: ezetimibe  Take 10 mg by mouth once daily.         * This list has 2 medication(s) that are the same as other medications prescribed for you. Read the directions carefully, and ask your doctor or other care provider to review them with you.            STOP taking these medications    aspirin 81 MG EC " tablet  Commonly known as: ECOTRIN     hydroCHLOROthiazide 12.5 MG Tab  Commonly known as: HYDRODIURIL     potassium chloride 20 mEq  Commonly known as: K-TAB            Indwelling Lines/Drains at time of discharge:   Lines/Drains/Airways     None                 Time spent on the discharge of patient: 34 minutes         Jailene Astudillo MD  Department of Hospital Medicine  [place] [facility] Surg (3rd Fl)

## 2022-04-16 PROCEDURE — G0180 PR HOME HEALTH MD CERTIFICATION: ICD-10-PCS | Mod: ,,, | Performed by: INTERNAL MEDICINE

## 2022-04-16 PROCEDURE — G0180 MD CERTIFICATION HHA PATIENT: HCPCS | Mod: ,,, | Performed by: INTERNAL MEDICINE

## 2022-04-18 ENCOUNTER — OFFICE VISIT (OUTPATIENT)
Dept: INTERNAL MEDICINE | Facility: CLINIC | Age: 82
End: 2022-04-18
Payer: MEDICARE

## 2022-04-18 VITALS
DIASTOLIC BLOOD PRESSURE: 58 MMHG | SYSTOLIC BLOOD PRESSURE: 116 MMHG | HEART RATE: 70 BPM | HEIGHT: 57 IN | BODY MASS INDEX: 35.68 KG/M2 | WEIGHT: 165.38 LBS | OXYGEN SATURATION: 100 % | RESPIRATION RATE: 16 BRPM

## 2022-04-18 DIAGNOSIS — J44.1 COPD EXACERBATION: ICD-10-CM

## 2022-04-18 DIAGNOSIS — F02.818 EARLY ONSET ALZHEIMER'S DEMENTIA WITH BEHAVIORAL DISTURBANCE: ICD-10-CM

## 2022-04-18 DIAGNOSIS — Z09 HOSPITAL DISCHARGE FOLLOW-UP: Primary | ICD-10-CM

## 2022-04-18 DIAGNOSIS — G30.0 EARLY ONSET ALZHEIMER'S DEMENTIA WITH BEHAVIORAL DISTURBANCE: ICD-10-CM

## 2022-04-18 DIAGNOSIS — N18.31 CHRONIC KIDNEY DISEASE, STAGE 3A: ICD-10-CM

## 2022-04-18 PROBLEM — G30.9 ALZHEIMER'S DEMENTIA WITH BEHAVIORAL DISTURBANCE: Status: ACTIVE | Noted: 2022-04-18

## 2022-04-18 PROCEDURE — 99214 OFFICE O/P EST MOD 30 MIN: CPT | Mod: S$PBB | Performed by: INTERNAL MEDICINE

## 2022-04-18 PROCEDURE — 99214 OFFICE O/P EST MOD 30 MIN: CPT | Mod: PBBFAC | Performed by: INTERNAL MEDICINE

## 2022-04-18 PROCEDURE — 99999 PR PBB SHADOW E&M-EST. PATIENT-LVL IV: CPT | Mod: PBBFAC,,, | Performed by: INTERNAL MEDICINE

## 2022-04-18 PROCEDURE — 99999 PR STA SHADOW: CPT | Mod: PBBFAC,,, | Performed by: INTERNAL MEDICINE

## 2022-04-18 PROCEDURE — 99999 PR PBB SHADOW E&M-EST. PATIENT-LVL IV: ICD-10-PCS | Mod: PBBFAC,,, | Performed by: INTERNAL MEDICINE

## 2022-04-18 RX ORDER — ISOSORBIDE MONONITRATE 30 MG/1
30 TABLET, EXTENDED RELEASE ORAL DAILY
COMMUNITY
Start: 2022-02-21 | End: 2022-07-12

## 2022-04-18 RX ORDER — BEMPEDOIC ACID 180 MG/1
1 TABLET, FILM COATED ORAL DAILY
COMMUNITY
Start: 2022-03-22 | End: 2023-01-19

## 2022-04-18 NOTE — PROGRESS NOTES
Subjective:       Patient ID: Gretel Ashton is a 81 y.o. female.    Chief Complaint: Follow-up    Discharge Summary        Patient Name: Gretel Ashton  MRN: [id number]  Patient Class: IP- Inpatient  Admission Date: 4/7/2022  Hospital Length of Stay: 7 days  Discharge Date and Time:  04/15/2022 9:08 AM  Attending Physician: Aayush Rinaldi MD   Discharging Provider: Jailene Astudillo MD  Primary Care Provider: Jailene Astudillo MD        HPI:   Gretel Ashton is a 81 y.o. 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 Reese and chronic respiratory failure on 2-3 L at home secondary to COPD, follows with Dr. CECILY Guerra.   Pt presents to ER yesterday PM with c/o shortness of breath that began 2:00 a.m. yesterday  morning.  She also reports feeling nauseated and having a cough with productive brownish yellow sputum. Brother reports she has been having frequent episodes of shortness of breath but not quite this severe. Denies any home o2 use???    denies any fever. denies any chest pain or palpitations. patient does appear very anxious.  Denies any recent ill contacts     Colitis- ? Sulfasalazine   URINE ABN- Cx in process   Will start IV Rocephin,         * No surgery found *       Hospital Course:   Patient had a rough night complaining of intermittent chest pain. + belching. + nausea. Increasing intensity overnight. Initial SBP 90s then 200s, then 90s. Likely anxiety driven. Initial CE negative. EKG negative. 11 beat VT on tele overnight but none since. Replacing electrolytes PRN. She remains on 2L NC. She is unable to tell me if her pain feels like reflux but reports in the left lower chest.      4/10:  Patient developed worsening hypotension throughout the day  yesterday. Given up to 3L NS bolus and then was on 100cc/hr NS. SBP improved from 70s to 90s but still low. RR > 30. No fevers (temp around 99) and no leukocytosis. Urine cx negative. Initial CXR without consolidation. However, triggering sepsis alert. She was moved to the ICU last night for closer monitoring in case she would require pressor support but she has not. Lactic acid 2. She continues to oxygenate well on 2L NC. pCO2 is 52--unchanged from admission. She remains intermittently confused since admission; though likely her baseline. No further reports of chest pain since treatment for reflux yesterday. CE not suggestive of ischemia. Resumed Rocephin and azithro as pulmonary source seems most likely given her initial presentation; repeat CXR this AM.      4/11/22  Gretel Ashton is a 81 y.o. female  Seen in ICU ; on precedex and irene synephrine .still on IVF   I saw her in office for a long time she does not recognize me now .  She was admitted for copd exacerbation ; on steroid ; and ceftriaxone and zithromax   She became hypotensive ; needs IVF ; minimal pressors ; her HB dropped to 6 ; required 2 units of PRBCs   Now  9.7; No BM so dont know if GI bleeding .  Will wean her off of pressors and precedex .      4/12:  Off predecex and irene this AM  BP borderline this morning, better on rounds   Still with cough on 2L NC  BM not collected for occult; H/H trended up. No gross bleeding noted  Mental status still with intermittent confusion but less agitation. She knows where she is and her family members. Can ask to go to toilet for BM, etc.      4/12  Doing much better.  Working with therapy.  Still with cough and O2 at 2 liters NC.    No longer agitated.  Confusion much better.  Eating        4/14 pt was moved from ICU yesterday after blood pressure stabilizing. She is currently being treated for COPD exacerbation. She is on medrol 40mf IV daily and duonebs every 6hr. On 3L NC sats %. Uses 2-3 L at home.  Still has cough with sputum production.  Completed 3 days azithromycin and 6 days rocephin. VSS/afebrile [place] now elevated. Will need to resume home antihypertensives.   Anemia stable with + occult stool noted.   PT has pt ambulating 15 ft forward and backward using RW> goal is 100ft and 4 steps            4/15/22  Gretel Ashton is a 81 y.o. female  She wants to go home ; no family by bedisde   Nurses trying to get hold off son   Using 3 L nasal cannula   Walking with PT 30 feet with PT .        Goals of Care Treatment Preferences:  Code Status: Full Code        Consults:      No new Assessment & Plan notes have been filed under this hospital service since the last note was generated.  Service: Hospital Medicine     Final Active Diagnoses:    Diagnosis Date Noted POA   PRINCIPAL PROBLEM:  COPD exacerbation (J44.1) 01/02/2013 Yes   Symptomatic anemia (D64.9) 04/11/2022 No   SIRS (systemic inflammatory response syndrome) (R65.10) 04/10/2022 No   Chest pain (R07.9) 04/09/2022 No   Debility (R53.81) 04/08/2022 Yes   HTN (hypertension) (I10) 04/13/2020 Yes   Cystitis (N30.90) 04/13/2020 Yes   Acute on chronic respiratory failure with hypoxemia (J96.21) 04/12/2020 Yes   Rheumatoid arthritis involving both hands with negative rheumatoid factor (M06.041, M06.042) 12/18/2019 Yes   Aortic atherosclerosis (I70.0) 06/12/2019 Yes   Type 2 diabetes mellitus with diabetic neuropathy, without long-term current use of insulin (E11.40) 12/04/2013 Yes   Memory loss (R41.3) 12/04/2013 Yes   Hyperlipidemia (E78.5)   Yes     Problems Resolved During this Admission:        Discharged Condition: fair     Disposition:      Follow Up:      Follow-up Information       Jailene Astudillo MD. Go on 4/18/2022.   Specialty: Internal Medicine  Why: Hospital Follow-up at 8:15am  Contact information:  [address]  [place] [address]  [phone]                    4/18/22  Gretel Ashton is a 81 y.o. female  Here for hospital discharge  follow up.  Admitted for copd exacerbation and anemia   Lab Results      Component                Value               Date                      WBC                      5.38                04/15/2022                HGB                      9.2 (L)             04/15/2022                HCT                      30.4 (L)            04/15/2022                MCV                      89                  04/15/2022                PLT                      201                 04/15/2022              She has severe dementia .  Brought by son   Considering NH placement .        Follow-up  Associated symptoms include arthralgias, joint swelling and myalgias. Pertinent negatives include no chest pain, chills, congestion, fever, headaches, nausea, numbness, rash, sore throat, vomiting or weakness.     Review of Systems   Constitutional: Negative for chills and fever.   HENT: Negative for congestion, hearing loss, sinus pressure and sore throat.    Eyes: Negative for photophobia.   Respiratory: Negative for choking, chest tightness and wheezing.         On home o2 ; COPD ;sleeps with O2 at night    Cardiovascular: Negative for chest pain and palpitations.   Gastrointestinal: Negative for blood in stool, nausea and vomiting.   Endocrine: Negative for polydipsia and polyphagia.   Genitourinary: Negative for dysuria and hematuria.   Musculoskeletal: Positive for arthralgias, joint swelling, myalgias and neck stiffness.   Skin: Negative for pallor and rash.        Left lower back with sebaceous cyst    Neurological: Negative for dizziness, weakness, numbness and headaches.        Dementia ++   Hematological: Does not bruise/bleed easily.   Psychiatric/Behavioral: Positive for confusion. Negative for dysphoric mood, hallucinations, sleep disturbance and suicidal ideas. The patient is not nervous/anxious.        Objective:      Physical Exam  Vitals and nursing note reviewed.   Constitutional:       Appearance: She is well-developed.    HENT:      Head: Normocephalic and atraumatic.      Nose: Nose normal.      Mouth/Throat:      Mouth: Mucous membranes are pale.   Eyes:      Conjunctiva/sclera: Conjunctivae normal.      Pupils: Pupils are equal, round, and reactive to light.   Neck:      Thyroid: No thyromegaly.      Vascular: No JVD.      Trachea: No tracheal deviation.   Cardiovascular:      Rate and Rhythm: Normal rate and regular rhythm.      Heart sounds: Normal heart sounds.   Pulmonary:      Effort: Pulmonary effort is normal. No respiratory distress.      Breath sounds: No wheezing or rales.   Chest:      Chest wall: No tenderness.   Abdominal:      General: Bowel sounds are normal. There is no distension.      Palpations: Abdomen is soft. There is no mass.      Tenderness: There is no abdominal tenderness. There is no guarding or rebound.   Musculoskeletal:         General: Normal range of motion.      Cervical back: Normal range of motion and neck supple.      Comments: RA changes in both hands.   Lymphadenopathy:      Cervical: No cervical adenopathy.   Skin:     General: Skin is warm and dry.   Neurological:      Mental Status: She is alert.      Cranial Nerves: No cranial nerve deficit.      Motor: No abnormal muscle tone.      Coordination: Coordination normal.      Deep Tendon Reflexes: Reflexes are normal and symmetric.   Psychiatric:         Behavior: Behavior normal.         Thought Content: Thought content normal.         Judgment: Judgment normal.         Assessment:       1. Hospital discharge follow-up    2. Chronic kidney disease, stage 3a    3. COPD exacerbation    4. 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

## 2022-04-19 ENCOUNTER — PATIENT OUTREACH (OUTPATIENT)
Dept: ADMINISTRATIVE | Facility: CLINIC | Age: 82
End: 2022-04-19
Payer: MEDICARE

## 2022-04-19 ENCOUNTER — TELEPHONE (OUTPATIENT)
Dept: INTERNAL MEDICINE | Facility: CLINIC | Age: 82
End: 2022-04-19
Payer: MEDICARE

## 2022-04-19 DIAGNOSIS — R53.81 DEBILITATED: Primary | ICD-10-CM

## 2022-04-19 NOTE — TELEPHONE ENCOUNTER
----- Message from Raffy Sanchez sent at 2022  9:09 AM CDT -----  Contact: Zahra Ashton  MRN: [id number]  : 1940  PCP: Jailene Astudillo  Home Phone      [phone]  Work Phone      Not on file.  Mobile          [phone]  Home Phone      Not on file.      MESSAGE:   Zahra with [facility] is calling stating she is wanting to get order for rolling walker. Please return call [phone]

## 2022-04-19 NOTE — PROGRESS NOTES
C3 nurse attempted to contact Gretel Ashton for a TCC post hospital discharge follow up call. No answer. Left voicemail with callback information. The patient does not have a scheduled HOSFU appointment. Message sent to PCP staff for assistance with scheduling visit with patient.    Patient completed HOSFU appt on yesterday.

## 2022-04-20 ENCOUNTER — TELEPHONE (OUTPATIENT)
Dept: INTERNAL MEDICINE | Facility: CLINIC | Age: 82
End: 2022-04-20
Payer: MEDICARE

## 2022-04-21 ENCOUNTER — TELEPHONE (OUTPATIENT)
Dept: INTERNAL MEDICINE | Facility: CLINIC | Age: 82
End: 2022-04-21
Payer: MEDICARE

## 2022-04-21 NOTE — TELEPHONE ENCOUNTER
----- Message from Janae Velasquez sent at 2022 12:58 PM CDT -----  Regarding: Request to speak to a nurse  Contact: Codi with [facility]  Gretel Ashton  MRN: [id number]  : 1940  PCP: Jailene Astudillo  Home Phone      [phone]  Work Phone      Not on file.  Mobile          [phone]  Home Phone      Not on file.      MESSAGE:   Request to speak to a nurse regarding PCP orders received for a rolling walker for the patient.   The office is asking for recent progress notes.     Phone # [phone] / Ext [phone]  Fax # [phone]    Pharmacy - [facility] [address] [place] [address]

## 2022-04-22 ENCOUNTER — HOSPITAL ENCOUNTER (EMERGENCY)
Facility: HOSPITAL | Age: 82
Discharge: HOME | End: 2022-04-22
Attending: STUDENT IN AN ORGANIZED HEALTH CARE EDUCATION/TRAINING PROGRAM
Payer: MEDICARE

## 2022-04-22 VITALS
OXYGEN SATURATION: 95 % | SYSTOLIC BLOOD PRESSURE: 119 MMHG | RESPIRATION RATE: 22 BRPM | TEMPERATURE: 97 F | DIASTOLIC BLOOD PRESSURE: 56 MMHG | HEART RATE: 93 BPM

## 2022-04-22 DIAGNOSIS — R06.02 SOB (SHORTNESS OF BREATH): ICD-10-CM

## 2022-04-22 DIAGNOSIS — J44.1 COPD EXACERBATION: Primary | ICD-10-CM

## 2022-04-22 LAB
ALBUMIN SERPL BCP-MCNC: 3.1 G/DL (ref 3.5–5.2)
ALLENS TEST: ABNORMAL
ALP SERPL-CCNC: 71 U/L (ref 55–135)
ALT SERPL W/O P-5'-P-CCNC: 35 U/L (ref 10–44)
ANION GAP SERPL CALC-SCNC: 11 MMOL/L (ref 8–16)
AST SERPL-CCNC: 29 U/L (ref 10–40)
BASOPHILS # BLD AUTO: 0.04 K/UL (ref 0–0.2)
BASOPHILS NFR BLD: 0.5 % (ref 0–1.9)
BILIRUB SERPL-MCNC: 0.6 MG/DL (ref 0.1–1)
BNP SERPL-MCNC: 128 PG/ML (ref 0–99)
BUN SERPL-MCNC: 19 MG/DL (ref 8–23)
CALCIUM SERPL-MCNC: 9 MG/DL (ref 8.7–10.5)
CHLORIDE SERPL-SCNC: 92 MMOL/L (ref 95–110)
CO2 SERPL-SCNC: 34 MMOL/L (ref 23–29)
CREAT SERPL-MCNC: 1 MG/DL (ref 0.5–1.4)
DELSYS: ABNORMAL
DIFFERENTIAL METHOD: ABNORMAL
EOSINOPHIL # BLD AUTO: 0.1 K/UL (ref 0–0.5)
EOSINOPHIL NFR BLD: 1.4 % (ref 0–8)
ERYTHROCYTE [DISTWIDTH] IN BLOOD BY AUTOMATED COUNT: 16.1 % (ref 11.5–14.5)
EST. GFR  (AFRICAN AMERICAN): >60 ML/MIN/1.73 M^2
EST. GFR  (NON AFRICAN AMERICAN): 53 ML/MIN/1.73 M^2
FIO2: 32 (ref 21–100)
GLUCOSE SERPL-MCNC: 123 MG/DL (ref 70–110)
HCO3 UR-SCNC: 40.2 MMOL/L
HCT VFR BLD AUTO: 33 % (ref 37–48.5)
HGB BLD-MCNC: 10.2 G/DL (ref 12–16)
IMM GRANULOCYTES # BLD AUTO: 0.02 K/UL (ref 0–0.04)
IMM GRANULOCYTES NFR BLD AUTO: 0.3 % (ref 0–0.5)
INFLUENZA A, MOLECULAR: NEGATIVE
INFLUENZA B, MOLECULAR: NEGATIVE
LYMPHOCYTES # BLD AUTO: 1.8 K/UL (ref 1–4.8)
LYMPHOCYTES NFR BLD: 24.1 % (ref 18–48)
MAGNESIUM SERPL-MCNC: 1.6 MG/DL (ref 1.6–2.6)
MCH RBC QN AUTO: 27.4 PG (ref 27–31)
MCHC RBC AUTO-ENTMCNC: 30.9 G/DL (ref 32–36)
MCV RBC AUTO: 89 FL (ref 82–98)
MONOCYTES # BLD AUTO: 0.8 K/UL (ref 0.3–1)
MONOCYTES NFR BLD: 10.5 % (ref 4–15)
NEUTROPHILS # BLD AUTO: 4.6 K/UL (ref 1.8–7.7)
NEUTROPHILS NFR BLD: 63.2 % (ref 38–73)
NRBC BLD-RTO: 0 /100 WBC
PCO2 BLDA: 54 MMHG (ref 35–45)
PH SMN: 7.48 [PH] (ref 7.35–7.45)
PLATELET # BLD AUTO: 220 K/UL (ref 150–450)
PMV BLD AUTO: 10.6 FL (ref 9.2–12.9)
PO2 BLDA: 107 MMHG (ref 75–100)
POC BE: 14.7 MMOL/L (ref -2–2)
POC COHB: 2.1 % (ref 0–3)
POC METHB: 1 % (ref 0–1.5)
POC O2HB ARTERIAL: 96 % (ref 94–100)
POC SATURATED O2: 99.1 % (ref 90–100)
POC TCO2: 41.9 MMOL/L
POC THB: 10.2 G/DL (ref 12–18)
POTASSIUM SERPL-SCNC: 4 MMOL/L (ref 3.5–5.1)
PROT SERPL-MCNC: 6.3 G/DL (ref 6–8.4)
RBC # BLD AUTO: 3.72 M/UL (ref 4–5.4)
SARS-COV-2 RDRP RESP QL NAA+PROBE: NEGATIVE
SITE: ABNORMAL
SODIUM SERPL-SCNC: 137 MMOL/L (ref 136–145)
SPECIMEN SOURCE: NORMAL
TROPONIN I SERPL DL<=0.01 NG/ML-MCNC: 0.02 NG/ML (ref 0–0.03)
WBC # BLD AUTO: 7.34 K/UL (ref 3.9–12.7)

## 2022-04-22 PROCEDURE — 80053 COMPREHEN METABOLIC PANEL: CPT | Performed by: STUDENT IN AN ORGANIZED HEALTH CARE EDUCATION/TRAINING PROGRAM

## 2022-04-22 PROCEDURE — 25000242 PHARM REV CODE 250 ALT 637 W/ HCPCS: Performed by: STUDENT IN AN ORGANIZED HEALTH CARE EDUCATION/TRAINING PROGRAM

## 2022-04-22 PROCEDURE — 27000221 HC OXYGEN, UP TO 24 HOURS

## 2022-04-22 PROCEDURE — 96365 THER/PROPH/DIAG IV INF INIT: CPT

## 2022-04-22 PROCEDURE — 63600175 PHARM REV CODE 636 W HCPCS: Performed by: STUDENT IN AN ORGANIZED HEALTH CARE EDUCATION/TRAINING PROGRAM

## 2022-04-22 PROCEDURE — 87502 INFLUENZA DNA AMP PROBE: CPT | Performed by: STUDENT IN AN ORGANIZED HEALTH CARE EDUCATION/TRAINING PROGRAM

## 2022-04-22 PROCEDURE — 25000003 PHARM REV CODE 250: Performed by: STUDENT IN AN ORGANIZED HEALTH CARE EDUCATION/TRAINING PROGRAM

## 2022-04-22 PROCEDURE — 99285 EMERGENCY DEPT VISIT HI MDM: CPT | Mod: 25

## 2022-04-22 PROCEDURE — 96375 TX/PRO/DX INJ NEW DRUG ADDON: CPT

## 2022-04-22 PROCEDURE — 83735 ASSAY OF MAGNESIUM: CPT | Performed by: STUDENT IN AN ORGANIZED HEALTH CARE EDUCATION/TRAINING PROGRAM

## 2022-04-22 PROCEDURE — 36600 WITHDRAWAL OF ARTERIAL BLOOD: CPT

## 2022-04-22 PROCEDURE — 93010 ELECTROCARDIOGRAM REPORT: CPT | Mod: ,,, | Performed by: INTERNAL MEDICINE

## 2022-04-22 PROCEDURE — 82803 BLOOD GASES ANY COMBINATION: CPT | Performed by: STUDENT IN AN ORGANIZED HEALTH CARE EDUCATION/TRAINING PROGRAM

## 2022-04-22 PROCEDURE — 93010 EKG 12-LEAD: ICD-10-PCS | Mod: ,,, | Performed by: INTERNAL MEDICINE

## 2022-04-22 PROCEDURE — 84484 ASSAY OF TROPONIN QUANT: CPT | Performed by: STUDENT IN AN ORGANIZED HEALTH CARE EDUCATION/TRAINING PROGRAM

## 2022-04-22 PROCEDURE — 94640 AIRWAY INHALATION TREATMENT: CPT

## 2022-04-22 PROCEDURE — 85025 COMPLETE CBC W/AUTO DIFF WBC: CPT | Performed by: STUDENT IN AN ORGANIZED HEALTH CARE EDUCATION/TRAINING PROGRAM

## 2022-04-22 PROCEDURE — 99900035 HC TECH TIME PER 15 MIN (STAT)

## 2022-04-22 PROCEDURE — U0002 COVID-19 LAB TEST NON-CDC: HCPCS | Performed by: STUDENT IN AN ORGANIZED HEALTH CARE EDUCATION/TRAINING PROGRAM

## 2022-04-22 PROCEDURE — 96366 THER/PROPH/DIAG IV INF ADDON: CPT

## 2022-04-22 PROCEDURE — 36415 COLL VENOUS BLD VENIPUNCTURE: CPT | Performed by: STUDENT IN AN ORGANIZED HEALTH CARE EDUCATION/TRAINING PROGRAM

## 2022-04-22 PROCEDURE — 93005 ELECTROCARDIOGRAM TRACING: CPT

## 2022-04-22 PROCEDURE — 83880 ASSAY OF NATRIURETIC PEPTIDE: CPT | Performed by: STUDENT IN AN ORGANIZED HEALTH CARE EDUCATION/TRAINING PROGRAM

## 2022-04-22 RX ORDER — MAGNESIUM SULFATE HEPTAHYDRATE 40 MG/ML
2 INJECTION, SOLUTION INTRAVENOUS
Status: COMPLETED | OUTPATIENT
Start: 2022-04-22 | End: 2022-04-22

## 2022-04-22 RX ORDER — IPRATROPIUM BROMIDE AND ALBUTEROL SULFATE 2.5; .5 MG/3ML; MG/3ML
3 SOLUTION RESPIRATORY (INHALATION)
Status: COMPLETED | OUTPATIENT
Start: 2022-04-22 | End: 2022-04-22

## 2022-04-22 RX ORDER — LIDOCAINE HYDROCHLORIDE 20 MG/ML
10 SOLUTION OROPHARYNGEAL ONCE
Status: COMPLETED | OUTPATIENT
Start: 2022-04-22 | End: 2022-04-22

## 2022-04-22 RX ORDER — MAG HYDROX/ALUMINUM HYD/SIMETH 200-200-20
30 SUSPENSION, ORAL (FINAL DOSE FORM) ORAL ONCE
Status: DISCONTINUED | OUTPATIENT
Start: 2022-04-22 | End: 2022-04-22

## 2022-04-22 RX ORDER — LIDOCAINE HYDROCHLORIDE 20 MG/ML
10 SOLUTION OROPHARYNGEAL ONCE
Status: DISCONTINUED | OUTPATIENT
Start: 2022-04-22 | End: 2022-04-22

## 2022-04-22 RX ORDER — PREDNISONE 20 MG/1
40 TABLET ORAL DAILY
Qty: 10 TABLET | Refills: 0 | Status: SHIPPED | OUTPATIENT
Start: 2022-04-22 | End: 2022-04-27

## 2022-04-22 RX ORDER — METHYLPREDNISOLONE SOD SUCC 125 MG
125 VIAL (EA) INJECTION
Status: COMPLETED | OUTPATIENT
Start: 2022-04-22 | End: 2022-04-22

## 2022-04-22 RX ORDER — MAG HYDROX/ALUMINUM HYD/SIMETH 200-200-20
30 SUSPENSION, ORAL (FINAL DOSE FORM) ORAL ONCE
Status: COMPLETED | OUTPATIENT
Start: 2022-04-22 | End: 2022-04-22

## 2022-04-22 RX ORDER — OMEPRAZOLE 20 MG/1
20 CAPSULE, DELAYED RELEASE ORAL DAILY
Qty: 30 CAPSULE | Refills: 0 | Status: SHIPPED | OUTPATIENT
Start: 2022-04-22 | End: 2024-02-01 | Stop reason: DRUGHIGH

## 2022-04-22 RX ADMIN — IPRATROPIUM BROMIDE AND ALBUTEROL SULFATE 3 ML: 2.5; .5 SOLUTION RESPIRATORY (INHALATION) at 03:04

## 2022-04-22 RX ADMIN — LIDOCAINE HYDROCHLORIDE 10 ML: 20 SOLUTION ORAL; TOPICAL at 02:04

## 2022-04-22 RX ADMIN — ALUMINUM HYDROXIDE, MAGNESIUM HYDROXIDE, AND SIMETHICONE 30 ML: 200; 200; 20 SUSPENSION ORAL at 02:04

## 2022-04-22 RX ADMIN — METHYLPREDNISOLONE SODIUM SUCCINATE 125 MG: 125 INJECTION, POWDER, FOR SOLUTION INTRAMUSCULAR; INTRAVENOUS at 01:04

## 2022-04-22 RX ADMIN — MAGNESIUM SULFATE HEPTAHYDRATE 2 G: 40 INJECTION, SOLUTION INTRAVENOUS at 03:04

## 2022-04-22 NOTE — ED PROVIDER NOTES
Encounter Date: 2022       History     Chief Complaint   Patient presents with    Chest Pain    Shortness of Breath     Pt c/o CP and SOB for 2 days.  Pt states CP is substernal and non radiating.  Pt states SOB has worsened ov the past 2 days.  Pt O2 sat 80% upon arrival.  Pt on home O2 via NC at 2LPM.  Pt placed on O2 via NC at 5LPM     81-year-old female with history of COPD, diabetes, hypertension, presenting with shortness of breath and cough.  Symptoms have been present for two days.  Patient had recent admission for same.  Denies fever, chills, nausea, vomiting, chest pain, abdominal pain.  No other complaints.  No leg swelling.  Patient satting 80% on her home 2 L.         Review of patient's allergies indicates:  No Known Allergies  Past Medical History:   Diagnosis Date    Arthritis     COPD (chronic obstructive pulmonary disease)     Depression     Diabetes mellitus type II     Hyperlipidemia     Hypertension     Pacemaker      Past Surgical History:   Procedure Laterality Date    CARDIAC PACEMAKER PLACEMENT       SECTION      CYST REMOVAL Left 2019    Procedure: EXCISION, SEBACEOUS CYST;  Surgeon: Jaylen Gonzalez Jr., MD;  [facility];  Service: General;  Laterality: Left;    INNER EAR SURGERY       Family History   Problem Relation Age of Onset    Cancer Mother     Breast cancer Mother     Stroke Father     Cancer Sister     Breast cancer Sister     Stroke Maternal Grandmother      Social History     Tobacco Use    Smoking status: Former Smoker     Packs/day: 2.00     Years: 43.00     Pack years: 86.00     Types: Cigarettes     Quit date: 2000     Years since quittin.3    Smokeless tobacco: Never Used   Substance Use Topics    Alcohol use: No    Drug use: No     Review of Systems   Constitutional: Negative for fever.   HENT: Negative for sore throat.    Respiratory: Positive for cough, shortness of breath and wheezing.    Cardiovascular: Negative  for chest pain.   Gastrointestinal: Negative for abdominal pain, diarrhea, nausea and vomiting.   Genitourinary: Negative for dysuria.   Musculoskeletal: Negative for back pain.   Skin: Negative for rash.   Neurological: Negative for weakness.   Hematological: Does not bruise/bleed easily.       Physical Exam     Initial Vitals   BP Pulse Resp Temp SpO2   04/22/22 0141 04/22/22 0141 04/22/22 0142 04/22/22 0144 04/22/22 0141   127/85 85 (!) 35 97.2 °F (36.2 °C) (!) 90 %      MAP       --                Physical Exam    Nursing note and vitals reviewed.  Constitutional: She appears well-developed. No distress.   HENT:   Head: Normocephalic.   Eyes: Pupils are equal, round, and reactive to light.   Neck:   Normal range of motion.  Cardiovascular:   No murmur heard.  Pulmonary/Chest: No respiratory distress.   Diffuse wheezing bilaterally.  Moderate air movement.   Abdominal: Abdomen is soft.   Musculoskeletal:      Cervical back: Normal range of motion.     Neurological: She is alert.   Skin: Skin is warm.   Psychiatric: She has a normal mood and affect.         ED Course   Procedures  Labs Reviewed   CBC W/ AUTO DIFFERENTIAL - Abnormal; Notable for the following components:       Result Value    RBC 3.72 (*)     Hemoglobin 10.2 (*)     Hematocrit 33.0 (*)     MCHC 30.9 (*)     RDW 16.1 (*)     All other components within normal limits   COMPREHENSIVE METABOLIC PANEL - Abnormal; Notable for the following components:    Chloride 92 (*)     CO2 34 (*)     Glucose 123 (*)     Albumin 3.1 (*)     eGFR if non  53 (*)     All other components within normal limits   B-TYPE NATRIURETIC PEPTIDE - Abnormal; Notable for the following components:     (*)     All other components within normal limits   INFLUENZA A & B BY MOLECULAR   TROPONIN I   SARS-COV-2 RNA AMPLIFICATION, QUAL   MAGNESIUM     EKG Readings: (Independently Interpreted)   Initial Reading: No STEMI. Rhythm: Normal Sinus Rhythm. Heart Rate: 78.  Ectopy: No Ectopy. Conduction: Normal.       Imaging Results          X-Ray Chest AP Portable (In process)                  Medications   methylPREDNISolone sodium succinate injection 125 mg (125 mg Intravenous Given 4/22/22 0158)   magnesium sulfate 2g in water 50mL IVPB (premix) (0 g Intravenous Stopped 4/22/22 0529)   aluminum-magnesium hydroxide-simethicone 200-200-20 mg/5 mL suspension 30 mL (30 mLs Oral Given 4/22/22 0259)     And   LIDOcaine HCl 2% oral solution 10 mL (10 mLs Oral Given 4/22/22 0259)   albuterol-ipratropium 2.5 mg-0.5 mg/3 mL nebulizer solution 3 mL (3 mLs Nebulization Given 4/22/22 3927)     Medical Decision Making:   Differential Diagnosis:   DDX: Likely COPD exacerbation given history, exam. R/o PNA. Unlikely PE given history, physical, risk factor analysis, +other more likely diagnosis. Low suspicion ACS but will screen given risk factors.  DX: CXR. BMP, CBC, Mg. EKG.   TX: Analgesia PRN. Albuterol/atrovent nebs PRN. Steroids. Antibiotics if indicated by CXR or will be admitted. Treatment/consult as indicated by studies.  Dispo: Pending studies. If symptoms controlled, studies WNL or stable for outpatient management, discharge to follow up with PMD within 3-5 days with steroid course and albuterol PRN, precautions for return, and recommendations for supportive care. If no improvement in respiratory distress with appropriate observation in ED, consider observation vs. admission for acute asthma exacerbation.               ED Course as of 04/22/22 0538   Fri Apr 22, 2022   0404 Patient feeling well.  Normal I to E ratio.  Satting 96% on her home 2 L. lab work not concerning.  Patient reports significant improvement in symptoms. [NB]      ED Course User Index  [NB] Js Morley MD             Clinical Impression:   Final diagnoses:  [R06.02] SOB (shortness of breath)  [J44.1] COPD exacerbation (Primary)          ED Disposition Condition    Discharge Stable        ED Prescriptions      Medication Sig Dispense Start Date End Date Auth. Provider    predniSONE (DELTASONE) 20 MG tablet Take 2 tablets (40 mg total) by mouth once daily. for 5 days 10 tablet 4/22/2022 4/27/2022 Js Morley MD        Follow-up Information     Follow up With Specialties Details Why Contact Info    Jailene Astudillo MD Internal Medicine Schedule an appointment as soon as possible for a visit in 2 days  [address] [place] [address]  [phone]      [facility] - Emergency Dept Emergency Medicine  If symptoms worsen [address] [place] [address]  [phone]           Js Morley MD  04/22/22 0141       Js Morley MD  04/22/22 0152       Js Morley MD  04/22/22 0404       Js Morley MD  04/22/22 0577

## 2022-04-26 ENCOUNTER — OUTPATIENT CASE MANAGEMENT (OUTPATIENT)
Dept: ADMINISTRATIVE | Facility: OTHER | Age: 82
End: 2022-04-26
Payer: MEDICARE

## 2022-04-26 NOTE — PROGRESS NOTES
Outpatient Care Management  Patient Not Qualified    Patient: Gretel Ashton  MRN:  [id number]  Date of Service:  4/26/2022  Completed by:  Marissa Swartz, RN    Chief Complaint   Patient presents with    OPCM RN First Assessment Attempt    OPCM Enrollment Call    OPCM Chart Review       Patient Summary     Program:  OPCM High Risk  Qualification Status:  No  Reason Not Qualified:  Followed by SNF

## 2022-04-26 NOTE — LETTER
April 26, 2022    Gretel Ashton  [address] [address]             [facility] [address] [place] [address] Dear Ms. Gretel Ashton,      I have been assigned as your  with [facility] Outpatient Complex Case Management Department. We received a referral to call you to discuss your medical history. I have attempted to reach you by telephone, but I was unsuccessful. Please call our department so that we can go over some questions with you regarding your health.     The Outpatient Case Management department can be reached at [phone] from 8:00am to 4:30pm on Monday thru Friday. [facility] also has a program where a nurse is available 24/7 to answer questions or provider medical advice. Their number is [phone].     Thanks,      Marissa Swartz, RN  Outpatient Case Management  [phone]

## 2022-04-28 ENCOUNTER — EXTERNAL HOME HEALTH (OUTPATIENT)
Dept: HOME HEALTH SERVICES | Facility: HOSPITAL | Age: 82
End: 2022-04-28
Payer: MEDICARE

## 2022-05-13 ENCOUNTER — TELEPHONE (OUTPATIENT)
Dept: INTERNAL MEDICINE | Facility: CLINIC | Age: 82
End: 2022-05-13
Payer: MEDICARE

## 2022-05-13 NOTE — TELEPHONE ENCOUNTER
Notified Janae that Dr. Astudillo will sign orders for this patient. She has an appt with him on 5/16/22

## 2022-05-13 NOTE — TELEPHONE ENCOUNTER
----- Message from Donna Garcia MA sent at 2022  8:57 AM CDT -----  Regarding: Jory Ashton  MRN: [id number]  : 1940  PCP: Jailene Astudillo  Home Phone      [phone]  Work Phone      Not on file.  Mobile          [phone]  Home Phone      Not on file.      MESSAGE:     Pt is getting discharged from [facility], and they would like to know if Dr. Astudillo will sign her home health orders. Please return call @ [phone]. Thanks.

## 2022-06-06 DIAGNOSIS — J43.9 PULMONARY EMPHYSEMA, UNSPECIFIED EMPHYSEMA TYPE: ICD-10-CM

## 2022-06-06 RX ORDER — ALBUTEROL SULFATE 0.83 MG/ML
SOLUTION RESPIRATORY (INHALATION)
Qty: 180 EACH | Refills: 11 | Status: SHIPPED | OUTPATIENT
Start: 2022-06-06 | End: 2022-07-12

## 2022-06-06 NOTE — TELEPHONE ENCOUNTER
No new care gaps identified.  [facility] Embedded Care Gaps. Reference number: [id number]. 6/06/2022   10:13:49 AM CHIN

## 2022-06-06 NOTE — TELEPHONE ENCOUNTER
Refill Routing Note   Medication(s) are not appropriate for processing by [facility] for the following reason(s):      - Patient has been seen in the ED/Hospital since the last PCP visit    [facility] action(s):  Route          Medication reconciliation completed: No     Appointments  past 12m or future 3m with PCP    Date Provider   Last Visit   4/18/2022 Jailene Astudillo MD   Next Visit   Visit date not found Jailene Astudillo MD   ED visits in past 90 days: 1        Note composed:1:15 PM 06/06/2022

## 2022-07-12 ENCOUNTER — OFFICE VISIT (OUTPATIENT)
Dept: NEUROLOGY | Facility: CLINIC | Age: 82
End: 2022-07-12
Payer: MEDICARE

## 2022-07-12 VITALS
BODY MASS INDEX: 27.43 KG/M2 | HEIGHT: 62 IN | SYSTOLIC BLOOD PRESSURE: 94 MMHG | HEART RATE: 80 BPM | RESPIRATION RATE: 16 BRPM | WEIGHT: 149.06 LBS | DIASTOLIC BLOOD PRESSURE: 64 MMHG

## 2022-07-12 DIAGNOSIS — F02.818 EARLY ONSET ALZHEIMER'S DEMENTIA WITH BEHAVIORAL DISTURBANCE: Primary | ICD-10-CM

## 2022-07-12 DIAGNOSIS — E78.5 DYSLIPIDEMIA: ICD-10-CM

## 2022-07-12 DIAGNOSIS — E11.40 TYPE 2 DIABETES MELLITUS WITH DIABETIC NEUROPATHY, WITHOUT LONG-TERM CURRENT USE OF INSULIN: ICD-10-CM

## 2022-07-12 DIAGNOSIS — I10 HYPERTENSION, UNSPECIFIED TYPE: ICD-10-CM

## 2022-07-12 DIAGNOSIS — G30.0 EARLY ONSET ALZHEIMER'S DEMENTIA WITH BEHAVIORAL DISTURBANCE: Primary | ICD-10-CM

## 2022-07-12 PROCEDURE — 99999 PR STA SHADOW: CPT | Mod: PBBFAC,,, | Performed by: PSYCHIATRY & NEUROLOGY

## 2022-07-12 PROCEDURE — 99213 OFFICE O/P EST LOW 20 MIN: CPT | Mod: PBBFAC | Performed by: PSYCHIATRY & NEUROLOGY

## 2022-07-12 PROCEDURE — 99214 OFFICE O/P EST MOD 30 MIN: CPT | Mod: S$PBB | Performed by: PSYCHIATRY & NEUROLOGY

## 2022-07-12 PROCEDURE — 99999 PR STA SHADOW: ICD-10-PCS | Mod: PBBFAC,,, | Performed by: PSYCHIATRY & NEUROLOGY

## 2022-07-12 PROCEDURE — 99999 PR PBB SHADOW E&M-EST. PATIENT-LVL III: CPT | Mod: PBBFAC,,, | Performed by: PSYCHIATRY & NEUROLOGY

## 2022-07-12 RX ORDER — ASPIRIN 81 MG/1
81 TABLET ORAL
COMMUNITY

## 2022-07-12 RX ORDER — EZETIMIBE 10 MG/1
10 TABLET ORAL DAILY
COMMUNITY
End: 2023-01-19

## 2022-07-12 RX ORDER — MEMANTINE HYDROCHLORIDE 10 MG/1
TABLET ORAL
Qty: 60 TABLET | Refills: 11 | Status: SHIPPED | OUTPATIENT
Start: 2022-07-12 | End: 2023-07-25

## 2022-07-12 NOTE — PROGRESS NOTES
HPI: Gretel Ashton is a 81 y.o. female     Not seen since 2021.    The patient has been hospitalized a few times over the year for COPD, anemia    Namenda is being used 5mg BID    Memory is forgetful of short term details to her and son states her longer term memory is better. Daily memory loss and nearly instantly     No dizziness    No syncope    No stroke like symptoms      She is hearing impaired chroncially        Sleep is good    No wandering and no hallucinations    Son is here today. She is now in the NH at [facility] on rehab. She was having too much debility with hospitalizations. She did try to come out of the NH but then decided to go back on her own and will likely stay there as she likes it      ROS not reliable due to dementia      Exam:  Gen Appearance, well developed/nourished in no apparent distress  CV: 2+ distal pulses with no edema or swelling  Neuro:  MS: Awake, alert, oriented to place, person, not to time and not situation.  Recent recall is moderately forgetting/remote memory intact is also well intact, Language is full to spontaneous speech/comprehension. Fund of Knowledge is full.   CN: Optic discs are flat with normal vasculature, PERRL, Extraoccular movements and visual fields are full. Normal facial strength,Tongue and Palate are midline and strong. Shoulder Shrug symmetric and strong.  Motor: Normal bulk, tone, no abnormal movements. 5/5 strength bilateral upper/lower extremities and 1+ reflexes  Sensory: symmetric to   temp, and vibration. Romberg negative  Cerebellar: Finger-nose,Rapid alternating movements intact  Gait: Normal stance, no ataxia but mildly slowing gait      Labs:  CMP, CBC, TSH reviewed     B12 well corrected    2022 Cr 1.0    Imagin2021 CT head: No acute intracranial findings.     Age-appropriate cerebral volume loss with mild moderate patchy decreased attenuation supratentorial white matter while nonspecific suggestive for chronic ischemic  "change.     No evidence for acute intracranial hemorrhage.  Clinical correlation and further evaluation as warranted.    Assessment/Plan: Gretel Ashton is a 81 y.o. female with several months of memory loss and dizziness/ loss of time in 2014  Questionable virchow sauceda space vs chronic lacunar infart in the left deep white matter by CT. All symptoms improved greatly at that time    Represented with memory loss worsening over time in 2021  I recommend:   1. Updated CT head 2021 unchanged  -Can't have MRI due to pacemaker. Also, EEG normal -She had reported " blackout" periods prior. PCP worked her up for syncope (resolved) and stopped BP meds with good relief then  2. 2021 B12 level well corrected (taking 500mcg Oral B12 currently, but remotely had to use injections)  3. Continue ASA daily for CVA prevention  Anti-HTN, and statin for dyslipidema for CVA prevention as well good control DM for stroke prevention given possible lacunar infarct prior.   4.  Neuropsychological testing would be difficult based on near illiteracy.   -in 2014 I thought she had some MCI: this may be progressive to early alzheimer's as reviewed with patient and son prior  -Namenda to increase per orders unless side effects  Was on aricept prior, not sure why this is not longer being given  5. She no longer drives, lives in the NH with good support from family   6. Discussed the benefits of treating hearing loss in regards to preventing demential from getting worse prior    RTC 6 months              "

## 2022-08-24 DIAGNOSIS — Z78.0 MENOPAUSE: ICD-10-CM

## 2022-11-08 ENCOUNTER — HOSPITAL ENCOUNTER (EMERGENCY)
Facility: HOSPITAL | Age: 82
Discharge: HOME | End: 2022-11-08
Attending: STUDENT IN AN ORGANIZED HEALTH CARE EDUCATION/TRAINING PROGRAM
Payer: MEDICARE

## 2022-11-08 VITALS
HEART RATE: 98 BPM | DIASTOLIC BLOOD PRESSURE: 61 MMHG | SYSTOLIC BLOOD PRESSURE: 130 MMHG | OXYGEN SATURATION: 92 % | TEMPERATURE: 98 F | RESPIRATION RATE: 22 BRPM

## 2022-11-08 DIAGNOSIS — J18.9 COMMUNITY ACQUIRED PNEUMONIA OF RIGHT MIDDLE LOBE OF LUNG: ICD-10-CM

## 2022-11-08 DIAGNOSIS — J44.1 COPD EXACERBATION: Primary | ICD-10-CM

## 2022-11-08 DIAGNOSIS — R06.02 SHORTNESS OF BREATH: ICD-10-CM

## 2022-11-08 LAB
ALBUMIN SERPL BCP-MCNC: 3.4 G/DL (ref 3.5–5.2)
ALP SERPL-CCNC: 82 U/L (ref 55–135)
ALT SERPL W/O P-5'-P-CCNC: 30 U/L (ref 10–44)
ANION GAP SERPL CALC-SCNC: 9 MMOL/L (ref 8–16)
AST SERPL-CCNC: 45 U/L (ref 10–40)
BASOPHILS # BLD AUTO: 0.02 K/UL (ref 0–0.2)
BASOPHILS NFR BLD: 0.6 % (ref 0–1.9)
BILIRUB SERPL-MCNC: 0.2 MG/DL (ref 0.1–1)
BNP SERPL-MCNC: 59 PG/ML (ref 0–99)
BUN SERPL-MCNC: 18 MG/DL (ref 8–23)
CALCIUM SERPL-MCNC: 9 MG/DL (ref 8.7–10.5)
CHLORIDE SERPL-SCNC: 102 MMOL/L (ref 95–110)
CO2 SERPL-SCNC: 28 MMOL/L (ref 23–29)
CREAT SERPL-MCNC: 1 MG/DL (ref 0.5–1.4)
DIFFERENTIAL METHOD: ABNORMAL
EOSINOPHIL # BLD AUTO: 0 K/UL (ref 0–0.5)
EOSINOPHIL NFR BLD: 1.2 % (ref 0–8)
ERYTHROCYTE [DISTWIDTH] IN BLOOD BY AUTOMATED COUNT: 14.8 % (ref 11.5–14.5)
EST. GFR  (NO RACE VARIABLE): 56 ML/MIN/1.73 M^2
GLUCOSE SERPL-MCNC: 168 MG/DL (ref 70–110)
HCT VFR BLD AUTO: 29.7 % (ref 37–48.5)
HGB BLD-MCNC: 9.3 G/DL (ref 12–16)
IMM GRANULOCYTES # BLD AUTO: 0.01 K/UL (ref 0–0.04)
IMM GRANULOCYTES NFR BLD AUTO: 0.3 % (ref 0–0.5)
INFLUENZA A, MOLECULAR: NEGATIVE
INFLUENZA B, MOLECULAR: NEGATIVE
LACTATE SERPL-SCNC: 1.7 MMOL/L (ref 0.5–2.2)
LACTATE SERPL-SCNC: 2.6 MMOL/L (ref 0.5–2.2)
LYMPHOCYTES # BLD AUTO: 1.4 K/UL (ref 1–4.8)
LYMPHOCYTES NFR BLD: 40.1 % (ref 18–48)
MCH RBC QN AUTO: 25.8 PG (ref 27–31)
MCHC RBC AUTO-ENTMCNC: 31.3 G/DL (ref 32–36)
MCV RBC AUTO: 82 FL (ref 82–98)
MONOCYTES # BLD AUTO: 0.7 K/UL (ref 0.3–1)
MONOCYTES NFR BLD: 19.9 % (ref 4–15)
NEUTROPHILS # BLD AUTO: 1.3 K/UL (ref 1.8–7.7)
NEUTROPHILS NFR BLD: 37.9 % (ref 38–73)
NRBC BLD-RTO: 0 /100 WBC
PLATELET # BLD AUTO: 196 K/UL (ref 150–450)
PMV BLD AUTO: 11.2 FL (ref 9.2–12.9)
POTASSIUM SERPL-SCNC: 3.9 MMOL/L (ref 3.5–5.1)
PROT SERPL-MCNC: 6.7 G/DL (ref 6–8.4)
RBC # BLD AUTO: 3.61 M/UL (ref 4–5.4)
SARS-COV-2 RDRP RESP QL NAA+PROBE: NEGATIVE
SODIUM SERPL-SCNC: 139 MMOL/L (ref 136–145)
SPECIMEN SOURCE: NORMAL
TROPONIN I SERPL DL<=0.01 NG/ML-MCNC: 0.01 NG/ML (ref 0–0.03)
WBC # BLD AUTO: 3.42 K/UL (ref 3.9–12.7)

## 2022-11-08 PROCEDURE — 96361 HYDRATE IV INFUSION ADD-ON: CPT

## 2022-11-08 PROCEDURE — 96375 TX/PRO/DX INJ NEW DRUG ADDON: CPT

## 2022-11-08 PROCEDURE — 96365 THER/PROPH/DIAG IV INF INIT: CPT

## 2022-11-08 PROCEDURE — 84484 ASSAY OF TROPONIN QUANT: CPT | Performed by: STUDENT IN AN ORGANIZED HEALTH CARE EDUCATION/TRAINING PROGRAM

## 2022-11-08 PROCEDURE — 85025 COMPLETE CBC W/AUTO DIFF WBC: CPT | Performed by: STUDENT IN AN ORGANIZED HEALTH CARE EDUCATION/TRAINING PROGRAM

## 2022-11-08 PROCEDURE — 87040 BLOOD CULTURE FOR BACTERIA: CPT | Performed by: STUDENT IN AN ORGANIZED HEALTH CARE EDUCATION/TRAINING PROGRAM

## 2022-11-08 PROCEDURE — 25000003 PHARM REV CODE 250: Performed by: STUDENT IN AN ORGANIZED HEALTH CARE EDUCATION/TRAINING PROGRAM

## 2022-11-08 PROCEDURE — U0002 COVID-19 LAB TEST NON-CDC: HCPCS | Performed by: STUDENT IN AN ORGANIZED HEALTH CARE EDUCATION/TRAINING PROGRAM

## 2022-11-08 PROCEDURE — 83880 ASSAY OF NATRIURETIC PEPTIDE: CPT | Performed by: STUDENT IN AN ORGANIZED HEALTH CARE EDUCATION/TRAINING PROGRAM

## 2022-11-08 PROCEDURE — 93005 ELECTROCARDIOGRAM TRACING: CPT

## 2022-11-08 PROCEDURE — 96367 TX/PROPH/DG ADDL SEQ IV INF: CPT

## 2022-11-08 PROCEDURE — 93010 EKG 12-LEAD: ICD-10-PCS | Mod: ,,, | Performed by: INTERNAL MEDICINE

## 2022-11-08 PROCEDURE — 83605 ASSAY OF LACTIC ACID: CPT | Performed by: STUDENT IN AN ORGANIZED HEALTH CARE EDUCATION/TRAINING PROGRAM

## 2022-11-08 PROCEDURE — 99285 EMERGENCY DEPT VISIT HI MDM: CPT | Mod: 25

## 2022-11-08 PROCEDURE — 94760 N-INVAS EAR/PLS OXIMETRY 1: CPT

## 2022-11-08 PROCEDURE — 36415 COLL VENOUS BLD VENIPUNCTURE: CPT | Performed by: STUDENT IN AN ORGANIZED HEALTH CARE EDUCATION/TRAINING PROGRAM

## 2022-11-08 PROCEDURE — 93010 ELECTROCARDIOGRAM REPORT: CPT | Mod: ,,, | Performed by: INTERNAL MEDICINE

## 2022-11-08 PROCEDURE — 87502 INFLUENZA DNA AMP PROBE: CPT | Performed by: STUDENT IN AN ORGANIZED HEALTH CARE EDUCATION/TRAINING PROGRAM

## 2022-11-08 PROCEDURE — 80053 COMPREHEN METABOLIC PANEL: CPT | Performed by: STUDENT IN AN ORGANIZED HEALTH CARE EDUCATION/TRAINING PROGRAM

## 2022-11-08 PROCEDURE — 63600175 PHARM REV CODE 636 W HCPCS: Performed by: STUDENT IN AN ORGANIZED HEALTH CARE EDUCATION/TRAINING PROGRAM

## 2022-11-08 RX ORDER — METHYLPREDNISOLONE SOD SUCC 125 MG
125 VIAL (EA) INJECTION
Status: COMPLETED | OUTPATIENT
Start: 2022-11-08 | End: 2022-11-08

## 2022-11-08 RX ORDER — PREDNISONE 50 MG/1
50 TABLET ORAL DAILY
Qty: 5 TABLET | Refills: 0 | Status: SHIPPED | OUTPATIENT
Start: 2022-11-08 | End: 2022-11-13

## 2022-11-08 RX ORDER — AMOXICILLIN AND CLAVULANATE POTASSIUM 875; 125 MG/1; MG/1
1 TABLET, FILM COATED ORAL 2 TIMES DAILY
Qty: 14 TABLET | Refills: 0 | Status: SHIPPED | OUTPATIENT
Start: 2022-11-08 | End: 2023-01-19

## 2022-11-08 RX ADMIN — SODIUM CHLORIDE 1000 ML: 0.9 INJECTION, SOLUTION INTRAVENOUS at 08:11

## 2022-11-08 RX ADMIN — METHYLPREDNISOLONE SODIUM SUCCINATE 125 MG: 125 INJECTION, POWDER, FOR SOLUTION INTRAMUSCULAR; INTRAVENOUS at 07:11

## 2022-11-08 RX ADMIN — CEFTRIAXONE 1 G: 1 INJECTION, SOLUTION INTRAVENOUS at 07:11

## 2022-11-08 RX ADMIN — AZITHROMYCIN MONOHYDRATE 500 MG: 500 INJECTION, POWDER, LYOPHILIZED, FOR SOLUTION INTRAVENOUS at 08:11

## 2022-11-09 NOTE — ED NOTES
Patient resting in ED stretcher. Pt SOB with exertion, oxygen at 2 L/min per nasal cannula. Bed in lowest position and locked, side rails up x 2. Family at the bedside.

## 2022-11-09 NOTE — ED NOTES
Spoke with AIXA Merida from the [facility], RN notified of pt's current condition and discharge. States she will call me back once they get a transporter to pick patient up.

## 2022-11-09 NOTE — ED PROVIDER NOTES
Encounter Date: 2022    This document was partially completed using speech recognition software and may contain misspellings, grammatical errors, and/or unexpected word substitutions.       History     Chief Complaint   Patient presents with    Shortness of Breath     Pt to ED via EMS from the [facility]. Pt c/o SOB. Pt wears O23L at NH, but NH reports pt is non-compliant. Pt received breathing treatment prior to arrival.      82 year old with a PMHx of COPD, DM, HLD, HTN presents to the ED from [facility] via EMS with shortness of breath. Son states there is early dementia so history obtained from EMS, son, and daughter in law at the bedside. Son states the patient is at her mental baseline and has been having a cough recently. She is suppose to be on O2  but when he visits her, she doesn't usually have it on. She was suppose to see her pulmonologist in [place] but due to transport issues from the NH, missed it. She came in today because of the shortness of breath. EMS gave her a neb treatment and she is doing much better.      Pt to ED via EMS from the [facility]. Pt c/o SOB. Pt wears O23L at NH, but NH reports pt is non-compliant. Pt received breathing treatment prior to arrival.       Review of patient's allergies indicates:  No Known Allergies  Past Medical History:   Diagnosis Date    Arthritis     COPD (chronic obstructive pulmonary disease)     Depression     Diabetes mellitus type II     Hyperlipidemia     Hypertension     Pacemaker      Past Surgical History:   Procedure Laterality Date    CARDIAC PACEMAKER PLACEMENT       SECTION      CYST REMOVAL Left 2019    Procedure: EXCISION, SEBACEOUS CYST;  Surgeon: Jaylen Gonzalez Jr., MD;  Location: [facility];  Service: General;  Laterality: Left;    INNER EAR SURGERY       Family History   Problem Relation Age of Onset    Cancer Mother     Breast cancer Mother     Stroke Father     Cancer Sister     Breast cancer Sister     Stroke  Maternal Grandmother      Social History     Tobacco Use    Smoking status: Former     Packs/day: 2.00     Years: 43.00     Pack years: 86.00     Types: Cigarettes     Quit date: 2000     Years since quittin.8    Smokeless tobacco: Never   Substance Use Topics    Alcohol use: No    Drug use: No     Review of Systems   Unable to perform ROS: Dementia     Physical Exam     Initial Vitals   BP Pulse Resp Temp SpO2   22 1745 22 1745 22 1745 22 1815 22 1745   (!) 108/56 95 (!) 22 97.9 °F (36.6 °C) (!) 93 %      MAP       --                Physical Exam    Nursing note and vitals reviewed.  Constitutional: She appears well-developed. She is not diaphoretic. No distress.   HENT:   Head: Normocephalic and atraumatic.   Right Ear: External ear normal.   Left Ear: External ear normal.   Eyes: Right eye exhibits no discharge. Left eye exhibits no discharge. No scleral icterus.   Neck: Neck supple.   Cardiovascular:  Normal rate and regular rhythm.           Pulmonary/Chest: Breath sounds normal. No stridor. No respiratory distress. She has no wheezes. She has no rhonchi. She has no rales.   Abdominal: Abdomen is soft. There is no abdominal tenderness. There is no guarding.   Musculoskeletal:         General: No edema.      Cervical back: Neck supple.     Neurological: She is alert.   Skin: Skin is warm and dry. Capillary refill takes less than 2 seconds.   Psychiatric: She has a normal mood and affect.       ED Course   Procedures  Labs Reviewed   CBC W/ AUTO DIFFERENTIAL - Abnormal; Notable for the following components:       Result Value    WBC 3.42 (*)     RBC 3.61 (*)     Hemoglobin 9.3 (*)     Hematocrit 29.7 (*)     MCH 25.8 (*)     MCHC 31.3 (*)     RDW 14.8 (*)     Gran # (ANC) 1.3 (*)     Gran % 37.9 (*)     Mono % 19.9 (*)     All other components within normal limits   COMPREHENSIVE METABOLIC PANEL - Abnormal; Notable for the following components:    Glucose 168 (*)     Albumin  3.4 (*)     AST 45 (*)     eGFR 56 (*)     All other components within normal limits   LACTIC ACID, PLASMA - Abnormal; Notable for the following components:    Lactate (Lactic Acid) 2.6 (*)     All other components within normal limits   INFLUENZA A & B BY MOLECULAR   CULTURE, BLOOD   CULTURE, BLOOD   SARS-COV-2 RNA AMPLIFICATION, QUAL   B-TYPE NATRIURETIC PEPTIDE   TROPONIN I   LACTIC ACID, PLASMA     EKG Readings: (Independently Interpreted)   Previous EKG: Compared with most recent EKG Previous EKG Date: 4/22/2022.   Sinus rhythm with PACs with abberant conduction. RBBB. Normal axis. No STEMI.     Imaging Results              X-Ray Chest 1 View (Final result)  Result time 11/08/22 18:24:22      Final result by Cristela Barrett MD (11/08/22 18:24:22)                   Impression:      Mild opacification right medial lung base suggesting very mild infiltrate or atelectasis.      Electronically signed by: Cristela Barrett MD  Date:    11/08/2022  Time:    18:24               Narrative:    EXAMINATION:  XR CHEST 1 VIEW    CLINICAL HISTORY:  shortness of breath;    TECHNIQUE:  Single frontal view of the chest was performed.    COMPARISON:  04/22/2022    FINDINGS:  Stable cardiomediastinal silhouette.  Anterior chest wall cardiac pacemaker with pacing leads remains in place.  Mild opacification right medial lung base.  Left lung appears clear.  Costophrenic sulci are sharp.                                       Medications   methylPREDNISolone sodium succinate injection 125 mg (125 mg Intravenous Given 11/8/22 1904)   cefTRIAXone (ROCEPHIN) 1 g/50 mL D5W IVPB (0 g Intravenous Stopped 11/8/22 1938)   azithromycin 500 mg in dextrose 5 % 250 mL IVPB (ready to mix system) (0 mg Intravenous Stopped 11/8/22 2115)   sodium chloride 0.9% bolus 1,000 mL (1,000 mLs Intravenous New Bag 11/8/22 2018)     Medical Decision Making:   Differential Diagnosis:   Differential considerations include (in no particular order): ACS, PE, CHF,  COPD, Pneumothorax, Asthma, Pneumonia, Anemia, COVID-19  ED Management:  Based on the patient's evaluation - patient appears well for discharge home. Patient appears well for discharge home. Likely COPD exacerbation with possible CAP vs atelectasis. Lactic acid elevated but normalized. Treated with neb treatment by EMS in addition to ED steroids, Abx. No hypoxia. Will discharge home with PCP f/u and rx for steroids, antibiotics for COPD. Son updated and agrees with the plan.                        Clinical Impression:   Final diagnoses:  [R06.02] Shortness of breath  [J44.1] COPD exacerbation (Primary)  [J18.9] Community acquired pneumonia of right middle lobe of lung        ED Disposition Condition    Discharge Stable          ED Prescriptions       Medication Sig Dispense Start Date End Date Auth. Provider    amoxicillin-clavulanate 875-125mg (AUGMENTIN) 875-125 mg per tablet Take 1 tablet by mouth 2 (two) times daily. 14 tablet 11/8/2022 -- Kev Awad DO    predniSONE (DELTASONE) 50 MG Tab Take 1 tablet (50 mg total) by mouth once daily. for 5 days 5 tablet 11/8/2022 11/13/2022 Kev Awad DO          Follow-up Information       Follow up With Specialties Details Why Contact Info    Jailene Astudillo MD Internal Medicine Schedule an appointment as soon as possible for a visit in 3 days  [address] [facility] [facility] [address]  [facility] [address]  [phone]               Kev Awad DO  11/08/22 0529

## 2022-11-09 NOTE — ED NOTES
Patient being transported by [facility] staff on 2L/min nc O2. Report given to AIXA Merida at the [facility]    Advised - pt said it would be her daughter Shannon/No

## 2022-11-14 LAB
BACTERIA BLD CULT: NORMAL
BACTERIA BLD CULT: NORMAL

## 2023-01-19 ENCOUNTER — OFFICE VISIT (OUTPATIENT)
Dept: NEUROLOGY | Facility: CLINIC | Age: 83
End: 2023-01-19
Payer: MEDICARE

## 2023-01-19 VITALS
SYSTOLIC BLOOD PRESSURE: 106 MMHG | HEIGHT: 62 IN | RESPIRATION RATE: 14 BRPM | HEART RATE: 84 BPM | BODY MASS INDEX: 27.79 KG/M2 | WEIGHT: 151 LBS | DIASTOLIC BLOOD PRESSURE: 56 MMHG

## 2023-01-19 DIAGNOSIS — E78.5 DYSLIPIDEMIA: ICD-10-CM

## 2023-01-19 DIAGNOSIS — E11.40 TYPE 2 DIABETES MELLITUS WITH DIABETIC NEUROPATHY, WITHOUT LONG-TERM CURRENT USE OF INSULIN: ICD-10-CM

## 2023-01-19 DIAGNOSIS — I10 HYPERTENSION, UNSPECIFIED TYPE: ICD-10-CM

## 2023-01-19 DIAGNOSIS — G30.9 ALZHEIMER DISEASE: Primary | ICD-10-CM

## 2023-01-19 DIAGNOSIS — F02.80 ALZHEIMER DISEASE: Primary | ICD-10-CM

## 2023-01-19 PROCEDURE — 99213 OFFICE O/P EST LOW 20 MIN: CPT | Mod: S$PBB | Performed by: PSYCHIATRY & NEUROLOGY

## 2023-01-19 PROCEDURE — 99999 PR STA SHADOW: CPT | Mod: PBBFAC,,, | Performed by: PSYCHIATRY & NEUROLOGY

## 2023-01-19 PROCEDURE — 99999 PR STA SHADOW: ICD-10-PCS | Mod: PBBFAC,,, | Performed by: PSYCHIATRY & NEUROLOGY

## 2023-01-19 PROCEDURE — 99999 PR PBB SHADOW E&M-EST. PATIENT-LVL III: CPT | Mod: PBBFAC,,, | Performed by: PSYCHIATRY & NEUROLOGY

## 2023-01-19 PROCEDURE — 99213 OFFICE O/P EST LOW 20 MIN: CPT | Mod: PBBFAC | Performed by: PSYCHIATRY & NEUROLOGY

## 2023-01-19 RX ORDER — ACETAMINOPHEN 325 MG/1
650 TABLET ORAL EVERY 6 HOURS PRN
COMMUNITY

## 2023-01-19 RX ORDER — SERTRALINE HYDROCHLORIDE 25 MG/1
12.5 TABLET, FILM COATED ORAL EVERY MORNING
COMMUNITY

## 2023-01-19 NOTE — PROGRESS NOTES
HPI: Gretel Ashton is a 82 y.o. female     Here for 6 months follow up    The patient has been in there ER once since the last visit for  COPD      Namenda was increased at the last visit      Memory is still challenges    No dizziness or passing out noted    Denies pain    Mood and behavior are good    No stroke like symptoms    Son was at a last visit    Here with NH aide today    Living at [facility]       ROS not reliable due to dementia      Exam:  Gen Appearance, well developed/nourished in no apparent distress  CV: 2+ distal pulses with no edema or swelling  Neuro:  MS: Awake, alert, oriented to place, person, not to time and not situation.  Recent recall is moderately forgetful/remote memory intact is also well intact, Language is full to spontaneous speech/comprehension. Fund of Knowledge is full.   CN: Optic discs are flat with normal vasculature, PERRL, Extraoccular movements and visual fields are full. Normal facial strength,Tongue and Palate are midline and strong. Shoulder Shrug symmetric and strong.  Easily redirectable   Motor: Normal bulk, tone, no abnormal movements. 5/5 strength bilateral upper/lower extremities and 1+ reflexes  Sensory: symmetric to   temp, and vibration. Romberg negative  Cerebellar: Finger-nose,Rapid alternating movements intact  Gait: Normal stance, no ataxia but mildly slowing gait      Labs:  CMP, CBC, TSH reviewed     B12 well corrected    2022 Cr 1.0    Imagin2021 CT head: No acute intracranial findings.     Age-appropriate cerebral volume loss with mild moderate patchy decreased attenuation supratentorial white matter while nonspecific suggestive for chronic ischemic change.     No evidence for acute intracranial hemorrhage.  Clinical correlation and further evaluation as warranted.    Assessment/Plan: Gretel Ashton is a 82 y.o. female with several months of memory loss and dizziness/ loss of time in 2014  Questionable virchow sauceda space vs chronic  "lacunar infart in the left deep white matter by CT. All symptoms improved greatly at that time    Represented with memory loss worsening over time in 2021  I recommend:   1. Updated CT head 2021 unchanged  -Can't have MRI due to pacemaker. Also, EEG normal -She had reported " blackout" periods prior. PCP worked her up for syncope (resolved) and stopped BP meds with good relief then  2. 2021 B12 level well corrected (taking 500mcg Oral B12 currently, but remotely had to use injections)  3. Continue ASA daily for CVA prevention  Anti-HTN, and statin for dyslipidema for CVA prevention as well good control DM for stroke prevention given possible lacunar infarct prior.   4.  Neuropsychological testing would be difficult based on near illiteracy.   -in 2014 I thought she had some MCI: this may be progressive to early alzheimer's as reviewed with patient and son prior  -Namenda to continue at 10mg BID  -Was on aricept prior, not sure why this is no longer being given  5. She no longer drives, lives in the NH with good support from family and has no current reported mood or behavior  6. Discussed the benefits of treating hearing loss in regards to preventing demential from getting worse prior    RTC 1 year                "

## 2023-01-26 ENCOUNTER — HOSPITAL ENCOUNTER (EMERGENCY)
Facility: HOSPITAL | Age: 83
Discharge: HOME | End: 2023-01-26
Attending: STUDENT IN AN ORGANIZED HEALTH CARE EDUCATION/TRAINING PROGRAM
Payer: MEDICARE

## 2023-01-26 VITALS
DIASTOLIC BLOOD PRESSURE: 91 MMHG | RESPIRATION RATE: 18 BRPM | OXYGEN SATURATION: 97 % | WEIGHT: 150 LBS | HEIGHT: 62 IN | SYSTOLIC BLOOD PRESSURE: 131 MMHG | TEMPERATURE: 98 F | BODY MASS INDEX: 27.6 KG/M2 | HEART RATE: 86 BPM

## 2023-01-26 DIAGNOSIS — D50.9 MICROCYTIC ANEMIA: Primary | ICD-10-CM

## 2023-01-26 LAB
ABO + RH BLD: NORMAL
ALBUMIN SERPL BCP-MCNC: 3.5 G/DL (ref 3.5–5.2)
ALP SERPL-CCNC: 84 U/L (ref 55–135)
ALT SERPL W/O P-5'-P-CCNC: 14 U/L (ref 10–44)
ANION GAP SERPL CALC-SCNC: 9 MMOL/L (ref 8–16)
APTT BLDCRRT: 22.9 SEC (ref 21–32)
AST SERPL-CCNC: 31 U/L (ref 10–40)
BASOPHILS # BLD AUTO: 0.04 K/UL (ref 0–0.2)
BASOPHILS NFR BLD: 1 % (ref 0–1.9)
BILIRUB SERPL-MCNC: 0.2 MG/DL (ref 0.1–1)
BLD GP AB SCN CELLS X3 SERPL QL: NORMAL
BUN SERPL-MCNC: 21 MG/DL (ref 8–23)
CALCIUM SERPL-MCNC: 9.3 MG/DL (ref 8.7–10.5)
CHLORIDE SERPL-SCNC: 101 MMOL/L (ref 95–110)
CO2 SERPL-SCNC: 30 MMOL/L (ref 23–29)
CREAT SERPL-MCNC: 1.1 MG/DL (ref 0.5–1.4)
DIFFERENTIAL METHOD: ABNORMAL
EOSINOPHIL # BLD AUTO: 0.1 K/UL (ref 0–0.5)
EOSINOPHIL NFR BLD: 1.8 % (ref 0–8)
ERYTHROCYTE [DISTWIDTH] IN BLOOD BY AUTOMATED COUNT: 17.2 % (ref 11.5–14.5)
EST. GFR  (NO RACE VARIABLE): 50 ML/MIN/1.73 M^2
GLUCOSE SERPL-MCNC: 136 MG/DL (ref 70–110)
HCT VFR BLD AUTO: 23.9 % (ref 37–48.5)
HGB BLD-MCNC: 7 G/DL (ref 12–16)
IMM GRANULOCYTES # BLD AUTO: 0 K/UL (ref 0–0.04)
IMM GRANULOCYTES NFR BLD AUTO: 0 % (ref 0–0.5)
INR PPP: 1.2 (ref 0.8–1.2)
LYMPHOCYTES # BLD AUTO: 1.2 K/UL (ref 1–4.8)
LYMPHOCYTES NFR BLD: 30.9 % (ref 18–48)
MCH RBC QN AUTO: 21.8 PG (ref 27–31)
MCHC RBC AUTO-ENTMCNC: 29.3 G/DL (ref 32–36)
MCV RBC AUTO: 75 FL (ref 82–98)
MONOCYTES # BLD AUTO: 0.7 K/UL (ref 0.3–1)
MONOCYTES NFR BLD: 18.7 % (ref 4–15)
NEUTROPHILS # BLD AUTO: 1.9 K/UL (ref 1.8–7.7)
NEUTROPHILS NFR BLD: 47.6 % (ref 38–73)
NRBC BLD-RTO: 0 /100 WBC
OB PNL STL: NEGATIVE
PLATELET # BLD AUTO: 241 K/UL (ref 150–450)
PMV BLD AUTO: 11.3 FL (ref 9.2–12.9)
POTASSIUM SERPL-SCNC: 4 MMOL/L (ref 3.5–5.1)
PROT SERPL-MCNC: 6.8 G/DL (ref 6–8.4)
PROTHROMBIN TIME: 12 SEC (ref 9–12.5)
RBC # BLD AUTO: 3.21 M/UL (ref 4–5.4)
SODIUM SERPL-SCNC: 140 MMOL/L (ref 136–145)
TROPONIN I SERPL DL<=0.01 NG/ML-MCNC: 0.02 NG/ML (ref 0–0.03)
WBC # BLD AUTO: 3.95 K/UL (ref 3.9–12.7)

## 2023-01-26 PROCEDURE — 93005 ELECTROCARDIOGRAM TRACING: CPT

## 2023-01-26 PROCEDURE — 84484 ASSAY OF TROPONIN QUANT: CPT | Performed by: STUDENT IN AN ORGANIZED HEALTH CARE EDUCATION/TRAINING PROGRAM

## 2023-01-26 PROCEDURE — 82272 OCCULT BLD FECES 1-3 TESTS: CPT | Performed by: STUDENT IN AN ORGANIZED HEALTH CARE EDUCATION/TRAINING PROGRAM

## 2023-01-26 PROCEDURE — 86900 BLOOD TYPING SEROLOGIC ABO: CPT | Performed by: STUDENT IN AN ORGANIZED HEALTH CARE EDUCATION/TRAINING PROGRAM

## 2023-01-26 PROCEDURE — 85025 COMPLETE CBC W/AUTO DIFF WBC: CPT | Performed by: STUDENT IN AN ORGANIZED HEALTH CARE EDUCATION/TRAINING PROGRAM

## 2023-01-26 PROCEDURE — 99284 EMERGENCY DEPT VISIT MOD MDM: CPT | Mod: 25

## 2023-01-26 PROCEDURE — 80053 COMPREHEN METABOLIC PANEL: CPT | Performed by: STUDENT IN AN ORGANIZED HEALTH CARE EDUCATION/TRAINING PROGRAM

## 2023-01-26 PROCEDURE — 93010 ELECTROCARDIOGRAM REPORT: CPT | Mod: ,,, | Performed by: INTERNAL MEDICINE

## 2023-01-26 PROCEDURE — 85610 PROTHROMBIN TIME: CPT | Performed by: STUDENT IN AN ORGANIZED HEALTH CARE EDUCATION/TRAINING PROGRAM

## 2023-01-26 PROCEDURE — 85730 THROMBOPLASTIN TIME PARTIAL: CPT | Performed by: STUDENT IN AN ORGANIZED HEALTH CARE EDUCATION/TRAINING PROGRAM

## 2023-01-26 PROCEDURE — 93010 EKG 12-LEAD: ICD-10-PCS | Mod: ,,, | Performed by: INTERNAL MEDICINE

## 2023-01-26 RX ORDER — FERROUS SULFATE 325(65) MG
325 TABLET, DELAYED RELEASE (ENTERIC COATED) ORAL DAILY
Qty: 60 TABLET | Refills: 0 | Status: SHIPPED | OUTPATIENT
Start: 2023-01-26 | End: 2023-03-27

## 2023-01-26 NOTE — ED NOTES
Notified Jv KRUSE with [facility] of patient's discharge. Reports will work on pt's transportation and will keep us updated.

## 2023-01-26 NOTE — ED PROVIDER NOTES
Encounter Date: 2023    This document was partially completed using speech recognition software and may contain misspellings, grammatical errors, and/or unexpected word substitutions.       History     Chief Complaint   Patient presents with    Labs Only     Pt to ED from [facility] for repeat H&H levels and treat accordingly. Pt has no complaints upon arrival.     82 year old female with a PMHx of COPD on home oxygen, DM, HLD, HTN, dementia presents to the ED after being sent from [facility] for a hemoglobin/hematocrit labs. Patient has no complaints, no pain.     I called the [facility] at [phone] and spoke with Barbara. Not altered, refuses to wear her oxygen as usual. Routing labs drawn this morning. Hgb was 6.6, Hct was 22.2.    Agustin Ashton (Son) [phone] (Home Phone) updated by phone.      Review of patient's allergies indicates:  No Known Allergies  Past Medical History:   Diagnosis Date    Arthritis     COPD (chronic obstructive pulmonary disease)     Depression     Diabetes mellitus type II     Hyperlipidemia     Hypertension     Pacemaker      Past Surgical History:   Procedure Laterality Date    CARDIAC PACEMAKER PLACEMENT       SECTION      CYST REMOVAL Left 2019    Procedure: EXCISION, SEBACEOUS CYST;  Surgeon: Jaylen Gonzalez Jr., MD;  Location: [facility] OR;  Service: General;  Laterality: Left;    INNER EAR SURGERY       Family History   Problem Relation Age of Onset    Cancer Mother     Breast cancer Mother     Stroke Father     Cancer Sister     Breast cancer Sister     Stroke Maternal Grandmother      Social History     Tobacco Use    Smoking status: Former     Packs/day: 2.00     Years: 43.00     Pack years: 86.00     Types: Cigarettes     Quit date: 2000     Years since quittin.0    Smokeless tobacco: Never   Substance Use Topics    Alcohol use: No    Drug use: No     Review of Systems   Unable to perform ROS: Dementia     Physical Exam      Initial Vitals [01/26/23 1545]   BP Pulse Resp Temp SpO2   118/63 91 18 98 °F (36.7 °C) 98 %      MAP       --         Physical Exam    Nursing note and vitals reviewed.  Constitutional: She appears well-developed. She is not diaphoretic. No distress.   HENT:   Head: Normocephalic and atraumatic.   Right Ear: External ear normal.   Left Ear: External ear normal.   Eyes: Right eye exhibits no discharge. Left eye exhibits no discharge. No scleral icterus.   Neck: Neck supple.   Cardiovascular:  Normal rate and regular rhythm.           Pulmonary/Chest: No stridor. No respiratory distress. She has decreased breath sounds. She has no wheezes. She has no rhonchi. She has no rales.   Abdominal: Abdomen is soft. There is no abdominal tenderness. There is no guarding.   Musculoskeletal:         General: No edema.      Cervical back: Neck supple.     Neurological: She is alert. She is disoriented.   Skin: Skin is warm and dry. Capillary refill takes less than 2 seconds.   Psychiatric: She has a normal mood and affect.       ED Course   Procedures  Labs Reviewed   CBC W/ AUTO DIFFERENTIAL - Abnormal; Notable for the following components:       Result Value    RBC 3.21 (*)     Hemoglobin 7.0 (*)     Hematocrit 23.9 (*)     MCV 75 (*)     MCH 21.8 (*)     MCHC 29.3 (*)     RDW 17.2 (*)     Mono % 18.7 (*)     All other components within normal limits   COMPREHENSIVE METABOLIC PANEL - Abnormal; Notable for the following components:    CO2 30 (*)     Glucose 136 (*)     eGFR 50 (*)     All other components within normal limits   OCCULT BLOOD X 1, STOOL   PROTIME-INR   APTT   TROPONIN I   TYPE & SCREEN     EKG Readings: (Independently Interpreted)   Previous EKG: Compared with most recent EKG Previous EKG Date: 11/8/2022.   NSR at 89 bpm. RBBB. Normal axis. Normal intervals. No STEMI.     Imaging Results    None          Medications - No data to display  Medical Decision Making:   Differential Diagnosis:   Ddx: anemia, GI  bleed, shock  ED Management:  Based on the patient's evaluation - patient appears well for discharge home. Microcytic anemia with Hgb 7.0. She is NOT having symptoms. Per [facility], she is her usual health. Given critical blood shortage, will not transfuse unless symptomatic or Hgb < 7. Will discharge home with rx for Fe. Attempted to call son at 540pm but unable to reach him.                        Clinical Impression:   Final diagnoses:  [D50.9] Microcytic anemia (Primary)        ED Disposition Condition    Discharge Stable          ED Prescriptions       Medication Sig Dispense Start Date End Date Auth. Provider    ferrous sulfate 325 (65 FE) MG EC tablet Take 1 tablet (325 mg total) by mouth once daily. 60 tablet 1/26/2023 3/27/2023 Kev Awad DO          Follow-up Information       Follow up With Specialties Details Why Contact Info    Jailene Astudillo MD Internal Medicine Schedule an appointment as soon as possible for a visit in 3 days  [address]  [facility] [address]  [phone]               Kev Awad DO  01/26/23 6802

## 2023-01-27 ENCOUNTER — LAB VISIT (OUTPATIENT)
Dept: LAB | Facility: HOSPITAL | Age: 83
End: 2023-01-27
Attending: INTERNAL MEDICINE
Payer: MEDICARE

## 2023-01-27 DIAGNOSIS — D64.9 ANEMIA: Primary | ICD-10-CM

## 2023-01-27 LAB
BASOPHILS # BLD AUTO: 0.05 K/UL (ref 0–0.2)
BASOPHILS NFR BLD: 0.9 % (ref 0–1.9)
DACRYOCYTES BLD QL SMEAR: ABNORMAL
DIFFERENTIAL METHOD: ABNORMAL
EOSINOPHIL # BLD AUTO: 0.1 K/UL (ref 0–0.5)
EOSINOPHIL NFR BLD: 1.4 % (ref 0–8)
ERYTHROCYTE [DISTWIDTH] IN BLOOD BY AUTOMATED COUNT: 17.2 % (ref 11.5–14.5)
HCT VFR BLD AUTO: 25.9 % (ref 37–48.5)
HGB BLD-MCNC: 7.2 G/DL (ref 12–16)
HYPOCHROMIA BLD QL SMEAR: ABNORMAL
IMM GRANULOCYTES # BLD AUTO: 0.01 K/UL (ref 0–0.04)
IMM GRANULOCYTES NFR BLD AUTO: 0.2 % (ref 0–0.5)
LYMPHOCYTES # BLD AUTO: 0.9 K/UL (ref 1–4.8)
LYMPHOCYTES NFR BLD: 15.7 % (ref 18–48)
MCH RBC QN AUTO: 21.3 PG (ref 27–31)
MCHC RBC AUTO-ENTMCNC: 27.8 G/DL (ref 32–36)
MCV RBC AUTO: 77 FL (ref 82–98)
MONOCYTES # BLD AUTO: 0.8 K/UL (ref 0.3–1)
MONOCYTES NFR BLD: 14.4 % (ref 4–15)
NEUTROPHILS # BLD AUTO: 3.7 K/UL (ref 1.8–7.7)
NEUTROPHILS NFR BLD: 67.4 % (ref 38–73)
NRBC BLD-RTO: 0 /100 WBC
OVALOCYTES BLD QL SMEAR: ABNORMAL
PLATELET # BLD AUTO: 260 K/UL (ref 150–450)
PMV BLD AUTO: 12.1 FL (ref 9.2–12.9)
RBC # BLD AUTO: 3.38 M/UL (ref 4–5.4)
WBC # BLD AUTO: 5.55 K/UL (ref 3.9–12.7)

## 2023-01-27 PROCEDURE — 85025 COMPLETE CBC W/AUTO DIFF WBC: CPT

## 2023-01-27 NOTE — ED NOTES
Spoke to abdulkadir at the [facility] to make sure it is okay for pts son to bring her back. She advised that is fine. D/C papers sent with son with instructions to give to nursing staff.

## 2023-02-14 ENCOUNTER — HOSPITAL ENCOUNTER (EMERGENCY)
Facility: HOSPITAL | Age: 83
Discharge: HOME | End: 2023-02-15
Attending: STUDENT IN AN ORGANIZED HEALTH CARE EDUCATION/TRAINING PROGRAM
Payer: MEDICARE

## 2023-02-14 DIAGNOSIS — D64.9 LOW HEMOGLOBIN: Primary | ICD-10-CM

## 2023-02-14 LAB
ABO + RH BLD: NORMAL
APTT BLDCRRT: 23.1 SEC (ref 21–32)
BASOPHILS # BLD AUTO: 0.03 K/UL (ref 0–0.2)
BASOPHILS # BLD AUTO: 0.04 K/UL (ref 0–0.2)
BASOPHILS NFR BLD: 0.5 % (ref 0–1.9)
BASOPHILS NFR BLD: 0.6 % (ref 0–1.9)
BLD GP AB SCN CELLS X3 SERPL QL: NORMAL
BLD PROD TYP BPU: NORMAL
BLD PROD TYP BPU: NORMAL
BLOOD UNIT EXPIRATION DATE: NORMAL
BLOOD UNIT EXPIRATION DATE: NORMAL
BLOOD UNIT TYPE CODE: 5100
BLOOD UNIT TYPE CODE: 5100
BLOOD UNIT TYPE: NORMAL
BLOOD UNIT TYPE: NORMAL
CODING SYSTEM: NORMAL
CODING SYSTEM: NORMAL
CROSSMATCH INTERPRETATION: NORMAL
CROSSMATCH INTERPRETATION: NORMAL
DACRYOCYTES BLD QL SMEAR: ABNORMAL
DIFFERENTIAL METHOD: ABNORMAL
DIFFERENTIAL METHOD: ABNORMAL
DISPENSE STATUS: NORMAL
DISPENSE STATUS: NORMAL
EOSINOPHIL # BLD AUTO: 0.1 K/UL (ref 0–0.5)
EOSINOPHIL # BLD AUTO: 0.1 K/UL (ref 0–0.5)
EOSINOPHIL NFR BLD: 1.8 % (ref 0–8)
EOSINOPHIL NFR BLD: 1.8 % (ref 0–8)
ERYTHROCYTE [DISTWIDTH] IN BLOOD BY AUTOMATED COUNT: 19 % (ref 11.5–14.5)
ERYTHROCYTE [DISTWIDTH] IN BLOOD BY AUTOMATED COUNT: 20.8 % (ref 11.5–14.5)
HCT VFR BLD AUTO: 22.3 % (ref 37–48.5)
HCT VFR BLD AUTO: 22.9 % (ref 37–48.5)
HGB BLD-MCNC: 6.2 G/DL (ref 12–16)
HGB BLD-MCNC: 6.4 G/DL (ref 12–16)
HYPOCHROMIA BLD QL SMEAR: ABNORMAL
IMM GRANULOCYTES # BLD AUTO: 0.02 K/UL (ref 0–0.04)
IMM GRANULOCYTES # BLD AUTO: 0.04 K/UL (ref 0–0.04)
IMM GRANULOCYTES NFR BLD AUTO: 0.4 % (ref 0–0.5)
IMM GRANULOCYTES NFR BLD AUTO: 0.6 % (ref 0–0.5)
INR PPP: 1.1 (ref 0.8–1.2)
LYMPHOCYTES # BLD AUTO: 1.1 K/UL (ref 1–4.8)
LYMPHOCYTES # BLD AUTO: 1.5 K/UL (ref 1–4.8)
LYMPHOCYTES NFR BLD: 16.2 % (ref 18–48)
LYMPHOCYTES NFR BLD: 26.3 % (ref 18–48)
MCH RBC QN AUTO: 20.7 PG (ref 27–31)
MCH RBC QN AUTO: 21.7 PG (ref 27–31)
MCHC RBC AUTO-ENTMCNC: 27.1 G/DL (ref 32–36)
MCHC RBC AUTO-ENTMCNC: 28.7 G/DL (ref 32–36)
MCV RBC AUTO: 76 FL (ref 82–98)
MCV RBC AUTO: 76 FL (ref 82–98)
MONOCYTES # BLD AUTO: 0.8 K/UL (ref 0.3–1)
MONOCYTES # BLD AUTO: 0.9 K/UL (ref 0.3–1)
MONOCYTES NFR BLD: 12 % (ref 4–15)
MONOCYTES NFR BLD: 15.6 % (ref 4–15)
NEUTROPHILS # BLD AUTO: 3.1 K/UL (ref 1.8–7.7)
NEUTROPHILS # BLD AUTO: 4.6 K/UL (ref 1.8–7.7)
NEUTROPHILS NFR BLD: 55.4 % (ref 38–73)
NEUTROPHILS NFR BLD: 68.8 % (ref 38–73)
NRBC BLD-RTO: 0 /100 WBC
NRBC BLD-RTO: 0 /100 WBC
NUM UNITS TRANS PACKED RBC: NORMAL
OVALOCYTES BLD QL SMEAR: ABNORMAL
PLATELET # BLD AUTO: 137 K/UL (ref 150–450)
PLATELET # BLD AUTO: 178 K/UL (ref 150–450)
PMV BLD AUTO: 10.9 FL (ref 9.2–12.9)
PMV BLD AUTO: 11.1 FL (ref 9.2–12.9)
PROTHROMBIN TIME: 11.4 SEC (ref 9–12.5)
RBC # BLD AUTO: 2.95 M/UL (ref 4–5.4)
RBC # BLD AUTO: 3 M/UL (ref 4–5.4)
RETICS/RBC NFR AUTO: 2.2 % (ref 0.5–2.5)
TRANS ERYTHROCYTES VOL PATIENT: NORMAL ML
WBC # BLD AUTO: 5.52 K/UL (ref 3.9–12.7)
WBC # BLD AUTO: 6.67 K/UL (ref 3.9–12.7)

## 2023-02-14 PROCEDURE — 85025 COMPLETE CBC W/AUTO DIFF WBC: CPT | Mod: 91 | Performed by: STUDENT IN AN ORGANIZED HEALTH CARE EDUCATION/TRAINING PROGRAM

## 2023-02-14 PROCEDURE — 36415 COLL VENOUS BLD VENIPUNCTURE: CPT | Performed by: STUDENT IN AN ORGANIZED HEALTH CARE EDUCATION/TRAINING PROGRAM

## 2023-02-14 PROCEDURE — 85730 THROMBOPLASTIN TIME PARTIAL: CPT | Performed by: STUDENT IN AN ORGANIZED HEALTH CARE EDUCATION/TRAINING PROGRAM

## 2023-02-14 PROCEDURE — 86920 COMPATIBILITY TEST SPIN: CPT | Performed by: STUDENT IN AN ORGANIZED HEALTH CARE EDUCATION/TRAINING PROGRAM

## 2023-02-14 PROCEDURE — 86900 BLOOD TYPING SEROLOGIC ABO: CPT | Performed by: STUDENT IN AN ORGANIZED HEALTH CARE EDUCATION/TRAINING PROGRAM

## 2023-02-14 PROCEDURE — 36430 TRANSFUSION BLD/BLD COMPNT: CPT

## 2023-02-14 PROCEDURE — 86920 COMPATIBILITY TEST SPIN: CPT | Mod: 59 | Performed by: STUDENT IN AN ORGANIZED HEALTH CARE EDUCATION/TRAINING PROGRAM

## 2023-02-14 PROCEDURE — 99285 EMERGENCY DEPT VISIT HI MDM: CPT | Mod: 25

## 2023-02-14 PROCEDURE — 85610 PROTHROMBIN TIME: CPT | Performed by: STUDENT IN AN ORGANIZED HEALTH CARE EDUCATION/TRAINING PROGRAM

## 2023-02-14 PROCEDURE — 84466 ASSAY OF TRANSFERRIN: CPT | Performed by: STUDENT IN AN ORGANIZED HEALTH CARE EDUCATION/TRAINING PROGRAM

## 2023-02-14 PROCEDURE — P9016 RBC LEUKOCYTES REDUCED: HCPCS | Performed by: STUDENT IN AN ORGANIZED HEALTH CARE EDUCATION/TRAINING PROGRAM

## 2023-02-14 PROCEDURE — 82728 ASSAY OF FERRITIN: CPT | Performed by: STUDENT IN AN ORGANIZED HEALTH CARE EDUCATION/TRAINING PROGRAM

## 2023-02-14 PROCEDURE — 85025 COMPLETE CBC W/AUTO DIFF WBC: CPT | Performed by: STUDENT IN AN ORGANIZED HEALTH CARE EDUCATION/TRAINING PROGRAM

## 2023-02-14 PROCEDURE — P9021 RED BLOOD CELLS UNIT: HCPCS | Performed by: STUDENT IN AN ORGANIZED HEALTH CARE EDUCATION/TRAINING PROGRAM

## 2023-02-14 PROCEDURE — 85045 AUTOMATED RETICULOCYTE COUNT: CPT | Performed by: STUDENT IN AN ORGANIZED HEALTH CARE EDUCATION/TRAINING PROGRAM

## 2023-02-14 RX ORDER — HYDROCODONE BITARTRATE AND ACETAMINOPHEN 500; 5 MG/1; MG/1
TABLET ORAL
Status: DISCONTINUED | OUTPATIENT
Start: 2023-02-14 | End: 2023-02-15 | Stop reason: HOSPADM

## 2023-02-14 NOTE — ED PROVIDER NOTES
Encounter Date: 2023       History     Chief Complaint   Patient presents with    Low H&H     Patient to ER CC of a low H & H, no other complaints      82-year-old female with history of COPD on home oxygen at home, diabetes, hypertension, presenting with reports of low hemoglobin on outpatient lab work.  Patient denies any blood in stool, hematuria, abdominal pain.  No hematemesis.  Patient has no complaints at this time.    Review of patient's allergies indicates:  No Known Allergies  Past Medical History:   Diagnosis Date    Arthritis     COPD (chronic obstructive pulmonary disease)     Depression     Diabetes mellitus type II     Hyperlipidemia     Hypertension     Pacemaker      Past Surgical History:   Procedure Laterality Date    CARDIAC PACEMAKER PLACEMENT       SECTION      CYST REMOVAL Left 2019    Procedure: EXCISION, SEBACEOUS CYST;  Surgeon: Jaylen Gonzalez Jr., MD;  Location: [facility] OR;  Service: General;  Laterality: Left;    INNER EAR SURGERY       Family History   Problem Relation Age of Onset    Cancer Mother     Breast cancer Mother     Stroke Father     Cancer Sister     Breast cancer Sister     Stroke Maternal Grandmother      Social History     Tobacco Use    Smoking status: Former     Packs/day: 2.00     Years: 43.00     Pack years: 86.00     Types: Cigarettes     Quit date: 2000     Years since quittin.1    Smokeless tobacco: Never   Substance Use Topics    Alcohol use: No    Drug use: No     Review of Systems   Constitutional:  Negative for fever.   HENT:  Negative for sore throat.    Respiratory:  Negative for shortness of breath.    Cardiovascular:  Negative for chest pain.   Gastrointestinal:  Negative for nausea.   Genitourinary:  Negative for dysuria.   Musculoskeletal:  Negative for back pain.   Skin:  Negative for rash.   Neurological:  Negative for weakness.   Hematological:  Does not bruise/bleed easily.     Physical Exam     Initial Vitals   BP Pulse Resp  Temp SpO2   02/14/23 1443 02/14/23 1443 02/14/23 1443 02/14/23 1443 02/14/23 1500   122/63 97 18 97.9 °F (36.6 °C) (!) 84 %      MAP       --                Physical Exam    Nursing note and vitals reviewed.  Constitutional: She appears well-developed.   HENT:   Head: Normocephalic.   Eyes: Pupils are equal, round, and reactive to light.   Neck:   Normal range of motion.  Cardiovascular:            No murmur heard.  Pulmonary/Chest: No respiratory distress.   Abdominal: Abdomen is soft. She exhibits no distension. There is no abdominal tenderness. There is no rebound.   Musculoskeletal:         General: No edema.      Cervical back: Normal range of motion.     Neurological: She is alert.   Skin: Skin is warm.   Psychiatric: She has a normal mood and affect.       ED Course   Procedures  Labs Reviewed   CBC W/ AUTO DIFFERENTIAL   APTT   PROTIME-INR   TYPE & SCREEN          Imaging Results    None          Medications - No data to display  Medical Decision Making:   Differential Diagnosis:   DDX:  Patient presenting with reports of low hemoglobin.  Will screen for anemia, and if required, will transfuse.  Patient asymptomatic this time.  DX:  CBC, CMP, type and screen, coags.  TX:  Transfusion PRN  Dispo:  Pending workup                            Clinical Impression:   Final diagnoses:  [D64.9] Low hemoglobin (Primary)               Js Morley MD  02/14/23 1504

## 2023-02-15 VITALS
SYSTOLIC BLOOD PRESSURE: 160 MMHG | HEART RATE: 92 BPM | TEMPERATURE: 98 F | DIASTOLIC BLOOD PRESSURE: 74 MMHG | RESPIRATION RATE: 20 BRPM | OXYGEN SATURATION: 98 %

## 2023-02-15 LAB
BASOPHILS # BLD AUTO: 0.05 K/UL (ref 0–0.2)
BASOPHILS NFR BLD: 0.8 % (ref 0–1.9)
DIFFERENTIAL METHOD: ABNORMAL
EOSINOPHIL # BLD AUTO: 0.1 K/UL (ref 0–0.5)
EOSINOPHIL NFR BLD: 2.1 % (ref 0–8)
ERYTHROCYTE [DISTWIDTH] IN BLOOD BY AUTOMATED COUNT: 20 % (ref 11.5–14.5)
FERRITIN SERPL-MCNC: 24 NG/ML (ref 20–300)
HCT VFR BLD AUTO: 25.5 % (ref 37–48.5)
HGB BLD-MCNC: 7.5 G/DL (ref 12–16)
IMM GRANULOCYTES # BLD AUTO: 0.03 K/UL (ref 0–0.04)
IMM GRANULOCYTES NFR BLD AUTO: 0.5 % (ref 0–0.5)
IRON SERPL-MCNC: 17 UG/DL (ref 30–160)
LYMPHOCYTES # BLD AUTO: 1.1 K/UL (ref 1–4.8)
LYMPHOCYTES NFR BLD: 18 % (ref 18–48)
MCH RBC QN AUTO: 22.8 PG (ref 27–31)
MCHC RBC AUTO-ENTMCNC: 29.4 G/DL (ref 32–36)
MCV RBC AUTO: 78 FL (ref 82–98)
MONOCYTES # BLD AUTO: 0.8 K/UL (ref 0.3–1)
MONOCYTES NFR BLD: 13.4 % (ref 4–15)
NEUTROPHILS # BLD AUTO: 4 K/UL (ref 1.8–7.7)
NEUTROPHILS NFR BLD: 65.2 % (ref 38–73)
NRBC BLD-RTO: 0 /100 WBC
PLATELET # BLD AUTO: 133 K/UL (ref 150–450)
PMV BLD AUTO: 11.6 FL (ref 9.2–12.9)
RBC # BLD AUTO: 3.29 M/UL (ref 4–5.4)
SATURATED IRON: 4 % (ref 20–50)
TOTAL IRON BINDING CAPACITY: 444 UG/DL (ref 250–450)
TRANSFERRIN SERPL-MCNC: 300 MG/DL (ref 200–375)
WBC # BLD AUTO: 6.1 K/UL (ref 3.9–12.7)

## 2023-02-15 NOTE — PROVIDER PROGRESS NOTES - EMERGENCY DEPT.
Encounter Date: 2/14/2023    ED Physician Progress Notes            I am assuming care of patient Gretel Ashton from physician Dr. Morley at 6 PM.    Condition: stable  Pending: blood transfusion, repeat CBC  Contingency plan: goal Hgb > 7  Anticipated dispo: discharge    82 year old with anemia pending blood transfusion.    12:17 AM  Patient has received a total of 2 units of pRBCs with repeat CBC showing a Hgb of 7.5, Hct of 25.5.     Will discharge.    Critical Care    Date/Time: 2/15/2023 12:18 AM  Performed by: Alexis Velasco DO  Authorized by: Alexis Velasco DO   Direct patient critical care time: 21 minutes  Additional history critical care time: 3 minutes  Ordering / reviewing critical care time: 4 minutes  Documentation critical care time: 4 minutes  Total critical care time (exclusive of procedural time) : 32 minutes  Critical care time was exclusive of separately billable procedures and treating other patients and teaching time.  Critical care was necessary to treat or prevent imminent or life-threatening deterioration of the following conditions: circulatory failure.  Critical care was time spent personally by me on the following activities: development of treatment plan with patient or surrogate, evaluation of patient's response to treatment, examination of patient, obtaining history from patient or surrogate, ordering and performing treatments and interventions, ordering and review of laboratory studies and review of old charts.        ALEXIS VELASCO DO  EMERGENCY MEDICINE  [facility]  02/15/2023 12:18 AM

## 2023-02-24 ENCOUNTER — LAB VISIT (OUTPATIENT)
Dept: LAB | Facility: HOSPITAL | Age: 83
End: 2023-02-24
Attending: HOSPITALIST
Payer: MEDICARE

## 2023-02-24 DIAGNOSIS — D64.9 ANEMIA, UNSPECIFIED: Primary | ICD-10-CM

## 2023-02-24 LAB
ANISOCYTOSIS BLD QL SMEAR: SLIGHT
BASOPHILS # BLD AUTO: 0.05 K/UL (ref 0–0.2)
BASOPHILS NFR BLD: 0.9 % (ref 0–1.9)
DACRYOCYTES BLD QL SMEAR: ABNORMAL
DIFFERENTIAL METHOD: ABNORMAL
EOSINOPHIL # BLD AUTO: 0.2 K/UL (ref 0–0.5)
EOSINOPHIL NFR BLD: 3.5 % (ref 0–8)
ERYTHROCYTE [DISTWIDTH] IN BLOOD BY AUTOMATED COUNT: 23.6 % (ref 11.5–14.5)
HCT VFR BLD AUTO: 27.4 % (ref 37–48.5)
HGB BLD-MCNC: 7.6 G/DL (ref 12–16)
HYPOCHROMIA BLD QL SMEAR: ABNORMAL
IMM GRANULOCYTES # BLD AUTO: 0.01 K/UL (ref 0–0.04)
IMM GRANULOCYTES NFR BLD AUTO: 0.2 % (ref 0–0.5)
LYMPHOCYTES # BLD AUTO: 1.2 K/UL (ref 1–4.8)
LYMPHOCYTES NFR BLD: 22.3 % (ref 18–48)
MCH RBC QN AUTO: 22.8 PG (ref 27–31)
MCHC RBC AUTO-ENTMCNC: 27.7 G/DL (ref 32–36)
MCV RBC AUTO: 82 FL (ref 82–98)
MONOCYTES # BLD AUTO: 0.8 K/UL (ref 0.3–1)
MONOCYTES NFR BLD: 14.5 % (ref 4–15)
NEUTROPHILS # BLD AUTO: 3.2 K/UL (ref 1.8–7.7)
NEUTROPHILS NFR BLD: 58.6 % (ref 38–73)
NRBC BLD-RTO: 0 /100 WBC
PLATELET # BLD AUTO: 222 K/UL (ref 150–450)
PMV BLD AUTO: ABNORMAL FL (ref 9.2–12.9)
POIKILOCYTOSIS BLD QL SMEAR: SLIGHT
RBC # BLD AUTO: 3.34 M/UL (ref 4–5.4)
WBC # BLD AUTO: 5.46 K/UL (ref 3.9–12.7)

## 2023-02-24 PROCEDURE — 36415 COLL VENOUS BLD VENIPUNCTURE: CPT | Performed by: HOSPITALIST

## 2023-02-24 PROCEDURE — 85025 COMPLETE CBC W/AUTO DIFF WBC: CPT | Performed by: HOSPITALIST

## 2023-04-04 ENCOUNTER — HOSPITAL ENCOUNTER (EMERGENCY)
Facility: HOSPITAL | Age: 83
Discharge: ANOTHER HOSPITAL | End: 2023-04-04
Attending: STUDENT IN AN ORGANIZED HEALTH CARE EDUCATION/TRAINING PROGRAM
Payer: MEDICARE

## 2023-04-04 VITALS
HEART RATE: 96 BPM | TEMPERATURE: 99 F | OXYGEN SATURATION: 95 % | WEIGHT: 150 LBS | SYSTOLIC BLOOD PRESSURE: 126 MMHG | DIASTOLIC BLOOD PRESSURE: 63 MMHG | RESPIRATION RATE: 24 BRPM | BODY MASS INDEX: 27.44 KG/M2

## 2023-04-04 DIAGNOSIS — R06.02 SHORTNESS OF BREATH: ICD-10-CM

## 2023-04-04 DIAGNOSIS — R53.83 FATIGUE: ICD-10-CM

## 2023-04-04 DIAGNOSIS — K92.2 GASTROINTESTINAL HEMORRHAGE, UNSPECIFIED GASTROINTESTINAL HEMORRHAGE TYPE: Primary | ICD-10-CM

## 2023-04-04 LAB
ABO + RH BLD: NORMAL
ALBUMIN SERPL BCP-MCNC: 3.2 G/DL (ref 3.5–5.2)
ALP SERPL-CCNC: 71 U/L (ref 55–135)
ALT SERPL W/O P-5'-P-CCNC: 18 U/L (ref 10–44)
ANION GAP SERPL CALC-SCNC: 9 MMOL/L (ref 8–16)
ANISOCYTOSIS BLD QL SMEAR: ABNORMAL
APTT PPP: 23.7 SEC (ref 21–32)
AST SERPL-CCNC: 32 U/L (ref 10–40)
BASOPHILS # BLD AUTO: 0.01 K/UL (ref 0–0.2)
BASOPHILS NFR BLD: 0.2 % (ref 0–1.9)
BILIRUB SERPL-MCNC: 0.3 MG/DL (ref 0.1–1)
BLD GP AB SCN CELLS X3 SERPL QL: NORMAL
BNP SERPL-MCNC: 214 PG/ML (ref 0–99)
BUN SERPL-MCNC: 26 MG/DL (ref 8–23)
CALCIUM SERPL-MCNC: 9.1 MG/DL (ref 8.7–10.5)
CHLORIDE SERPL-SCNC: 103 MMOL/L (ref 95–110)
CO2 SERPL-SCNC: 28 MMOL/L (ref 23–29)
CREAT SERPL-MCNC: 1 MG/DL (ref 0.5–1.4)
DIFFERENTIAL METHOD: ABNORMAL
EOSINOPHIL # BLD AUTO: 0.1 K/UL (ref 0–0.5)
EOSINOPHIL NFR BLD: 1.5 % (ref 0–8)
ERYTHROCYTE [DISTWIDTH] IN BLOOD BY AUTOMATED COUNT: 20.7 % (ref 11.5–14.5)
EST. GFR  (NO RACE VARIABLE): 56 ML/MIN/1.73 M^2
GLUCOSE SERPL-MCNC: 140 MG/DL (ref 70–110)
HCT VFR BLD AUTO: 19 % (ref 37–48.5)
HGB BLD-MCNC: 5 G/DL (ref 12–16)
HYPOCHROMIA BLD QL SMEAR: ABNORMAL
IMM GRANULOCYTES # BLD AUTO: 0.01 K/UL (ref 0–0.04)
IMM GRANULOCYTES NFR BLD AUTO: 0.2 % (ref 0–0.5)
INR PPP: 1.2 (ref 0.8–1.2)
LYMPHOCYTES # BLD AUTO: 0.8 K/UL (ref 1–4.8)
LYMPHOCYTES NFR BLD: 20 % (ref 18–48)
MCH RBC QN AUTO: 19.9 PG (ref 27–31)
MCHC RBC AUTO-ENTMCNC: 26.3 G/DL (ref 32–36)
MCV RBC AUTO: 76 FL (ref 82–98)
MONOCYTES # BLD AUTO: 0.6 K/UL (ref 0.3–1)
MONOCYTES NFR BLD: 14.8 % (ref 4–15)
NEUTROPHILS # BLD AUTO: 2.6 K/UL (ref 1.8–7.7)
NEUTROPHILS NFR BLD: 63.3 % (ref 38–73)
NRBC BLD-RTO: 1 /100 WBC
OB PNL STL: POSITIVE
OVALOCYTES BLD QL SMEAR: ABNORMAL
PLATELET # BLD AUTO: 125 K/UL (ref 150–450)
PLATELET BLD QL SMEAR: ABNORMAL
PMV BLD AUTO: 10.7 FL (ref 9.2–12.9)
POIKILOCYTOSIS BLD QL SMEAR: SLIGHT
POLYCHROMASIA BLD QL SMEAR: ABNORMAL
POTASSIUM SERPL-SCNC: 4.2 MMOL/L (ref 3.5–5.1)
PROT SERPL-MCNC: 6.3 G/DL (ref 6–8.4)
PROTHROMBIN TIME: 12.7 SEC (ref 9–12.5)
RBC # BLD AUTO: 2.51 M/UL (ref 4–5.4)
SODIUM SERPL-SCNC: 140 MMOL/L (ref 136–145)
SPECIMEN OUTDATE: NORMAL
TROPONIN I SERPL DL<=0.01 NG/ML-MCNC: 0.01 NG/ML (ref 0–0.03)
WBC # BLD AUTO: 4.05 K/UL (ref 3.9–12.7)

## 2023-04-04 PROCEDURE — 63600175 PHARM REV CODE 636 W HCPCS: Performed by: STUDENT IN AN ORGANIZED HEALTH CARE EDUCATION/TRAINING PROGRAM

## 2023-04-04 PROCEDURE — 85610 PROTHROMBIN TIME: CPT | Performed by: STUDENT IN AN ORGANIZED HEALTH CARE EDUCATION/TRAINING PROGRAM

## 2023-04-04 PROCEDURE — 93010 EKG 12-LEAD: ICD-10-PCS | Mod: ,,, | Performed by: INTERNAL MEDICINE

## 2023-04-04 PROCEDURE — 93010 ELECTROCARDIOGRAM REPORT: CPT | Mod: ,,, | Performed by: INTERNAL MEDICINE

## 2023-04-04 PROCEDURE — P9016 RBC LEUKOCYTES REDUCED: HCPCS | Performed by: STUDENT IN AN ORGANIZED HEALTH CARE EDUCATION/TRAINING PROGRAM

## 2023-04-04 PROCEDURE — 80053 COMPREHEN METABOLIC PANEL: CPT | Performed by: STUDENT IN AN ORGANIZED HEALTH CARE EDUCATION/TRAINING PROGRAM

## 2023-04-04 PROCEDURE — 85730 THROMBOPLASTIN TIME PARTIAL: CPT | Performed by: STUDENT IN AN ORGANIZED HEALTH CARE EDUCATION/TRAINING PROGRAM

## 2023-04-04 PROCEDURE — 36430 TRANSFUSION BLD/BLD COMPNT: CPT

## 2023-04-04 PROCEDURE — 86900 BLOOD TYPING SEROLOGIC ABO: CPT | Performed by: STUDENT IN AN ORGANIZED HEALTH CARE EDUCATION/TRAINING PROGRAM

## 2023-04-04 PROCEDURE — 84484 ASSAY OF TROPONIN QUANT: CPT | Performed by: STUDENT IN AN ORGANIZED HEALTH CARE EDUCATION/TRAINING PROGRAM

## 2023-04-04 PROCEDURE — 99291 CRITICAL CARE FIRST HOUR: CPT | Mod: 25

## 2023-04-04 PROCEDURE — 82272 OCCULT BLD FECES 1-3 TESTS: CPT | Performed by: STUDENT IN AN ORGANIZED HEALTH CARE EDUCATION/TRAINING PROGRAM

## 2023-04-04 PROCEDURE — 85025 COMPLETE CBC W/AUTO DIFF WBC: CPT | Performed by: STUDENT IN AN ORGANIZED HEALTH CARE EDUCATION/TRAINING PROGRAM

## 2023-04-04 PROCEDURE — C9113 INJ PANTOPRAZOLE SODIUM, VIA: HCPCS | Performed by: STUDENT IN AN ORGANIZED HEALTH CARE EDUCATION/TRAINING PROGRAM

## 2023-04-04 PROCEDURE — 93005 ELECTROCARDIOGRAM TRACING: CPT

## 2023-04-04 PROCEDURE — 86920 COMPATIBILITY TEST SPIN: CPT | Performed by: STUDENT IN AN ORGANIZED HEALTH CARE EDUCATION/TRAINING PROGRAM

## 2023-04-04 PROCEDURE — 96374 THER/PROPH/DIAG INJ IV PUSH: CPT | Mod: 59

## 2023-04-04 PROCEDURE — 83880 ASSAY OF NATRIURETIC PEPTIDE: CPT | Performed by: STUDENT IN AN ORGANIZED HEALTH CARE EDUCATION/TRAINING PROGRAM

## 2023-04-04 RX ORDER — HYDROCODONE BITARTRATE AND ACETAMINOPHEN 500; 5 MG/1; MG/1
TABLET ORAL
Status: DISCONTINUED | OUTPATIENT
Start: 2023-04-04 | End: 2023-04-04 | Stop reason: HOSPADM

## 2023-04-04 RX ORDER — PANTOPRAZOLE SODIUM 40 MG/10ML
80 INJECTION, POWDER, LYOPHILIZED, FOR SOLUTION INTRAVENOUS
Status: COMPLETED | OUTPATIENT
Start: 2023-04-04 | End: 2023-04-04

## 2023-04-04 RX ADMIN — PANTOPRAZOLE SODIUM 80 MG: 40 INJECTION, POWDER, FOR SOLUTION INTRAVENOUS at 12:04

## 2023-04-04 NOTE — ED NOTES
Per pt request, notified pt's son of pt's arrival to ED and plan of care, verbalized understanding.

## 2023-04-04 NOTE — ED PROVIDER NOTES
Encounter Date: 2023       History     Chief Complaint   Patient presents with    Abnormal Lab     Pt sent for further eval of abnormal labs. Hgb 6.1, Hct 20.6. Pt chronically SOB, denies feeling worse than baseline.      82 year old female with a PMHx of HTN, HLD, DM, COPD, dementia presents to the ED from the [facility] with anemia (Hgb 6.1, Hct 20.6). Patient has chronic shortness of breath but is otherwise at her baseline. She states she never would have came to the ED otherwise. She denies cough, chest pain, dark stools, bloody stools. She has mentally been at baseline.      Review of patient's allergies indicates:  No Known Allergies  Past Medical History:   Diagnosis Date    Arthritis     COPD (chronic obstructive pulmonary disease)     Depression     Diabetes mellitus type II     Hyperlipidemia     Hypertension     Pacemaker      Past Surgical History:   Procedure Laterality Date    CARDIAC PACEMAKER PLACEMENT       SECTION      CYST REMOVAL Left 2019    Procedure: EXCISION, SEBACEOUS CYST;  Surgeon: Jaylen Gonzalez Jr., MD;  Location: [facility] OR;  Service: General;  Laterality: Left;    INNER EAR SURGERY       Family History   Problem Relation Age of Onset    Cancer Mother     Breast cancer Mother     Stroke Father     Cancer Sister     Breast cancer Sister     Stroke Maternal Grandmother      Social History     Tobacco Use    Smoking status: Former     Packs/day: 2.00     Years: 43.00     Pack years: 86.00     Types: Cigarettes     Quit date: 2000     Years since quittin.2    Smokeless tobacco: Never   Substance Use Topics    Alcohol use: No    Drug use: No     Review of Systems   Constitutional:  Negative for chills and fever.   HENT:  Negative for congestion, rhinorrhea and sneezing.    Eyes:  Negative for discharge and redness.   Respiratory:  Positive for shortness of breath (chronic). Negative for cough.    Cardiovascular:  Negative for chest pain and palpitations.    Gastrointestinal:  Negative for abdominal pain, diarrhea, nausea and vomiting.   Genitourinary:  Negative for dysuria, frequency, vaginal bleeding and vaginal discharge.   Musculoskeletal:  Negative for back pain and neck pain.   Skin:  Negative for rash and wound.   Neurological:  Negative for weakness, numbness and headaches.     Physical Exam     Initial Vitals   BP Pulse Resp Temp SpO2   04/04/23 1131 04/04/23 1123 04/04/23 1123 04/04/23 1125 04/04/23 1123   (!) 121/56 (P) 87 (!) (P) 24 97.5 °F (36.4 °C) (P) 96 %      MAP       --                Physical Exam    Nursing note and vitals reviewed.  Constitutional: She appears well-developed. She is not diaphoretic. No distress.   HENT:   Head: Normocephalic and atraumatic.   Right Ear: External ear normal.   Left Ear: External ear normal.   Eyes: Right eye exhibits no discharge. Left eye exhibits no discharge. No scleral icterus.   Neck: Neck supple.   Cardiovascular:  Normal rate and regular rhythm.           Pulmonary/Chest: Breath sounds normal. No stridor. No respiratory distress. She has no wheezes. She has no rhonchi. She has no rales.   Abdominal: Abdomen is soft. There is no abdominal tenderness. There is no guarding.   Genitourinary:    Genitourinary Comments: Chaperoned by female RN    Brown stool on rectal exam     Musculoskeletal:         General: No edema.      Cervical back: Neck supple.     Neurological: She is alert.   Skin: Skin is warm and dry. Capillary refill takes less than 2 seconds.   Psychiatric: She has a normal mood and affect.       ED Course   Critical Care    Date/Time: 4/4/2023 12:25 PM  Performed by: Kev Awad DO  Authorized by: Kev Awad DO   Direct patient critical care time: 23 minutes  Additional history critical care time: 4 minutes  Ordering / reviewing critical care time: 3 minutes  Documentation critical care time: 3 minutes  Consulting other physicians critical care time: 4 minutes  Consult with family critical  care time: 3 minutes  Total critical care time (exclusive of procedural time) : 40 minutes  Critical care time was exclusive of separately billable procedures and treating other patients and teaching time.  Critical care was necessary to treat or prevent imminent or life-threatening deterioration of the following conditions: circulatory failure.  Critical care was time spent personally by me on the following activities: development of treatment plan with patient or surrogate, discussions with consultants, evaluation of patient's response to treatment, examination of patient, obtaining history from patient or surrogate, ordering and performing treatments and interventions, ordering and review of laboratory studies, ordering and review of radiographic studies, re-evaluation of patient's condition and review of old charts.      Labs Reviewed   CBC W/ AUTO DIFFERENTIAL - Abnormal; Notable for the following components:       Result Value    RBC 2.51 (*)     Hemoglobin 5.0 (*)     Hematocrit 19.0 (*)     MCV 76 (*)     MCH 19.9 (*)     MCHC 26.3 (*)     RDW 20.7 (*)     Platelets 125 (*)     Lymph # 0.8 (*)     nRBC 1 (*)     Platelet Estimate Decreased (*)     All other components within normal limits    Narrative:     HGB, HCT  critical result(s) called and verbal readback obtained from   CASEY CABRERA RN  by AK5 04/04/2023 11:59   COMPREHENSIVE METABOLIC PANEL - Abnormal; Notable for the following components:    Glucose 140 (*)     BUN 26 (*)     Albumin 3.2 (*)     eGFR 56 (*)     All other components within normal limits   PROTIME-INR - Abnormal; Notable for the following components:    Prothrombin Time 12.7 (*)     All other components within normal limits   OCCULT BLOOD X 1, STOOL - Abnormal; Notable for the following components:    Occult Blood Positive (*)     All other components within normal limits   B-TYPE NATRIURETIC PEPTIDE - Abnormal; Notable for the following components:     (*)     All other  components within normal limits   TROPONIN I   APTT   TYPE & SCREEN   PREPARE RBC SOFT     EKG Readings: (Independently Interpreted)   Previous EKG: Compared with most recent EKG Previous EKG Date: 1/26/2023.   NSR at 89 bpm. RBBB. Normal axis. Normal intervals. No STEMI.     Imaging Results              X-Ray Chest AP Portable (Final result)  Result time 04/04/23 12:20:19      Final result by Jaylen Mckeon Jr., MD (04/04/23 12:20:19)                   Impression:      1. Findings compatible with compensated congestive heart failure.  2. CCD device occupying the left upper chest wall.      Electronically signed by: Jaylen Mckeon MD  Date:    04/04/2023  Time:    12:20               Narrative:    EXAMINATION:  XR CHEST AP PORTABLE    CLINICAL HISTORY:  Shortness of breath    TECHNIQUE:  Single frontal view of the chest was performed.    COMPARISON:  11/18/2022    FINDINGS:  CCC device projects over the patient's left upper chest wall distal leads deployed within the right atrium and right ventricle as expected.  The heart remains prominent size with biventricular dominance open and convex bulging of the ascending aortic margin due to systemic hypertension with the degree of bulbous contour of the ascending aorta appears equal to that from previous.  Mild central vascular congestion without overt pulmonary edema.  There is no evidence of pulmonic infiltrate, pleural effusion, pneumothorax, or mass lesion.  Right left CP angles are relatively clear.  Chronic degenerative changes of bilateral glenohumeral joint compartments and prominent erosion changes along the apical edge of the left greater tubercle.                                       Medications   0.9%  NaCl infusion (for blood administration) (has no administration in time range)   pantoprazole injection 80 mg (has no administration in time range)     Medical Decision Making:   Differential Diagnosis:   Ddx: GI bleed, chronic anemia, CHF, PNA, cardiac  dysrhythmia  ED Management:  Based on the patient's evaluation - patient will need transfer for GI bleed, anemia. I spoke with Agustin (son) who was very frustrated and upset. States we were never able to identify the source of the anemia in the past. I was able to ask him to be professional and calm. I informed him his mother is anemic, requiring multiple units of blood. We discussed goals of care - he states he wants everything done. She has seen GI's Dr. Bangura in the past and would like her transferred to [facility] for further treatment and management.                         Clinical Impression:   Final diagnoses:  [R53.83] Fatigue  [R06.02] Shortness of breath  [K92.2] Gastrointestinal hemorrhage, unspecified gastrointestinal hemorrhage type (Primary)        ED Disposition Condition    Transfer to Another Facility Stable                   Kev Awad DO  04/04/23 9071

## 2023-04-08 LAB
BLD PROD TYP BPU: NORMAL
BLOOD UNIT EXPIRATION DATE: NORMAL
BLOOD UNIT TYPE CODE: 5100
BLOOD UNIT TYPE: NORMAL
CODING SYSTEM: NORMAL
CROSSMATCH INTERPRETATION: NORMAL
DISPENSE STATUS: NORMAL
NUM UNITS TRANS PACKED RBC: NORMAL

## 2023-04-25 ENCOUNTER — HOSPITAL ENCOUNTER (EMERGENCY)
Facility: HOSPITAL | Age: 83
Discharge: HOME | End: 2023-04-25
Attending: SURGERY
Payer: MEDICARE

## 2023-04-25 VITALS
SYSTOLIC BLOOD PRESSURE: 120 MMHG | HEIGHT: 62 IN | RESPIRATION RATE: 21 BRPM | HEART RATE: 91 BPM | DIASTOLIC BLOOD PRESSURE: 60 MMHG | BODY MASS INDEX: 27.42 KG/M2 | WEIGHT: 149 LBS | OXYGEN SATURATION: 98 % | TEMPERATURE: 99 F

## 2023-04-25 DIAGNOSIS — U07.1 COVID-19: Primary | ICD-10-CM

## 2023-04-25 DIAGNOSIS — R06.02 SOB (SHORTNESS OF BREATH): ICD-10-CM

## 2023-04-25 DIAGNOSIS — U07.1 COVID-19 VIRUS DETECTED: ICD-10-CM

## 2023-04-25 LAB
ALBUMIN SERPL BCP-MCNC: 3.2 G/DL (ref 3.5–5.2)
ALP SERPL-CCNC: 87 U/L (ref 55–135)
ALT SERPL W/O P-5'-P-CCNC: 30 U/L (ref 10–44)
ANION GAP SERPL CALC-SCNC: 10 MMOL/L (ref 8–16)
ANISOCYTOSIS BLD QL SMEAR: ABNORMAL
APTT PPP: 27.1 SEC (ref 21–32)
AST SERPL-CCNC: 58 U/L (ref 10–40)
BASOPHILS # BLD AUTO: 0.04 K/UL (ref 0–0.2)
BASOPHILS NFR BLD: 1.1 % (ref 0–1.9)
BILIRUB SERPL-MCNC: 0.3 MG/DL (ref 0.1–1)
BNP SERPL-MCNC: 74 PG/ML (ref 0–99)
BUN SERPL-MCNC: 18 MG/DL (ref 8–23)
CALCIUM SERPL-MCNC: 9.3 MG/DL (ref 8.7–10.5)
CHLORIDE SERPL-SCNC: 101 MMOL/L (ref 95–110)
CK SERPL-CCNC: 41 U/L (ref 20–180)
CO2 SERPL-SCNC: 30 MMOL/L (ref 23–29)
CREAT SERPL-MCNC: 1 MG/DL (ref 0.5–1.4)
D DIMER PPP IA.FEU-MCNC: 0.83 MG/L FEU
DIFFERENTIAL METHOD: ABNORMAL
EOSINOPHIL # BLD AUTO: 0.1 K/UL (ref 0–0.5)
EOSINOPHIL NFR BLD: 2.1 % (ref 0–8)
ERYTHROCYTE [DISTWIDTH] IN BLOOD BY AUTOMATED COUNT: 26.5 % (ref 11.5–14.5)
EST. GFR  (NO RACE VARIABLE): 56 ML/MIN/1.73 M^2
GLUCOSE SERPL-MCNC: 148 MG/DL (ref 70–110)
HCT VFR BLD AUTO: 37 % (ref 37–48.5)
HGB BLD-MCNC: 10.4 G/DL (ref 12–16)
HYPOCHROMIA BLD QL SMEAR: ABNORMAL
IMM GRANULOCYTES # BLD AUTO: 0.01 K/UL (ref 0–0.04)
IMM GRANULOCYTES NFR BLD AUTO: 0.3 % (ref 0–0.5)
INFLUENZA A, MOLECULAR: NEGATIVE
INFLUENZA B, MOLECULAR: NEGATIVE
INR PPP: 1.2 (ref 0.8–1.2)
LYMPHOCYTES # BLD AUTO: 0.8 K/UL (ref 1–4.8)
LYMPHOCYTES NFR BLD: 20.6 % (ref 18–48)
MCH RBC QN AUTO: 24.5 PG (ref 27–31)
MCHC RBC AUTO-ENTMCNC: 28.1 G/DL (ref 32–36)
MCV RBC AUTO: 87 FL (ref 82–98)
MONOCYTES # BLD AUTO: 0.6 K/UL (ref 0.3–1)
MONOCYTES NFR BLD: 16.1 % (ref 4–15)
NEUTROPHILS # BLD AUTO: 2.3 K/UL (ref 1.8–7.7)
NEUTROPHILS NFR BLD: 59.8 % (ref 38–73)
NRBC BLD-RTO: 0 /100 WBC
OVALOCYTES BLD QL SMEAR: ABNORMAL
PLATELET # BLD AUTO: 234 K/UL (ref 150–450)
PMV BLD AUTO: ABNORMAL FL (ref 9.2–12.9)
POIKILOCYTOSIS BLD QL SMEAR: ABNORMAL
POTASSIUM SERPL-SCNC: 3.9 MMOL/L (ref 3.5–5.1)
PROT SERPL-MCNC: 6.2 G/DL (ref 6–8.4)
PROTHROMBIN TIME: 12.2 SEC (ref 9–12.5)
RBC # BLD AUTO: 4.24 M/UL (ref 4–5.4)
SARS-COV-2 RDRP RESP QL NAA+PROBE: POSITIVE
SODIUM SERPL-SCNC: 141 MMOL/L (ref 136–145)
SPECIMEN SOURCE: NORMAL
TROPONIN I SERPL DL<=0.01 NG/ML-MCNC: 0.01 NG/ML (ref 0–0.03)
TROPONIN I SERPL DL<=0.01 NG/ML-MCNC: 0.02 NG/ML (ref 0–0.03)
WBC # BLD AUTO: 3.79 K/UL (ref 3.9–12.7)

## 2023-04-25 PROCEDURE — 80053 COMPREHEN METABOLIC PANEL: CPT | Performed by: SURGERY

## 2023-04-25 PROCEDURE — 85610 PROTHROMBIN TIME: CPT | Performed by: SURGERY

## 2023-04-25 PROCEDURE — 99285 EMERGENCY DEPT VISIT HI MDM: CPT | Mod: 25

## 2023-04-25 PROCEDURE — 93010 ELECTROCARDIOGRAM REPORT: CPT | Mod: ,,, | Performed by: INTERNAL MEDICINE

## 2023-04-25 PROCEDURE — 25000242 PHARM REV CODE 250 ALT 637 W/ HCPCS: Performed by: SURGERY

## 2023-04-25 PROCEDURE — 87502 INFLUENZA DNA AMP PROBE: CPT | Performed by: SURGERY

## 2023-04-25 PROCEDURE — 96375 TX/PRO/DX INJ NEW DRUG ADDON: CPT

## 2023-04-25 PROCEDURE — 27000221 HC OXYGEN, UP TO 24 HOURS

## 2023-04-25 PROCEDURE — 85730 THROMBOPLASTIN TIME PARTIAL: CPT | Performed by: SURGERY

## 2023-04-25 PROCEDURE — 63600175 PHARM REV CODE 636 W HCPCS: Performed by: SURGERY

## 2023-04-25 PROCEDURE — 36415 COLL VENOUS BLD VENIPUNCTURE: CPT | Performed by: SURGERY

## 2023-04-25 PROCEDURE — 96367 TX/PROPH/DG ADDL SEQ IV INF: CPT

## 2023-04-25 PROCEDURE — 96365 THER/PROPH/DIAG IV INF INIT: CPT

## 2023-04-25 PROCEDURE — 93005 ELECTROCARDIOGRAM TRACING: CPT

## 2023-04-25 PROCEDURE — 85379 FIBRIN DEGRADATION QUANT: CPT | Performed by: SURGERY

## 2023-04-25 PROCEDURE — 85025 COMPLETE CBC W/AUTO DIFF WBC: CPT | Performed by: SURGERY

## 2023-04-25 PROCEDURE — 84484 ASSAY OF TROPONIN QUANT: CPT | Mod: 91 | Performed by: SURGERY

## 2023-04-25 PROCEDURE — 82550 ASSAY OF CK (CPK): CPT | Performed by: SURGERY

## 2023-04-25 PROCEDURE — 83880 ASSAY OF NATRIURETIC PEPTIDE: CPT | Performed by: SURGERY

## 2023-04-25 PROCEDURE — 25000003 PHARM REV CODE 250: Performed by: SURGERY

## 2023-04-25 PROCEDURE — 94640 AIRWAY INHALATION TREATMENT: CPT

## 2023-04-25 PROCEDURE — 96366 THER/PROPH/DIAG IV INF ADDON: CPT

## 2023-04-25 PROCEDURE — 93010 EKG 12-LEAD: ICD-10-PCS | Mod: ,,, | Performed by: INTERNAL MEDICINE

## 2023-04-25 PROCEDURE — U0002 COVID-19 LAB TEST NON-CDC: HCPCS | Performed by: SURGERY

## 2023-04-25 RX ORDER — ALBUTEROL SULFATE 0.83 MG/ML
10 SOLUTION RESPIRATORY (INHALATION)
Status: DISCONTINUED | OUTPATIENT
Start: 2023-04-25 | End: 2023-04-25

## 2023-04-25 RX ORDER — METHYLPREDNISOLONE SOD SUCC 125 MG
125 VIAL (EA) INJECTION
Status: COMPLETED | OUTPATIENT
Start: 2023-04-25 | End: 2023-04-25

## 2023-04-25 RX ORDER — MAGNESIUM SULFATE HEPTAHYDRATE 40 MG/ML
2 INJECTION, SOLUTION INTRAVENOUS
Status: COMPLETED | OUTPATIENT
Start: 2023-04-25 | End: 2023-04-25

## 2023-04-25 RX ORDER — ALBUTEROL SULFATE 90 UG/1
2 AEROSOL, METERED RESPIRATORY (INHALATION) EVERY 6 HOURS PRN
Status: DISCONTINUED | OUTPATIENT
Start: 2023-04-25 | End: 2023-04-25 | Stop reason: HOSPADM

## 2023-04-25 RX ORDER — AZITHROMYCIN 250 MG/1
TABLET, FILM COATED ORAL
Qty: 6 TABLET | Refills: 0 | Status: SHIPPED | OUTPATIENT
Start: 2023-04-25 | End: 2024-02-01

## 2023-04-25 RX ORDER — BENZONATATE 100 MG/1
200 CAPSULE ORAL 3 TIMES DAILY PRN
Qty: 20 CAPSULE | Refills: 0 | Status: SHIPPED | OUTPATIENT
Start: 2023-04-25 | End: 2023-05-05

## 2023-04-25 RX ADMIN — METHYLPREDNISOLONE SODIUM SUCCINATE 125 MG: 125 INJECTION, POWDER, FOR SOLUTION INTRAMUSCULAR; INTRAVENOUS at 09:04

## 2023-04-25 RX ADMIN — ALBUTEROL SULFATE 2 PUFF: 90 AEROSOL, METERED RESPIRATORY (INHALATION) at 10:04

## 2023-04-25 RX ADMIN — AZITHROMYCIN MONOHYDRATE 500 MG: 500 INJECTION, POWDER, LYOPHILIZED, FOR SOLUTION INTRAVENOUS at 11:04

## 2023-04-25 RX ADMIN — MAGNESIUM SULFATE HEPTAHYDRATE 2 G: 40 INJECTION, SOLUTION INTRAVENOUS at 09:04

## 2023-04-25 NOTE — ED PROVIDER NOTES
Encounter Date: 2023       History     Chief Complaint   Patient presents with    Evaluation     Gretel Ashton is a 82 y.o. female that presents with cough & shortness of breath  Nursing home states the patient was hypoxic but EMS feels this was an accurate  Patient is on 2 liters of oxygen nursing home, EMS reports 100% oxygenation today  Dry hacking cough with no sputum production, no distress, no retractions/tachypnea  No fever, no obvious signs of distress; poor historian due to chronic debility/dementia    Review of patient's allergies indicates:  No Known Allergies    Past Medical History:   Diagnosis Date    Arthritis     COPD (chronic obstructive pulmonary disease)     Depression     Diabetes mellitus type II     Hyperlipidemia     Hypertension     Pacemaker      Past Surgical History:   Procedure Laterality Date    CARDIAC PACEMAKER PLACEMENT       SECTION      CYST REMOVAL Left 2019    Procedure: EXCISION, SEBACEOUS CYST;  Surgeon: Jaylen Gonzalez Jr., MD;  Location: [facility];  Service: General;  Laterality: Left;    INNER EAR SURGERY       Family History   Problem Relation Age of Onset    Cancer Mother     Breast cancer Mother     Stroke Father     Cancer Sister     Breast cancer Sister     Stroke Maternal Grandmother      Social History     Tobacco Use    Smoking status: Former     Packs/day: 2.00     Years: 43.00     Pack years: 86.00     Types: Cigarettes     Quit date: 2000     Years since quittin.3    Smokeless tobacco: Never   Substance Use Topics    Alcohol use: No    Drug use: No     Review of Systems   Constitutional: Negative.    HENT: Negative.     Eyes: Negative.    Respiratory: Negative.     Cardiovascular: Negative.    Gastrointestinal: Negative.    Genitourinary: Negative.    Musculoskeletal: Negative.    Skin: Negative.    Neurological: Negative.    Psychiatric/Behavioral: Negative.       Physical Exam     Initial Vitals [23 0913]   BP Pulse Resp Temp  SpO2   (!) 118/58 74 20 98.5 °F (36.9 °C) 98 %      MAP       --         Physical Exam    Nursing note and vitals reviewed.  Constitutional: She appears well-developed.   HENT:   Head: Normocephalic and atraumatic.   Right Ear: External ear normal.   Left Ear: External ear normal.   Nose: Nose normal.   Mouth/Throat: Oropharynx is clear and moist.   Eyes: Conjunctivae and EOM are normal. Pupils are equal, round, and reactive to light.   Neck: Neck supple. No JVD present.   Normal range of motion.  Cardiovascular:  Normal rate and regular rhythm.           Pulmonary/Chest: No respiratory distress. She has no wheezes. She has no rhonchi. She has no rales. She exhibits no tenderness.   (+) rhonchi at the bilateral bases with no active wheezing   Abdominal: Abdomen is soft. Bowel sounds are normal. She exhibits no distension. There is no abdominal tenderness. There is no rebound.   Musculoskeletal:         General: Normal range of motion.      Cervical back: Normal range of motion and neck supple.     Neurological: She is alert and oriented to person, place, and time. She has normal strength and normal reflexes.   Skin: Skin is warm and dry.       ED Course   Procedures  Labs Reviewed   COMPREHENSIVE METABOLIC PANEL - Abnormal; Notable for the following components:       Result Value    CO2 30 (*)     Glucose 148 (*)     Albumin 3.2 (*)     AST 58 (*)     eGFR 56 (*)     All other components within normal limits   CBC W/ AUTO DIFFERENTIAL - Abnormal; Notable for the following components:    WBC 3.79 (*)     Hemoglobin 10.4 (*)     MCH 24.5 (*)     MCHC 28.1 (*)     RDW 26.5 (*)     Lymph # 0.8 (*)     Mono % 16.1 (*)     All other components within normal limits   D DIMER, QUANTITATIVE - Abnormal; Notable for the following components:    D-Dimer 0.83 (*)     All other components within normal limits   SARS-COV-2 RNA AMPLIFICATION, QUAL - Abnormal; Notable for the following components:    SARS-CoV-2 RNA, Amplification,  Qual Positive (*)     All other components within normal limits   INFLUENZA A & B BY MOLECULAR   TROPONIN I   CK   B-TYPE NATRIURETIC PEPTIDE   PROTIME-INR   APTT   TROPONIN I     EKG Readings: (Independently Interpreted)   No STEMI  Normal sinus rhythm  No ectopy  Normal conduction  Normal ST segments  Normal T-wave  Normal axis  Heart rate in the 80s     Imaging Results              US Lower Extremity Veins Bilateral (Final result)  Result time 04/25/23 11:14:49      Final result by Selene Dominguez MD (04/25/23 11:14:49)                   Impression:      Negative bilateral lower extremity venous ultrasound without evidence of DVT      Electronically signed by: Selene Dominguez MD  Date:    04/25/2023  Time:    11:14               Narrative:    EXAMINATION:  US LOWER EXTREMITY VEINS BILATERAL    CLINICAL HISTORY:  Shortness of breath & mildly elevated D-dimer;    TECHNIQUE:  Grayscale, color and spectral analysis performed    FINDINGS:  The common femoral, superficial femoral and popliteal veins show normal flow, compression and augmentation                                       X-Ray Chest 1 View (Final result)  Result time 04/25/23 10:06:01      Final result by Selene Dominguez MD (04/25/23 10:06:01)                   Impression:      Possible small right pleural effusion    No other evidence of an acute process in the chest      Electronically signed by: Selene Dominguez MD  Date:    04/25/2023  Time:    10:06               Narrative:    EXAMINATION:  XR CHEST 1 VIEW    CLINICAL HISTORY:  sob;    COMPARISON:  04/04/2023    FINDINGS:  Unchanged prominent cardiomediastinal silhouette.  No vascular congestion.  Blunted right CP angle, possible small effusion.  Left pacemaker.  No acute infiltrates detected.                                       Medications   albuterol inhaler 2 puff (2 puffs Inhalation Given 4/25/23 1007)   magnesium sulfate 2g in water 50mL IVPB (premix) (0 g Intravenous Stopped 4/25/23 1135)    methylPREDNISolone sodium succinate injection 125 mg (125 mg Intravenous Given 4/25/23 0930)   azithromycin (ZITHROMAX) 500 mg in dextrose 5 % (D5W) 250 mL IVPB (Vial-Mate) (0 mg Intravenous Stopped 4/25/23 1238)     Medical Decision Making:   Perceived shortness of breath at the nursing home this morning, on 2 liters  EMS states that the oxygenation was 99% on their arrival at nursing home  Patient presents, dementia, cough but no real complaints on ER interview  Clear chest x-ray, (+) COVID swab, no apparent chest pain on ER evaluation    (-) troponins x2, stable baseline lab work, mildly elevated D-dimer noted today  Ultrasound of the leg showed no pulmonary embolisms, clear chest x-ray  Patient given IV Zithromax for cough with a prescription of Zithromax on DC  Son is at bedside, counseled on plan of care going forward this afternoon    The patient is not febrile, patient is not hypoxic, the patient is in no distress  Symptomatic care for this COVID diagnosis, close NH MD follow-up advised  Will return to the ER with any concerning symptoms regarding COVID on DC  Son voiced complete understanding, patient has 100% oxygenation right now                        Clinical Impression:   Final diagnoses:  [R06.02] SOB (shortness of breath)  [U07.1] COVID-19 (Primary)        ED Disposition Condition    Discharge Stable          ED Prescriptions       Medication Sig Dispense Start Date End Date Auth. Provider    azithromycin (Z-JARED) 250 MG tablet Z-PACK AS DIRECTED 6 tablet 4/25/2023 -- Bruce Malone MD    benzonatate (TESSALON) 100 MG capsule Take 2 capsules (200 mg total) by mouth 3 (three) times daily as needed for Cough. 20 capsule 4/25/2023 5/5/2023 Bruce Malone MD          Follow-up Information       Follow up With Specialties Details Why Contact Info    Jailene Astudillo MD Internal Medicine Schedule an appointment as soon as possible for a visit in 2 days  [address]  [facility] [address]  [phone]                Bruce Malone MD  04/25/23 3425

## 2023-04-25 NOTE — NURSING
Pt discharged. Edson called to . Report given to pt nurse, Brittney. States will send someone to .

## 2023-04-25 NOTE — ED TRIAGE NOTES
To ED from [facility]. Nursing home nurse reported patient with SOB and oxygen saturation in the 80's. Patient with even and unlabored respirations on arrival. EMS reports patient was sleeping with no distress on arrival. Patient on 2 liters nasal cannula at home.

## 2023-05-24 ENCOUNTER — LAB VISIT (OUTPATIENT)
Dept: LAB | Facility: HOSPITAL | Age: 83
End: 2023-05-24
Attending: INTERNAL MEDICINE
Payer: MEDICARE

## 2023-05-24 DIAGNOSIS — M05.79 SEROPOSITIVE RHEUMATOID ARTHRITIS OF MULTIPLE SITES: Primary | ICD-10-CM

## 2023-05-24 PROCEDURE — 86480 TB TEST CELL IMMUN MEASURE: CPT | Performed by: INTERNAL MEDICINE

## 2023-05-25 LAB
GAMMA INTERFERON BACKGROUND BLD IA-ACNC: 0.04 IU/ML
M TB IFN-G CD4+ BCKGRND COR BLD-ACNC: 0.18 IU/ML
MITOGEN IGNF BCKGRD COR BLD-ACNC: 9.96 IU/ML
TB GOLD PLUS: NEGATIVE
TB2 - NIL: 0.23 IU/ML

## 2023-07-20 NOTE — PROGRESS NOTES
Subjective:       Patient ID: Gretel Ashton is a 78 y.o. female.    Chief Complaint: Follow-up and Mass (left side of back )    Gretel Ashton is a 78 y.o. female who presents for Type II DM, Hypertension, and Hyperlipidemia follow up. Labs were reviewed with patient today.      Hyperlipidemia   This is a chronic problem. The problem is uncontrolled. Recent lipid tests were reviewed and are high. Associated symptoms include myalgias. Pertinent negatives include no chest pain. Current antihyperlipidemic treatment includes statins. The current treatment provides moderate improvement of lipids.   Arthritis   Presents for follow-up visit. She complains of joint swelling. Affected location: MCps both hands  Pertinent negatives include no dysuria, fever or rash.   Diabetes   She presents for her follow-up diabetic visit. She has type 2 diabetes mellitus. Pertinent negatives for hypoglycemia include no confusion, dizziness, headaches, nervousness/anxiousness or pallor. Pertinent negatives for diabetes include no chest pain, no polydipsia, no polyphagia and no weakness. There are no hypoglycemic complications. Symptoms are worsening. There are no diabetic complications. Risk factors for coronary artery disease include diabetes mellitus, dyslipidemia, hypertension, obesity, post-menopausal and sedentary lifestyle. Current diabetic treatment includes oral agent (monotherapy). Her weight is stable. She is following a generally healthy diet. Her breakfast blood glucose range is generally 130-140 mg/dl. An ACE inhibitor/angiotensin II receptor blocker is not being taken.   Depression Patient is not experiencing: choking sensation, confusion, nervousness/anxiety, palpitations and suicidal ideas.    Medication Refill   Associated symptoms include arthralgias, coughing, joint swelling and myalgias. Pertinent negatives include no chest pain, chills, congestion, fever, headaches, nausea, numbness, rash, sore throat, vomiting or  weakness.     Review of Systems   Constitutional: Negative for chills and fever.   HENT: Negative for congestion, hearing loss, sinus pressure and sore throat.    Eyes: Negative for photophobia.   Respiratory: Positive for cough. Negative for choking, chest tightness and wheezing.         On home o2 ; COPD ;sleeps with O2 at night    Cardiovascular: Negative for chest pain and palpitations.   Gastrointestinal: Negative for blood in stool, nausea and vomiting.   Endocrine: Negative for polydipsia and polyphagia.   Genitourinary: Negative for dysuria and hematuria.   Musculoskeletal: Positive for arthralgias, arthritis, joint swelling, myalgias and neck stiffness.   Skin: Negative for pallor and rash.        Left lower back with sebaceous cyst    Neurological: Negative for dizziness, weakness, numbness and headaches.   Hematological: Does not bruise/bleed easily.   Psychiatric/Behavioral: Positive for depression. Negative for confusion, dysphoric mood, hallucinations, sleep disturbance and suicidal ideas. The patient is not nervous/anxious.        Objective:      Physical Exam   Constitutional: She is oriented to person, place, and time. She appears well-developed and well-nourished.   HENT:   Head: Normocephalic and atraumatic.   Nose: Nose normal.   Mouth/Throat: Oropharynx is clear and moist. Mucous membranes are pale.   Eyes: Pupils are equal, round, and reactive to light. Conjunctivae and EOM are normal.   Neck: Normal range of motion. Neck supple. No JVD present. No tracheal deviation present. No thyromegaly present.   Cardiovascular: Normal rate, regular rhythm, normal heart sounds and intact distal pulses.   Pulmonary/Chest: Effort normal. No respiratory distress. She has no wheezes. She has no rales. She exhibits no tenderness.   Abdominal: Soft. Bowel sounds are normal. She exhibits no distension and no mass. There is no tenderness. There is no rebound and no guarding.   Musculoskeletal: Normal range of  motion. She exhibits no edema.   Lymphadenopathy:     She has no cervical adenopathy.   Neurological: She is alert and oriented to person, place, and time. She has normal reflexes. No cranial nerve deficit. She exhibits normal muscle tone. Coordination normal.   Skin: Skin is warm and dry.        Sebaceous cyst : 5 cm in size .   Psychiatric: She has a normal mood and affect. Her behavior is normal. Judgment and thought content normal.   Nursing note and vitals reviewed.      Assessment:       1. Hyperlipidemia, unspecified hyperlipidemia type    2. Pulmonary emphysema, unspecified emphysema type    3. Aortic atherosclerosis    4. Controlled type 2 diabetes mellitus without complication, without long-term current use of insulin    5. Sebaceous cyst        Plan:   Gretel was seen today for follow-up and mass.    Diagnoses and all orders for this visit:    Hyperlipidemia, unspecified hyperlipidemia type  -     Ambulatory Referral to Outpatient Case Management  -     CBC auto differential; Future  -     Comprehensive metabolic panel; Future  -     Lipid panel; Future  -     TSH; Future  Well controlled.  Continue same medication and dose.  Pulmonary emphysema, unspecified emphysema type  -     Ambulatory Referral to Outpatient Case Management  Continue advair     Aortic atherosclerosis  -     Ambulatory Referral to Outpatient Case Management  -     Lipid panel; Future  Continue statins    Controlled type 2 diabetes mellitus without complication, without long-term current use of insulin  -     Ambulatory Referral to Outpatient Case Management  -     Microalbumin/creatinine urine ratio; Future  -     Hemoglobin A1c; Future  -     TSH; Future  Patient has controlled Diabetes .  We discussed about diet ;low in calories. Avoid sweats, sodas.  Also increasing activity;walking 2-3 miles a day.  I also adjusted medications and gave patient  instructions about adherence to plan.  Goal of  A1c  less than 7 % stressed.  Also  "goal of LDL less than 70 highlighted to patient.  Sebaceous cyst  -     Ambulatory referral to General Surgery      Problem List Items Addressed This Visit     Hyperlipidemia - Primary    Relevant Orders    Ambulatory Referral to Outpatient Case Management    COPD (chronic obstructive pulmonary disease)    Relevant Orders    Ambulatory Referral to Outpatient Case Management    Type 2 diabetes mellitus, controlled    Relevant Orders    Ambulatory Referral to Outpatient Case Management    Aortic atherosclerosis    Overview     2/22/19 CXR "Calcified atheromatous disease affects the aorta."    7/18/18 CTA Chest "Calcified atheromatous disease affects the aorta and its branch vessels"   5/30/18 CXR "There is calcification of the aorta which is tortuous. "     Associate diagnosis:  12/12/18 IM OV - HLD  Meds:  rosuvastatin         Relevant Orders    Ambulatory Referral to Outpatient Case Management      Other Visit Diagnoses     Sebaceous cyst        Relevant Orders    Ambulatory referral to General Surgery        " 70.8

## 2023-07-25 DIAGNOSIS — F02.818 EARLY ONSET ALZHEIMER'S DEMENTIA WITH BEHAVIORAL DISTURBANCE: ICD-10-CM

## 2023-07-25 DIAGNOSIS — G30.0 EARLY ONSET ALZHEIMER'S DEMENTIA WITH BEHAVIORAL DISTURBANCE: ICD-10-CM

## 2023-07-25 RX ORDER — MEMANTINE HYDROCHLORIDE 10 MG/1
TABLET ORAL
Qty: 60 TABLET | Refills: 5 | Status: SHIPPED | OUTPATIENT
Start: 2023-07-25 | End: 2024-01-22

## 2023-10-06 NOTE — SUBJECTIVE & OBJECTIVE
Interval History: off pressors and precedex, BP borderline but improving    Review of Systems   Constitutional:  Negative for fatigue and fever.   HENT:  Negative for congestion and sore throat.    Eyes:  Negative for pain and visual disturbance.   Respiratory:  Positive for cough and wheezing. Negative for shortness of breath.    Cardiovascular:  Negative for chest pain and leg swelling.   Gastrointestinal:  Negative for abdominal pain, constipation, diarrhea, nausea and vomiting.   Genitourinary:  Negative for dysuria and hematuria.   Skin:  Negative for rash and wound.   Neurological:  Positive for weakness (generalized). Negative for dizziness, light-headedness and headaches.   Objective:     Vital Signs (Most Recent):  Temp: 97.5 °F (36.4 °C) (04/14/22 0749)  Pulse: 68 (04/14/22 0800)  Resp: 20 (04/14/22 0749)  BP: (!) 174/67 (04/14/22 0749)  SpO2: 100 % (04/14/22 0749)   Vital Signs (24h Range):  Temp:  [96.5 °F (35.8 °C)-98.3 °F (36.8 °C)] 97.5 °F (36.4 °C)  Pulse:  [] 68  Resp:  [14-20] 20  SpO2:  [93 %-100 %] 100 %  BP: (105-174)/(61-82) 174/67     Weight: 80.5 kg (177 lb 7.5 oz)  Body mass index is 38.4 kg/m².    Intake/Output Summary (Last 24 hours) at 4/14/2022 0851  Last data filed at 4/14/2022 0800  Gross per 24 hour   Intake 600 ml   Output 300 ml   Net 300 ml        Physical Exam  Vitals and nursing note reviewed.   Constitutional:       General: She is not in acute distress.     Appearance: She is well-developed. She is obese.   HENT:      Head: Normocephalic and atraumatic.      Nose: Nose normal.   Eyes:      General:         Right eye: No discharge.         Left eye: No discharge.      Extraocular Movements: Extraocular movements intact.      Conjunctiva/sclera: Conjunctivae normal.      Pupils: Pupils are equal, round, and reactive to light.   Neck:      Thyroid: No thyromegaly.   Cardiovascular:      Rate and Rhythm: Normal rate and regular rhythm.      Heart sounds: No murmur  heard.  Pulmonary:      Effort: Pulmonary effort is normal. No respiratory distress.      Breath sounds: Rhonchi present. No wheezing or rales.      Comments: Ronchi worse over right lung field.  Abdominal:      General: Bowel sounds are normal. There is no distension.      Palpations: Abdomen is soft.      Tenderness: There is no abdominal tenderness.   Musculoskeletal:      Cervical back: Neck supple.      Right lower leg: No edema.      Left lower leg: No edema.   Skin:     General: Skin is warm and dry.   Neurological:      General: No focal deficit present.      Mental Status: She is alert and oriented to person, place, and time.   Psychiatric:         Mood and Affect: Mood normal.         Behavior: Behavior normal.       Significant Labs: CBC:   Recent Labs   Lab 04/14/22  0558   WBC 5.65   HGB 10.1*   HCT 32.4*        Occult stool positive   B12 1065   Retic 3.8  Lab Results   Component Value Date    IRON 17 (L) 04/10/2022    TIBC 395 04/10/2022    FERRITIN 54 04/10/2022     Lab Results   Component Value Date    HGBA1C 6.0 (H) 04/10/2022     Lactic 2.0 on admission     CMP:   Recent Labs   Lab 04/14/22  0558      K 3.7   CL 99   CO2 32*   GLU 87   BUN 15   CREATININE 0.9   CALCIUM 8.8   PROT 6.1   ALBUMIN 2.8*   BILITOT 0.2   ALKPHOS 77   AST 34   ALT 33   ANIONGAP 12   EGFRNONAA 60       Troponin:   Recent Labs   Lab 04/12/22 2023 04/13/22  0215 04/13/22  0821   TROPONINI 0.008 0.016 0.016     BNP  Recent Labs   Lab 04/07/22  1603   BNP 76         Blood cultures x 2 NGTD   4/7 urine culture No growth   Resp culture normal crystal  Covid screen negative     Significant Imaging:     CXR 4/7 The patient is rotated to the right.  Lung volumes remain low.  Numerous external artifacts are present.  Heart is enlarged and unchanged with a pacemaker in place.  Chronic interstitial opacities in the lung bases appears similar to the prior study.  There is a left apical calcified granuloma.  No pleural  effusion.    CXR 4/10 Film again limited by body habitus and external leads.  Heart is enlarged and unchanged.  Aorta is ectatic.  Chronic widening of right paratracheal stripe secondary to tortuous vessels.  Calcified granuloma noted left apex.  Chronic interstitial opacities in lung bases appears similar to multiple previous chest x-rays.  No superimposed consolidation noted.    CXR 4/12 Probable developing small volume right basilar pleural fluid and associated atelectasis/consolidation in the right lower lung zone.       EKG 4/9 Normal sinus rhythm   Normal ECG   When compared with ECG of 08-APR-2022 12:01,   PACs no longer present   Confirmed by Moshe CACERES MD ([id number]) on 4/9/2022 9:24:17 AM    EKG 4/8 Sinus rhythm with occasional Premature ventricular complexes   Otherwise normal ECG   When compared with ECG of 07-APR-2022 15:48,   Premature ventricular complexes are now Present   Questionable change in The axis   Confirmed by Rody Maher MD ([id number]) on 4/8/2022 1:40:36 PM     EKG 4/7 Sinus rhythm with Premature supraventricular complexes   Baseline wander   Otherwise normal ECG   When compared with ECG of 28-DEC-2021 08:48,   Premature ventricular complexes are now Present   Confirmed by Dilshad Sanders MD ([id number]) on 4/8/2022 11:29:40 AM    Stable

## 2023-11-22 DIAGNOSIS — Z78.0 MENOPAUSE: ICD-10-CM

## 2024-01-20 DIAGNOSIS — G30.0 EARLY ONSET ALZHEIMER'S DEMENTIA WITH BEHAVIORAL DISTURBANCE: ICD-10-CM

## 2024-01-20 DIAGNOSIS — F02.818 EARLY ONSET ALZHEIMER'S DEMENTIA WITH BEHAVIORAL DISTURBANCE: ICD-10-CM

## 2024-01-22 RX ORDER — MEMANTINE HYDROCHLORIDE 10 MG/1
TABLET ORAL
Qty: 60 TABLET | Refills: 5 | Status: ON HOLD | OUTPATIENT
Start: 2024-01-22 | End: 2024-05-13 | Stop reason: HOSPADM

## 2024-02-01 ENCOUNTER — HOSPITAL ENCOUNTER (INPATIENT)
Facility: HOSPITAL | Age: 84
LOS: 1 days | Discharge: HOME | DRG: 190 | End: 2024-02-03
Attending: SURGERY | Admitting: FAMILY MEDICINE
Payer: MEDICARE

## 2024-02-01 DIAGNOSIS — I50.9 CONGESTIVE HEART FAILURE, UNSPECIFIED HF CHRONICITY, UNSPECIFIED HEART FAILURE TYPE: ICD-10-CM

## 2024-02-01 DIAGNOSIS — J20.9 COPD WITH ACUTE BRONCHITIS: ICD-10-CM

## 2024-02-01 DIAGNOSIS — R06.02 SHORTNESS OF BREATH: ICD-10-CM

## 2024-02-01 DIAGNOSIS — J44.1 COPD EXACERBATION: Primary | ICD-10-CM

## 2024-02-01 DIAGNOSIS — J44.0 COPD WITH ACUTE BRONCHITIS: ICD-10-CM

## 2024-02-01 LAB
ALBUMIN SERPL BCP-MCNC: 3.2 G/DL (ref 3.5–5.2)
ALLENS TEST: ABNORMAL
ALLENS TEST: ABNORMAL
ALP SERPL-CCNC: 90 U/L (ref 55–135)
ALT SERPL W/O P-5'-P-CCNC: 21 U/L (ref 10–44)
ANION GAP SERPL CALC-SCNC: 8 MMOL/L (ref 8–16)
AST SERPL-CCNC: 34 U/L (ref 10–40)
BASOPHILS # BLD AUTO: 0.02 K/UL (ref 0–0.2)
BASOPHILS NFR BLD: 0.5 % (ref 0–1.9)
BILIRUB SERPL-MCNC: 0.3 MG/DL (ref 0.1–1)
BNP SERPL-MCNC: 84 PG/ML (ref 0–99)
BUN SERPL-MCNC: 31 MG/DL (ref 8–23)
CALCIUM SERPL-MCNC: 9 MG/DL (ref 8.7–10.5)
CHLORIDE SERPL-SCNC: 101 MMOL/L (ref 95–110)
CO2 SERPL-SCNC: 33 MMOL/L (ref 23–29)
CREAT SERPL-MCNC: 1.1 MG/DL (ref 0.5–1.4)
DELSYS: ABNORMAL
DELSYS: ABNORMAL
DIFFERENTIAL METHOD BLD: ABNORMAL
EOSINOPHIL # BLD AUTO: 0.1 K/UL (ref 0–0.5)
EOSINOPHIL NFR BLD: 2.9 % (ref 0–8)
ERYTHROCYTE [DISTWIDTH] IN BLOOD BY AUTOMATED COUNT: 15.9 % (ref 11.5–14.5)
EST. GFR  (NO RACE VARIABLE): 50 ML/MIN/1.73 M^2
FIO2: 28 (ref 21–100)
FIO2: 28 (ref 21–100)
GLUCOSE SERPL-MCNC: 161 MG/DL (ref 70–110)
HCO3 UR-SCNC: 33.9 MMOL/L (ref 22–26)
HCO3 UR-SCNC: 38.7 MMOL/L (ref 22–26)
HCT VFR BLD AUTO: 35.1 % (ref 37–48.5)
HGB BLD-MCNC: 10.9 G/DL (ref 12–16)
IMM GRANULOCYTES # BLD AUTO: 0.02 K/UL (ref 0–0.04)
IMM GRANULOCYTES NFR BLD AUTO: 0.5 % (ref 0–0.5)
INFLUENZA A, MOLECULAR: NEGATIVE
INFLUENZA B, MOLECULAR: NEGATIVE
INR PPP: 1.1 (ref 0.8–1.2)
LYMPHOCYTES # BLD AUTO: 1.4 K/UL (ref 1–4.8)
LYMPHOCYTES NFR BLD: 36.6 % (ref 18–48)
MCH RBC QN AUTO: 25.8 PG (ref 27–31)
MCHC RBC AUTO-ENTMCNC: 31.1 G/DL (ref 32–36)
MCV RBC AUTO: 83 FL (ref 82–98)
MONOCYTES # BLD AUTO: 0.6 K/UL (ref 0.3–1)
MONOCYTES NFR BLD: 14.7 % (ref 4–15)
NEUTROPHILS # BLD AUTO: 1.7 K/UL (ref 1.8–7.7)
NEUTROPHILS NFR BLD: 44.8 % (ref 38–73)
NRBC BLD-RTO: 0 /100 WBC
PCO2 BLDA: 56 MMHG (ref 35–45)
PCO2 BLDA: 64 MMHG (ref 35–45)
PH SMN: 7.39 [PH] (ref 7.35–7.45)
PH SMN: 7.39 [PH] (ref 7.35–7.45)
PLATELET # BLD AUTO: 178 K/UL (ref 150–450)
PMV BLD AUTO: 12.6 FL (ref 9.2–12.9)
PO2 BLDA: 57 MMHG (ref 75–100)
PO2 BLDA: 65 MMHG (ref 75–100)
POC BE: 11.5 MMOL/L (ref -2–2)
POC BE: 7.4 MMOL/L (ref -2–2)
POC COHB: 1.5 % (ref 0–3)
POC COHB: 1.7 % (ref 0–3)
POC METHB: 0.7 % (ref 0–1.5)
POC METHB: 0.9 % (ref 0–1.5)
POC O2HB ARTERIAL: 88.1 % (ref 94–100)
POC O2HB ARTERIAL: 91.9 % (ref 94–100)
POC SATURATED O2: 90.5 % (ref 90–100)
POC SATURATED O2: 94.1 % (ref 90–100)
POC TCO2: 35.6 MMOL/L
POC TCO2: 40.7 MMOL/L
POC THB: 11.4 G/DL (ref 12–18)
POC THB: 11.6 G/DL (ref 12–18)
POCT GLUCOSE: 237 MG/DL (ref 70–110)
POTASSIUM SERPL-SCNC: 4.5 MMOL/L (ref 3.5–5.1)
PROT SERPL-MCNC: 6.6 G/DL (ref 6–8.4)
PROTHROMBIN TIME: 11.9 SEC (ref 9–12.5)
RBC # BLD AUTO: 4.22 M/UL (ref 4–5.4)
SARS-COV-2 RDRP RESP QL NAA+PROBE: NEGATIVE
SITE: ABNORMAL
SITE: ABNORMAL
SODIUM SERPL-SCNC: 142 MMOL/L (ref 136–145)
SPECIMEN SOURCE: NORMAL
TROPONIN I SERPL DL<=0.01 NG/ML-MCNC: 0.01 NG/ML (ref 0–0.03)
WBC # BLD AUTO: 3.8 K/UL (ref 3.9–12.7)

## 2024-02-01 PROCEDURE — 85610 PROTHROMBIN TIME: CPT | Performed by: SURGERY

## 2024-02-01 PROCEDURE — 94640 AIRWAY INHALATION TREATMENT: CPT | Mod: XB

## 2024-02-01 PROCEDURE — 96374 THER/PROPH/DIAG INJ IV PUSH: CPT

## 2024-02-01 PROCEDURE — 93010 ELECTROCARDIOGRAM REPORT: CPT | Mod: ,,, | Performed by: INTERNAL MEDICINE

## 2024-02-01 PROCEDURE — 63600175 PHARM REV CODE 636 W HCPCS: Performed by: FAMILY MEDICINE

## 2024-02-01 PROCEDURE — 63600175 PHARM REV CODE 636 W HCPCS: Performed by: SURGERY

## 2024-02-01 PROCEDURE — 96372 THER/PROPH/DIAG INJ SC/IM: CPT | Performed by: FAMILY MEDICINE

## 2024-02-01 PROCEDURE — 87040 BLOOD CULTURE FOR BACTERIA: CPT | Mod: 59 | Performed by: SURGERY

## 2024-02-01 PROCEDURE — 96375 TX/PRO/DX INJ NEW DRUG ADDON: CPT

## 2024-02-01 PROCEDURE — 99900035 HC TECH TIME PER 15 MIN (STAT)

## 2024-02-01 PROCEDURE — 85025 COMPLETE CBC W/AUTO DIFF WBC: CPT | Performed by: SURGERY

## 2024-02-01 PROCEDURE — 25000242 PHARM REV CODE 250 ALT 637 W/ HCPCS: Performed by: SURGERY

## 2024-02-01 PROCEDURE — 99222 1ST HOSP IP/OBS MODERATE 55: CPT | Mod: AI,,, | Performed by: FAMILY MEDICINE

## 2024-02-01 PROCEDURE — 84484 ASSAY OF TROPONIN QUANT: CPT | Performed by: SURGERY

## 2024-02-01 PROCEDURE — 25000003 PHARM REV CODE 250: Performed by: SURGERY

## 2024-02-01 PROCEDURE — 83880 ASSAY OF NATRIURETIC PEPTIDE: CPT | Performed by: SURGERY

## 2024-02-01 PROCEDURE — 82803 BLOOD GASES ANY COMBINATION: CPT | Performed by: SURGERY

## 2024-02-01 PROCEDURE — 94760 N-INVAS EAR/PLS OXIMETRY 1: CPT | Mod: XB

## 2024-02-01 PROCEDURE — 94761 N-INVAS EAR/PLS OXIMETRY MLT: CPT | Mod: XB

## 2024-02-01 PROCEDURE — 93005 ELECTROCARDIOGRAM TRACING: CPT

## 2024-02-01 PROCEDURE — G0378 HOSPITAL OBSERVATION PER HR: HCPCS

## 2024-02-01 PROCEDURE — 36415 COLL VENOUS BLD VENIPUNCTURE: CPT | Performed by: FAMILY MEDICINE

## 2024-02-01 PROCEDURE — 82803 BLOOD GASES ANY COMBINATION: CPT | Performed by: FAMILY MEDICINE

## 2024-02-01 PROCEDURE — 96372 THER/PROPH/DIAG INJ SC/IM: CPT | Performed by: SURGERY

## 2024-02-01 PROCEDURE — 36600 WITHDRAWAL OF ARTERIAL BLOOD: CPT

## 2024-02-01 PROCEDURE — 87502 INFLUENZA DNA AMP PROBE: CPT | Performed by: SURGERY

## 2024-02-01 PROCEDURE — 80053 COMPREHEN METABOLIC PANEL: CPT | Performed by: SURGERY

## 2024-02-01 PROCEDURE — 83036 HEMOGLOBIN GLYCOSYLATED A1C: CPT | Performed by: FAMILY MEDICINE

## 2024-02-01 PROCEDURE — 27000221 HC OXYGEN, UP TO 24 HOURS

## 2024-02-01 PROCEDURE — 99285 EMERGENCY DEPT VISIT HI MDM: CPT | Mod: 25

## 2024-02-01 PROCEDURE — U0002 COVID-19 LAB TEST NON-CDC: HCPCS | Performed by: SURGERY

## 2024-02-01 RX ORDER — METHYLPREDNISOLONE SOD SUCC 125 MG
125 VIAL (EA) INJECTION
Status: COMPLETED | OUTPATIENT
Start: 2024-02-01 | End: 2024-02-01

## 2024-02-01 RX ORDER — GLUCAGON 1 MG
1 KIT INJECTION
Status: DISCONTINUED | OUTPATIENT
Start: 2024-02-01 | End: 2024-02-03 | Stop reason: HOSPADM

## 2024-02-01 RX ORDER — IBUPROFEN 200 MG
16 TABLET ORAL
Status: DISCONTINUED | OUTPATIENT
Start: 2024-02-01 | End: 2024-02-03 | Stop reason: HOSPADM

## 2024-02-01 RX ORDER — OMEPRAZOLE 40 MG/1
40 CAPSULE, DELAYED RELEASE ORAL EVERY MORNING
Status: ON HOLD | COMMUNITY
Start: 2024-01-09 | End: 2024-05-13 | Stop reason: HOSPADM

## 2024-02-01 RX ORDER — IBUPROFEN 200 MG
24 TABLET ORAL
Status: DISCONTINUED | OUTPATIENT
Start: 2024-02-01 | End: 2024-02-03 | Stop reason: HOSPADM

## 2024-02-01 RX ORDER — ENOXAPARIN SODIUM 100 MG/ML
30 INJECTION SUBCUTANEOUS EVERY 24 HOURS
Status: DISCONTINUED | OUTPATIENT
Start: 2024-02-01 | End: 2024-02-03 | Stop reason: HOSPADM

## 2024-02-01 RX ORDER — SERTRALINE HYDROCHLORIDE 25 MG/1
25 TABLET, FILM COATED ORAL EVERY MORNING
Status: DISCONTINUED | OUTPATIENT
Start: 2024-02-02 | End: 2024-02-03 | Stop reason: HOSPADM

## 2024-02-01 RX ORDER — PREDNISONE 5 MG/1
TABLET ORAL
COMMUNITY
Start: 2023-12-07 | End: 2024-02-01

## 2024-02-01 RX ORDER — MAGNESIUM 200 MG
1000 TABLET ORAL DAILY
Status: ON HOLD | COMMUNITY
End: 2024-05-13 | Stop reason: HOSPADM

## 2024-02-01 RX ORDER — INSULIN ASPART 100 [IU]/ML
0-5 INJECTION, SOLUTION INTRAVENOUS; SUBCUTANEOUS
Status: DISCONTINUED | OUTPATIENT
Start: 2024-02-01 | End: 2024-02-03 | Stop reason: HOSPADM

## 2024-02-01 RX ORDER — SODIUM CHLORIDE 0.9 % (FLUSH) 0.9 %
10 SYRINGE (ML) INJECTION
Status: DISCONTINUED | OUTPATIENT
Start: 2024-02-01 | End: 2024-02-03 | Stop reason: HOSPADM

## 2024-02-01 RX ORDER — TALC
6 POWDER (GRAM) TOPICAL NIGHTLY PRN
Status: DISCONTINUED | OUTPATIENT
Start: 2024-02-01 | End: 2024-02-03 | Stop reason: HOSPADM

## 2024-02-01 RX ORDER — IPRATROPIUM BROMIDE AND ALBUTEROL SULFATE 2.5; .5 MG/3ML; MG/3ML
3 SOLUTION RESPIRATORY (INHALATION)
Status: COMPLETED | OUTPATIENT
Start: 2024-02-01 | End: 2024-02-01

## 2024-02-01 RX ORDER — IPRATROPIUM BROMIDE AND ALBUTEROL SULFATE 2.5; .5 MG/3ML; MG/3ML
3 SOLUTION RESPIRATORY (INHALATION) EVERY 6 HOURS PRN
Status: ON HOLD | COMMUNITY
End: 2024-05-13 | Stop reason: HOSPADM

## 2024-02-01 RX ORDER — IPRATROPIUM BROMIDE AND ALBUTEROL SULFATE 2.5; .5 MG/3ML; MG/3ML
3 SOLUTION RESPIRATORY (INHALATION)
Status: COMPLETED | OUTPATIENT
Start: 2024-02-01 | End: 2024-02-02

## 2024-02-01 RX ORDER — FUROSEMIDE 10 MG/ML
40 INJECTION INTRAMUSCULAR; INTRAVENOUS
Status: COMPLETED | OUTPATIENT
Start: 2024-02-01 | End: 2024-02-01

## 2024-02-01 RX ORDER — ACETAMINOPHEN 325 MG/1
650 TABLET ORAL EVERY 8 HOURS PRN
Status: DISCONTINUED | OUTPATIENT
Start: 2024-02-01 | End: 2024-02-03 | Stop reason: HOSPADM

## 2024-02-01 RX ORDER — FERROUS SULFATE 325(65) MG
325 TABLET, DELAYED RELEASE (ENTERIC COATED) ORAL DAILY
Status: ON HOLD | COMMUNITY
End: 2024-05-13 | Stop reason: HOSPADM

## 2024-02-01 RX ADMIN — Medication 6 MG: at 08:02

## 2024-02-01 RX ADMIN — INSULIN ASPART 1 UNITS: 100 INJECTION, SOLUTION INTRAVENOUS; SUBCUTANEOUS at 08:02

## 2024-02-01 RX ADMIN — IPRATROPIUM BROMIDE AND ALBUTEROL SULFATE 3 ML: 2.5; .5 SOLUTION RESPIRATORY (INHALATION) at 11:02

## 2024-02-01 RX ADMIN — FUROSEMIDE 40 MG: 10 INJECTION, SOLUTION INTRAMUSCULAR; INTRAVENOUS at 11:02

## 2024-02-01 RX ADMIN — ENOXAPARIN SODIUM 30 MG: 30 INJECTION SUBCUTANEOUS at 06:02

## 2024-02-01 RX ADMIN — METHYLPREDNISOLONE SODIUM SUCCINATE 125 MG: 125 INJECTION, POWDER, FOR SOLUTION INTRAMUSCULAR; INTRAVENOUS at 10:02

## 2024-02-01 RX ADMIN — IPRATROPIUM BROMIDE AND ALBUTEROL SULFATE 3 ML: 2.5; .5 SOLUTION RESPIRATORY (INHALATION) at 10:02

## 2024-02-01 RX ADMIN — IPRATROPIUM BROMIDE AND ALBUTEROL SULFATE 3 ML: 2.5; .5 SOLUTION RESPIRATORY (INHALATION) at 07:02

## 2024-02-01 RX ADMIN — CEFTRIAXONE SODIUM 1 G: 1 INJECTION, POWDER, FOR SOLUTION INTRAMUSCULAR; INTRAVENOUS at 06:02

## 2024-02-01 RX ADMIN — IPRATROPIUM BROMIDE AND ALBUTEROL SULFATE 3 ML: 2.5; .5 SOLUTION RESPIRATORY (INHALATION) at 04:02

## 2024-02-01 NOTE — HPI
83 year old female with known dementia and chronic resp failure who sees dr. Guerra had a witnessed episode of aspiration today at the NH and subsequent increased dyspnea. She was sent for evaluation. Coincidentally she was seen yesterday by Dr. Guerra. While she was there, her sats were 84%. However, she was not actively SOB and has o2 orders. She has known significant emphysema secondary to her significant tobacco use. She was advised to cont her trelegy and rtc in 12 months. Today, she was dyspneic and was wheezing on arrival - 93% on 3L NC. She was given nebs and steroid and remained dyspneic. Work up reassuring. At bedside daughter and DIL present and report that she was very short of breath. She has known dementia but is at her baseline. Her resp status is much improved at current.

## 2024-02-01 NOTE — PHARMACY MED REC
"Admission Medication History     The home medication history was taken by Albertina Stoddard CPhT.    Medication history obtained from, [facility] Verified    You may go to "Admission" then "Reconcile Home Medications" tabs to review and/or act upon these items.     The home medication list has been updated by the Pharmacy department.   Please read ALL comments highlighted in yellow.   Please address this information as you see fit.    Feel free to contact us if you have any questions or require assistance.      The medications listed below were removed from the home medication list.  Please reorder if appropriate:  Patient reports no longer taking the following medication(s):  Azithromycin 250 mg  HCTZ 12.5 mg  Prednisone 5 mg        Albertina Stoddard CPhT.  Ext [phone]               .          "

## 2024-02-01 NOTE — H&P
[facility] ([address] [place] [address] Medicine  History & Physical    Patient Name: Gretel Ashton  MRN: [id number]  Patient Class: OP- Observation  Admission Date: 2024  Attending Physician: Althea Krause MD   Primary Care Provider: Jailene Astudillo MD         Patient information was obtained from patient, relative(s), nursing home, and ER records.     Subjective:     Principal Problem:COPD with acute bronchitis    Chief Complaint:   Chief Complaint   Patient presents with    Shortness of Breath     To ED via [facility] from the [facility] with SOB with hx of COPD. Pt tachypneic, moderate distress noted. SPO2 90% on 3 L/min O2 via NC.        HPI: 83 year old female with known dementia and chronic resp failure who sees dr. Guerra had a witnessed episode of aspiration today at the NH and subsequent increased dyspnea. She was sent for evaluation. Coincidentally she was seen yesterday by Dr. Guerra. While she was there, her sats were 84%. However, she was not actively SOB and has o2 orders. She has known significant emphysema secondary to her significant tobacco use. She was advised to cont her trelegy and rtc in 12 months. Today, she was dyspneic and was wheezing on arrival - 93% on 3L NC. She was given nebs and steroid and remained dyspneic. Work up reassuring. At bedside daughter and DIL present and report that she was very short of breath. She has known dementia but is at her baseline. Her resp status is much improved at current.    Past Medical History:   Diagnosis Date    Arthritis     COPD (chronic obstructive pulmonary disease)     Depression     Diabetes mellitus type II     Hyperlipidemia     Hypertension     Pacemaker        Past Surgical History:   Procedure Laterality Date    CARDIAC PACEMAKER PLACEMENT       SECTION      CYST REMOVAL Left 2019    Procedure: EXCISION, SEBACEOUS CYST;  Surgeon: Jaylen Gonzalez Jr., MD;  Location: [facility];  Service: General;  Laterality: Left;    INNER EAR  "SURGERY         Review of patient's allergies indicates:  No Known Allergies    No current facility-administered medications on file prior to encounter.     Current Outpatient Medications on File Prior to Encounter   Medication Sig    acetaminophen (TYLENOL) 325 MG tablet Take 650 mg by mouth every 6 (six) hours as needed for Pain.    albuterol-ipratropium (DUO-NEB) 2.5 mg-0.5 mg/3 mL nebulizer solution Take 3 mLs by nebulization every 6 (six) hours as needed for Wheezing. Rescue    aspirin (ECOTRIN) 81 MG EC tablet Take 81 mg by mouth every Mon, Wed, Fri.    bempedoic acid 180 mg Tab Take 180 mg by mouth every evening.    coenzyme Q10 100 mg capsule Take 100 mg by mouth once daily.    cyanocobalamin, vitamin B-12, 1,000 mcg Subl Place 1,000 mcg under the tongue once daily.    ferrous sulfate 325 (65 FE) MG EC tablet Take 325 mg by mouth once daily.    fluticasone-umeclidin-vilanter (TRELEGY ELLIPTA) 100-62.5-25 mcg DsDv Inhale 1 puff into the lungs once daily.    leflunomide (ARAVA) 10 MG Tab Take 10 mg by mouth every other day.    memantine (NAMENDA) 10 MG Tab TAKE 1 TABLET BY MOUTH 2 (TWO) TIMES A DAY.    metFORMIN (GLUCOPHAGE) 500 MG tablet Take 1 tablet (500 mg total) by mouth 2 (two) times daily with meals.    omeprazole (PRILOSEC) 40 MG capsule Take 40 mg by mouth every morning.    rosuvastatin (CRESTOR) 40 MG Tab Take 40 mg by mouth every evening.    sertraline (ZOLOFT) 25 MG tablet Take 12.5 mg by mouth every morning.    [DISCONTINUED] predniSONE (DELTASONE) 5 MG tablet Take by mouth.    syringe with needle (SYRINGE 3CC/25GX1") 3 mL 25 gauge x 1" Syrg Once every months    [DISCONTINUED] albuterol (PROVENTIL/VENTOLIN HFA) 90 mcg/actuation inhaler Inhale 1-2 puffs into the lungs every 6 (six) hours as needed for Wheezing. Rescue    [DISCONTINUED] azithromycin (Z-JARED) 250 MG tablet Z-PACK AS DIRECTED    [DISCONTINUED] cyanocobalamin, vitamin B-12, 500 mcg Subl One sub lingual daily    [DISCONTINUED] " "hydroCHLOROthiazide (HYDRODIURIL) 12.5 MG Tab Take 12.5 mg by mouth daily as needed.     [DISCONTINUED] omeprazole (PRILOSEC) 20 MG capsule Take 1 capsule (20 mg total) by mouth once daily. (Patient taking differently: Take 40 mg by mouth once daily.)     Family History       Problem Relation (Age of Onset)    Breast cancer Mother, Sister    Cancer Mother, Sister    Stroke Father, Maternal Grandmother          Tobacco Use    Smoking status: Former     Current packs/day: 0.00     Average packs/day: 2.0 packs/day for 43.0 years (86.0 ttl pk-yrs)     Types: Cigarettes     Start date: 1957     Quit date: 2000     Years since quittin.1    Smokeless tobacco: Never   Substance and Sexual Activity    Alcohol use: No    Drug use: No    Sexual activity: Not on file     Review of Systems  Objective:     Vital Signs (Most Recent):  Temp: 98.2 °F (36.8 °C) (24 1509)  Pulse: 99 (24 1515)  Resp: 19 (24 1509)  BP: 120/62 (24 1509)  SpO2: 96 % (24 1509) Vital Signs (24h Range):  Temp:  [98.2 °F (36.8 °C)] 98.2 °F (36.8 °C)  Pulse:  [87-99] 99  Resp:  [17-47] 19  SpO2:  [90 %-96 %] 96 %  BP: (120-150)/(60-98) 120/62     Weight: 73.5 kg (162 lb)  Body mass index is 29.63 kg/m².     Physical Exam           Significant Labs: All pertinent labs within the past 24 hours have been reviewed.  ABGs:   Recent Labs   Lab 24  1007   PH 7.390   PCO2 64*   HCO3 38.70*   POCSATURATED 90.5   BE 11.50*   TOTALHB 11.4*   COHB 1.7   METHB 0.9   PO2 57*     CBC:   Recent Labs   Lab 24  0950   WBC 3.80*   HGB 10.9*   HCT 35.1*        CMP:   Recent Labs   Lab 24  0950      K 4.5      CO2 33*   *   BUN 31*   CREATININE 1.1   CALCIUM 9.0   PROT 6.6   ALBUMIN 3.2*   BILITOT 0.3   ALKPHOS 90   AST 34   ALT 21   ANIONGAP 8     Magnesium: No results for input(s): "MG" in the last 48 hours.  Troponin:   Recent Labs   Lab 24  0950   TROPONINI 0.011     TSH: No results " "for input(s): "TSH" in the last 4320 hours.    Significant Imaging: I have reviewed all pertinent imaging results/findings within the past 24 hours.    CT:  ?patchy bilateral apical infiltrates/nodules.  No effusion but some fluid noted.  Assessment/Plan:     * COPD with acute bronchitis  Patient's COPD is with exacerbation noted by continued dyspnea, use of accessory muscles for breathing, and worsening of baseline hypoxia currently.  Patient is currently on COPD Pathway. Continue scheduled inhalers Steroids, Antibiotics, and Supplemental oxygen and monitor respiratory status closely.     Alzheimer's dementia with behavioral disturbance  Hold home namenda    Chronic kidney disease, stage 3a  Creatine stable for now. BMP reviewed- noted Estimated Creatinine Clearance: 35.8 mL/min (based on SCr of 1.1 mg/dL). according to latest data. Based on current GFR, CKD stage is stage 3 - GFR 30-59.  Monitor UOP and serial BMP and adjust therapy as needed. Renally dose meds. Avoid nephrotoxic medications and procedures.    Symptomatic anemia  Patient's anemia is currently controlled. Has not received any PRBCs to date. Etiology likely d/t Iron deficiency and chronic disease  Current CBC reviewed-   Lab Results   Component Value Date    HGB 10.9 (L) 02/01/2024    HCT 35.1 (L) 02/01/2024     Monitor serial CBC and transfuse if patient becomes hemodynamically unstable, symptomatic or H/H drops below 7/21.    HTN (hypertension)  Not on home meds.    Temp:  [98.2 °F (36.8 °C)]   Pulse:  [87-99]   Resp:  [17-47]   BP: (120-150)/(60-98)   SpO2:  [90 %-96 %] .   Home meds for hypertension were reviewed and noted below.   Hypertension Medications               Acute on chronic respiratory failure with hypoxemia  Patient with Hypercapnic and Hypoxic Respiratory failure which is Acute on chronic.  she is on home oxygen at 3 LPM. Supplemental oxygen was provided and noted-      .   Signs/symptoms of respiratory failure include- tachypnea, " increased work of breathing, and respiratory distress. Contributing diagnoses includes - Aspiration and COPD Labs and images were reviewed. Patient Has recent ABG, which has been reviewed. Will treat underlying causes and adjust management of respiratory failure as follows- o2 support, given nebs and steroid. No longer wheezing. Will not repeat solumedrol at present. Cont on nebs. Monitor for fever - none previous to admit and wean o2 as tolerated with goal 92%. Will recheck abg as co2 64 on admit.    Type 2 diabetes mellitus with diabetic neuropathy, without long-term current use of insulin  Sliding scale only.        VTE Risk Mitigation (From admission, onward)           Ordered     enoxaparin injection 30 mg  Daily         02/01/24 1553     IP VTE HIGH RISK PATIENT  Once         02/01/24 1553     Place sequential compression device  Until discontinued         02/01/24 1553                       On 02/01/2024, patient should be placed in hospital observation services under my care.             Althea Krause MD  Department of Hospital Medicine  [facility] (3rd Fl)

## 2024-02-01 NOTE — ASSESSMENT & PLAN NOTE
Not on home meds.    Temp:  [98.2 °F (36.8 °C)]   Pulse:  [87-99]   Resp:  [17-47]   BP: (120-150)/(60-98)   SpO2:  [90 %-96 %] .   Home meds for hypertension were reviewed and noted below.   Hypertension Medications

## 2024-02-01 NOTE — ASSESSMENT & PLAN NOTE
Creatine stable for now. BMP reviewed- noted Estimated Creatinine Clearance: 35.8 mL/min (based on SCr of 1.1 mg/dL). according to latest data. Based on current GFR, CKD stage is stage 3 - GFR 30-59.  Monitor UOP and serial BMP and adjust therapy as needed. Renally dose meds. Avoid nephrotoxic medications and procedures.

## 2024-02-01 NOTE — ASSESSMENT & PLAN NOTE
Patient's COPD is with exacerbation noted by continued dyspnea, use of accessory muscles for breathing, and worsening of baseline hypoxia currently.  Patient is currently on COPD Pathway. Continue scheduled inhalers Steroids, Antibiotics, and Supplemental oxygen and monitor respiratory status closely.

## 2024-02-01 NOTE — ED PROVIDER NOTES
Encounter Date: 2024       History     Chief Complaint   Patient presents with    Shortness of Breath     To ED via [facility] from the [place] with SOB with hx of COPD. Pt tachypneic, moderate distress noted. SPO2 90% on 3 L/min O2 via NC.     Patient brought over from the nursing home with chief complaint of shortness of breath she was seen by her pulmonologist Dr. Guerra yesterday and he recommend that the patient continue Trelegy once daily nebulizers q.6 hours p.r.n. and oxygen at 2 L a minute continuously she is tachypneic and short of breath on admission to the ED here    The history is provided by the patient. No  was used.   Shortness of Breath  This is a chronic problem. The average episode lasts 1 day. The problem occurs frequently.The current episode started 3 to 5 hours ago. Pertinent negatives include no fever, no headaches, no coryza, no ear pain, no neck pain, no cough, no orthopnea, no chest pain and no leg pain.     Review of patient's allergies indicates:  No Known Allergies  Past Medical History:   Diagnosis Date    Arthritis     COPD (chronic obstructive pulmonary disease)     Depression     Diabetes mellitus type II     Hyperlipidemia     Hypertension     Pacemaker      Past Surgical History:   Procedure Laterality Date    CARDIAC PACEMAKER PLACEMENT       SECTION      CYST REMOVAL Left 2019    Procedure: EXCISION, SEBACEOUS CYST;  Surgeon: Jaylen Gonzalez Jr., MD;  Location: [facility];  Service: General;  Laterality: Left;    INNER EAR SURGERY       Family History   Problem Relation Age of Onset    Cancer Mother     Breast cancer Mother     Stroke Father     Cancer Sister     Breast cancer Sister     Stroke Maternal Grandmother      Social History     Tobacco Use    Smoking status: Former     Current packs/day: 0.00     Average packs/day: 2.0 packs/day for 43.0 years (86.0 ttl pk-yrs)     Types: Cigarettes     Start date: 1957     Quit date: 2000      Years since quittin.1    Smokeless tobacco: Never   Substance Use Topics    Alcohol use: No    Drug use: No     Review of Systems   Constitutional: Negative.  Negative for fever.   HENT: Negative.  Negative for ear pain.    Eyes: Negative.    Respiratory:  Positive for shortness of breath. Negative for cough.    Cardiovascular:  Negative for chest pain and orthopnea.   Gastrointestinal: Negative.    Endocrine: Negative.    Genitourinary: Negative.    Musculoskeletal: Negative.  Negative for neck pain.   Skin: Negative.    Allergic/Immunologic: Negative.    Neurological: Negative.  Negative for headaches.   Hematological: Negative.    Psychiatric/Behavioral: Negative.         Physical Exam     Initial Vitals [24 0940]   BP Pulse Resp Temp SpO2   123/60 88 (!) 26 98.2 °F (36.8 °C) (!) 90 %      MAP       --         Physical Exam    Nursing note and vitals reviewed.  Constitutional: She appears well-developed and well-nourished.   HENT:   Head: Normocephalic.   Eyes: Conjunctivae are normal.   Neck:   Normal range of motion.  Cardiovascular:  Normal rate.           Pulmonary/Chest: She has wheezes.   Abdominal: Bowel sounds are normal.   Musculoskeletal:      Cervical back: Normal range of motion.     Neurological: She is alert. GCS score is 15. GCS eye subscore is 4. GCS verbal subscore is 5. GCS motor subscore is 6.   Skin: Skin is warm.   Psychiatric: She has a normal mood and affect.         ED Course   Critical Care    Date/Time: 2024 4:29 PM    Performed by: BENJAMIN So III, MD  Authorized by: Althea Krause MD  Total critical care time (exclusive of procedural time) : 0 minutes        Labs Reviewed   CBC W/ AUTO DIFFERENTIAL - Abnormal; Notable for the following components:       Result Value    WBC 3.80 (*)     Hemoglobin 10.9 (*)     Hematocrit 35.1 (*)     MCH 25.8 (*)     MCHC 31.1 (*)     RDW 15.9 (*)     Gran # (ANC) 1.7 (*)     All other components within normal limits    COMPREHENSIVE METABOLIC PANEL - Abnormal; Notable for the following components:    CO2 33 (*)     Glucose 161 (*)     BUN 31 (*)     Albumin 3.2 (*)     eGFR 50 (*)     All other components within normal limits   INFLUENZA A & B BY MOLECULAR   CULTURE, BLOOD   CULTURE, BLOOD   SARS-COV-2 RNA AMPLIFICATION, QUAL   B-TYPE NATRIURETIC PEPTIDE   TROPONIN I   PROTIME-INR     EKG Readings: (Independently Interpreted)   Rhythm: Normal Sinus Rhythm. Heart Rate: 84. Conduction: RBBB.     ECG Results              EKG 12-lead (Final result)  Result time 02/01/24 15:03:08      Final result by Interface, Lab In [facility] (02/01/24 15:03:08)                   Narrative:    Test Reason : R06.02    Vent. Rate : 084 BPM     Atrial Rate : 084 BPM     P-R Int : 188 ms          QRS Dur : 134 ms      QT Int : 394 ms       P-R-T Axes : 036 020 005 degrees     QTc Int : 465 ms    Normal sinus rhythm  Right bundle branch block  Cannot rule out Inferior infarct ,age undetermined  Abnormal ECG  When compared with ECG of 25-APR-2023 09:16,  Premature ventricular complexes are no longer Present  Confirmed by You WARD, Dilshad ALVES ([id number]) on 2/1/2024 3:03:00 PM    Referred By: [email or web address]   SELF           Confirmed By:Dilshad Orta MD                                  Imaging Results              CT Chest Without Contrast (Final result)  Result time 02/01/24 12:16:24      Final result by Jaylen Mckeon Jr., MD (02/01/24 12:16:24)                   Impression:      1. Mild pulmonary edema with evidence few scattered reticular opacities that primarily within the upper lobes bilaterally more prominent towards the right likely on the basis of a low-grade infectious or inflammatory process remote.  2. Prominent subpleural nodularity the right upper lobe with adjacent pleural thickening on a chronic basis.  3. Mild cardiomegaly with extensive atheromatous calcifications of the major coronary vessels.      Electronically signed by: Jaylen Mckeon  MD  Date:    02/01/2024  Time:    12:16               Narrative:    EXAMINATION:  CT CHEST WITHOUT CONTRAST    CLINICAL HISTORY:  Cough, persistent;    TECHNIQUE:  Low dose axial images, sagittal and coronal reformations were obtained from the thoracic inlet to the lung bases. Contrast was not administered.    COMPARISON:  No previous comparison study is made available.    FINDINGS:  Mild to moderate centrilobular emphysematous changes are status with mildly prominent distention involving the apical air cells bilaterally on the basis of longstanding disease.  A few scattered subpleural reticular opacities and nodular densities are noted primary within right upper lobe with a broad area of subpleural parenchymal scarring and subpleural nodularity or mass that is established about the periphery of the right upper lobe and about the the periphery the right upper lobe with evidence of nominal pleural thickening of the at adjacent pleural.  There is evidence of minimal scarring in the right middle lobe lateral segment with evidence of marginal thickening of the adjacent major fissure.  The left lung appears hyperexpanded but otherwise clear.  The trachea column appears patent without evidence of intraluminal masses in both the right and left mainstem bronchus are wide open without evidence of endoluminal occlusion.  Cardiac chambers are slightly enlarged without evidence of significant pericardial fluid or pericardial thickening.  Pacemaker size the patient's left upper chest wall with the distal leads imbedded within both the right atrium and right ventricle.  There are extensive atheromatous calcifications of the coronary vessels.  Small sliding-type hiatal hernia.  Upper abdominal cavity shows granulomatous calcification of liver as well as minimal calcification depletion of the gallbladder fossa.  The aorta appears markedly calcified with evidence of moderate plaque deposition located at the origin point of the left  renal artery.  There is evidence of prominent calcification involving the renal vasculature.                                       X-Ray Chest 1 View (Final result)  Result time 02/01/24 10:16:52      Final result by Jaylen Mckeon Jr., MD (02/01/24 10:16:52)                   Impression:      Decreased vascular prominence likely on the basis of improved inspiration.      Electronically signed by: Jaylen Mckeon MD  Date:    02/01/2024  Time:    10:16               Narrative:    EXAMINATION:  XR CHEST 1 VIEW    CLINICAL HISTORY:  shortness of breath;    TECHNIQUE:  Erect AP view of the chest.    COMPARISON:  Comparison made with the patient's previous imaging study of 04/25/2023    FINDINGS:  ACDF lies in left axilla with evidence of leads projecting within the right atrium and right ventricle appears similar in position upon comparison.  Patient is slightly tilted towards the right causing some degree of anatomical distortion.  Heart appears prominent size exacerbated by the poor inspiratory effort.  Degree of central vascular congestion changes as reduced substantially upon comparison, although attributed part due to the improved inspiration of the lungs.  No pulmonic in granulomatous type calcification in the left upper lobe is noted.  There is no evidence of significant pleural effusion.  Chronic degenerative spondylosis changes of the thoracic spine.                                       Medications   sodium chloride 0.9% flush 10 mL (has no administration in time range)   melatonin tablet 6 mg (has no administration in time range)   cefTRIAXone (Rocephin) 1 g in dextrose 5 % in water (D5W) 100 mL IVPB (MB+) (has no administration in time range)   albuterol-ipratropium 2.5 mg-0.5 mg/3 mL nebulizer solution 3 mL (has no administration in time range)   enoxaparin injection 30 mg (has no administration in time range)   sertraline tablet 25 mg (has no administration in time range)   glucose chewable tablet 16  g (has no administration in time range)   glucose chewable tablet 24 g (has no administration in time range)   glucagon (human recombinant) injection 1 mg (has no administration in time range)   insulin aspart U-100 pen 0-5 Units (has no administration in time range)   dextrose 10% bolus 125 mL 125 mL (has no administration in time range)   dextrose 10% bolus 250 mL 250 mL (has no administration in time range)   methylPREDNISolone sodium succinate injection 125 mg (125 mg Intravenous Given 2/1/24 1003)   albuterol-ipratropium 2.5 mg-0.5 mg/3 mL nebulizer solution 3 mL (3 mLs Nebulization Given 2/1/24 1018)   albuterol-ipratropium 2.5 mg-0.5 mg/3 mL nebulizer solution 3 mL (3 mLs Nebulization Given 2/1/24 1157)   furosemide injection 40 mg (40 mg Intravenous Given 2/1/24 1107)     Medical Decision Making  Patient seen with shortness of breath and tachypnea she is COPD patient she was hypoxic on 3 L a minute nasal cannula with O2 sats in the 90 she was given multiple neb treatments and steroids and still had low oxygen saturations her blood gas shows 7.4 with a pCO2 of 64 PO2 of 57 she has a component of Ca a component infectious upper rest for infection blood cultures were drawn she was given 1 g of Rocephin 40 g Lasix and multiple neb treatments and steroids and will be admitted for observation    Amount and/or Complexity of Data Reviewed  Labs: ordered.  Radiology: ordered. Decision-making details documented in ED Course.    Risk  OTC drugs.  Prescription drug management.               ED Course as of 02/01/24 1630   Thu Feb 01, 2024   1018 X-Ray Chest 1 View [CC]      ED Course User Index  [CC] BENJAMIN So III, MD                           Clinical Impression:  Final diagnoses:  [R06.02] Shortness of breath  [J44.1] COPD exacerbation (Primary)  [I50.9] Congestive heart failure, unspecified HF chronicity, unspecified heart failure type          ED Disposition Condition    Observation Stable                 BENJAMIN So III, MD  02/01/24 5960

## 2024-02-01 NOTE — SUBJECTIVE & OBJECTIVE
Past Medical History:   Diagnosis Date    Arthritis     COPD (chronic obstructive pulmonary disease)     Depression     Diabetes mellitus type II     Hyperlipidemia     Hypertension     Pacemaker        Past Surgical History:   Procedure Laterality Date    CARDIAC PACEMAKER PLACEMENT       SECTION      CYST REMOVAL Left 2019    Procedure: EXCISION, SEBACEOUS CYST;  Surgeon: Jaylen Gonzalez Jr., MD;  Location: [facility];  Service: General;  Laterality: Left;    INNER EAR SURGERY         Review of patient's allergies indicates:  No Known Allergies    No current facility-administered medications on file prior to encounter.     Current Outpatient Medications on File Prior to Encounter   Medication Sig    acetaminophen (TYLENOL) 325 MG tablet Take 650 mg by mouth every 6 (six) hours as needed for Pain.    albuterol-ipratropium (DUO-NEB) 2.5 mg-0.5 mg/3 mL nebulizer solution Take 3 mLs by nebulization every 6 (six) hours as needed for Wheezing. Rescue    aspirin (ECOTRIN) 81 MG EC tablet Take 81 mg by mouth every Mon, Wed, Fri.    bempedoic acid 180 mg Tab Take 180 mg by mouth every evening.    coenzyme Q10 100 mg capsule Take 100 mg by mouth once daily.    cyanocobalamin, vitamin B-12, 1,000 mcg Subl Place 1,000 mcg under the tongue once daily.    ferrous sulfate 325 (65 FE) MG EC tablet Take 325 mg by mouth once daily.    fluticasone-umeclidin-vilanter (TRELEGY ELLIPTA) 100-62.5-25 mcg DsDv Inhale 1 puff into the lungs once daily.    leflunomide (ARAVA) 10 MG Tab Take 10 mg by mouth every other day.    memantine (NAMENDA) 10 MG Tab TAKE 1 TABLET BY MOUTH 2 (TWO) TIMES A DAY.    metFORMIN (GLUCOPHAGE) 500 MG tablet Take 1 tablet (500 mg total) by mouth 2 (two) times daily with meals.    omeprazole (PRILOSEC) 40 MG capsule Take 40 mg by mouth every morning.    rosuvastatin (CRESTOR) 40 MG Tab Take 40 mg by mouth every evening.    sertraline (ZOLOFT) 25 MG tablet Take 12.5 mg by mouth every morning.     "[DISCONTINUED] predniSONE (DELTASONE) 5 MG tablet Take by mouth.    syringe with needle (SYRINGE 3CC/25GX1") 3 mL 25 gauge x 1" Syrg Once every months    [DISCONTINUED] albuterol (PROVENTIL/VENTOLIN HFA) 90 mcg/actuation inhaler Inhale 1-2 puffs into the lungs every 6 (six) hours as needed for Wheezing. Rescue    [DISCONTINUED] azithromycin (Z-JARED) 250 MG tablet Z-PACK AS DIRECTED    [DISCONTINUED] cyanocobalamin, vitamin B-12, 500 mcg Subl One sub lingual daily    [DISCONTINUED] hydroCHLOROthiazide (HYDRODIURIL) 12.5 MG Tab Take 12.5 mg by mouth daily as needed.     [DISCONTINUED] omeprazole (PRILOSEC) 20 MG capsule Take 1 capsule (20 mg total) by mouth once daily. (Patient taking differently: Take 40 mg by mouth once daily.)     Family History       Problem Relation (Age of Onset)    Breast cancer Mother, Sister    Cancer Mother, Sister    Stroke Father, Maternal Grandmother          Tobacco Use    Smoking status: Former     Current packs/day: 0.00     Average packs/day: 2.0 packs/day for 43.0 years (86.0 ttl pk-yrs)     Types: Cigarettes     Start date: 1957     Quit date: 2000     Years since quittin.1    Smokeless tobacco: Never   Substance and Sexual Activity    Alcohol use: No    Drug use: No    Sexual activity: Not on file     Review of Systems  Objective:     Vital Signs (Most Recent):  Temp: 98.2 °F (36.8 °C) (24 1509)  Pulse: 99 (24 1515)  Resp: 19 (24 1509)  BP: 120/62 (24 1509)  SpO2: 96 % (24 1509) Vital Signs (24h Range):  Temp:  [98.2 °F (36.8 °C)] 98.2 °F (36.8 °C)  Pulse:  [87-99] 99  Resp:  [17-47] 19  SpO2:  [90 %-96 %] 96 %  BP: (120-150)/(60-98) 120/62     Weight: 73.5 kg (162 lb)  Body mass index is 29.63 kg/m².     Physical Exam           Significant Labs: All pertinent labs within the past 24 hours have been reviewed.  ABGs:   Recent Labs   Lab 24  1007   PH 7.390   PCO2 64*   HCO3 38.70*   POCSATURATED 90.5   BE 11.50*   TOTALHB 11.4*   COHB " "1.7   METHB 0.9   PO2 57*     CBC:   Recent Labs   Lab 02/01/24  0950   WBC 3.80*   HGB 10.9*   HCT 35.1*        CMP:   Recent Labs   Lab 02/01/24  0950      K 4.5      CO2 33*   *   BUN 31*   CREATININE 1.1   CALCIUM 9.0   PROT 6.6   ALBUMIN 3.2*   BILITOT 0.3   ALKPHOS 90   AST 34   ALT 21   ANIONGAP 8     Magnesium: No results for input(s): "MG" in the last 48 hours.  Troponin:   Recent Labs   Lab 02/01/24  0950   TROPONINI 0.011     TSH: No results for input(s): "TSH" in the last 4320 hours.    Significant Imaging: I have reviewed all pertinent imaging results/findings within the past 24 hours.    CT:  ?patchy bilateral apical infiltrates/nodules.  No effusion but some fluid noted.  "

## 2024-02-01 NOTE — ASSESSMENT & PLAN NOTE
Patient with Hypercapnic and Hypoxic Respiratory failure which is Acute on chronic.  she is on home oxygen at 3 LPM. Supplemental oxygen was provided and noted-      .   Signs/symptoms of respiratory failure include- tachypnea, increased work of breathing, and respiratory distress. Contributing diagnoses includes - Aspiration and COPD Labs and images were reviewed. Patient Has recent ABG, which has been reviewed. Will treat underlying causes and adjust management of respiratory failure as follows- o2 support, given nebs and steroid. No longer wheezing. Will not repeat solumedrol at present. Cont on nebs. Monitor for fever - none previous to admit and wean o2 as tolerated with goal 92%. Will recheck abg as co2 64 on admit.

## 2024-02-01 NOTE — ASSESSMENT & PLAN NOTE
Patient's anemia is currently controlled. Has not received any PRBCs to date. Etiology likely d/t Iron deficiency and chronic disease  Current CBC reviewed-   Lab Results   Component Value Date    HGB 10.9 (L) 02/01/2024    HCT 35.1 (L) 02/01/2024     Monitor serial CBC and transfuse if patient becomes hemodynamically unstable, symptomatic or H/H drops below 7/21.

## 2024-02-02 LAB
ALLENS TEST: ABNORMAL
ANION GAP SERPL CALC-SCNC: 12 MMOL/L (ref 8–16)
BASOPHILS # BLD AUTO: 0.01 K/UL (ref 0–0.2)
BASOPHILS NFR BLD: 0.2 % (ref 0–1.9)
BUN SERPL-MCNC: 35 MG/DL (ref 8–23)
CALCIUM SERPL-MCNC: 9.5 MG/DL (ref 8.7–10.5)
CHLORIDE SERPL-SCNC: 95 MMOL/L (ref 95–110)
CO2 SERPL-SCNC: 34 MMOL/L (ref 23–29)
CREAT SERPL-MCNC: 1.3 MG/DL (ref 0.5–1.4)
DELSYS: ABNORMAL
DIFFERENTIAL METHOD BLD: ABNORMAL
EOSINOPHIL # BLD AUTO: 0 K/UL (ref 0–0.5)
EOSINOPHIL NFR BLD: 0 % (ref 0–8)
ERYTHROCYTE [DISTWIDTH] IN BLOOD BY AUTOMATED COUNT: 15.8 % (ref 11.5–14.5)
EST. GFR  (NO RACE VARIABLE): 41 ML/MIN/1.73 M^2
ESTIMATED AVG GLUCOSE: 123 MG/DL (ref 68–131)
FIO2: 32 (ref 21–100)
GLUCOSE SERPL-MCNC: 120 MG/DL (ref 70–110)
HBA1C MFR BLD: 5.9 % (ref 4–5.6)
HCO3 UR-SCNC: 40.8 MMOL/L (ref 22–26)
HCT VFR BLD AUTO: 36.5 % (ref 37–48.5)
HGB BLD-MCNC: 11.4 G/DL (ref 12–16)
IMM GRANULOCYTES # BLD AUTO: 0.01 K/UL (ref 0–0.04)
IMM GRANULOCYTES NFR BLD AUTO: 0.2 % (ref 0–0.5)
LYMPHOCYTES # BLD AUTO: 0.6 K/UL (ref 1–4.8)
LYMPHOCYTES NFR BLD: 9.7 % (ref 18–48)
MCH RBC QN AUTO: 25.7 PG (ref 27–31)
MCHC RBC AUTO-ENTMCNC: 31.2 G/DL (ref 32–36)
MCV RBC AUTO: 82 FL (ref 82–98)
MONOCYTES # BLD AUTO: 0.6 K/UL (ref 0.3–1)
MONOCYTES NFR BLD: 9.4 % (ref 4–15)
NEUTROPHILS # BLD AUTO: 5.3 K/UL (ref 1.8–7.7)
NEUTROPHILS NFR BLD: 80.5 % (ref 38–73)
NRBC BLD-RTO: 0 /100 WBC
PCO2 BLDA: 56 MMHG (ref 35–45)
PH SMN: 7.47 [PH] (ref 7.35–7.45)
PLATELET # BLD AUTO: 223 K/UL (ref 150–450)
PMV BLD AUTO: 12.2 FL (ref 9.2–12.9)
PO2 BLDA: 64 MMHG (ref 75–100)
POC BE: 14.9 MMOL/L (ref -2–2)
POC COHB: 1.6 % (ref 0–3)
POC METHB: 1.1 % (ref 0–1.5)
POC O2HB ARTERIAL: 91.3 % (ref 94–100)
POC SATURATED O2: 93.8 % (ref 90–100)
POC TCO2: 42.5 MMOL/L
POC THB: 11.3 G/DL (ref 12–18)
POCT GLUCOSE: 100 MG/DL (ref 70–110)
POCT GLUCOSE: 110 MG/DL (ref 70–110)
POCT GLUCOSE: 183 MG/DL (ref 70–110)
POCT GLUCOSE: 183 MG/DL (ref 70–110)
POTASSIUM SERPL-SCNC: 4.2 MMOL/L (ref 3.5–5.1)
RBC # BLD AUTO: 4.43 M/UL (ref 4–5.4)
SITE: ABNORMAL
SODIUM SERPL-SCNC: 141 MMOL/L (ref 136–145)
WBC # BLD AUTO: 6.57 K/UL (ref 3.9–12.7)

## 2024-02-02 PROCEDURE — 63600175 PHARM REV CODE 636 W HCPCS: Performed by: PHYSICIAN ASSISTANT

## 2024-02-02 PROCEDURE — 36600 WITHDRAWAL OF ARTERIAL BLOOD: CPT

## 2024-02-02 PROCEDURE — 99900035 HC TECH TIME PER 15 MIN (STAT)

## 2024-02-02 PROCEDURE — 27000221 HC OXYGEN, UP TO 24 HOURS

## 2024-02-02 PROCEDURE — 94761 N-INVAS EAR/PLS OXIMETRY MLT: CPT | Mod: XB

## 2024-02-02 PROCEDURE — 25000003 PHARM REV CODE 250: Performed by: PHYSICIAN ASSISTANT

## 2024-02-02 PROCEDURE — 11000001 HC ACUTE MED/SURG PRIVATE ROOM

## 2024-02-02 PROCEDURE — 85025 COMPLETE CBC W/AUTO DIFF WBC: CPT | Performed by: FAMILY MEDICINE

## 2024-02-02 PROCEDURE — 36415 COLL VENOUS BLD VENIPUNCTURE: CPT | Performed by: FAMILY MEDICINE

## 2024-02-02 PROCEDURE — 99233 SBSQ HOSP IP/OBS HIGH 50: CPT | Mod: ,,, | Performed by: PHYSICIAN ASSISTANT

## 2024-02-02 PROCEDURE — 82803 BLOOD GASES ANY COMBINATION: CPT | Performed by: PHYSICIAN ASSISTANT

## 2024-02-02 PROCEDURE — 25000003 PHARM REV CODE 250: Performed by: FAMILY MEDICINE

## 2024-02-02 PROCEDURE — 63600175 PHARM REV CODE 636 W HCPCS: Performed by: SURGERY

## 2024-02-02 PROCEDURE — 80048 BASIC METABOLIC PNL TOTAL CA: CPT | Mod: 59 | Performed by: FAMILY MEDICINE

## 2024-02-02 PROCEDURE — 92610 EVALUATE SWALLOWING FUNCTION: CPT

## 2024-02-02 PROCEDURE — 25000003 PHARM REV CODE 250: Performed by: SURGERY

## 2024-02-02 PROCEDURE — 99900031 HC PATIENT EDUCATION (STAT)

## 2024-02-02 PROCEDURE — 94640 AIRWAY INHALATION TREATMENT: CPT

## 2024-02-02 PROCEDURE — 25000242 PHARM REV CODE 250 ALT 637 W/ HCPCS: Performed by: SURGERY

## 2024-02-02 RX ADMIN — CEFTRIAXONE SODIUM 1 G: 1 INJECTION, POWDER, FOR SOLUTION INTRAMUSCULAR; INTRAVENOUS at 03:02

## 2024-02-02 RX ADMIN — IPRATROPIUM BROMIDE AND ALBUTEROL SULFATE 3 ML: 2.5; .5 SOLUTION RESPIRATORY (INHALATION) at 07:02

## 2024-02-02 RX ADMIN — AZITHROMYCIN MONOHYDRATE 500 MG: 500 INJECTION, POWDER, LYOPHILIZED, FOR SOLUTION INTRAVENOUS at 11:02

## 2024-02-02 RX ADMIN — Medication 6 MG: at 09:02

## 2024-02-02 RX ADMIN — ENOXAPARIN SODIUM 30 MG: 30 INJECTION SUBCUTANEOUS at 04:02

## 2024-02-02 RX ADMIN — ACETAMINOPHEN 650 MG: 325 TABLET ORAL at 09:02

## 2024-02-02 RX ADMIN — METHYLPREDNISOLONE SODIUM SUCCINATE 40 MG: 40 INJECTION, POWDER, FOR SOLUTION INTRAMUSCULAR; INTRAVENOUS at 11:02

## 2024-02-02 RX ADMIN — SERTRALINE HYDROCHLORIDE 25 MG: 25 TABLET ORAL at 06:02

## 2024-02-02 NOTE — ASSESSMENT & PLAN NOTE
Patient's COPD is with exacerbation noted by continued dyspnea, use of accessory muscles for breathing, and worsening of baseline hypoxia currently.  Patient is currently on COPD Pathway. Continue scheduled inhalers Steroids, Antibiotics, and Supplemental oxygen and monitor respiratory status closely.     IV abx, IV steroids

## 2024-02-02 NOTE — PT/OT/SLP EVAL
February 12, 2021       Outside Provider      Patient: Popeye Rosenbaum   YOB: 1952   Date of Visit: 2/12/2021       Dear  Provider:    Thank you for referring Popeye Rosenbaum to me for evaluation. Below are my notes for this visit with him.    If you have questions, please do not hesitate to call me. I look forward to following your patient along with you.      Sincerely,        Rudy Guillen MD        CC: No Recipients  Rudy Guillen MD  2/12/2021 11:10 AM  Sign when Signing Visit  Cardiology Office Visit    Chief Complaint/Reason for Visit:   Chief Complaint   Patient presents with   • Video Visit     6 month follow up visit   • Referral     PCP- Dr. Guille Fontenot     This visit is being performed via video to discuss Video Visit (6 month follow up visit) and Referral (PCP- Dr. Guille Fontenot)    Clinician Location:  [address] [place] Nye is in [place] and his identity has been established.   He was informed that consent to treat includes permission to submit charges to the applicable insurance on file. Popeye was advised regarding the potential risk inherent in video visits, as the assessment may be limited due to what can be seen on the screen which potentially results in an incomplete assessment; as well as either of us may discontinue the video visit if it is felt that the videoconferencing connections are not adequate for his/her situation.         HISTORY OF PRESENT ILLNESS: Popeye Rosenbaum is a 68 year old male with      Severe Coronary artery disease status post bypass graft surgery 1996 with total occlusion of all grafts. He underwent Complex multivessel PCI of distal left main, proximal circumflex and obtuse marginal using balloon angioplasty and stenting 2018  Hypertension  Dyslipidemia  diabetes mellitus  Familial Hypercholesterolemia with Intolerance to all statin in the past  w severe myalgias on  Speech Language Pathology Evaluation  Bedside Swallow    Patient Name:  Gretel Ashton   MRN:  [id number]  Admitting Diagnosis: COPD with acute bronchitis    Recommendations:                 General Recommendations:  SLP to f/u x1 to monitor for swallow changes and/or complications r/t to oral diet; then will likely sign off.  Diet recommendations:  Regular Diet - IDDSI Level 7, Thin liquids - IDDSI Level 0. PO meds w/ liquids.  Aspiration Precautions: 1 bite of solids & single sips of thin liquids at a time in the context of SOB, Standard aspiration precautions.  General Precautions: Standard, aspiration  Communication strategies:  none    Assessment:     Gretel Ashton is an 84 y/o F who presents w/ no clinical signs of oropharyngeal dysphagia. Pt w/o evidence of possible chronic dysphagia-related pulmonary complication. Pt appears to be at acutely increased risk for aspiration in the setting of SOB and possible difficulty coordinating swallow apneic period. Diet modification is not indicated; aspiration risk can likely be managed by aspiration precautions as outlined above. Safe to continue w/ PO intake at this current time.    History:     Past Medical History:   Diagnosis Date    Arthritis     COPD (chronic obstructive pulmonary disease)     Depression     Diabetes mellitus type II     Hyperlipidemia     Hypertension     Pacemaker        Past Surgical History:   Procedure Laterality Date    CARDIAC PACEMAKER PLACEMENT       SECTION      CYST REMOVAL Left 2019    Procedure: EXCISION, SEBACEOUS CYST;  Surgeon: Jaylen Gonzalez Jr., MD;  Location: [facility];  Service: General;  Laterality: Left;    INNER EAR SURGERY       Prior Intubation HX: n/a     Modified Barium Swallow: n/a    Chest X-Rays: 2024    Prior diet: Regular solids / thin liquids.       Subjective     Pt seen at bedside. Pt awake/alert, very pleasant. Denied chronic dysphagia reports/concerns. Sitting in chair at bedside, appears to  be SOB on NC.  Patient goals: none stated     Pain/Comfort:  Pain Rating 1: 0/10    Respiratory Status: Nasal cannula, flow 2 L/min    Objective:     Oral Musculature Evaluation  Oral Musculature: WFL  Dentition: upper dentures, other (see comments) (edentulous on bottom)  Secretion Management: adequate  Mucosal Quality: adequate  Mandibular Strength and Mobility: WFL  Oral Labial Strength and Mobility: WFL, functional pursing, functional coordination, functional retraction, functional seal  Lingual Strength and Mobility: WFL, functional lateral movement, functional anterior elevation, functional protrusion  Buccal Strength and Mobility: WFL  Volitional Cough: perceptually adequate strength  Voice Prior to PO Intake: perceptually WFL    Bedside Swallow Eval:   Consistencies Assessed:  Thin liquids , sequential straw drinking x3, self fed  Solids , nidia crackers x3      Oral Phase:   WFL    Pharyngeal Phase:   no overt clinical signs/symptoms of aspiration  no overt clinical signs/symptoms of pharyngeal dysphagia    Compensatory Strategies  None needed at time of exam but would benefit from 1 bite of solids and single sips of liquids in the context of SOB increasing risk for aspiration      Goals:   Multidisciplinary Problems       SLP Goals          Problem: SLP    Goal Priority Disciplines Outcome   SLP Goal     SLP    Description: GOALS:    (1) Pt will tolerate least restrictive oral diet w/o acute aspiration-related pulmonary complication                       Plan:     Patient to be seen:  1 x/week   Plan of Care expires:  02/09/24  Plan of Care reviewed with:  patient   SLP Follow-Up:  Yes       Discharge recommendations:  No Therapy Indicated   Barriers to Discharge:   none per SLP at this time    Time Tracking:     SLP Treatment Date:   02/02/24  Speech Start Time:  1134  Speech Stop Time:  1149     Speech Total Time (min):  15 min    Billable Minutes: Eval Swallow and Oral Function 15   atorvastatin and rosuvastatin.Refuses to take cholesterol meds including PCSK9       He feels well he has no chest pain he has no angina.  He has no shortness of breath he has no syncope.  He has no palpitation.  He has no orthopnea or nocturnal paroxysmal dyspnea      REVIEW OF SYSTEMS:  All other systems negative, except as noted above in HPI    PAST MEDICAL HISTORY:   Past Medical History:   Diagnosis Date   • CAD (coronary artery disease)    • Diabetes mellitus (CMS/HCC)    • Familial hyperlipidemia    • Hypertension    • S/P CABG (coronary artery bypass graft)    • S/P coronary artery stent placement    • Statin intolerance    • Thrombus    • Thrombus of aorta (CMS/HCC)        PAST SURGICAL HISTORY:   Past Surgical History:   Procedure Laterality Date   • Coronary artery bypass graft     • Hernia repair         FAMILY HISTORY:   Family History   Problem Relation Age of Onset   • Heart disease Father        SOCIAL HISTORY:   Social History     Tobacco Use   • Smoking status: Never Smoker   • Smokeless tobacco: Never Used   Substance Use Topics   • Alcohol use: No     Frequency: Never   • Drug use: Not on file       Drug Use:    Not Asked         ALLERGIES:   ALLERGIES:   Allergen Reactions   • Atorvastatin MYALGIA     Per pt doesn't do well with statins   • Indocin Other (See Comments)     Unknown       MEDICATIONS:   Current Medications    AMLODIPINE (NORVASC) 10 MG TABLET    Take 1 tablet by mouth daily.    ASPIRIN 81 MG TABLET    Take 81 mg by mouth daily.    CHOLECALCIFEROL (D3 VITAMIN PO)    Take 10,000 Units by mouth.    COENZYME Q10 (COQ10) 200 MG CAP        LOSARTAN (COZAAR) 50 MG TABLET    Take 1 tablet by mouth every evening.    MENAQUINONE-7 (VITAMIN K2) 100 MCG CAP    Take 200 mcg by mouth.    METFORMIN (GLUCOPHAGE) 1000 MG TABLET    Take 1,000 mg by mouth 2 times daily (with meals).    METOPROLOL TARTRATE (LOPRESSOR) 50 MG TABLET    TAKE 1 TABLET BY MOUTH TWICE DAILY    MULTIPLE VITAMIN  (MULTIVITAMIN ADULT PO)        RIVAROXABAN (XARELTO) 2.5 MG TAB    Take 1 tablet by mouth 2 times daily (with meals).        PHYSICAL  EXAMINATION  There were no vitals taken for this visit.            Labs: Reviewed     CHOLESTEROL   Date Value Ref Range Status   05/25/2018 313 (H) <200 mg/dl Final     Comment:        Desirable            <200  Borderline High      200 to 239  High                 >=240          CALCULATED LDL   Date Value Ref Range Status   05/25/2018 219 (H) <130 mg/dl Final     Comment:     OPTIMAL               <100  NEAR OPTIMAL          100-129  BORDERLINE HIGH       130-159  HIGH                  160-189  VERY HIGH             >=190     05/25/2018 219 (H) <130 mg/dL Final     Comment:     OPTIMAL               <100  NEAR OPTIMAL          100-129  BORDERLINE HIGH       130-159  HIGH                  160-189  VERY HIGH             >=190  [facility]  [address] [place] [address], [id number]       HDL   Date Value Ref Range Status   05/25/2018 34 (L) >39 mg/dl Final     Comment:     Low            <40  Borderline Low 40 to 49  Near Optimal   50 to 59  Optimal        >=60       TRIGLYCERIDE   Date Value Ref Range Status   05/25/2018 298 (H) <150 mg/dl Final     Comment:     Normal                   <150  Borderline High          150 to 199  High                     200 to 499  Very High                >=500       CHOL/HDL   Date Value Ref Range Status   05/25/2018 9.2 (H) <4.5   Final   05/25/2018 9.2 (H) <4.5 Final     Comment:     [facility]  [address] [place], [place], [address]            IMPRESSION/PLAN:    Essential hypertension   BP controlled     Familial hyperlipidemia   LDL cholesterol greater than 210 mg percent down to 135 on Zetia and healthy diet.   Cannot tolerate statins.   Pt was on [place] diet.prefers dietary approach to traet hi Cholesterol  Does not want to take PCSK9 inhibitor RX    CAD (coronary artery disease)  s/p  Cardiac cath  minutes    02/02/2024          06/04/2018>> all grafts from CABG 22 yrs ago  were occluded.Pt had complex multivessel PCI of distal left main, proximal circumflex and obtuse margina using balloon angioplasty and stenting.  Doing well on current medical regimen.  Change brillinta to 60 mg BID  Changing to xarelto 2.5 mg BID      Atherosclerosis of aorta (CMS/HCC)     CT abdomen 2018  1.Moderate aortic disease with distal bilobed aneurysm measuring up to 2 x 2 cm in diameter.  2.   Celiac origin high-grade stenosis measuring up to 80%.  3.   SMA origin moderate stenosis measuring 40-50%.  4.  , Dominant right renal arteries with moderate/high-grade stenosis measuring up to 70-80%.    Renal artery stenosis (CMS/HCC)  70-80% Rt GEO on CTA abdomen 2018; patient blood pressure is well controlled.  No plan for intervention on the renal artery stenosis    Celiac artery stenosis (CMS/HCC)  80% celiac artery stenosis on CTA abdomen 2018.  Patient has no symptoms.  No plan for intervention    Carotid atherosclerosis  Mild plaquing With no significant obstructive disease on carotid duplex on carotid duplex scan 2018  Continue aspirin and Brilinta      No orders of the defined types were placed in this encounter.  The nature of cardiac risk has been  discussed with this patient.  The benefits of lowering the cholesterol  in view of the patient's age and risk status have been discussed, as well as verbal review of appropriate diet. The risks associated with lipid lowering therapy were discussed. The need for lifelong general compliance  and follow up in order to reduce risk is stressed.  A gradual exercise program  and healthy diet  recommended to help maintain normal body weight and improve lipid balance.  Not interested in pharmacological Rx for hyperlipidemia    2/12/2021      Johnathon Guillen MD  Cardiology              English

## 2024-02-02 NOTE — PROGRESS NOTES
[place] [facility] ([facility])  [facility] Medicine  Progress Note    Patient Name: Gretel Ashton  MRN: [id number]  Patient Class: OP- Observation   Admission Date: 2024  Length of Stay: 0 days  Attending Physician: Althea Krause MD  Primary Care Provider: Jailene Astudillo MD        Subjective:     Principal Problem:COPD with acute bronchitis        HPI:  83 year old female with known dementia and chronic resp failure who sees dr. Guerra had a witnessed episode of aspiration today at the NH and subsequent increased dyspnea. She was sent for evaluation. Coincidentally she was seen yesterday by Dr. Guerra. While she was there, her sats were 84%. However, she was not actively SOB and has o2 orders. She has known significant emphysema secondary to her significant tobacco use. She was advised to cont her trelegy and rtc in 12 months. Today, she was dyspneic and was wheezing on arrival - 93% on 3L NC. She was given nebs and steroid and remained dyspneic. Work up reassuring. At bedside daughter and DIL present and report that she was very short of breath. She has known dementia but is at her baseline. Her resp status is much improved at current.    Overview/Hospital Course:  Pt HD stable on 3L nasal cannula with oxygen saturation 95% at rest.  She was initially tachypnea on 2L with ambulation.  Working with case management/ to determine who is power of  due to dementia.      Past Medical History:   Diagnosis Date    Arthritis     COPD (chronic obstructive pulmonary disease)     Depression     Diabetes mellitus type II     Hyperlipidemia     Hypertension     Pacemaker        Past Surgical History:   Procedure Laterality Date    CARDIAC PACEMAKER PLACEMENT       SECTION      CYST REMOVAL Left 2019    Procedure: EXCISION, SEBACEOUS CYST;  Surgeon: Jaylen Gonzalez Jr., MD;  Location: [facility];  Service: General;  Laterality: Left;    INNER EAR SURGERY         Review of patient's  "allergies indicates:  No Known Allergies    No current facility-administered medications on file prior to encounter.     Current Outpatient Medications on File Prior to Encounter   Medication Sig    acetaminophen (TYLENOL) 325 MG tablet Take 650 mg by mouth every 6 (six) hours as needed for Pain.    albuterol-ipratropium (DUO-NEB) 2.5 mg-0.5 mg/3 mL nebulizer solution Take 3 mLs by nebulization every 6 (six) hours as needed for Wheezing. Rescue    aspirin (ECOTRIN) 81 MG EC tablet Take 81 mg by mouth every Mon, Wed, Fri.    bempedoic acid 180 mg Tab Take 180 mg by mouth every evening.    coenzyme Q10 100 mg capsule Take 100 mg by mouth once daily.    cyanocobalamin, vitamin B-12, 1,000 mcg Subl Place 1,000 mcg under the tongue once daily.    ferrous sulfate 325 (65 FE) MG EC tablet Take 325 mg by mouth once daily.    fluticasone-umeclidin-vilanter (TRELEGY ELLIPTA) 100-62.5-25 mcg DsDv Inhale 1 puff into the lungs once daily.    leflunomide (ARAVA) 10 MG Tab Take 10 mg by mouth every other day.    memantine (NAMENDA) 10 MG Tab TAKE 1 TABLET BY MOUTH 2 (TWO) TIMES A DAY.    metFORMIN (GLUCOPHAGE) 500 MG tablet Take 1 tablet (500 mg total) by mouth 2 (two) times daily with meals.    omeprazole (PRILOSEC) 40 MG capsule Take 40 mg by mouth every morning.    rosuvastatin (CRESTOR) 40 MG Tab Take 40 mg by mouth every evening.    sertraline (ZOLOFT) 25 MG tablet Take 12.5 mg by mouth every morning.    syringe with needle (SYRINGE 3CC/25GX1") 3 mL 25 gauge x 1" Syrg Once every months    [DISCONTINUED] hydroCHLOROthiazide (HYDRODIURIL) 12.5 MG Tab Take 12.5 mg by mouth daily as needed.      Family History       Problem Relation (Age of Onset)    Breast cancer Mother, Sister    Cancer Mother, Sister    Stroke Father, Maternal Grandmother          Tobacco Use    Smoking status: Former     Current packs/day: 0.00     Average packs/day: 2.0 packs/day for 43.0 years (86.0 ttl pk-yrs)     Types: Cigarettes     Start date: 1/1/1957 "     Quit date: 2000     Years since quittin.1    Smokeless tobacco: Never   Substance and Sexual Activity    Alcohol use: No    Drug use: No    Sexual activity: Not on file     Review of Systems   Reason unable to perform ROS: no acute compliants but h/o dementia.     Objective:     Vital Signs (Most Recent):  Temp: 97.7 °F (36.5 °C) (24 0750)  Pulse: 90 (24 1012)  Resp: (!) 22 (24 0750)  BP: (!) 102/51 (24 0750)  SpO2: (!) 92 % (24 0750) Vital Signs (24h Range):  Temp:  [97.7 °F (36.5 °C)-98.4 °F (36.9 °C)] 97.7 °F (36.5 °C)  Pulse:  [] 90  Resp:  [17-32] 22  SpO2:  [92 %-96 %] 92 %  BP: (102-150)/(49-76) 102/51     Weight: 73.5 kg (162 lb)  Body mass index is 29.63 kg/m².     Physical Exam  Constitutional:       General: She is not in acute distress.  HENT:      Head: Normocephalic and atraumatic.   Eyes:      General:         Right eye: No discharge.         Left eye: No discharge.   Cardiovascular:      Rate and Rhythm: Normal rate and regular rhythm.   Pulmonary:      Breath sounds: No wheezing.      Comments: Tachypnea  No wheezing/coarse breath sounds   Abdominal:      General: There is no distension.      Tenderness: There is no abdominal tenderness.   Musculoskeletal:         General: No swelling or tenderness.      Cervical back: Neck supple. No tenderness.   Skin:     General: Skin is warm and dry.   Neurological:      General: No focal deficit present.      Mental Status: She is alert. Mental status is at baseline. She is disoriented.   Psychiatric:         Mood and Affect: Mood normal.         Behavior: Behavior normal.                Significant Labs: All pertinent labs within the past 24 hours have been reviewed.  ABGs:   Recent Labs   Lab 24  1007 24  1650   PH 7.390 7.390   PCO2 64* 56*   HCO3 38.70* 33.90*   POCSATURATED 90.5 94.1   BE 11.50* 7.40*   TOTALHB 11.4* 11.6*   COHB 1.7 1.5   METHB 0.9 0.7   PO2 57* 65*       CBC:   Recent Labs  "  Lab 02/01/24  0950 02/02/24  0856   WBC 3.80* 6.57   HGB 10.9* 11.4*   HCT 35.1* 36.5*    223       CMP:   Recent Labs   Lab 02/01/24  0950 02/02/24  0856    141   K 4.5 4.2    95   CO2 33* 34*   * 120*   BUN 31* 35*   CREATININE 1.1 1.3   CALCIUM 9.0 9.5   PROT 6.6  --    ALBUMIN 3.2*  --    BILITOT 0.3  --    ALKPHOS 90  --    AST 34  --    ALT 21  --    ANIONGAP 8 12       Magnesium: No results for input(s): "MG" in the last 48 hours.  Troponin:   Recent Labs   Lab 02/01/24  0950   TROPONINI 0.011       TSH: No results for input(s): "TSH" in the last 4320 hours.    Significant Imaging: I have reviewed all pertinent imaging results/findings within the past 24 hours.    CT:  ?patchy bilateral apical infiltrates/nodules.  No effusion but some fluid noted.    Assessment/Plan:      * COPD with acute bronchitis  Patient's COPD is with exacerbation noted by continued dyspnea, use of accessory muscles for breathing, and worsening of baseline hypoxia currently.  Patient is currently on COPD Pathway. Continue scheduled inhalers Steroids, Antibiotics, and Supplemental oxygen and monitor respiratory status closely.     IV abx, IV steroids     Alzheimer's dementia with behavioral disturbance  Hold home namenda    Chronic kidney disease, stage 3a  Creatine stable for now. BMP reviewed- noted Estimated Creatinine Clearance: 30.3 mL/min (based on SCr of 1.3 mg/dL). according to latest data. Based on current GFR, CKD stage is stage 3 - GFR 30-59.  Monitor UOP and serial BMP and adjust therapy as needed. Renally dose meds. Avoid nephrotoxic medications and procedures.    Symptomatic anemia  Patient's anemia is currently controlled. Has not received any PRBCs to date. Etiology likely d/t Iron deficiency and chronic disease  Current CBC reviewed-   Lab Results   Component Value Date    HGB 11.4 (L) 02/02/2024    HCT 36.5 (L) 02/02/2024     Monitor serial CBC and transfuse if patient becomes " hemodynamically unstable, symptomatic or H/H drops below 7/21.    HTN (hypertension)  Not on home meds.    Temp:  [97.7 °F (36.5 °C)-98.4 °F (36.9 °C)]   Pulse:  []   Resp:  [17-32]   BP: (102-150)/(49-76)   SpO2:  [92 %-96 %] .   Home meds for hypertension were reviewed and noted below.   Hypertension Medications         PT hypotensive currently     Acute on chronic respiratory failure with hypoxemia  Patient with Hypercapnic and Hypoxic Respiratory failure which is Acute on chronic.  she is on home oxygen at 3 LPM. Supplemental oxygen was provided and noted-      .   Signs/symptoms of respiratory failure include- tachypnea, increased work of breathing, and respiratory distress. Contributing diagnoses includes - Aspiration and COPD Labs and images were reviewed. Patient Has recent ABG, which has been reviewed. Will treat underlying causes and adjust management of respiratory failure as follows- o2 support, given nebs and steroid. No longer wheezing. Will not repeat solumedrol at present. Cont on nebs. Monitor for fever - none previous to admit and wean o2 as tolerated with goal 92%. Will recheck abg as co2 64 on admit.    2/2/24: Tachypnea initially but now improving with increasing supplemental oxygen from 2 to 3 L.  IV abx.  Will discuss coded status and goals of care with children.      Type 2 diabetes mellitus with diabetic neuropathy, without long-term current use of insulin  Sliding scale only.        VTE Risk Mitigation (From admission, onward)           Ordered     enoxaparin injection 30 mg  Daily         02/01/24 1553     IP VTE HIGH RISK PATIENT  Once         02/01/24 1553     Place sequential compression device  Until discontinued         02/01/24 1553                    Discharge Planning   MARIKA:      Code Status: Full Code   Is the patient medically ready for discharge?:     Reason for patient still in hospital (select all that apply): Patient trending condition                     Barbara MCMANUS  LATISHA Flowers  Department of Hospital Medicine   [place] - [facility] (3rd Fl)

## 2024-02-02 NOTE — ASSESSMENT & PLAN NOTE
Patient's anemia is currently controlled. Has not received any PRBCs to date. Etiology likely d/t Iron deficiency and chronic disease  Current CBC reviewed-   Lab Results   Component Value Date    HGB 11.4 (L) 02/02/2024    HCT 36.5 (L) 02/02/2024     Monitor serial CBC and transfuse if patient becomes hemodynamically unstable, symptomatic or H/H drops below 7/21.

## 2024-02-02 NOTE — ASSESSMENT & PLAN NOTE
Creatine stable for now. BMP reviewed- noted Estimated Creatinine Clearance: 30.3 mL/min (based on SCr of 1.3 mg/dL). according to latest data. Based on current GFR, CKD stage is stage 3 - GFR 30-59.  Monitor UOP and serial BMP and adjust therapy as needed. Renally dose meds. Avoid nephrotoxic medications and procedures.

## 2024-02-02 NOTE — ASSESSMENT & PLAN NOTE
Not on home meds.    Temp:  [97.7 °F (36.5 °C)-98.4 °F (36.9 °C)]   Pulse:  []   Resp:  [17-32]   BP: (102-150)/(49-76)   SpO2:  [92 %-96 %] .   Home meds for hypertension were reviewed and noted below.   Hypertension Medications         PT hypotensive currently

## 2024-02-02 NOTE — PLAN OF CARE
[place] - Med Surg (3rd Fl)  Initial Discharge Assessment       Primary Care Provider: Jailene Astudillo MD    Admission Diagnosis: Shortness of breath [R06.02]  COPD exacerbation [J44.1]  Congestive heart failure, unspecified HF chronicity, unspecified heart failure type [I50.9]    Admission Date: 2/1/2024  Expected Discharge Date:          Payor: MEDICARE / Plan: MEDICARE PART A & B / Product Type: Government /     Extended Emergency Contact Information  Primary Emergency Contact: Agustin Ashton   [facility]  Home Phone: [phone]  Relation: Son    Discharge Plan A: (P) Return to nursing home  Discharge Plan B: (P) Return to Nursing Home      [facility] - [address]  [address] [place] [id number]  Phone: [phone] Fax: [phone]      Initial Assessment (most recent)       Adult Discharge Assessment - 02/02/24 1226          Discharge Assessment    Assessment Type Discharge Planning Assessment     Confirmed/corrected address, phone number and insurance Yes     Confirmed Demographics Correct on Facesheet     Source of Information health record (P)      Communicated MARIKA with patient/caregiver Date not available/Unable to determine     Reason For Admission COPD with acute bronchitis     People in Home facility resident     Facility Arrived From: [facility]     Do you expect to return to your current living situation? Yes (P)      Do you have help at home or someone to help you manage your care at home? Yes (P)      Who are your caregiver(s) and their phone number(s)? [facility] staff (P)      Prior to hospitilization cognitive status: Unable to Assess (P)      Current cognitive status: Alert/Oriented (P)      Walking or Climbing Stairs Difficulty yes (P)      Walking or Climbing Stairs ambulation difficulty, requires equipment (P)      Mobility Management Wheelchair (P)      Dressing/Bathing Difficulty yes (P)      Dressing/Bathing bathing difficulty, assistance  1 person;dressing difficulty, assistance 1 person (P)      Dressing/Bathing Management [facility] staff (P)      Home Accessibility wheelchair accessible (P)      Home Layout Able to live on 1st floor (P)      Equipment Currently Used at Home wheelchair;hospital bed;nebulizer;oxygen (P)      Readmission within 30 days? No (P)      Patient currently being followed by outpatient case management? No (P)      Do you currently have service(s) that help you manage your care at home? No (P)      Do you take prescription medications? Yes (P)      Do you have prescription coverage? Yes (P)      Coverage Medicare (P)      Do you have any problems affording any of your prescribed medications? No (P)      Is the patient taking medications as prescribed? yes (P)      Who is going to help you get home at discharge? [facility] staff (P)      How do you get to doctors appointments? agency (P)      Are you on dialysis? No (P)      Do you take coumadin? No (P)      Discharge Plan A Return to nursing home (P)      Discharge Plan B Return to Nursing Home (P)      DME Needed Upon Discharge  none (P)                             Discharge assessment completed at bedside with patient. No post acute needs determined at this time. SW to remain available if needs arise.

## 2024-02-02 NOTE — HOSPITAL COURSE
Pt HD stable on 3L nasal cannula with oxygen saturation 95% at rest.  She was initially tachypnea on 2L with ambulation.  Working with case management/ to determine who is power of  due to dementia.      2/3/24: doing well, reports her breathing is at baseline. She is stable on home 3L NC. VSS

## 2024-02-02 NOTE — SUBJECTIVE & OBJECTIVE
Past Medical History:   Diagnosis Date    Arthritis     COPD (chronic obstructive pulmonary disease)     Depression     Diabetes mellitus type II     Hyperlipidemia     Hypertension     Pacemaker        Past Surgical History:   Procedure Laterality Date    CARDIAC PACEMAKER PLACEMENT       SECTION      CYST REMOVAL Left 2019    Procedure: EXCISION, SEBACEOUS CYST;  Surgeon: Jaylen Gonzalez Jr., MD;  Location: [facility];  Service: General;  Laterality: Left;    INNER EAR SURGERY         Review of patient's allergies indicates:  No Known Allergies    No current facility-administered medications on file prior to encounter.     Current Outpatient Medications on File Prior to Encounter   Medication Sig    acetaminophen (TYLENOL) 325 MG tablet Take 650 mg by mouth every 6 (six) hours as needed for Pain.    albuterol-ipratropium (DUO-NEB) 2.5 mg-0.5 mg/3 mL nebulizer solution Take 3 mLs by nebulization every 6 (six) hours as needed for Wheezing. Rescue    aspirin (ECOTRIN) 81 MG EC tablet Take 81 mg by mouth every Mon, Wed, Fri.    bempedoic acid 180 mg Tab Take 180 mg by mouth every evening.    coenzyme Q10 100 mg capsule Take 100 mg by mouth once daily.    cyanocobalamin, vitamin B-12, 1,000 mcg Subl Place 1,000 mcg under the tongue once daily.    ferrous sulfate 325 (65 FE) MG EC tablet Take 325 mg by mouth once daily.    fluticasone-umeclidin-vilanter (TRELEGY ELLIPTA) 100-62.5-25 mcg DsDv Inhale 1 puff into the lungs once daily.    leflunomide (ARAVA) 10 MG Tab Take 10 mg by mouth every other day.    memantine (NAMENDA) 10 MG Tab TAKE 1 TABLET BY MOUTH 2 (TWO) TIMES A DAY.    metFORMIN (GLUCOPHAGE) 500 MG tablet Take 1 tablet (500 mg total) by mouth 2 (two) times daily with meals.    omeprazole (PRILOSEC) 40 MG capsule Take 40 mg by mouth every morning.    rosuvastatin (CRESTOR) 40 MG Tab Take 40 mg by mouth every evening.    sertraline (ZOLOFT) 25 MG tablet Take 12.5 mg by mouth every morning.    syringe  "with needle (SYRINGE 3CC/25GX1") 3 mL 25 gauge x 1" Syrg Once every months    [DISCONTINUED] hydroCHLOROthiazide (HYDRODIURIL) 12.5 MG Tab Take 12.5 mg by mouth daily as needed.      Family History       Problem Relation (Age of Onset)    Breast cancer Mother, Sister    Cancer Mother, Sister    Stroke Father, Maternal Grandmother          Tobacco Use    Smoking status: Former     Current packs/day: 0.00     Average packs/day: 2.0 packs/day for 43.0 years (86.0 ttl pk-yrs)     Types: Cigarettes     Start date: 1957     Quit date: 2000     Years since quittin.1    Smokeless tobacco: Never   Substance and Sexual Activity    Alcohol use: No    Drug use: No    Sexual activity: Not on file     Review of Systems   Reason unable to perform ROS: no acute compliants but h/o dementia.     Objective:     Vital Signs (Most Recent):  Temp: 97.7 °F (36.5 °C) (24 0750)  Pulse: 90 (24 1012)  Resp: (!) 22 (24 0750)  BP: (!) 102/51 (24 0750)  SpO2: (!) 92 % (24 0750) Vital Signs (24h Range):  Temp:  [97.7 °F (36.5 °C)-98.4 °F (36.9 °C)] 97.7 °F (36.5 °C)  Pulse:  [] 90  Resp:  [17-32] 22  SpO2:  [92 %-96 %] 92 %  BP: (102-150)/(49-76) 102/51     Weight: 73.5 kg (162 lb)  Body mass index is 29.63 kg/m².     Physical Exam  Constitutional:       General: She is not in acute distress.  HENT:      Head: Normocephalic and atraumatic.   Eyes:      General:         Right eye: No discharge.         Left eye: No discharge.   Cardiovascular:      Rate and Rhythm: Normal rate and regular rhythm.   Pulmonary:      Breath sounds: No wheezing.      Comments: Tachypnea  No wheezing/coarse breath sounds   Abdominal:      General: There is no distension.      Tenderness: There is no abdominal tenderness.   Musculoskeletal:         General: No swelling or tenderness.      Cervical back: Neck supple. No tenderness.   Skin:     General: Skin is warm and dry.   Neurological:      General: No focal deficit " "present.      Mental Status: She is alert. Mental status is at baseline. She is disoriented.   Psychiatric:         Mood and Affect: Mood normal.         Behavior: Behavior normal.                Significant Labs: All pertinent labs within the past 24 hours have been reviewed.  ABGs:   Recent Labs   Lab 02/01/24  1007 02/01/24  1650   PH 7.390 7.390   PCO2 64* 56*   HCO3 38.70* 33.90*   POCSATURATED 90.5 94.1   BE 11.50* 7.40*   TOTALHB 11.4* 11.6*   COHB 1.7 1.5   METHB 0.9 0.7   PO2 57* 65*       CBC:   Recent Labs   Lab 02/01/24  0950 02/02/24  0856   WBC 3.80* 6.57   HGB 10.9* 11.4*   HCT 35.1* 36.5*    223       CMP:   Recent Labs   Lab 02/01/24  0950 02/02/24  0856    141   K 4.5 4.2    95   CO2 33* 34*   * 120*   BUN 31* 35*   CREATININE 1.1 1.3   CALCIUM 9.0 9.5   PROT 6.6  --    ALBUMIN 3.2*  --    BILITOT 0.3  --    ALKPHOS 90  --    AST 34  --    ALT 21  --    ANIONGAP 8 12       Magnesium: No results for input(s): "MG" in the last 48 hours.  Troponin:   Recent Labs   Lab 02/01/24  0950   TROPONINI 0.011       TSH: No results for input(s): "TSH" in the last 4320 hours.    Significant Imaging: I have reviewed all pertinent imaging results/findings within the past 24 hours.    CT:  ?patchy bilateral apical infiltrates/nodules.  No effusion but some fluid noted.  "

## 2024-02-02 NOTE — ASSESSMENT & PLAN NOTE
Patient with Hypercapnic and Hypoxic Respiratory failure which is Acute on chronic.  she is on home oxygen at 3 LPM. Supplemental oxygen was provided and noted-      .   Signs/symptoms of respiratory failure include- tachypnea, increased work of breathing, and respiratory distress. Contributing diagnoses includes - Aspiration and COPD Labs and images were reviewed. Patient Has recent ABG, which has been reviewed. Will treat underlying causes and adjust management of respiratory failure as follows- o2 support, given nebs and steroid. No longer wheezing. Will not repeat solumedrol at present. Cont on nebs. Monitor for fever - none previous to admit and wean o2 as tolerated with goal 92%. Will recheck abg as co2 64 on admit.    2/2/24: Tachypnea initially but now improving with increasing supplemental oxygen from 2 to 3 L.  IV abx.  Will discuss coded status and goals of care with children.

## 2024-02-03 VITALS
DIASTOLIC BLOOD PRESSURE: 67 MMHG | OXYGEN SATURATION: 98 % | SYSTOLIC BLOOD PRESSURE: 111 MMHG | TEMPERATURE: 98 F | HEART RATE: 76 BPM | RESPIRATION RATE: 18 BRPM | WEIGHT: 162 LBS | HEIGHT: 62 IN | BODY MASS INDEX: 29.81 KG/M2

## 2024-02-03 LAB
ANION GAP SERPL CALC-SCNC: 8 MMOL/L (ref 8–16)
BASOPHILS # BLD AUTO: 0.01 K/UL (ref 0–0.2)
BASOPHILS NFR BLD: 0.2 % (ref 0–1.9)
BUN SERPL-MCNC: 40 MG/DL (ref 8–23)
CALCIUM SERPL-MCNC: 9.1 MG/DL (ref 8.7–10.5)
CHLORIDE SERPL-SCNC: 97 MMOL/L (ref 95–110)
CO2 SERPL-SCNC: 36 MMOL/L (ref 23–29)
CREAT SERPL-MCNC: 1 MG/DL (ref 0.5–1.4)
DIFFERENTIAL METHOD BLD: ABNORMAL
EOSINOPHIL # BLD AUTO: 0 K/UL (ref 0–0.5)
EOSINOPHIL NFR BLD: 0.5 % (ref 0–8)
ERYTHROCYTE [DISTWIDTH] IN BLOOD BY AUTOMATED COUNT: 15.8 % (ref 11.5–14.5)
EST. GFR  (NO RACE VARIABLE): 56 ML/MIN/1.73 M^2
GLUCOSE SERPL-MCNC: 89 MG/DL (ref 70–110)
HCT VFR BLD AUTO: 36.6 % (ref 37–48.5)
HGB BLD-MCNC: 10.9 G/DL (ref 12–16)
IMM GRANULOCYTES # BLD AUTO: 0.02 K/UL (ref 0–0.04)
IMM GRANULOCYTES NFR BLD AUTO: 0.3 % (ref 0–0.5)
LYMPHOCYTES # BLD AUTO: 1.1 K/UL (ref 1–4.8)
LYMPHOCYTES NFR BLD: 18.3 % (ref 18–48)
MCH RBC QN AUTO: 25.8 PG (ref 27–31)
MCHC RBC AUTO-ENTMCNC: 29.8 G/DL (ref 32–36)
MCV RBC AUTO: 87 FL (ref 82–98)
MONOCYTES # BLD AUTO: 0.9 K/UL (ref 0.3–1)
MONOCYTES NFR BLD: 13.7 % (ref 4–15)
NEUTROPHILS # BLD AUTO: 4.2 K/UL (ref 1.8–7.7)
NEUTROPHILS NFR BLD: 67 % (ref 38–73)
NRBC BLD-RTO: 0 /100 WBC
PLATELET # BLD AUTO: 201 K/UL (ref 150–450)
PMV BLD AUTO: 12.8 FL (ref 9.2–12.9)
POCT GLUCOSE: 116 MG/DL (ref 70–110)
POCT GLUCOSE: 117 MG/DL (ref 70–110)
POTASSIUM SERPL-SCNC: 4.3 MMOL/L (ref 3.5–5.1)
RBC # BLD AUTO: 4.23 M/UL (ref 4–5.4)
SODIUM SERPL-SCNC: 141 MMOL/L (ref 136–145)
WBC # BLD AUTO: 6.22 K/UL (ref 3.9–12.7)

## 2024-02-03 PROCEDURE — 25000003 PHARM REV CODE 250: Performed by: FAMILY MEDICINE

## 2024-02-03 PROCEDURE — 36415 COLL VENOUS BLD VENIPUNCTURE: CPT | Performed by: STUDENT IN AN ORGANIZED HEALTH CARE EDUCATION/TRAINING PROGRAM

## 2024-02-03 PROCEDURE — 27000221 HC OXYGEN, UP TO 24 HOURS

## 2024-02-03 PROCEDURE — 99239 HOSP IP/OBS DSCHRG MGMT >30: CPT | Mod: ,,, | Performed by: STUDENT IN AN ORGANIZED HEALTH CARE EDUCATION/TRAINING PROGRAM

## 2024-02-03 PROCEDURE — 63600175 PHARM REV CODE 636 W HCPCS: Performed by: PHYSICIAN ASSISTANT

## 2024-02-03 PROCEDURE — 25000003 PHARM REV CODE 250: Performed by: PHYSICIAN ASSISTANT

## 2024-02-03 PROCEDURE — 94761 N-INVAS EAR/PLS OXIMETRY MLT: CPT

## 2024-02-03 PROCEDURE — 85025 COMPLETE CBC W/AUTO DIFF WBC: CPT | Performed by: STUDENT IN AN ORGANIZED HEALTH CARE EDUCATION/TRAINING PROGRAM

## 2024-02-03 PROCEDURE — 80048 BASIC METABOLIC PNL TOTAL CA: CPT | Performed by: STUDENT IN AN ORGANIZED HEALTH CARE EDUCATION/TRAINING PROGRAM

## 2024-02-03 RX ORDER — AZITHROMYCIN 250 MG/1
250 TABLET, FILM COATED ORAL DAILY
Qty: 3 TABLET | Refills: 0 | Status: SHIPPED | OUTPATIENT
Start: 2024-02-03 | End: 2024-02-06

## 2024-02-03 RX ORDER — PREDNISONE 20 MG/1
40 TABLET ORAL DAILY
Qty: 10 TABLET | Refills: 0 | Status: SHIPPED | OUTPATIENT
Start: 2024-02-03 | End: 2024-02-08

## 2024-02-03 RX ADMIN — METHYLPREDNISOLONE SODIUM SUCCINATE 40 MG: 40 INJECTION, POWDER, FOR SOLUTION INTRAMUSCULAR; INTRAVENOUS at 09:02

## 2024-02-03 RX ADMIN — AZITHROMYCIN MONOHYDRATE 500 MG: 500 INJECTION, POWDER, LYOPHILIZED, FOR SOLUTION INTRAVENOUS at 11:02

## 2024-02-03 RX ADMIN — SERTRALINE HYDROCHLORIDE 25 MG: 25 TABLET ORAL at 06:02

## 2024-02-03 NOTE — ASSESSMENT & PLAN NOTE
Patient's COPD is with exacerbation noted by continued dyspnea, use of accessory muscles for breathing, and worsening of baseline hypoxia currently.  Patient is currently on COPD Pathway. Continue scheduled inhalers Steroids, Antibiotics, and Supplemental oxygen and monitor respiratory status closely.     IV abx, IV steroids     Stable for DC back to NH; would transition to oral abx and steroids to complete course.

## 2024-02-03 NOTE — DISCHARGE SUMMARY
[place] - [facility] Surg ([facility])  Hospital Medicine  Discharge Summary      Patient Name: Gretel Ashton  MRN: [id number]  LAW: [id number]  Patient Class: IP- Inpatient  Admission Date: 2/1/2024  Hospital Length of Stay: 1 days  Discharge Date and Time: No discharge date for patient encounter.  Attending Physician: Bobo Macdonald MD   Discharging Provider: Bobo Macdonald MD  Primary Care Provider: Jailene Astudillo MD    Primary Care Team: Networked reference to record PCT     HPI:   83 year old female with known dementia and chronic resp failure who sees dr. Guerra had a witnessed episode of aspiration today at the NH and subsequent increased dyspnea. She was sent for evaluation. Coincidentally she was seen yesterday by Dr. Guerra. While she was there, her sats were 84%. However, she was not actively SOB and has o2 orders. She has known significant emphysema secondary to her significant tobacco use. She was advised to cont her trelegy and rtc in 12 months. Today, she was dyspneic and was wheezing on arrival - 93% on 3L NC. She was given nebs and steroid and remained dyspneic. Work up reassuring. At bedside daughter and DIL present and report that she was very short of breath. She has known dementia but is at her baseline. Her resp status is much improved at current.    * No surgery found *      Hospital Course:   Pt HD stable on 3L nasal cannula with oxygen saturation 95% at rest.  She was initially tachypnea on 2L with ambulation.  Working with case management/ to determine who is power of  due to dementia.      2/3/24: doing well, reports her breathing is at baseline. She is stable on home 3L NC. VSS     Goals of Care Treatment Preferences:  Code Status: Full Code       LaPOST: Yes           Consults:     No new Assessment & Plan notes have been filed under this hospital service since the last note was generated.  Service: Hospital Medicine    Final Active Diagnoses:    Diagnosis Date Noted  POA    PRINCIPAL PROBLEM:  COPD with acute bronchitis [J44.0, J20.9] 05/08/2018 Yes    Chronic kidney disease, stage 3a [N18.31] 04/18/2022 Yes    Alzheimer's dementia with behavioral disturbance [G30.9, F02.818] 04/18/2022 Yes    Symptomatic anemia [D64.9] 04/11/2022 Yes    HTN (hypertension) [I10] 04/13/2020 Yes    Acute on chronic respiratory failure with hypoxemia [J96.21] 04/12/2020 Yes    SOB (shortness of breath) [R06.02]  Yes    Mild cognitive disorder [F09] 12/18/2014 Yes    Type 2 diabetes mellitus with diabetic neuropathy, without long-term current use of insulin [E11.40] 12/04/2013 Yes      Problems Resolved During this Admission:       Discharged Condition: fair    Disposition: Home or Self Care    Follow Up:   Follow-up Information       Jailene Astudillo MD. Schedule an appointment as soon as possible for a visit in 1 week(s).    Specialty: Internal Medicine  Contact information:  [facility] [address] [address]  [phone]                           Patient Instructions:      Diet diabetic     Notify your health care provider if you experience any of the following:  temperature >100.4     Notify your health care provider if you experience any of the following:  difficulty breathing or increased cough     Notify your health care provider if you experience any of the following:  increased confusion or weakness     Notify your health care provider if you experience any of the following:  persistent dizziness, light-headedness, or visual disturbances     Activity as tolerated       Significant Diagnostic Studies: Labs: BMP:   Recent Labs   Lab 02/02/24  0856 02/03/24  0804   * 89    141   K 4.2 4.3   CL 95 97   CO2 34* 36*   BUN 35* 40*   CREATININE 1.3 1.0   CALCIUM 9.5 9.1   , CMP   Recent Labs   Lab 02/02/24  0856 02/03/24  0804    141   K 4.2 4.3   CL 95 97   CO2 34* 36*   * 89   BUN 35* 40*   CREATININE 1.3 1.0   CALCIUM 9.5 9.1   ANIONGAP 12 8   , and CBC   Recent Labs    Lab 02/02/24  0856 02/03/24  0804   WBC 6.57 6.22   HGB 11.4* 10.9*   HCT 36.5* 36.6*    201     Microbiology: Blood Culture   Lab Results   Component Value Date    LABBLOO No Growth to date 02/01/2024    LABBLOO No Growth to date 02/01/2024     Radiology: X-Ray: CXR: X-Ray Chest PA and Lateral (CXR):   Results for orders placed or performed during the hospital encounter of 02/01/24   X-Ray Chest PA And Lateral    Narrative    EXAMINATION:  XR CHEST PA AND LATERAL    CLINICAL HISTORY:  aspiration;    TECHNIQUE:  PA and lateral views of the chest were performed.    COMPARISON:  02/01/2024    FINDINGS:  Chronic lung changes are noted.  The heart is enlarged there is pulmonary vascular congestion with interstitial edema.  No pleural effusions.  Left-sided pacer device is in place.  Calcified atheromatous disease affects the aorta.  The bones are osteopenic and show age-appropriate degenerative change.      Impression    As above.      Electronically signed by: Elayne Nash MD  Date:    02/02/2024  Time:    08:55     CT scan:  CT chest:  1. Mild pulmonary edema with evidence few scattered reticular opacities that primarily within the upper lobes bilaterally more prominent towards the right likely on the basis of a low-grade infectious or inflammatory process remote.  2. Prominent subpleural nodularity the right upper lobe with adjacent pleural thickening on a chronic basis.  3. Mild cardiomegaly with extensive atheromatous calcifications of the major coronary vessels.  Pending Diagnostic Studies:       None           Medications:  Reconciled Home Medications:      Medication List        START taking these medications      azithromycin 250 MG tablet  Commonly known as: Z-JARED  Take 1 tablet (250 mg total) by mouth once daily. Take 2 tablets by mouth on day 1; Take 1 tablet by mouth on days 2-5 for 3 days     predniSONE 20 MG tablet  Commonly known as: DELTASONE  Take 2 tablets (40 mg total) by mouth once daily.  "for 5 days            CONTINUE taking these medications      acetaminophen 325 MG tablet  Commonly known as: TYLENOL  Take 650 mg by mouth every 6 (six) hours as needed for Pain.     albuterol-ipratropium 2.5 mg-0.5 mg/3 mL nebulizer solution  Commonly known as: DUO-NEB  Take 3 mLs by nebulization every 6 (six) hours as needed for Wheezing. Rescue     aspirin 81 MG EC tablet  Commonly known as: ECOTRIN  Take 81 mg by mouth every Mon, Wed, Fri.     bempedoic acid 180 mg Tab  Take 180 mg by mouth every evening.     coenzyme Q10 100 mg capsule  Take 100 mg by mouth once daily.     cyanocobalamin (vitamin B-12) 1,000 mcg Subl  Place 1,000 mcg under the tongue once daily.     ferrous sulfate 325 (65 FE) MG EC tablet  Take 325 mg by mouth once daily.     fluticasone-umeclidin-vilanter 100-62.5-25 mcg Dsdv  Commonly known as: TRELEGY ELLIPTA  Inhale 1 puff into the lungs once daily.     leflunomide 10 MG Tab  Commonly known as: ARAVA  Take 10 mg by mouth every other day.     memantine 10 MG Tab  Commonly known as: NAMENDA  TAKE 1 TABLET BY MOUTH 2 (TWO) TIMES A DAY.     metFORMIN 500 MG tablet  Commonly known as: GLUCOPHAGE  Take 1 tablet (500 mg total) by mouth 2 (two) times daily with meals.     omeprazole 40 MG capsule  Commonly known as: PRILOSEC  Take 40 mg by mouth every morning.     rosuvastatin 40 MG Tab  Commonly known as: CRESTOR  Take 40 mg by mouth every evening.     sertraline 25 MG tablet  Commonly known as: ZOLOFT  Take 12.5 mg by mouth every morning.     SYRINGE 3CC/25GX1" 3 mL 25 gauge x 1" Syrg  Generic drug: syringe with needle  Once every months              Indwelling Lines/Drains at time of discharge:   Lines/Drains/Airways       Drain  Duration             Female External Urinary Catheter w/ Suction 02/02/24 1828 1 day                    Time spent on the discharge of patient: 36 minutes         Bobo Macdonald MD  Department of Hospital Medicine  [place] - [facility] Surg (3rd Fl)  "

## 2024-02-03 NOTE — NURSING
Discharge report called to nurse Carey at [facility]. All questions answered. Transporter to  patient shortly.

## 2024-02-03 NOTE — PROGRESS NOTES
[place] - [facility] Surg ([facility])  [facility] Medicine  Progress Note    Patient Name: Gretel Ashton  MRN: [id number]  Patient Class: IP- Inpatient   Admission Date: 2024  Length of Stay: 1 days  Attending Physician: Bobo Macdonald MD  Primary Care Provider: Jailene Astudillo MD        Subjective:     Principal Problem:COPD with acute bronchitis        HPI:  83 year old female with known dementia and chronic resp failure who sees dr. Guerra had a witnessed episode of aspiration today at the NH and subsequent increased dyspnea. She was sent for evaluation. Coincidentally she was seen yesterday by Dr. Guerra. While she was there, her sats were 84%. However, she was not actively SOB and has o2 orders. She has known significant emphysema secondary to her significant tobacco use. She was advised to cont her trelegy and rtc in 12 months. Today, she was dyspneic and was wheezing on arrival - 93% on 3L NC. She was given nebs and steroid and remained dyspneic. Work up reassuring. At bedside daughter and DIL present and report that she was very short of breath. She has known dementia but is at her baseline. Her resp status is much improved at current.    Overview/Hospital Course:  Pt HD stable on 3L nasal cannula with oxygen saturation 95% at rest.  She was initially tachypnea on 2L with ambulation.  Working with case management/ to determine who is power of  due to dementia.      2/3/24: doing well, reports her breathing is at baseline. She is stable on home 3L NC. VSS    Past Medical History:   Diagnosis Date    Arthritis     COPD (chronic obstructive pulmonary disease)     Depression     Diabetes mellitus type II     Hyperlipidemia     Hypertension     Pacemaker        Past Surgical History:   Procedure Laterality Date    CARDIAC PACEMAKER PLACEMENT       SECTION      CYST REMOVAL Left 2019    Procedure: EXCISION, SEBACEOUS CYST;  Surgeon: Jaylen Gonzalez Jr., MD;  Location: [facility] OR;   "Service: General;  Laterality: Left;    INNER EAR SURGERY         Review of patient's allergies indicates:  No Known Allergies    No current facility-administered medications on file prior to encounter.     Current Outpatient Medications on File Prior to Encounter   Medication Sig    acetaminophen (TYLENOL) 325 MG tablet Take 650 mg by mouth every 6 (six) hours as needed for Pain.    albuterol-ipratropium (DUO-NEB) 2.5 mg-0.5 mg/3 mL nebulizer solution Take 3 mLs by nebulization every 6 (six) hours as needed for Wheezing. Rescue    aspirin (ECOTRIN) 81 MG EC tablet Take 81 mg by mouth every Mon, Wed, Fri.    bempedoic acid 180 mg Tab Take 180 mg by mouth every evening.    coenzyme Q10 100 mg capsule Take 100 mg by mouth once daily.    cyanocobalamin, vitamin B-12, 1,000 mcg Subl Place 1,000 mcg under the tongue once daily.    ferrous sulfate 325 (65 FE) MG EC tablet Take 325 mg by mouth once daily.    fluticasone-umeclidin-vilanter (TRELEGY ELLIPTA) 100-62.5-25 mcg DsDv Inhale 1 puff into the lungs once daily.    leflunomide (ARAVA) 10 MG Tab Take 10 mg by mouth every other day.    memantine (NAMENDA) 10 MG Tab TAKE 1 TABLET BY MOUTH 2 (TWO) TIMES A DAY.    metFORMIN (GLUCOPHAGE) 500 MG tablet Take 1 tablet (500 mg total) by mouth 2 (two) times daily with meals.    omeprazole (PRILOSEC) 40 MG capsule Take 40 mg by mouth every morning.    rosuvastatin (CRESTOR) 40 MG Tab Take 40 mg by mouth every evening.    sertraline (ZOLOFT) 25 MG tablet Take 12.5 mg by mouth every morning.    syringe with needle (SYRINGE 3CC/25GX1") 3 mL 25 gauge x 1" Syrg Once every months    [DISCONTINUED] hydroCHLOROthiazide (HYDRODIURIL) 12.5 MG Tab Take 12.5 mg by mouth daily as needed.      Family History       Problem Relation (Age of Onset)    Breast cancer Mother, Sister    Cancer Mother, Sister    Stroke Father, Maternal Grandmother          Tobacco Use    Smoking status: Former     Current packs/day: 0.00     Average packs/day: 2.0 " packs/day for 43.0 years (86.0 ttl pk-yrs)     Types: Cigarettes     Start date: 1957     Quit date: 2000     Years since quittin.1    Smokeless tobacco: Never   Substance and Sexual Activity    Alcohol use: No    Drug use: No    Sexual activity: Not on file     Review of Systems   Reason unable to perform ROS: no acute compliants but h/o dementia.   Constitutional:  Negative for fever.   HENT:  Negative for congestion and sore throat.    Respiratory:  Positive for shortness of breath (at baseline). Negative for cough.    Cardiovascular:  Negative for chest pain.   Gastrointestinal:  Negative for abdominal pain, diarrhea, nausea and vomiting.   Genitourinary:  Negative for dysuria and hematuria.   Neurological:  Negative for dizziness, syncope and light-headedness.     Objective:     Vital Signs (Most Recent):  Temp: 97.8 °F (36.6 °C) (24 1113)  Pulse: 70 (24 1113)  Resp: 18 (24 1113)  BP: 111/67 (24 1113)  SpO2: 99 % (24 1113) Vital Signs (24h Range):  Temp:  [97.1 °F (36.2 °C)-98.3 °F (36.8 °C)] 97.8 °F (36.6 °C)  Pulse:  [60-96] 70  Resp:  [16-20] 18  SpO2:  [94 %-99 %] 99 %  BP: (111-126)/(53-73) 111/67     Weight: 73.5 kg (162 lb)  Body mass index is 29.63 kg/m².     Physical Exam  Constitutional:       General: She is not in acute distress.  HENT:      Head: Normocephalic and atraumatic.      Mouth/Throat:      Mouth: Mucous membranes are moist.   Eyes:      General:         Right eye: No discharge.         Left eye: No discharge.      Extraocular Movements: Extraocular movements intact.      Conjunctiva/sclera: Conjunctivae normal.      Pupils: Pupils are equal, round, and reactive to light.   Cardiovascular:      Rate and Rhythm: Normal rate and regular rhythm.   Pulmonary:      Effort: Pulmonary effort is normal.      Breath sounds: Normal breath sounds. No wheezing.   Abdominal:      General: There is no distension.      Tenderness: There is no abdominal  "tenderness.   Musculoskeletal:         General: No swelling or tenderness.      Cervical back: Neck supple. No tenderness.   Skin:     General: Skin is warm and dry.   Neurological:      General: No focal deficit present.      Mental Status: She is alert. Mental status is at baseline. She is disoriented.   Psychiatric:         Mood and Affect: Mood normal.         Behavior: Behavior normal.              CRANIAL NERVES     CN III, IV, VI   Pupils are equal, round, and reactive to light.       Significant Labs: All pertinent labs within the past 24 hours have been reviewed.  ABGs:   Recent Labs   Lab 02/01/24  1650 02/02/24  1045   PH 7.390 7.470*   PCO2 56* 56*   HCO3 33.90* 40.80*   POCSATURATED 94.1 93.8   BE 7.40* 14.90*   TOTALHB 11.6* 11.3*   COHB 1.5 1.6   METHB 0.7 1.1   PO2 65* 64*       CBC:   Recent Labs   Lab 02/02/24  0856 02/03/24  0804   WBC 6.57 6.22   HGB 11.4* 10.9*   HCT 36.5* 36.6*    201       CMP:   Recent Labs   Lab 02/02/24  0856 02/03/24  0804    141   K 4.2 4.3   CL 95 97   CO2 34* 36*   * 89   BUN 35* 40*   CREATININE 1.3 1.0   CALCIUM 9.5 9.1   ANIONGAP 12 8       Magnesium: No results for input(s): "MG" in the last 48 hours.  Troponin:   No results for input(s): "TROPONINI", "TROPONINIHS" in the last 48 hours.    TSH: No results for input(s): "TSH" in the last 4320 hours.    Significant Imaging: I have reviewed all pertinent imaging results/findings within the past 24 hours.    CT:  ?patchy bilateral apical infiltrates/nodules.  No effusion but some fluid noted.    Assessment/Plan:      * COPD with acute bronchitis  Patient's COPD is with exacerbation noted by continued dyspnea, use of accessory muscles for breathing, and worsening of baseline hypoxia currently.  Patient is currently on COPD Pathway. Continue scheduled inhalers Steroids, Antibiotics, and Supplemental oxygen and monitor respiratory status closely.     IV abx, IV steroids     Stable for DC back to NH; " would transition to oral abx and steroids to complete course.    Alzheimer's dementia with behavioral disturbance  Hold home namenda    Chronic kidney disease, stage 3a  Creatine stable for now. BMP reviewed- noted Estimated Creatinine Clearance: 39.3 mL/min (based on SCr of 1 mg/dL). according to latest data. Based on current GFR, CKD stage is stage 3 - GFR 30-59.  Monitor UOP and serial BMP and adjust therapy as needed. Renally dose meds. Avoid nephrotoxic medications and procedures.    Symptomatic anemia  Patient's anemia is currently controlled. Has not received any PRBCs to date. Etiology likely d/t Iron deficiency and chronic disease  Current CBC reviewed-   Lab Results   Component Value Date    HGB 10.9 (L) 02/03/2024    HCT 36.6 (L) 02/03/2024     Monitor serial CBC and transfuse if patient becomes hemodynamically unstable, symptomatic or H/H drops below 7/21.    HTN (hypertension)  Not on home meds.    Temp:  [97.1 °F (36.2 °C)-98.3 °F (36.8 °C)]   Pulse:  [60-96]   Resp:  [16-20]   BP: (111-126)/(53-73)   SpO2:  [94 %-99 %] .   Home meds for hypertension were reviewed and noted below.   Hypertension Medications             Acute on chronic respiratory failure with hypoxemia  Patient with Hypercapnic and Hypoxic Respiratory failure which is Acute on chronic.  she is on home oxygen at 3 LPM. Supplemental oxygen was provided and noted-      .   Signs/symptoms of respiratory failure include- tachypnea, increased work of breathing, and respiratory distress. Contributing diagnoses includes - Aspiration and COPD Labs and images were reviewed. Patient Has recent ABG, which has been reviewed. Will treat underlying causes and adjust management of respiratory failure as follows- o2 support, given nebs and steroid. No longer wheezing. Will not repeat solumedrol at present. Cont on nebs. Monitor for fever - none previous to admit and wean o2 as tolerated with goal 92%. Will recheck abg as co2 64 on admit.    2/2/24:  Tachypnea initially but now improving with increasing supplemental oxygen from 2 to 3 L.  IV abx.  Will discuss coded status and goals of care with children.      2/3/24: respiratory status much improved, pt reports her breathing is at baseline. Stable on 2-3L NC which is her home oxygen requirement.     Mild cognitive disorder        Type 2 diabetes mellitus with diabetic neuropathy, without long-term current use of insulin  Sliding scale only.        VTE Risk Mitigation (From admission, onward)           Ordered     enoxaparin injection 30 mg  Daily         02/01/24 1553     IP VTE HIGH RISK PATIENT  Once         02/01/24 1553     Place sequential compression device  Until discontinued         02/01/24 1553                    Discharge Planning   MARIKA:      Code Status: Full Code   Is the patient medically ready for discharge?:     yes  Discharge Plan A: Return to nursing home                  Bobo Macdonald MD  Department of Hospital Medicine   [place] - Med Surg (3rd Fl)

## 2024-02-03 NOTE — ASSESSMENT & PLAN NOTE
Creatine stable for now. BMP reviewed- noted Estimated Creatinine Clearance: 39.3 mL/min (based on SCr of 1 mg/dL). according to latest data. Based on current GFR, CKD stage is stage 3 - GFR 30-59.  Monitor UOP and serial BMP and adjust therapy as needed. Renally dose meds. Avoid nephrotoxic medications and procedures.

## 2024-02-03 NOTE — PLAN OF CARE
02/03/24 1205   Post-Acute Status   Post-Acute Authorization Placement   Post-Acute Placement Status Set-up Complete/Auth obtained   Coverage Medicare   Hospital Resources/Appts/Education Provided Appointments scheduled and added to AVS   Patient choice form signed by patient/caregiver List from System Post-Acute Care   Discharge Delays None known at this time   Discharge Plan   Discharge Plan A Return to nursing home         MD notified CM that patient is ready for discharge back to the nursing home. CM notified [facility] of this, and they state they will try to find a  for transport.   Discharge summary faxed to nursing home for review, and nurse can call report in about 30 minutes. Nurse aware.

## 2024-02-03 NOTE — SUBJECTIVE & OBJECTIVE
Past Medical History:   Diagnosis Date    Arthritis     COPD (chronic obstructive pulmonary disease)     Depression     Diabetes mellitus type II     Hyperlipidemia     Hypertension     Pacemaker        Past Surgical History:   Procedure Laterality Date    CARDIAC PACEMAKER PLACEMENT       SECTION      CYST REMOVAL Left 2019    Procedure: EXCISION, SEBACEOUS CYST;  Surgeon: Jaylen Gonzalez Jr., MD;  Location: [facility];  Service: General;  Laterality: Left;    INNER EAR SURGERY         Review of patient's allergies indicates:  No Known Allergies    No current facility-administered medications on file prior to encounter.     Current Outpatient Medications on File Prior to Encounter   Medication Sig    acetaminophen (TYLENOL) 325 MG tablet Take 650 mg by mouth every 6 (six) hours as needed for Pain.    albuterol-ipratropium (DUO-NEB) 2.5 mg-0.5 mg/3 mL nebulizer solution Take 3 mLs by nebulization every 6 (six) hours as needed for Wheezing. Rescue    aspirin (ECOTRIN) 81 MG EC tablet Take 81 mg by mouth every Mon, Wed, Fri.    bempedoic acid 180 mg Tab Take 180 mg by mouth every evening.    coenzyme Q10 100 mg capsule Take 100 mg by mouth once daily.    cyanocobalamin, vitamin B-12, 1,000 mcg Subl Place 1,000 mcg under the tongue once daily.    ferrous sulfate 325 (65 FE) MG EC tablet Take 325 mg by mouth once daily.    fluticasone-umeclidin-vilanter (TRELEGY ELLIPTA) 100-62.5-25 mcg DsDv Inhale 1 puff into the lungs once daily.    leflunomide (ARAVA) 10 MG Tab Take 10 mg by mouth every other day.    memantine (NAMENDA) 10 MG Tab TAKE 1 TABLET BY MOUTH 2 (TWO) TIMES A DAY.    metFORMIN (GLUCOPHAGE) 500 MG tablet Take 1 tablet (500 mg total) by mouth 2 (two) times daily with meals.    omeprazole (PRILOSEC) 40 MG capsule Take 40 mg by mouth every morning.    rosuvastatin (CRESTOR) 40 MG Tab Take 40 mg by mouth every evening.    sertraline (ZOLOFT) 25 MG tablet Take 12.5 mg by mouth every morning.    syringe  "with needle (SYRINGE 3CC/25GX1") 3 mL 25 gauge x 1" Syrg Once every months    [DISCONTINUED] hydroCHLOROthiazide (HYDRODIURIL) 12.5 MG Tab Take 12.5 mg by mouth daily as needed.      Family History       Problem Relation (Age of Onset)    Breast cancer Mother, Sister    Cancer Mother, Sister    Stroke Father, Maternal Grandmother          Tobacco Use    Smoking status: Former     Current packs/day: 0.00     Average packs/day: 2.0 packs/day for 43.0 years (86.0 ttl pk-yrs)     Types: Cigarettes     Start date: 1957     Quit date: 2000     Years since quittin.1    Smokeless tobacco: Never   Substance and Sexual Activity    Alcohol use: No    Drug use: No    Sexual activity: Not on file     Review of Systems   Reason unable to perform ROS: no acute compliants but h/o dementia.   Constitutional:  Negative for fever.   HENT:  Negative for congestion and sore throat.    Respiratory:  Positive for shortness of breath (at baseline). Negative for cough.    Cardiovascular:  Negative for chest pain.   Gastrointestinal:  Negative for abdominal pain, diarrhea, nausea and vomiting.   Genitourinary:  Negative for dysuria and hematuria.   Neurological:  Negative for dizziness, syncope and light-headedness.     Objective:     Vital Signs (Most Recent):  Temp: 97.8 °F (36.6 °C) (24 1113)  Pulse: 70 (24 1113)  Resp: 18 (24 1113)  BP: 111/67 (24 1113)  SpO2: 99 % (24 1113) Vital Signs (24h Range):  Temp:  [97.1 °F (36.2 °C)-98.3 °F (36.8 °C)] 97.8 °F (36.6 °C)  Pulse:  [60-96] 70  Resp:  [16-20] 18  SpO2:  [94 %-99 %] 99 %  BP: (111-126)/(53-73) 111/67     Weight: 73.5 kg (162 lb)  Body mass index is 29.63 kg/m².     Physical Exam  Constitutional:       General: She is not in acute distress.  HENT:      Head: Normocephalic and atraumatic.      Mouth/Throat:      Mouth: Mucous membranes are moist.   Eyes:      General:         Right eye: No discharge.         Left eye: No discharge.      " "Extraocular Movements: Extraocular movements intact.      Conjunctiva/sclera: Conjunctivae normal.      Pupils: Pupils are equal, round, and reactive to light.   Cardiovascular:      Rate and Rhythm: Normal rate and regular rhythm.   Pulmonary:      Effort: Pulmonary effort is normal.      Breath sounds: Normal breath sounds. No wheezing.   Abdominal:      General: There is no distension.      Tenderness: There is no abdominal tenderness.   Musculoskeletal:         General: No swelling or tenderness.      Cervical back: Neck supple. No tenderness.   Skin:     General: Skin is warm and dry.   Neurological:      General: No focal deficit present.      Mental Status: She is alert. Mental status is at baseline. She is disoriented.   Psychiatric:         Mood and Affect: Mood normal.         Behavior: Behavior normal.              CRANIAL NERVES     CN III, IV, VI   Pupils are equal, round, and reactive to light.       Significant Labs: All pertinent labs within the past 24 hours have been reviewed.  ABGs:   Recent Labs   Lab 02/01/24  1650 02/02/24  1045   PH 7.390 7.470*   PCO2 56* 56*   HCO3 33.90* 40.80*   POCSATURATED 94.1 93.8   BE 7.40* 14.90*   TOTALHB 11.6* 11.3*   COHB 1.5 1.6   METHB 0.7 1.1   PO2 65* 64*       CBC:   Recent Labs   Lab 02/02/24  0856 02/03/24  0804   WBC 6.57 6.22   HGB 11.4* 10.9*   HCT 36.5* 36.6*    201       CMP:   Recent Labs   Lab 02/02/24  0856 02/03/24  0804    141   K 4.2 4.3   CL 95 97   CO2 34* 36*   * 89   BUN 35* 40*   CREATININE 1.3 1.0   CALCIUM 9.5 9.1   ANIONGAP 12 8       Magnesium: No results for input(s): "MG" in the last 48 hours.  Troponin:   No results for input(s): "TROPONINI", "TROPONINIHS" in the last 48 hours.    TSH: No results for input(s): "TSH" in the last 4320 hours.    Significant Imaging: I have reviewed all pertinent imaging results/findings within the past 24 hours.    CT:  ?patchy bilateral apical infiltrates/nodules.  No effusion but " some fluid noted.

## 2024-02-03 NOTE — PLAN OF CARE
02/03/24 1217   Medicare Message   Important Message from Medicare regarding Discharge Appeal Rights Given to patient/caregiver;Explained to patient/caregiver;Signed/date by patient/caregiver   Date IMM was signed 02/03/24   Time IMM was signed 1205         Copy placed in chart, but patient is disoriented at baseline. CM did try to call son to complete via phone, but no answer.

## 2024-02-03 NOTE — ASSESSMENT & PLAN NOTE
Not on home meds.    Temp:  [97.1 °F (36.2 °C)-98.3 °F (36.8 °C)]   Pulse:  [60-96]   Resp:  [16-20]   BP: (111-126)/(53-73)   SpO2:  [94 %-99 %] .   Home meds for hypertension were reviewed and noted below.   Hypertension Medications

## 2024-02-03 NOTE — PLAN OF CARE
[place] - Med Surg (3rd Fl)  Discharge Final Note    Primary Care Provider: Jailene Astudillo MD    Expected Discharge Date: 2/3/2024    Final Discharge Note (most recent)       Final Note - 02/03/24 1224          Post-Acute Status    Post-Acute Authorization Placement (P)      Post-Acute Placement Status Set-up Complete/Auth obtained (P)      Coverage Medicare (P)      Patient choice form signed by patient/caregiver List from System Post-Acute Care (P)      Discharge Delays None known at this time (P)                      Important Message from Medicare  Important Message from Medicare regarding Discharge Appeal Rights: Given to patient/caregiver, Explained to patient/caregiver, Signed/date by patient/caregiver     Date IMM was signed: 02/03/24  Time IMM was signed: 1205    Contact Info       Jailene Astudillo MD   Specialty: Internal Medicine   Relationship: PCP - General    [address] [address]   Phone: [phone]       Next Steps: Schedule an appointment as soon as possible for a visit in 1 week(s)            Patient will return to [facility] today where she is a current resident. [facility] has been notified of this, and they are aware patient will need their transport. Nurse aware to call report to [phone].

## 2024-02-03 NOTE — ASSESSMENT & PLAN NOTE
Patient with Hypercapnic and Hypoxic Respiratory failure which is Acute on chronic.  she is on home oxygen at 3 LPM. Supplemental oxygen was provided and noted-      .   Signs/symptoms of respiratory failure include- tachypnea, increased work of breathing, and respiratory distress. Contributing diagnoses includes - Aspiration and COPD Labs and images were reviewed. Patient Has recent ABG, which has been reviewed. Will treat underlying causes and adjust management of respiratory failure as follows- o2 support, given nebs and steroid. No longer wheezing. Will not repeat solumedrol at present. Cont on nebs. Monitor for fever - none previous to admit and wean o2 as tolerated with goal 92%. Will recheck abg as co2 64 on admit.    2/2/24: Tachypnea initially but now improving with increasing supplemental oxygen from 2 to 3 L.  IV abx.  Will discuss coded status and goals of care with children.      2/3/24: respiratory status much improved, pt reports her breathing is at baseline. Stable on 2-3L NC which is her home oxygen requirement.

## 2024-02-03 NOTE — ASSESSMENT & PLAN NOTE
Patient's anemia is currently controlled. Has not received any PRBCs to date. Etiology likely d/t Iron deficiency and chronic disease  Current CBC reviewed-   Lab Results   Component Value Date    HGB 10.9 (L) 02/03/2024    HCT 36.6 (L) 02/03/2024     Monitor serial CBC and transfuse if patient becomes hemodynamically unstable, symptomatic or H/H drops below 7/21.

## 2024-02-03 NOTE — PLAN OF CARE
Problem: Diabetes Comorbidity  Goal: Blood Glucose Level Within Targeted Range  Outcome: Ongoing, Progressing     Problem: Fall Injury Risk  Goal: Absence of Fall and Fall-Related Injury  Outcome: Ongoing, Progressing     Problem: Skin Injury Risk Increased  Goal: Skin Health and Integrity  Outcome: Ongoing, Progressing     Problem: Adult Inpatient Plan of Care  Goal: Absence of Hospital-Acquired Illness or Injury  Outcome: Ongoing, Progressing

## 2024-02-06 ENCOUNTER — PATIENT OUTREACH (OUTPATIENT)
Dept: ADMINISTRATIVE | Facility: CLINIC | Age: 84
End: 2024-02-06
Payer: MEDICARE

## 2024-02-06 LAB
BACTERIA BLD CULT: NORMAL
BACTERIA BLD CULT: NORMAL

## 2024-02-07 NOTE — PHYSICIAN QUERY
PT Name: Gretel Ashton  MR #: [id number]     DOCUMENTATION CLARIFICATION     CDS/: Linda Barakat RN           Contact information: [email or web address]   This form is a permanent document in the medical record.     Query Date: February 7, 2024    By submitting this query, we are merely seeking further clarification of documentation.  Please utilize your independent clinical judgment when addressing the question(s) below.  The Medical Record contains the following   Indicators   Supporting Clinical Findings Location in Medical Record   x Documentation of Respiratory Failure, ARDS Acute on chronic respiratory failure with hypoxemia   H & P of 2/1 , HM PN of 2/2, 2/3 and discharge summary of 2/3    x Subjective Respiratory Signs/Symptoms Patient presents with  Shortness of Breath  To ED via [facility] from the [facility] with SOB with hx of COPD. Pt tachypneic, moderate distress noted. SPO2 90% on 3 L/min O2 via NC.   ED provider note of 2/1    x Objective Respiratory Signs/Symptoms Pulmonary/Chest: She has wheezes.     Today, she was dyspneic and was wheezing on arrival - 93% on 3L NC. She was given nebs and steroid and remained dyspneic.   Pulmonary:      Breath sounds: No wheezing.      Comments: Tachypnea  No wheezing/coarse breath sounds     doing well, reports her breathing is at baseline. She is stable on home 3L NC. VSS   Respiratory:  Positive for shortness of breath (at baseline). Negative for cough.       ED provider note of 2/1     H & P of 2/1               HM PN of 2/3    x RR     O2 sat     O2 use Spo2 90 %     RR 26 on 3 L O2 NC     Pt HD stable on 3L nasal cannula with oxygen saturation 95% at rest.  She was initially tachypnea on 2L with ambulation.     RR   38             Spo2  95 % on 2 L O2 NC     RR   18             Spo2  94 % on 3 L O2 NC     RR    20             Spo2  93 % on 2 L O2 NC     RR    20             Spo2 93 % on 2 L O2 NC     RR    18             Spo2 92 % on 2 L o2 NC     RR   20                Spo2 93 % on 2 L O2 NC      ED provider note of 2/1     HM PN of 2/2       Vital sign flow sheet of 2/1 1018    Vital sign flow sheet of 2/1 1133    Vital sign flow sheet of 2/1  1157    Vital sign flow sheet of 2/1  1644    Vital sign flow sheet of 2/1  1919    Vital sign flow sheet of 2/1  1935   x Blood Gas (ABG or VBG)  02/01/24 10:07 02/01/24 16:50 02/02/24 10:45   POC PH 7.390 7.390 7.470 (H)   POC PCO2 64 (H) 56 (H) 56 (H)   POC PO2 57 (L) 65 (L) 64 (L)   POC HCO3 38.70 (H) 33.90 (H) 40.80 (H)   POC SATURATED O2 90.5 94.1 93.8   POCT Glucose      POC TCO2 40.7 (H) 35.6 (H) 42.5 (H)   POC THb 11.4 (L) 11.6 (L) 11.3 (L)   POC O2Hb Arterial 88.1 (L) 91.9 (L) 91.3 (L)   POC COHb 1.7 1.5 1.6   POC MetHb 0.9 0.7 1.1   POC BE 11.50 (H) 7.40 (H) 14.90 (H)   FiO2 28 28 32   DelSys Nasal Cannula Nasal Cannula Nasal Cannula   Site Right Radial Right Radial Right Brachial      Labs of 2/1 to 2/2    x Hypoxia/Hypercapnia Patient seen with shortness of breath and tachypnea she is COPD patient she was hypoxic on 3 L a minute nasal cannula with O2 sats in the 90 she was given multiple neb treatments  and steroids and still had low oxygen saturations her blood gas shows 7.4 with a pCO2 of 64 PO2 of 57 she has a component of Ca a component infectious upper rest for infection blood cultures were drawn she was given 1 g of Rocephin 40 g Lasix and multiple  neb treatments and steroids and will be admitted for observation    Patient's COPD is with exacerbation noted by continued dyspnea, use of accessory muscles for breathing, and worsening of baseline hypoxia currently.   ED provider note of 2/1             H & P of 2/1     BiPAP/Intubation/Mechanical Ventilation       x Home O2, Oxygen Dependence she is on home oxygen at 3 LPM.   H & P o f2/1    x Acute/Chronic Illness Acute Illness: COPD with acute bronchitis, alzheimer's dementia with behavioral disturbance, CKD 3a, symptomatic anemia, hypertension, acute on  chronic respiratory failure with hypoxemia, type 2 diabetes mellitus with diabetic neuropathy,     Chronic Illness: arthritis, COPD, depression, diabetes mellitus type 2, hyperlipidemia, hypertension, pacemaker, dementia, chronic respiratory failure    H & P of 2/1        H & P of 2/1    x Treatment 2-3 L O2 NC    Vital sign flow sheet of 2/1 to 2/3     Other           The clinical guidelines noted are only a system guideline. It does not replace the providers clinical judgment.    [facility] Approved Diagnostic Criteria      Acute Respiratory Failure    Hypoxic: ABG pO2<60 mmHg or O2 sat of <91% on RA   AND/OR   Hypercapnic: ABG pCO2>50 mmHg with pH <7.35   AND   Respiratory symptoms documented (Subjective: SOB; Objective: Tachypnea, respiratory distress, increased work of breathing, unable to speak in complete sentences, labored breathing, use of accessory muscles, RR>26, cyanosis, dyspnea, wheezing, stridor, lethargy)      Chronic Respiratory Failure   Hypoxic: Continuous home oxygen    AND/OR   Hypercapnic: Normal pH with high CO2 (ex. COPD)      Acute on Chronic Respiratory Failure   Hypoxic: ABG pO2>10mmHg below baseline OR ABG pO2<60 mmHg OR SpO2<91% on usual home O2  OR O2>2L/min over baseline home O2   AND/OR   Hypercapnic: ABG pCO2>50 mmHg OR pCO2>10mmHg over baseline and pH <7.35   AND   Respiratory symptoms documented      Acute Respiratory Distress Syndrome (ARDS) - an acute, diffuse, inflammatory form of lung injury. Suspect with progressive dyspnea, hypoxemic respiratory failure, and bilateral alveolar infiltrates on chest imaging within 6 to 72 hours of an inciting event.   Acute Respiratory Distress - Generally describes less severe respiratory symptoms (tachypnea, in respiratory distress, increased work of breathing, unable to speak in complete sentences, labored breathing, use of accessory muscles, RR> 24, cyanosis, dyspnea, wheezing, stridor, lethargy) without sufficient  measurements (pO2, SpO2, pH, and pCO2) to meet criteria for respiratory failure)   Acute Respiratory Insufficiency - Generally describes less severe respiratory symptoms and measurements (pO2, SpO2, pH, and pCO2) not meeting criteria for respiratory failure         Due to the conflicting clinical picture, please clinically validate the diagnosis of Acute respiratory failure with hypoxemia    If validated, please provide additional clinical support for the diagnosis.     [    ] Acute respiratory failure with hypoxemia is not confirmed and/or it has been ruled out      [    ] Acute respiratory failure with hypoxemia is not confirmed and/or it has been ruled out, other diagnosis ruled in (please specify):_______________     [ X   ] Acute Respiratory Failure with Hypoxia diagnosis is confirmed and additional clinical support/decision-making indicators for the diagnosis include (please specify):increased work of breathing, respiratory distress______________________________________________     [    ] Other clarification (please specify): ___________________           Please document in your progress notes daily for the duration of treatment until resolved and include in your discharge summary.     Reference:    ENEIDA Loza MD. (2020, March 13). Acute respiratory distress syndrome: Clinical features, diagnosis, and complications in adults ([id number] DANIEL Jaimes MD & [id number] NATASHA Mendieta MD, Eds.). Retrieved November 13, 2020, from [email or web address]-[email or web address]-distress-[email or web address]-features-[email or web address]-[email or web address]?search=ards&source=search_result&selectedTitle=1~150&usage_type=default&display_rank=1  Form No. 57088

## 2024-02-08 ENCOUNTER — OFFICE VISIT (OUTPATIENT)
Dept: NEUROLOGY | Facility: CLINIC | Age: 84
End: 2024-02-08
Payer: MEDICARE

## 2024-02-08 VITALS
SYSTOLIC BLOOD PRESSURE: 110 MMHG | BODY MASS INDEX: 28.24 KG/M2 | DIASTOLIC BLOOD PRESSURE: 58 MMHG | HEIGHT: 62 IN | HEART RATE: 76 BPM | RESPIRATION RATE: 20 BRPM | WEIGHT: 153.44 LBS

## 2024-02-08 DIAGNOSIS — I10 HYPERTENSION, UNSPECIFIED TYPE: ICD-10-CM

## 2024-02-08 DIAGNOSIS — G30.9 ALZHEIMER DISEASE: Primary | ICD-10-CM

## 2024-02-08 DIAGNOSIS — E78.5 DYSLIPIDEMIA: ICD-10-CM

## 2024-02-08 DIAGNOSIS — F02.80 ALZHEIMER DISEASE: Primary | ICD-10-CM

## 2024-02-08 PROCEDURE — 99999 PR PBB SHADOW E&M-EST. PATIENT-LVL IV: CPT | Mod: PBBFAC,,, | Performed by: PSYCHIATRY & NEUROLOGY

## 2024-02-08 PROCEDURE — 99214 OFFICE O/P EST MOD 30 MIN: CPT | Mod: PBBFAC | Performed by: PSYCHIATRY & NEUROLOGY

## 2024-02-08 PROCEDURE — 99214 OFFICE O/P EST MOD 30 MIN: CPT | Mod: S$PBB | Performed by: PSYCHIATRY & NEUROLOGY

## 2024-02-08 PROCEDURE — 99999 PR STA SHADOW: CPT | Mod: PBBFAC,,, | Performed by: PSYCHIATRY & NEUROLOGY

## 2024-02-08 NOTE — PROGRESS NOTES
HPI: Gretel Ashton is a 83 y.o. female     Here for one year follow up    Was hospitalized last week for COPD and CHF    Memory is still impaired      Namenda  use is ongoing again per the NH      No dizziness or passing out noted        Mood and behavior has been ok but staff state they have encourage her to keep her O2 on      Son at a prior visit    Here with staff today    Living at [place]       ROS not reliable due to dementia      Exam:  Gen Appearance, well developed/nourished in no apparent distress  CV: 2+ distal pulses with no edema or swelling  Neuro:  MS: Awake, alert, oriented to place, person, not to time and not situation.  Recent recall is moderately forgetful/remote memory intact is also well intact, Language is full to spontaneous speech/comprehension. Fund of Knowledge is full.   CN: Optic discs are flat with normal vasculature, PERRL, Extraoccular movements and visual fields are full. Normal facial strength,Tongue and Palate are midline and strong. Shoulder Shrug symmetric and strong.  Mood is calm  She tells a joke  Motor: Normal bulk, tone, no abnormal movements. 5/5 strength bilateral upper/lower extremities and 1+ reflexes  Sensory: symmetric to   temp, and vibration. Romberg negative  Cerebellar: Finger-nose,Rapid alternating movements intact  Gait: Not done/ in a wheel chair today      Labs:  CMP, CBC, TSH reviewed     B12 well corrected    2022 Cr 1.0    Imagin2021 CT head: No acute intracranial findings.     Age-appropriate cerebral volume loss with mild moderate patchy decreased attenuation supratentorial white matter while nonspecific suggestive for chronic ischemic change.     No evidence for acute intracranial hemorrhage.  Clinical correlation and further evaluation as warranted.    Assessment/Plan: Gretel Ashton is a 83 y.o. female with several months of memory loss and dizziness/ loss of time in 2014  Questionable virchow sauceda space vs chronic lacunar infart  "in the left deep white matter by CT. All symptoms improved greatly at that time    Represented with memory loss worsening over time in 2021  I recommend:   1. Updated CT head 2021 unchanged  -Can't have MRI due to pacemaker. Also, EEG normal -She had reported " blackout" periods prior. PCP worked her up for syncope (resolved) and stopped BP meds with good relief then    2. 2021 B12 level well corrected (taking 500mcg Oral B12 currently, but remotely had to use injections)    3. Continue ASA daily for CVA prevention  Anti-HTN, and statin for dyslipidema for CVA prevention as well good control DM for stroke prevention given possible lacunar infarct prior.     4.  Neuropsychological testing would be difficult based on near illiteracy.   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 She no longer drives, lives in the NH with good support from family and NH     6. Discussed the benefits of treating hearing loss in regards to preventing demential from getting worse prior    RTC 1 year                "

## 2024-02-10 ENCOUNTER — HOSPITAL ENCOUNTER (EMERGENCY)
Facility: HOSPITAL | Age: 84
Discharge: HOME | End: 2024-02-10
Attending: EMERGENCY MEDICINE
Payer: MEDICARE

## 2024-02-10 VITALS
RESPIRATION RATE: 22 BRPM | WEIGHT: 153.38 LBS | BODY MASS INDEX: 28.22 KG/M2 | SYSTOLIC BLOOD PRESSURE: 119 MMHG | DIASTOLIC BLOOD PRESSURE: 61 MMHG | HEART RATE: 78 BPM | HEIGHT: 62 IN | OXYGEN SATURATION: 99 % | TEMPERATURE: 99 F

## 2024-02-10 DIAGNOSIS — R07.9 CHEST PAIN: ICD-10-CM

## 2024-02-10 DIAGNOSIS — R07.9 CHEST PAIN, UNSPECIFIED TYPE: Primary | ICD-10-CM

## 2024-02-10 LAB
ALBUMIN SERPL BCP-MCNC: 3.2 G/DL (ref 3.5–5.2)
ALP SERPL-CCNC: 73 U/L (ref 55–135)
ALT SERPL W/O P-5'-P-CCNC: 28 U/L (ref 10–44)
ANION GAP SERPL CALC-SCNC: 9 MMOL/L (ref 8–16)
AST SERPL-CCNC: 42 U/L (ref 10–40)
BASOPHILS # BLD AUTO: 0.03 K/UL (ref 0–0.2)
BASOPHILS NFR BLD: 0.5 % (ref 0–1.9)
BILIRUB SERPL-MCNC: 0.3 MG/DL (ref 0.1–1)
BILIRUB UR QL STRIP: NEGATIVE
BNP SERPL-MCNC: 42 PG/ML (ref 0–99)
BUN SERPL-MCNC: 29 MG/DL (ref 8–23)
CALCIUM SERPL-MCNC: 9.2 MG/DL (ref 8.7–10.5)
CHLORIDE SERPL-SCNC: 100 MMOL/L (ref 95–110)
CK SERPL-CCNC: 32 U/L (ref 20–180)
CLARITY UR: CLEAR
CO2 SERPL-SCNC: 34 MMOL/L (ref 23–29)
COLOR UR: YELLOW
CREAT SERPL-MCNC: 1 MG/DL (ref 0.5–1.4)
DIFFERENTIAL METHOD BLD: ABNORMAL
EOSINOPHIL # BLD AUTO: 0.3 K/UL (ref 0–0.5)
EOSINOPHIL NFR BLD: 4.7 % (ref 0–8)
ERYTHROCYTE [DISTWIDTH] IN BLOOD BY AUTOMATED COUNT: 15.8 % (ref 11.5–14.5)
EST. GFR  (NO RACE VARIABLE): 56 ML/MIN/1.73 M^2
GLUCOSE SERPL-MCNC: 111 MG/DL (ref 70–110)
GLUCOSE UR QL STRIP: NEGATIVE
HCT VFR BLD AUTO: 40.3 % (ref 37–48.5)
HGB BLD-MCNC: 12 G/DL (ref 12–16)
HGB UR QL STRIP: NEGATIVE
IMM GRANULOCYTES # BLD AUTO: 0.02 K/UL (ref 0–0.04)
IMM GRANULOCYTES NFR BLD AUTO: 0.4 % (ref 0–0.5)
INFLUENZA A, MOLECULAR: NEGATIVE
INFLUENZA B, MOLECULAR: NEGATIVE
KETONES UR QL STRIP: ABNORMAL
LEUKOCYTE ESTERASE UR QL STRIP: NEGATIVE
LYMPHOCYTES # BLD AUTO: 1.2 K/UL (ref 1–4.8)
LYMPHOCYTES NFR BLD: 22.1 % (ref 18–48)
MAGNESIUM SERPL-MCNC: 1.9 MG/DL (ref 1.6–2.6)
MCH RBC QN AUTO: 25.8 PG (ref 27–31)
MCHC RBC AUTO-ENTMCNC: 29.8 G/DL (ref 32–36)
MCV RBC AUTO: 87 FL (ref 82–98)
MONOCYTES # BLD AUTO: 0.8 K/UL (ref 0.3–1)
MONOCYTES NFR BLD: 14.6 % (ref 4–15)
NEUTROPHILS # BLD AUTO: 3.2 K/UL (ref 1.8–7.7)
NEUTROPHILS NFR BLD: 57.7 % (ref 38–73)
NITRITE UR QL STRIP: NEGATIVE
NRBC BLD-RTO: 0 /100 WBC
PH UR STRIP: 6 [PH] (ref 5–8)
PLATELET # BLD AUTO: 228 K/UL (ref 150–450)
PMV BLD AUTO: 12.3 FL (ref 9.2–12.9)
POTASSIUM SERPL-SCNC: 4.7 MMOL/L (ref 3.5–5.1)
PROT SERPL-MCNC: 6.4 G/DL (ref 6–8.4)
PROT UR QL STRIP: NEGATIVE
RBC # BLD AUTO: 4.66 M/UL (ref 4–5.4)
SARS-COV-2 RDRP RESP QL NAA+PROBE: NEGATIVE
SODIUM SERPL-SCNC: 143 MMOL/L (ref 136–145)
SP GR UR STRIP: 1.02 (ref 1–1.03)
SPECIMEN SOURCE: NORMAL
TROPONIN I SERPL DL<=0.01 NG/ML-MCNC: 0.01 NG/ML (ref 0–0.03)
TROPONIN I SERPL DL<=0.01 NG/ML-MCNC: 0.01 NG/ML (ref 0–0.03)
URN SPEC COLLECT METH UR: ABNORMAL
UROBILINOGEN UR STRIP-ACNC: NEGATIVE EU/DL
WBC # BLD AUTO: 5.48 K/UL (ref 3.9–12.7)

## 2024-02-10 PROCEDURE — 83880 ASSAY OF NATRIURETIC PEPTIDE: CPT | Performed by: EMERGENCY MEDICINE

## 2024-02-10 PROCEDURE — U0002 COVID-19 LAB TEST NON-CDC: HCPCS | Performed by: EMERGENCY MEDICINE

## 2024-02-10 PROCEDURE — 93010 ELECTROCARDIOGRAM REPORT: CPT | Mod: ,,, | Performed by: INTERNAL MEDICINE

## 2024-02-10 PROCEDURE — 87502 INFLUENZA DNA AMP PROBE: CPT | Performed by: EMERGENCY MEDICINE

## 2024-02-10 PROCEDURE — 83735 ASSAY OF MAGNESIUM: CPT | Performed by: EMERGENCY MEDICINE

## 2024-02-10 PROCEDURE — 99900035 HC TECH TIME PER 15 MIN (STAT)

## 2024-02-10 PROCEDURE — 84484 ASSAY OF TROPONIN QUANT: CPT | Performed by: EMERGENCY MEDICINE

## 2024-02-10 PROCEDURE — 80053 COMPREHEN METABOLIC PANEL: CPT | Performed by: EMERGENCY MEDICINE

## 2024-02-10 PROCEDURE — 27000221 HC OXYGEN, UP TO 24 HOURS

## 2024-02-10 PROCEDURE — 99285 EMERGENCY DEPT VISIT HI MDM: CPT | Mod: 25

## 2024-02-10 PROCEDURE — 85025 COMPLETE CBC W/AUTO DIFF WBC: CPT | Performed by: EMERGENCY MEDICINE

## 2024-02-10 PROCEDURE — 93005 ELECTROCARDIOGRAM TRACING: CPT

## 2024-02-10 PROCEDURE — 81003 URINALYSIS AUTO W/O SCOPE: CPT | Performed by: EMERGENCY MEDICINE

## 2024-02-10 PROCEDURE — 82550 ASSAY OF CK (CPK): CPT | Performed by: EMERGENCY MEDICINE

## 2024-02-10 NOTE — ED PROVIDER NOTES
[facility]                                                  Chief Complaint  83 y.o. female with Chest Pain    History of Present Illness  Gretel Ashton presents to the emergency room via EMS from the [facility].  Patient reports that she had chest pain in the left side of her chest which has now resolved.  Patient did receive full-dose aspirin in the ambulance.  Patient is oxygen dependent at 3 L for history of COPD and chronic respiratory failure.  Patient states that she does not feel more short of breath than normal.  She is on her 3 L on arrival.    Past Medical History:   Diagnosis Date    Arthritis     COPD (chronic obstructive pulmonary disease)     Depression     Diabetes mellitus type II     Hyperlipidemia     Hypertension     Pacemaker      Past Surgical History:   Procedure Laterality Date    CARDIAC PACEMAKER PLACEMENT       SECTION      CYST REMOVAL Left 2019    Procedure: EXCISION, SEBACEOUS CYST;  Surgeon: Jaylen Gonzalez Jr., MD;  Location: [facility] OR;  Service: General;  Laterality: Left;    INNER EAR SURGERY        Review of patient's allergies indicates:  No Known Allergies     Review of Systems and Physical Exam     Review of Systems  -- Constitution - no fever, no weight loss, no loss of consciousness  -- Eyes - no changes in vision, no redness, no swelling  -- Ear, Nose - no  earache, denies congestion  -- Mouth,Throat - no sore throat, no toothache, normal voice, normal swallowing  -- Respiratory - denies cough and congestion, no shortness of breath, no wheezing  -- Cardiovascular - reports chest pain now resolved, no palpitations,   -- Gastrointestinal - denies abdominal pain, denies nausea, vomiting, and diarrhea  -- Genitourinary - no dysuria, no denies flank pain, no hematuria or frequency   -- Musculoskeletal - denies back pain, negative for myalgias and arthralgias   -- Neurological - no headache, no neurologic changes, no loss of bladder or  bowel function no seizure like activity, no changes in hearing or vision  -- Skin - denies skin changes, no rash, no hives, no suspected skin infection    Vital Signs   weight is 69.6 kg (153 lb 6.4 oz). Her oral temperature is 98.6 °F (37 °C). Her blood pressure is 124/67 and her pulse is 75. Her respiration is 26 (abnormal) and oxygen saturation is 98%.      Physical Exam  -- Nursing note and vitals reviewed  -- Constitutional:  Awake alert and oriented, GCS 15, no acute distress.  Appears well.  -- Head: Atraumatic. Normocephalic. No obvious abnormality  -- Eyes: Pupils are equal and reactive to light. Extraocular movements intact. No nystagmus.  No periorbital swelling. Normal conjunctiva.  -- Nose: Nose grossly normal in appearance, nares grossly normal. No rhinorrhea.  -- Throat: Mucous membranes moist, pharynx normal, normal tonsils.  Airway patent.  -- Ears: External ears and TM normal bilaterally. Normal hearing.   -- Neck: Normal range of motion. Neck supple. No meningismus. No adenopathy  -- Cardiac: Normal rate, regular rhythm and normal heart sounds. No carotid bruit. No lower extremity edema.  No chest wall tenderness  -- Pulmonary: Normal respiratory effort, breath sounds equal bilaterally. Adequate flow.  No wheezing.  No crackles.  -- Abdominal: Soft, no tenderness, no guarding, no rebound. Normal bowel sounds.   -- Musculoskeletal: Normal range of motion, all 4 extremities 5/5 strength.  Neurovascularly intact. Atraumatic. No deformities.  -- Neurological:  Cranial nerves 2-12 grossly intact. No focal deficits.   -- Vascular: Posterior tibial, dorsalis pedis and radial pulses 2+ bilaterally    -- Lymphatics: No cervical or peripheral lymphadenopathy.   -- Skin: Warm and dry. No evidence of rash or cellulitis  -- Psychiatric: Normal mood and affect. Bedside behavior is appropriate.  Patient is cooperative.  Denies suicidal homicidal ideation.    Emergency Room Course     Treatment Course,  Evaluation, and Medical Decision Making  1. Physical exam largely unremarkable.  Patient is oxygen dependent on 3 L   2. EKG normal sinus rhythm with right bundle-branch block  3. CBC/CMP within normal  4. Cardiac enzymes negative x2  5. BNP within normal  6. Chest x-ray no acute process  7.  COVID negative  8. Influenza negative   9. Urinalysis within normal limits   10. Patient is awake and alert.  States that her chest pain resolved prior to arrival and has not returned.  Patient is eating lunch and appears well  11. Discharge to nursing home    Abnormal lab values  Labs Reviewed   CBC W/ AUTO DIFFERENTIAL   COMPREHENSIVE METABOLIC PANEL   B-TYPE NATRIURETIC PEPTIDE   CK   URINALYSIS   TROPONIN I   MAGNESIUM       Medications Given  Medications - No data to display      Diagnosis  -- chest pain, now resolved    Disposition and Plan  -- Disposition: home  -- Condition: stable  -- Follow-up: Patient to follow up with Jailene Astudillo MD in 1-2 days, and any specialists noted on discharge paperwork  -- I advised the patient that we have found no life threatening condition today  -- At this time, I believe the patient is clinically stable for discharge.   -- The patient acknowledges that close follow up with a MD is required   -- Patient agrees to comply with all instruction and direction given in the ER  -- Patient counseled on strict return precautions as discussed       Lucretia Franklin MD  02/10/24 [id number]

## 2024-02-10 NOTE — ED NOTES
PATIENT REQUESTED MEAL TRAY. MD NOTIFIED . PATIENT RECEIVED MEAL TRAY . PT SITTING IN BED EATING . PT TOLERATING WELL

## 2024-02-10 NOTE — ED NOTES
CALLED [facility] TO GIVE REPORT TO NURSE . REPORT GIVEN TO NURSE DRUMMOND. PER [place] NURSING NURSE BHANU WILL SETUP TRANSPORTATION .

## 2024-02-10 NOTE — ED TRIAGE NOTES
Patient to ER via [facility] from the [facility] with complaints of substernal chest pain and nausea that started this morning, ASA was administered via [facility] while en route to ED

## 2024-02-12 LAB
OHS QRS DURATION: 130 MS
OHS QTC CALCULATION: 438 MS

## 2024-03-03 NOTE — PLAN OF CARE
Ms Majano is here for treatment of pneumonia. She will discharge home today. She has no post acute care needs.       H&P " 70-year-old female; PMH significant for DM, HTN, HLD, GERD, CVA, Micra pacemaker; now presenting with chest pain–substernal, rating to right shoulder, associated with mild shortness of breath and palpitations and cough x 3 to 4 days.  Patient also complaining of dysuria frequency and urgency x 3 to 4 days.  Patient's granddaughter concerned that patient's right foot looks erythematous. "    ROS: 10-system review is otherwise negative except HPI above.      PAST MEDICAL & SURGICAL HISTORY:  DM (diabetes mellitus)      HTN (hypertension)      H/O gastroesophageal reflux (GERD)      Cardiac pacemaker  MICRA for mobitz type 11      CVA (cerebrovascular accident)  resdiual right sided weakness      CVA (cerebrovascular accident)      History of benign brain tumor        FAMILY HISTORY:  FH: asthma  Son      Family history not pertinent as reviewed with the patient.    SOCIAL HISTORY:  Denies any toxic habits    ALLERGIES: NKA No Known Allergies      HOME MEDICATIONS:   Home Medications:  acetaminophen 325 mg oral tablet: 2 tab(s) orally every 6 hours, As needed, Temp greater or equal to 38C (100.4F), Mild Pain (1 - 3) (22 May 2020 08:20)  famotidine 20 mg oral tablet: 1 tab(s) orally once a day (22 May 2020 08:20)      --------------------------------------------------------------------------------------------    PHYSICAL EXAM:   General: NAD, Lying in bed comfortably  Neuro: A+Ox3  HEENT: EOMI, PERRLA, MMM  Cardio: RRR  Resp: Non labored breathing on RA  GI/Abd: Soft, R flank tenderness , ND, no rebound/guarding, no masses palpated  Vascular: All 4 extremities warm and well perfused.   Pelvis: stable  Musculoskeletal: All 4 extremities moving spontaneously, no limitations, no spinal tenderness.  --------------------------------------------------------------------------------------------    LABS                 11.6   11.28  )----------(  360       ( 02 Mar 2024 17:40 )               34.6      137    |  100    |  22.6   ----------------------------<  174        ( 02 Mar 2024 17:40 )  5.5     |  22.0   |  1.52     Ca    8.9        ( 02 Mar 2024 17:40 )  Mg     1.7       ( 02 Mar 2024 17:40 )    TPro  7.0    /  Alb  4.1    /  TBili  0.5    /  DBili  x      /  AST  29     /  ALT  24     /  AlkPhos  71     ( 02 Mar 2024 17:40 )    LIVER FUNCTIONS - ( 02 Mar 2024 17:40 )  Alb: 4.1 g/dL / Pro: 7.0 g/dL / ALK PHOS: 71 U/L / ALT: 24 U/L / AST: 29 U/L / GGT: x           PT/INR -  12.4 sec / 1.12 ratio   ( 02 Mar 2024 17:40 )       PTT -  32.8 sec   ( 02 Mar 2024 17:40 )  CAPILLARY BLOOD GLUCOSE        Urinalysis Basic - ( 02 Mar 2024 18:15 )    Color: Yellow / Appearance: Turbid / S.011 / pH: x  Gluc: x / Ketone: Negative mg/dL  / Bili: Negative / Urobili: 0.2 mg/dL   Blood: x / Protein: 30 mg/dL / Nitrite: Positive   Leuk Esterase: Large / RBC: 2 /HPF / WBC >998 /HPF   Sq Epi: x / Non Sq Epi: 3 /HPF / Bacteria: Many /HPF        21:45 - VBG - pH:       | pCO2:       | pO2:       | Lactate: 2.00   17:40 - VBG - pH:       | pCO2:       | pO2:       | Lactate: 2.90     --------------------------------------------------------------------------------------------  IMAGING  < from: US Abdomen Upper Quadrant Right (24 @ 23:05) >  IMPRESSION:  Limited exam due to prominent shadowing from overlying bowel gas and   patient body habitus. Within this constraint:    Distended gallbladder with sludge and multiple shadowing stones, although   no pericholecystic fluid or wall thickening. CBD not visualized due to   artifact. If persistent clinical concern for acute cholecystitis, nuclear   scintigraphy could further evaluate.    Additional findings suggestive of adenomyomatosis. 5 mm gallbladder polyp.    --- End of Report ---    < end of copied text >

## 2024-04-04 ENCOUNTER — HOSPITAL ENCOUNTER (INPATIENT)
Facility: HOSPITAL | Age: 84
LOS: 4 days | DRG: 190 | End: 2024-04-09
Attending: SURGERY | Admitting: FAMILY MEDICINE
Payer: MEDICARE

## 2024-04-04 DIAGNOSIS — J44.1 COPD EXACERBATION: ICD-10-CM

## 2024-04-04 DIAGNOSIS — R09.02 HYPOXIA: Primary | ICD-10-CM

## 2024-04-04 DIAGNOSIS — R07.9 CHEST PAIN: ICD-10-CM

## 2024-04-04 DIAGNOSIS — I25.10 CARDIOVASCULAR DISEASE: ICD-10-CM

## 2024-04-04 DIAGNOSIS — R06.02 SOB (SHORTNESS OF BREATH): ICD-10-CM

## 2024-04-04 LAB
ALBUMIN SERPL BCP-MCNC: 3.5 G/DL (ref 3.5–5.2)
ALP SERPL-CCNC: 81 U/L (ref 55–135)
ALT SERPL W/O P-5'-P-CCNC: 18 U/L (ref 10–44)
ANION GAP SERPL CALC-SCNC: 8 MMOL/L (ref 8–16)
AST SERPL-CCNC: 34 U/L (ref 10–40)
BASOPHILS # BLD AUTO: 0.02 K/UL (ref 0–0.2)
BASOPHILS NFR BLD: 0.5 % (ref 0–1.9)
BILIRUB SERPL-MCNC: 0.3 MG/DL (ref 0.1–1)
BNP SERPL-MCNC: 105 PG/ML (ref 0–99)
BUN SERPL-MCNC: 27 MG/DL (ref 8–23)
CALCIUM SERPL-MCNC: 9.2 MG/DL (ref 8.7–10.5)
CHLORIDE SERPL-SCNC: 102 MMOL/L (ref 95–110)
CO2 SERPL-SCNC: 32 MMOL/L (ref 23–29)
CREAT SERPL-MCNC: 1 MG/DL (ref 0.5–1.4)
DIFFERENTIAL METHOD BLD: ABNORMAL
EOSINOPHIL # BLD AUTO: 0.1 K/UL (ref 0–0.5)
EOSINOPHIL NFR BLD: 2 % (ref 0–8)
ERYTHROCYTE [DISTWIDTH] IN BLOOD BY AUTOMATED COUNT: 16.6 % (ref 11.5–14.5)
EST. GFR  (NO RACE VARIABLE): 56 ML/MIN/1.73 M^2
GLUCOSE SERPL-MCNC: 107 MG/DL (ref 70–110)
HCT VFR BLD AUTO: 34.9 % (ref 37–48.5)
HGB BLD-MCNC: 10.8 G/DL (ref 12–16)
IMM GRANULOCYTES # BLD AUTO: 0.01 K/UL (ref 0–0.04)
IMM GRANULOCYTES NFR BLD AUTO: 0.3 % (ref 0–0.5)
INFLUENZA A, MOLECULAR: NEGATIVE
INFLUENZA B, MOLECULAR: NEGATIVE
LACTATE SERPL-SCNC: 1.3 MMOL/L (ref 0.5–2.2)
LYMPHOCYTES # BLD AUTO: 1 K/UL (ref 1–4.8)
LYMPHOCYTES NFR BLD: 24.2 % (ref 18–48)
MCH RBC QN AUTO: 25.8 PG (ref 27–31)
MCHC RBC AUTO-ENTMCNC: 30.9 G/DL (ref 32–36)
MCV RBC AUTO: 83 FL (ref 82–98)
MONOCYTES # BLD AUTO: 0.6 K/UL (ref 0.3–1)
MONOCYTES NFR BLD: 14.5 % (ref 4–15)
NEUTROPHILS # BLD AUTO: 2.3 K/UL (ref 1.8–7.7)
NEUTROPHILS NFR BLD: 58.5 % (ref 38–73)
NRBC BLD-RTO: 0 /100 WBC
PLATELET # BLD AUTO: 191 K/UL (ref 150–450)
PMV BLD AUTO: 11.8 FL (ref 9.2–12.9)
POCT GLUCOSE: 148 MG/DL (ref 70–110)
POTASSIUM SERPL-SCNC: 4.6 MMOL/L (ref 3.5–5.1)
PROCALCITONIN SERPL IA-MCNC: 0.05 NG/ML
PROT SERPL-MCNC: 6.9 G/DL (ref 6–8.4)
RBC # BLD AUTO: 4.19 M/UL (ref 4–5.4)
RSV AG SPEC QL IA: NEGATIVE
SARS-COV-2 RDRP RESP QL NAA+PROBE: NEGATIVE
SODIUM SERPL-SCNC: 142 MMOL/L (ref 136–145)
SPECIMEN SOURCE: NORMAL
SPECIMEN SOURCE: NORMAL
TROPONIN I SERPL DL<=0.01 NG/ML-MCNC: 0.02 NG/ML (ref 0–0.03)
WBC # BLD AUTO: 3.93 K/UL (ref 3.9–12.7)

## 2024-04-04 PROCEDURE — 84484 ASSAY OF TROPONIN QUANT: CPT | Performed by: SURGERY

## 2024-04-04 PROCEDURE — 93005 ELECTROCARDIOGRAM TRACING: CPT

## 2024-04-04 PROCEDURE — 93010 ELECTROCARDIOGRAM REPORT: CPT | Mod: ,,, | Performed by: INTERNAL MEDICINE

## 2024-04-04 PROCEDURE — 87040 BLOOD CULTURE FOR BACTERIA: CPT | Performed by: SURGERY

## 2024-04-04 PROCEDURE — 80053 COMPREHEN METABOLIC PANEL: CPT | Performed by: SURGERY

## 2024-04-04 PROCEDURE — 85025 COMPLETE CBC W/AUTO DIFF WBC: CPT | Performed by: SURGERY

## 2024-04-04 PROCEDURE — G0378 HOSPITAL OBSERVATION PER HR: HCPCS

## 2024-04-04 PROCEDURE — 84145 PROCALCITONIN (PCT): CPT | Performed by: SURGERY

## 2024-04-04 PROCEDURE — 83605 ASSAY OF LACTIC ACID: CPT | Performed by: SURGERY

## 2024-04-04 PROCEDURE — 63600175 PHARM REV CODE 636 W HCPCS: Performed by: SURGERY

## 2024-04-04 PROCEDURE — 87634 RSV DNA/RNA AMP PROBE: CPT | Performed by: SURGERY

## 2024-04-04 PROCEDURE — 99900031 HC PATIENT EDUCATION (STAT)

## 2024-04-04 PROCEDURE — 99900035 HC TECH TIME PER 15 MIN (STAT)

## 2024-04-04 PROCEDURE — 96372 THER/PROPH/DIAG INJ SC/IM: CPT | Performed by: SURGERY

## 2024-04-04 PROCEDURE — U0002 COVID-19 LAB TEST NON-CDC: HCPCS | Performed by: SURGERY

## 2024-04-04 PROCEDURE — 94761 N-INVAS EAR/PLS OXIMETRY MLT: CPT

## 2024-04-04 PROCEDURE — 87502 INFLUENZA DNA AMP PROBE: CPT | Performed by: SURGERY

## 2024-04-04 PROCEDURE — 25000003 PHARM REV CODE 250: Performed by: SURGERY

## 2024-04-04 PROCEDURE — 99285 EMERGENCY DEPT VISIT HI MDM: CPT | Mod: 25

## 2024-04-04 PROCEDURE — 94640 AIRWAY INHALATION TREATMENT: CPT

## 2024-04-04 PROCEDURE — 25000242 PHARM REV CODE 250 ALT 637 W/ HCPCS: Performed by: SURGERY

## 2024-04-04 PROCEDURE — 96375 TX/PRO/DX INJ NEW DRUG ADDON: CPT

## 2024-04-04 PROCEDURE — 27000221 HC OXYGEN, UP TO 24 HOURS

## 2024-04-04 PROCEDURE — 94760 N-INVAS EAR/PLS OXIMETRY 1: CPT

## 2024-04-04 PROCEDURE — 83880 ASSAY OF NATRIURETIC PEPTIDE: CPT | Performed by: SURGERY

## 2024-04-04 PROCEDURE — 96374 THER/PROPH/DIAG INJ IV PUSH: CPT

## 2024-04-04 PROCEDURE — 94640 AIRWAY INHALATION TREATMENT: CPT | Mod: XB

## 2024-04-04 RX ORDER — MAGNESIUM SULFATE HEPTAHYDRATE 40 MG/ML
2 INJECTION, SOLUTION INTRAVENOUS
Status: COMPLETED | OUTPATIENT
Start: 2024-04-04 | End: 2024-04-04

## 2024-04-04 RX ORDER — HYDROCODONE BITARTRATE AND ACETAMINOPHEN 5; 325 MG/1; MG/1
1 TABLET ORAL EVERY 4 HOURS PRN
Status: DISCONTINUED | OUTPATIENT
Start: 2024-04-04 | End: 2024-04-05

## 2024-04-04 RX ORDER — INSULIN ASPART 100 [IU]/ML
6 INJECTION, SOLUTION INTRAVENOUS; SUBCUTANEOUS
Status: DISCONTINUED | OUTPATIENT
Start: 2024-04-05 | End: 2024-04-09 | Stop reason: HOSPADM

## 2024-04-04 RX ORDER — IBUPROFEN 200 MG
24 TABLET ORAL
Status: DISCONTINUED | OUTPATIENT
Start: 2024-04-04 | End: 2024-04-09 | Stop reason: HOSPADM

## 2024-04-04 RX ORDER — METHYLPREDNISOLONE SOD SUCC 125 MG
125 VIAL (EA) INJECTION EVERY 8 HOURS
Status: DISCONTINUED | OUTPATIENT
Start: 2024-04-04 | End: 2024-04-05

## 2024-04-04 RX ORDER — ALBUTEROL SULFATE 0.83 MG/ML
2.5 SOLUTION RESPIRATORY (INHALATION)
Status: COMPLETED | OUTPATIENT
Start: 2024-04-04 | End: 2024-04-04

## 2024-04-04 RX ORDER — INSULIN ASPART 100 [IU]/ML
0-5 INJECTION, SOLUTION INTRAVENOUS; SUBCUTANEOUS
Status: DISCONTINUED | OUTPATIENT
Start: 2024-04-04 | End: 2024-04-09 | Stop reason: HOSPADM

## 2024-04-04 RX ORDER — ONDANSETRON HYDROCHLORIDE 2 MG/ML
4 INJECTION, SOLUTION INTRAVENOUS EVERY 8 HOURS PRN
Status: DISCONTINUED | OUTPATIENT
Start: 2024-04-04 | End: 2024-04-09 | Stop reason: HOSPADM

## 2024-04-04 RX ORDER — SODIUM CHLORIDE 0.9 % (FLUSH) 0.9 %
10 SYRINGE (ML) INJECTION
Status: DISCONTINUED | OUTPATIENT
Start: 2024-04-04 | End: 2024-04-09 | Stop reason: HOSPADM

## 2024-04-04 RX ORDER — GLUCAGON 1 MG
1 KIT INJECTION
Status: DISCONTINUED | OUTPATIENT
Start: 2024-04-04 | End: 2024-04-09 | Stop reason: HOSPADM

## 2024-04-04 RX ORDER — METHYLPREDNISOLONE SOD SUCC 125 MG
125 VIAL (EA) INJECTION EVERY 6 HOURS
Status: DISCONTINUED | OUTPATIENT
Start: 2024-04-04 | End: 2024-04-04

## 2024-04-04 RX ORDER — TALC
6 POWDER (GRAM) TOPICAL NIGHTLY PRN
Status: DISCONTINUED | OUTPATIENT
Start: 2024-04-04 | End: 2024-04-09 | Stop reason: HOSPADM

## 2024-04-04 RX ORDER — LEVALBUTEROL 1.25 MG/.5ML
1.25 SOLUTION, CONCENTRATE RESPIRATORY (INHALATION) EVERY 6 HOURS
Status: DISCONTINUED | OUTPATIENT
Start: 2024-04-04 | End: 2024-04-04

## 2024-04-04 RX ORDER — ACETAMINOPHEN 325 MG/1
650 TABLET ORAL EVERY 8 HOURS PRN
Status: DISCONTINUED | OUTPATIENT
Start: 2024-04-04 | End: 2024-04-09 | Stop reason: HOSPADM

## 2024-04-04 RX ORDER — LEVALBUTEROL 1.25 MG/.5ML
1.25 SOLUTION, CONCENTRATE RESPIRATORY (INHALATION) EVERY 8 HOURS
Status: DISCONTINUED | OUTPATIENT
Start: 2024-04-05 | End: 2024-04-08

## 2024-04-04 RX ORDER — IBUPROFEN 200 MG
16 TABLET ORAL
Status: DISCONTINUED | OUTPATIENT
Start: 2024-04-04 | End: 2024-04-09 | Stop reason: HOSPADM

## 2024-04-04 RX ADMIN — INSULIN DETEMIR 15 UNITS: 100 INJECTION, SOLUTION SUBCUTANEOUS at 08:04

## 2024-04-04 RX ADMIN — METHYLPREDNISOLONE SODIUM SUCCINATE 125 MG: 125 INJECTION, POWDER, FOR SOLUTION INTRAMUSCULAR; INTRAVENOUS at 04:04

## 2024-04-04 RX ADMIN — LEVALBUTEROL 1.25 MG: 1.25 SOLUTION, CONCENTRATE RESPIRATORY (INHALATION) at 11:04

## 2024-04-04 RX ADMIN — METHYLPREDNISOLONE SODIUM SUCCINATE 125 MG: 125 INJECTION, POWDER, FOR SOLUTION INTRAMUSCULAR; INTRAVENOUS at 10:04

## 2024-04-04 RX ADMIN — ACETAMINOPHEN 650 MG: 325 TABLET ORAL at 08:04

## 2024-04-04 RX ADMIN — ALBUTEROL SULFATE 2.5 MG: 2.5 SOLUTION RESPIRATORY (INHALATION) at 04:04

## 2024-04-04 RX ADMIN — MAGNESIUM SULFATE HEPTAHYDRATE 2 G: 40 INJECTION, SOLUTION INTRAVENOUS at 04:04

## 2024-04-04 NOTE — ED PROVIDER NOTES
Encounter Date: 2024       History     Chief Complaint   Patient presents with    Shortness of Breath     C/o SOB and pain under left breast     History of Present Illness  Gretel Ashton is a 83 y.o. female that presents with shortness of breath today  Chest pressure & shortness of breath with the nursing home on ER evaluation   Patient has hypertension, hyperlipidemia with a history of COPD noted on triage  Patient has no history of coronary artery disease but does have a pacemaker  Normal sinus rhythm on EKG with no definitive ST elevation on initial triage    The history is provided by the patient.     Review of patient's allergies indicates:  No Known Allergies  Past Medical History:   Diagnosis Date    Arthritis     COPD (chronic obstructive pulmonary disease)     Depression     Diabetes mellitus type II     Hyperlipidemia     Hypertension     Pacemaker      Past Surgical History:   Procedure Laterality Date    CARDIAC PACEMAKER PLACEMENT       SECTION      CYST REMOVAL Left 2019    Procedure: EXCISION, SEBACEOUS CYST;  Surgeon: Jaylen Gonzalez Jr., MD;  Location: [facility];  Service: General;  Laterality: Left;    INNER EAR SURGERY       Family History   Problem Relation Age of Onset    Cancer Mother     Breast cancer Mother     Stroke Father     Cancer Sister     Breast cancer Sister     Stroke Maternal Grandmother      Social History     Tobacco Use    Smoking status: Former     Current packs/day: 0.00     Average packs/day: 2.0 packs/day for 43.0 years (86.0 ttl pk-yrs)     Types: Cigarettes     Start date: 1957     Quit date: 2000     Years since quittin.2    Smokeless tobacco: Never   Substance Use Topics    Alcohol use: No    Drug use: No     Review of Systems   Constitutional: Negative.    HENT: Negative.     Eyes: Negative.    Respiratory:  Positive for chest tightness and shortness of breath.    Cardiovascular:  Positive for chest pain.   Gastrointestinal: Negative.     Genitourinary: Negative.    Musculoskeletal: Negative.    Skin: Negative.    Neurological: Negative.    Psychiatric/Behavioral: Negative.         Physical Exam     Initial Vitals [04/04/24 1539]   BP Pulse Resp Temp SpO2   (!) 129/57 81 20 98.9 °F (37.2 °C) 96 %      MAP       --         Physical Exam    Nursing note and vitals reviewed.  Constitutional: Vital signs are normal. She appears well-developed and well-nourished. She is cooperative.   HENT:   Head: Normocephalic and atraumatic.   Eyes: Conjunctivae, EOM and lids are normal. Pupils are equal, round, and reactive to light.   Neck: Trachea normal and phonation normal. Neck supple. No JVD present.   Normal range of motion.   Full passive range of motion without pain.     Cardiovascular:  Normal rate, regular rhythm, S1 normal, S2 normal, normal heart sounds, intact distal pulses and normal pulses.           Pulmonary/Chest: Effort normal.   (+) rhonchi at the bilateral bases with active wheezing   Abdominal: Abdomen is soft and flat. Bowel sounds are normal.   Musculoskeletal:         General: Normal range of motion.      Cervical back: Full passive range of motion without pain, normal range of motion and neck supple.     Neurological: She is alert and oriented to person, place, and time. She has normal strength.   Skin: Skin is warm, dry and intact. Capillary refill takes less than 2 seconds.         ED Course   Procedures  Labs Reviewed   CBC W/ AUTO DIFFERENTIAL - Abnormal; Notable for the following components:       Result Value    Hemoglobin 10.8 (*)     Hematocrit 34.9 (*)     MCH 25.8 (*)     MCHC 30.9 (*)     RDW 16.6 (*)     All other components within normal limits   COMPREHENSIVE METABOLIC PANEL - Abnormal; Notable for the following components:    CO2 32 (*)     BUN 27 (*)     eGFR 56 (*)     All other components within normal limits   B-TYPE NATRIURETIC PEPTIDE - Abnormal; Notable for the following components:     (*)     All other  components within normal limits   INFLUENZA A & B BY MOLECULAR   CULTURE, BLOOD   CULTURE, BLOOD   SARS-COV-2 RNA AMPLIFICATION, QUAL   TROPONIN I   LACTIC ACID, PLASMA   PROCALCITONIN   RSV ANTIGEN DETECTION     EKG Readings: (Independently Interpreted)   No STEMI  Normal sinus rhythm  No ectopy  Normal conduction  Normal ST segments  Normal T-wave  Normal axis  Heart rate in the 90s       Imaging Results              X-Ray Chest 1 View (Final result)  Result time 04/04/24 17:14:16      Final result by Burke Liz MD (04/04/24 17:14:16)                   Impression:      As above..      Electronically signed by: Burke Liz MD  Date:    04/04/2024  Time:    17:14               Narrative:    EXAMINATION:  XR CHEST 1 VIEW    CLINICAL HISTORY:  COVID;    TECHNIQUE:  Single frontal view of the chest was performed.    COMPARISON:  02/10/2024    FINDINGS:  The patient is rotated limiting assessment.  There is a left chest wall cardiac pacing device present.  Cardiac monitoring leads overlie the chest.  Cardiomediastinal silhouette is enlarged, similar to prior examination.  Lung volumes are diminished.  There are coarse accentuated interstitial lung markings bilaterally.  No definite new large confluent airspace consolidation appreciated compared to prior examination allowing for exam limitations.  Calcified granuloma projects over the left upper lung zone.  No large volume pleural fluid or pneumothorax appreciated.  Osseous structures demonstrate degenerative changes.                                       Medications   magnesium sulfate 2g in water 50mL IVPB (premix) (2 g Intravenous New Bag 4/4/24 1650)   methylPREDNISolone sodium succinate injection 125 mg (125 mg Intravenous Given 4/4/24 1642)   albuterol nebulizer solution 2.5 mg (2.5 mg Nebulization Given 4/4/24 1654)     Medical Decision Making  83-year-old female presents with shortness of breath in the emergency room today  History of COPD, history  of pacemaker, mild chest pain as well in the ER today  Differential: flu, strep, COVID, bronchitis, pneumonia, COPD, CHF, STEMI, non-STEMI    Problems Addressed:  COPD exacerbation: complicated acute illness or injury  Hypoxia: complicated acute illness or injury  SOB (shortness of breath): complicated acute illness or injury    Amount and/or Complexity of Data Reviewed  External Data Reviewed: notes.  Labs: ordered. Decision-making details documented in ED Course.  Radiology: ordered and independent interpretation performed.  ECG/medicine tests: ordered and independent interpretation performed.    ED Management & Risks of Complication, Morbidity, & Mortality:  IV steroids, IV magnesium sulfate, breathing treatments given in the ER  1st troponin (-), patient saw wheezing on initial evaluation today  Will admit with serial troponins with breathing treatments & steroids    Critical Care ED Physician Time (minutes):  -- Performed by: Bruce Malone M.D.  -- Date/Time: 5:49 PM 4/4/2024   -- Direct Patient Care (Face Time): 15  -- Additional History from Records or Taking Additional History: 15  -- Ordering, Reviewing, and Interpreting Diagnostic Studies: 15  -- Total Time in Documentation: 15  -- Consultation with Other Physicians: 15  -- Consultation with Family Related to Condition: 15  -- Total Critical Care Time: 75  -- Critical care was necessary to treat Hypoxia & COPD  -- Critical care was time spent personally by me on the following activities:   -- discussions with consultants regarding treatment plan today  -- development of treatment plan with patient & their family  -- examination of patient, ordering and performing treatments   -- review of radiographic studies, re-evaluation of pt's condition  -- review of labs and evaluation of response to treatment      Clinical Impression:  Final diagnoses:  [R09.02] Hypoxia (Primary)  [R06.02] SOB (shortness of breath)  [J44.1] COPD exacerbation          ED Disposition  Condition    Observation Stable                Bruce Malone MD  04/04/24 6879

## 2024-04-04 NOTE — NURSING
Pt up from ED via bed by AIXA Patterson.   Report taken. Pt resting comfortably in bed. AAOx3. Respirations even and unlabored.  3L-NC .Bed locked in lowest position. Call bell within reach.Mag  infusing.

## 2024-04-04 NOTE — ED NOTES
Call placed to 3rd floor to give report. Staff states they will call back shortly to receive report.

## 2024-04-05 PROBLEM — J96.20 ACUTE ON CHRONIC RESPIRATORY FAILURE: Status: ACTIVE | Noted: 2024-04-05

## 2024-04-05 LAB
ALLENS TEST: ABNORMAL
AMORPH CRY URNS QL MICRO: ABNORMAL
ANION GAP SERPL CALC-SCNC: 11 MMOL/L (ref 8–16)
BACTERIA #/AREA URNS HPF: ABNORMAL /HPF
BASOPHILS # BLD AUTO: 0.01 K/UL (ref 0–0.2)
BASOPHILS NFR BLD: 0.3 % (ref 0–1.9)
BILIRUB UR QL STRIP: NEGATIVE
BUN SERPL-MCNC: 32 MG/DL (ref 8–23)
CALCIUM SERPL-MCNC: 9.2 MG/DL (ref 8.7–10.5)
CHLORIDE SERPL-SCNC: 100 MMOL/L (ref 95–110)
CLARITY UR: ABNORMAL
CO2 SERPL-SCNC: 30 MMOL/L (ref 23–29)
COLOR UR: YELLOW
CREAT SERPL-MCNC: 1 MG/DL (ref 0.5–1.4)
DELSYS: ABNORMAL
DIFFERENTIAL METHOD BLD: ABNORMAL
EOSINOPHIL # BLD AUTO: 0 K/UL (ref 0–0.5)
EOSINOPHIL NFR BLD: 0 % (ref 0–8)
ERYTHROCYTE [DISTWIDTH] IN BLOOD BY AUTOMATED COUNT: 16.3 % (ref 11.5–14.5)
EST. GFR  (NO RACE VARIABLE): 56 ML/MIN/1.73 M^2
FIO2: 36 (ref 21–100)
GLUCOSE SERPL-MCNC: 151 MG/DL (ref 70–110)
GLUCOSE UR QL STRIP: NEGATIVE
HCO3 UR-SCNC: 38.4 MMOL/L (ref 22–26)
HCT VFR BLD AUTO: 33.4 % (ref 37–48.5)
HGB BLD-MCNC: 10.1 G/DL (ref 12–16)
HGB UR QL STRIP: NEGATIVE
IMM GRANULOCYTES # BLD AUTO: 0.02 K/UL (ref 0–0.04)
IMM GRANULOCYTES NFR BLD AUTO: 0.6 % (ref 0–0.5)
KETONES UR QL STRIP: NEGATIVE
LEUKOCYTE ESTERASE UR QL STRIP: NEGATIVE
LYMPHOCYTES # BLD AUTO: 0.3 K/UL (ref 1–4.8)
LYMPHOCYTES NFR BLD: 8.4 % (ref 18–48)
MAGNESIUM SERPL-MCNC: 2.3 MG/DL (ref 1.6–2.6)
MCH RBC QN AUTO: 25.1 PG (ref 27–31)
MCHC RBC AUTO-ENTMCNC: 30.2 G/DL (ref 32–36)
MCV RBC AUTO: 83 FL (ref 82–98)
MICROSCOPIC COMMENT: ABNORMAL
MONOCYTES # BLD AUTO: 0.2 K/UL (ref 0.3–1)
MONOCYTES NFR BLD: 5.7 % (ref 4–15)
NEUTROPHILS # BLD AUTO: 2.8 K/UL (ref 1.8–7.7)
NEUTROPHILS NFR BLD: 85 % (ref 38–73)
NITRITE UR QL STRIP: NEGATIVE
NRBC BLD-RTO: 0 /100 WBC
OHS QRS DURATION: 120 MS
OHS QRS DURATION: 128 MS
OHS QRS DURATION: 130 MS
OHS QTC CALCULATION: 461 MS
OHS QTC CALCULATION: 485 MS
OHS QTC CALCULATION: 487 MS
PCO2 BLDA: 47 MMHG (ref 35–45)
PH SMN: 7.52 [PH] (ref 7.35–7.45)
PH UR STRIP: 6 [PH] (ref 5–8)
PLATELET # BLD AUTO: 206 K/UL (ref 150–450)
PMV BLD AUTO: 12.5 FL (ref 9.2–12.9)
PO2 BLDA: 81 MMHG (ref 75–100)
POC BE: 14 MMOL/L (ref -2–2)
POC COHB: 3.2 % (ref 0–3)
POC METHB: 1.4 % (ref 0–1.5)
POC O2HB ARTERIAL: 94.9 % (ref 94–100)
POC SATURATED O2: 99.5 % (ref 90–100)
POC TCO2: 39.8 MMOL/L
POC THB: 9.2 G/DL (ref 12–18)
POCT GLUCOSE: 140 MG/DL (ref 70–110)
POCT GLUCOSE: 145 MG/DL (ref 70–110)
POCT GLUCOSE: 150 MG/DL (ref 70–110)
POCT GLUCOSE: 180 MG/DL (ref 70–110)
POTASSIUM SERPL-SCNC: 4.6 MMOL/L (ref 3.5–5.1)
PROT UR QL STRIP: NEGATIVE
RBC # BLD AUTO: 4.03 M/UL (ref 4–5.4)
SITE: ABNORMAL
SODIUM SERPL-SCNC: 141 MMOL/L (ref 136–145)
SP GR UR STRIP: 1.03 (ref 1–1.03)
TROPONIN I SERPL DL<=0.01 NG/ML-MCNC: 0.02 NG/ML (ref 0–0.03)
TROPONIN I SERPL DL<=0.01 NG/ML-MCNC: 0.02 NG/ML (ref 0–0.03)
TROPONIN I SERPL DL<=0.01 NG/ML-MCNC: 0.03 NG/ML (ref 0–0.03)
URN SPEC COLLECT METH UR: ABNORMAL
UROBILINOGEN UR STRIP-ACNC: NEGATIVE EU/DL
WBC # BLD AUTO: 3.33 K/UL (ref 3.9–12.7)
WBC #/AREA URNS HPF: 4 /HPF (ref 0–5)

## 2024-04-05 PROCEDURE — 63600175 PHARM REV CODE 636 W HCPCS: Performed by: PHYSICIAN ASSISTANT

## 2024-04-05 PROCEDURE — 63600175 PHARM REV CODE 636 W HCPCS: Performed by: SURGERY

## 2024-04-05 PROCEDURE — 93005 ELECTROCARDIOGRAM TRACING: CPT

## 2024-04-05 PROCEDURE — 27000221 HC OXYGEN, UP TO 24 HOURS

## 2024-04-05 PROCEDURE — 11000001 HC ACUTE MED/SURG PRIVATE ROOM

## 2024-04-05 PROCEDURE — 93010 ELECTROCARDIOGRAM REPORT: CPT | Mod: 76,,, | Performed by: INTERNAL MEDICINE

## 2024-04-05 PROCEDURE — 99222 1ST HOSP IP/OBS MODERATE 55: CPT | Mod: ,,, | Performed by: PHYSICIAN ASSISTANT

## 2024-04-05 PROCEDURE — 25000003 PHARM REV CODE 250: Performed by: FAMILY MEDICINE

## 2024-04-05 PROCEDURE — 96372 THER/PROPH/DIAG INJ SC/IM: CPT | Performed by: SURGERY

## 2024-04-05 PROCEDURE — 25000003 PHARM REV CODE 250: Performed by: SURGERY

## 2024-04-05 PROCEDURE — 25000242 PHARM REV CODE 250 ALT 637 W/ HCPCS: Performed by: STUDENT IN AN ORGANIZED HEALTH CARE EDUCATION/TRAINING PROGRAM

## 2024-04-05 PROCEDURE — 81000 URINALYSIS NONAUTO W/SCOPE: CPT | Performed by: PHYSICIAN ASSISTANT

## 2024-04-05 PROCEDURE — 84484 ASSAY OF TROPONIN QUANT: CPT | Mod: 91 | Performed by: SURGERY

## 2024-04-05 PROCEDURE — 99900035 HC TECH TIME PER 15 MIN (STAT)

## 2024-04-05 PROCEDURE — 36600 WITHDRAWAL OF ARTERIAL BLOOD: CPT

## 2024-04-05 PROCEDURE — 83735 ASSAY OF MAGNESIUM: CPT | Performed by: PHYSICIAN ASSISTANT

## 2024-04-05 PROCEDURE — 36415 COLL VENOUS BLD VENIPUNCTURE: CPT | Performed by: SURGERY

## 2024-04-05 PROCEDURE — 94640 AIRWAY INHALATION TREATMENT: CPT

## 2024-04-05 PROCEDURE — 93010 ELECTROCARDIOGRAM REPORT: CPT | Mod: ,,, | Performed by: INTERNAL MEDICINE

## 2024-04-05 PROCEDURE — 85025 COMPLETE CBC W/AUTO DIFF WBC: CPT | Performed by: PHYSICIAN ASSISTANT

## 2024-04-05 PROCEDURE — 80048 BASIC METABOLIC PNL TOTAL CA: CPT | Performed by: PHYSICIAN ASSISTANT

## 2024-04-05 PROCEDURE — 82803 BLOOD GASES ANY COMBINATION: CPT | Performed by: PHYSICIAN ASSISTANT

## 2024-04-05 PROCEDURE — 94761 N-INVAS EAR/PLS OXIMETRY MLT: CPT | Mod: XB

## 2024-04-05 PROCEDURE — 25000003 PHARM REV CODE 250: Performed by: PHYSICIAN ASSISTANT

## 2024-04-05 RX ORDER — PANTOPRAZOLE SODIUM 40 MG/1
40 TABLET, DELAYED RELEASE ORAL DAILY
Status: DISCONTINUED | OUTPATIENT
Start: 2024-04-05 | End: 2024-04-09 | Stop reason: HOSPADM

## 2024-04-05 RX ORDER — DIVALPROEX SODIUM 250 MG/1
250 TABLET, DELAYED RELEASE ORAL EVERY 12 HOURS
Status: DISCONTINUED | OUTPATIENT
Start: 2024-04-05 | End: 2024-04-09 | Stop reason: HOSPADM

## 2024-04-05 RX ORDER — LEFLUNOMIDE 10 MG/1
10 TABLET ORAL EVERY OTHER DAY
Status: DISCONTINUED | OUTPATIENT
Start: 2024-04-06 | End: 2024-04-05

## 2024-04-05 RX ORDER — TRAMADOL HYDROCHLORIDE 50 MG/1
50 TABLET ORAL EVERY 6 HOURS PRN
Status: DISCONTINUED | OUTPATIENT
Start: 2024-04-05 | End: 2024-04-06 | Stop reason: DRUGHIGH

## 2024-04-05 RX ORDER — DIVALPROEX SODIUM 125 MG/1
125 TABLET, DELAYED RELEASE ORAL EVERY 8 HOURS
Status: DISCONTINUED | OUTPATIENT
Start: 2024-04-05 | End: 2024-04-05

## 2024-04-05 RX ORDER — DIVALPROEX SODIUM 250 MG/1
250 TABLET, DELAYED RELEASE ORAL EVERY 8 HOURS
Status: DISCONTINUED | OUTPATIENT
Start: 2024-04-05 | End: 2024-04-05

## 2024-04-05 RX ORDER — SERTRALINE HYDROCHLORIDE 25 MG/1
25 TABLET, FILM COATED ORAL EVERY MORNING
Status: DISCONTINUED | OUTPATIENT
Start: 2024-04-05 | End: 2024-04-05

## 2024-04-05 RX ORDER — ASPIRIN 81 MG/1
81 TABLET ORAL
Status: DISCONTINUED | OUTPATIENT
Start: 2024-04-05 | End: 2024-04-09 | Stop reason: HOSPADM

## 2024-04-05 RX ORDER — MEMANTINE HYDROCHLORIDE 10 MG/1
10 TABLET ORAL 2 TIMES DAILY
Status: DISCONTINUED | OUTPATIENT
Start: 2024-04-05 | End: 2024-04-09 | Stop reason: HOSPADM

## 2024-04-05 RX ORDER — ATORVASTATIN CALCIUM 80 MG/1
80 TABLET, FILM COATED ORAL DAILY
Status: DISCONTINUED | OUTPATIENT
Start: 2024-04-05 | End: 2024-04-09 | Stop reason: HOSPADM

## 2024-04-05 RX ADMIN — DIVALPROEX SODIUM 125 MG: 125 TABLET, DELAYED RELEASE ORAL at 11:04

## 2024-04-05 RX ADMIN — TRAMADOL HYDROCHLORIDE 50 MG: 50 TABLET, FILM COATED ORAL at 06:04

## 2024-04-05 RX ADMIN — LEVALBUTEROL 1.25 MG: 1.25 SOLUTION, CONCENTRATE RESPIRATORY (INHALATION) at 11:04

## 2024-04-05 RX ADMIN — MEMANTINE 10 MG: 10 TABLET ORAL at 08:04

## 2024-04-05 RX ADMIN — Medication 81 MG: at 05:04

## 2024-04-05 RX ADMIN — INSULIN DETEMIR 15 UNITS: 100 INJECTION, SOLUTION SUBCUTANEOUS at 08:04

## 2024-04-05 RX ADMIN — ACETAMINOPHEN 650 MG: 325 TABLET ORAL at 09:04

## 2024-04-05 RX ADMIN — ACETAMINOPHEN 650 MG: 325 TABLET ORAL at 11:04

## 2024-04-05 RX ADMIN — PIPERACILLIN AND TAZOBACTAM 4.5 G: 4; .5 INJECTION, POWDER, LYOPHILIZED, FOR SOLUTION INTRAVENOUS; PARENTERAL at 06:04

## 2024-04-05 RX ADMIN — INSULIN ASPART 6 UNITS: 100 INJECTION, SOLUTION INTRAVENOUS; SUBCUTANEOUS at 09:04

## 2024-04-05 RX ADMIN — PIPERACILLIN AND TAZOBACTAM 4.5 G: 4; .5 INJECTION, POWDER, LYOPHILIZED, FOR SOLUTION INTRAVENOUS; PARENTERAL at 11:04

## 2024-04-05 RX ADMIN — LEVALBUTEROL 1.25 MG: 1.25 SOLUTION, CONCENTRATE RESPIRATORY (INHALATION) at 03:04

## 2024-04-05 RX ADMIN — DIVALPROEX SODIUM 250 MG: 250 TABLET, DELAYED RELEASE ORAL at 08:04

## 2024-04-05 RX ADMIN — Medication 6 MG: at 09:04

## 2024-04-05 RX ADMIN — METHYLPREDNISOLONE SODIUM SUCCINATE 40 MG: 40 INJECTION, POWDER, FOR SOLUTION INTRAMUSCULAR; INTRAVENOUS at 11:04

## 2024-04-05 RX ADMIN — PANTOPRAZOLE SODIUM 40 MG: 40 TABLET, DELAYED RELEASE ORAL at 11:04

## 2024-04-05 RX ADMIN — INSULIN ASPART 6 UNITS: 100 INJECTION, SOLUTION INTRAVENOUS; SUBCUTANEOUS at 01:04

## 2024-04-05 RX ADMIN — LEVALBUTEROL 1.25 MG: 1.25 SOLUTION, CONCENTRATE RESPIRATORY (INHALATION) at 07:04

## 2024-04-05 RX ADMIN — ATORVASTATIN CALCIUM 80 MG: 80 TABLET, FILM COATED ORAL at 11:04

## 2024-04-05 RX ADMIN — METHYLPREDNISOLONE SODIUM SUCCINATE 125 MG: 125 INJECTION, POWDER, FOR SOLUTION INTRAMUSCULAR; INTRAVENOUS at 05:04

## 2024-04-05 RX ADMIN — MEMANTINE 10 MG: 10 TABLET ORAL at 11:04

## 2024-04-05 RX ADMIN — INSULIN ASPART 6 UNITS: 100 INJECTION, SOLUTION INTRAVENOUS; SUBCUTANEOUS at 05:04

## 2024-04-05 NOTE — ASSESSMENT & PLAN NOTE
On 4L nasal cannula with oxygen saturation 93%  Xopenex & IV steroids   IV zosyn started due to consolidation seen on CXR

## 2024-04-05 NOTE — SUBJECTIVE & OBJECTIVE
Past Medical History:   Diagnosis Date    Arthritis     COPD (chronic obstructive pulmonary disease)     Depression     Diabetes mellitus type II     Hyperlipidemia     Hypertension     Pacemaker        Past Surgical History:   Procedure Laterality Date    CARDIAC PACEMAKER PLACEMENT       SECTION      CYST REMOVAL Left 2019    Procedure: EXCISION, SEBACEOUS CYST;  Surgeon: Jaylen Gonzalez Jr., MD;  Location: [facility];  Service: General;  Laterality: Left;    INNER EAR SURGERY         Review of patient's allergies indicates:  No Known Allergies    No current facility-administered medications on file prior to encounter.     Current Outpatient Medications on File Prior to Encounter   Medication Sig    albuterol-ipratropium (DUO-NEB) 2.5 mg-0.5 mg/3 mL nebulizer solution Take 3 mLs by nebulization every 6 (six) hours as needed for Wheezing. Rescue    aspirin (ECOTRIN) 81 MG EC tablet Take 81 mg by mouth every Mon, Wed, Fri.    bempedoic acid 180 mg Tab Take 180 mg by mouth every evening.    coenzyme Q10 100 mg capsule Take 100 mg by mouth once daily.    cyanocobalamin, vitamin B-12, 1,000 mcg Subl Place 1,000 mcg under the tongue once daily.    ferrous sulfate 325 (65 FE) MG EC tablet Take 325 mg by mouth once daily.    fluticasone-umeclidin-vilanter (TRELEGY ELLIPTA) 100-62.5-25 mcg DsDv Inhale 1 puff into the lungs once daily.    leflunomide (ARAVA) 10 MG Tab Take 10 mg by mouth every other day.    memantine (NAMENDA) 10 MG Tab TAKE 1 TABLET BY MOUTH 2 (TWO) TIMES A DAY.    metFORMIN (GLUCOPHAGE) 500 MG tablet Take 1 tablet (500 mg total) by mouth 2 (two) times daily with meals.    omeprazole (PRILOSEC) 40 MG capsule Take 40 mg by mouth every morning.    rosuvastatin (CRESTOR) 40 MG Tab Take 40 mg by mouth every evening.    sertraline (ZOLOFT) 25 MG tablet Take 12.5 mg by mouth every morning.    acetaminophen (TYLENOL) 325 MG tablet Take 650 mg by mouth every 6 (six) hours as needed for Pain.    syringe  "with needle (SYRINGE 3CC/25GX1") 3 mL 25 gauge x 1" Syrg Once every months    [DISCONTINUED] hydroCHLOROthiazide (HYDRODIURIL) 12.5 MG Tab Take 12.5 mg by mouth daily as needed.      Family History       Problem Relation (Age of Onset)    Breast cancer Mother, Sister    Cancer Mother, Sister    Stroke Father, Maternal Grandmother          Tobacco Use    Smoking status: Former     Current packs/day: 0.00     Average packs/day: 2.0 packs/day for 43.0 years (86.0 ttl pk-yrs)     Types: Cigarettes     Start date: 1957     Quit date: 2000     Years since quittin.2    Smokeless tobacco: Never   Substance and Sexual Activity    Alcohol use: No    Drug use: No    Sexual activity: Not on file     Review of Systems   Reason unable to perform ROS: dementia.     Objective:     Vital Signs (Most Recent):  Temp: 98.2 °F (36.8 °C) (24 0804)  Pulse: 90 (24 1003)  Resp: (!) 25 (24 0804)  BP: 126/68 (24 0804)  SpO2: (!) 91 % (24 0804) Vital Signs (24h Range):  Temp:  [97.3 °F (36.3 °C)-98.9 °F (37.2 °C)] 98.2 °F (36.8 °C)  Pulse:  [66-93] 90  Resp:  [16-25] 25  SpO2:  [83 %-98 %] 91 %  BP: (112-149)/(57-86) 126/68     Weight: 72.1 kg (158 lb 15.2 oz)  Body mass index is 29.07 kg/m².     Physical Exam  Constitutional:       General: She is not in acute distress.  HENT:      Head: Normocephalic and atraumatic.   Eyes:      General:         Right eye: No discharge.         Left eye: No discharge.   Cardiovascular:      Rate and Rhythm: Normal rate and regular rhythm.   Pulmonary:      Effort: Pulmonary effort is normal.      Breath sounds: Normal breath sounds.   Abdominal:      General: There is no distension.      Tenderness: There is no abdominal tenderness.   Musculoskeletal:         General: No swelling or tenderness.      Cervical back: Neck supple. No tenderness.   Skin:     General: Skin is warm and dry.   Neurological:      General: No focal deficit present.      Mental Status: She is " "alert.      Comments: + anxious  Knows she is at the hospital but doesn't know why   Not oriented to month    Psychiatric:         Mood and Affect: Mood normal.         Behavior: Behavior normal.                Significant Labs: A1C:   Recent Labs   Lab 02/01/24  1717   HGBA1C 5.9*     ABGs: No results for input(s): "PH", "PCO2", "HCO3", "POCSATURATED", "BE", "TOTALHB", "COHB", "METHB", "O2HB", "POCFIO2", "PO2" in the last 48 hours.  Bilirubin:   Recent Labs   Lab 04/04/24  1620   BILITOT 0.3     Blood Culture:   Recent Labs   Lab 04/04/24  1619 04/04/24  1627   LABBLOO No Growth to date No Growth to date     BMP:   Recent Labs   Lab 04/04/24  1620         K 4.6      CO2 32*   BUN 27*   CREATININE 1.0   CALCIUM 9.2     CBC:   Recent Labs   Lab 04/04/24  1620   WBC 3.93   HGB 10.8*   HCT 34.9*        CMP:   Recent Labs   Lab 04/04/24  1620      K 4.6      CO2 32*      BUN 27*   CREATININE 1.0   CALCIUM 9.2   PROT 6.9   ALBUMIN 3.5   BILITOT 0.3   ALKPHOS 81   AST 34   ALT 18   ANIONGAP 8     Cardiac Markers:   Recent Labs   Lab 04/04/24  1620   *     Coagulation: No results for input(s): "PT", "INR", "APTT" in the last 48 hours.  Lactic Acid:   Recent Labs   Lab 04/04/24  1620   LACTATE 1.3     Lipase: No results for input(s): "LIPASE" in the last 48 hours.  Lipid Panel: No results for input(s): "CHOL", "HDL", "LDLCALC", "TRIG", "CHOLHDL" in the last 48 hours.  Magnesium: No results for input(s): "MG" in the last 48 hours.  POCT Glucose:   Recent Labs   Lab 04/04/24  1943 04/05/24  0712   POCTGLUCOSE 148* 140*     Prealbumin: No results for input(s): "PREALBUMIN" in the last 48 hours.  Respiratory Culture: No results for input(s): "GSRESP", "RESPIRATORYC" in the last 48 hours.  Troponin:   Recent Labs   Lab 04/04/24  1620 04/05/24  0439   TROPONINI 0.017 0.020     TSH: No results for input(s): "TSH" in the last 4320 hours.  Urine Culture: No results for input(s): " ""LABURIN" in the last 48 hours.  Urine Studies:   Recent Labs   Lab 04/05/24  1039   COLORU Yellow   APPEARANCEUA Cloudy*   PHUR 6.0   SPECGRAV 1.030   PROTEINUA Negative   GLUCUA Negative   KETONESU Negative   BILIRUBINUA Negative   OCCULTUA Negative   NITRITE Negative   UROBILINOGEN Negative   LEUKOCYTESUR Negative   WBCUA 4   BACTERIA Moderate*       Significant Imaging: I have reviewed all pertinent imaging results/findings within the past 24 hours.  "

## 2024-04-05 NOTE — PLAN OF CARE
Problem: Adult Inpatient Plan of Care  Goal: Plan of Care Review  Outcome: Ongoing, Progressing     Problem: Diabetes Comorbidity  Goal: Blood Glucose Level Within Targeted Range  Outcome: Ongoing, Progressing     Problem: Skin Injury Risk Increased  Goal: Skin Health and Integrity  Outcome: Ongoing, Progressing     Problem: Chest Pain  Goal: Resolution of Chest Pain Symptoms  Outcome: Ongoing, Progressing     Problem: Gas Exchange Impaired  Goal: Optimal Gas Exchange  Outcome: Ongoing, Progressing

## 2024-04-05 NOTE — H&P
[facility] ([address] [place] [address] Medicine  History & Physical    Patient Name: Gretel Ashton  MRN: [id number]  Patient Class: OP- Observation  Admission Date: 2024  Attending Physician: Aayush Rinaldi MD   Primary Care Provider: Jailene Astudillo MD         Patient information was obtained from patient and ER records.     Subjective:     Principal Problem:COPD exacerbation    Chief Complaint:   Chief Complaint   Patient presents with    Shortness of Breath     C/o SOB and pain under left breast        HPI: Patient is an 83 year old female with medical history of HTN, HLD, DM type 2, pacemaker and COPD ( on 2-3L at home) who was admitted for SOB and chest pressure.  Patient is a resident at the [facility].  Patient has no acute complaints at this time.      Admitted for worsening hypoxia secondary to COPD exacerbation.         Past Medical History:   Diagnosis Date    Arthritis     COPD (chronic obstructive pulmonary disease)     Depression     Diabetes mellitus type II     Hyperlipidemia     Hypertension     Pacemaker        Past Surgical History:   Procedure Laterality Date    CARDIAC PACEMAKER PLACEMENT       SECTION      CYST REMOVAL Left 2019    Procedure: EXCISION, SEBACEOUS CYST;  Surgeon: Jaylen Gonzalez Jr., MD;  Location: [facility];  Service: General;  Laterality: Left;    INNER EAR SURGERY         Review of patient's allergies indicates:  No Known Allergies    No current facility-administered medications on file prior to encounter.     Current Outpatient Medications on File Prior to Encounter   Medication Sig    albuterol-ipratropium (DUO-NEB) 2.5 mg-0.5 mg/3 mL nebulizer solution Take 3 mLs by nebulization every 6 (six) hours as needed for Wheezing. Rescue    aspirin (ECOTRIN) 81 MG EC tablet Take 81 mg by mouth every Mon, Wed, Fri.    bempedoic acid 180 mg Tab Take 180 mg by mouth every evening.    coenzyme Q10 100 mg capsule Take 100 mg by mouth once daily.     "cyanocobalamin, vitamin B-12, 1,000 mcg Subl Place 1,000 mcg under the tongue once daily.    ferrous sulfate 325 (65 FE) MG EC tablet Take 325 mg by mouth once daily.    fluticasone-umeclidin-vilanter (TRELEGY ELLIPTA) 100-62.5-25 mcg DsDv Inhale 1 puff into the lungs once daily.    leflunomide (ARAVA) 10 MG Tab Take 10 mg by mouth every other day.    memantine (NAMENDA) 10 MG Tab TAKE 1 TABLET BY MOUTH 2 (TWO) TIMES A DAY.    metFORMIN (GLUCOPHAGE) 500 MG tablet Take 1 tablet (500 mg total) by mouth 2 (two) times daily with meals.    omeprazole (PRILOSEC) 40 MG capsule Take 40 mg by mouth every morning.    rosuvastatin (CRESTOR) 40 MG Tab Take 40 mg by mouth every evening.    sertraline (ZOLOFT) 25 MG tablet Take 12.5 mg by mouth every morning.    acetaminophen (TYLENOL) 325 MG tablet Take 650 mg by mouth every 6 (six) hours as needed for Pain.    syringe with needle (SYRINGE 3CC/25GX1") 3 mL 25 gauge x 1" Syrg Once every months    [DISCONTINUED] hydroCHLOROthiazide (HYDRODIURIL) 12.5 MG Tab Take 12.5 mg by mouth daily as needed.      Family History       Problem Relation (Age of Onset)    Breast cancer Mother, Sister    Cancer Mother, Sister    Stroke Father, Maternal Grandmother          Tobacco Use    Smoking status: Former     Current packs/day: 0.00     Average packs/day: 2.0 packs/day for 43.0 years (86.0 ttl pk-yrs)     Types: Cigarettes     Start date: 1957     Quit date: 2000     Years since quittin.2    Smokeless tobacco: Never   Substance and Sexual Activity    Alcohol use: No    Drug use: No    Sexual activity: Not on file     Review of Systems   Reason unable to perform ROS: dementia.     Objective:     Vital Signs (Most Recent):  Temp: 98.2 °F (36.8 °C) (24 0804)  Pulse: 90 (24 1003)  Resp: (!) 25 (24 08)  BP: 126/68 (24 08)  SpO2: (!) 91 % (24 0804) Vital Signs (24h Range):  Temp:  [97.3 °F (36.3 °C)-98.9 °F (37.2 °C)] 98.2 °F (36.8 °C)  Pulse:  [66-93] " "90  Resp:  [16-25] 25  SpO2:  [83 %-98 %] 91 %  BP: (112-149)/(57-86) 126/68     Weight: 72.1 kg (158 lb 15.2 oz)  Body mass index is 29.07 kg/m².     Physical Exam  Constitutional:       General: She is not in acute distress.  HENT:      Head: Normocephalic and atraumatic.   Eyes:      General:         Right eye: No discharge.         Left eye: No discharge.   Cardiovascular:      Rate and Rhythm: Normal rate and regular rhythm.   Pulmonary:      Effort: Pulmonary effort is normal.      Breath sounds: Normal breath sounds.   Abdominal:      General: There is no distension.      Tenderness: There is no abdominal tenderness.   Musculoskeletal:         General: No swelling or tenderness.      Cervical back: Neck supple. No tenderness.   Skin:     General: Skin is warm and dry.   Neurological:      General: No focal deficit present.      Mental Status: She is alert.      Comments: + anxious  Knows she is at the hospital but doesn't know why   Not oriented to month    Psychiatric:         Mood and Affect: Mood normal.         Behavior: Behavior normal.                Significant Labs: A1C:   Recent Labs   Lab 02/01/24  1717   HGBA1C 5.9*     ABGs: No results for input(s): "PH", "PCO2", "HCO3", "POCSATURATED", "BE", "TOTALHB", "COHB", "METHB", "O2HB", "POCFIO2", "PO2" in the last 48 hours.  Bilirubin:   Recent Labs   Lab 04/04/24  1620   BILITOT 0.3     Blood Culture:   Recent Labs   Lab 04/04/24  1619 04/04/24  1627   LABBLOO No Growth to date No Growth to date     BMP:   Recent Labs   Lab 04/04/24  1620         K 4.6      CO2 32*   BUN 27*   CREATININE 1.0   CALCIUM 9.2     CBC:   Recent Labs   Lab 04/04/24  1620   WBC 3.93   HGB 10.8*   HCT 34.9*        CMP:   Recent Labs   Lab 04/04/24  1620      K 4.6      CO2 32*      BUN 27*   CREATININE 1.0   CALCIUM 9.2   PROT 6.9   ALBUMIN 3.5   BILITOT 0.3   ALKPHOS 81   AST 34   ALT 18   ANIONGAP 8     Cardiac Markers:   Recent " "Labs   Lab 04/04/24  1620   *     Coagulation: No results for input(s): "PT", "INR", "APTT" in the last 48 hours.  Lactic Acid:   Recent Labs   Lab 04/04/24  1620   LACTATE 1.3     Lipase: No results for input(s): "LIPASE" in the last 48 hours.  Lipid Panel: No results for input(s): "CHOL", "HDL", "LDLCALC", "TRIG", "CHOLHDL" in the last 48 hours.  Magnesium: No results for input(s): "MG" in the last 48 hours.  POCT Glucose:   Recent Labs   Lab 04/04/24  1943 04/05/24  0712   POCTGLUCOSE 148* 140*     Prealbumin: No results for input(s): "PREALBUMIN" in the last 48 hours.  Respiratory Culture: No results for input(s): "GSRESP", "RESPIRATORYC" in the last 48 hours.  Troponin:   Recent Labs   Lab 04/04/24  1620 04/05/24  0439   TROPONINI 0.017 0.020     TSH: No results for input(s): "TSH" in the last 4320 hours.  Urine Culture: No results for input(s): "LABURIN" in the last 48 hours.  Urine Studies:   Recent Labs   Lab 04/05/24  1039   COLORU Yellow   APPEARANCEUA Cloudy*   PHUR 6.0   SPECGRAV 1.030   PROTEINUA Negative   GLUCUA Negative   KETONESU Negative   BILIRUBINUA Negative   OCCULTUA Negative   NITRITE Negative   UROBILINOGEN Negative   LEUKOCYTESUR Negative   WBCUA 4   BACTERIA Moderate*       Significant Imaging: I have reviewed all pertinent imaging results/findings within the past 24 hours.  Assessment/Plan:     * COPD exacerbation  On 4L nasal cannula with oxygen saturation 93%  Xopenex & IV steroids   IV zosyn started due to consolidation seen on CXR       Acute on chronic respiratory failure  Patient with Hypoxic Respiratory failure which is Acute on chronic.  she is on home oxygen at 2-3 LPM. Supplemental oxygen was provided and noted-      .   Signs/symptoms of respiratory failure include- increased work of breathing and respiratory distress. Contributing diagnoses includes - COPD and Pneumonia Labs and images were reviewed. Patient Has not had a recent ABG. Will treat underlying causes and " adjust management of respiratory failure as follows- IV solumedrol, IV abx, xopenex treatments    Alzheimer's dementia with behavioral disturbance  Patient with dementia with likely etiology of alzheimer's dementia. Dementia is moderate. The patient does have signs of behavioral disturbance. Home dementia medications are Held or Continued: continued.. Continue non-pharmacologic interventions to prevent delirium (No VS between 11PM-5AM, activity during day, opening blinds, providing glasses/hearing aids, and up in chair during daytime). Will avoid narcotics and benzos unless absolutely necessary. PRN anti-psychotics are prescribed to avoid self harm behaviors.    Patient appears agitated at this time.  Depakote started.  Patient has baseline dementia and received high dose steroids.  Additionally she is out of environment.    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  Patient does not have home medication with her       Memory loss  Continue namenda       Type 2 diabetes mellitus with diabetic neuropathy, without long-term current use of insulin  Continue insulin       VTE Risk Mitigation (From admission, onward)           Ordered     IP VTE HIGH RISK PATIENT  Once         04/04/24 1847     Place sequential compression device  Until discontinued         04/04/24 1847                         On 04/05/2024, patient should be placed in hospital observation services under my care in collaboration with Dr. Rinaldi.           Barbara Flowers PA-C  Department of Hospital Medicine  [facility] ([facility])

## 2024-04-05 NOTE — PLAN OF CARE
[facility] - Med Surg (3rd Fl)  Initial Discharge Assessment       Primary Care Provider: Jailene Astudillo MD    Admission Diagnosis: SOB (shortness of breath) [R06.02]  Hypoxia [R09.02]  COPD exacerbation [J44.1]    Admission Date: 4/4/2024  Expected Discharge Date: 4/5/2024    Transition of Care Barriers: (P) None    Payor: MEDICARE / Plan: MEDICARE PART A & B / Product Type: Government /     Extended Emergency Contact Information  Primary Emergency Contact: Agustin Ashton   [facility]  Home Phone: [phone]  Relation: Son    Discharge Plan A: (P) Return to nursing home, Skilled Nursing Facility  Discharge Plan B: (P) Return to Nursing Home      [facility] - [address] [address]  Phone: [phone] Fax: [phone]      Initial Assessment (most recent)       Adult Discharge Assessment - 04/05/24 1518          Discharge Assessment    Assessment Type Discharge Planning Assessment     Confirmed/corrected address, phone number and insurance Yes     Confirmed Demographics Correct on Facesheet     Source of Information patient     Does patient/caregiver understand observation status Yes     Communicated MARIKA with patient/caregiver Yes     Reason For Admission Shortness of breath     People in Home facility resident     Facility Arrived From: [facility]     Do you expect to return to your current living situation? Yes     Do you have help at home or someone to help you manage your care at home? Yes     Who are your caregiver(s) and their phone number(s)? [facility] staff     Prior to hospitilization cognitive status: Not Oriented to Place;Not Oriented to Time (P)      Current cognitive status: Not Oriented to Place;Not Oriented to Time (P)      Walking or Climbing Stairs Difficulty yes (P)      Walking or Climbing Stairs ambulation difficulty, dependent (P)      Mobility Management Utilizes wheelchair (P)      Dressing/Bathing Difficulty yes  (P)      Dressing/Bathing bathing difficulty, dependent (P)      Dressing/Bathing Management [facility] staff assist (P)      Home Accessibility wheelchair accessible (P)      Home Layout Able to live on 1st floor (P)      Equipment Currently Used at Home wheelchair;hospital bed (P)      Readmission within 30 days? No (P)      Patient currently being followed by outpatient case management? No (P)      Do you currently have service(s) that help you manage your care at home? No (P)      Do you take prescription medications? Yes (P)      Do you have prescription coverage? Yes (P)      Coverage Medicare suppliment (P)      Do you have any problems affording any of your prescribed medications? No (P)      Is the patient taking medications as prescribed? yes (P)      Who is going to help you get home at discharge? [facility] staff (P)      How do you get to doctors appointments? agency (P)      Are you on dialysis? No (P)      Do you take coumadin? No (P)      Discharge Plan A Return to nursing home;Skilled Nursing Facility (P)      Discharge Plan B Return to Nursing Home (P)      DME Needed Upon Discharge  none (P)      Discharge Plan discussed with: POA (P)      Name(s) and Number(s) Agustin Ashton [phone] (P)      Transition of Care Barriers None (P)         Physical Activity    On average, how many days per week do you engage in moderate to strenuous exercise (like a brisk walk)? 0 days (P)      On average, how many minutes do you engage in exercise at this level? 0 min (P)         Financial Resource Strain    How hard is it for you to pay for the very basics like food, housing, medical care, and heating? Not hard at all (P)         Housing Stability    In the last 12 months, was there a time when you were not able to pay the mortgage or rent on time? No (P)      In the last 12 months, was there a time when you did not have a steady place to sleep or slept in a shelter (including now)? No (P)         Transportation  Needs    In the past 12 months, has lack of transportation kept you from medical appointments or from getting medications? No (P)      In the past 12 months, has lack of transportation kept you from meetings, work, or from getting things needed for daily living? No (P)         Food Insecurity    Within the past 12 months, you worried that your food would run out before you got the money to buy more. Never true (P)      Within the past 12 months, the food you bought just didn't last and you didn't have money to get more. Never true (P)         Stress    Do you feel stress - tense, restless, nervous, or anxious, or unable to sleep at night because your mind is troubled all the time - these days? Patient unable to answer (P)         Social Connections    In a typical week, how many times do you talk on the phone with family, friends, or neighbors? More than three times a week (P)      How often do you get together with friends or relatives? More than three times a week (P)      How often do you attend Nondenominational or Cheondoism services? Patient unable to answer (P)      Do you belong to any clubs or organizations such as Nondenominational groups, unions, fraternal or athletic groups, or school groups? Patient unable to answer (P)      How often do you attend meetings of the clubs or organizations you belong to? Patient unable to answer (P)         Alcohol Use    Q1: How often do you have a drink containing alcohol? Never (P)      Q2: How many drinks containing alcohol do you have on a typical day when you are drinking? Patient does not drink (P)      Q3: How often do you have six or more drinks on one occasion? Never (P)         OTHER    Name(s) of People in Home Facility resident (P)                         Discharge assessment complete. Patient will return to [facility] once medically stable.

## 2024-04-05 NOTE — ASSESSMENT & PLAN NOTE
Patient with Hypoxic Respiratory failure which is Acute on chronic.  she is on home oxygen at 2-3 LPM. Supplemental oxygen was provided and noted-      .   Signs/symptoms of respiratory failure include- increased work of breathing and respiratory distress. Contributing diagnoses includes - COPD and Pneumonia Labs and images were reviewed. Patient Has not had a recent ABG. Will treat underlying causes and adjust management of respiratory failure as follows- IV solumedrol, IV abx, xopenex treatments

## 2024-04-05 NOTE — HPI
Patient is an 83 year old female with medical history of HTN, HLD, DM type 2, pacemaker and COPD ( on 2-3L at home) who was admitted for SOB and chest pressure.  Patient is a resident at the [facility].  Patient has no acute complaints at this time.      Admitted for worsening hypoxia secondary to COPD exacerbation.

## 2024-04-05 NOTE — ASSESSMENT & PLAN NOTE
Patient with dementia with likely etiology of alzheimer's dementia. Dementia is moderate. The patient does have signs of behavioral disturbance. Home dementia medications are Held or Continued: continued.. Continue non-pharmacologic interventions to prevent delirium (No VS between 11PM-5AM, activity during day, opening blinds, providing glasses/hearing aids, and up in chair during daytime). Will avoid narcotics and benzos unless absolutely necessary. PRN anti-psychotics are prescribed to avoid self harm behaviors.    Patient appears agitated at this time.  Depakote started.  Patient has baseline dementia and received high dose steroids.  Additionally she is out of environment.    U/A negative

## 2024-04-05 NOTE — PROGRESS NOTES
04/05/24 0755   Vital Signs   SpO2 (!) 83 %   Flow (L/min) 3   Device (Oxygen Therapy) nasal cannula       Upon assessment, pt with SOB and labored breathing. Pt bumped to 4 L of O2, sats remains between 91-92%. JANET Jimenez and respiratory department notified.

## 2024-04-05 NOTE — PLAN OF CARE
Provider reviewed plan of care with patient.  Discharge planning initiated.  Patient provided with Case Management contact information and encouraged to call with concerns or questions.  Will continue to follow for duration of stay.  Patient requested that CN contact family to confirm information needed for dc planning

## 2024-04-06 LAB
POCT GLUCOSE: 102 MG/DL (ref 70–110)
POCT GLUCOSE: 126 MG/DL (ref 70–110)
POCT GLUCOSE: 85 MG/DL (ref 70–110)
POCT GLUCOSE: 93 MG/DL (ref 70–110)
TROPONIN I SERPL DL<=0.01 NG/ML-MCNC: 0.02 NG/ML (ref 0–0.03)

## 2024-04-06 PROCEDURE — 84484 ASSAY OF TROPONIN QUANT: CPT | Performed by: SURGERY

## 2024-04-06 PROCEDURE — 36415 COLL VENOUS BLD VENIPUNCTURE: CPT | Performed by: SURGERY

## 2024-04-06 PROCEDURE — 63600175 PHARM REV CODE 636 W HCPCS: Performed by: PHYSICIAN ASSISTANT

## 2024-04-06 PROCEDURE — 99900035 HC TECH TIME PER 15 MIN (STAT)

## 2024-04-06 PROCEDURE — 99233 SBSQ HOSP IP/OBS HIGH 50: CPT | Mod: ,,, | Performed by: FAMILY MEDICINE

## 2024-04-06 PROCEDURE — 25000003 PHARM REV CODE 250: Performed by: SURGERY

## 2024-04-06 PROCEDURE — 11000001 HC ACUTE MED/SURG PRIVATE ROOM

## 2024-04-06 PROCEDURE — 94640 AIRWAY INHALATION TREATMENT: CPT

## 2024-04-06 PROCEDURE — 94761 N-INVAS EAR/PLS OXIMETRY MLT: CPT

## 2024-04-06 PROCEDURE — 27000221 HC OXYGEN, UP TO 24 HOURS

## 2024-04-06 PROCEDURE — 25000242 PHARM REV CODE 250 ALT 637 W/ HCPCS: Performed by: STUDENT IN AN ORGANIZED HEALTH CARE EDUCATION/TRAINING PROGRAM

## 2024-04-06 PROCEDURE — 25000003 PHARM REV CODE 250: Performed by: PHYSICIAN ASSISTANT

## 2024-04-06 RX ORDER — TRAMADOL HYDROCHLORIDE 50 MG/1
50 TABLET ORAL EVERY 12 HOURS PRN
Status: DISCONTINUED | OUTPATIENT
Start: 2024-04-06 | End: 2024-04-09 | Stop reason: HOSPADM

## 2024-04-06 RX ADMIN — INSULIN ASPART 6 UNITS: 100 INJECTION, SOLUTION INTRAVENOUS; SUBCUTANEOUS at 02:04

## 2024-04-06 RX ADMIN — MEMANTINE 10 MG: 10 TABLET ORAL at 10:04

## 2024-04-06 RX ADMIN — PIPERACILLIN AND TAZOBACTAM 4.5 G: 4; .5 INJECTION, POWDER, LYOPHILIZED, FOR SOLUTION INTRAVENOUS; PARENTERAL at 12:04

## 2024-04-06 RX ADMIN — LEVALBUTEROL 1.25 MG: 1.25 SOLUTION, CONCENTRATE RESPIRATORY (INHALATION) at 03:04

## 2024-04-06 RX ADMIN — MEMANTINE 10 MG: 10 TABLET ORAL at 09:04

## 2024-04-06 RX ADMIN — DIVALPROEX SODIUM 250 MG: 250 TABLET, DELAYED RELEASE ORAL at 10:04

## 2024-04-06 RX ADMIN — METHYLPREDNISOLONE SODIUM SUCCINATE 40 MG: 40 INJECTION, POWDER, FOR SOLUTION INTRAMUSCULAR; INTRAVENOUS at 12:04

## 2024-04-06 RX ADMIN — ATORVASTATIN CALCIUM 80 MG: 80 TABLET, FILM COATED ORAL at 10:04

## 2024-04-06 RX ADMIN — LEVALBUTEROL 1.25 MG: 1.25 SOLUTION, CONCENTRATE RESPIRATORY (INHALATION) at 07:04

## 2024-04-06 RX ADMIN — ACETAMINOPHEN 650 MG: 325 TABLET ORAL at 05:04

## 2024-04-06 RX ADMIN — INSULIN ASPART 6 UNITS: 100 INJECTION, SOLUTION INTRAVENOUS; SUBCUTANEOUS at 10:04

## 2024-04-06 RX ADMIN — PANTOPRAZOLE SODIUM 40 MG: 40 TABLET, DELAYED RELEASE ORAL at 10:04

## 2024-04-06 RX ADMIN — METHYLPREDNISOLONE SODIUM SUCCINATE 40 MG: 40 INJECTION, POWDER, FOR SOLUTION INTRAMUSCULAR; INTRAVENOUS at 10:04

## 2024-04-06 RX ADMIN — Medication 6 MG: at 09:04

## 2024-04-06 RX ADMIN — PIPERACILLIN AND TAZOBACTAM 4.5 G: 4; .5 INJECTION, POWDER, LYOPHILIZED, FOR SOLUTION INTRAVENOUS; PARENTERAL at 06:04

## 2024-04-06 RX ADMIN — DIVALPROEX SODIUM 250 MG: 250 TABLET, DELAYED RELEASE ORAL at 09:04

## 2024-04-06 RX ADMIN — INSULIN ASPART 6 UNITS: 100 INJECTION, SOLUTION INTRAVENOUS; SUBCUTANEOUS at 06:04

## 2024-04-06 RX ADMIN — PIPERACILLIN AND TAZOBACTAM 4.5 G: 4; .5 INJECTION, POWDER, LYOPHILIZED, FOR SOLUTION INTRAVENOUS; PARENTERAL at 04:04

## 2024-04-06 NOTE — PLAN OF CARE
Problem: Adult Inpatient Plan of Care  Goal: Plan of Care Review  Outcome: Ongoing, Progressing     Problem: Diabetes Comorbidity  Goal: Blood Glucose Level Within Targeted Range  Outcome: Ongoing, Progressing     Problem: Skin Injury Risk Increased  Goal: Skin Health and Integrity  Outcome: Ongoing, Progressing     Problem: Chest Pain  Goal: Resolution of Chest Pain Symptoms  Outcome: Ongoing, Progressing     Problem: Gas Exchange Impaired  Goal: Optimal Gas Exchange  Outcome: Ongoing, Progressing     IV zosyn continued  Pt disoriented to situation, place and time  Pt having to be redirected frequently  Pt attempted to pull out IV multiple time  Pt remains safe and free from falls  Pt updated on plan of care

## 2024-04-06 NOTE — HOSPITAL COURSE
Patient seems be doing better.  Her agitation has resolved.  Depakote started and seems to be working nicely.  Her respiratory status seems to be better than yesterday.  She is still wheezing seems less.    4/8 Patient HD stable on 4L nasal cannula.  Improvement in lung auscultation.  Repeat CXR pending.  Continue IV abx.  Possible d/c within the next 24-48 hours.  Baseline supplemental oxygen 2-3L.      4/9 Back to her home baseline oxygen.  D/c with 3 more days of levofloxacin for pneumonia.

## 2024-04-06 NOTE — PROGRESS NOTES
[facility] ([facility])  [facility] Medicine  Progress Note    Patient Name: Gretel Ashton  MRN: [id number]  Patient Class: IP- Inpatient   Admission Date: 2024  Length of Stay: 1 days  Attending Physician: Aayush Rinaldi MD  Primary Care Provider: Jailene Astudillo MD        Subjective:     Principal Problem:COPD exacerbation        HPI:  Patient is an 83 year old female with medical history of HTN, HLD, DM type 2, pacemaker and COPD ( on 2-3L at home) who was admitted for SOB and chest pressure.  Patient is a resident at the [facility].  Patient has no acute complaints at this time.      Admitted for worsening hypoxia secondary to COPD exacerbation.         Overview/Hospital Course:  Patient has been agitated.  Depakote started and seems to be working nicely.  Her respiratory status seems to be better than yesterday.  She is still wheezing.    Past Medical History:   Diagnosis Date    Arthritis     COPD (chronic obstructive pulmonary disease)     Depression     Diabetes mellitus type II     Hyperlipidemia     Hypertension     Pacemaker        Past Surgical History:   Procedure Laterality Date    CARDIAC PACEMAKER PLACEMENT       SECTION      CYST REMOVAL Left 2019    Procedure: EXCISION, SEBACEOUS CYST;  Surgeon: Jaylen Gonzalez Jr., MD;  Location: [facility];  Service: General;  Laterality: Left;    INNER EAR SURGERY         Review of patient's allergies indicates:  No Known Allergies    No current facility-administered medications on file prior to encounter.     Current Outpatient Medications on File Prior to Encounter   Medication Sig    albuterol-ipratropium (DUO-NEB) 2.5 mg-0.5 mg/3 mL nebulizer solution Take 3 mLs by nebulization every 6 (six) hours as needed for Wheezing. Rescue    aspirin (ECOTRIN) 81 MG EC tablet Take 81 mg by mouth every Mon, Wed, Fri.    bempedoic acid 180 mg Tab Take 180 mg by mouth every evening.    coenzyme Q10 100 mg capsule Take 100 mg by mouth once daily.  "   cyanocobalamin, vitamin B-12, 1,000 mcg Subl Place 1,000 mcg under the tongue once daily.    ferrous sulfate 325 (65 FE) MG EC tablet Take 325 mg by mouth once daily.    fluticasone-umeclidin-vilanter (TRELEGY ELLIPTA) 100-62.5-25 mcg DsDv Inhale 1 puff into the lungs once daily.    leflunomide (ARAVA) 10 MG Tab Take 10 mg by mouth every other day.    memantine (NAMENDA) 10 MG Tab TAKE 1 TABLET BY MOUTH 2 (TWO) TIMES A DAY.    metFORMIN (GLUCOPHAGE) 500 MG tablet Take 1 tablet (500 mg total) by mouth 2 (two) times daily with meals.    omeprazole (PRILOSEC) 40 MG capsule Take 40 mg by mouth every morning.    rosuvastatin (CRESTOR) 40 MG Tab Take 40 mg by mouth every evening.    sertraline (ZOLOFT) 25 MG tablet Take 12.5 mg by mouth every morning.    acetaminophen (TYLENOL) 325 MG tablet Take 650 mg by mouth every 6 (six) hours as needed for Pain.    syringe with needle (SYRINGE 3CC/25GX1") 3 mL 25 gauge x 1" Syrg Once every months    [DISCONTINUED] hydroCHLOROthiazide (HYDRODIURIL) 12.5 MG Tab Take 12.5 mg by mouth daily as needed.      Family History       Problem Relation (Age of Onset)    Breast cancer Mother, Sister    Cancer Mother, Sister    Stroke Father, Maternal Grandmother          Tobacco Use    Smoking status: Former     Current packs/day: 0.00     Average packs/day: 2.0 packs/day for 43.0 years (86.0 ttl pk-yrs)     Types: Cigarettes     Start date: 1957     Quit date: 2000     Years since quittin.2    Smokeless tobacco: Never   Substance and Sexual Activity    Alcohol use: No    Drug use: No    Sexual activity: Not on file     Review of Systems   Reason unable to perform ROS: dementia.     Objective:     Vital Signs (Most Recent):  Temp: 98.6 °F (37 °C) (24 1136)  Pulse: 67 (24 1200)  Resp: 20 (24 1136)  BP: 119/63 (24 1136)  SpO2: 98 % (24 1136) Vital Signs (24h Range):  Temp:  [97.9 °F (36.6 °C)-98.8 °F (37.1 °C)] 98.6 °F (37 °C)  Pulse:  [] " 67  Resp:  [18-22] 20  SpO2:  [94 %-98 %] 98 %  BP: (110-122)/(53-63) 119/63     Weight: 72.1 kg (158 lb 15.2 oz)  Body mass index is 29.07 kg/m².     Physical Exam  Constitutional:       General: She is not in acute distress.  HENT:      Head: Normocephalic and atraumatic.   Eyes:      General:         Right eye: No discharge.         Left eye: No discharge.   Cardiovascular:      Rate and Rhythm: Normal rate and regular rhythm.   Pulmonary:      Effort: Pulmonary effort is normal.      Breath sounds: Wheezing and rales present.   Abdominal:      General: There is no distension.      Tenderness: There is no abdominal tenderness.   Musculoskeletal:         General: No swelling or tenderness.      Cervical back: Neck supple. No tenderness.      Right lower leg: No edema.      Left lower leg: No edema.   Skin:     General: Skin is warm and dry.   Neurological:      General: No focal deficit present.      Mental Status: She is alert. Mental status is at baseline. She is disoriented.      Cranial Nerves: No cranial nerve deficit.      Motor: Weakness present.      Gait: Gait abnormal.      Comments: + anxious  Knows she is at the hospital but doesn't know why   Not oriented to month    Psychiatric:         Behavior: Behavior is withdrawn.         Cognition and Memory: Cognition is impaired. Memory is impaired.                Significant Labs: A1C:   Recent Labs   Lab 02/01/24  1717   HGBA1C 5.9*       ABGs:   Recent Labs   Lab 04/05/24  1217   PH 7.520*   PCO2 47*   HCO3 38.40*   POCSATURATED 99.5   BE 14.00*   TOTALHB 9.2*   COHB 3.2*   METHB 1.4   PO2 81     Bilirubin:   Recent Labs   Lab 04/04/24  1620   BILITOT 0.3       Blood Culture:   Recent Labs   Lab 04/04/24  1619 04/04/24  1627   LABBLOO No Growth to date  No Growth to date No Growth to date  No Growth to date       BMP:   Recent Labs   Lab 04/05/24  1247   *      K 4.6      CO2 30*   BUN 32*   CREATININE 1.0   CALCIUM 9.2   MG 2.3  "      CBC:   Recent Labs   Lab 04/04/24  1620 04/05/24  1247   WBC 3.93 3.33*   HGB 10.8* 10.1*   HCT 34.9* 33.4*    206       CMP:   Recent Labs   Lab 04/04/24  1620 04/05/24  1247    141   K 4.6 4.6    100   CO2 32* 30*    151*   BUN 27* 32*   CREATININE 1.0 1.0   CALCIUM 9.2 9.2   PROT 6.9  --    ALBUMIN 3.5  --    BILITOT 0.3  --    ALKPHOS 81  --    AST 34  --    ALT 18  --    ANIONGAP 8 11       Cardiac Markers:   Recent Labs   Lab 04/04/24  1620   *       Coagulation: No results for input(s): "PT", "INR", "APTT" in the last 48 hours.  Lactic Acid:   Recent Labs   Lab 04/04/24  1620   LACTATE 1.3       Lipase: No results for input(s): "LIPASE" in the last 48 hours.  Lipid Panel: No results for input(s): "CHOL", "HDL", "LDLCALC", "TRIG", "CHOLHDL" in the last 48 hours.  Magnesium:   Recent Labs   Lab 04/05/24  1247   MG 2.3     POCT Glucose:   Recent Labs   Lab 04/05/24  2053 04/06/24  0742 04/06/24  1135   POCTGLUCOSE 150* 126* 102       Prealbumin: No results for input(s): "PREALBUMIN" in the last 48 hours.  Respiratory Culture: No results for input(s): "GSRESP", "RESPIRATORYC" in the last 48 hours.  Troponin:   Recent Labs   Lab 04/05/24  1247 04/05/24  1716 04/06/24  0440   TROPONINI 0.029* 0.016 0.023       TSH: No results for input(s): "TSH" in the last 4320 hours.  Urine Culture: No results for input(s): "LABURIN" in the last 48 hours.  Urine Studies:   Recent Labs   Lab 04/05/24  1039   COLORU Yellow   APPEARANCEUA Cloudy*   PHUR 6.0   SPECGRAV 1.030   PROTEINUA Negative   GLUCUA Negative   KETONESU Negative   BILIRUBINUA Negative   OCCULTUA Negative   NITRITE Negative   UROBILINOGEN Negative   LEUKOCYTESUR Negative   WBCUA 4   BACTERIA Moderate*         Significant Imaging: I have reviewed all pertinent imaging results/findings within the past 24 hours.    Assessment/Plan:      * COPD exacerbation  On 4L nasal cannula with oxygen saturation 93%  Xopenex & IV steroids "   IV zosyn started due to consolidation seen on CXR       Acute on chronic respiratory failure  Patient with Hypoxic Respiratory failure which is Acute on chronic.  she is on home oxygen at 2-3 LPM. Supplemental oxygen was provided and noted-      .   Signs/symptoms of respiratory failure include- increased work of breathing and respiratory distress. Contributing diagnoses includes - COPD and Pneumonia Labs and images were reviewed. Patient Has not had a recent ABG. Will treat underlying causes and adjust management of respiratory failure as follows- IV solumedrol, IV abx, xopenex treatments    Alzheimer's dementia with behavioral disturbance  Patient with dementia with likely etiology of alzheimer's dementia. Dementia is moderate. The patient does have signs of behavioral disturbance. Home dementia medications are Held or Continued: continued.. Continue non-pharmacologic interventions to prevent delirium (No VS between 11PM-5AM, activity during day, opening blinds, providing glasses/hearing aids, and up in chair during daytime). Will avoid narcotics and benzos unless absolutely necessary. PRN anti-psychotics are prescribed to avoid self harm behaviors.    Patient appears agitated at this time.  Depakote started.  Patient has baseline dementia and received high dose steroids.  Additionally she is out of environment.    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  Patient does not have home medication with her       Memory loss  Continue namenda       Type 2 diabetes mellitus with diabetic neuropathy, without long-term current use of insulin  Continue insulin       VTE Risk Mitigation (From admission, onward)           Ordered     IP VTE HIGH RISK PATIENT  Once         04/04/24 1847     Place sequential compression device  Until discontinued         04/04/24 1847                    Discharge Planning   MARIKA: 4/5/2024     Code Status: Full Code   Is the patient medically  ready for discharge?:     Reason for patient still in hospital (select all that apply): Treatment  Discharge Plan A: Return to nursing home, Skilled Nursing Facility                  Aayush Rinaldi MD  Department of Hospital Medicine   [place] - [facility] (3rd Fl)

## 2024-04-06 NOTE — SUBJECTIVE & OBJECTIVE
Past Medical History:   Diagnosis Date    Arthritis     COPD (chronic obstructive pulmonary disease)     Depression     Diabetes mellitus type II     Hyperlipidemia     Hypertension     Pacemaker        Past Surgical History:   Procedure Laterality Date    CARDIAC PACEMAKER PLACEMENT       SECTION      CYST REMOVAL Left 2019    Procedure: EXCISION, SEBACEOUS CYST;  Surgeon: Jaylen Gonzalez Jr., MD;  Location: [facility];  Service: General;  Laterality: Left;    INNER EAR SURGERY         Review of patient's allergies indicates:  No Known Allergies    No current facility-administered medications on file prior to encounter.     Current Outpatient Medications on File Prior to Encounter   Medication Sig    albuterol-ipratropium (DUO-NEB) 2.5 mg-0.5 mg/3 mL nebulizer solution Take 3 mLs by nebulization every 6 (six) hours as needed for Wheezing. Rescue    aspirin (ECOTRIN) 81 MG EC tablet Take 81 mg by mouth every Mon, Wed, Fri.    bempedoic acid 180 mg Tab Take 180 mg by mouth every evening.    coenzyme Q10 100 mg capsule Take 100 mg by mouth once daily.    cyanocobalamin, vitamin B-12, 1,000 mcg Subl Place 1,000 mcg under the tongue once daily.    ferrous sulfate 325 (65 FE) MG EC tablet Take 325 mg by mouth once daily.    fluticasone-umeclidin-vilanter (TRELEGY ELLIPTA) 100-62.5-25 mcg DsDv Inhale 1 puff into the lungs once daily.    leflunomide (ARAVA) 10 MG Tab Take 10 mg by mouth every other day.    memantine (NAMENDA) 10 MG Tab TAKE 1 TABLET BY MOUTH 2 (TWO) TIMES A DAY.    metFORMIN (GLUCOPHAGE) 500 MG tablet Take 1 tablet (500 mg total) by mouth 2 (two) times daily with meals.    omeprazole (PRILOSEC) 40 MG capsule Take 40 mg by mouth every morning.    rosuvastatin (CRESTOR) 40 MG Tab Take 40 mg by mouth every evening.    sertraline (ZOLOFT) 25 MG tablet Take 12.5 mg by mouth every morning.    acetaminophen (TYLENOL) 325 MG tablet Take 650 mg by mouth every 6 (six) hours as needed for Pain.    syringe  "with needle (SYRINGE 3CC/25GX1") 3 mL 25 gauge x 1" Syrg Once every months    [DISCONTINUED] hydroCHLOROthiazide (HYDRODIURIL) 12.5 MG Tab Take 12.5 mg by mouth daily as needed.      Family History       Problem Relation (Age of Onset)    Breast cancer Mother, Sister    Cancer Mother, Sister    Stroke Father, Maternal Grandmother          Tobacco Use    Smoking status: Former     Current packs/day: 0.00     Average packs/day: 2.0 packs/day for 43.0 years (86.0 ttl pk-yrs)     Types: Cigarettes     Start date: 1957     Quit date: 2000     Years since quittin.2    Smokeless tobacco: Never   Substance and Sexual Activity    Alcohol use: No    Drug use: No    Sexual activity: Not on file     Review of Systems   Reason unable to perform ROS: dementia.     Objective:     Vital Signs (Most Recent):  Temp: 98.6 °F (37 °C) (24 1136)  Pulse: 67 (24 1200)  Resp: 20 (24 1136)  BP: 119/63 (24 1136)  SpO2: 98 % (24 1136) Vital Signs (24h Range):  Temp:  [97.9 °F (36.6 °C)-98.8 °F (37.1 °C)] 98.6 °F (37 °C)  Pulse:  [] 67  Resp:  [18-22] 20  SpO2:  [94 %-98 %] 98 %  BP: (110-122)/(53-63) 119/63     Weight: 72.1 kg (158 lb 15.2 oz)  Body mass index is 29.07 kg/m².     Physical Exam  Constitutional:       General: She is not in acute distress.  HENT:      Head: Normocephalic and atraumatic.   Eyes:      General:         Right eye: No discharge.         Left eye: No discharge.   Cardiovascular:      Rate and Rhythm: Normal rate and regular rhythm.   Pulmonary:      Effort: Pulmonary effort is normal.      Breath sounds: Wheezing and rales present.   Abdominal:      General: There is no distension.      Tenderness: There is no abdominal tenderness.   Musculoskeletal:         General: No swelling or tenderness.      Cervical back: Neck supple. No tenderness.      Right lower leg: No edema.      Left lower leg: No edema.   Skin:     General: Skin is warm and dry.   Neurological:      " "General: No focal deficit present.      Mental Status: She is alert. Mental status is at baseline. She is disoriented.      Cranial Nerves: No cranial nerve deficit.      Motor: Weakness present.      Gait: Gait abnormal.      Comments: + anxious  Knows she is at the hospital but doesn't know why   Not oriented to month    Psychiatric:         Behavior: Behavior is withdrawn.         Cognition and Memory: Cognition is impaired. Memory is impaired.                Significant Labs: A1C:   Recent Labs   Lab 02/01/24  1717   HGBA1C 5.9*       ABGs:   Recent Labs   Lab 04/05/24  1217   PH 7.520*   PCO2 47*   HCO3 38.40*   POCSATURATED 99.5   BE 14.00*   TOTALHB 9.2*   COHB 3.2*   METHB 1.4   PO2 81     Bilirubin:   Recent Labs   Lab 04/04/24  1620   BILITOT 0.3       Blood Culture:   Recent Labs   Lab 04/04/24  1619 04/04/24  1627   LABBLOO No Growth to date  No Growth to date No Growth to date  No Growth to date       BMP:   Recent Labs   Lab 04/05/24  1247   *      K 4.6      CO2 30*   BUN 32*   CREATININE 1.0   CALCIUM 9.2   MG 2.3       CBC:   Recent Labs   Lab 04/04/24  1620 04/05/24  1247   WBC 3.93 3.33*   HGB 10.8* 10.1*   HCT 34.9* 33.4*    206       CMP:   Recent Labs   Lab 04/04/24  1620 04/05/24  1247    141   K 4.6 4.6    100   CO2 32* 30*    151*   BUN 27* 32*   CREATININE 1.0 1.0   CALCIUM 9.2 9.2   PROT 6.9  --    ALBUMIN 3.5  --    BILITOT 0.3  --    ALKPHOS 81  --    AST 34  --    ALT 18  --    ANIONGAP 8 11       Cardiac Markers:   Recent Labs   Lab 04/04/24  1620   *       Coagulation: No results for input(s): "PT", "INR", "APTT" in the last 48 hours.  Lactic Acid:   Recent Labs   Lab 04/04/24  1620   LACTATE 1.3       Lipase: No results for input(s): "LIPASE" in the last 48 hours.  Lipid Panel: No results for input(s): "CHOL", "HDL", "LDLCALC", "TRIG", "CHOLHDL" in the last 48 hours.  Magnesium:   Recent Labs   Lab 04/05/24  1247   MG 2.3     POCT " "Glucose:   Recent Labs   Lab 04/05/24  2053 04/06/24  0742 04/06/24  1135   POCTGLUCOSE 150* 126* 102       Prealbumin: No results for input(s): "PREALBUMIN" in the last 48 hours.  Respiratory Culture: No results for input(s): "GSRESP", "RESPIRATORYC" in the last 48 hours.  Troponin:   Recent Labs   Lab 04/05/24  1247 04/05/24  1716 04/06/24  0440   TROPONINI 0.029* 0.016 0.023       TSH: No results for input(s): "TSH" in the last 4320 hours.  Urine Culture: No results for input(s): "LABURIN" in the last 48 hours.  Urine Studies:   Recent Labs   Lab 04/05/24  1039   COLORU Yellow   APPEARANCEUA Cloudy*   PHUR 6.0   SPECGRAV 1.030   PROTEINUA Negative   GLUCUA Negative   KETONESU Negative   BILIRUBINUA Negative   OCCULTUA Negative   NITRITE Negative   UROBILINOGEN Negative   LEUKOCYTESUR Negative   WBCUA 4   BACTERIA Moderate*         Significant Imaging: I have reviewed all pertinent imaging results/findings within the past 24 hours.  "

## 2024-04-07 LAB
POCT GLUCOSE: 168 MG/DL (ref 70–110)
POCT GLUCOSE: 82 MG/DL (ref 70–110)
POCT GLUCOSE: 86 MG/DL (ref 70–110)
POCT GLUCOSE: 94 MG/DL (ref 70–110)

## 2024-04-07 PROCEDURE — 94640 AIRWAY INHALATION TREATMENT: CPT

## 2024-04-07 PROCEDURE — 25000242 PHARM REV CODE 250 ALT 637 W/ HCPCS: Performed by: STUDENT IN AN ORGANIZED HEALTH CARE EDUCATION/TRAINING PROGRAM

## 2024-04-07 PROCEDURE — 99900035 HC TECH TIME PER 15 MIN (STAT)

## 2024-04-07 PROCEDURE — 25000003 PHARM REV CODE 250: Performed by: FAMILY MEDICINE

## 2024-04-07 PROCEDURE — 27000221 HC OXYGEN, UP TO 24 HOURS

## 2024-04-07 PROCEDURE — 99233 SBSQ HOSP IP/OBS HIGH 50: CPT | Mod: ,,, | Performed by: FAMILY MEDICINE

## 2024-04-07 PROCEDURE — 63600175 PHARM REV CODE 636 W HCPCS: Performed by: PHYSICIAN ASSISTANT

## 2024-04-07 PROCEDURE — 25000003 PHARM REV CODE 250: Performed by: PHYSICIAN ASSISTANT

## 2024-04-07 PROCEDURE — 94761 N-INVAS EAR/PLS OXIMETRY MLT: CPT

## 2024-04-07 PROCEDURE — 25000003 PHARM REV CODE 250: Performed by: SURGERY

## 2024-04-07 PROCEDURE — 11000001 HC ACUTE MED/SURG PRIVATE ROOM

## 2024-04-07 RX ORDER — TRIAMCINOLONE ACETONIDE 1 MG/G
CREAM TOPICAL 2 TIMES DAILY
Status: DISCONTINUED | OUTPATIENT
Start: 2024-04-07 | End: 2024-04-09 | Stop reason: HOSPADM

## 2024-04-07 RX ADMIN — INSULIN ASPART 6 UNITS: 100 INJECTION, SOLUTION INTRAVENOUS; SUBCUTANEOUS at 09:04

## 2024-04-07 RX ADMIN — TRAMADOL HYDROCHLORIDE 50 MG: 50 TABLET, COATED ORAL at 05:04

## 2024-04-07 RX ADMIN — Medication 6 MG: at 11:04

## 2024-04-07 RX ADMIN — PIPERACILLIN AND TAZOBACTAM 4.5 G: 4; .5 INJECTION, POWDER, LYOPHILIZED, FOR SOLUTION INTRAVENOUS; PARENTERAL at 04:04

## 2024-04-07 RX ADMIN — TRIAMCINOLONE ACETONIDE: 1 CREAM TOPICAL at 12:04

## 2024-04-07 RX ADMIN — PIPERACILLIN AND TAZOBACTAM 4.5 G: 4; .5 INJECTION, POWDER, LYOPHILIZED, FOR SOLUTION INTRAVENOUS; PARENTERAL at 06:04

## 2024-04-07 RX ADMIN — LEVALBUTEROL 1.25 MG: 1.25 SOLUTION, CONCENTRATE RESPIRATORY (INHALATION) at 07:04

## 2024-04-07 RX ADMIN — METHYLPREDNISOLONE SODIUM SUCCINATE 40 MG: 40 INJECTION, POWDER, FOR SOLUTION INTRAMUSCULAR; INTRAVENOUS at 11:04

## 2024-04-07 RX ADMIN — PANTOPRAZOLE SODIUM 40 MG: 40 TABLET, DELAYED RELEASE ORAL at 09:04

## 2024-04-07 RX ADMIN — METHYLPREDNISOLONE SODIUM SUCCINATE 40 MG: 40 INJECTION, POWDER, FOR SOLUTION INTRAMUSCULAR; INTRAVENOUS at 12:04

## 2024-04-07 RX ADMIN — MEMANTINE 10 MG: 10 TABLET ORAL at 08:04

## 2024-04-07 RX ADMIN — MEMANTINE 10 MG: 10 TABLET ORAL at 09:04

## 2024-04-07 RX ADMIN — DIVALPROEX SODIUM 250 MG: 250 TABLET, DELAYED RELEASE ORAL at 08:04

## 2024-04-07 RX ADMIN — TRIAMCINOLONE ACETONIDE: 1 CREAM TOPICAL at 09:04

## 2024-04-07 RX ADMIN — LEVALBUTEROL 1.25 MG: 1.25 SOLUTION, CONCENTRATE RESPIRATORY (INHALATION) at 03:04

## 2024-04-07 RX ADMIN — INSULIN ASPART 6 UNITS: 100 INJECTION, SOLUTION INTRAVENOUS; SUBCUTANEOUS at 12:04

## 2024-04-07 RX ADMIN — INSULIN ASPART 6 UNITS: 100 INJECTION, SOLUTION INTRAVENOUS; SUBCUTANEOUS at 05:04

## 2024-04-07 RX ADMIN — INSULIN DETEMIR 15 UNITS: 100 INJECTION, SOLUTION SUBCUTANEOUS at 09:04

## 2024-04-07 RX ADMIN — PIPERACILLIN AND TAZOBACTAM 4.5 G: 4; .5 INJECTION, POWDER, LYOPHILIZED, FOR SOLUTION INTRAVENOUS; PARENTERAL at 12:04

## 2024-04-07 RX ADMIN — ATORVASTATIN CALCIUM 80 MG: 80 TABLET, FILM COATED ORAL at 09:04

## 2024-04-07 RX ADMIN — DIVALPROEX SODIUM 250 MG: 250 TABLET, DELAYED RELEASE ORAL at 09:04

## 2024-04-07 NOTE — PLAN OF CARE
.Plan of care reviewed with pt. Pt with no evidenced of understanding. Pt oriented to self only. Pt pulled out PIV during the night. Pt did sleep about 6 hours but had to be woken up to restart PIV for IV abx. No apparent distress noted. Fall precautions maintained. Pt remains free of fall or injury. Bed in lowest position, locked, call light within reach, and bed alarm on. Side rails up x's 2 with slip resistant socks on.   Problem: Adult Inpatient Plan of Care  Goal: Plan of Care Review  Outcome: Ongoing, Progressing  Flowsheets (Taken 4/7/2024 6099)  Plan of Care Reviewed With: patient  Goal: Absence of Hospital-Acquired Illness or Injury  Outcome: Ongoing, Progressing  Goal: Optimal Comfort and Wellbeing  Outcome: Ongoing, Progressing     Problem: Diabetes Comorbidity  Goal: Blood Glucose Level Within Targeted Range  Outcome: Ongoing, Progressing     Problem: Skin Injury Risk Increased  Goal: Skin Health and Integrity  Outcome: Ongoing, Progressing     Problem: Chest Pain  Goal: Resolution of Chest Pain Symptoms  Outcome: Ongoing, Progressing     Problem: Gas Exchange Impaired  Goal: Optimal Gas Exchange  Outcome: Ongoing, Progressing

## 2024-04-07 NOTE — PLAN OF CARE
Notified Dr. Looney of pt's blood sugar being 93 and has Levemir 15 units scheduled at this time. Went over pt's blood sugar for the day. Orders to hold tonight's dose.

## 2024-04-07 NOTE — SUBJECTIVE & OBJECTIVE
Past Medical History:   Diagnosis Date    Arthritis     COPD (chronic obstructive pulmonary disease)     Depression     Diabetes mellitus type II     Hyperlipidemia     Hypertension     Pacemaker        Past Surgical History:   Procedure Laterality Date    CARDIAC PACEMAKER PLACEMENT       SECTION      CYST REMOVAL Left 2019    Procedure: EXCISION, SEBACEOUS CYST;  Surgeon: Jaylen Gonzalez Jr., MD;  Location: [facility];  Service: General;  Laterality: Left;    INNER EAR SURGERY         Review of patient's allergies indicates:  No Known Allergies    No current facility-administered medications on file prior to encounter.     Current Outpatient Medications on File Prior to Encounter   Medication Sig    albuterol-ipratropium (DUO-NEB) 2.5 mg-0.5 mg/3 mL nebulizer solution Take 3 mLs by nebulization every 6 (six) hours as needed for Wheezing. Rescue    aspirin (ECOTRIN) 81 MG EC tablet Take 81 mg by mouth every Mon, Wed, Fri.    bempedoic acid 180 mg Tab Take 180 mg by mouth every evening.    coenzyme Q10 100 mg capsule Take 100 mg by mouth once daily.    cyanocobalamin, vitamin B-12, 1,000 mcg Subl Place 1,000 mcg under the tongue once daily.    ferrous sulfate 325 (65 FE) MG EC tablet Take 325 mg by mouth once daily.    fluticasone-umeclidin-vilanter (TRELEGY ELLIPTA) 100-62.5-25 mcg DsDv Inhale 1 puff into the lungs once daily.    leflunomide (ARAVA) 10 MG Tab Take 10 mg by mouth every other day.    memantine (NAMENDA) 10 MG Tab TAKE 1 TABLET BY MOUTH 2 (TWO) TIMES A DAY.    metFORMIN (GLUCOPHAGE) 500 MG tablet Take 1 tablet (500 mg total) by mouth 2 (two) times daily with meals.    omeprazole (PRILOSEC) 40 MG capsule Take 40 mg by mouth every morning.    rosuvastatin (CRESTOR) 40 MG Tab Take 40 mg by mouth every evening.    sertraline (ZOLOFT) 25 MG tablet Take 12.5 mg by mouth every morning.    acetaminophen (TYLENOL) 325 MG tablet Take 650 mg by mouth every 6 (six) hours as needed for Pain.    syringe  "with needle (SYRINGE 3CC/25GX1") 3 mL 25 gauge x 1" Syrg Once every months    [DISCONTINUED] hydroCHLOROthiazide (HYDRODIURIL) 12.5 MG Tab Take 12.5 mg by mouth daily as needed.      Family History       Problem Relation (Age of Onset)    Breast cancer Mother, Sister    Cancer Mother, Sister    Stroke Father, Maternal Grandmother          Tobacco Use    Smoking status: Former     Current packs/day: 0.00     Average packs/day: 2.0 packs/day for 43.0 years (86.0 ttl pk-yrs)     Types: Cigarettes     Start date: 1957     Quit date: 2000     Years since quittin.2    Smokeless tobacco: Never   Substance and Sexual Activity    Alcohol use: No    Drug use: No    Sexual activity: Not on file     Review of Systems   Reason unable to perform ROS: dementia.     Objective:     Vital Signs (Most Recent):  Temp: 98.6 °F (37 °C) (24 1118)  Pulse: 83 (24 1118)  Resp: 16 (24 1118)  BP: (!) 112/59 (24 1118)  SpO2: 97 % (24 1118) Vital Signs (24h Range):  Temp:  [97.5 °F (36.4 °C)-98.6 °F (37 °C)] 98.6 °F (37 °C)  Pulse:  [59-89] 83  Resp:  [14-22] 16  SpO2:  [94 %-98 %] 97 %  BP: (110-116)/(54-59) 112/59     Weight: 72.1 kg (158 lb 15.2 oz)  Body mass index is 29.07 kg/m².     Physical Exam  Constitutional:       General: She is not in acute distress.  HENT:      Head: Normocephalic and atraumatic.   Eyes:      General:         Right eye: No discharge.         Left eye: No discharge.   Cardiovascular:      Rate and Rhythm: Normal rate and regular rhythm.   Pulmonary:      Effort: Pulmonary effort is normal.      Breath sounds: Wheezing and rales present.   Abdominal:      General: There is no distension.      Tenderness: There is no abdominal tenderness.   Musculoskeletal:         General: No swelling or tenderness.      Cervical back: Neck supple. No tenderness.      Right lower leg: No edema.      Left lower leg: No edema.   Skin:     General: Skin is warm and dry.   Neurological:      " "General: No focal deficit present.      Mental Status: She is alert. Mental status is at baseline. She is disoriented.      Cranial Nerves: No cranial nerve deficit.      Motor: Weakness present.      Gait: Gait abnormal.      Comments: + anxious  Knows she is at the hospital but doesn't know why   Not oriented to month    Psychiatric:         Behavior: Behavior is withdrawn.         Cognition and Memory: Cognition is impaired. Memory is impaired.                Significant Labs: A1C:   Recent Labs   Lab 02/01/24  1717   HGBA1C 5.9*       ABGs: No results for input(s): "PH", "PCO2", "HCO3", "POCSATURATED", "BE", "TOTALHB", "COHB", "METHB", "O2HB", "POCFIO2", "PO2" in the last 48 hours.    Bilirubin:   Recent Labs   Lab 04/04/24  1620   BILITOT 0.3       Blood Culture:   No results for input(s): "LABBLOO" in the last 48 hours.    BMP:   No results for input(s): "GLU", "NA", "K", "CL", "CO2", "BUN", "CREATININE", "CALCIUM", "MG" in the last 48 hours.    CBC:   No results for input(s): "WBC", "HGB", "HCT", "PLT" in the last 48 hours.    CMP:   No results for input(s): "NA", "K", "CL", "CO2", "GLU", "BUN", "CREATININE", "CALCIUM", "PROT", "ALBUMIN", "BILITOT", "ALKPHOS", "AST", "ALT", "ANIONGAP", "EGFRNONAA" in the last 48 hours.    Invalid input(s): "ESTGFAFRICA"    Cardiac Markers:   No results for input(s): "CKMB", "MYOGLOBIN", "BNP", "TROPISTAT" in the last 48 hours.    Coagulation: No results for input(s): "PT", "INR", "APTT" in the last 48 hours.  Lactic Acid:   No results for input(s): "LACTATE" in the last 48 hours.    Lipase: No results for input(s): "LIPASE" in the last 48 hours.  Lipid Panel: No results for input(s): "CHOL", "HDL", "LDLCALC", "TRIG", "CHOLHDL" in the last 48 hours.  Magnesium: No results for input(s): "MG" in the last 48 hours.    POCT Glucose:   Recent Labs   Lab 04/06/24 2009 04/07/24  0712 04/07/24  1122   POCTGLUCOSE 93 82 94       Prealbumin: No results for input(s): "PREALBUMIN" in the " "last 48 hours.  Respiratory Culture: No results for input(s): "GSRESP", "RESPIRATORYC" in the last 48 hours.  Troponin:   Recent Labs   Lab 04/05/24  1716 04/06/24  0440   TROPONINI 0.016 0.023       TSH: No results for input(s): "TSH" in the last 4320 hours.  Urine Culture: No results for input(s): "LABURIN" in the last 48 hours.  Urine Studies:   No results for input(s): "COLORU", "APPEARANCEUA", "PHUR", "SPECGRAV", "PROTEINUA", "GLUCUA", "KETONESU", "BILIRUBINUA", "OCCULTUA", "NITRITE", "UROBILINOGEN", "LEUKOCYTESUR", "RBCUA", "WBCUA", "BACTERIA", "SQUAMEPITHEL", "HYALINECASTS" in the last 48 hours.    Invalid input(s): "WRIGHTSUR"      Significant Imaging: I have reviewed all pertinent imaging results/findings within the past 24 hours.  "

## 2024-04-07 NOTE — PLAN OF CARE
Problem: Adult Inpatient Plan of Care  Goal: Plan of Care Review  Outcome: Ongoing, Progressing  Flowsheets (Taken 4/7/2024 1701)  Plan of Care Reviewed With: patient     Problem: Diabetes Comorbidity  Goal: Blood Glucose Level Within Targeted Range  Outcome: Ongoing, Progressing     Problem: Skin Injury Risk Increased  Goal: Skin Health and Integrity  Outcome: Ongoing, Progressing     Problem: Gas Exchange Impaired  Goal: Optimal Gas Exchange  Outcome: Ongoing, Progressing

## 2024-04-07 NOTE — PROGRESS NOTES
[facility] ([address] [place] [address] Medicine  Progress Note    Patient Name: Gretel Ashton  MRN: [id number]  Patient Class: IP- Inpatient   Admission Date: 2024  Length of Stay: 2 days  Attending Physician: Aayush Rinaldi MD  Primary Care Provider: Jailene Astudillo MD        Subjective:     Principal Problem:COPD exacerbation        HPI:  Patient is an 83 year old female with medical history of HTN, HLD, DM type 2, pacemaker and COPD ( on 2-3L at home) who was admitted for SOB and chest pressure.  Patient is a resident at the [facility].  Patient has no acute complaints at this time.      Admitted for worsening hypoxia secondary to COPD exacerbation.         Overview/Hospital Course:  Patient seems be doing better.  Her agitation has resolved.  Depakote started and seems to be working nicely.  Her respiratory status seems to be better than yesterday.  She is still wheezing seems less.    Past Medical History:   Diagnosis Date    Arthritis     COPD (chronic obstructive pulmonary disease)     Depression     Diabetes mellitus type II     Hyperlipidemia     Hypertension     Pacemaker        Past Surgical History:   Procedure Laterality Date    CARDIAC PACEMAKER PLACEMENT       SECTION      CYST REMOVAL Left 2019    Procedure: EXCISION, SEBACEOUS CYST;  Surgeon: Jaylen Gonzalez Jr., MD;  [facility];  Service: General;  Laterality: Left;    INNER EAR SURGERY         Review of patient's allergies indicates:  No Known Allergies    No current facility-administered medications on file prior to encounter.     Current Outpatient Medications on File Prior to Encounter   Medication Sig    albuterol-ipratropium (DUO-NEB) 2.5 mg-0.5 mg/3 mL nebulizer solution Take 3 mLs by nebulization every 6 (six) hours as needed for Wheezing. Rescue    aspirin (ECOTRIN) 81 MG EC tablet Take 81 mg by mouth every Mon, Wed, Fri.    bempedoic acid 180 mg Tab Take 180 mg by mouth every evening.    coenzyme Q10 100  "mg capsule Take 100 mg by mouth once daily.    cyanocobalamin, vitamin B-12, 1,000 mcg Subl Place 1,000 mcg under the tongue once daily.    ferrous sulfate 325 (65 FE) MG EC tablet Take 325 mg by mouth once daily.    fluticasone-umeclidin-vilanter (TRELEGY ELLIPTA) 100-62.5-25 mcg DsDv Inhale 1 puff into the lungs once daily.    leflunomide (ARAVA) 10 MG Tab Take 10 mg by mouth every other day.    memantine (NAMENDA) 10 MG Tab TAKE 1 TABLET BY MOUTH 2 (TWO) TIMES A DAY.    metFORMIN (GLUCOPHAGE) 500 MG tablet Take 1 tablet (500 mg total) by mouth 2 (two) times daily with meals.    omeprazole (PRILOSEC) 40 MG capsule Take 40 mg by mouth every morning.    rosuvastatin (CRESTOR) 40 MG Tab Take 40 mg by mouth every evening.    sertraline (ZOLOFT) 25 MG tablet Take 12.5 mg by mouth every morning.    acetaminophen (TYLENOL) 325 MG tablet Take 650 mg by mouth every 6 (six) hours as needed for Pain.    syringe with needle (SYRINGE 3CC/25GX1") 3 mL 25 gauge x 1" Syrg Once every months    [DISCONTINUED] hydroCHLOROthiazide (HYDRODIURIL) 12.5 MG Tab Take 12.5 mg by mouth daily as needed.      Family History       Problem Relation (Age of Onset)    Breast cancer Mother, Sister    Cancer Mother, Sister    Stroke Father, Maternal Grandmother          Tobacco Use    Smoking status: Former     Current packs/day: 0.00     Average packs/day: 2.0 packs/day for 43.0 years (86.0 ttl pk-yrs)     Types: Cigarettes     Start date: 1957     Quit date: 2000     Years since quittin.2    Smokeless tobacco: Never   Substance and Sexual Activity    Alcohol use: No    Drug use: No    Sexual activity: Not on file     Review of Systems   Reason unable to perform ROS: dementia.     Objective:     Vital Signs (Most Recent):  Temp: 98.6 °F (37 °C) (24 1118)  Pulse: 83 (24 1118)  Resp: 16 (24 1118)  BP: (!) 112/59 (24 1118)  SpO2: 97 % (24 1118) Vital Signs (24h Range):  Temp:  [97.5 °F (36.4 °C)-98.6 °F (37 " "°C)] 98.6 °F (37 °C)  Pulse:  [59-89] 83  Resp:  [14-22] 16  SpO2:  [94 %-98 %] 97 %  BP: (110-116)/(54-59) 112/59     Weight: 72.1 kg (158 lb 15.2 oz)  Body mass index is 29.07 kg/m².     Physical Exam  Constitutional:       General: She is not in acute distress.  HENT:      Head: Normocephalic and atraumatic.   Eyes:      General:         Right eye: No discharge.         Left eye: No discharge.   Cardiovascular:      Rate and Rhythm: Normal rate and regular rhythm.   Pulmonary:      Effort: Pulmonary effort is normal.      Breath sounds: Wheezing and rales present.   Abdominal:      General: There is no distension.      Tenderness: There is no abdominal tenderness.   Musculoskeletal:         General: No swelling or tenderness.      Cervical back: Neck supple. No tenderness.      Right lower leg: No edema.      Left lower leg: No edema.   Skin:     General: Skin is warm and dry.   Neurological:      General: No focal deficit present.      Mental Status: She is alert. Mental status is at baseline. She is disoriented.      Cranial Nerves: No cranial nerve deficit.      Motor: Weakness present.      Gait: Gait abnormal.      Comments: + anxious  Knows she is at the hospital but doesn't know why   Not oriented to month    Psychiatric:         Behavior: Behavior is withdrawn.         Cognition and Memory: Cognition is impaired. Memory is impaired.                Significant Labs: A1C:   Recent Labs   Lab 02/01/24  1717   HGBA1C 5.9*       ABGs: No results for input(s): "PH", "PCO2", "HCO3", "POCSATURATED", "BE", "TOTALHB", "COHB", "METHB", "O2HB", "POCFIO2", "PO2" in the last 48 hours.    Bilirubin:   Recent Labs   Lab 04/04/24  1620   BILITOT 0.3       Blood Culture:   No results for input(s): "LABBLOO" in the last 48 hours.    BMP:   No results for input(s): "GLU", "NA", "K", "CL", "CO2", "BUN", "CREATININE", "CALCIUM", "MG" in the last 48 hours.    CBC:   No results for input(s): "WBC", "HGB", "HCT", "PLT" in the " "last 48 hours.    CMP:   No results for input(s): "NA", "K", "CL", "CO2", "GLU", "BUN", "CREATININE", "CALCIUM", "PROT", "ALBUMIN", "BILITOT", "ALKPHOS", "AST", "ALT", "ANIONGAP", "EGFRNONAA" in the last 48 hours.    Invalid input(s): "ESTGFAFRICA"    Cardiac Markers:   No results for input(s): "CKMB", "MYOGLOBIN", "BNP", "TROPISTAT" in the last 48 hours.    Coagulation: No results for input(s): "PT", "INR", "APTT" in the last 48 hours.  Lactic Acid:   No results for input(s): "LACTATE" in the last 48 hours.    Lipase: No results for input(s): "LIPASE" in the last 48 hours.  Lipid Panel: No results for input(s): "CHOL", "HDL", "LDLCALC", "TRIG", "CHOLHDL" in the last 48 hours.  Magnesium: No results for input(s): "MG" in the last 48 hours.    POCT Glucose:   Recent Labs   Lab 04/06/24  2009 04/07/24  0712 04/07/24  1122   POCTGLUCOSE 93 82 94       Prealbumin: No results for input(s): "PREALBUMIN" in the last 48 hours.  Respiratory Culture: No results for input(s): "GSRESP", "RESPIRATORYC" in the last 48 hours.  Troponin:   Recent Labs   Lab 04/05/24  1716 04/06/24  0440   TROPONINI 0.016 0.023       TSH: No results for input(s): "TSH" in the last 4320 hours.  Urine Culture: No results for input(s): "LABURIN" in the last 48 hours.  Urine Studies:   No results for input(s): "COLORU", "APPEARANCEUA", "PHUR", "SPECGRAV", "PROTEINUA", "GLUCUA", "KETONESU", "BILIRUBINUA", "OCCULTUA", "NITRITE", "UROBILINOGEN", "LEUKOCYTESUR", "RBCUA", "WBCUA", "BACTERIA", "SQUAMEPITHEL", "HYALINECASTS" in the last 48 hours.    Invalid input(s): "WRIGHTSUR"      Significant Imaging: I have reviewed all pertinent imaging results/findings within the past 24 hours.    Assessment/Plan:      * COPD exacerbation  On 4L nasal cannula with oxygen saturation 93%  Xopenex & IV steroids   IV zosyn started due to consolidation seen on CXR   Chest x-ray seems mildly improved      Acute on chronic respiratory failure  Patient with Hypoxic Respiratory " failure which is Acute on chronic.  she is on home oxygen at 2-3 LPM. Supplemental oxygen was provided and noted-      .   Signs/symptoms of respiratory failure include- increased work of breathing and respiratory distress. Contributing diagnoses includes - COPD and Pneumonia Labs and images were reviewed. Patient Has not had a recent ABG. Will treat underlying causes and adjust management of respiratory failure as follows- IV solumedrol, IV abx, xopenex treatments    Alzheimer's dementia with behavioral disturbance  Patient with dementia with likely etiology of alzheimer's dementia. Dementia is moderate. The patient does have signs of behavioral disturbance. Home dementia medications are Held or Continued: continued.. Continue non-pharmacologic interventions to prevent delirium (No VS between 11PM-5AM, activity during day, opening blinds, providing glasses/hearing aids, and up in chair during daytime). Will avoid narcotics and benzos unless absolutely necessary. PRN anti-psychotics are prescribed to avoid self harm behaviors.    Patient's agitation has resolved.  Depakote started.  Patient has baseline dementia.    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  Patient does not have home medication with her       Memory loss  Continue namenda   Depakote was added and she seems to be tolerating this well      Type 2 diabetes mellitus with diabetic neuropathy, without long-term current use of insulin  Continue insulin       VTE Risk Mitigation (From admission, onward)           Ordered     IP VTE HIGH RISK PATIENT  Once         04/04/24 1847     Place sequential compression device  Until discontinued         04/04/24 1847                    Discharge Planning   MARIKA: 4/5/2024     Code Status: Full Code   Is the patient medically ready for discharge?:     Reason for patient still in hospital (select all that apply): Patient trending condition and Treatment  Discharge Plan A:  Return to nursing home, Skilled Nursing Facility                  Aayush Rinaldi MD  Department of Hospital Medicine   [facility] - Med Surg (3rd Fl)

## 2024-04-07 NOTE — ASSESSMENT & PLAN NOTE
On 4L nasal cannula with oxygen saturation 93%  Xopenex & IV steroids   IV zosyn started due to consolidation seen on CXR   Chest x-ray seems mildly improved

## 2024-04-07 NOTE — ASSESSMENT & PLAN NOTE
Patient with dementia with likely etiology of alzheimer's dementia. Dementia is moderate. The patient does have signs of behavioral disturbance. Home dementia medications are Held or Continued: continued.. Continue non-pharmacologic interventions to prevent delirium (No VS between 11PM-5AM, activity during day, opening blinds, providing glasses/hearing aids, and up in chair during daytime). Will avoid narcotics and benzos unless absolutely necessary. PRN anti-psychotics are prescribed to avoid self harm behaviors.    Patient's agitation has resolved.  Depakote started.  Patient has baseline dementia.    U/A negative

## 2024-04-08 PROBLEM — J18.9 PNEUMONIA: Status: ACTIVE | Noted: 2024-04-08

## 2024-04-08 LAB
ANION GAP SERPL CALC-SCNC: 8 MMOL/L (ref 8–16)
BASOPHILS # BLD AUTO: 0 K/UL (ref 0–0.2)
BASOPHILS NFR BLD: 0 % (ref 0–1.9)
BNP SERPL-MCNC: 78 PG/ML (ref 0–99)
BUN SERPL-MCNC: 36 MG/DL (ref 8–23)
CALCIUM SERPL-MCNC: 9.1 MG/DL (ref 8.7–10.5)
CHLORIDE SERPL-SCNC: 97 MMOL/L (ref 95–110)
CO2 SERPL-SCNC: 37 MMOL/L (ref 23–29)
CREAT SERPL-MCNC: 1 MG/DL (ref 0.5–1.4)
DIFFERENTIAL METHOD BLD: ABNORMAL
EOSINOPHIL # BLD AUTO: 0 K/UL (ref 0–0.5)
EOSINOPHIL NFR BLD: 0 % (ref 0–8)
ERYTHROCYTE [DISTWIDTH] IN BLOOD BY AUTOMATED COUNT: 16.4 % (ref 11.5–14.5)
EST. GFR  (NO RACE VARIABLE): 56 ML/MIN/1.73 M^2
GLUCOSE SERPL-MCNC: 95 MG/DL (ref 70–110)
HCT VFR BLD AUTO: 32.9 % (ref 37–48.5)
HGB BLD-MCNC: 9.8 G/DL (ref 12–16)
IMM GRANULOCYTES # BLD AUTO: 0.02 K/UL (ref 0–0.04)
IMM GRANULOCYTES NFR BLD AUTO: 0.6 % (ref 0–0.5)
LYMPHOCYTES # BLD AUTO: 0.4 K/UL (ref 1–4.8)
LYMPHOCYTES NFR BLD: 12.3 % (ref 18–48)
MCH RBC QN AUTO: 24.9 PG (ref 27–31)
MCHC RBC AUTO-ENTMCNC: 29.8 G/DL (ref 32–36)
MCV RBC AUTO: 84 FL (ref 82–98)
MONOCYTES # BLD AUTO: 0.2 K/UL (ref 0.3–1)
MONOCYTES NFR BLD: 5.3 % (ref 4–15)
NEUTROPHILS # BLD AUTO: 2.9 K/UL (ref 1.8–7.7)
NEUTROPHILS NFR BLD: 81.8 % (ref 38–73)
NRBC BLD-RTO: 0 /100 WBC
PLATELET # BLD AUTO: 174 K/UL (ref 150–450)
PMV BLD AUTO: 12.7 FL (ref 9.2–12.9)
POCT GLUCOSE: 160 MG/DL (ref 70–110)
POCT GLUCOSE: 171 MG/DL (ref 70–110)
POCT GLUCOSE: 90 MG/DL (ref 70–110)
POCT GLUCOSE: 95 MG/DL (ref 70–110)
POTASSIUM SERPL-SCNC: 4.3 MMOL/L (ref 3.5–5.1)
RBC # BLD AUTO: 3.94 M/UL (ref 4–5.4)
SODIUM SERPL-SCNC: 142 MMOL/L (ref 136–145)
WBC # BLD AUTO: 3.57 K/UL (ref 3.9–12.7)

## 2024-04-08 PROCEDURE — 25000242 PHARM REV CODE 250 ALT 637 W/ HCPCS: Performed by: STUDENT IN AN ORGANIZED HEALTH CARE EDUCATION/TRAINING PROGRAM

## 2024-04-08 PROCEDURE — 80048 BASIC METABOLIC PNL TOTAL CA: CPT | Performed by: FAMILY MEDICINE

## 2024-04-08 PROCEDURE — 25000003 PHARM REV CODE 250: Performed by: PHYSICIAN ASSISTANT

## 2024-04-08 PROCEDURE — 11000001 HC ACUTE MED/SURG PRIVATE ROOM

## 2024-04-08 PROCEDURE — 83880 ASSAY OF NATRIURETIC PEPTIDE: CPT | Performed by: FAMILY MEDICINE

## 2024-04-08 PROCEDURE — 85025 COMPLETE CBC W/AUTO DIFF WBC: CPT | Performed by: FAMILY MEDICINE

## 2024-04-08 PROCEDURE — 36415 COLL VENOUS BLD VENIPUNCTURE: CPT | Performed by: FAMILY MEDICINE

## 2024-04-08 PROCEDURE — 99900031 HC PATIENT EDUCATION (STAT)

## 2024-04-08 PROCEDURE — 25000003 PHARM REV CODE 250: Performed by: FAMILY MEDICINE

## 2024-04-08 PROCEDURE — 94761 N-INVAS EAR/PLS OXIMETRY MLT: CPT

## 2024-04-08 PROCEDURE — 25000242 PHARM REV CODE 250 ALT 637 W/ HCPCS: Performed by: PHYSICIAN ASSISTANT

## 2024-04-08 PROCEDURE — 63600175 PHARM REV CODE 636 W HCPCS: Performed by: PHYSICIAN ASSISTANT

## 2024-04-08 PROCEDURE — 27000221 HC OXYGEN, UP TO 24 HOURS

## 2024-04-08 PROCEDURE — 94640 AIRWAY INHALATION TREATMENT: CPT

## 2024-04-08 PROCEDURE — 99232 SBSQ HOSP IP/OBS MODERATE 35: CPT | Mod: ,,, | Performed by: PHYSICIAN ASSISTANT

## 2024-04-08 PROCEDURE — 99900035 HC TECH TIME PER 15 MIN (STAT)

## 2024-04-08 RX ORDER — ARFORMOTEROL TARTRATE 15 UG/2ML
15 SOLUTION RESPIRATORY (INHALATION) 2 TIMES DAILY
Status: DISCONTINUED | OUTPATIENT
Start: 2024-04-08 | End: 2024-04-09 | Stop reason: HOSPADM

## 2024-04-08 RX ORDER — FUROSEMIDE 10 MG/ML
20 INJECTION INTRAMUSCULAR; INTRAVENOUS ONCE
Status: COMPLETED | OUTPATIENT
Start: 2024-04-08 | End: 2024-04-08

## 2024-04-08 RX ADMIN — INSULIN ASPART 6 UNITS: 100 INJECTION, SOLUTION INTRAVENOUS; SUBCUTANEOUS at 05:04

## 2024-04-08 RX ADMIN — MEMANTINE 10 MG: 10 TABLET ORAL at 09:04

## 2024-04-08 RX ADMIN — MEMANTINE 10 MG: 10 TABLET ORAL at 10:04

## 2024-04-08 RX ADMIN — DIVALPROEX SODIUM 250 MG: 250 TABLET, DELAYED RELEASE ORAL at 09:04

## 2024-04-08 RX ADMIN — PIPERACILLIN AND TAZOBACTAM 4.5 G: 4; .5 INJECTION, POWDER, LYOPHILIZED, FOR SOLUTION INTRAVENOUS; PARENTERAL at 06:04

## 2024-04-08 RX ADMIN — ARFORMOTEROL TARTRATE 15 MCG: 15 SOLUTION RESPIRATORY (INHALATION) at 08:04

## 2024-04-08 RX ADMIN — FUROSEMIDE 20 MG: 10 INJECTION, SOLUTION INTRAMUSCULAR; INTRAVENOUS at 01:04

## 2024-04-08 RX ADMIN — PANTOPRAZOLE SODIUM 40 MG: 40 TABLET, DELAYED RELEASE ORAL at 10:04

## 2024-04-08 RX ADMIN — TRIAMCINOLONE ACETONIDE: 1 CREAM TOPICAL at 10:04

## 2024-04-08 RX ADMIN — TRIAMCINOLONE ACETONIDE: 1 CREAM TOPICAL at 09:04

## 2024-04-08 RX ADMIN — PIPERACILLIN AND TAZOBACTAM 4.5 G: 4; .5 INJECTION, POWDER, LYOPHILIZED, FOR SOLUTION INTRAVENOUS; PARENTERAL at 04:04

## 2024-04-08 RX ADMIN — LEVALBUTEROL 1.25 MG: 1.25 SOLUTION, CONCENTRATE RESPIRATORY (INHALATION) at 07:04

## 2024-04-08 RX ADMIN — INSULIN DETEMIR 15 UNITS: 100 INJECTION, SOLUTION SUBCUTANEOUS at 09:04

## 2024-04-08 RX ADMIN — DIVALPROEX SODIUM 250 MG: 250 TABLET, DELAYED RELEASE ORAL at 10:04

## 2024-04-08 RX ADMIN — PIPERACILLIN AND TAZOBACTAM 4.5 G: 4; .5 INJECTION, POWDER, LYOPHILIZED, FOR SOLUTION INTRAVENOUS; PARENTERAL at 11:04

## 2024-04-08 RX ADMIN — METHYLPREDNISOLONE SODIUM SUCCINATE 40 MG: 40 INJECTION, POWDER, FOR SOLUTION INTRAMUSCULAR; INTRAVENOUS at 11:04

## 2024-04-08 RX ADMIN — Medication 81 MG: at 05:04

## 2024-04-08 RX ADMIN — TRAMADOL HYDROCHLORIDE 50 MG: 50 TABLET, COATED ORAL at 09:04

## 2024-04-08 RX ADMIN — ATORVASTATIN CALCIUM 80 MG: 80 TABLET, FILM COATED ORAL at 10:04

## 2024-04-08 NOTE — ASSESSMENT & PLAN NOTE
On 4L nasal cannula with oxygen saturation 93%  Xopenex & IV steroids   IV zosyn started due to consolidation seen on CXR   Repeat CXR

## 2024-04-08 NOTE — PLAN OF CARE
Care team reviewed plan of care and discharge planning. Case management will continue to follow through discharge,

## 2024-04-08 NOTE — ASSESSMENT & PLAN NOTE
Patient with Hypoxic Respiratory failure which is Acute on chronic.  she is on home oxygen at 2-3 LPM. Supplemental oxygen was provided and noted-      .   Signs/symptoms of respiratory failure include- increased work of breathing and respiratory distress. Contributing diagnoses includes - COPD and Pneumonia Labs and images were reviewed. Patient Has not had a recent ABG. Will treat underlying causes and adjust management of respiratory failure as follows- IV solumedrol, IV abx, xopenex treatments    Currently on 4 L.  Will attempt to wean back to home baseline oxygen before discharge

## 2024-04-08 NOTE — SUBJECTIVE & OBJECTIVE
Past Medical History:   Diagnosis Date    Arthritis     COPD (chronic obstructive pulmonary disease)     Depression     Diabetes mellitus type II     Hyperlipidemia     Hypertension     Pacemaker        Past Surgical History:   Procedure Laterality Date    CARDIAC PACEMAKER PLACEMENT       SECTION      CYST REMOVAL Left 2019    Procedure: EXCISION, SEBACEOUS CYST;  Surgeon: Jaylen Gonzalez Jr., MD;  Location: [facility];  Service: General;  Laterality: Left;    INNER EAR SURGERY         Review of patient's allergies indicates:  No Known Allergies    No current facility-administered medications on file prior to encounter.     Current Outpatient Medications on File Prior to Encounter   Medication Sig    albuterol-ipratropium (DUO-NEB) 2.5 mg-0.5 mg/3 mL nebulizer solution Take 3 mLs by nebulization every 6 (six) hours as needed for Wheezing. Rescue    aspirin (ECOTRIN) 81 MG EC tablet Take 81 mg by mouth every Mon, Wed, Fri.    bempedoic acid 180 mg Tab Take 180 mg by mouth every evening.    coenzyme Q10 100 mg capsule Take 100 mg by mouth once daily.    cyanocobalamin, vitamin B-12, 1,000 mcg Subl Place 1,000 mcg under the tongue once daily.    ferrous sulfate 325 (65 FE) MG EC tablet Take 325 mg by mouth once daily.    fluticasone-umeclidin-vilanter (TRELEGY ELLIPTA) 100-62.5-25 mcg DsDv Inhale 1 puff into the lungs once daily.    leflunomide (ARAVA) 10 MG Tab Take 10 mg by mouth every other day.    memantine (NAMENDA) 10 MG Tab TAKE 1 TABLET BY MOUTH 2 (TWO) TIMES A DAY.    metFORMIN (GLUCOPHAGE) 500 MG tablet Take 1 tablet (500 mg total) by mouth 2 (two) times daily with meals.    omeprazole (PRILOSEC) 40 MG capsule Take 40 mg by mouth every morning.    rosuvastatin (CRESTOR) 40 MG Tab Take 40 mg by mouth every evening.    sertraline (ZOLOFT) 25 MG tablet Take 12.5 mg by mouth every morning.    acetaminophen (TYLENOL) 325 MG tablet Take 650 mg by mouth every 6 (six) hours as needed for Pain.    syringe  "with needle (SYRINGE 3CC/25GX1") 3 mL 25 gauge x 1" Syrg Once every months    [DISCONTINUED] hydroCHLOROthiazide (HYDRODIURIL) 12.5 MG Tab Take 12.5 mg by mouth daily as needed.      Family History       Problem Relation (Age of Onset)    Breast cancer Mother, Sister    Cancer Mother, Sister    Stroke Father, Maternal Grandmother          Tobacco Use    Smoking status: Former     Current packs/day: 0.00     Average packs/day: 2.0 packs/day for 43.0 years (86.0 ttl pk-yrs)     Types: Cigarettes     Start date: 1957     Quit date: 2000     Years since quittin.2    Smokeless tobacco: Never   Substance and Sexual Activity    Alcohol use: No    Drug use: No    Sexual activity: Not on file     Review of Systems   Reason unable to perform ROS: dementia.     Objective:     Vital Signs (Most Recent):  Temp: 97.4 °F (36.3 °C) (24 075)  Pulse: 80 (24)  Resp: 20 (24)  BP: (!) 145/80 (244)  SpO2: 95 % (24) Vital Signs (24h Range):  Temp:  [97.4 °F (36.3 °C)-98.8 °F (37.1 °C)] 97.4 °F (36.3 °C)  Pulse:  [68-85] 80  Resp:  [16-20] 20  SpO2:  [92 %-97 %] 95 %  BP: (112-145)/(55-80) 145/80     Weight: 72.1 kg (158 lb 15.2 oz)  Body mass index is 29.07 kg/m².     Physical Exam  Constitutional:       General: She is not in acute distress.  HENT:      Head: Normocephalic and atraumatic.   Eyes:      General:         Right eye: No discharge.         Left eye: No discharge.   Cardiovascular:      Rate and Rhythm: Normal rate and regular rhythm.   Pulmonary:      Effort: Pulmonary effort is normal.   Abdominal:      General: There is no distension.      Tenderness: There is no abdominal tenderness.   Musculoskeletal:         General: No swelling or tenderness.      Cervical back: Neck supple. No tenderness.      Right lower leg: No edema.      Left lower leg: No edema.   Skin:     General: Skin is warm and dry.   Neurological:      General: No focal deficit present.      " "Mental Status: She is alert. Mental status is at baseline. She is disoriented.      Cranial Nerves: No cranial nerve deficit.      Motor: Weakness present.      Gait: Gait abnormal.      Comments: + anxious  Knows she is at the hospital but doesn't know why   Not oriented to month    Psychiatric:         Behavior: Behavior is withdrawn.         Cognition and Memory: Cognition is impaired. Memory is impaired.                Significant Labs: A1C:   Recent Labs   Lab 02/01/24  1717   HGBA1C 5.9*       ABGs: No results for input(s): "PH", "PCO2", "HCO3", "POCSATURATED", "BE", "TOTALHB", "COHB", "METHB", "O2HB", "POCFIO2", "PO2" in the last 48 hours.    Bilirubin:   Recent Labs   Lab 04/04/24  1620   BILITOT 0.3       Blood Culture:   No results for input(s): "LABBLOO" in the last 48 hours.    BMP:   Recent Labs   Lab 04/08/24  0429   GLU 95      K 4.3   CL 97   CO2 37*   BUN 36*   CREATININE 1.0   CALCIUM 9.1       CBC:   Recent Labs   Lab 04/08/24  0429   WBC 3.57*   HGB 9.8*   HCT 32.9*          CMP:   Recent Labs   Lab 04/08/24  0429      K 4.3   CL 97   CO2 37*   GLU 95   BUN 36*   CREATININE 1.0   CALCIUM 9.1   ANIONGAP 8       Cardiac Markers:   Recent Labs   Lab 04/08/24  0429   BNP 78       Coagulation: No results for input(s): "PT", "INR", "APTT" in the last 48 hours.  Lactic Acid:   No results for input(s): "LACTATE" in the last 48 hours.    Lipase: No results for input(s): "LIPASE" in the last 48 hours.  Lipid Panel: No results for input(s): "CHOL", "HDL", "LDLCALC", "TRIG", "CHOLHDL" in the last 48 hours.  Magnesium: No results for input(s): "MG" in the last 48 hours.    POCT Glucose:   Recent Labs   Lab 04/07/24  1122 04/07/24  1618 04/07/24  2053   POCTGLUCOSE 94 86 168*       Prealbumin: No results for input(s): "PREALBUMIN" in the last 48 hours.  Respiratory Culture: No results for input(s): "GSRESP", "RESPIRATORYC" in the last 48 hours.  Troponin:   No results for input(s): " ""TROPONINI", "TROPONINIHS" in the last 48 hours.    TSH: No results for input(s): "TSH" in the last 4320 hours.  Urine Culture: No results for input(s): "LABURIN" in the last 48 hours.  Urine Studies:   No results for input(s): "COLORU", "APPEARANCEUA", "PHUR", "SPECGRAV", "PROTEINUA", "GLUCUA", "KETONESU", "BILIRUBINUA", "OCCULTUA", "NITRITE", "UROBILINOGEN", "LEUKOCYTESUR", "RBCUA", "WBCUA", "BACTERIA", "SQUAMEPITHEL", "HYALINECASTS" in the last 48 hours.    Invalid input(s): "WRIGHTSUR"      Significant Imaging: I have reviewed all pertinent imaging results/findings within the past 24 hours.  "

## 2024-04-08 NOTE — PLAN OF CARE
Problem: Adult Inpatient Plan of Care  Goal: Plan of Care Review  Outcome: Ongoing, Progressing     Problem: Diabetes Comorbidity  Goal: Blood Glucose Level Within Targeted Range  Outcome: Ongoing, Progressing     Problem: Skin Injury Risk Increased  Goal: Skin Health and Integrity  Outcome: Ongoing, Progressing     Problem: Chest Pain  Goal: Resolution of Chest Pain Symptoms  Outcome: Ongoing, Progressing     Problem: Gas Exchange Impaired  Goal: Optimal Gas Exchange  Outcome: Ongoing, Progressing     IV zosyn continued  Disoriented to place, time and situation, hard to redirect  Pt having to be redirected frequently  Sitter remains at bedside  Pt remains safe and free from falls  Pt updated on plan of care

## 2024-04-08 NOTE — PROGRESS NOTES
[facility] ([facility])  [facility] Medicine  Progress Note    Patient Name: Gretel Ashton  MRN: [id number]  Patient Class: IP- Inpatient   Admission Date: 2024  Length of Stay: 3 days  Attending Physician: Aayush Rinaldi MD  Primary Care Provider: Jailene Astudillo MD        Subjective:     Principal Problem:COPD exacerbation        HPI:  Patient is an 83 year old female with medical history of HTN, HLD, DM type 2, pacemaker and COPD ( on 2-3L at home) who was admitted for SOB and chest pressure.  Patient is a resident at the [facility].  Patient has no acute complaints at this time.      Admitted for worsening hypoxia secondary to COPD exacerbation.         Overview/Hospital Course:  Patient seems be doing better.  Her agitation has resolved.  Depakote started and seems to be working nicely.  Her respiratory status seems to be better than yesterday.  She is still wheezing seems less.     Patient HD stable on 4L nasal cannula.  Improvement in lung auscultation.  Repeat CXR pending.  Continue IV abx.  Possible d/c within the next 24-48 hours.  Baseline supplemental oxygen 2-3L.      Past Medical History:   Diagnosis Date    Arthritis     COPD (chronic obstructive pulmonary disease)     Depression     Diabetes mellitus type II     Hyperlipidemia     Hypertension     Pacemaker        Past Surgical History:   Procedure Laterality Date    CARDIAC PACEMAKER PLACEMENT       SECTION      CYST REMOVAL Left 2019    Procedure: EXCISION, SEBACEOUS CYST;  Surgeon: Jaylen Gonzalez Jr., MD;  Location: [facility];  Service: General;  Laterality: Left;    INNER EAR SURGERY         Review of patient's allergies indicates:  No Known Allergies    No current facility-administered medications on file prior to encounter.     Current Outpatient Medications on File Prior to Encounter   Medication Sig    albuterol-ipratropium (DUO-NEB) 2.5 mg-0.5 mg/3 mL nebulizer solution Take 3 mLs by nebulization every 6 (six)  "hours as needed for Wheezing. Rescue    aspirin (ECOTRIN) 81 MG EC tablet Take 81 mg by mouth every Mon, Wed, Fri.    bempedoic acid 180 mg Tab Take 180 mg by mouth every evening.    coenzyme Q10 100 mg capsule Take 100 mg by mouth once daily.    cyanocobalamin, vitamin B-12, 1,000 mcg Subl Place 1,000 mcg under the tongue once daily.    ferrous sulfate 325 (65 FE) MG EC tablet Take 325 mg by mouth once daily.    fluticasone-umeclidin-vilanter (TRELEGY ELLIPTA) 100-62.5-25 mcg DsDv Inhale 1 puff into the lungs once daily.    leflunomide (ARAVA) 10 MG Tab Take 10 mg by mouth every other day.    memantine (NAMENDA) 10 MG Tab TAKE 1 TABLET BY MOUTH 2 (TWO) TIMES A DAY.    metFORMIN (GLUCOPHAGE) 500 MG tablet Take 1 tablet (500 mg total) by mouth 2 (two) times daily with meals.    omeprazole (PRILOSEC) 40 MG capsule Take 40 mg by mouth every morning.    rosuvastatin (CRESTOR) 40 MG Tab Take 40 mg by mouth every evening.    sertraline (ZOLOFT) 25 MG tablet Take 12.5 mg by mouth every morning.    acetaminophen (TYLENOL) 325 MG tablet Take 650 mg by mouth every 6 (six) hours as needed for Pain.    syringe with needle (SYRINGE 3CC/25GX1") 3 mL 25 gauge x 1" Syrg Once every months    [DISCONTINUED] hydroCHLOROthiazide (HYDRODIURIL) 12.5 MG Tab Take 12.5 mg by mouth daily as needed.      Family History       Problem Relation (Age of Onset)    Breast cancer Mother, Sister    Cancer Mother, Sister    Stroke Father, Maternal Grandmother          Tobacco Use    Smoking status: Former     Current packs/day: 0.00     Average packs/day: 2.0 packs/day for 43.0 years (86.0 ttl pk-yrs)     Types: Cigarettes     Start date: 1957     Quit date: 2000     Years since quittin.2    Smokeless tobacco: Never   Substance and Sexual Activity    Alcohol use: No    Drug use: No    Sexual activity: Not on file     Review of Systems   Reason unable to perform ROS: dementia.     Objective:     Vital Signs (Most Recent):  Temp: 97.4 °F " "(36.3 °C) (04/08/24 0754)  Pulse: 80 (04/08/24 0754)  Resp: 20 (04/08/24 0754)  BP: (!) 145/80 (04/08/24 0754)  SpO2: 95 % (04/08/24 0754) Vital Signs (24h Range):  Temp:  [97.4 °F (36.3 °C)-98.8 °F (37.1 °C)] 97.4 °F (36.3 °C)  Pulse:  [68-85] 80  Resp:  [16-20] 20  SpO2:  [92 %-97 %] 95 %  BP: (112-145)/(55-80) 145/80     Weight: 72.1 kg (158 lb 15.2 oz)  Body mass index is 29.07 kg/m².     Physical Exam  Constitutional:       General: She is not in acute distress.  HENT:      Head: Normocephalic and atraumatic.   Eyes:      General:         Right eye: No discharge.         Left eye: No discharge.   Cardiovascular:      Rate and Rhythm: Normal rate and regular rhythm.   Pulmonary:      Effort: Pulmonary effort is normal.   Abdominal:      General: There is no distension.      Tenderness: There is no abdominal tenderness.   Musculoskeletal:         General: No swelling or tenderness.      Cervical back: Neck supple. No tenderness.      Right lower leg: No edema.      Left lower leg: No edema.   Skin:     General: Skin is warm and dry.   Neurological:      General: No focal deficit present.      Mental Status: She is alert. Mental status is at baseline. She is disoriented.      Cranial Nerves: No cranial nerve deficit.      Motor: Weakness present.      Gait: Gait abnormal.      Comments: + anxious  Knows she is at the hospital but doesn't know why   Not oriented to month    Psychiatric:         Behavior: Behavior is withdrawn.         Cognition and Memory: Cognition is impaired. Memory is impaired.                Significant Labs: A1C:   Recent Labs   Lab 02/01/24  1717   HGBA1C 5.9*       ABGs: No results for input(s): "PH", "PCO2", "HCO3", "POCSATURATED", "BE", "TOTALHB", "COHB", "METHB", "O2HB", "POCFIO2", "PO2" in the last 48 hours.    Bilirubin:   Recent Labs   Lab 04/04/24  1620   BILITOT 0.3       Blood Culture:   No results for input(s): "LABBLOO" in the last 48 hours.    BMP:   Recent Labs   Lab " "04/08/24 0429   GLU 95      K 4.3   CL 97   CO2 37*   BUN 36*   CREATININE 1.0   CALCIUM 9.1       CBC:   Recent Labs   Lab 04/08/24 0429   WBC 3.57*   HGB 9.8*   HCT 32.9*          CMP:   Recent Labs   Lab 04/08/24 0429      K 4.3   CL 97   CO2 37*   GLU 95   BUN 36*   CREATININE 1.0   CALCIUM 9.1   ANIONGAP 8       Cardiac Markers:   Recent Labs   Lab 04/08/24 0429   BNP 78       Coagulation: No results for input(s): "PT", "INR", "APTT" in the last 48 hours.  Lactic Acid:   No results for input(s): "LACTATE" in the last 48 hours.    Lipase: No results for input(s): "LIPASE" in the last 48 hours.  Lipid Panel: No results for input(s): "CHOL", "HDL", "LDLCALC", "TRIG", "CHOLHDL" in the last 48 hours.  Magnesium: No results for input(s): "MG" in the last 48 hours.    POCT Glucose:   Recent Labs   Lab 04/07/24  1122 04/07/24  1618 04/07/24  2053   POCTGLUCOSE 94 86 168*       Prealbumin: No results for input(s): "PREALBUMIN" in the last 48 hours.  Respiratory Culture: No results for input(s): "GSRESP", "RESPIRATORYC" in the last 48 hours.  Troponin:   No results for input(s): "TROPONINI", "TROPONINIHS" in the last 48 hours.    TSH: No results for input(s): "TSH" in the last 4320 hours.  Urine Culture: No results for input(s): "LABURIN" in the last 48 hours.  Urine Studies:   No results for input(s): "COLORU", "APPEARANCEUA", "PHUR", "SPECGRAV", "PROTEINUA", "GLUCUA", "KETONESU", "BILIRUBINUA", "OCCULTUA", "NITRITE", "UROBILINOGEN", "LEUKOCYTESUR", "RBCUA", "WBCUA", "BACTERIA", "SQUAMEPITHEL", "HYALINECASTS" in the last 48 hours.    Invalid input(s): "WRIGHTSUR"      Significant Imaging: I have reviewed all pertinent imaging results/findings within the past 24 hours.    Assessment/Plan:      * COPD exacerbation  On 4L nasal cannula with oxygen saturation 93%  Xopenex & IV steroids   IV zosyn started due to consolidation seen on CXR   Repeat CXR       Pneumonia  Seen on CXR 4/5  Continue IV zosyn "       Acute on chronic respiratory failure  Patient with Hypoxic Respiratory failure which is Acute on chronic.  she is on home oxygen at 2-3 LPM. Supplemental oxygen was provided and noted-      .   Signs/symptoms of respiratory failure include- increased work of breathing and respiratory distress. Contributing diagnoses includes - COPD and Pneumonia Labs and images were reviewed. Patient Has not had a recent ABG. Will treat underlying causes and adjust management of respiratory failure as follows- IV solumedrol, IV abx, xopenex treatments    Currently on 4 L.  Will attempt to wean back to home baseline oxygen before discharge     Alzheimer's dementia with behavioral disturbance  Patient with dementia with likely etiology of alzheimer's dementia. Dementia is moderate. The patient does have signs of behavioral disturbance. Home dementia medications are Held or Continued: continued.. Continue non-pharmacologic interventions to prevent delirium (No VS between 11PM-5AM, activity during day, opening blinds, providing glasses/hearing aids, and up in chair during daytime). Will avoid narcotics and benzos unless absolutely necessary. PRN anti-psychotics are prescribed to avoid self harm behaviors.    Patient's agitation has resolved.  Depakote started.  Patient has baseline dementia.    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  Patient does not have home medication with her       Memory loss  Continue namenda   Depakote was added and she seems to be tolerating this well      Type 2 diabetes mellitus with diabetic neuropathy, without long-term current use of insulin  Continue insulin       VTE Risk Mitigation (From admission, onward)           Ordered     IP VTE HIGH RISK PATIENT  Once         04/04/24 1847     Place sequential compression device  Until discontinued         04/04/24 1847                    Discharge Planning   MARIKA: 4/5/2024     Code Status: Full Code   Is  the patient medically ready for discharge?:     Reason for patient still in hospital (select all that apply): Patient trending condition  Discharge Plan A: Return to nursing home                  Barbara Flowers PA-C  Department of Hospital Medicine   [facility] ([facility])

## 2024-04-09 VITALS
HEART RATE: 75 BPM | OXYGEN SATURATION: 98 % | SYSTOLIC BLOOD PRESSURE: 115 MMHG | WEIGHT: 158.94 LBS | RESPIRATION RATE: 18 BRPM | HEIGHT: 62 IN | TEMPERATURE: 97 F | BODY MASS INDEX: 29.25 KG/M2 | DIASTOLIC BLOOD PRESSURE: 57 MMHG

## 2024-04-09 LAB
BACTERIA BLD CULT: NORMAL
BACTERIA BLD CULT: NORMAL
POCT GLUCOSE: 106 MG/DL (ref 70–110)
POCT GLUCOSE: 202 MG/DL (ref 70–110)
POCT GLUCOSE: 97 MG/DL (ref 70–110)

## 2024-04-09 PROCEDURE — 94760 N-INVAS EAR/PLS OXIMETRY 1: CPT

## 2024-04-09 PROCEDURE — 94640 AIRWAY INHALATION TREATMENT: CPT

## 2024-04-09 PROCEDURE — 94761 N-INVAS EAR/PLS OXIMETRY MLT: CPT

## 2024-04-09 PROCEDURE — 63600175 PHARM REV CODE 636 W HCPCS: Performed by: SURGERY

## 2024-04-09 PROCEDURE — 99239 HOSP IP/OBS DSCHRG MGMT >30: CPT | Mod: ,,, | Performed by: PHYSICIAN ASSISTANT

## 2024-04-09 PROCEDURE — 27000221 HC OXYGEN, UP TO 24 HOURS

## 2024-04-09 PROCEDURE — 63600175 PHARM REV CODE 636 W HCPCS: Performed by: PHYSICIAN ASSISTANT

## 2024-04-09 PROCEDURE — 25000003 PHARM REV CODE 250: Performed by: PHYSICIAN ASSISTANT

## 2024-04-09 PROCEDURE — 25000242 PHARM REV CODE 250 ALT 637 W/ HCPCS: Performed by: PHYSICIAN ASSISTANT

## 2024-04-09 RX ORDER — DIVALPROEX SODIUM 250 MG/1
250 TABLET, DELAYED RELEASE ORAL EVERY 12 HOURS
Qty: 60 TABLET | Refills: 0 | Status: ON HOLD | OUTPATIENT
Start: 2024-04-09 | End: 2024-05-13 | Stop reason: HOSPADM

## 2024-04-09 RX ORDER — LACTOBACILLUS RHAMNOSUS GG 10B CELL
1 CAPSULE ORAL DAILY
Qty: 14 CAPSULE | Refills: 0 | Status: SHIPPED | OUTPATIENT
Start: 2024-04-09 | End: 2024-04-23

## 2024-04-09 RX ORDER — LEVOFLOXACIN 750 MG/1
750 TABLET ORAL EVERY OTHER DAY
Qty: 3 TABLET | Refills: 0 | Status: SHIPPED | OUTPATIENT
Start: 2024-04-09 | End: 2024-04-14

## 2024-04-09 RX ORDER — LACTOBACILLUS RHAMNOSUS GG 10B CELL
1 CAPSULE ORAL DAILY
Qty: 14 CAPSULE | Refills: 0 | Status: SHIPPED | OUTPATIENT
Start: 2024-04-09 | End: 2024-04-09

## 2024-04-09 RX ORDER — METHYLPREDNISOLONE 4 MG/1
TABLET ORAL
Qty: 21 EACH | Refills: 0 | Status: SHIPPED | OUTPATIENT
Start: 2024-04-09 | End: 2024-04-09

## 2024-04-09 RX ORDER — LEVOFLOXACIN 750 MG/1
750 TABLET ORAL EVERY OTHER DAY
Qty: 3 TABLET | Refills: 0 | Status: SHIPPED | OUTPATIENT
Start: 2024-04-09 | End: 2024-04-09

## 2024-04-09 RX ORDER — METHYLPREDNISOLONE 4 MG/1
TABLET ORAL
Qty: 21 EACH | Refills: 0 | Status: SHIPPED | OUTPATIENT
Start: 2024-04-09 | End: 2024-04-30

## 2024-04-09 RX ADMIN — METHYLPREDNISOLONE SODIUM SUCCINATE 40 MG: 40 INJECTION, POWDER, FOR SOLUTION INTRAMUSCULAR; INTRAVENOUS at 12:04

## 2024-04-09 RX ADMIN — PIPERACILLIN AND TAZOBACTAM 4.5 G: 4; .5 INJECTION, POWDER, LYOPHILIZED, FOR SOLUTION INTRAVENOUS; PARENTERAL at 02:04

## 2024-04-09 RX ADMIN — ATORVASTATIN CALCIUM 80 MG: 80 TABLET, FILM COATED ORAL at 08:04

## 2024-04-09 RX ADMIN — MEMANTINE 10 MG: 10 TABLET ORAL at 08:04

## 2024-04-09 RX ADMIN — TRIAMCINOLONE ACETONIDE: 1 CREAM TOPICAL at 08:04

## 2024-04-09 RX ADMIN — PANTOPRAZOLE SODIUM 40 MG: 40 TABLET, DELAYED RELEASE ORAL at 08:04

## 2024-04-09 RX ADMIN — ARFORMOTEROL TARTRATE 15 MCG: 15 SOLUTION RESPIRATORY (INHALATION) at 07:04

## 2024-04-09 RX ADMIN — INSULIN ASPART 2 UNITS: 100 INJECTION, SOLUTION INTRAVENOUS; SUBCUTANEOUS at 05:04

## 2024-04-09 RX ADMIN — DIVALPROEX SODIUM 250 MG: 250 TABLET, DELAYED RELEASE ORAL at 08:04

## 2024-04-09 RX ADMIN — METHYLPREDNISOLONE SODIUM SUCCINATE 40 MG: 40 INJECTION, POWDER, FOR SOLUTION INTRAMUSCULAR; INTRAVENOUS at 11:04

## 2024-04-09 RX ADMIN — PIPERACILLIN AND TAZOBACTAM 4.5 G: 4; .5 INJECTION, POWDER, LYOPHILIZED, FOR SOLUTION INTRAVENOUS; PARENTERAL at 11:04

## 2024-04-09 RX ADMIN — INSULIN ASPART 6 UNITS: 100 INJECTION, SOLUTION INTRAVENOUS; SUBCUTANEOUS at 05:04

## 2024-04-09 NOTE — DISCHARGE SUMMARY
[facility] ([facility])  [facility] Medicine  Discharge Summary      Patient Name: Gretel Ashton  MRN: [id number]  [facility]: [id number]  Patient Class: IP- Inpatient  Admission Date: 4/4/2024  Hospital Length of Stay: 4 days  Discharge Date and Time:  04/09/2024 10:35 AM  Attending Physician: Aayush Rinaldi MD   Discharging Provider: Barbara Flowers PA-C  Primary Care Provider: Jailene Astudillo MD    Primary Care Team: Networked reference to record PCT     HPI:   Patient is an 83 year old female with medical history of HTN, HLD, DM type 2, pacemaker and COPD ( on 2-3L at home) who was admitted for SOB and chest pressure.  Patient is a resident at the [facility].  Patient has no acute complaints at this time.      Admitted for worsening hypoxia secondary to COPD exacerbation.         * No surgery found *      Hospital Course:   Patient seems be doing better.  Her agitation has resolved.  Depakote started and seems to be working nicely.  Her respiratory status seems to be better than yesterday.  She is still wheezing seems less.    4/8 Patient HD stable on 4L nasal cannula.  Improvement in lung auscultation.  Repeat CXR pending.  Continue IV abx.  Possible d/c within the next 24-48 hours.  Baseline supplemental oxygen 2-3L.      4/9 Back to her home baseline oxygen.  D/c with 3 more days of levofloxacin for pneumonia.       Goals of Care Treatment Preferences:  Code Status: Full Code    Living Will: Yes  LaPOST: Yes           Consults:     Neuro  Alzheimer's dementia with behavioral disturbance  Patient with dementia with likely etiology of alzheimer's dementia. Dementia is moderate. The patient does have signs of behavioral disturbance. Home dementia medications are Held or Continued: continued.. Continue non-pharmacologic interventions to prevent delirium (No VS between 11PM-5AM, activity during day, opening blinds, providing glasses/hearing aids, and up in chair during daytime). Will avoid narcotics and  benzos unless absolutely necessary. PRN anti-psychotics are prescribed to avoid self harm behaviors.    Patient's agitation has resolved.  Depakote started.  Patient has baseline dementia.    U/A negative     Discharge today     Memory loss  Continue namenda   Depakote was added and she seems to be tolerating this well      Pulmonary  * COPD exacerbation  On 4L nasal cannula with oxygen saturation 93%  Xopenex & IV steroids   IV zosyn started due to consolidation seen on CXR   Repeat CXR with fluids    D/c today. 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    Significant Diagnostic Studies: see A&P    Pending Diagnostic Studies:       None           Medications:  Reconciled Home Medications:      Medication List        START taking these medications      CULTURELLE 10 billion cell capsule  Generic drug: Lactobacillus rhamnosus GG  Take 1 capsule by mouth once daily. for 14 days     divalproex 250 MG EC tablet  Commonly known as: DEPAKOTE  Take 1 tablet (250 mg total) by mouth every 12 (twelve) hours.     levoFLOXacin 750 MG tablet  Commonly known as:  "LEVAQUIN  Take 1 tablet (750 mg total) by mouth every other day. for 3 doses     methylPREDNISolone 4 mg tablet  Commonly known as: MEDROL DOSEPACK  use as directed            CONTINUE taking these medications      acetaminophen 325 MG tablet  Commonly known as: TYLENOL  Take 650 mg by mouth every 6 (six) hours as needed for Pain.     albuterol-ipratropium 2.5 mg-0.5 mg/3 mL nebulizer solution  Commonly known as: DUO-NEB  Take 3 mLs by nebulization every 6 (six) hours as needed for Wheezing. Rescue     aspirin 81 MG EC tablet  Commonly known as: ECOTRIN  Take 81 mg by mouth every Mon, Wed, Fri.     bempedoic acid 180 mg Tab  Take 180 mg by mouth every evening.     coenzyme Q10 100 mg capsule  Take 100 mg by mouth once daily.     cyanocobalamin (vitamin B-12) 1,000 mcg Subl  Place 1,000 mcg under the tongue once daily.     ferrous sulfate 325 (65 FE) MG EC tablet  Take 325 mg by mouth once daily.     fluticasone-umeclidin-vilanter 100-62.5-25 mcg Dsdv  Commonly known as: TRELEGY ELLIPTA  Inhale 1 puff into the lungs once daily.     leflunomide 10 MG Tab  Commonly known as: ARAVA  Take 10 mg by mouth every other day.     memantine 10 MG Tab  Commonly known as: NAMENDA  TAKE 1 TABLET BY MOUTH 2 (TWO) TIMES A DAY.     metFORMIN 500 MG tablet  Commonly known as: GLUCOPHAGE  Take 1 tablet (500 mg total) by mouth 2 (two) times daily with meals.     omeprazole 40 MG capsule  Commonly known as: PRILOSEC  Take 40 mg by mouth every morning.     rosuvastatin 40 MG Tab  Commonly known as: CRESTOR  Take 40 mg by mouth every evening.     SYRINGE 3CC/25GX1" 3 mL 25 gauge x 1" Syrg  Generic drug: syringe with needle  Once every months            STOP taking these medications      sertraline 25 MG tablet  Commonly known as: ZOLOFT              Indwelling Lines/Drains at time of discharge:   Lines/Drains/Airways       Drain  Duration             Female External Urinary Catheter w/ Suction 04/06/24 0524 3 days                    Time " spent on the discharge of patient: 35 minutes         Barbara Flowers PA-C  Department of Hospital Medicine  [facility] (3rd Fl)

## 2024-04-09 NOTE — NURSING
Report called to Becky at the [facility] all questions answered. [place] to arrange transportation via their wheelchair van.

## 2024-04-09 NOTE — PLAN OF CARE
04/09/24 1509   Post-Acute Status   Post-Acute Authorization Placement   Post-Acute Placement Status Set-up Complete/Auth obtained   Coverage Medicare   Discharge Delays None known at this time   Discharge Plan   Discharge Plan A Return to nursing home   Discharge Plan B Return to Nursing Home         Nurse to call report to enedina Cisneros nurse at [phone]

## 2024-04-09 NOTE — PLAN OF CARE
Patient has been restless this shift even with  at bedside. Encouraged reorientation. Patient has had no complaints of pain.    Problem: Adult Inpatient Plan of Care  Goal: Plan of Care Review  Outcome: Ongoing, Progressing  Goal: Optimal Comfort and Wellbeing  Outcome: Ongoing, Progressing     Problem: Diabetes Comorbidity  Goal: Blood Glucose Level Within Targeted Range  Outcome: Ongoing, Progressing     Problem: Skin Injury Risk Increased  Goal: Skin Health and Integrity  Outcome: Ongoing, Progressing     Problem: Chest Pain  Goal: Resolution of Chest Pain Symptoms  Outcome: Ongoing, Progressing     Problem: Gas Exchange Impaired  Goal: Optimal Gas Exchange  Outcome: Ongoing, Progressing     Problem: Fall Injury Risk  Goal: Absence of Fall and Fall-Related Injury  Outcome: Ongoing, Progressing     Problem: Fluid Imbalance (Pneumonia)  Goal: Fluid Balance  Outcome: Ongoing, Progressing

## 2024-04-09 NOTE — PLAN OF CARE
04/09/24 1501   Post-Acute Status   Post-Acute Authorization Placement   Post-Acute Placement Status Pending post-acute provider review/more information requested   Discharge Delays None known at this time   Discharge Plan   Discharge Plan A Return to nursing home         CM sent discharge summary/orders to [facility]. Awaiting them to review orders. CM did speak with admissions, who states they should be reviewing shortly.

## 2024-04-09 NOTE — ASSESSMENT & PLAN NOTE
On 4L nasal cannula with oxygen saturation 93%  Xopenex & IV steroids   IV zosyn started due to consolidation seen on CXR   Repeat CXR with fluids    D/c today. Resolving and will dc with medrol dose pack

## 2024-04-09 NOTE — SUBJECTIVE & OBJECTIVE
Past Medical History:   Diagnosis Date    Arthritis     COPD (chronic obstructive pulmonary disease)     Depression     Diabetes mellitus type II     Hyperlipidemia     Hypertension     Pacemaker        Past Surgical History:   Procedure Laterality Date    CARDIAC PACEMAKER PLACEMENT       SECTION      CYST REMOVAL Left 2019    Procedure: EXCISION, SEBACEOUS CYST;  Surgeon: Jaylen Gonzalez Jr., MD;  Location: [facility];  Service: General;  Laterality: Left;    INNER EAR SURGERY         Review of patient's allergies indicates:  No Known Allergies    No current facility-administered medications on file prior to encounter.     Current Outpatient Medications on File Prior to Encounter   Medication Sig    albuterol-ipratropium (DUO-NEB) 2.5 mg-0.5 mg/3 mL nebulizer solution Take 3 mLs by nebulization every 6 (six) hours as needed for Wheezing. Rescue    aspirin (ECOTRIN) 81 MG EC tablet Take 81 mg by mouth every Mon, Wed, Fri.    bempedoic acid 180 mg Tab Take 180 mg by mouth every evening.    coenzyme Q10 100 mg capsule Take 100 mg by mouth once daily.    cyanocobalamin, vitamin B-12, 1,000 mcg Subl Place 1,000 mcg under the tongue once daily.    ferrous sulfate 325 (65 FE) MG EC tablet Take 325 mg by mouth once daily.    fluticasone-umeclidin-vilanter (TRELEGY ELLIPTA) 100-62.5-25 mcg DsDv Inhale 1 puff into the lungs once daily.    leflunomide (ARAVA) 10 MG Tab Take 10 mg by mouth every other day.    memantine (NAMENDA) 10 MG Tab TAKE 1 TABLET BY MOUTH 2 (TWO) TIMES A DAY.    metFORMIN (GLUCOPHAGE) 500 MG tablet Take 1 tablet (500 mg total) by mouth 2 (two) times daily with meals.    omeprazole (PRILOSEC) 40 MG capsule Take 40 mg by mouth every morning.    rosuvastatin (CRESTOR) 40 MG Tab Take 40 mg by mouth every evening.    sertraline (ZOLOFT) 25 MG tablet Take 12.5 mg by mouth every morning.    acetaminophen (TYLENOL) 325 MG tablet Take 650 mg by mouth every 6 (six) hours as needed for Pain.    syringe  "with needle (SYRINGE 3CC/25GX1") 3 mL 25 gauge x 1" Syrg Once every months    [DISCONTINUED] hydroCHLOROthiazide (HYDRODIURIL) 12.5 MG Tab Take 12.5 mg by mouth daily as needed.      Family History       Problem Relation (Age of Onset)    Breast cancer Mother, Sister    Cancer Mother, Sister    Stroke Father, Maternal Grandmother          Tobacco Use    Smoking status: Former     Current packs/day: 0.00     Average packs/day: 2.0 packs/day for 43.0 years (86.0 ttl pk-yrs)     Types: Cigarettes     Start date: 1957     Quit date: 2000     Years since quittin.2    Smokeless tobacco: Never   Substance and Sexual Activity    Alcohol use: No    Drug use: No    Sexual activity: Not on file     Review of Systems   Reason unable to perform ROS: dementia.     Objective:     Vital Signs (Most Recent):  Temp: 97.3 °F (36.3 °C) (24)  Pulse: 62 (24)  Resp: 16 (24)  BP: 137/68 (24)  SpO2: 97 % (24) Vital Signs (24h Range):  Temp:  [97.3 °F (36.3 °C)-98.4 °F (36.9 °C)] 97.3 °F (36.3 °C)  Pulse:  [62-80] 62  Resp:  [16-22] 16  SpO2:  [95 %-98 %] 97 %  BP: (100-137)/(56-77) 137/68     Weight: 72.1 kg (158 lb 15.2 oz)  Body mass index is 29.07 kg/m².     Physical Exam  Constitutional:       General: She is not in acute distress.  HENT:      Head: Normocephalic and atraumatic.   Eyes:      General:         Right eye: No discharge.         Left eye: No discharge.   Cardiovascular:      Rate and Rhythm: Normal rate and regular rhythm.   Pulmonary:      Effort: Pulmonary effort is normal.   Abdominal:      General: There is no distension.      Tenderness: There is no abdominal tenderness.   Musculoskeletal:         General: No swelling or tenderness.      Cervical back: Neck supple. No tenderness.      Right lower leg: No edema.      Left lower leg: No edema.   Skin:     General: Skin is warm and dry.   Neurological:      General: No focal deficit present.      Mental " "Status: She is alert. Mental status is at baseline. She is disoriented.      Cranial Nerves: No cranial nerve deficit.      Motor: Weakness present.      Gait: Gait abnormal.      Comments: Calm and ready to go home     Psychiatric:         Behavior: Behavior is withdrawn.         Cognition and Memory: Cognition is impaired. Memory is impaired.                Significant Labs: A1C:   Recent Labs   Lab 02/01/24  1717   HGBA1C 5.9*       ABGs: No results for input(s): "PH", "PCO2", "HCO3", "POCSATURATED", "BE", "TOTALHB", "COHB", "METHB", "O2HB", "POCFIO2", "PO2" in the last 48 hours.    Bilirubin:   Recent Labs   Lab 04/04/24  1620   BILITOT 0.3       Blood Culture:   No results for input(s): "LABBLOO" in the last 48 hours.    BMP:   Recent Labs   Lab 04/08/24  0429   GLU 95      K 4.3   CL 97   CO2 37*   BUN 36*   CREATININE 1.0   CALCIUM 9.1       CBC:   Recent Labs   Lab 04/08/24  0429   WBC 3.57*   HGB 9.8*   HCT 32.9*          CMP:   Recent Labs   Lab 04/08/24  0429      K 4.3   CL 97   CO2 37*   GLU 95   BUN 36*   CREATININE 1.0   CALCIUM 9.1   ANIONGAP 8       Cardiac Markers:   Recent Labs   Lab 04/08/24  0429   BNP 78       Coagulation: No results for input(s): "PT", "INR", "APTT" in the last 48 hours.  Lactic Acid:   No results for input(s): "LACTATE" in the last 48 hours.    Lipase: No results for input(s): "LIPASE" in the last 48 hours.  Lipid Panel: No results for input(s): "CHOL", "HDL", "LDLCALC", "TRIG", "CHOLHDL" in the last 48 hours.  Magnesium: No results for input(s): "MG" in the last 48 hours.    POCT Glucose:   Recent Labs   Lab 04/08/24  1642 04/08/24  1917 04/09/24  0746   POCTGLUCOSE 171* 160* 97       Prealbumin: No results for input(s): "PREALBUMIN" in the last 48 hours.  Respiratory Culture: No results for input(s): "GSRESP", "RESPIRATORYC" in the last 48 hours.  Troponin:   No results for input(s): "TROPONINI", "TROPONINIHS" in the last 48 hours.    TSH: No results for " "input(s): "TSH" in the last 4320 hours.  Urine Culture: No results for input(s): "LABURIN" in the last 48 hours.  Urine Studies:   No results for input(s): "COLORU", "APPEARANCEUA", "PHUR", "SPECGRAV", "PROTEINUA", "GLUCUA", "KETONESU", "BILIRUBINUA", "OCCULTUA", "NITRITE", "UROBILINOGEN", "LEUKOCYTESUR", "RBCUA", "WBCUA", "BACTERIA", "SQUAMEPITHEL", "HYALINECASTS" in the last 48 hours.    Invalid input(s): "WRIGHTSUR"      Significant Imaging: I have reviewed all pertinent imaging results/findings within the past 24 hours.  "

## 2024-04-09 NOTE — ASSESSMENT & PLAN NOTE
Patient with Hypoxic Respiratory failure which is Acute on chronic.  she is on home oxygen at 2-3 LPM. Supplemental oxygen was provided and noted-      .   Signs/symptoms of respiratory failure include- increased work of breathing and respiratory distress. Contributing diagnoses includes - COPD and Pneumonia Labs and images were reviewed. Patient Has not had a recent ABG. Will treat underlying causes and adjust management of respiratory failure as follows- IV solumedrol, IV abx, xopenex treatments    Currently on 4 L.  Will attempt to wean back to home baseline oxygen before discharge     On 3 L nasal cannula.  This is her baseline home oxygen.  D/C today

## 2024-04-09 NOTE — PROGRESS NOTES
[facility] ([address] [place] [address] Medicine  Progress Note    Patient Name: Gretel Ashton  MRN: [id number]  Patient Class: IP- Inpatient   Admission Date: 2024  Length of Stay: 4 days  Attending Physician: Aayush Rinaldi MD  Primary Care Provider: Jailene Astudillo MD        Subjective:     Principal Problem:COPD exacerbation        HPI:  Patient is an 83 year old female with medical history of HTN, HLD, DM type 2, pacemaker and COPD ( on 2-3L at home) who was admitted for SOB and chest pressure.  Patient is a resident at the [facility].  Patient has no acute complaints at this time.      Admitted for worsening hypoxia secondary to COPD exacerbation.         Overview/Hospital Course:  Patient seems be doing better.  Her agitation has resolved.  Depakote started and seems to be working nicely.  Her respiratory status seems to be better than yesterday.  She is still wheezing seems less.     Patient HD stable on 4L nasal cannula.  Improvement in lung auscultation.  Repeat CXR pending.  Continue IV abx.  Possible d/c within the next 24-48 hours.  Baseline supplemental oxygen 2-3L.       Back to her home baseline oxygen.  D/c with 3 more days of levofloxacin for pneumonia.      Past Medical History:   Diagnosis Date    Arthritis     COPD (chronic obstructive pulmonary disease)     Depression     Diabetes mellitus type II     Hyperlipidemia     Hypertension     Pacemaker        Past Surgical History:   Procedure Laterality Date    CARDIAC PACEMAKER PLACEMENT       SECTION      CYST REMOVAL Left 2019    Procedure: EXCISION, SEBACEOUS CYST;  Surgeon: Jaylen Gonzalez Jr., MD;  Location: [facility] OR;  Service: General;  Laterality: Left;    INNER EAR SURGERY         Review of patient's allergies indicates:  No Known Allergies    No current facility-administered medications on file prior to encounter.     Current Outpatient Medications on File Prior to Encounter   Medication Sig     "albuterol-ipratropium (DUO-NEB) 2.5 mg-0.5 mg/3 mL nebulizer solution Take 3 mLs by nebulization every 6 (six) hours as needed for Wheezing. Rescue    aspirin (ECOTRIN) 81 MG EC tablet Take 81 mg by mouth every Mon, Wed, Fri.    bempedoic acid 180 mg Tab Take 180 mg by mouth every evening.    coenzyme Q10 100 mg capsule Take 100 mg by mouth once daily.    cyanocobalamin, vitamin B-12, 1,000 mcg Subl Place 1,000 mcg under the tongue once daily.    ferrous sulfate 325 (65 FE) MG EC tablet Take 325 mg by mouth once daily.    fluticasone-umeclidin-vilanter (TRELEGY ELLIPTA) 100-62.5-25 mcg DsDv Inhale 1 puff into the lungs once daily.    leflunomide (ARAVA) 10 MG Tab Take 10 mg by mouth every other day.    memantine (NAMENDA) 10 MG Tab TAKE 1 TABLET BY MOUTH 2 (TWO) TIMES A DAY.    metFORMIN (GLUCOPHAGE) 500 MG tablet Take 1 tablet (500 mg total) by mouth 2 (two) times daily with meals.    omeprazole (PRILOSEC) 40 MG capsule Take 40 mg by mouth every morning.    rosuvastatin (CRESTOR) 40 MG Tab Take 40 mg by mouth every evening.    sertraline (ZOLOFT) 25 MG tablet Take 12.5 mg by mouth every morning.    acetaminophen (TYLENOL) 325 MG tablet Take 650 mg by mouth every 6 (six) hours as needed for Pain.    syringe with needle (SYRINGE 3CC/25GX1") 3 mL 25 gauge x 1" Syrg Once every months    [DISCONTINUED] hydroCHLOROthiazide (HYDRODIURIL) 12.5 MG Tab Take 12.5 mg by mouth daily as needed.      Family History       Problem Relation (Age of Onset)    Breast cancer Mother, Sister    Cancer Mother, Sister    Stroke Father, Maternal Grandmother          Tobacco Use    Smoking status: Former     Current packs/day: 0.00     Average packs/day: 2.0 packs/day for 43.0 years (86.0 ttl pk-yrs)     Types: Cigarettes     Start date: 1957     Quit date: 2000     Years since quittin.2    Smokeless tobacco: Never   Substance and Sexual Activity    Alcohol use: No    Drug use: No    Sexual activity: Not on file     Review of " "Systems   Reason unable to perform ROS: dementia.     Objective:     Vital Signs (Most Recent):  Temp: 97.3 °F (36.3 °C) (04/09/24 0732)  Pulse: 62 (04/09/24 0732)  Resp: 16 (04/09/24 0732)  BP: 137/68 (04/09/24 0732)  SpO2: 97 % (04/09/24 0732) Vital Signs (24h Range):  Temp:  [97.3 °F (36.3 °C)-98.4 °F (36.9 °C)] 97.3 °F (36.3 °C)  Pulse:  [62-80] 62  Resp:  [16-22] 16  SpO2:  [95 %-98 %] 97 %  BP: (100-137)/(56-77) 137/68     Weight: 72.1 kg (158 lb 15.2 oz)  Body mass index is 29.07 kg/m².     Physical Exam  Constitutional:       General: She is not in acute distress.  HENT:      Head: Normocephalic and atraumatic.   Eyes:      General:         Right eye: No discharge.         Left eye: No discharge.   Cardiovascular:      Rate and Rhythm: Normal rate and regular rhythm.   Pulmonary:      Effort: Pulmonary effort is normal.   Abdominal:      General: There is no distension.      Tenderness: There is no abdominal tenderness.   Musculoskeletal:         General: No swelling or tenderness.      Cervical back: Neck supple. No tenderness.      Right lower leg: No edema.      Left lower leg: No edema.   Skin:     General: Skin is warm and dry.   Neurological:      General: No focal deficit present.      Mental Status: She is alert. Mental status is at baseline. She is disoriented.      Cranial Nerves: No cranial nerve deficit.      Motor: Weakness present.      Gait: Gait abnormal.      Comments: Calm and ready to go home     Psychiatric:         Behavior: Behavior is withdrawn.         Cognition and Memory: Cognition is impaired. Memory is impaired.                Significant Labs: A1C:   Recent Labs   Lab 02/01/24  1717   HGBA1C 5.9*       ABGs: No results for input(s): "PH", "PCO2", "HCO3", "POCSATURATED", "BE", "TOTALHB", "COHB", "METHB", "O2HB", "POCFIO2", "PO2" in the last 48 hours.    Bilirubin:   Recent Labs   Lab 04/04/24  1620   BILITOT 0.3       Blood Culture:   No results for input(s): "LABBLOO" in the last " "48 hours.    BMP:   Recent Labs   Lab 04/08/24 0429   GLU 95      K 4.3   CL 97   CO2 37*   BUN 36*   CREATININE 1.0   CALCIUM 9.1       CBC:   Recent Labs   Lab 04/08/24 0429   WBC 3.57*   HGB 9.8*   HCT 32.9*          CMP:   Recent Labs   Lab 04/08/24 0429      K 4.3   CL 97   CO2 37*   GLU 95   BUN 36*   CREATININE 1.0   CALCIUM 9.1   ANIONGAP 8       Cardiac Markers:   Recent Labs   Lab 04/08/24 0429   BNP 78       Coagulation: No results for input(s): "PT", "INR", "APTT" in the last 48 hours.  Lactic Acid:   No results for input(s): "LACTATE" in the last 48 hours.    Lipase: No results for input(s): "LIPASE" in the last 48 hours.  Lipid Panel: No results for input(s): "CHOL", "HDL", "LDLCALC", "TRIG", "CHOLHDL" in the last 48 hours.  Magnesium: No results for input(s): "MG" in the last 48 hours.    POCT Glucose:   Recent Labs   Lab 04/08/24  1642 04/08/24  1917 04/09/24  0746   POCTGLUCOSE 171* 160* 97       Prealbumin: No results for input(s): "PREALBUMIN" in the last 48 hours.  Respiratory Culture: No results for input(s): "GSRESP", "RESPIRATORYC" in the last 48 hours.  Troponin:   No results for input(s): "TROPONINI", "TROPONINIHS" in the last 48 hours.    TSH: No results for input(s): "TSH" in the last 4320 hours.  Urine Culture: No results for input(s): "LABURIN" in the last 48 hours.  Urine Studies:   No results for input(s): "COLORU", "APPEARANCEUA", "PHUR", "SPECGRAV", "PROTEINUA", "GLUCUA", "KETONESU", "BILIRUBINUA", "OCCULTUA", "NITRITE", "UROBILINOGEN", "LEUKOCYTESUR", "RBCUA", "WBCUA", "BACTERIA", "SQUAMEPITHEL", "HYALINECASTS" in the last 48 hours.    Invalid input(s): "WRIGHTSUR"      Significant Imaging: I have reviewed all pertinent imaging results/findings within the past 24 hours.    Assessment/Plan:      * COPD exacerbation  On 4L nasal cannula with oxygen saturation 93%  Xopenex & IV steroids   IV zosyn started due to consolidation seen on CXR   Repeat CXR with " fluids    D/c today. Resolving and will dc with medrol dose pack       Pneumonia  Seen on CXR 4/5  Continue IV zosyn     D/c with 3 more days of levofloxacin       Acute on chronic respiratory failure  Patient with Hypoxic Respiratory failure which is Acute on chronic.  she is on home oxygen at 2-3 LPM. Supplemental oxygen was provided and noted-      .   Signs/symptoms of respiratory failure include- increased work of breathing and respiratory distress. Contributing diagnoses includes - COPD and Pneumonia Labs and images were reviewed. Patient Has not had a recent ABG. Will treat underlying causes and adjust management of respiratory failure as follows- IV solumedrol, IV abx, xopenex treatments    Currently on 4 L.  Will attempt to wean back to home baseline oxygen before discharge     On 3 L nasal cannula.  This is her baseline home oxygen.  D/C today     Alzheimer's dementia with behavioral disturbance  Patient with dementia with likely etiology of alzheimer's dementia. Dementia is moderate. The patient does have signs of behavioral disturbance. Home dementia medications are Held or Continued: continued.. Continue non-pharmacologic interventions to prevent delirium (No VS between 11PM-5AM, activity during day, opening blinds, providing glasses/hearing aids, and up in chair during daytime). Will avoid narcotics and benzos unless absolutely necessary. PRN anti-psychotics are prescribed to avoid self harm behaviors.    Patient's agitation has resolved.  Depakote started.  Patient has baseline dementia.    U/A negative     Discharge today     Rheumatoid arthritis involving both hands with negative rheumatoid factor  Patient does not have home medication with her       Memory loss  Continue namenda   Depakote was added and she seems to be tolerating this well      Type 2 diabetes mellitus with diabetic neuropathy, without long-term current use of insulin  Continue insulin       VTE Risk Mitigation (From admission,  onward)           Ordered     IP VTE HIGH RISK PATIENT  Once         04/04/24 1847     Place sequential compression device  Until discontinued         04/04/24 1847                    Discharge Planning   MARIKA: 4/5/2024     Code Status: Full Code   Is the patient medically ready for discharge?:     Reason for patient still in hospital (select all that apply): Other (specify) discharge today  Discharge Plan A: Return to nursing home                  Barbara Flowers PA-C  Department of Hospital Medicine   [facility] Surg (3rd Fl)

## 2024-04-09 NOTE — PLAN OF CARE
Problem: Adult Inpatient Plan of Care  Goal: Plan of Care Review  Outcome: Ongoing, Progressing     Problem: Adult Inpatient Plan of Care  Goal: Optimal Comfort and Wellbeing  Outcome: Ongoing, Progressing     Problem: Diabetes Comorbidity  Goal: Blood Glucose Level Within Targeted Range  Outcome: Ongoing, Progressing     Problem: Skin Injury Risk Increased  Goal: Skin Health and Integrity  Outcome: Ongoing, Progressing     Pt was restless at beginning of shift. Pt pulled out IV.  Pt with c/o L sided lower chest/rib pain.   Tramadol given per MD order for pain, pt then rested throughout the night.  Purewick remains in place.   New IV started-- 22g L FA.  Pt remains safe and free of falls.  Delirium precautions maintained.   Pt re-oriented and re-assured as needed.

## 2024-04-09 NOTE — PLAN OF CARE
[facility] - Med Surg (3rd Fl)  Discharge Final Note    Primary Care Provider: Jailene Astudillo MD    Expected Discharge Date: 4/9/2024    Final Discharge Note (most recent)       Final Note - 04/09/24 1231          Final Note    Assessment Type Final Discharge Note (P)      Anticipated Discharge Disposition intermediate Nursing Facility (P)      Hospital Resources/Appts/Education Provided Provided patient/caregiver with written discharge plan information (P)         Post-Acute Status    Discharge Delays None known at this time (P)                      Important Message from Medicare  Important Message from Medicare regarding Discharge Appeal Rights: Given to patient/caregiver, Explained to patient/caregiver, Signed/date by patient/caregiver, Other (comments) (contacted patients brian Agustin Ashton and notified of imm)     Date IMM was signed: 04/09/24  Time IMM was signed: 1100    Patient evaluated and treated inpatient for copd exacerbation. Is now medically cleared for discharge back to nursing home.  Family is aware of patient discharge.

## 2024-04-09 NOTE — ASSESSMENT & PLAN NOTE
Patient with dementia with likely etiology of alzheimer's dementia. Dementia is moderate. The patient does have signs of behavioral disturbance. Home dementia medications are Held or Continued: continued.. Continue non-pharmacologic interventions to prevent delirium (No VS between 11PM-5AM, activity during day, opening blinds, providing glasses/hearing aids, and up in chair during daytime). Will avoid narcotics and benzos unless absolutely necessary. PRN anti-psychotics are prescribed to avoid self harm behaviors.    Patient's agitation has resolved.  Depakote started.  Patient has baseline dementia.    U/A negative     Discharge today

## 2024-05-01 ENCOUNTER — TELEPHONE (OUTPATIENT)
Dept: INTERNAL MEDICINE | Facility: CLINIC | Age: 84
End: 2024-05-01
Payer: MEDICARE

## 2024-05-01 NOTE — TELEPHONE ENCOUNTER
----- Message from Doris Kumari sent at 5/1/2024  1:47 PM CDT -----  Type: General Call Back     Name of Caller:pts brian Velez  Symptoms:annual   Would the patient rather a call back or a response via [facility]? Call back   Best Call Back Number: [phone]  Additional Information: Patients son called on behalf of the patient. Patients son states he received a notification through the portal that the patient is due for an annual visit. Patients son states he would like to see if it is possible to schedule with the provider still as her PCP as she was put in a nursing home. Patient son states if so he would like to schedule. Patients son would like a call back with further assistance and more information.

## 2024-05-01 NOTE — TELEPHONE ENCOUNTER
Spoke to Agustin, and he states that she is still in the nursing home and the doctor there has been responsible for her care. He does not wish to schedule an appointment with Dr. Astudillo at this time. I advised that I would remove Dr. Astudillo as her PCP so he would not continue to receive notifications regarding appointments. He will notify us if she is discharged and wants to re-establish care with Dr. Astudillo.

## 2024-05-06 PROBLEM — J96.21 ACUTE ON CHRONIC RESPIRATORY FAILURE WITH HYPOXEMIA: Status: RESOLVED | Noted: 2020-04-12 | Resolved: 2024-05-06

## 2024-05-11 ENCOUNTER — HOSPITAL ENCOUNTER (INPATIENT)
Facility: HOSPITAL | Age: 84
LOS: 1 days | Discharge: NURSING, REHAB OR LONG-TERM CARE | DRG: 189 | End: 2024-05-13
Attending: SURGERY | Admitting: INTERNAL MEDICINE
Payer: MEDICARE

## 2024-05-11 DIAGNOSIS — R09.02 HYPOXIA: Primary | ICD-10-CM

## 2024-05-11 DIAGNOSIS — Z51.5 COMFORT MEASURES ONLY STATUS: ICD-10-CM

## 2024-05-11 DIAGNOSIS — J44.1 COPD WITH ACUTE EXACERBATION: ICD-10-CM

## 2024-05-11 DIAGNOSIS — J96.22 ACUTE ON CHRONIC RESPIRATORY FAILURE WITH HYPOXIA AND HYPERCAPNIA: ICD-10-CM

## 2024-05-11 DIAGNOSIS — I25.10 CARDIOVASCULAR DISEASE: ICD-10-CM

## 2024-05-11 DIAGNOSIS — J81.1 PULMONARY EDEMA: ICD-10-CM

## 2024-05-11 DIAGNOSIS — I50.9 CONGESTIVE HEART FAILURE, UNSPECIFIED HF CHRONICITY, UNSPECIFIED HEART FAILURE TYPE: ICD-10-CM

## 2024-05-11 DIAGNOSIS — J96.21 ACUTE ON CHRONIC RESPIRATORY FAILURE WITH HYPOXIA AND HYPERCAPNIA: ICD-10-CM

## 2024-05-11 DIAGNOSIS — R06.02 SHORTNESS OF BREATH: ICD-10-CM

## 2024-05-11 DIAGNOSIS — J44.1 COPD EXACERBATION: ICD-10-CM

## 2024-05-11 LAB
ALBUMIN SERPL BCP-MCNC: 2.7 G/DL (ref 3.5–5.2)
ALLENS TEST: ABNORMAL
ALLENS TEST: ABNORMAL
ALP SERPL-CCNC: 89 U/L (ref 55–135)
ALT SERPL W/O P-5'-P-CCNC: 11 U/L (ref 10–44)
AMORPH CRY URNS QL MICRO: ABNORMAL
ANION GAP SERPL CALC-SCNC: 8 MMOL/L (ref 8–16)
AST SERPL-CCNC: 27 U/L (ref 10–40)
BACTERIA #/AREA URNS HPF: ABNORMAL /HPF
BASOPHILS # BLD AUTO: 0.02 K/UL (ref 0–0.2)
BASOPHILS NFR BLD: 0.5 % (ref 0–1.9)
BILIRUB SERPL-MCNC: 0.2 MG/DL (ref 0.1–1)
BILIRUB UR QL STRIP: NEGATIVE
BNP SERPL-MCNC: 359 PG/ML (ref 0–99)
BUN SERPL-MCNC: 29 MG/DL (ref 8–23)
CALCIUM SERPL-MCNC: 8.8 MG/DL (ref 8.7–10.5)
CHLORIDE SERPL-SCNC: 100 MMOL/L (ref 95–110)
CK SERPL-CCNC: 35 U/L (ref 20–180)
CLARITY UR: CLEAR
CO2 SERPL-SCNC: 35 MMOL/L (ref 23–29)
COLOR UR: YELLOW
CREAT SERPL-MCNC: 0.9 MG/DL (ref 0.5–1.4)
DELSYS: ABNORMAL
DELSYS: ABNORMAL
DIFFERENTIAL METHOD BLD: ABNORMAL
EOSINOPHIL # BLD AUTO: 0.1 K/UL (ref 0–0.5)
EOSINOPHIL NFR BLD: 2.2 % (ref 0–8)
ERYTHROCYTE [DISTWIDTH] IN BLOOD BY AUTOMATED COUNT: 19.6 % (ref 11.5–14.5)
EST. GFR  (NO RACE VARIABLE): >60 ML/MIN/1.73 M^2
FIO2: 28 (ref 21–100)
FIO2: 40 (ref 21–100)
GLUCOSE SERPL-MCNC: 125 MG/DL (ref 70–110)
GLUCOSE UR QL STRIP: NEGATIVE
HCO3 UR-SCNC: 48.2 MMOL/L (ref 22–26)
HCO3 UR-SCNC: 52.5 MMOL/L (ref 22–26)
HCT VFR BLD AUTO: 33.5 % (ref 37–48.5)
HGB BLD-MCNC: 9.7 G/DL (ref 12–16)
HGB UR QL STRIP: NEGATIVE
HYALINE CASTS #/AREA URNS LPF: 1 /LPF
IMM GRANULOCYTES # BLD AUTO: 0.02 K/UL (ref 0–0.04)
IMM GRANULOCYTES NFR BLD AUTO: 0.5 % (ref 0–0.5)
INFLUENZA A, MOLECULAR: NEGATIVE
INFLUENZA B, MOLECULAR: NEGATIVE
KETONES UR QL STRIP: ABNORMAL
LEUKOCYTE ESTERASE UR QL STRIP: NEGATIVE
LYMPHOCYTES # BLD AUTO: 0.7 K/UL (ref 1–4.8)
LYMPHOCYTES NFR BLD: 16.9 % (ref 18–48)
MCH RBC QN AUTO: 25.8 PG (ref 27–31)
MCHC RBC AUTO-ENTMCNC: 29 G/DL (ref 32–36)
MCV RBC AUTO: 89 FL (ref 82–98)
MICROSCOPIC COMMENT: ABNORMAL
MONOCYTES # BLD AUTO: 1.1 K/UL (ref 0.3–1)
MONOCYTES NFR BLD: 25.7 % (ref 4–15)
NEUTROPHILS # BLD AUTO: 2.2 K/UL (ref 1.8–7.7)
NEUTROPHILS NFR BLD: 54.2 % (ref 38–73)
NITRITE UR QL STRIP: NEGATIVE
NON-SQ EPI CELLS #/AREA URNS HPF: 2 /HPF
NRBC BLD-RTO: 0 /100 WBC
PCO2 BLDA: 76 MMHG (ref 35–45)
PCO2 BLDA: 81 MMHG (ref 35–45)
PH SMN: 7.41 [PH] (ref 7.35–7.45)
PH SMN: 7.42 [PH] (ref 7.35–7.45)
PH UR STRIP: 5.5 [PH] (ref 5–8)
PLATELET # BLD AUTO: 147 K/UL (ref 150–450)
PMV BLD AUTO: ABNORMAL FL (ref 9.2–12.9)
PO2 BLDA: 49 MMHG (ref 75–100)
PO2 BLDA: 82 MMHG (ref 75–100)
POC BE: 20.3 MMOL/L (ref -2–2)
POC BE: 24.1 MMOL/L (ref -2–2)
POC COHB: 2.5 % (ref 0–3)
POC COHB: 3.7 % (ref 0–3)
POC METHB: 0 % (ref 0–1.5)
POC METHB: 0.9 % (ref 0–1.5)
POC O2HB ARTERIAL: 83.1 % (ref 94–100)
POC O2HB ARTERIAL: 95.1 % (ref 94–100)
POC SATURATED O2: 86 % (ref 90–100)
POC SATURATED O2: 98.8 % (ref 90–100)
POC TCO2: 50.5 MMOL/L
POC TCO2: 55 MMOL/L
POC THB: 10 G/DL (ref 12–18)
POC THB: 10.2 G/DL (ref 12–18)
POTASSIUM SERPL-SCNC: 4.8 MMOL/L (ref 3.5–5.1)
PROT SERPL-MCNC: 5.9 G/DL (ref 6–8.4)
PROT UR QL STRIP: ABNORMAL
RBC # BLD AUTO: 3.76 M/UL (ref 4–5.4)
RBC #/AREA URNS HPF: 1 /HPF (ref 0–4)
SARS-COV-2 RDRP RESP QL NAA+PROBE: NEGATIVE
SITE: ABNORMAL
SITE: ABNORMAL
SODIUM SERPL-SCNC: 143 MMOL/L (ref 136–145)
SP GR UR STRIP: 1.02 (ref 1–1.03)
SPECIMEN SOURCE: NORMAL
TROPONIN I SERPL DL<=0.01 NG/ML-MCNC: 0.03 NG/ML (ref 0–0.03)
URN SPEC COLLECT METH UR: ABNORMAL
UROBILINOGEN UR STRIP-ACNC: NEGATIVE EU/DL
WBC # BLD AUTO: 4.08 K/UL (ref 3.9–12.7)
WBC #/AREA URNS HPF: 2 /HPF (ref 0–5)

## 2024-05-11 PROCEDURE — 96366 THER/PROPH/DIAG IV INF ADDON: CPT

## 2024-05-11 PROCEDURE — 27000190 HC CPAP FULL FACE MASK W/VALVE

## 2024-05-11 PROCEDURE — 96365 THER/PROPH/DIAG IV INF INIT: CPT

## 2024-05-11 PROCEDURE — 94644 CONT INHLJ TX 1ST HOUR: CPT

## 2024-05-11 PROCEDURE — 96375 TX/PRO/DX INJ NEW DRUG ADDON: CPT

## 2024-05-11 PROCEDURE — 94660 CPAP INITIATION&MGMT: CPT

## 2024-05-11 PROCEDURE — 85025 COMPLETE CBC W/AUTO DIFF WBC: CPT | Performed by: SURGERY

## 2024-05-11 PROCEDURE — 99291 CRITICAL CARE FIRST HOUR: CPT

## 2024-05-11 PROCEDURE — 81000 URINALYSIS NONAUTO W/SCOPE: CPT | Performed by: SURGERY

## 2024-05-11 PROCEDURE — 99900029 HC O2 SETUP (STAT)

## 2024-05-11 PROCEDURE — 27100171 HC OXYGEN HIGH FLOW UP TO 24 HOURS

## 2024-05-11 PROCEDURE — 27000221 HC OXYGEN, UP TO 24 HOURS

## 2024-05-11 PROCEDURE — 80053 COMPREHEN METABOLIC PANEL: CPT | Performed by: SURGERY

## 2024-05-11 PROCEDURE — 99900031 HC PATIENT EDUCATION (STAT)

## 2024-05-11 PROCEDURE — 84484 ASSAY OF TROPONIN QUANT: CPT | Performed by: SURGERY

## 2024-05-11 PROCEDURE — 99900026 HC AIRWAY MAINTENANCE (STAT)

## 2024-05-11 PROCEDURE — 93010 ELECTROCARDIOGRAM REPORT: CPT | Mod: ,,, | Performed by: INTERNAL MEDICINE

## 2024-05-11 PROCEDURE — 93005 ELECTROCARDIOGRAM TRACING: CPT

## 2024-05-11 PROCEDURE — 25000242 PHARM REV CODE 250 ALT 637 W/ HCPCS: Performed by: SURGERY

## 2024-05-11 PROCEDURE — 87502 INFLUENZA DNA AMP PROBE: CPT | Performed by: SURGERY

## 2024-05-11 PROCEDURE — 63600175 PHARM REV CODE 636 W HCPCS: Performed by: SURGERY

## 2024-05-11 PROCEDURE — 83880 ASSAY OF NATRIURETIC PEPTIDE: CPT | Performed by: SURGERY

## 2024-05-11 PROCEDURE — 36600 WITHDRAWAL OF ARTERIAL BLOOD: CPT

## 2024-05-11 PROCEDURE — 5A09357 ASSISTANCE WITH RESPIRATORY VENTILATION, LESS THAN 24 CONSECUTIVE HOURS, CONTINUOUS POSITIVE AIRWAY PRESSURE: ICD-10-PCS | Performed by: INTERNAL MEDICINE

## 2024-05-11 PROCEDURE — 82550 ASSAY OF CK (CPK): CPT | Performed by: SURGERY

## 2024-05-11 PROCEDURE — 99900035 HC TECH TIME PER 15 MIN (STAT)

## 2024-05-11 PROCEDURE — 82803 BLOOD GASES ANY COMBINATION: CPT | Performed by: SURGERY

## 2024-05-11 PROCEDURE — 94761 N-INVAS EAR/PLS OXIMETRY MLT: CPT | Mod: XB

## 2024-05-11 PROCEDURE — U0002 COVID-19 LAB TEST NON-CDC: HCPCS | Performed by: SURGERY

## 2024-05-11 PROCEDURE — P9612 CATHETERIZE FOR URINE SPEC: HCPCS

## 2024-05-11 RX ORDER — METHYLPREDNISOLONE SOD SUCC 125 MG
125 VIAL (EA) INJECTION
Status: COMPLETED | OUTPATIENT
Start: 2024-05-11 | End: 2024-05-11

## 2024-05-11 RX ORDER — ALBUTEROL SULFATE 0.83 MG/ML
10 SOLUTION RESPIRATORY (INHALATION)
Status: COMPLETED | OUTPATIENT
Start: 2024-05-11 | End: 2024-05-11

## 2024-05-11 RX ORDER — MAGNESIUM SULFATE HEPTAHYDRATE 40 MG/ML
2 INJECTION, SOLUTION INTRAVENOUS
Status: COMPLETED | OUTPATIENT
Start: 2024-05-11 | End: 2024-05-11

## 2024-05-11 RX ORDER — FUROSEMIDE 10 MG/ML
40 INJECTION INTRAMUSCULAR; INTRAVENOUS EVERY 12 HOURS
Status: DISCONTINUED | OUTPATIENT
Start: 2024-05-11 | End: 2024-05-12

## 2024-05-11 RX ADMIN — ALBUTEROL SULFATE 10 MG: 2.5 SOLUTION RESPIRATORY (INHALATION) at 09:05

## 2024-05-11 RX ADMIN — MAGNESIUM SULFATE HEPTAHYDRATE 2 G: 40 INJECTION, SOLUTION INTRAVENOUS at 09:05

## 2024-05-11 RX ADMIN — FUROSEMIDE 40 MG: 10 INJECTION, SOLUTION INTRAMUSCULAR; INTRAVENOUS at 11:05

## 2024-05-11 RX ADMIN — METHYLPREDNISOLONE SODIUM SUCCINATE 125 MG: 125 INJECTION, POWDER, FOR SOLUTION INTRAMUSCULAR; INTRAVENOUS at 09:05

## 2024-05-12 PROBLEM — Z51.5 COMFORT MEASURES ONLY STATUS: Status: ACTIVE | Noted: 2024-05-12

## 2024-05-12 PROBLEM — J81.0 ACUTE PULMONARY EDEMA: Status: ACTIVE | Noted: 2024-05-12

## 2024-05-12 PROBLEM — J96.22 ACUTE ON CHRONIC RESPIRATORY FAILURE WITH HYPOXIA AND HYPERCAPNIA: Status: ACTIVE | Noted: 2024-04-05

## 2024-05-12 PROBLEM — Z71.89 ADVANCED CARE PLANNING/COUNSELING DISCUSSION: Status: ACTIVE | Noted: 2024-05-12

## 2024-05-12 PROBLEM — J96.21 ACUTE ON CHRONIC RESPIRATORY FAILURE WITH HYPOXIA AND HYPERCAPNIA: Status: ACTIVE | Noted: 2024-04-05

## 2024-05-12 LAB
ALLENS TEST: ABNORMAL
ALLENS TEST: ABNORMAL
DELSYS: ABNORMAL
DELSYS: ABNORMAL
FIO2: 50 (ref 21–100)
FIO2: 50 (ref 21–100)
HCO3 UR-SCNC: 55.8 MMOL/L (ref 22–26)
HCO3 UR-SCNC: 59 MMOL/L (ref 22–26)
PCO2 BLDA: 81 MMHG (ref 35–45)
PCO2 BLDA: 88 MMHG (ref 35–45)
PH SMN: 7.41 [PH] (ref 7.35–7.45)
PH SMN: 7.47 [PH] (ref 7.35–7.45)
PO2 BLDA: 70 MMHG (ref 75–100)
PO2 BLDA: 74 MMHG (ref 75–100)
POC BE: 26.8 MMOL/L (ref -2–2)
POC BE: 30.6 MMOL/L (ref -2–2)
POC COHB: 1.8 % (ref 0–3)
POC COHB: 2 % (ref 0–3)
POC METHB: 0.8 % (ref 0–1.5)
POC METHB: 0.8 % (ref 0–1.5)
POC O2HB ARTERIAL: 93.3 % (ref 94–100)
POC O2HB ARTERIAL: 93.5 % (ref 94–100)
POC SATURATED O2: 95.8 % (ref 90–100)
POC SATURATED O2: 96.2 % (ref 90–100)
POC TCO2: 58.5 MMOL/L
POC TCO2: 61.5 MMOL/L
POC THB: 10.2 G/DL (ref 12–18)
POC THB: 10.3 G/DL (ref 12–18)
POCT GLUCOSE: 124 MG/DL (ref 70–110)
POCT GLUCOSE: 232 MG/DL (ref 70–110)
SITE: ABNORMAL
SITE: ABNORMAL
TROPONIN I SERPL DL<=0.01 NG/ML-MCNC: 0.03 NG/ML (ref 0–0.03)
TROPONIN I SERPL DL<=0.01 NG/ML-MCNC: 0.03 NG/ML (ref 0–0.03)

## 2024-05-12 PROCEDURE — 25000003 PHARM REV CODE 250: Performed by: INTERNAL MEDICINE

## 2024-05-12 PROCEDURE — 83036 HEMOGLOBIN GLYCOSYLATED A1C: CPT | Performed by: INTERNAL MEDICINE

## 2024-05-12 PROCEDURE — 93010 ELECTROCARDIOGRAM REPORT: CPT | Mod: ,,, | Performed by: INTERNAL MEDICINE

## 2024-05-12 PROCEDURE — 63600175 PHARM REV CODE 636 W HCPCS: Performed by: INTERNAL MEDICINE

## 2024-05-12 PROCEDURE — 84484 ASSAY OF TROPONIN QUANT: CPT | Performed by: SURGERY

## 2024-05-12 PROCEDURE — 36600 WITHDRAWAL OF ARTERIAL BLOOD: CPT

## 2024-05-12 PROCEDURE — 27000492 HC SLEEVE, SCD T/L

## 2024-05-12 PROCEDURE — 94660 CPAP INITIATION&MGMT: CPT

## 2024-05-12 PROCEDURE — 21400001 HC TELEMETRY ROOM

## 2024-05-12 PROCEDURE — 99900035 HC TECH TIME PER 15 MIN (STAT)

## 2024-05-12 PROCEDURE — 82803 BLOOD GASES ANY COMBINATION: CPT | Performed by: INTERNAL MEDICINE

## 2024-05-12 PROCEDURE — 36415 COLL VENOUS BLD VENIPUNCTURE: CPT | Performed by: SURGERY

## 2024-05-12 PROCEDURE — 63600175 PHARM REV CODE 636 W HCPCS: Performed by: SURGERY

## 2024-05-12 PROCEDURE — 36415 COLL VENOUS BLD VENIPUNCTURE: CPT | Performed by: INTERNAL MEDICINE

## 2024-05-12 PROCEDURE — 99223 1ST HOSP IP/OBS HIGH 75: CPT | Mod: AI,,, | Performed by: INTERNAL MEDICINE

## 2024-05-12 PROCEDURE — 94761 N-INVAS EAR/PLS OXIMETRY MLT: CPT

## 2024-05-12 PROCEDURE — 27100171 HC OXYGEN HIGH FLOW UP TO 24 HOURS

## 2024-05-12 PROCEDURE — 99900026 HC AIRWAY MAINTENANCE (STAT)

## 2024-05-12 PROCEDURE — 93005 ELECTROCARDIOGRAM TRACING: CPT

## 2024-05-12 PROCEDURE — 25000242 PHARM REV CODE 250 ALT 637 W/ HCPCS: Performed by: SURGERY

## 2024-05-12 PROCEDURE — 94640 AIRWAY INHALATION TREATMENT: CPT

## 2024-05-12 RX ORDER — DIVALPROEX SODIUM 250 MG/1
250 TABLET, DELAYED RELEASE ORAL EVERY 12 HOURS
Status: DISCONTINUED | OUTPATIENT
Start: 2024-05-12 | End: 2024-05-12

## 2024-05-12 RX ORDER — FUROSEMIDE 10 MG/ML
40 INJECTION INTRAMUSCULAR; INTRAVENOUS DAILY
Status: DISCONTINUED | OUTPATIENT
Start: 2024-05-13 | End: 2024-05-12

## 2024-05-12 RX ORDER — METHYLPREDNISOLONE SOD SUCC 125 MG
125 VIAL (EA) INJECTION EVERY 6 HOURS
Status: DISCONTINUED | OUTPATIENT
Start: 2024-05-12 | End: 2024-05-12

## 2024-05-12 RX ORDER — MEMANTINE HYDROCHLORIDE 5 MG/1
10 TABLET ORAL 2 TIMES DAILY
Status: DISCONTINUED | OUTPATIENT
Start: 2024-05-12 | End: 2024-05-12

## 2024-05-12 RX ORDER — ASPIRIN 81 MG/1
81 TABLET ORAL
Status: DISCONTINUED | OUTPATIENT
Start: 2024-05-13 | End: 2024-05-12

## 2024-05-12 RX ORDER — OLANZAPINE 10 MG/2ML
2.5 INJECTION, POWDER, FOR SOLUTION INTRAMUSCULAR ONCE AS NEEDED
Status: DISCONTINUED | OUTPATIENT
Start: 2024-05-12 | End: 2024-05-12

## 2024-05-12 RX ORDER — INSULIN ASPART 100 [IU]/ML
0-5 INJECTION, SOLUTION INTRAVENOUS; SUBCUTANEOUS EVERY 6 HOURS PRN
Status: DISCONTINUED | OUTPATIENT
Start: 2024-05-12 | End: 2024-05-12

## 2024-05-12 RX ORDER — ACETAMINOPHEN 325 MG/1
650 TABLET ORAL EVERY 6 HOURS PRN
Status: DISCONTINUED | OUTPATIENT
Start: 2024-05-12 | End: 2024-05-13 | Stop reason: HOSPADM

## 2024-05-12 RX ORDER — LORAZEPAM 2 MG/ML
1 INJECTION INTRAMUSCULAR EVERY 4 HOURS PRN
Status: DISCONTINUED | OUTPATIENT
Start: 2024-05-12 | End: 2024-05-13 | Stop reason: HOSPADM

## 2024-05-12 RX ORDER — HALOPERIDOL 5 MG/ML
1 INJECTION INTRAMUSCULAR EVERY 4 HOURS PRN
Status: DISCONTINUED | OUTPATIENT
Start: 2024-05-12 | End: 2024-05-13 | Stop reason: HOSPADM

## 2024-05-12 RX ORDER — IPRATROPIUM BROMIDE AND ALBUTEROL SULFATE 2.5; .5 MG/3ML; MG/3ML
3 SOLUTION RESPIRATORY (INHALATION) EVERY 4 HOURS
Status: DISCONTINUED | OUTPATIENT
Start: 2024-05-12 | End: 2024-05-12

## 2024-05-12 RX ORDER — ATORVASTATIN CALCIUM 20 MG/1
80 TABLET, FILM COATED ORAL NIGHTLY
Status: DISCONTINUED | OUTPATIENT
Start: 2024-05-12 | End: 2024-05-12

## 2024-05-12 RX ORDER — LORAZEPAM 2 MG/ML
1 INJECTION INTRAMUSCULAR
Status: COMPLETED | OUTPATIENT
Start: 2024-05-12 | End: 2024-05-12

## 2024-05-12 RX ORDER — MICONAZOLE NITRATE 2 %
POWDER (GRAM) TOPICAL 2 TIMES DAILY
Status: DISCONTINUED | OUTPATIENT
Start: 2024-05-12 | End: 2024-05-13 | Stop reason: HOSPADM

## 2024-05-12 RX ORDER — ATORVASTATIN CALCIUM 20 MG/1
80 TABLET, FILM COATED ORAL DAILY
Status: DISCONTINUED | OUTPATIENT
Start: 2024-05-12 | End: 2024-05-12

## 2024-05-12 RX ORDER — SODIUM CHLORIDE 0.9 % (FLUSH) 0.9 %
10 SYRINGE (ML) INJECTION
Status: DISCONTINUED | OUTPATIENT
Start: 2024-05-12 | End: 2024-05-12

## 2024-05-12 RX ORDER — METHYLPREDNISOLONE SOD SUCC 125 MG
80 VIAL (EA) INJECTION EVERY 12 HOURS
Status: DISCONTINUED | OUTPATIENT
Start: 2024-05-12 | End: 2024-05-12

## 2024-05-12 RX ORDER — BACLOFEN 5 MG/1
5 TABLET ORAL EVERY 8 HOURS PRN
Status: DISCONTINUED | OUTPATIENT
Start: 2024-05-12 | End: 2024-05-13 | Stop reason: HOSPADM

## 2024-05-12 RX ORDER — HYDRALAZINE HYDROCHLORIDE 20 MG/ML
10 INJECTION INTRAMUSCULAR; INTRAVENOUS EVERY 6 HOURS PRN
Status: DISCONTINUED | OUTPATIENT
Start: 2024-05-12 | End: 2024-05-12

## 2024-05-12 RX ORDER — IPRATROPIUM BROMIDE AND ALBUTEROL SULFATE 2.5; .5 MG/3ML; MG/3ML
3 SOLUTION RESPIRATORY (INHALATION) EVERY 4 HOURS PRN
Status: DISCONTINUED | OUTPATIENT
Start: 2024-05-12 | End: 2024-05-13 | Stop reason: HOSPADM

## 2024-05-12 RX ORDER — ONDANSETRON HYDROCHLORIDE 2 MG/ML
4 INJECTION, SOLUTION INTRAVENOUS EVERY 8 HOURS PRN
Status: DISCONTINUED | OUTPATIENT
Start: 2024-05-12 | End: 2024-05-13 | Stop reason: HOSPADM

## 2024-05-12 RX ORDER — TALC
6 POWDER (GRAM) TOPICAL NIGHTLY PRN
Status: DISCONTINUED | OUTPATIENT
Start: 2024-05-12 | End: 2024-05-12

## 2024-05-12 RX ORDER — LORAZEPAM 2 MG/ML
1 INJECTION INTRAMUSCULAR EVERY 4 HOURS PRN
Status: DISCONTINUED | OUTPATIENT
Start: 2024-05-12 | End: 2024-05-12

## 2024-05-12 RX ORDER — ATROPINE SULFATE 10 MG/ML
2 SOLUTION/ DROPS OPHTHALMIC EVERY 4 HOURS PRN
Status: DISCONTINUED | OUTPATIENT
Start: 2024-05-12 | End: 2024-05-13 | Stop reason: HOSPADM

## 2024-05-12 RX ORDER — MUPIROCIN 20 MG/G
OINTMENT TOPICAL 2 TIMES DAILY
Status: DISCONTINUED | OUTPATIENT
Start: 2024-05-12 | End: 2024-05-12

## 2024-05-12 RX ORDER — MORPHINE SULFATE 2 MG/ML
2 INJECTION, SOLUTION INTRAMUSCULAR; INTRAVENOUS EVERY 4 HOURS PRN
Status: DISCONTINUED | OUTPATIENT
Start: 2024-05-12 | End: 2024-05-13 | Stop reason: HOSPADM

## 2024-05-12 RX ORDER — GLUCAGON 1 MG
1 KIT INJECTION
Status: DISCONTINUED | OUTPATIENT
Start: 2024-05-12 | End: 2024-05-12

## 2024-05-12 RX ADMIN — LORAZEPAM 1 MG: 2 INJECTION INTRAMUSCULAR; INTRAVENOUS at 12:05

## 2024-05-12 RX ADMIN — INSULIN ASPART 2 UNITS: 100 INJECTION, SOLUTION INTRAVENOUS; SUBCUTANEOUS at 11:05

## 2024-05-12 RX ADMIN — MICONAZOLE NITRATE: 20 POWDER TOPICAL at 11:05

## 2024-05-12 RX ADMIN — MICONAZOLE NITRATE: 20 POWDER TOPICAL at 09:05

## 2024-05-12 RX ADMIN — LORAZEPAM 1 MG: 2 INJECTION INTRAMUSCULAR; INTRAVENOUS at 07:05

## 2024-05-12 RX ADMIN — IPRATROPIUM BROMIDE AND ALBUTEROL SULFATE 3 ML: 2.5; .5 SOLUTION RESPIRATORY (INHALATION) at 07:05

## 2024-05-12 RX ADMIN — FUROSEMIDE 40 MG: 10 INJECTION, SOLUTION INTRAMUSCULAR; INTRAVENOUS at 10:05

## 2024-05-12 RX ADMIN — METHYLPREDNISOLONE SODIUM SUCCINATE 125 MG: 125 INJECTION, POWDER, FOR SOLUTION INTRAMUSCULAR; INTRAVENOUS at 06:05

## 2024-05-12 RX ADMIN — LORAZEPAM 1 MG: 2 INJECTION INTRAMUSCULAR; INTRAVENOUS at 01:05

## 2024-05-12 RX ADMIN — IPRATROPIUM BROMIDE AND ALBUTEROL SULFATE 3 ML: 2.5; .5 SOLUTION RESPIRATORY (INHALATION) at 03:05

## 2024-05-12 RX ADMIN — IPRATROPIUM BROMIDE AND ALBUTEROL SULFATE 3 ML: 2.5; .5 SOLUTION RESPIRATORY (INHALATION) at 11:05

## 2024-05-12 RX ADMIN — MUPIROCIN: 20 OINTMENT TOPICAL at 10:05

## 2024-05-12 NOTE — EICU
Intervention Initiated From:  [facility] / EICU    Dwight intervened regarding:  Rounding (Video assessment)    Nurse Notified:  Yes    Doctor Notified:  No    Comments: Pt seen on video rounds in bed with bipap in place.  V/S within set parameters.

## 2024-05-12 NOTE — ED PROVIDER NOTES
Encounter Date: 2024       History     Chief Complaint   Patient presents with    Shortness of Breath     Pt to ED via [facility] with c/o SOB. Pt has history of dementia and non-compliant with wearing oxygen. Pt was found in hallway without her oxygen, placed back into her room and placed back on her chronic oxygen, 87% O2 sat. Patient placed on 3L NRB in route by paramedic.      History of Present Illness  Gretel Ashton is a 83 y.o. female that presents with shortness of breath now  Patient was on 3 days oxygen at the nursing home, severe dementia reported  Patient keeps pulling off her oxygen with the nursing home, staff places back  Patient had been off of her oxygen for some time with the nursing home tonight  Staff put the oxygen back with continued hypoxia & wheezing shortly afterwards  Ambulance was called, ambulance reports an 87% oxygenation on non-rebreather    The history is provided by the nursing home.     Review of patient's allergies indicates:  No Known Allergies  Past Medical History:   Diagnosis Date    Arthritis     COPD (chronic obstructive pulmonary disease)     Depression     Diabetes mellitus type II     Hyperlipidemia     Hypertension     Pacemaker      Past Surgical History:   Procedure Laterality Date    CARDIAC PACEMAKER PLACEMENT       SECTION      CYST REMOVAL Left 2019    Procedure: EXCISION, SEBACEOUS CYST;  Surgeon: Jaylen Gonzalez Jr., MD;  Location: [facility] OR;  Service: General;  Laterality: Left;    INNER EAR SURGERY       Family History   Problem Relation Name Age of Onset    Cancer Mother      Breast cancer Mother      Stroke Father      Cancer Sister      Breast cancer Sister      Stroke Maternal Grandmother       Social History     Tobacco Use    Smoking status: Former     Current packs/day: 0.00     Average packs/day: 2.0 packs/day for 43.0 years (86.0 ttl pk-yrs)     Types: Cigarettes     Start date: 1957     Quit date: 2000     Years since quitting:  24.3    Smokeless tobacco: Never   Substance Use Topics    Alcohol use: No    Drug use: No     Review of Systems   Unable to perform ROS: Acuity of condition       Physical Exam     Initial Vitals [05/11/24 2133]   BP Pulse Resp Temp SpO2   117/71 105 18 98.9 °F (37.2 °C) 99 %      MAP       --         Physical Exam    Nursing note and vitals reviewed.  Constitutional: Vital signs are normal. She is cooperative.   (+) demented elderly female with no distress   HENT:   Head: Normocephalic and atraumatic.   Eyes: Conjunctivae, EOM and lids are normal. Pupils are equal, round, and reactive to light.   Neck: Trachea normal and phonation normal. Neck supple. No JVD present.   Normal range of motion.   Full passive range of motion without pain.     Cardiovascular:  Normal rate, regular rhythm, S1 normal, S2 normal, normal heart sounds, intact distal pulses and normal pulses.           Pulmonary/Chest: Effort normal.   (+) wheezing & crackles in all fields bilaterally   Abdominal: Abdomen is soft and flat. Bowel sounds are normal.   Musculoskeletal:         General: Normal range of motion.      Cervical back: Full passive range of motion without pain, normal range of motion and neck supple.     Neurological: She is alert. She has normal strength.   Skin: Skin is warm, dry and intact. Capillary refill takes less than 2 seconds.         ED Course   Procedures  Labs Reviewed   CBC W/ AUTO DIFFERENTIAL - Abnormal; Notable for the following components:       Result Value    RBC 3.76 (*)     Hemoglobin 9.7 (*)     Hematocrit 33.5 (*)     MCH 25.8 (*)     MCHC 29.0 (*)     RDW 19.6 (*)     Platelets 147 (*)     Lymph # 0.7 (*)     Mono # 1.1 (*)     Lymph % 16.9 (*)     Mono % 25.7 (*)     All other components within normal limits   COMPREHENSIVE METABOLIC PANEL - Abnormal; Notable for the following components:    CO2 35 (*)     Glucose 125 (*)     BUN 29 (*)     Total Protein 5.9 (*)     Albumin 2.7 (*)     All other components  within normal limits   TROPONIN I - Abnormal; Notable for the following components:    Troponin I 0.031 (*)     All other components within normal limits   B-TYPE NATRIURETIC PEPTIDE - Abnormal; Notable for the following components:     (*)     All other components within normal limits   URINALYSIS, REFLEX TO URINE CULTURE - Abnormal; Notable for the following components:    Protein, UA 1+ (*)     Ketones, UA Trace (*)     All other components within normal limits    Narrative:     Specimen Source->Urine   URINALYSIS MICROSCOPIC - Abnormal; Notable for the following components:    Non-Squam Epith 2 (*)     All other components within normal limits    Narrative:     Specimen Source->Urine   INFLUENZA A & B BY MOLECULAR   SARS-COV-2 RNA AMPLIFICATION, QUAL   CK     EKG Readings: (Independently Interpreted)   No STEMI  Normal sinus rhythm  No ectopy  Normal conduction  Normal ST segments  Normal T-wave  Normal axis  Heart rate in the 100s       Imaging Results              X-Ray Chest 1 View (Final result)  Result time 05/11/24 22:25:07      Final result by Taz Martínez MD (05/11/24 22:25:07)                   Impression:      As above.      Electronically signed by: Taz Martínez  Date:    05/11/2024  Time:    22:25               Narrative:    EXAMINATION:  XR CHEST 1 VIEW    CLINICAL HISTORY:  Shortness of breath;    TECHNIQUE:  Single frontal view of the chest was performed.    COMPARISON:  04/08/2024    FINDINGS:  Left chest wall cardiac pacemaker.  Enlarged cardiac silhouette with pulmonary vascular congestion.  Mild interstitial edema.  Trace bilateral pleural effusions.                                       Medications   furosemide injection 40 mg (40 mg Intravenous Given 5/11/24 2308)   albuterol nebulizer solution 10 mg (10 mg Nebulization Given 5/11/24 2148)   methylPREDNISolone sodium succinate injection 125 mg (125 mg Intravenous Given 5/11/24 2149)   magnesium sulfate 2g in water 50mL  IVPB (premix) (0 g Intravenous Stopped 5/11/24 5334)     Medical Decision Making  83-year-old female presents with shortness of breath after noncompliance with oxygen  Severe dementia, does not keep her oxygen on at the nursing home on ER triage  Differential includes flu, COVID, CHF, COPD, oxygen noncompliance    Problems Addressed:  Congestive heart failure, unspecified HF chronicity, unspecified heart failure type: complicated acute illness or injury  COPD with acute exacerbation: complicated acute illness or injury  Hypoxia: complicated acute illness or injury  Shortness of breath: complicated acute illness or injury    Amount and/or Complexity of Data Reviewed  Independent Historian: caregiver and EMS  External Data Reviewed: notes.  Labs: ordered. Decision-making details documented in ED Course.  Radiology: ordered and independent interpretation performed.  ECG/medicine tests: ordered and independent interpretation performed.    ED Management & Risks of Complication, Morbidity, & Mortality:  Chest x-ray showed no acute findings today  Arterial blood gas shows obvious hypoxia & CO2 retention of 81  Rest of lab work within normal limits, noted mild BNP elevation  Troponin 0.031, no evidence of ST elevation on EKG  Repeat ABG after starting BiPAP is improved, decreased CO2  Oxygenation now 98%, chest x-ray suggest CHF/COPD  IV Lasix mg IV steroids, magnesium & breathing treatments given  I will admit this patient to the ICU in restraints to keep the BiPAP on  I attempted to call the patient's son Agustin Ashton this morning  Directly to an answering machine with no opportunity for voicemail  Discussed with Dr. Lira who agrees to put the patient in the unit    Critical Care ED Physician Time (minutes):  -- Performed by: Bruce Malone M.D.  -- Date/Time: 12:39 AM 5/12/2024   -- Direct Patient Care (Face Time): 15  -- Additional History from Records or Taking Additional History: 15  -- Ordering, Reviewing, and  Interpreting Diagnostic Studies: 15  -- Total Time in Documentation: 15  -- Consultation with Other Physicians: 15  -- Consultation with Family Related to Condition: 0  -- Total Critical Care Time: 60  -- Critical care was necessary to treat COPD & CHF  -- Critical care was time spent personally by me on the following activities:   -- discussions with consultants regarding treatment plan today  -- development of treatment plan with patient & their family  -- examination of patient, ordering and performing treatments   -- review of radiographic studies, re-evaluation of pt's condition  -- review of labs and evaluation of response to treatment     Clinical Impression:  Final diagnoses:  [R06.02] Shortness of breath  [R09.02] Hypoxia (Primary)  [J44.1] COPD with acute exacerbation  [I50.9] Congestive heart failure, unspecified HF chronicity, unspecified heart failure type          ED Disposition Condition    Observation Stable                Bruce Malone MD  05/12/24 0040

## 2024-05-12 NOTE — SUBJECTIVE & OBJECTIVE
Past Medical History:   Diagnosis Date    Arthritis     COPD (chronic obstructive pulmonary disease)     Depression     Diabetes mellitus type II     Hyperlipidemia     Hypertension     Pacemaker        Past Surgical History:   Procedure Laterality Date    CARDIAC PACEMAKER PLACEMENT       SECTION      CYST REMOVAL Left 2019    Procedure: EXCISION, SEBACEOUS CYST;  Surgeon: Jaylen Gonzalez Jr., MD;  Location: [facility];  Service: General;  Laterality: Left;    INNER EAR SURGERY         Review of patient's allergies indicates:  No Known Allergies    No current facility-administered medications on file prior to encounter.     Current Outpatient Medications on File Prior to Encounter   Medication Sig    acetaminophen (TYLENOL) 325 MG tablet Take 650 mg by mouth every 6 (six) hours as needed for Pain.    albuterol-ipratropium (DUO-NEB) 2.5 mg-0.5 mg/3 mL nebulizer solution Take 3 mLs by nebulization every 6 (six) hours as needed for Wheezing. Rescue    aspirin (ECOTRIN) 81 MG EC tablet Take 81 mg by mouth every Mon, Wed, Fri.    bempedoic acid 180 mg Tab Take 180 mg by mouth every evening.    coenzyme Q10 100 mg capsule Take 100 mg by mouth once daily.    cyanocobalamin, vitamin B-12, 1,000 mcg Subl Place 1,000 mcg under the tongue once daily.    divalproex (DEPAKOTE) 250 MG EC tablet Take 1 tablet (250 mg total) by mouth every 12 (twelve) hours.    ferrous sulfate 325 (65 FE) MG EC tablet Take 325 mg by mouth once daily.    fluticasone-umeclidin-vilanter (TRELEGY ELLIPTA) 100-62.5-25 mcg DsDv Inhale 1 puff into the lungs once daily.    leflunomide (ARAVA) 10 MG Tab Take 10 mg by mouth every other day.    memantine (NAMENDA) 10 MG Tab TAKE 1 TABLET BY MOUTH 2 (TWO) TIMES A DAY.    metFORMIN (GLUCOPHAGE) 500 MG tablet Take 1 tablet (500 mg total) by mouth 2 (two) times daily with meals.    omeprazole (PRILOSEC) 40 MG capsule Take 40 mg by mouth every morning.    rosuvastatin (CRESTOR) 40 MG Tab Take 40 mg by  "mouth every evening.    syringe with needle (SYRINGE 3CC/25GX1") 3 mL 25 gauge x 1" Syrg Once every months    [DISCONTINUED] hydroCHLOROthiazide (HYDRODIURIL) 12.5 MG Tab Take 12.5 mg by mouth daily as needed.      Family History       Problem Relation (Age of Onset)    Breast cancer Mother, Sister    Cancer Mother, Sister    Stroke Father, Maternal Grandmother          Tobacco Use    Smoking status: Former     Current packs/day: 0.00     Average packs/day: 2.0 packs/day for 43.0 years (86.0 ttl pk-yrs)     Types: Cigarettes     Start date: 1957     Quit date: 2000     Years since quittin.3    Smokeless tobacco: Never   Substance and Sexual Activity    Alcohol use: No    Drug use: No    Sexual activity: Not on file     Review of Systems   Unable to perform ROS: Dementia   Acuity of medical condition as well    Objective:     Vital Signs (Most Recent):  Temp: 97.6 °F (36.4 °C) (24 0730)  Pulse: 82 (24 0945)  Resp: 18 (24 0945)  BP: (!) 122/59 (24 0945)  SpO2: 99 % (24 0945) Vital Signs (24h Range):  Temp:  [97.6 °F (36.4 °C)-98.9 °F (37.2 °C)] 97.6 °F (36.4 °C)  Pulse:  [] 82  Resp:  [9-56] 18  SpO2:  [87 %-100 %] 99 %  BP: ()/(53-82) 122/59     Weight: 72.6 kg (160 lb)  Body mass index is 29.26 kg/m².     Physical Exam  Vitals and nursing note reviewed.   Constitutional:       Appearance: She is well-developed.      Comments: Laying bed with BiPaP; intermittently agitated per nursing but calm on my assessment  Does not answer questions     HENT:      Head: Normocephalic and atraumatic.      Right Ear: External ear normal.      Left Ear: External ear normal.   Eyes:      General:         Right eye: No discharge.         Left eye: No discharge.      Pupils: Pupils are equal, round, and reactive to light.      Comments: Can not follow commands to assess EOM   Neck:      Thyroid: No thyromegaly.   Cardiovascular:      Rate and Rhythm: Normal rate and regular rhythm. " "     Heart sounds: No murmur heard.  Pulmonary:      Effort: Pulmonary effort is normal. No respiratory distress.      Breath sounds: Wheezing (soft wheeze in bases) present.   Abdominal:      General: Bowel sounds are normal. There is no distension.   Musculoskeletal:      Right lower leg: No edema.      Left lower leg: No edema.   Skin:     General: Skin is warm and dry.   Neurological:      Comments: Disoriented  Does not follow commands  Intermittently agitate   Psychiatric:      Comments: Can not assess              CRANIAL NERVES     CN III, IV, VI   Pupils are equal, round, and reactive to light.       Significant Labs: All pertinent labs within the past 24 hours have been reviewed.  ABGs:   Recent Labs   Lab 05/11/24 2155 05/11/24  2335 05/12/24  0900   PH 7.420 7.410 7.410   PCO2 81* 76* 88*   HCO3 52.50* 48.20* 55.80*   POCSATURATED 86.0* 98.8 96.2   BE 24.10* 20.30* 26.80*   TOTALHB 10.2* 10.0* 10.2*   COHB 2.5 3.7* 2.0   METHB 0.9 0.0 0.8   PO2 49* 82 74*     CBC:   Recent Labs   Lab 05/11/24 2147   WBC 4.08   HGB 9.7*   HCT 33.5*   *     CMP:   Recent Labs   Lab 05/11/24 2147      K 4.8      CO2 35*   *   BUN 29*   CREATININE 0.9   CALCIUM 8.8   PROT 5.9*   ALBUMIN 2.7*   BILITOT 0.2   ALKPHOS 89   AST 27   ALT 11   ANIONGAP 8     Cardiac Markers:   Recent Labs   Lab 05/11/24 2147   *     Lactic Acid: No results for input(s): "LACTATE" in the last 48 hours.  Lipid Panel: No results for input(s): "CHOL", "HDL", "LDLCALC", "TRIG", "CHOLHDL" in the last 48 hours.  Troponin:   Recent Labs   Lab 05/11/24 2147 05/12/24  0616   TROPONINI 0.031* 0.025     TSH: No results for input(s): "TSH" in the last 4320 hours.  Urine Studies:   Recent Labs   Lab 05/11/24 2158   COLORU Yellow   APPEARANCEUA Clear   PHUR 5.5   SPECGRAV 1.025   PROTEINUA 1+*   GLUCUA Negative   KETONESU Trace*   BILIRUBINUA Negative   OCCULTUA Negative   NITRITE Negative   UROBILINOGEN Negative "   LEUKOCYTESUR Negative   RBCUA 1   WBCUA 2   BACTERIA Occasional   HYALINECASTS 1       Significant Imaging: I have reviewed all pertinent imaging results/findings within the past 24 hours.

## 2024-05-12 NOTE — H&P
"  [facility]  History & Physical    Patient Name: Gretel Ashton  MRN: [id number]  Patient Class: IP- Inpatient  Admission Date: 5/11/2024  Attending Physician: Linda Lira MD   Primary Care Provider: [facility] Primary Doctor         Patient information was obtained from relative(s) and ER records.     Subjective:     Principal Problem:Acute on chronic respiratory failure with hypoxia and hypercapnia    Chief Complaint:   Chief Complaint   Patient presents with    Shortness of Breath     Pt to ED via [facility] with c/o SOB. Pt has history of dementia and non-compliant with wearing oxygen. Pt was found in hallway without her oxygen, placed back into her room and placed back on her chronic oxygen, 87% O2 sat. Patient placed on 3L NRB in route by paramedic.         HPI: Patient brought to ED with hypoxia. She is a NH resident. History obtain from son and ED notes as patient has dementia and confused due to acute medical issues. Per notes, She "keeps pulling off her oxygen with the nursing home, staff places back. Patient had been off of her oxygen for some time with the nursing home tonight. Staff put the oxygen back with continued hypoxia & wheezing shortly afterwards. Ambulance was called, ambulance reports an 87% oxygenation on non-rebreather"  In ED was found to have hypoxia and hypercapenia. She was started on BiPaP and admitted to ICU.   Started on nebs and steroids.  CXR also noted some pulmonary edema. BNP mildly elevated with normal GFR. No recent TTE in our system. No CHF diagnosis on chart. Given Lasix overnight and this AM.  She remains confused. Agitated with turning, cleaning this morning.  pCO2 is not improving despite continuous BipaP overnight: 76-->88. pH normal however.   Mr. Agustin Ashton is POA. Spoke with him on this phone this morning. This is the patient's 3rd hospital admission since Feb 2024 for the same issues. She is becoming forgetful and not remembering family per " Mr. Velez. She is refusing oxygen at NH. She is sometimes refusing medication. Mr. Velez does note her quality if life is becoming poor and is already aware her long term prognosis is not good.  She came from [place] with [employer] signed in  stating she is a full code. However, since that time her dementia and medical issues are worsening. The decision was made for DNR today which I agree is very appropriate for this patient.  For now, we will continue care up to the point of intubation, CPR, defibrillation including BipaP. He understands if she does not respond to BiPaP she will get worse and possibly die. He is notifying family. We will re-evaluate tomorrow and if not improving with medical care may consider transitioning to hospice.     Past Medical History:   Diagnosis Date    Arthritis     COPD (chronic obstructive pulmonary disease)     Depression     Diabetes mellitus type II     Hyperlipidemia     Hypertension     Pacemaker        Past Surgical History:   Procedure Laterality Date    CARDIAC PACEMAKER PLACEMENT       SECTION      CYST REMOVAL Left 2019    Procedure: EXCISION, SEBACEOUS CYST;  Surgeon: Jaylen Gonzalez Jr., MD;  Location: [facility];  Service: General;  Laterality: Left;    INNER EAR SURGERY         Review of patient's allergies indicates:  No Known Allergies    No current facility-administered medications on file prior to encounter.     Current Outpatient Medications on File Prior to Encounter   Medication Sig    acetaminophen (TYLENOL) 325 MG tablet Take 650 mg by mouth every 6 (six) hours as needed for Pain.    albuterol-ipratropium (DUO-NEB) 2.5 mg-0.5 mg/3 mL nebulizer solution Take 3 mLs by nebulization every 6 (six) hours as needed for Wheezing. Rescue    aspirin (ECOTRIN) 81 MG EC tablet Take 81 mg by mouth every Mon, Wed, Fri.    bempedoic acid 180 mg Tab Take 180 mg by mouth every evening.    coenzyme Q10 100 mg capsule Take 100 mg by mouth once daily.     "cyanocobalamin, vitamin B-12, 1,000 mcg Subl Place 1,000 mcg under the tongue once daily.    divalproex (DEPAKOTE) 250 MG EC tablet Take 1 tablet (250 mg total) by mouth every 12 (twelve) hours.    ferrous sulfate 325 (65 FE) MG EC tablet Take 325 mg by mouth once daily.    fluticasone-umeclidin-vilanter (TRELEGY ELLIPTA) 100-62.5-25 mcg DsDv Inhale 1 puff into the lungs once daily.    leflunomide (ARAVA) 10 MG Tab Take 10 mg by mouth every other day.    memantine (NAMENDA) 10 MG Tab TAKE 1 TABLET BY MOUTH 2 (TWO) TIMES A DAY.    metFORMIN (GLUCOPHAGE) 500 MG tablet Take 1 tablet (500 mg total) by mouth 2 (two) times daily with meals.    omeprazole (PRILOSEC) 40 MG capsule Take 40 mg by mouth every morning.    rosuvastatin (CRESTOR) 40 MG Tab Take 40 mg by mouth every evening.    syringe with needle (SYRINGE 3CC/25GX1") 3 mL 25 gauge x 1" Syrg Once every months    [DISCONTINUED] hydroCHLOROthiazide (HYDRODIURIL) 12.5 MG Tab Take 12.5 mg by mouth daily as needed.      Family History       Problem Relation (Age of Onset)    Breast cancer Mother, Sister    Cancer Mother, Sister    Stroke Father, Maternal Grandmother          Tobacco Use    Smoking status: Former     Current packs/day: 0.00     Average packs/day: 2.0 packs/day for 43.0 years (86.0 ttl pk-yrs)     Types: Cigarettes     Start date: 1957     Quit date: 2000     Years since quittin.3    Smokeless tobacco: Never   Substance and Sexual Activity    Alcohol use: No    Drug use: No    Sexual activity: Not on file     Review of Systems   Unable to perform ROS: Dementia   Acuity of medical condition as well    Objective:     Vital Signs (Most Recent):  Temp: 97.6 °F (36.4 °C) (24 0730)  Pulse: 82 (24 0945)  Resp: 18 (24 0945)  BP: (!) 122/59 (24 0945)  SpO2: 99 % (24 0945) Vital Signs (24h Range):  Temp:  [97.6 °F (36.4 °C)-98.9 °F (37.2 °C)] 97.6 °F (36.4 °C)  Pulse:  [] 82  Resp:  [9-56] 18  SpO2:  [87 %-100 %] 99 " %  BP: ()/(53-82) 122/59     Weight: 72.6 kg (160 lb)  Body mass index is 29.26 kg/m².     Physical Exam  Vitals and nursing note reviewed.   Constitutional:       Appearance: She is well-developed.      Comments: Laying bed with BiPaP; intermittently agitated per nursing but calm on my assessment  Does not answer questions     HENT:      Head: Normocephalic and atraumatic.      Right Ear: External ear normal.      Left Ear: External ear normal.   Eyes:      General:         Right eye: No discharge.         Left eye: No discharge.      Pupils: Pupils are equal, round, and reactive to light.      Comments: Can not follow commands to assess EOM   Neck:      Thyroid: No thyromegaly.   Cardiovascular:      Rate and Rhythm: Normal rate and regular rhythm.      Heart sounds: No murmur heard.  Pulmonary:      Effort: Pulmonary effort is normal. No respiratory distress.      Breath sounds: Wheezing (soft wheeze in bases) present.   Abdominal:      General: Bowel sounds are normal. There is no distension.   Musculoskeletal:      Right lower leg: No edema.      Left lower leg: No edema.   Skin:     General: Skin is warm and dry.   Neurological:      Comments: Disoriented  Does not follow commands  Intermittently agitate   Psychiatric:      Comments: Can not assess              CRANIAL NERVES     CN III, IV, VI   Pupils are equal, round, and reactive to light.       Significant Labs: All pertinent labs within the past 24 hours have been reviewed.  ABGs:   Recent Labs   Lab 05/11/24 2155 05/11/24  2335 05/12/24  0900   PH 7.420 7.410 7.410   PCO2 81* 76* 88*   HCO3 52.50* 48.20* 55.80*   POCSATURATED 86.0* 98.8 96.2   BE 24.10* 20.30* 26.80*   TOTALHB 10.2* 10.0* 10.2*   COHB 2.5 3.7* 2.0   METHB 0.9 0.0 0.8   PO2 49* 82 74*     CBC:   Recent Labs   Lab 05/11/24 2147   WBC 4.08   HGB 9.7*   HCT 33.5*   *     CMP:   Recent Labs   Lab 05/11/24 2147      K 4.8      CO2 35*   *   BUN 29*  "  CREATININE 0.9   CALCIUM 8.8   PROT 5.9*   ALBUMIN 2.7*   BILITOT 0.2   ALKPHOS 89   AST 27   ALT 11   ANIONGAP 8     Cardiac Markers:   Recent Labs   Lab 05/11/24 2147   *     Lactic Acid: No results for input(s): "LACTATE" in the last 48 hours.  Lipid Panel: No results for input(s): "CHOL", "HDL", "LDLCALC", "TRIG", "CHOLHDL" in the last 48 hours.  Troponin:   Recent Labs   Lab 05/11/24 2147 05/12/24  0616   TROPONINI 0.031* 0.025     TSH: No results for input(s): "TSH" in the last 4320 hours.  Urine Studies:   Recent Labs   Lab 05/11/24 2158   COLORU Yellow   APPEARANCEUA Clear   PHUR 5.5   SPECGRAV 1.025   PROTEINUA 1+*   GLUCUA Negative   KETONESU Trace*   BILIRUBINUA Negative   OCCULTUA Negative   NITRITE Negative   UROBILINOGEN Negative   LEUKOCYTESUR Negative   RBCUA 1   WBCUA 2   BACTERIA Occasional   HYALINECASTS 1       Significant Imaging: I have reviewed all pertinent imaging results/findings within the past 24 hours.  Assessment/Plan:     * Acute on chronic respiratory failure with hypoxia and hypercapnia  Patient with Hypercapnic and Hypoxic Respiratory failure which is Acute on chronic.  she is on home oxygen at 2-3 LPM. Supplemental oxygen was provided and noted- Oxygen Concentration (%):  [40-50] 50    .   Signs/symptoms of respiratory failure include- increased work of breathing, wheezing, and lethargy. Contributing diagnoses includes - COPD Labs and images were reviewed. Patient Has recent ABG, which has been reviewed. Will treat underlying causes and adjust management of respiratory failure as follows-     Scheduled nebs  Steroids  CXR without consolidation--no antibx at this time  Lasix for possible superimposed CHF. GFR > 60  BiPaP  pCO2 trending up. She is protecting her airway currently but confused and lethargic. DNR in place per POA (see below).     Acute pulmonary edema  Noted on CXR  BNP slightly elevated; GFR > 60  Lasix last night in ED and this morning  Will start with " "daily dosing as not much peripheral edema on exam  TTE in AM  Daily labs   Initial trop mildly elevated but repeat normal  Cont telemetry      Advanced care planning/counseling discussion  Documentation of discussion by Dr. Linda Lira on 5/12/24:   "Mr. Agustin Ashton is POA. Spoke with him on this phone this morning. This is the patient's 3rd hospital admission since Feb 2024 for the same issues. She is becoming forgetful and not remembering family per Mr. Velez. She is refusing oxygen at NH. She is sometimes refusing medication. Mr. Velez does note her quality if life is becoming poor and is already aware her long term prognosis is not good.  She came from [place] with [employer] signed in 2022 stating she is a full code. However, since that time her dementia and medical issues are worsening. The decision was made for DNR today which I agree is very appropriate for this patient.  For now, we will continue care up to the point of intubation, CPR, defibrillation including BipaP. He understands if she does not respond to BiPaP she will get worse and possibly die. He is notifying family. We will re-evaluate tomorrow and if not improving with medical care may consider transitioning to hospice. "      Alzheimer's dementia with behavioral disturbance  Home dementia medications are Held or Continued: held.. Continue non-pharmacologic interventions to prevent delirium (No VS between 11PM-5AM, activity during day, opening blinds, providing glasses/hearing aids, and up in chair during daytime). Will avoid narcotics and benzos unless absolutely necessary. PRN anti-psychotics are prescribed to avoid self harm behaviors.    PO meds on hold while continuous BipaP and can not safely swallow  PRN Ativan IV for severe agitation. Try to avoid and use redirection, etc  Delirium precautions in place     HTN (hypertension)  Chronic, controlled. Latest blood pressure and vitals reviewed-     Temp:  [97.6 °F (36.4 °C)-98.9 °F (37.2 " °C)]   Pulse:  []   Resp:  [9-56]   BP: ()/(53-82)   SpO2:  [87 %-100 %] .   Home meds for hypertension were reviewed and noted below.   Hypertension Medications             While in the hospital, will manage blood pressure as follows; Adjust home antihypertensive regimen as follows- holding home meds. BP borderline line. Can not tolerate PO. Will add IV PRNs if rising.     Will utilize p.r.n. blood pressure medication only if patient's blood pressure greater than 180/110 and she develops symptoms such as worsening chest pain or shortness of breath.    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  Patient is currently on COPD Pathway. Continue scheduled inhalers Steroids and Supplemental oxygen and monitor respiratory status closely.     Scheduled nebs  Steroids  CXR without consolidation--no antibx at this time  Lasix for possible superimposed CHF. GFR > 60  BiPaP  pCO2 trending up. She is protecting her airway currently but confused and lethargic. DNR in place per POA (see below).     Hyperlipidemia  Statin ordered--right not can not tolerate PO. Ok to hold        VTE Risk Mitigation (From admission, onward)           Ordered     IP VTE HIGH RISK PATIENT  Once         05/12/24 0136     Place sequential compression device  Until discontinued         05/12/24 0136                  Critical care time spent on the evaluation and treatment of severe organ dysfunction, review of pertinent labs and imaging studies, discussions with consulting providers and discussions with patient/family: 45 minutes.                   Linda Lira MD  Department of Hospital Medicine  [place] [facility] - Intensive Care

## 2024-05-12 NOTE — ASSESSMENT & PLAN NOTE
"Documentation of discussion by Dr. Linda Lira on 5/12/24:   "Mr. Agustin Ashton is POA. Spoke with him on this phone this morning. This is the patient's 3rd hospital admission since Feb 2024 for the same issues. She is becoming forgetful and not remembering family per Mr. Velez. She is refusing oxygen at NH. She is sometimes refusing medication. Mr. Velez does note her quality if life is becoming poor and is already aware her long term prognosis is not good.  She came from [place] with [place] signed in 2022 stating she is a full code. However, since that time her dementia and medical issues are worsening. The decision was made for DNR today which I agree is very appropriate for this patient.  For now, we will continue care up to the point of intubation, CPR, defibrillation including BipaP. He understands if she does not respond to BiPaP she will get worse and possibly die. He is notifying family. We will re-evaluate tomorrow and if not improving with medical care may consider transitioning to hospice. "    "

## 2024-05-12 NOTE — PROVIDER PROGRESS NOTES - EMERGENCY DEPT.
Face to Face Restraint Note- [facility]         /71 Pulse 106   Temp 98.9 °F (37.2 °C) (Oral)   Resp (!) 28      Time: 12:15 AM    Type: Secure padded 2 point restraints    Patient's immediate situation: Patient was violent and unable to be redirected    Reaction to intervention: Patient continues to be agitated and angry, thrashing    Medications/behavioral condition: Patient will hurt himself given this condition    Restraint status: Continue restraints until patient acts appropriately       Bruce Malone M.D. 12:41 AM 5/12/2024

## 2024-05-12 NOTE — PLAN OF CARE
Vital signs were stable throughout the night.  Patient continues to wear restraints as she remains confused and pulling at medical equipment.  Will continue to monitor.

## 2024-05-12 NOTE — EICU
Brief Note     83 yr old female with dementia / COPD   Presented with acute on chronic hypoxic respiratory failure   COPD   CHF     On NIV   Lasix   Nebs / steroids   Trial off NIV in am

## 2024-05-12 NOTE — ASSESSMENT & PLAN NOTE
Noted on CXR  BNP slightly elevated; GFR > 60  Lasix last night in ED and this morning  Will start with daily dosing as not much peripheral edema on exam  TTE in AM  Daily labs   Initial trop mildly elevated but repeat normal  Cont telemetry

## 2024-05-12 NOTE — EICU
Intervention Initiated From:  [facility] / EICU    Dwight intervened regarding:  Rounding (Video assessment)    Nurse Notified:  Yes    Doctor Notified:  Yes    Comments: Rounding on new admit. Patiennt sleeping on 50% BiPAP. No IV fluids. B.P 145/79, HR 99, resp 15, sat 889. Side rails up x4. No needs voiced by nurses @ this time

## 2024-05-12 NOTE — ASSESSMENT & PLAN NOTE
Home arava not on formulary  If she improved and can tolerate PO and resume non-formulary if family bring in

## 2024-05-12 NOTE — HPI
"Patient brought to ED with hypoxia. She is a NH resident. History obtain from son and ED notes as patient has dementia and confused due to acute medical issues. Per notes, She "keeps pulling off her oxygen with the nursing home, staff places back. Patient had been off of her oxygen for some time with the nursing home tonight. Staff put the oxygen back with continued hypoxia & wheezing shortly afterwards. Ambulance was called, ambulance reports an 87% oxygenation on non-rebreather"  In ED was found to have hypoxia and hypercapenia. She was started on BiPaP and admitted to ICU.   Started on nebs and steroids.  CXR also noted some pulmonary edema. BNP mildly elevated with normal GFR. No recent TTE in our system. No CHF diagnosis on chart. Given Lasix overnight and this AM.  She remains confused. Agitated with turning, cleaning this morning.  pCO2 is not improving despite continuous BipaP overnight: 76-->88. pH normal however.   Mr. Agustin Ashton is POA. Spoke with him on this phone this morning. This is the patient's 3rd hospital admission since Feb 2024 for the same issues. She is becoming forgetful and not remembering family per Mr. Velez. She is refusing oxygen at NH. She is sometimes refusing medication. Mr. Velez does note her quality if life is becoming poor and is already aware her long term prognosis is not good.  She came from [place] with [employer] signed in 2022 stating she is a full code. However, since that time her dementia and medical issues are worsening. The decision was made for DNR today which I agree is very appropriate for this patient.  For now, we will continue care up to the point of intubation, CPR, defibrillation including BipaP. He understands if she does not respond to BiPaP she will get worse and possibly die. He is notifying family. We will re-evaluate tomorrow and if not improving with medical care may consider transitioning to hospice.   "

## 2024-05-12 NOTE — ASSESSMENT & PLAN NOTE
Home dementia medications are Held or Continued: held.. Continue non-pharmacologic interventions to prevent delirium (No VS between 11PM-5AM, activity during day, opening blinds, providing glasses/hearing aids, and up in chair during daytime). Will avoid narcotics and benzos unless absolutely necessary. PRN anti-psychotics are prescribed to avoid self harm behaviors.    PO meds on hold while continuous BipaP and can not safely swallow  PRN Ativan IV for severe agitation. Try to avoid and use redirection, etc  Delirium precautions in place

## 2024-05-12 NOTE — ASSESSMENT & PLAN NOTE
Patient with Hypercapnic and Hypoxic Respiratory failure which is Acute on chronic.  she is on home oxygen at 2-3 LPM. Supplemental oxygen was provided and noted- Oxygen Concentration (%):  [40-50] 50    .   Signs/symptoms of respiratory failure include- increased work of breathing, wheezing, and lethargy. Contributing diagnoses includes - COPD Labs and images were reviewed. Patient Has recent ABG, which has been reviewed. Will treat underlying causes and adjust management of respiratory failure as follows-     Scheduled nebs  Steroids  CXR without consolidation--no antibx at this time  Lasix for possible superimposed CHF. GFR > 60  BiPaP  pCO2 trending up. She is protecting her airway currently but confused and lethargic. DNR in place per POA (see below).

## 2024-05-12 NOTE — ASSESSMENT & PLAN NOTE
Chronic, controlled. Latest blood pressure and vitals reviewed-     Temp:  [97.6 °F (36.4 °C)-98.9 °F (37.2 °C)]   Pulse:  []   Resp:  [9-56]   BP: ()/(53-82)   SpO2:  [87 %-100 %] .   Home meds for hypertension were reviewed and noted below.   Hypertension Medications             While in the hospital, will manage blood pressure as follows; Adjust home antihypertensive regimen as follows- holding home meds. BP borderline line. Can not tolerate PO. Will add IV PRNs if rising.     Will utilize p.r.n. blood pressure medication only if patient's blood pressure greater than 180/110 and she develops symptoms such as worsening chest pain or shortness of breath.

## 2024-05-12 NOTE — PLAN OF CARE
Patient became agitated and was non-redirectable this shift PRN medication was administered with relief. Patient remains on continuous Bipap.    Problem: Fall Injury Risk  Goal: Absence of Fall and Fall-Related Injury  Outcome: Progressing     Problem: Restraint, Nonviolent  Goal: Absence of Harm or Injury  Outcome: Progressing     Problem: Adult Inpatient Plan of Care  Goal: Plan of Care Review  Outcome: Progressing     Problem: Diabetes Comorbidity  Goal: Blood Glucose Level Within Targeted Range  Outcome: Progressing     Problem: Pneumonia  Goal: Fluid Balance  Outcome: Progressing     Problem: Skin Injury Risk Increased  Goal: Skin Health and Integrity  Outcome: Progressing     Problem: Wound  Goal: Optimal Coping  Outcome: Progressing  Goal: Improved Oral Intake  Outcome: Progressing  Goal: Optimal Wound Healing  Outcome: Progressing

## 2024-05-12 NOTE — ASSESSMENT & PLAN NOTE
Patient's COPD is with exacerbation noted by continued dyspnea and worsening of baseline hypoxia currently.  Patient is currently on COPD Pathway. Continue scheduled inhalers Steroids and Supplemental oxygen and monitor respiratory status closely.     Scheduled nebs  Steroids  CXR without consolidation--no antibx at this time  Lasix for possible superimposed CHF. GFR > 60  BiPaP  pCO2 trending up. She is protecting her airway currently but confused and lethargic. DNR in place per POA (see below).

## 2024-05-13 VITALS
WEIGHT: 160 LBS | SYSTOLIC BLOOD PRESSURE: 119 MMHG | RESPIRATION RATE: 26 BRPM | BODY MASS INDEX: 29.44 KG/M2 | OXYGEN SATURATION: 93 % | HEIGHT: 62 IN | DIASTOLIC BLOOD PRESSURE: 57 MMHG | HEART RATE: 84 BPM | TEMPERATURE: 98 F

## 2024-05-13 LAB
ALLENS TEST: ABNORMAL
DELSYS: ABNORMAL
ESTIMATED AVG GLUCOSE: 111 MG/DL (ref 68–131)
FIO2: 40 (ref 21–100)
HBA1C MFR BLD: 5.5 % (ref 4–5.6)
HCO3 UR-SCNC: 67.8 MMOL/L (ref 22–26)
OHS QRS DURATION: 122 MS
OHS QRS DURATION: 130 MS
OHS QTC CALCULATION: 450 MS
OHS QTC CALCULATION: 471 MS
PCO2 BLDA: 91 MMHG (ref 35–45)
PH SMN: 7.48 [PH] (ref 7.35–7.45)
PO2 BLDA: 59 MMHG (ref 75–100)
POC BE: 38.1 MMOL/L (ref -2–2)
POC COHB: 1.8 % (ref 0–3)
POC METHB: 1.4 % (ref 0–1.5)
POC O2HB ARTERIAL: 89.8 % (ref 94–100)
POC SATURATED O2: 92.8 % (ref 90–100)
POC TCO2: 70.6 MMOL/L
POC THB: 10.7 G/DL (ref 12–18)
SITE: ABNORMAL

## 2024-05-13 PROCEDURE — 27000221 HC OXYGEN, UP TO 24 HOURS

## 2024-05-13 PROCEDURE — 82803 BLOOD GASES ANY COMBINATION: CPT | Performed by: INTERNAL MEDICINE

## 2024-05-13 PROCEDURE — 99900035 HC TECH TIME PER 15 MIN (STAT)

## 2024-05-13 PROCEDURE — 36600 WITHDRAWAL OF ARTERIAL BLOOD: CPT

## 2024-05-13 PROCEDURE — 94760 N-INVAS EAR/PLS OXIMETRY 1: CPT | Mod: XB

## 2024-05-13 PROCEDURE — 99238 HOSP IP/OBS DSCHRG MGMT 30/<: CPT | Mod: ,,, | Performed by: INTERNAL MEDICINE

## 2024-05-13 RX ADMIN — MICONAZOLE NITRATE: 20 POWDER TOPICAL at 09:05

## 2024-05-13 NOTE — ASSESSMENT & PLAN NOTE
Patient's COPD is with exacerbation noted by continued dyspnea and worsening of baseline hypoxia currently.  Patient is currently on COPD Pathway. Continue scheduled inhalers Steroids and Supplemental oxygen and monitor respiratory status closely.     Scheduled nebs  Steroids  CXR without consolidation--no antibx at this time  Lasix for possible superimposed CHF. GFR > 60  BiPaP  pCO2 trending up. She is protecting her airway currently but confused and lethargic. DNR in place per POA (see below).       5/13/24  Hospice/comfort care   She received IV ativan for agitation  Also on morphine

## 2024-05-13 NOTE — PROGRESS NOTES
"[facility] Medicine  Progress Note    Patient Name: Gretel Ashton  MRN: [id number]  Patient Class: IP- Inpatient   Admission Date: 5/11/2024  Length of Stay: 1 days  Attending Physician: Linda Lira MD  Primary Care Provider: Saida, Primary Doctor        Subjective:     Principal Problem:Acute on chronic respiratory failure with hypoxia and hypercapnia        HPI:  Patient brought to ED with hypoxia. She is a NH resident. History obtain from son and ED notes as patient has dementia and confused due to acute medical issues. Per notes, She "keeps pulling off her oxygen with the nursing home, staff places back. Patient had been off of her oxygen for some time with the nursing home tonight. Staff put the oxygen back with continued hypoxia & wheezing shortly afterwards. Ambulance was called, ambulance reports an 87% oxygenation on non-rebreather"  In ED was found to have hypoxia and hypercapenia. She was started on BiPaP and admitted to ICU.   Started on nebs and steroids.  CXR also noted some pulmonary edema. BNP mildly elevated with normal GFR. No recent TTE in our system. No CHF diagnosis on chart. Given Lasix overnight and this AM.  She remains confused. Agitated with turning, cleaning this morning.  pCO2 is not improving despite continuous BipaP overnight: 76-->88. pH normal however.   Mr. Agustin Ashton is POA. Spoke with him on this phone this morning. This is the patient's 3rd hospital admission since Feb 2024 for the same issues. She is becoming forgetful and not remembering family per Mr. Velez. She is refusing oxygen at NH. She is sometimes refusing medication. Mr. Velez does note her quality if life is becoming poor and is already aware her long term prognosis is not good.  She came from [place] with [employer] signed in 2022 stating she is a full code. However, since that time her dementia and medical issues are worsening. The decision was made for DNR today which I agree is very " "appropriate for this patient.  For now, we will continue care up to the point of intubation, CPR, defibrillation including BipaP. He understands if she does not respond to BiPaP she will get worse and possibly die. He is notifying family. We will re-evaluate tomorrow and if not improving with medical care may consider transitioning to hospice.     Overview/Hospital Course:  5/13/24  Palliative care since last night   Off of bipap.  Squeezes hand ;opened eyes .  Using 5 L oxygen . Otherwise her vitals are stable .  Will try to get hospice at NH today          DR schmidt notes reviewed ;patient examined     Review of Systems   Respiratory:          Wearing oxygen 5 L   Neurological:         Opens eyes ; " I am ok "     Objective:     Vital Signs (Most Recent):  Temp: 97.6 °F (36.4 °C) (05/13/24 0730)  Pulse: 88 (05/13/24 0730)  Resp: (!) 25 (05/13/24 0730)  BP: (!) 98/55 (05/13/24 0730)  SpO2: 100 % (05/13/24 0730) Vital Signs (24h Range):  Temp:  [96.2 °F (35.7 °C)-97.8 °F (36.6 °C)] 97.6 °F (36.4 °C)  Pulse:  [] 88  Resp:  [13-57] 25  SpO2:  [92 %-100 %] 100 %  BP: ()/(53-75) 98/55     Weight: 72.6 kg (160 lb)  Body mass index is 29.26 kg/m².    Intake/Output Summary (Last 24 hours) at 5/13/2024 0747  Last data filed at 5/13/2024 0622  Gross per 24 hour   Intake 220 ml   Output 2550 ml   Net -2330 ml         Physical Exam  Vitals and nursing note reviewed.   Constitutional:       Appearance: She is well-developed.   HENT:      Head: Normocephalic and atraumatic.      Right Ear: External ear normal.      Left Ear: External ear normal.   Eyes:      Conjunctiva/sclera: Conjunctivae normal.      Pupils: Pupils are equal, round, and reactive to light.   Neck:      Thyroid: No thyromegaly.      Vascular: No JVD.      Trachea: No tracheal deviation.   Cardiovascular:      Rate and Rhythm: Normal rate and regular rhythm.      Heart sounds: Normal heart sounds.   Pulmonary:      Effort: Pulmonary effort is normal. " No respiratory distress.      Breath sounds: Normal breath sounds. No wheezing or rales.   Chest:      Chest wall: No tenderness.   Abdominal:      General: Bowel sounds are normal. There is no distension.      Palpations: Abdomen is soft. There is no mass.      Tenderness: There is no abdominal tenderness. There is no guarding or rebound.   Musculoskeletal:         General: Normal range of motion.      Cervical back: Normal range of motion and neck supple.   Lymphadenopathy:      Cervical: No cervical adenopathy.   Skin:     General: Skin is warm and dry.   Neurological:      Comments: Opens eyes to verbal command   Sleepy              Significant Labs: All pertinent labs within the past 24 hours have been reviewed.  CBC:   Recent Labs   Lab 05/11/24 2147   WBC 4.08   HGB 9.7*   HCT 33.5*   *     CMP:   Recent Labs   Lab 05/11/24 2147      K 4.8      CO2 35*   *   BUN 29*   CREATININE 0.9   CALCIUM 8.8   PROT 5.9*   ALBUMIN 2.7*   BILITOT 0.2   ALKPHOS 89   AST 27   ALT 11   ANIONGAP 8       7.470 High  7.410 7.410 7.420 7.520 High  7.470 High  7.390     POC PCO2 35 - 45 mmHg 81 High Panic  88 High Panic  76 High Panic  81 High Panic  47 High  56 High  56 High    POC PO2 75 - 100 mmHg 70 Low  74 Low  82 49 Low Panic  81 64 Low  65 Low    POC THb 12 - 18 g/dL 10.3 Low  10.2 Low  10.0 Low  10.2 Low  9.2 Low  11.3 Low  11.6 Low    POC O2Hb Arterial 94 - 100 % 93.3 Low  93.5 Low  95.1 83.1 Low  94.9 91.3 Low  91.9 Low    POC COHb 0 - 3.0 % 1.8 2.0 3.7 High  2.5 3.2 High  1.6 1.5   POC MetHb 0 - 1.5 % 0.8 0.8 0.0 0.9 1.4 1.1 0.7   POC SATURATED O2 90 - 100 % 95.8 96.2 98.8 86.0 Low  99.5 93.8 94.1   POC TCO2 mmol/L 61.5 High  58.5 High  50.5 55.0 39.8 High  42.5 High  35.6 High    POC BE -2.00 - 2.00 mmol/L 30.60 High  26.80 High  20.30 High  24.10 High  14.00 High  14.90 High  7.40 High    POC HCO3 22.00 - 26.00 mmol/L 59.00 High  55.80 High  48.20 High  52.50 High  38.40 High  40.80 High   "33.90 High    Site  Right Brachial Right Brachial Right Radial Right Radial Right Radial Right Brachial Right Radial   DelSys  CPAP/BiPAP CPAP/B          Significant Imaging: I have reviewed all pertinent imaging results/findings within the past 24 hours.  CXR: I have reviewed all pertinent results/findings within the past 24 hours and my personal findings are:  Left chest wall cardiac pacemaker.  Enlarged cardiac silhouette with pulmonary vascular congestion.  Mild interstitial edema.  Trace bilateral pleural effusions.    Assessment/Plan:      * Acute on chronic respiratory failure with hypoxia and hypercapnia  Patient with Hypercapnic and Hypoxic Respiratory failure which is Acute on chronic.  she is on home oxygen at 2-3 LPM. Supplemental oxygen was provided and noted- Oxygen Concentration (%):  [50] 50    .   Signs/symptoms of respiratory failure include- increased work of breathing, wheezing, and lethargy. Contributing diagnoses includes - COPD Labs and images were reviewed. Patient Has recent ABG, which has been reviewed. Will treat underlying causes and adjust management of respiratory failure as follows-     Scheduled nebs  Steroids  CXR without consolidation--no antibx at this time  Lasix for possible superimposed CHF. GFR > 60  BiPaP  pCO2 trending up. She is protecting her airway currently but confused and lethargic. DNR in place per POA (see below).     5/13/24  DR ZHONG discussed with her power of  yesterday ;   DNR ; now comfort care   Will plan for NH discharge on hospice   I will complete Lapost       Acute pulmonary edema  Noted on CXR  BNP slightly elevated; GFR > 60  Lasix last night in ED and this morning  Will start with daily dosing as not much peripheral edema on exam  TTE in AM  Daily labs   Initial trop mildly elevated but repeat normal  Cont telemetry      Advanced care planning/counseling discussion  Documentation of discussion by Dr. Linda Zhong on 5/12/24:   "Mr. Velez " "Poli is POA. Spoke with him on this phone this morning. This is the patient's 3rd hospital admission since Feb 2024 for the same issues. She is becoming forgetful and not remembering family per Mr. Velez. She is refusing oxygen at NH. She is sometimes refusing medication. Mr. Velez does note her quality if life is becoming poor and is already aware her long term prognosis is not good.  She came from [place] with [employer] signed in 2022 stating she is a full code. However, since that time her dementia and medical issues are worsening. The decision was made for DNR today which I agree is very appropriate for this patient.  For now, we will continue care up to the point of intubation, CPR, defibrillation including BipaP. He understands if she does not respond to BiPaP she will get worse and possibly die. He is notifying family. We will re-evaluate tomorrow and if not improving with medical care may consider transitioning to hospice. "        5/13/24  Lapost completed   Will discharge to NH with hospice       Alzheimer's dementia with behavioral disturbance  Home dementia medications are Held or Continued: held.. Continue non-pharmacologic interventions to prevent delirium (No VS between 11PM-5AM, activity during day, opening blinds, providing glasses/hearing aids, and up in chair during daytime). Will avoid narcotics and benzos unless absolutely necessary. PRN anti-psychotics are prescribed to avoid self harm behaviors.    PO meds on hold while continuous BipaP and can not safely swallow  PRN Ativan IV for severe agitation. Try to avoid and use redirection, etc  Delirium precautions in place     5/13/24  Hospice/comfort care     HTN (hypertension)  Chronic, controlled. Latest blood pressure and vitals reviewed-     Temp:  [96.2 °F (35.7 °C)-97.8 °F (36.6 °C)]   Pulse:  []   Resp:  [13-57]   BP: ()/(53-75)   SpO2:  [92 %-100 %] .   Home meds for hypertension were reviewed and noted below. "   Hypertension Medications             While in the hospital, will manage blood pressure as follows; Adjust home antihypertensive regimen as follows- holding home meds. BP borderline line. Can not tolerate PO. Will add IV PRNs if rising.     Will utilize p.r.n. blood pressure medication only if patient's blood pressure greater than 180/110 and she develops symptoms such as worsening chest pain or shortness of breath.    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  Patient is currently on COPD Pathway. Continue scheduled inhalers Steroids and Supplemental oxygen and monitor respiratory status closely.     Scheduled nebs  Steroids  CXR without consolidation--no antibx at this time  Lasix for possible superimposed CHF. GFR > 60  BiPaP  pCO2 trending up. She is protecting her airway currently but confused and lethargic. DNR in place per POA (see below).       5/13/24  Hospice/comfort care   She received IV ativan for agitation  Also on morphine      Hyperlipidemia  Statin ordered--right not can not tolerate PO. Ok to hold        VTE Risk Mitigation (From admission, onward)           Ordered     IP VTE HIGH RISK PATIENT  Once         05/12/24 0136     Place sequential compression device  Until discontinued         05/12/24 0136                    Discharge Planning   MARIKA:      Code Status: DNR   Is the patient medically ready for discharge?:     Reason for patient still in hospital (select all that apply): Pending disposition               Critical care time spent on the evaluation and treatment of severe organ dysfunction,  review of pertinent labs and imaging studies, discussions with consulting providers and discussions with patient/family: 30 minutes.      Jailene Astudillo MD  Department of Hospital Medicine   [facility] - Intensive Care

## 2024-05-13 NOTE — PLAN OF CARE
Care team discussed plan of care.  Discharge planning initiated.  Will continue to follow for duration of stay.

## 2024-05-13 NOTE — ASSESSMENT & PLAN NOTE
Patient with Hypercapnic and Hypoxic Respiratory failure which is Acute on chronic.  she is on home oxygen at 2-3 LPM. Supplemental oxygen was provided and noted- Oxygen Concentration (%):  [50] 50    .   Signs/symptoms of respiratory failure include- increased work of breathing, wheezing, and lethargy. Contributing diagnoses includes - COPD Labs and images were reviewed. Patient Has recent ABG, which has been reviewed. Will treat underlying causes and adjust management of respiratory failure as follows-     Scheduled nebs  Steroids  CXR without consolidation--no antibx at this time  Lasix for possible superimposed CHF. GFR > 60  BiPaP  pCO2 trending up. She is protecting her airway currently but confused and lethargic. DNR in place per POA (see below).     5/13/24  DR ZHONG discussed with her power of  yesterday ;   DNR ; now comfort care   Will plan for NH discharge on hospice   I will complete Lapost

## 2024-05-13 NOTE — ASSESSMENT & PLAN NOTE
Home dementia medications are Held or Continued: held.. Continue non-pharmacologic interventions to prevent delirium (No VS between 11PM-5AM, activity during day, opening blinds, providing glasses/hearing aids, and up in chair during daytime). Will avoid narcotics and benzos unless absolutely necessary. PRN anti-psychotics are prescribed to avoid self harm behaviors.    PO meds on hold while continuous BipaP and can not safely swallow  PRN Ativan IV for severe agitation. Try to avoid and use redirection, etc  Delirium precautions in place     5/13/24  Hospice/comfort care

## 2024-05-13 NOTE — ASSESSMENT & PLAN NOTE
Chronic, controlled. Latest blood pressure and vitals reviewed-     Temp:  [96.2 °F (35.7 °C)-97.8 °F (36.6 °C)]   Pulse:  []   Resp:  [13-57]   BP: ()/(53-75)   SpO2:  [92 %-100 %] .   Home meds for hypertension were reviewed and noted below.   Hypertension Medications             While in the hospital, will manage blood pressure as follows; Adjust home antihypertensive regimen as follows- holding home meds. BP borderline line. Can not tolerate PO. Will add IV PRNs if rising.     Will utilize p.r.n. blood pressure medication only if patient's blood pressure greater than 180/110 and she develops symptoms such as worsening chest pain or shortness of breath.

## 2024-05-13 NOTE — PLAN OF CARE
05/13/24 0915   Post-Acute Status   Post-Acute Authorization Hospice;Placement   Post-Acute Placement Status Referrals Sent   Hospice Status Pending education   Patient choice form signed by patient/caregiver List from [facility] Post-Acute Care   Discharge Delays (!) Patient/Caregiver Education Not Complete   Discharge Plan   Discharge Plan A Hospice/home   Discharge Plan B Hospice/home         CM attempted to contact Agustin torres, to discuss hospice care at [place]. Message left, awaiting call back.

## 2024-05-13 NOTE — PLAN OF CARE
05/13/24 1422   Post-Acute Status   Post-Acute Authorization Placement;Hospice   Post-Acute Placement Status Set-up Complete/Auth obtained   Hospice Status Set-up Complete/Auth obtained   Hospital Resources/Appts/Education Provided Appointments scheduled and added to AVS   Discharge Delays None known at this time   Discharge Plan   Discharge Plan A Hospice/home       Nurse aware to call report to Becky at [phone]. Patient will travel by [facility]

## 2024-05-13 NOTE — PLAN OF CARE
05/13/24 1007   Post-Acute Status   Post-Acute Authorization Hospice   Post-Acute Placement Status Referrals Sent   Hospice Status Pending education   Coverage Medicare   Patient choice form signed by patient/caregiver List from [facility]   Discharge Delays (!) Patient and Family Barriers   Discharge Plan   Discharge Plan A Hospice/home;Return to nursing home   Discharge Plan B Hospice/home;Return to Nursing Home         CM discussed hospice with son Agustin, who states he is POA. He is on the fence about hospice care. He states he would like to talk with his other 2 siblings about it, talk with the nursing home about how care would look for patient, and talk with hospice about care. He gave permission for me to send information to [facility], and he would like them to call him to discuss.     Care team, including MD, updated on this.     Disposition pending family decision.

## 2024-05-13 NOTE — PROGRESS NOTES
Update to plan of care:    Patient ABG not improving despite BiPaP use today.  Her agitation is progressing. Requiring physical restraints and PRN Ativan use. She is pulling off her BiPaP  Her son, Agustin, called to bedside. Discussed prognosis. He agrees she remain DNR and with her worsening agitation agrees to start comfort care at this time. Her likelihood of any meaningful recovery at this time is very low.     Plan:  Remove BiPaP. Start nasal canula as tolerated.  PRN Ativan and Morphine  PRN Haldol for agitation  PRN atropine for secretions    I suspect without BiPaP support she has hours to days to live.  Inpatient social work/hospice consult placed for tomorrow AM should she live through the night to assist with transitioning back to NH with hospice tomorrow.

## 2024-05-13 NOTE — ASSESSMENT & PLAN NOTE
"Documentation of discussion by Dr. Linda Lira on 5/12/24:   "Mr. Agustin Ashton is POA. Spoke with him on this phone this morning. This is the patient's 3rd hospital admission since Feb 2024 for the same issues. She is becoming forgetful and not remembering family per Mr. Velez. She is refusing oxygen at NH. She is sometimes refusing medication. Mr. Velez does note her quality if life is becoming poor and is already aware her long term prognosis is not good.  [place] signed in 2022 stating she is a full code. However, since that time her dementia and medical issues are worsening. The decision was made for DNR today which I agree is very appropriate for this patient.  For now, we will continue care up to the point of intubation, CPR, defibrillation including BipaP. He understands if she does not respond to BiPaP she will get worse and possibly die. He is notifying family. We will re-evaluate tomorrow and if not improving with medical care may consider transitioning to hospice. "        5/13/24  Cassidy completed   Will discharge to NH with hospice     "

## 2024-05-13 NOTE — PLAN OF CARE
The patient rested comfortably throughout the night with family at bedside.  Vitals within normal limits.  Family updated on plan of care.  Palliative care continued.

## 2024-05-13 NOTE — HOSPITAL COURSE
5/13/24  Palliative care since last night   Off of bipap.  Squeezes hand ;opened eyes .  Using 5 L oxygen . Otherwise her vitals are stable .  Will try to get hospice at NH today      Addendum ;  Talked to family   Updated about plan   Ready to go to NH

## 2024-05-13 NOTE — PLAN OF CARE
[facility] - Intensive Care  Discharge Final Note    Primary Care Provider: Jailene Astudillo MD    Expected Discharge Date: 5/13/2024    Final Discharge Note (most recent)       Final Note - 05/13/24 1636          Final Note    Assessment Type Final Discharge Note (P)      Anticipated Discharge Disposition Hospice/Home (P)         Post-Acute Status    Post-Acute Authorization Placement;Hospice (P)      Post-Acute Placement Status Set-up Complete/Auth obtained (P)      Hospice Status Set-up Complete/Auth obtained (P)      Discharge Delays None known at this time (P)                      Important Message from Medicare  Important Message from Medicare regarding Discharge Appeal Rights: Other (comments) (contacted brian Hughes , left message with call backinfo)     Date IMM was signed: 05/13/24  Time IMM was signed: 0932

## 2024-05-13 NOTE — PLAN OF CARE
05/13/24 1247   Post-Acute Status   Post-Acute Authorization Hospice;Placement   Post-Acute Placement Status Set-up Complete/Auth obtained   Hospice Status Set-up Complete/Auth obtained   Coverage Medicare   Discharge Delays None known at this time   Discharge Plan   Discharge Plan A Hospice/home         Son, Agustin, agreeable to hospice at [facility]. Notification sent to [facility] to notify. Awaiting response.  [facility] on board.     Awaiting response from nursing home to see who nurse can call report to.

## 2024-05-13 NOTE — PLAN OF CARE
[facility] - Intensive Care  Initial Discharge Assessment       Primary Care Provider: No, Primary Doctor    Admission Diagnosis: Shortness of breath [R06.02]  Hypoxia [R09.02]  COPD with acute exacerbation [J44.1]  Congestive heart failure, unspecified HF chronicity, unspecified heart failure type [I50.9]    Admission Date: 5/11/2024  Expected Discharge Date: 5/13/2024    Transition of Care Barriers: (P) None    Payor: MEDICARE / Plan: MEDICARE PART A & B / Product Type: Government /     Extended Emergency Contact Information  Primary Emergency Contact: Agustin Ashton   [facility]  Home Phone: [phone]  Relation: Son  Secondary Emergency Contact: Mari Ashton  Mobile Phone: [phone]  Relation: None   needed? No    Discharge Plan A: (P) Hospice/home  Discharge Plan B: (P) Hospice/home      [facility] - [address] [address]  Phone: [phone] Fax: [phone]      Initial Assessment (most recent)       Adult Discharge Assessment - 05/13/24 0918          Discharge Assessment    Assessment Type Discharge Planning Assessment (P)      Confirmed/corrected address, phone number and insurance No (P)      Confirmed Demographics Contacted registration to update (P)      Source of Information family (P)      People in Home facility resident (P)      Facility Arrived From: [place] (P)      Do you expect to return to your current living situation? Yes (P)      Do you have help at home or someone to help you manage your care at home? Yes (P)      Who are your caregiver(s) and their phone number(s)? Facility staff (P)      Prior to hospitilization cognitive status: Not Oriented to Time;Not Oriented to Place (P)      Current cognitive status: Not Oriented to Time;Not Oriented to Place;Not Oriented to Person;Unable to Assess (P)      Walking or Climbing Stairs Difficulty yes (P)      Walking or Climbing Stairs ambulation difficulty,  dependent;transferring difficulty, dependent (P)      Dressing/Bathing Difficulty yes (P)      Dressing/Bathing bathing difficulty, dependent;dressing difficulty, dependent (P)      Home Accessibility wheelchair accessible (P)      Home Layout Able to live on 1st floor (P)      Equipment Currently Used at Home hospital bed (P)      Readmission within 30 days? No (P)      Patient currently being followed by outpatient case management? No (P)      Do you currently have service(s) that help you manage your care at home? No (P)      Do you take prescription medications? Yes (P)      Do you have prescription coverage? Yes (P)      Coverage Medicare (P)      Do you have any problems affording any of your prescribed medications? No (P)      Is the patient taking medications as prescribed? yes (P)      Who is going to help you get home at discharge? facility staff (P)      How do you get to doctors appointments? agency (P)      Are you on dialysis? No (P)      Discharge Plan A Hospice/home (P)      Discharge Plan B Hospice/home (P)      DME Needed Upon Discharge  other (see comments) (P)    TBD    Discharge Plan discussed with: Caregiver (P)      Name(s) and Number(s) Mari, daughter in law [phone] (P)      Transition of Care Barriers None (P)         Physical Activity    On average, how many days per week do you engage in moderate to strenuous exercise (like a brisk walk)? 0 days (P)      On average, how many minutes do you engage in exercise at this level? 0 min (P)         Financial Resource Strain    How hard is it for you to pay for the very basics like food, housing, medical care, and heating? Not hard at all (P)         Housing Stability    In the last 12 months, was there a time when you were not able to pay the mortgage or rent on time? No (P)      At any time in the past 12 months, were you homeless or living in a shelter (including now)? No (P)         Transportation Needs    Has the lack of transportation  kept you from medical appointments, meetings, work or from getting things needed for daily living? No (P)         Food Insecurity    Within the past 12 months, you worried that your food would run out before you got the money to buy more. Never true (P)      Within the past 12 months, the food you bought just didn't last and you didn't have money to get more. Never true (P)         Stress    Do you feel stress - tense, restless, nervous, or anxious, or unable to sleep at night because your mind is troubled all the time - these days? Patient unable to answer (P)         Social Isolation    How often do you feel lonely or isolated from those around you?  Patient unable to answer (P)         Alcohol Use    Q1: How often do you have a drink containing alcohol? Never (P)      Q2: How many drinks containing alcohol do you have on a typical day when you are drinking? Patient does not drink (P)      Q3: How often do you have six or more drinks on one occasion? Never (P)         Utilities    In the past 12 months has the electric, gas, oil, or water company threatened to shut off services in your home? No (P)         Health Literacy    How often do you need to have someone help you when you read instructions, pamphlets, or other written material from your doctor or pharmacy? Patient unable to respond (P)         OTHER    Name(s) of People in Home Facility resident (P)                         CM spoke briefly with laughter in law Mari via phone. She confirms patient is a facility resident at [facility], and they are interested in hospice care at [facility]. Awaiting son, Agustin, to return phone call to further discuss.

## 2024-05-13 NOTE — DISCHARGE SUMMARY
"[facility]  Discharge Summary      Patient Name: Gretel Ashton  MRN: [id number]  LAW: [id number]  Patient Class: IP- Inpatient  Admission Date: 5/11/2024  Hospital Length of Stay: 1 days  Discharge Date and Time:  05/13/2024 8:03 AM  Attending Physician: Linda Lira MD   Discharging Provider: Jailene Astudillo MD  Primary Care Provider: Saida, Primary Doctor    Primary Care Team: Networked reference to record PCT     HPI:   Patient brought to ED with hypoxia. She is a NH resident. History obtain from son and ED notes as patient has dementia and confused due to acute medical issues. Per notes, She "keeps pulling off her oxygen with the nursing home, staff places back. Patient had been off of her oxygen for some time with the nursing home tonight. Staff put the oxygen back with continued hypoxia & wheezing shortly afterwards. Ambulance was called, ambulance reports an 87% oxygenation on non-rebreather"  In ED was found to have hypoxia and hypercapenia. She was started on BiPaP and admitted to ICU.   Started on nebs and steroids.  CXR also noted some pulmonary edema. BNP mildly elevated with normal GFR. No recent TTE in our system. No CHF diagnosis on chart. Given Lasix overnight and this AM.  She remains confused. Agitated with turning, cleaning this morning.  pCO2 is not improving despite continuous BipaP overnight: 76-->88. pH normal however.   Mr. Agustin Ashton is POA. Spoke with him on this phone this morning. This is the patient's 3rd hospital admission since Feb 2024 for the same issues. She is becoming forgetful and not remembering family per Mr. Velez. She is refusing oxygen at NH. She is sometimes refusing medication. Mr. Velez does note her quality if life is becoming poor and is already aware her long term prognosis is not good.  She came from [place] with LaPost signed in 2022 stating she is a full code. However, since that time her dementia and medical issues are " worsening. The decision was made for DNR today which I agree is very appropriate for this patient.  For now, we will continue care up to the point of intubation, CPR, defibrillation including BipaP. He understands if she does not respond to BiPaP she will get worse and possibly die. He is notifying family. We will re-evaluate tomorrow and if not improving with medical care may consider transitioning to hospice.     * No surgery found *      Hospital Course:   5/13/24  Palliative care since last night   Off of bipap.  Squeezes hand ;opened eyes .  Using 5 L oxygen . Otherwise her vitals are stable .  Will try to get hospice at NH today           Goals of Care Treatment Preferences:  Code Status: DNR    Living Will: Yes  LaPOST: Yes           Consults:   Consults (From admission, onward)          Status Ordering Provider     Inpatient consult to Social Work  Once        Provider:  (Not yet assigned)    PATRICK Smith            No new Assessment & Plan notes have been filed under this hospital service since the last note was generated.  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 [M06.041, M06.042] 12/18/2019 Yes    Type 2 diabetes mellitus with diabetic neuropathy, without long-term current use of insulin [E11.40] 12/04/2013 Yes    COPD exacerbation [J44.1] 01/02/2013 Yes    Hyperlipidemia [E78.5]  Yes      Problems Resolved During this Admission:  "      Discharged Condition: poor    Disposition: NH on hospice     Follow Up:    Patient Instructions:      OXYGEN FOR HOME USE     Order Specific Question Answer Comments   Liter Flow 5    Duration Continuous    Qualifying Test Performed at: [facility]    Oxygen saturation: 83    Portable mode: continuous    Route nasal cannula    Device: home concentrator with portable concentrator    Length of need (in months): 99 mos    Patient condition with qualifying saturation COPD    Height: 5' 2" (1.575 m)    Weight: 72.6 kg (160 lb)    Alternative treatment measures have been tried or considered and deemed clinically ineffective. Yes        Pending Diagnostic Studies:       None           Medications:  Reconciled Home Medications:      Medication List        STOP taking these medications      acetaminophen 325 MG tablet  Commonly known as: TYLENOL     albuterol-ipratropium 2.5 mg-0.5 mg/3 mL nebulizer solution  Commonly known as: DUO-NEB     aspirin 81 MG EC tablet  Commonly known as: ECOTRIN     bempedoic acid 180 mg Tab     coenzyme Q10 100 mg capsule     cyanocobalamin (vitamin B-12) 1,000 mcg Subl     divalproex 250 MG EC tablet  Commonly known as: DEPAKOTE     ferrous sulfate 325 (65 FE) MG EC tablet     fluticasone-umeclidin-vilanter 100-62.5-25 mcg Dsdv  Commonly known as: TRELEGY ELLIPTA     leflunomide 10 MG Tab  Commonly known as: ARAVA     memantine 10 MG Tab  Commonly known as: NAMENDA     metFORMIN 500 MG tablet  Commonly known as: GLUCOPHAGE     omeprazole 40 MG capsule  Commonly known as: PRILOSEC     rosuvastatin 40 MG Tab  Commonly known as: CRESTOR     SYRINGE 3CC/25GX1" 3 mL 25 gauge x 1" Syrg  Generic drug: syringe with needle              Indwelling Lines/Drains at time of discharge:   Lines/Drains/Airways       Drain  Duration             Female External Urinary Catheter w/ Suction 05/12/24 0730 1 day                  IV MORPHINE AND IV ATIVAN PER HOSPICE PROTOCOL     Time spent on the discharge of " patient: 35 minutes    Critical care time spent on the evaluation and treatment of severe organ dysfunction, review of pertinent labs and imaging studies, discussions with consulting providers and discussions with patient/family: 30 minutes.     Jailene Astudillo MD  Department of Hospital Medicine  [place] [facility] - Intensive Care

## 2024-05-13 NOTE — PROGRESS NOTES
"[facility] Medicine  Progress Note    Patient Name: Gretel Ashton  MRN: [id number]  Patient Class: IP- Inpatient   Admission Date: 5/11/2024  Length of Stay: 1 days  Attending Physician: Linda Lira MD  Primary Care Provider: Saida, Primary Doctor        Subjective:     Principal Problem:Acute on chronic respiratory failure with hypoxia and hypercapnia        HPI:  Patient brought to ED with hypoxia. She is a NH resident. History obtain from son and ED notes as patient has dementia and confused due to acute medical issues. Per notes, She "keeps pulling off her oxygen with the nursing home, staff places back. Patient had been off of her oxygen for some time with the nursing home tonight. Staff put the oxygen back with continued hypoxia & wheezing shortly afterwards. Ambulance was called, ambulance reports an 87% oxygenation on non-rebreather"  In ED was found to have hypoxia and hypercapenia. She was started on BiPaP and admitted to ICU.   Started on nebs and steroids.  CXR also noted some pulmonary edema. BNP mildly elevated with normal GFR. No recent TTE in our system. No CHF diagnosis on chart. Given Lasix overnight and this AM.  She remains confused. Agitated with turning, cleaning this morning.  pCO2 is not improving despite continuous BipaP overnight: 76-->88. pH normal however.   Mr. Agustin Ashton is POA. Spoke with him on this phone this morning. This is the patient's 3rd hospital admission since Feb 2024 for the same issues. She is becoming forgetful and not remembering family per Mr. Velez. She is refusing oxygen at NH. She is sometimes refusing medication. Mr. Velez does note her quality if life is becoming poor and is already aware her long term prognosis is not good.  She came from [place] with LaPost signed in 2022 stating she is a full code. However, since that time her dementia and medical issues are worsening. The decision was made for DNR today which I agree is very " appropriate for this patient.  For now, we will continue care up to the point of intubation, CPR, defibrillation including BipaP. He understands if she does not respond to BiPaP she will get worse and possibly die. He is notifying family. We will re-evaluate tomorrow and if not improving with medical care may consider transitioning to hospice.     Overview/Hospital Course:  5/13/24  Palliative care since last night   Off of bipap.  Squeezes hand ;opened eyes .  Using 5 L oxygen . Otherwise her vitals are stable .  Will try to get hospice at NH today      Addendum ;  Talked to family   Updated about plan   Ready to go to NH     No new subjective & objective note has been filed under this hospital service since the last note was generated.      Assessment/Plan:      * Acute on chronic respiratory failure with hypoxia and hypercapnia  Patient with Hypercapnic and Hypoxic Respiratory failure which is Acute on chronic.  she is on home oxygen at 2-3 LPM. Supplemental oxygen was provided and noted- Oxygen Concentration (%):  [50] 50    .   Signs/symptoms of respiratory failure include- increased work of breathing, wheezing, and lethargy. Contributing diagnoses includes - COPD Labs and images were reviewed. Patient Has recent ABG, which has been reviewed. Will treat underlying causes and adjust management of respiratory failure as follows-     Scheduled nebs  Steroids  CXR without consolidation--no antibx at this time  Lasix for possible superimposed CHF. GFR > 60  BiPaP  pCO2 trending up. She is protecting her airway currently but confused and lethargic. DNR in place per POA (see below).     5/13/24  DR ZHONG discussed with her power of  yesterday ;   DNR ; now comfort care   Will plan for NH discharge on hospice   I will complete Lapost       Acute pulmonary edema  Noted on CXR  BNP slightly elevated; GFR > 60  Lasix last night in ED and this morning  Will start with daily dosing as not much peripheral edema on  "exam  TTE in AM  Daily labs   Initial trop mildly elevated but repeat normal  Cont telemetry      Advanced care planning/counseling discussion  Documentation of discussion by Dr. Linda Lira on 5/12/24:   "Mr. Agustin Ashton is POA. Spoke with him on this phone this morning. This is the patient's 3rd hospital admission since Feb 2024 for the same issues. She is becoming forgetful and not remembering family per Mr. Velez. She is refusing oxygen at NH. She is sometimes refusing medication. Mr. Velez does note her quality if life is becoming poor and is already aware her long term prognosis is not good.  She came from [place] with LaPost signed in 2022 stating she is a full code. However, since that time her dementia and medical issues are worsening. The decision was made for DNR today which I agree is very appropriate for this patient.  For now, we will continue care up to the point of intubation, CPR, defibrillation including BipaP. He understands if she does not respond to BiPaP she will get worse and possibly die. He is notifying family. We will re-evaluate tomorrow and if not improving with medical care may consider transitioning to hospice. "        5/13/24  Lapost completed   Will discharge to NH with hospice       Alzheimer's dementia with behavioral disturbance  Home dementia medications are Held or Continued: held.. Continue non-pharmacologic interventions to prevent delirium (No VS between 11PM-5AM, activity during day, opening blinds, providing glasses/hearing aids, and up in chair during daytime). Will avoid narcotics and benzos unless absolutely necessary. PRN anti-psychotics are prescribed to avoid self harm behaviors.    PO meds on hold while continuous BipaP and can not safely swallow  PRN Ativan IV for severe agitation. Try to avoid and use redirection, etc  Delirium precautions in place     5/13/24  Hospice/comfort care     HTN (hypertension)  Chronic, controlled. Latest blood pressure and " vitals reviewed-     Temp:  [96.2 °F (35.7 °C)-97.8 °F (36.6 °C)]   Pulse:  []   Resp:  [13-57]   BP: ()/(53-75)   SpO2:  [92 %-100 %] .   Home meds for hypertension were reviewed and noted below.   Hypertension Medications             While in the hospital, will manage blood pressure as follows; Adjust home antihypertensive regimen as follows- holding home meds. BP borderline line. Can not tolerate PO. Will add IV PRNs if rising.     Will utilize p.r.n. blood pressure medication only if patient's blood pressure greater than 180/110 and she develops symptoms such as worsening chest pain or shortness of breath.    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  Patient is currently on COPD Pathway. Continue scheduled inhalers Steroids and Supplemental oxygen and monitor respiratory status closely.     Scheduled nebs  Steroids  CXR without consolidation--no antibx at this time  Lasix for possible superimposed CHF. GFR > 60  BiPaP  pCO2 trending up. She is protecting her airway currently but confused and lethargic. DNR in place per POA (see below).       5/13/24  Hospice/comfort care   She received IV ativan for agitation  Also on morphine      Hyperlipidemia  Statin ordered--right not can not tolerate PO. Ok to hold        VTE Risk Mitigation (From admission, onward)           Ordered     IP VTE HIGH RISK PATIENT  Once         05/12/24 0136     Place sequential compression device  Until discontinued         05/12/24 0136                    Discharge Planning   MARIKA: 5/13/2024     Code Status: DNR   Is the patient  medically ready for discharge?:     Reason for patient still in hospital (select all that apply): Pending disposition  Discharge Plan A: Hospice/home   Discharge Delays: None known at this time        Critical care time spent on the evaluation and treatment of severe organ dysfunction, review of pertinent labs and imaging studies, discussions with consulting providers and discussions with patient/family: 10 minutes.      Jailene Astudillo MD  Department of Hospital Medicine   [place] - Intensive [facility]

## 2024-05-13 NOTE — PLAN OF CARE
05/13/24 1502   Discharge Assessment   Assessment Type Discharge Planning Assessment   Confirmed/corrected address, phone number and insurance Yes   Confirmed Demographics Correct on Facesheet   Source of Information family   Reason For Admission SOB   People in Home facility resident   Facility Arrived From: [facility]   Do you expect to return to your current living situation? Yes   Current cognitive status: Not Oriented to Time;Not Oriented to Place   Walking or Climbing Stairs Difficulty yes   Mobility Management staff manages mobility   Dressing/Bathing Difficulty yes   Dressing/Bathing bathing difficulty, dependent;dressing difficulty, dependent   Dressing/Bathing Management staff manages   Home Accessibility wheelchair accessible   Home Layout Able to live on 1st floor   Equipment Currently Used at Home hospital bed   Do you take prescription medications? Yes   Do you have prescription coverage? Yes   Coverage medicare   Do you have any problems affording any of your prescribed medications? No   Is the patient taking medications as prescribed? yes   How do you get to doctors appointments? other (see comments)   Are you on dialysis? No   Discharge Plan A Return to nursing home   DME Needed Upon Discharge  none   Discharge Plan discussed with: Adult children   Transition of Care Barriers None     [place] - Intensive Care  Discharge Assessment    Primary Care Provider: Jailene Astudillo MD

## 2024-05-13 NOTE — SUBJECTIVE & OBJECTIVE
"DR schmidt notes reviewed ;patient examined     Review of Systems   Respiratory:          Wearing oxygen 5 L   Neurological:         Opens eyes ; " I am ok "     Objective:     Vital Signs (Most Recent):  Temp: 97.6 °F (36.4 °C) (05/13/24 0730)  Pulse: 88 (05/13/24 0730)  Resp: (!) 25 (05/13/24 0730)  BP: (!) 98/55 (05/13/24 0730)  SpO2: 100 % (05/13/24 0730) Vital Signs (24h Range):  Temp:  [96.2 °F (35.7 °C)-97.8 °F (36.6 °C)] 97.6 °F (36.4 °C)  Pulse:  [] 88  Resp:  [13-57] 25  SpO2:  [92 %-100 %] 100 %  BP: ()/(53-75) 98/55     Weight: 72.6 kg (160 lb)  Body mass index is 29.26 kg/m².    Intake/Output Summary (Last 24 hours) at 5/13/2024 0747  Last data filed at 5/13/2024 0622  Gross per 24 hour   Intake 220 ml   Output 2550 ml   Net -2330 ml         Physical Exam  Vitals and nursing note reviewed.   Constitutional:       Appearance: She is well-developed.   HENT:      Head: Normocephalic and atraumatic.      Right Ear: External ear normal.      Left Ear: External ear normal.   Eyes:      Conjunctiva/sclera: Conjunctivae normal.      Pupils: Pupils are equal, round, and reactive to light.   Neck:      Thyroid: No thyromegaly.      Vascular: No JVD.      Trachea: No tracheal deviation.   Cardiovascular:      Rate and Rhythm: Normal rate and regular rhythm.      Heart sounds: Normal heart sounds.   Pulmonary:      Effort: Pulmonary effort is normal. No respiratory distress.      Breath sounds: Normal breath sounds. No wheezing or rales.   Chest:      Chest wall: No tenderness.   Abdominal:      General: Bowel sounds are normal. There is no distension.      Palpations: Abdomen is soft. There is no mass.      Tenderness: There is no abdominal tenderness. There is no guarding or rebound.   Musculoskeletal:         General: Normal range of motion.      Cervical back: Normal range of motion and neck supple.   Lymphadenopathy:      Cervical: No cervical adenopathy.   Skin:     General: Skin is warm and dry. "   Neurological:      Comments: Opens eyes to verbal command   Sleepy              Significant Labs: All pertinent labs within the past 24 hours have been reviewed.  CBC:   Recent Labs   Lab 05/11/24 2147   WBC 4.08   HGB 9.7*   HCT 33.5*   *     CMP:   Recent Labs   Lab 05/11/24 2147      K 4.8      CO2 35*   *   BUN 29*   CREATININE 0.9   CALCIUM 8.8   PROT 5.9*   ALBUMIN 2.7*   BILITOT 0.2   ALKPHOS 89   AST 27   ALT 11   ANIONGAP 8       7.470 High  7.410 7.410 7.420 7.520 High  7.470 High  7.390     POC PCO2 35 - 45 mmHg 81 High Panic  88 High Panic  76 High Panic  81 High Panic  47 High  56 High  56 High    POC PO2 75 - 100 mmHg 70 Low  74 Low  82 49 Low Panic  81 64 Low  65 Low    POC THb 12 - 18 g/dL 10.3 Low  10.2 Low  10.0 Low  10.2 Low  9.2 Low  11.3 Low  11.6 Low    POC O2Hb Arterial 94 - 100 % 93.3 Low  93.5 Low  95.1 83.1 Low  94.9 91.3 Low  91.9 Low    POC COHb 0 - 3.0 % 1.8 2.0 3.7 High  2.5 3.2 High  1.6 1.5   POC MetHb 0 - 1.5 % 0.8 0.8 0.0 0.9 1.4 1.1 0.7   POC SATURATED O2 90 - 100 % 95.8 96.2 98.8 86.0 Low  99.5 93.8 94.1   POC TCO2 mmol/L 61.5 High  58.5 High  50.5 55.0 39.8 High  42.5 High  35.6 High    POC BE -2.00 - 2.00 mmol/L 30.60 High  26.80 High  20.30 High  24.10 High  14.00 High  14.90 High  7.40 High    POC HCO3 22.00 - 26.00 mmol/L 59.00 High  55.80 High  48.20 High  52.50 High  38.40 High  40.80 High  33.90 High    Site  Right Brachial Right Brachial Right Radial Right Radial Right Radial Right Brachial Right Radial   DelSys  CPAP/BiPAP CPAP/B          Significant Imaging: I have reviewed all pertinent imaging results/findings within the past 24 hours.  CXR: I have reviewed all pertinent results/findings within the past 24 hours and my personal findings are:  Left chest wall cardiac pacemaker.  Enlarged cardiac silhouette with pulmonary vascular congestion.  Mild interstitial edema.  Trace bilateral pleural effusions.

## 2025-01-16 NOTE — PROGRESS NOTES
- Well controlled  - Continue valsartan-hydrochlorothiazide 160-25 mg daily    Subjective:       Patient ID: Gretel Ashton is a 78 y.o. female.    Chief Complaint: Mass (Back)    Review of patient's allergies indicates:  No Known Allergies  Patient with left lower back sebaceous cyst.  I saw her about a month ago.  She is now ready to schedule surgery. This will be done in the operating room under local anesthesia.  The cyst has a small little blackhead and it is approximately 3 cm in the left lower back.    Past Medical History:   Diagnosis Date    Arthritis     Depression     Diabetes mellitus type II     Hyperlipidemia     Hypertension     Pacemaker      Past Surgical History:   Procedure Laterality Date    CARDIAC PACEMAKER PLACEMENT       SECTION      INNER EAR SURGERY       Family History   Problem Relation Age of Onset    Cancer Mother     Breast cancer Mother     Stroke Father     Cancer Sister     Breast cancer Sister     Stroke Maternal Grandmother      Social History     Socioeconomic History    Marital status:      Spouse name: Not on file    Number of children: Not on file    Years of education: Not on file    Highest education level: Not on file   Occupational History    Not on file   Social Needs    Financial resource strain: Not on file    Food insecurity:     Worry: Not on file     Inability: Not on file    Transportation needs:     Medical: Not on file     Non-medical: Not on file   Tobacco Use    Smoking status: Former Smoker     Packs/day: 2.00     Years: 43.00     Pack years: 86.00     Types: Cigarettes     Last attempt to quit: 2000     Years since quittin.5    Smokeless tobacco: Never Used   Substance and Sexual Activity    Alcohol use: No    Drug use: No    Sexual activity: Not on file   Lifestyle    Physical activity:     Days per week: Not on file     Minutes per session: Not on file    Stress: To some extent   Relationships    Social connections:     Talks on phone: Not on file     Gets together: Not on file      Attends Restorationism service: Not on file     Active member of club or organization: Not on file     Attends meetings of clubs or organizations: Not on file     Relationship status: Not on file   Other Topics Concern    Not on file   Social History Narrative    Not on file     Vitals:    07/23/19 0945   BP: (!) 92/48   Pulse: 80   Resp: 18       Review of Systems   All other systems reviewed and are negative.      Objective:      Physical Exam   Constitutional: She is oriented to person, place, and time. She appears well-developed and well-nourished.   HENT:   Head: Normocephalic.   Eyes: Pupils are equal, round, and reactive to light.   Neck: Normal range of motion.   Pulmonary/Chest: Effort normal.   Abdominal: Soft.   Musculoskeletal: Normal range of motion.   Neurological: She is alert and oriented to person, place, and time.   Skin: Skin is warm and dry.   3 cm sebaceous cyst left lower back with a small punctate blackhead.  No evidence of redness or drainage.   Psychiatric: She has a normal mood and affect.       Assessment:       1. Screening mammogram, encounter for    2. Sebaceous cyst        Plan:         Gretel was seen today for mass.    Diagnoses and all orders for this visit:    Screening mammogram, encounter for  -     Mammo Digital Screening Bilateral With CAD; Future    Sebaceous cyst     Patient will be scheduled for excision of this left lower back sebaceous cyst under local anesthesia.    No follow-ups on file.          Jaylen Gonzalez Jr, MD

## (undated) DEVICE — GAUZE SPONGE 4X4 12PLY

## (undated) DEVICE — SUT ETHILON 2-0 FS 18IN BLK

## (undated) DEVICE — SLEEVE LITE DEVON

## (undated) DEVICE — DRESSING XEROFORM 1X8IN

## (undated) DEVICE — SEE MEDLINE ITEM 157117

## (undated) DEVICE — DRAPE MINOR PROCEDURE

## (undated) DEVICE — NDL ECLIPSE SAFETY 18GX1-1/2IN

## (undated) DEVICE — ELECTRODE REM PLYHSV RETURN 9

## (undated) DEVICE — PACK SET UP

## (undated) DEVICE — SUT 2-0 VICRYL / CT-1

## (undated) DEVICE — NDL SAFETY 25G X 1.5 ECLIPSE

## (undated) DEVICE — NDL GUARD

## (undated) DEVICE — GLOVE 8 PROTEXIS PI BLUE

## (undated) DEVICE — ADHESIVE DERMABOND ADVANCED

## (undated) DEVICE — SKIN MARKER STER DUAL TIP

## (undated) DEVICE — SUT 2-0 VICRYL / SH (J417)

## (undated) DEVICE — APPLICATOR CHLORAPREP ORN 26ML

## (undated) DEVICE — SEE MEDLINE ITEM 152622

## (undated) DEVICE — SPONGE GAUZE 16PLY 4X4

## (undated) DEVICE — SUT MONOCRYL 4-0

## (undated) DEVICE — STAPLER SKIN ROTATING HEAD

## (undated) DEVICE — SYR W/CANNULA 10CC

## (undated) DEVICE — GLOVE PROTEXIS LTX MICRO  7.5

## (undated) DEVICE — SUT 3-0 VICRYL / FS-2